# Patient Record
Sex: MALE | Race: WHITE | NOT HISPANIC OR LATINO | Employment: FULL TIME | URBAN - METROPOLITAN AREA
[De-identification: names, ages, dates, MRNs, and addresses within clinical notes are randomized per-mention and may not be internally consistent; named-entity substitution may affect disease eponyms.]

---

## 2017-01-06 ENCOUNTER — PATIENT OUTREACH (OUTPATIENT)
Dept: OTHER | Facility: OTHER | Age: 77
End: 2017-01-06
Payer: MEDICARE

## 2017-01-06 NOTE — PROGRESS NOTES
Last 5 Patient Entered Readings                                                               Current 30 Day Average: 134/72     Recent Readings 12/30/2016 12/29/2016 12/27/2016 12/26/2016 12/25/2016    Systolic BP (mmHg) 139 133 140 138 135    Diastolic BP (mmHg) 72 79 73 72 72    Pulse 60 62 59 58 63        Feeling well. Had a higher reading this morning but feels fine. No HA. BG is also slightly elevated an unsure why. Will continue to monitor. Wife inputs readings weekly.    Follow up with Mr. Reid Herman completed. Patient is maintaining a low sodium diet and continuing his exercise regime. Patient did not have any further questions or concerns. I will follow up in a few weeks to see how he is doing and progressing.

## 2017-01-13 ENCOUNTER — PATIENT OUTREACH (OUTPATIENT)
Dept: OTHER | Facility: OTHER | Age: 77
End: 2017-01-13
Payer: MEDICARE

## 2017-01-13 NOTE — PROGRESS NOTES
Mr. Herman is doing well. He has noticed BP is trending up. Admits to occasional canned foods.     Last 5 Patient Entered Readings                                                               Current 30 Day Average: 134/71     Recent Readings 1/11/2017 1/10/2017 1/9/2017 1/8/2017 1/7/2017    Systolic BP (mmHg) 139 141 115 145 158    Diastolic BP (mmHg) 72 68 61 75 81    Pulse 66 61 59 63 61      BP at goal, trending up  Continue monitoring

## 2017-01-19 ENCOUNTER — TELEPHONE (OUTPATIENT)
Dept: INTERNAL MEDICINE | Facility: CLINIC | Age: 77
End: 2017-01-19

## 2017-01-19 ENCOUNTER — OFFICE VISIT (OUTPATIENT)
Dept: INTERNAL MEDICINE | Facility: CLINIC | Age: 77
End: 2017-01-19
Payer: MEDICARE

## 2017-01-19 VITALS
HEIGHT: 67 IN | OXYGEN SATURATION: 97 % | HEART RATE: 80 BPM | SYSTOLIC BLOOD PRESSURE: 138 MMHG | WEIGHT: 206 LBS | BODY MASS INDEX: 32.33 KG/M2 | TEMPERATURE: 99 F | DIASTOLIC BLOOD PRESSURE: 56 MMHG

## 2017-01-19 DIAGNOSIS — B02.9 HERPES ZOSTER WITHOUT COMPLICATION: Primary | ICD-10-CM

## 2017-01-19 DIAGNOSIS — E10.42 POORLY CONTROLLED TYPE 1 DIABETES MELLITUS WITH PERIPHERAL NEUROPATHY: Chronic | ICD-10-CM

## 2017-01-19 DIAGNOSIS — L30.9 DERMATITIS: ICD-10-CM

## 2017-01-19 DIAGNOSIS — C91.10 CLL (CHRONIC LYMPHOCYTIC LEUKEMIA): ICD-10-CM

## 2017-01-19 DIAGNOSIS — M46.1 SACROILIITIS: ICD-10-CM

## 2017-01-19 DIAGNOSIS — E10.65 POORLY CONTROLLED TYPE 1 DIABETES MELLITUS WITH PERIPHERAL NEUROPATHY: Chronic | ICD-10-CM

## 2017-01-19 DIAGNOSIS — I25.119 CORONARY ARTERY DISEASE INVOLVING NATIVE CORONARY ARTERY OF NATIVE HEART WITH ANGINA PECTORIS: ICD-10-CM

## 2017-01-19 DIAGNOSIS — I70.0 ATHEROSCLEROSIS OF AORTA: ICD-10-CM

## 2017-01-19 DIAGNOSIS — E10.3599 PROLIFERATIVE DIABETIC RETINOPATHY WITHOUT MACULAR EDEMA ASSOCIATED WITH TYPE 1 DIABETES MELLITUS, UNSPECIFIED LATERALITY: ICD-10-CM

## 2017-01-19 DIAGNOSIS — I73.9 PERIPHERAL VASCULAR DISEASE: ICD-10-CM

## 2017-01-19 DIAGNOSIS — D69.2 SENILE PURPURA: ICD-10-CM

## 2017-01-19 PROCEDURE — 99499 UNLISTED E&M SERVICE: CPT | Mod: S$GLB,,, | Performed by: NURSE PRACTITIONER

## 2017-01-19 PROCEDURE — 4010F ACE/ARB THERAPY RXD/TAKEN: CPT | Mod: S$GLB,,, | Performed by: NURSE PRACTITIONER

## 2017-01-19 PROCEDURE — 99213 OFFICE O/P EST LOW 20 MIN: CPT | Mod: S$GLB,,, | Performed by: NURSE PRACTITIONER

## 2017-01-19 PROCEDURE — 1157F ADVNC CARE PLAN IN RCRD: CPT | Mod: S$GLB,,, | Performed by: NURSE PRACTITIONER

## 2017-01-19 PROCEDURE — 3078F DIAST BP <80 MM HG: CPT | Mod: S$GLB,,, | Performed by: NURSE PRACTITIONER

## 2017-01-19 PROCEDURE — 3045F PR MOST RECENT HEMOGLOBIN A1C LEVEL 7.0-9.0%: CPT | Mod: S$GLB,,, | Performed by: NURSE PRACTITIONER

## 2017-01-19 PROCEDURE — 99999 PR PBB SHADOW E&M-EST. PATIENT-LVL IV: CPT | Mod: PBBFAC,,, | Performed by: NURSE PRACTITIONER

## 2017-01-19 PROCEDURE — 1126F AMNT PAIN NOTED NONE PRSNT: CPT | Mod: S$GLB,,, | Performed by: NURSE PRACTITIONER

## 2017-01-19 PROCEDURE — 1159F MED LIST DOCD IN RCRD: CPT | Mod: S$GLB,,, | Performed by: NURSE PRACTITIONER

## 2017-01-19 PROCEDURE — 3075F SYST BP GE 130 - 139MM HG: CPT | Mod: S$GLB,,, | Performed by: NURSE PRACTITIONER

## 2017-01-19 PROCEDURE — 1160F RVW MEDS BY RX/DR IN RCRD: CPT | Mod: S$GLB,,, | Performed by: NURSE PRACTITIONER

## 2017-01-19 RX ORDER — VALACYCLOVIR HYDROCHLORIDE 1 G/1
1000 TABLET, FILM COATED ORAL 3 TIMES DAILY
Qty: 21 TABLET | Refills: 0 | Status: SHIPPED | OUTPATIENT
Start: 2017-01-19 | End: 2017-07-05

## 2017-01-19 RX ORDER — TRIAMCINOLONE ACETONIDE 1 MG/G
OINTMENT TOPICAL 2 TIMES DAILY
Qty: 30 G | Refills: 0 | Status: SHIPPED | OUTPATIENT
Start: 2017-01-19 | End: 2017-07-05

## 2017-01-19 NOTE — MR AVS SNAPSHOT
[facility] Internal Medicine  [address]  [place] [address]  Phone: [phone]  Fax: [phone]                  Reid Herman   2017 5:00 PM   Office Visit    Description:  Male : 1940   Provider:  Elina Martínez NP   Department:  [facility] - Internal Medicine           Reason for Visit     Insect Bite           Diagnoses this Visit        Comments    Herpes zoster without complication    -  Primary     Dermatitis                To Do List           Future Appointments        Provider Department Dept Phone    2017 5:00 PM Elina Martínez NP [facility] Internal Medicine [phone]    2017 8:30 AM LAB, APPOINTMENT [facility] [phone]    2017 8:30 AM MILDRED Fields, FNP [facility] Endocrinology [phone]    2017 8:30 AM Olivia Zavala MD [facility] Internal Medicine [phone]    2017 8:30 AM Marcos Alexander MD [facility] Cardiology [phone]      Goals (5 Years of Data)              Today    Today    17    Blood Pressure <= 140/90   138/56  150/60  139/72    Notes - Note created  2016  3:26 PM by Gilda Turcios, PharmD    It is important to consistently maintain a controlled blood pressure.      Exercise at least 150 minutes per week.           Maintain a low sodium diet           Take at least one BP reading per week at various times of the day           Weight (lb) < 87.5 kg (192 lb 13.6 oz)   93.4 kg (206 lb)        Notes - Note created  2016  3:29 PM by Gilda Turcios, PharmD    Decrease weight by 5% to improve cardiovascular outcomes.         These Medications        Disp Refills Start End    valacyclovir (VALTREX) 1000 MG tablet 21 tablet 0 2017    Take 1 tablet (1,000 mg total) by mouth 3 (three) times daily. - Oral    Pharmacy: [id number] - [place], [place] - [address] AT [facility] [address] Ph #:  [phone]       triamcinolone acetonide 0.1% (KENALOG) 0.1 % ointment 30 g 0 1/19/2017 1/29/2017    Apply topically 2 (two) times daily. - Topical (Top)    Pharmacy: [facility] [address] [place] [address] [address] AT [facility] [address] Ph #: [phone]         [facility] On [facility]     [facility] On Call Nurse Care Line - 24/7 Assistance  Registered nurses in the [facility] On Call Center provide clinical advisement, health education, appointment booking, and other advisory services.  Call for this free service at [phone].             Medications           Message regarding Medications     Verify the changes and/or additions to your medication regime listed below are the same as discussed with your clinician today.  If any of these changes or additions are incorrect, please notify your healthcare provider.        START taking these NEW medications        Refills    valacyclovir (VALTREX) 1000 MG tablet 0    Sig: Take 1 tablet (1,000 mg total) by mouth 3 (three) times daily.    Class: Normal    Route: Oral    triamcinolone acetonide 0.1% (KENALOG) 0.1 % ointment 0    Sig: Apply topically 2 (two) times daily.    Class: Normal    Route: Topical (Top)           Verify that the below list of medications is an accurate representation of the medications you are currently taking.  If none reported, the list may be blank. If incorrect, please contact your healthcare provider. Carry this list with you in case of emergency.           Current Medications     ACCU-CHEK FASTCLIX Misc TEST 6 TIMES DAILY    alpha lipoic acid 600 mg Cap Take 1 capsule by mouth once daily.      aspirin (ECOTRIN) 325 MG EC tablet Take 325 mg by mouth once daily.    atorvastatin (LIPITOR) 80 MG tablet Take 1 tablet (80 mg total) by mouth nightly.    blood sugar diagnostic (ACCU-CHEK SHARON PLUS TEST STRP) Strp Pt test blood sugar 5 times a day.    clotrimazole-betamethasone 1-0.05% (LOTRISONE) cream Apply topically 2  "(two) times daily.    FLUZONE HIGH-DOSE 2016-17, PF, 180 mcg/0.5 mL Syrg     fosinopril-hydrochlorothiazide (MONOPRIL-HCT) 10-12.5 mg per tablet Take 2 tablets by mouth once daily.    L-METHYL-B6-B12 3-35-2 mg Tab TAKE 1 TABLET BY MOUTH EVERY DAY    metformin (GLUCOPHAGE) 500 MG tablet TAKE 1 TABLET TWICE DAILY    metoprolol succinate (TOPROL-XL) 100 MG 24 hr tablet TAKE ONE TABLET BY MOUTH EVERY EVENING    multivitamin capsule Take 1 capsule by mouth once daily.      nitroGLYCERIN (NITROSTAT) 0.4 MG SL tablet Place 1 tablet (0.4 mg total) under the tongue every 5 (five) minutes as needed for Chest pain.    NOVOLOG 100 unit/mL injection VIA INSULIN PUMP INJECT UP TO MAX  UNITS DAILY    triamcinolone acetonide 0.1% (KENALOG) 0.1 % ointment Apply topically 2 (two) times daily.    valacyclovir (VALTREX) 1000 MG tablet Take 1 tablet (1,000 mg total) by mouth 3 (three) times daily.           Clinical Reference Information           Vital Signs - Last Recorded  Most recent update: 1/19/2017 11:45 AM by Elina Martínez NP    BP Pulse Temp Ht Wt SpO2    (!) 138/56 (BP Location: Right arm, Patient Position: Sitting, BP Method: Manual) 80 99.2 °F (37.3 °C) 5' 7" (1.702 m) 93.4 kg (206 lb) 97%    BMI                32.26 kg/m2          Blood Pressure          Most Recent Value    BP  (!)  138/56      Allergies as of 1/19/2017     No Known Allergies      Immunizations Administered on Date of Encounter - 1/19/2017     None      "

## 2017-01-19 NOTE — TELEPHONE ENCOUNTER
Patient walked in today 1/19/17 complaining of possible bug bites on the back of his head and neck. Notified MD and was able to scheduled an with extended hours to see Elina Martínez Np.

## 2017-01-19 NOTE — PROGRESS NOTES
Subjective:       Patient ID: Reid Herman is a 76 y.o. male.    Chief Complaint: Shagufta Mathews is a 76 y.o. male who presents today for a rash on the back of his head that has been present since Sunday.  Early on Sunday he was working in his yard and later in the day he noticed several slightly painful red spot on the back of his head that his wife pointed out.  They tried putting Neosporin on the area with no improvement.  He reports that the pain from the rash has gotten slightly worse since Sunday.  He describes the pain as tender to the touch and says there is a slight tingling feeling.  He has not been able to use his CPAP machine for the last few days because the strap hits at exactly the point of the rash and causes significant discomfort.  Rash   This is a new problem. The current episode started in the past 7 days. The problem has been gradually worsening since onset. The affected locations include the scalp and head. The rash is characterized by redness, swelling and pain. It is unknown if there was an exposure to a precipitant. Pertinent negatives include no diarrhea, fever, shortness of breath or vomiting. Past treatments include antibiotic cream. The treatment provided no relief.     Review of Systems   Constitutional: Negative for fever.   HENT: Negative for facial swelling.    Eyes: Negative for visual disturbance.   Respiratory: Negative for shortness of breath.    Cardiovascular: Negative for chest pain.   Gastrointestinal: Negative for diarrhea, nausea and vomiting.   Genitourinary: Negative for difficulty urinating.   Musculoskeletal: Negative for gait problem.   Skin: Positive for rash.   Neurological: Negative for headaches.   Psychiatric/Behavioral: Negative for confusion.       Objective:      Physical Exam   Constitutional: He is oriented to person, place, and time. He appears well-developed. No distress.   obese   HENT:   Head: Atraumatic.   Eyes: Right eye exhibits no discharge.  Left eye exhibits no discharge.   Neck: Normal range of motion. Neck supple.   Cardiovascular: Normal rate, regular rhythm and normal heart sounds.    Pulmonary/Chest: Effort normal and breath sounds normal. No respiratory distress.   Musculoskeletal: He exhibits edema.   Swelling to LE bilat   Lymphadenopathy:     He has no cervical adenopathy.   Neurological: He is alert and oriented to person, place, and time.   Skin: He is not diaphoretic.        Psychiatric: His behavior is normal.   Nursing note and vitals reviewed.      Assessment:       1. Herpes zoster without complication    2. Dermatitis    3. 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 Peripheral vascular disease    5. Coronary artery disease involving native coronary artery of native heart with angina pectoris    6. Atherosclerosis of aorta    7. CLL (chronic lymphocytic leukemia)    8. Sacroiliitis    9. Senile purpura    10. Proliferative diabetic retinopathy without macular edema associated with type 1 diabetes mellitus, unspecified laterality        Plan:   Reid was seen today for rash.    Diagnoses and all orders for this visit:    Herpes zoster without complication  -     valacyclovir (VALTREX) 1000 MG tablet; Take 1 tablet (1,000 mg total) by mouth 3 (three) times daily.  - Lesion distribution and pain quality consistent with shingles, but no definitive vesicular lesions.  He had shingles vaccines in 2012, so I suspect this is a mild outbreak.   - Call Monday if no improvement or sooner if interval worsening occurs.   Dermatitis  -     triamcinolone acetonide 0.1% (KENALOG) 0.1 % ointment; Apply topically 2 (two) times daily.    Poorly controlled type 1 diabetes mellitus with peripheral neuropathy  Last A1c was 7.5.  Followed by endocrinology and PCP    Peripheral vascular disease  - Stable, followed by cardiology and PCP    Coronary artery disease involving native coronary artery of native heart with angina  pectoris  - Occasional use of Nitro, generally following strenuous activity. Stable, followed by cardiology and PCP    Atherosclerosis of aorta  - Stable, chronic findings.  BP and lipids are well controlled.  Followed by PCP and cardiology    CLL (chronic lymphocytic leukemia)  - Stable, followed by oncology    Sacroiliitis  - Pain has been improved recently. PCP following.     Senile purpura  - Numerous  ecchymotic lesions to forearms bilaterally.  Sees dermatology regularly.     Proliferative diabetic retinopathy without macular edema associated with type 1 diabetes mellitus, unspecified laterality  - Stable, followed by opthalmology.           Pt has been given instructions populated from [employer] database and has verbalized understanding of the after visit summary and information contained wherein.    Follow up with a primary care provider. May go to ER for acute shortness of breath, lightheadedness, fever, or any other emergent complaints or changes in condition.

## 2017-01-23 ENCOUNTER — TELEPHONE (OUTPATIENT)
Dept: OPHTHALMOLOGY | Facility: CLINIC | Age: 77
End: 2017-01-23

## 2017-01-23 ENCOUNTER — LAB VISIT (OUTPATIENT)
Dept: LAB | Facility: HOSPITAL | Age: 77
End: 2017-01-23
Attending: NURSE PRACTITIONER
Payer: MEDICARE

## 2017-01-23 DIAGNOSIS — E10.65 POORLY CONTROLLED TYPE 1 DIABETES MELLITUS WITH PERIPHERAL NEUROPATHY: Chronic | ICD-10-CM

## 2017-01-23 DIAGNOSIS — E10.319: ICD-10-CM

## 2017-01-23 DIAGNOSIS — E10.42 DIABETIC POLYNEUROPATHY ASSOCIATED WITH TYPE 1 DIABETES MELLITUS: ICD-10-CM

## 2017-01-23 DIAGNOSIS — E10.22 TYPE 1 DIABETES MELLITUS WITH STAGE 3 CHRONIC KIDNEY DISEASE: ICD-10-CM

## 2017-01-23 DIAGNOSIS — E10.42 POORLY CONTROLLED TYPE 1 DIABETES MELLITUS WITH PERIPHERAL NEUROPATHY: Chronic | ICD-10-CM

## 2017-01-23 DIAGNOSIS — N18.30 TYPE 1 DIABETES MELLITUS WITH STAGE 3 CHRONIC KIDNEY DISEASE: ICD-10-CM

## 2017-01-23 LAB
ESTIMATED AVG GLUCOSE: 174 MG/DL
HBA1C MFR BLD HPLC: 7.7 %

## 2017-01-23 PROCEDURE — 36415 COLL VENOUS BLD VENIPUNCTURE: CPT

## 2017-01-23 PROCEDURE — 83036 HEMOGLOBIN GLYCOSYLATED A1C: CPT

## 2017-01-23 NOTE — TELEPHONE ENCOUNTER
----- Message from Olya Maya sent at 1/23/2017  1:50 PM CST -----  Contact: Reid Herman   Pt would like to speak with  nurse to scheduled the recall pt can be reached at [phone] please thanks.

## 2017-01-26 ENCOUNTER — TELEPHONE (OUTPATIENT)
Dept: INTERNAL MEDICINE | Facility: CLINIC | Age: 77
End: 2017-01-26

## 2017-01-26 NOTE — TELEPHONE ENCOUNTER
----- Message from Chery Roy sent at 1/26/2017 10:31 AM CST -----  Contact: Self/ [phone]   Pt want to know what should he do next. He is about to take the last pill today. Please call and advise     Thank you

## 2017-01-26 NOTE — TELEPHONE ENCOUNTER
Please call and find out if the problem has resolved. If so, he does not need to do anything.  If it has not, he needs to see dermatology

## 2017-01-26 NOTE — TELEPHONE ENCOUNTER
Called pt, he states problem is still occurring. made dermatology appt. Pt advised to call and see if he can get in for an earlier appt.

## 2017-01-31 ENCOUNTER — OFFICE VISIT (OUTPATIENT)
Dept: ENDOCRINOLOGY | Facility: CLINIC | Age: 77
End: 2017-01-31
Payer: MEDICARE

## 2017-01-31 VITALS
HEIGHT: 67 IN | DIASTOLIC BLOOD PRESSURE: 62 MMHG | BODY MASS INDEX: 32.28 KG/M2 | SYSTOLIC BLOOD PRESSURE: 135 MMHG | HEART RATE: 64 BPM | WEIGHT: 205.69 LBS

## 2017-01-31 DIAGNOSIS — I15.0 RENOVASCULAR HYPERTENSION: ICD-10-CM

## 2017-01-31 DIAGNOSIS — B02.8 HERPES ZOSTER WITH COMPLICATION: ICD-10-CM

## 2017-01-31 DIAGNOSIS — Z96.41 INSULIN PUMP STATUS: ICD-10-CM

## 2017-01-31 DIAGNOSIS — C91.10 CLL (CHRONIC LYMPHOCYTIC LEUKEMIA): ICD-10-CM

## 2017-01-31 DIAGNOSIS — E78.2 MIXED HYPERLIPIDEMIA: ICD-10-CM

## 2017-01-31 DIAGNOSIS — E10.42 POORLY CONTROLLED TYPE 1 DIABETES MELLITUS WITH PERIPHERAL NEUROPATHY: Chronic | ICD-10-CM

## 2017-01-31 DIAGNOSIS — E10.319 DIABETIC RETINOPATHY WITHOUT MACULAR EDEMA ASSOCIATED WITH TYPE 1 DIABETES MELLITUS, UNSPECIFIED LATERALITY, UNSPECIFIED RETINOPATHY SEVERITY: ICD-10-CM

## 2017-01-31 DIAGNOSIS — E10.65 POORLY CONTROLLED TYPE 1 DIABETES MELLITUS WITH PERIPHERAL NEUROPATHY: Chronic | ICD-10-CM

## 2017-01-31 DIAGNOSIS — N18.30 CHRONIC KIDNEY DISEASE, STAGE III (MODERATE): ICD-10-CM

## 2017-01-31 DIAGNOSIS — N18.30 TYPE 1 DIABETES MELLITUS WITH STAGE 3 CHRONIC KIDNEY DISEASE: Primary | ICD-10-CM

## 2017-01-31 DIAGNOSIS — E10.22 TYPE 1 DIABETES MELLITUS WITH STAGE 3 CHRONIC KIDNEY DISEASE: Primary | ICD-10-CM

## 2017-01-31 PROBLEM — B02.9 SHINGLES: Status: ACTIVE | Noted: 2017-01-31

## 2017-01-31 PROCEDURE — 1126F AMNT PAIN NOTED NONE PRSNT: CPT | Mod: S$GLB,,, | Performed by: NURSE PRACTITIONER

## 2017-01-31 PROCEDURE — 99999 PR PBB SHADOW E&M-EST. PATIENT-LVL IV: CPT | Mod: PBBFAC,,, | Performed by: NURSE PRACTITIONER

## 2017-01-31 PROCEDURE — 1157F ADVNC CARE PLAN IN RCRD: CPT | Mod: S$GLB,,, | Performed by: NURSE PRACTITIONER

## 2017-01-31 PROCEDURE — 99215 OFFICE O/P EST HI 40 MIN: CPT | Mod: S$GLB,,, | Performed by: NURSE PRACTITIONER

## 2017-01-31 PROCEDURE — 1159F MED LIST DOCD IN RCRD: CPT | Mod: S$GLB,,, | Performed by: NURSE PRACTITIONER

## 2017-01-31 PROCEDURE — 3075F SYST BP GE 130 - 139MM HG: CPT | Mod: S$GLB,,, | Performed by: NURSE PRACTITIONER

## 2017-01-31 PROCEDURE — 1160F RVW MEDS BY RX/DR IN RCRD: CPT | Mod: S$GLB,,, | Performed by: NURSE PRACTITIONER

## 2017-01-31 PROCEDURE — 3045F PR MOST RECENT HEMOGLOBIN A1C LEVEL 7.0-9.0%: CPT | Mod: S$GLB,,, | Performed by: NURSE PRACTITIONER

## 2017-01-31 PROCEDURE — 4010F ACE/ARB THERAPY RXD/TAKEN: CPT | Mod: S$GLB,,, | Performed by: NURSE PRACTITIONER

## 2017-01-31 PROCEDURE — 99499 UNLISTED E&M SERVICE: CPT | Mod: S$GLB,,, | Performed by: NURSE PRACTITIONER

## 2017-01-31 PROCEDURE — 3078F DIAST BP <80 MM HG: CPT | Mod: S$GLB,,, | Performed by: NURSE PRACTITIONER

## 2017-01-31 NOTE — PATIENT INSTRUCTIONS
Snacks can be an important part of a balanced, healthy meal plan. They allow you to eat more frequently, feeling full and satisfied throughout the day. Also, they allow you to spread carbohydrates evenly, which may stabilize blood sugars.  Plus, snacks are enjoyable!     The amount of carbohydrate needed at snacks varies. Generally, about 15-30 grams of carbohydrate per snack is recommended.  Below you will find some tasty treats.       0-5 gm carb   Crystal Light   Vitamin Water Zero   Herbal tea, unsweetened   2 tsp peanut butter on celery   1./2 cup sugar-free jell-o   1 sugar-free popsicle   ¼ cup blueberries   8oz Blue Angelique unsweetened almond milk   5 baby carrots & celery sticks, cucumbers, bell peppers dipped in ¼ cup salsa, 2Tbsp light ranch dressing or 2Tbsp plain Greek yogurt   10 Goldfish crackers   ½ oz low-fat cheese or string cheese   1 closed handful of nuts, unsalted   1 Tbsp of sunflower seeds, unsalted   1 cup Smart Pop popcorn   1 whole grain brown rice cake        15 gm carb   1 small piece of fruit or ½ banana or 1/2 cup lite canned fruit   3 ellyn cracker squares   3 cups Smart Pop popcorn, top spray butter, Nelson lite salt or cinnamon and Truvia   5 Vanilla Wafers   ½ cup low fat, no added sugar ice cream or frozen yogurt (Blue bell, Blue Bunny, Weight Watchers, Skinny Cow)   ½ turkey, ham, or chicken sandwich   ½ c fruit with ½ c Cottage cheese   4-6 unsalted wheat crackers with 1 oz low fat cheese or 1 tbsp peanut butter    30-45 goldfish crackers (depending on flavor)    7-8 Scientology mini brown rice cakes (caramel, apple cinnamon, chocolate)    12 Scientology mini brown rice cakes (cheddar, bbq, ranch)    1/3 cup hummus dip with raw veg   1/2 whole wheat celina, 1Tbsp hummus   Mini Pizza (1/2 whole wheat English muffin, low-fat  cheese, tomato sauce)   100 calorie snack pack (Oreo, Chips Ahoy, Ritz Mix, Baked Cheetos)   4-6 oz. light or Greek Style yogurt  (Guerita Childs, Michelle, [facility])   ½ cup sugar-free pudding     6 in. wheat tortilla or celina oven toasted chips (topped with spray butter flavoring, cinnamon, Truvia OR spray butter, garlic powder, chili powder)    18 BBQ Popchips (available at Target, Whole Foods, Fresh Market)                   Diabetes Support Group Meetings    Date Topics   February 9 Health Promotion/Nutrition   March 9 Taking Care of Your Kidneys   April 13 Taking Care of Your Feet   May 11 Ease Your Mind with Diabetes   June 8 Hurricane and Emergency Preparedness   July 13 Family & Caregiver Support for Diabetes   *August 17  Taking Care of Your Eyes   September 14 Devices & Technology   October 12 Recipes & Treats   November 9 Getting Pumped Up for Diabetes   December 14 Year-End Close Out            Meetings are held in the [facility] (A) of the [facility] located at [address] [place] [address]. Please call [phone] for additional information.    Free service, offered every 2nd Thursday of every month, except in August! Family members and/or friends are welcome as well!  Support group is for patients with type 1 or type 2 diabetes.    From 3:30p to 4:30p

## 2017-01-31 NOTE — PROGRESS NOTES
CC: This 76 y.o.  male presents for management of Diabetes Mellitus   along with the current chronic medical conditions including:  Patient Active Problem List   Diagnosis    CLL (chronic lymphocytic leukemia)        Diabetic retinopathy associated with type 1 diabetes mellitus    Insulin pump status    Diabetic polyneuropathy associated with type 1 diabetes mellitus    Poorly controlled type 1 diabetes mellitus with peripheral neuropathy    Type 1 diabetes, uncontrolled, with retinopathy    CAD (coronary artery disease)    PDR (proliferative diabetic retinopathy) - Right Eye    NPDR (nonproliferative diabetic retinopathy) - Left Eye    Posterior vitreous detachment - Both Eyes    Hyperlipidemia    HTN (hypertension)            Pseudophakia    BPH with urinary obstruction    Erectile dysfunction    S/P CABG x 2            Insulin pump fitting or adjustment    LBP (low back pain)    Vitreous hemorrhage    Lumbar facet arthropathy    DDD (degenerative disc disease), lumbar    Obstructive sleep apnea    Peripheral vascular disease    Lumbar spondylosis            Spondylolisthesis    S/P lumbar fusion      Status of these conditions is pending review.    HPI: Pt was diagnosed with T1DM in 1972- started on insulin.   Never hospitalized r/t DM recently.   Pt last seen by me in September 2016 and is now being seen by me again today.   Pt currently has accuchek spirit pump/sarai expert.    Pt reports some hypoglycemia in the evening-with possible over correction and a few in the am (50s).  Pt recently was treated for shingles, currently has topical steroid cream and on valtrex.    CURRENT DM MEDS: Metformin 500 mg BID, accuchek spirit pump   See epic download    Pt is monitoring BG at home 5 times a day.   Pt has had hypoglycemia at home- 60s-70s  In am x 2 weeks ago-corrects w/ juice, lowest 44, highest 295  Reid Herman brought glucometer or log to clinic today revealing the  following BG readings:    See download    tdd 48.65  51.2 % bolus, 48.8% basal    DIET/ MEAL PATTERN: Pt eats 3 meals a day    Yogurt, fruit, sugar free cookies     Snack around 7-8p nuts    EXERCISE: no formal exercise, walks occasionally-has cane, PT for back     STANDARDS OF CARE:  Eye exam: 2016, f/u q4-6 mos (floaters/injections/laser therapy), AMD, vit   Podiatry: 2016 f/u q2-3 mos toe nail clipping/ingrown toe nail problem (Dr. Maye Wyatt)                       ROS:   Gen: Appetite good, no weight gain or loss, denies fatigue and + weakness (after sx)-physical therapy.  Skin: Skin is intact and heals well, +shingles (on head, neck), + pruritis, easy bruising, no hair changes, no intolerance to heat/cold.  Eyes: + visual disturbances (floaters)-2015, 2016   Resp: no SOB or DIOR, no cough  Cardiac: No palpitations, chest pain, denies syncope, weakness, no edema or cyanosis.  GI: No nausea or vomiting, diarrhea, +constipation-r/t pain meds, or abdominal pain.  /GYN: No nocturia, no urinary frequency, burning or pain.   PVD: + leg pain with or without exercise, denies cyanosis, pallor, or cold extremities.  MS/Neuro: + numbness/ tingling in BLE; FROM of joints without swelling or pain. Gait mostly steady, has back brace, speech clear, no tremor, coordination problem. + back pain-improved  Psych: Denies drug/ETOH abuse, no hx. of eating disorders or depression.  Other systems: negative.    Lab Results   Component Value Date    HGBA1C 7.7 (H) 01/23/2017     Lab Results   Component Value Date    TSH 0.901 02/24/2016     No results found for: MICROALBUR    Chemistry        Component Value Date/Time     (L) 10/03/2016 0857    K 4.6 10/03/2016 0857    CL 95 10/03/2016 0857    CO2 28 10/03/2016 0857    BUN 17 10/03/2016 0857    CREATININE 1.2 10/03/2016 0857     (H) 10/03/2016 0857        Component Value Date/Time    CALCIUM 9.2 10/03/2016 0857    ALKPHOS 80 10/03/2016 0857    AST 17 10/03/2016  0857    ALT 17 10/03/2016 0857    BILITOT 0.6 10/03/2016 0857          Lab Results   Component Value Date    LDLCALC 65.6 02/24/2016       PE:  GENERAL: Well developed, well nourished.  PSYCH: AAOx3, appropriate mood and affect, pleasant expression, conversant, appears relaxed, well groomed.   EYES: Conjunctiva, corneas clear, EOM intact  NECK: Supple, trachea midline  CHEST: Resp even and unlabored, CTA bilateral.  CARDIAC: s1, s2 normal, no edema noted to BLE   ABDOMEN: soft, non distended   VASCULAR: Same temperature peripherally to centrally, DP pulses +2/4 bilaterally, no edema, brisk capillary refill BUE.  NEURO: Gait mostly steady, has back brace   SKIN: Normal skin turgor. Skin warm and dry. No areas of breakdown, + acanthosis nigracans +noted healing lesions on head/neck, accuchek spirit intact.  FEET: Footware appropriate, wearing diabetic shoes.     ASSESSMENT and PLAN:  Reid was seen today for diabetes mellitus.    Diagnoses and associated orders for this visit:  1. Type 1 diabetes mellitus with stage 3 chronic kidney disease  Hemoglobin A1c next time  a1c goal less than 7.5%  DM uncontrolled,  Reviewed download, and lab work  Change basal mn-0600, 9p-mn, 0.9 u/hr, 0600-9pm 1.15 u/hr  icr 1:9, iob 4 hours, target 110- 120, isf 35  Continue metformin 500 mg bid  Counseling >35 mins  Insulin resistance noted, 0.7-0.8 u/kg      Lipid panel next time     Microalbumin/creatinine urine ratio next time    2. Diabetic retinopathy without macular edema associated with type 1 diabetes mellitus, unspecified laterality, unspecified retinopathy severity  See retinal specialist, see above   3. Poorly controlled type 1 diabetes mellitus with peripheral neuropathy  See above, f/u with podiatry    4. Insulin pump status  See above    5. Mixed hyperlipidemia  Lipid panel next time,   Lab Results   Component Value Date    LDLCALC 65.6 02/24/2016        6. Renovascular hypertension  stable   7. Chronic kidney disease,  stage III (moderate)  Continue to monitor, stable   8. CLL (chronic lymphocytic leukemia)  F/u with hem/onc   9. DDD F/u with specialist   10. Herpes zoster with complication  Continue treatment, f/u with pcp

## 2017-02-02 ENCOUNTER — OFFICE VISIT (OUTPATIENT)
Dept: INTERNAL MEDICINE | Facility: CLINIC | Age: 77
End: 2017-02-02
Payer: MEDICARE

## 2017-02-02 VITALS
DIASTOLIC BLOOD PRESSURE: 70 MMHG | HEART RATE: 76 BPM | BODY MASS INDEX: 32.15 KG/M2 | SYSTOLIC BLOOD PRESSURE: 124 MMHG | HEIGHT: 67 IN | WEIGHT: 204.81 LBS

## 2017-02-02 DIAGNOSIS — N18.30 CHRONIC KIDNEY DISEASE, STAGE III (MODERATE): ICD-10-CM

## 2017-02-02 DIAGNOSIS — E10.65 POORLY CONTROLLED TYPE 1 DIABETES MELLITUS WITH PERIPHERAL NEUROPATHY: Primary | Chronic | ICD-10-CM

## 2017-02-02 DIAGNOSIS — H43.13 VITREOUS HEMORRHAGE, BILATERAL: ICD-10-CM

## 2017-02-02 DIAGNOSIS — G47.33 OBSTRUCTIVE SLEEP APNEA: ICD-10-CM

## 2017-02-02 DIAGNOSIS — E10.42 POORLY CONTROLLED TYPE 1 DIABETES MELLITUS WITH PERIPHERAL NEUROPATHY: Primary | Chronic | ICD-10-CM

## 2017-02-02 DIAGNOSIS — Z12.11 COLON CANCER SCREENING: ICD-10-CM

## 2017-02-02 DIAGNOSIS — E78.00 PURE HYPERCHOLESTEROLEMIA: ICD-10-CM

## 2017-02-02 DIAGNOSIS — I10 ESSENTIAL HYPERTENSION: ICD-10-CM

## 2017-02-02 PROCEDURE — 99499 UNLISTED E&M SERVICE: CPT | Mod: S$GLB,,, | Performed by: INTERNAL MEDICINE

## 2017-02-02 PROCEDURE — 3078F DIAST BP <80 MM HG: CPT | Mod: S$GLB,,, | Performed by: INTERNAL MEDICINE

## 2017-02-02 PROCEDURE — 3074F SYST BP LT 130 MM HG: CPT | Mod: S$GLB,,, | Performed by: INTERNAL MEDICINE

## 2017-02-02 PROCEDURE — 99213 OFFICE O/P EST LOW 20 MIN: CPT | Mod: S$GLB,,, | Performed by: INTERNAL MEDICINE

## 2017-02-02 PROCEDURE — 99999 PR PBB SHADOW E&M-EST. PATIENT-LVL III: CPT | Mod: PBBFAC,,, | Performed by: INTERNAL MEDICINE

## 2017-02-02 RX ORDER — NITROGLYCERIN 0.4 MG/1
0.4 TABLET SUBLINGUAL EVERY 5 MIN PRN
Qty: 50 TABLET | Refills: 12 | Status: SHIPPED | OUTPATIENT
Start: 2017-02-02 | End: 2017-08-16 | Stop reason: SDUPTHER

## 2017-02-02 NOTE — PROGRESS NOTES
"Subjective:       Patient ID: Reid Herman is a 77 y.o. male.    Chief Complaint: Follow-up    HPI   Patient was last seen by me 8/15/2016    Visits since then:  Endocrinology: 1/31/2017: insulin adjusted  Urgent Care: Herpes zoster: left ear, treated with valtrex  Digital hypertension clinic: reviewed and controlled  Podiatrist: follow up every 3 months  Urology: annual for psa  Neurosurgery: Dr. Pinedo: no further follow up  Hematology: Dr. Cabrera: follow up April  Sleep Clinic: Ms Fletcher monitoring CPAP  Opthamology: Dr. Mac    [place] made for Cardiology: Dr. Alexander    Diabetes type 2    Visit Vitals    /70    Pulse 76    Ht 5' 7" (1.702 m)    Wt 92.9 kg (204 lb 12.9 oz)    BMI 32.08 kg/m2   ,   Hemoglobin A1C   Date Value Ref Range Status   01/23/2017 7.7 (H) 4.5 - 6.2 % Final     Comment:     According to ADA guidelines, hemoglobin A1C <7.0% represents  optimal control in non-pregnant diabetic patients.  Different  metrics may apply to specific populations.   Standards of Medical Care in Diabetes - 2016.  For the purpose of screening for the presence of diabetes:  <5.7%     Consistent with the absence of diabetes  5.7-6.4%  Consistent with increasing risk for diabetes   (prediabetes)  >or=6.5%  Consistent with diabetes  Currently no consensus exists for use of hemoglobin A1C  for diagnosis of diabetes for children.     09/22/2016 7.5 (H) 4.5 - 6.2 % Final     Comment:     According to ADA guidelines, hemoglobin A1C <7.0% represents  optimal control in non-pregnant diabetic patients.  Different  metrics may apply to specific populations.   Standards of Medical Care in Diabetes - 2016.  For the purpose of screening for the presence of diabetes:  <5.7%     Consistent with the absence of diabetes  5.7-6.4%  Consistent with increasing risk for diabetes   (prediabetes)  >or=6.5%  Consistent with diabetes  Currently no consensus exists for use of hemoglobin A1C  for diagnosis of diabetes for children.   "   05/31/2016 7.6 (H) 4.5 - 6.2 % Final   ,   Creatinine   Date Value Ref Range Status   10/03/2016 1.2 0.5 - 1.4 mg/dL Final   05/31/2016 1.2 0.5 - 1.4 mg/dL Final   02/24/2016 1.3 0.5 - 1.4 mg/dL Final       Lab Results   Component Value Date    LDLCALC 65.6 02/24/2016    LDLCALC 62.8 (L) 02/18/2015    LDLCALC 61.8 (L) 03/22/2014             No other new symptoms  Review of Systems   Constitutional: Negative for appetite change, chills, fever and unexpected weight change.   Respiratory: Negative for shortness of breath and wheezing.    Cardiovascular: Negative for chest pain, palpitations and leg swelling.   Gastrointestinal: Negative for abdominal pain, blood in stool, diarrhea, nausea and vomiting.       Objective:      Physical Exam   Constitutional: He is oriented to person, place, and time. He appears well-developed and well-nourished. No distress.   Neck: Carotid bruit is not present. No thyromegaly present.   Cardiovascular: Normal rate, regular rhythm and normal heart sounds.  PMI is not displaced.    Pulmonary/Chest: Effort normal and breath sounds normal. No respiratory distress.   Abdominal: Soft. Bowel sounds are normal. He exhibits no distension. There is no tenderness.   Musculoskeletal: He exhibits no edema.   Neurological: He is alert and oriented to person, place, and time.       Assessment:       1. Poorly controlled type 1 diabetes mellitus with peripheral neuropathy    2. Vitreous hemorrhage, bilateral    3. Obstructive sleep apnea    4. Essential hypertension    5. Colon cancer screening    6. Pure hypercholesterolemia    7. Chronic kidney disease, stage III (moderate)        Plan:       Reid was seen today for follow-up.    Diagnoses and all orders for this visit:    Poorly controlled type 1 diabetes mellitus with peripheral neuropathy: endocrine adjusted insulin    Vitreous hemorrhage, bilateral, stable follow up in [place] in March    Obstructive sleep apnea: tries to use equipment  encourage    Essential hypertension: well controlled same med    Colon cancer screening  -     Case request GI: COLONOSCOPY    Pure hypercholesterolemia: same med, stable    Chronic kidney disease, stage III (moderate). Stable, no change in med    Other orders  -     nitroGLYCERIN (NITROSTAT) 0.4 MG SL tablet; Place 1 tablet (0.4 mg total) under the tongue every 5 (five) minutes as needed for Chest pain.      Return in about 6 months (around 8/2/2017) for Annual exam.    New Prescriptions    No medications on file       Modified Medications    Modified Medication Previous Medication    NITROGLYCERIN (NITROSTAT) 0.4 MG SL TABLET nitroGLYCERIN (NITROSTAT) 0.4 MG SL tablet       Place 1 tablet (0.4 mg total) under the tongue every 5 (five) minutes as needed for Chest pain.    Place 1 tablet (0.4 mg total) under the tongue every 5 (five) minutes as needed for Chest pain.       No orders of the defined types were placed in this encounter.      Labs, studies and consults associated with this visit were reviewed

## 2017-02-02 NOTE — MR AVS SNAPSHOT
[facility] - Internal Medicine  [address]  [facility] [address]  Phone: [phone]  Fax: [phone]                  Reid Herman   2017 8:30 AM   Office Visit    Description:  Male : 1940   Provider:  Olivia Zavala MD   Department:  [facility] Internal Medicine           Reason for Visit     Follow-up           Diagnoses this Visit        Comments    Poorly controlled type 1 diabetes mellitus with peripheral neuropathy    -  Primary     Vitreous hemorrhage, bilateral         Obstructive sleep apnea         Essential hypertension         Colon cancer screening         Pure hypercholesterolemia         Chronic kidney disease, stage III (moderate)                To Do List           Future Appointments        Provider Department Dept Phone    2017 8:30 AM [facility] [phone]    3/10/2017 8:30 AM Adrianne [facility] Dermatology [phone]    3/20/2017 9:00 AM Maye Wyatt DPM [facility] Podiatry [phone]    2017 7:30 AM Catrina Mac MD [facility] Ophthalmology [phone]    2017 8:00 AM LAB[facility] [phone]      To Schedule:     Please call the Endoscopy Department at [phone] to schedule your appointment.          Goals (5 Years of Data)              Today    17    Blood Pressure <= 140/90   124/70  135/62  146/84    Notes - Note created  2016  3:26 PM by Gilda Turcios, PharmD    It is important to consistently maintain a controlled blood pressure.      Exercise at least 150 minutes per week.           Maintain a low sodium diet           Take at least one BP reading per week at various times of the day           Weight (lb) < 87.5 kg (192 lb 13.6 oz)   92.9 kg (204 lb 12.9 oz)  93.3 kg (205 lb 11.2 oz)      Notes - Note created  2016  3:29 PM by Gilda Turcios, PharmD    Decrease weight by 5% to  improve cardiovascular outcomes.        Follow-Up and Disposition     Return in about 6 months (around 8/2/2017) for Annual exam.       These Medications        Disp Refills Start End    nitroGLYCERIN (NITROSTAT) 0.4 MG SL tablet 50 tablet 12 2/2/2017 2/2/2018    Place 1 tablet (0.4 mg total) under the tongue every 5 (five) minutes as needed for Chest pain. - Sublingual    Pharmacy: [facility] [address] [place] [address] [address] AT [facility]  #: [phone]       Notes to Pharmacy: Request either a 4 pack or 2 pack of 25 each      [facility]     [facility] Care [facility] - 24/7 Assistance  Registered nurses in the [facility] provide clinical advisement, health education, appointment booking, and other advisory services.  Call for this free service at [phone].             Medications           Message regarding Medications     Verify the changes and/or additions to your medication regime listed below are the same as discussed with your clinician today.  If any of these changes or additions are incorrect, please notify your healthcare provider.             Verify that the below list of medications is an accurate representation of the medications you are currently taking.  If none reported, the list may be blank. If incorrect, please contact your healthcare provider. Carry this list with you in case of emergency.           Current Medications     ACCU-CHEK FASTCLIX Misc TEST 6 TIMES DAILY    alpha lipoic acid 600 mg Cap Take 1 capsule by mouth once daily.      aspirin (ECOTRIN) 325 MG EC tablet Take 325 mg by mouth once daily.    atorvastatin (LIPITOR) 80 MG tablet Take 1 tablet (80 mg total) by mouth nightly.    blood sugar diagnostic (ACCU-CHEK SHARON PLUS TEST STRP) Strp Pt test blood sugar 5 times a day.    clotrimazole-betamethasone 1-0.05% (LOTRISONE) cream Apply topically 2 (two) times daily.    fosinopril-hydrochlorothiazide  "(MONOPRIL-HCT) 10-12.5 mg per tablet Take 2 tablets by mouth once daily.    L-METHYL-B6-B12 3-35-2 mg Tab TAKE 1 TABLET BY MOUTH EVERY DAY    metformin (GLUCOPHAGE) 500 MG tablet TAKE 1 TABLET TWICE DAILY    metoprolol succinate (TOPROL-XL) 100 MG 24 hr tablet TAKE ONE TABLET BY MOUTH EVERY EVENING    multivitamin capsule Take 1 capsule by mouth once daily.      nitroGLYCERIN (NITROSTAT) 0.4 MG SL tablet Place 1 tablet (0.4 mg total) under the tongue every 5 (five) minutes as needed for Chest pain.    NOVOLOG 100 unit/mL injection VIA INSULIN PUMP INJECT UP TO MAX  UNITS DAILY    FLUZONE HIGH-DOSE 2016-17, PF, 180 mcg/0.5 mL Syrg     triamcinolone acetonide 0.1% (KENALOG) 0.1 % ointment Apply topically 2 (two) times daily.    valacyclovir (VALTREX) 1000 MG tablet Take 1 tablet (1,000 mg total) by mouth 3 (three) times daily.           Clinical Reference Information           Vital Signs - Last Recorded  Most recent update: 2/2/2017  8:19 AM by Perla Barrios MA    BP Pulse Ht Wt BMI    124/70 76 5' 7" (1.702 m) 92.9 kg (204 lb 12.9 oz) 32.08 kg/m2      Blood Pressure          Most Recent Value    BP  124/70      Allergies as of 2/2/2017     No Known Allergies      Immunizations Administered on Date of Encounter - 2/2/2017     None      Orders Placed During Today's Visit      Normal Orders This Visit    Case request GI: COLONOSCOPY       "

## 2017-02-03 ENCOUNTER — PATIENT OUTREACH (OUTPATIENT)
Dept: OTHER | Facility: OTHER | Age: 77
End: 2017-02-03
Payer: MEDICARE

## 2017-02-03 NOTE — PROGRESS NOTES
Last 5 Patient Entered Readings                                                               Current 30 Day Average: 140/72     Recent Readings 1/31/2017 1/30/2017 1/29/2017 1/28/2017 1/27/2017    Systolic BP (mmHg) 146 143 140 125 142    Diastolic BP (mmHg) 84 68 72 63 76    Pulse 61 65 64 63 65

## 2017-02-07 RX ORDER — INSULIN ASPART 100 [IU]/ML
INJECTION, SOLUTION INTRAVENOUS; SUBCUTANEOUS
Qty: 30 ML | Refills: 4 | Status: SHIPPED | OUTPATIENT
Start: 2017-02-07 | End: 2018-01-07 | Stop reason: SDUPTHER

## 2017-02-08 DIAGNOSIS — Z12.11 SPECIAL SCREENING FOR MALIGNANT NEOPLASMS, COLON: Primary | ICD-10-CM

## 2017-02-08 RX ORDER — POLYETHYLENE GLYCOL 3350, SODIUM SULFATE ANHYDROUS, SODIUM BICARBONATE, SODIUM CHLORIDE, POTASSIUM CHLORIDE 236; 22.74; 6.74; 5.86; 2.97 G/4L; G/4L; G/4L; G/4L; G/4L
4 POWDER, FOR SOLUTION ORAL ONCE
Qty: 4000 ML | Refills: 0 | Status: SHIPPED | OUTPATIENT
Start: 2017-02-08 | End: 2017-02-08

## 2017-02-10 NOTE — PROGRESS NOTES
Last 5 Patient Entered Readings                                                               Current 30 Day Average: 139/71     Recent Readings 2/6/2017 2/4/2017 2/2/2017 2/1/2017 1/31/2017    Systolic BP (mmHg) 110 141 148 134 146    Diastolic BP (mmHg) 62 67 80 72 84    Pulse 85 63 63 64 61        Going to Cardiologist for a regular check up soon. Feeling well overall. Trying to stay on track with healthy diet and exercise routine.  Currently dealing with Shingles.     Follow up with Mr. Reid Herman completed. Patient is maintaining a low sodium diet and continuing his exercise regime. Patient did not have any further questions or concerns. I will follow up in a few weeks to see how he is doing and progressing.

## 2017-02-14 ENCOUNTER — TELEPHONE (OUTPATIENT)
Dept: DIABETES | Facility: CLINIC | Age: 77
End: 2017-02-14

## 2017-02-20 ENCOUNTER — OFFICE VISIT (OUTPATIENT)
Dept: CARDIOLOGY | Facility: CLINIC | Age: 77
End: 2017-02-20
Payer: MEDICARE

## 2017-02-20 VITALS
HEART RATE: 86 BPM | WEIGHT: 205.94 LBS | SYSTOLIC BLOOD PRESSURE: 140 MMHG | HEIGHT: 67 IN | BODY MASS INDEX: 32.32 KG/M2 | DIASTOLIC BLOOD PRESSURE: 60 MMHG

## 2017-02-20 DIAGNOSIS — E10.42 POORLY CONTROLLED TYPE 1 DIABETES MELLITUS WITH PERIPHERAL NEUROPATHY: Chronic | ICD-10-CM

## 2017-02-20 DIAGNOSIS — I70.0 ATHEROSCLEROSIS OF AORTA: ICD-10-CM

## 2017-02-20 DIAGNOSIS — I73.9 PERIPHERAL VASCULAR DISEASE: ICD-10-CM

## 2017-02-20 DIAGNOSIS — E10.65 POORLY CONTROLLED TYPE 1 DIABETES MELLITUS WITH PERIPHERAL NEUROPATHY: Chronic | ICD-10-CM

## 2017-02-20 DIAGNOSIS — I10 ESSENTIAL HYPERTENSION: ICD-10-CM

## 2017-02-20 DIAGNOSIS — I25.119 CORONARY ARTERY DISEASE INVOLVING NATIVE CORONARY ARTERY OF NATIVE HEART WITH ANGINA PECTORIS: Primary | ICD-10-CM

## 2017-02-20 DIAGNOSIS — C91.10 CLL (CHRONIC LYMPHOCYTIC LEUKEMIA): ICD-10-CM

## 2017-02-20 DIAGNOSIS — E78.00 PURE HYPERCHOLESTEROLEMIA: ICD-10-CM

## 2017-02-20 DIAGNOSIS — I20.89 STABLE ANGINA: ICD-10-CM

## 2017-02-20 PROCEDURE — 99499 UNLISTED E&M SERVICE: CPT | Mod: S$GLB,,, | Performed by: INTERNAL MEDICINE

## 2017-02-20 PROCEDURE — 3078F DIAST BP <80 MM HG: CPT | Mod: S$GLB,,, | Performed by: INTERNAL MEDICINE

## 2017-02-20 PROCEDURE — 1159F MED LIST DOCD IN RCRD: CPT | Mod: S$GLB,,, | Performed by: INTERNAL MEDICINE

## 2017-02-20 PROCEDURE — 99214 OFFICE O/P EST MOD 30 MIN: CPT | Mod: S$GLB,,, | Performed by: INTERNAL MEDICINE

## 2017-02-20 PROCEDURE — 3060F POS MICROALBUMINURIA REV: CPT | Mod: S$GLB,,, | Performed by: INTERNAL MEDICINE

## 2017-02-20 PROCEDURE — 3045F PR MOST RECENT HEMOGLOBIN A1C LEVEL 7.0-9.0%: CPT | Mod: S$GLB,,, | Performed by: INTERNAL MEDICINE

## 2017-02-20 PROCEDURE — 99999 PR PBB SHADOW E&M-EST. PATIENT-LVL III: CPT | Mod: PBBFAC,,, | Performed by: INTERNAL MEDICINE

## 2017-02-20 PROCEDURE — 1126F AMNT PAIN NOTED NONE PRSNT: CPT | Mod: S$GLB,,, | Performed by: INTERNAL MEDICINE

## 2017-02-20 PROCEDURE — 3077F SYST BP >= 140 MM HG: CPT | Mod: S$GLB,,, | Performed by: INTERNAL MEDICINE

## 2017-02-20 PROCEDURE — 1157F ADVNC CARE PLAN IN RCRD: CPT | Mod: S$GLB,,, | Performed by: INTERNAL MEDICINE

## 2017-02-20 NOTE — PROGRESS NOTES
"Subjective:   Patient ID:  Reid Herman is a 77 y.o. male who presents for follow-up of Hypertension      HPI:   Mr. Cabrera is a pleasant man with HTN, HLD, DM1 and prior CAD s/p CABG x with a SVG to LAD and LIMA to D1 in 1994 and subsequent cath in 2006 demonstrated 100% OM2 and 100% PDA who presents today for follow up of CAD. I last saw him 2/2015. He is on CPAP for MINDA. He also has CLL and followed by Onc.   NTG usage stable~3-4 pills/month. He has an insulin pump. He has angina with higher sugar + exertion. This has not changed in character. Enrolled in Dig HTN manage    Vitals:    02/20/17 0831   BP: (!) 140/60   Pulse: 86   Weight: 93.4 kg (205 lb 14.6 oz)   Height: 5' 7" (1.702 m)     Body mass index is 32.25 kg/(m^2).  CrCl cannot be calculated (Patient has no serum creatinine result on file.).    Lab Results   Component Value Date     (L) 10/03/2016    K 4.6 10/03/2016    CL 95 10/03/2016    CO2 28 10/03/2016    BUN 17 10/03/2016    CREATININE 1.2 10/03/2016     (H) 10/03/2016    HGBA1C 7.7 (H) 01/23/2017    AST 17 10/03/2016    ALT 17 10/03/2016    ALBUMIN 3.7 10/03/2016    PROT 6.2 10/03/2016    BILITOT 0.6 10/03/2016    WBC 32.99 (H) 10/03/2016    HGB 12.7 (L) 10/03/2016    HCT 37.2 (L) 10/03/2016    HCT 19 (LL) 05/18/2015    MCV 93 10/03/2016     10/03/2016    INR 0.9 05/11/2015    PSA 1.29 06/21/2013    TSH 0.901 02/24/2016    CHOL 133 02/24/2016    HDL 51 02/24/2016    LDLCALC 65.6 02/24/2016    TRIG 82 02/24/2016       Current Outpatient Prescriptions   Medication Sig    ACCU-CHEK FASTCLIX Misc TEST 6 TIMES DAILY    alpha lipoic acid 600 mg Cap Take 1 capsule by mouth once daily.      aspirin (ECOTRIN) 325 MG EC tablet Take 325 mg by mouth once daily.    atorvastatin (LIPITOR) 80 MG tablet Take 1 tablet (80 mg total) by mouth nightly.    blood sugar diagnostic (ACCU-CHEK SHARON PLUS TEST STRP) Strp Pt test blood sugar 5 times a day.    clotrimazole-betamethasone " 1-0.05% (LOTRISONE) cream Apply topically 2 (two) times daily.    FLUZONE HIGH-DOSE 2016-17, PF, 180 mcg/0.5 mL Syrg     L-METHYL-B6-B12 3-35-2 mg Tab TAKE 1 TABLET BY MOUTH EVERY DAY    metformin (GLUCOPHAGE) 500 MG tablet TAKE 1 TABLET TWICE DAILY    metoprolol succinate (TOPROL-XL) 100 MG 24 hr tablet TAKE ONE TABLET BY MOUTH EVERY EVENING    multivitamin capsule Take 1 capsule by mouth once daily.      nitroGLYCERIN (NITROSTAT) 0.4 MG SL tablet Place 1 tablet (0.4 mg total) under the tongue every 5 (five) minutes as needed for Chest pain.    NOVOLOG 100 unit/mL injection INJECT UP TO MAX  UNITS UNDER THE SKIN VIA INSULIN PUMP DAILY AS DIRECTED    triamcinolone acetonide 0.1% (KENALOG) 0.1 % ointment Apply topically 2 (two) times daily.    valacyclovir (VALTREX) 1000 MG tablet Take 1 tablet (1,000 mg total) by mouth 3 (three) times daily.     No current facility-administered medications for this visit.        Review of Systems   Constitution: Negative for decreased appetite, weakness, malaise/fatigue, weight gain and weight loss.   HENT: Negative for headaches.    Eyes: Negative for visual disturbance.   Cardiovascular: Positive for chest pain. Negative for claudication, dyspnea on exertion, irregular heartbeat, orthopnea, palpitations, paroxysmal nocturnal dyspnea and syncope.   Respiratory: Negative for cough, shortness of breath and snoring.    Skin: Negative for rash.   Musculoskeletal: Negative for arthritis, muscle cramps, muscle weakness and myalgias.   Gastrointestinal: Negative for abdominal pain, anorexia, change in bowel habit and nausea.   Genitourinary: Negative for dysuria and frequency.   Neurological: Negative for excessive daytime sleepiness, dizziness, loss of balance and numbness.   Psychiatric/Behavioral: Negative for depression.       Objective:   Physical Exam   Constitutional: He is oriented to person, place, and time. He appears well-developed and well-nourished.   HENT:    Head: Normocephalic and atraumatic.   Eyes: Pupils are equal, round, and reactive to light.   Neck: Normal range of motion. Neck supple.   Cardiovascular: Normal rate, regular rhythm, normal heart sounds, intact distal pulses and normal pulses.  Exam reveals no gallop.    No murmur heard.  Pulmonary/Chest: Effort normal and breath sounds normal.   Abdominal: Soft. Bowel sounds are normal. There is no hepatosplenomegaly. There is no tenderness.   Musculoskeletal: Normal range of motion.   Neurological: He is alert and oriented to person, place, and time.   Skin: Skin is warm and dry.   Psychiatric: He has a normal mood and affect. His speech is normal and behavior is normal. Judgment and thought content normal.       Assessment:     1. Coronary artery disease involving native coronary artery of native heart with angina pectoris    2. Pure hypercholesterolemia    3. Essential hypertension    4. Stable angina    5. Peripheral vascular disease    6. Atherosclerosis of aorta    7. Poorly controlled type 1 diabetes mellitus with peripheral neuropathy    8. CLL (chronic lymphocytic leukemia)        Plan:   From a cardiac standpoint, pt is doing well and is clinically stable. Pts lipid profile and BP's are at goal. Pt does not require any cardiac testing at this time

## 2017-02-20 NOTE — MR AVS SNAPSHOT
[facility] - Cardiology  [address]  [place] [address]  Phone: [phone]                  Reid Herman   2017 8:30 AM   Office Visit    Description:  Male : 1940   Provider:  Marcos Alexander MD   Department:  [facility] - Cardiology           Reason for Visit     Hypertension           Diagnoses this Visit        Comments    Coronary artery disease involving native coronary artery of native heart with angina pectoris    -  Primary     Pure hypercholesterolemia         Essential hypertension         Stable angina         Peripheral vascular disease         Atherosclerosis of aorta         Poorly controlled type 1 diabetes mellitus with peripheral neuropathy         CLL (chronic lymphocytic leukemia)                To Do List           Future Appointments        Provider Department Dept Phone    3/10/2017 8:30 AM Adrianne Kennedy MD [facility] Dermatology [phone]    3/20/2017 9:00 AM Maye Wyatt DPM [facility] Podiatry [phone]    2017 7:30 AM Catrina Mac MD [facility] Ophthalmology [phone]    2017 8:00 AM LAB, APPOINTMENT [facility] [phone]    2017 8:10 AM LAB, SPECIMEN [facility] [phone]      Your Future Surgeries/Procedures     2017   Surgery with CLARISSA Freeman MD   [facility])    [address] [facility] [address] [phone]              Goals (5 Years of Data)              Today    17    Blood Pressure <= 140/90   140/60  110/62  141/67    Notes - Note created  2016  3:26 PM by Gilda Turcios PharmD    It is important to consistently maintain a controlled blood pressure.      Exercise at least 150 minutes per week.           Maintain a low sodium diet           Take at least one BP reading per week at various times of the day           Weight (lb) < 87.5 kg (192  lb 13.6 oz)   93.4 kg (205 lb 14.6 oz)        Notes - Note created  8/23/2016  3:29 PM by Gilda Turcios, PharmD    Decrease weight by 5% to improve cardiovascular outcomes.        [facility]     [facility] Nurse Care Line - 24/7 Assistance  Registered nurses in the [facility] provide clinical advisement, health education, appointment booking, and other advisory services.  Call for this free service at [phone].             Medications           Message regarding Medications     Verify the changes and/or additions to your medication regime listed below are the same as discussed with your clinician today.  If any of these changes or additions are incorrect, please notify your healthcare provider.             Verify that the below list of medications is an accurate representation of the medications you are currently taking.  If none reported, the list may be blank. If incorrect, please contact your healthcare provider. Carry this list with you in case of emergency.           Current Medications     ACCU-CHEK FASTCLIX Misc TEST 6 TIMES DAILY    alpha lipoic acid 600 mg Cap Take 1 capsule by mouth once daily.      aspirin (ECOTRIN) 325 MG EC tablet Take 325 mg by mouth once daily.    atorvastatin (LIPITOR) 80 MG tablet Take 1 tablet (80 mg total) by mouth nightly.    blood sugar diagnostic (ACCU-CHEK SHARON PLUS TEST STRP) Strp Pt test blood sugar 5 times a day.    clotrimazole-betamethasone 1-0.05% (LOTRISONE) cream Apply topically 2 (two) times daily.    FLUZONE HIGH-DOSE 2016-17, PF, 180 mcg/0.5 mL Syrg     L-METHYL-B6-B12 3-35-2 mg Tab TAKE 1 TABLET BY MOUTH EVERY DAY    metformin (GLUCOPHAGE) 500 MG tablet TAKE 1 TABLET TWICE DAILY    metoprolol succinate (TOPROL-XL) 100 MG 24 hr tablet TAKE ONE TABLET BY MOUTH EVERY EVENING    multivitamin capsule Take 1 capsule by mouth once daily.      nitroGLYCERIN (NITROSTAT) 0.4 MG SL tablet Place 1 tablet (0.4 mg total) under the tongue every  "5 (five) minutes as needed for Chest pain.    NOVOLOG 100 unit/mL injection INJECT UP TO MAX  UNITS UNDER THE SKIN VIA INSULIN PUMP DAILY AS DIRECTED    triamcinolone acetonide 0.1% (KENALOG) 0.1 % ointment Apply topically 2 (two) times daily.    valacyclovir (VALTREX) 1000 MG tablet Take 1 tablet (1,000 mg total) by mouth 3 (three) times daily.           Clinical Reference Information           Your Vitals Were     BP Pulse Height Weight BMI    140/60 86 5' 7" (1.702 m) 93.4 kg (205 lb 14.6 oz) 32.25 kg/m2      Blood Pressure          Most Recent Value    Right Arm BP - Sitting  148/58    Left Arm BP - Sitting  140/60    BP  (!)  140/60      Allergies as of 2/20/2017     No Known Allergies      Immunizations Administered on Date of Encounter - 2/20/2017     None      Language Assistance Services     ATTENTION: Language assistance services are available, free of charge. Please call [phone].      ATENCIÓN: Si habla bianca, tiene a rivero disposición servicios gratuitos de asistencia lingüística. Llame al [phone].     ISAURA Ý: N?u b?n nói Ti?ng Vi?t, có các d?ch v? h? tr? ngôn ng? mi?n phí dành cho b?n. G?i s? [phone].         [facility] complies with applicable Federal civil rights laws and does not discriminate on the basis of race, color, national origin, age, disability, or sex.        "

## 2017-02-23 RX ORDER — FOSINOPRIL SODIUM AND HYDROCHLOROTHIAZIDE 10; 12.5 MG/1; MG/1
1 TABLET ORAL DAILY
Qty: 90 TABLET | Refills: 3 | Status: SHIPPED | OUTPATIENT
Start: 2017-02-23 | End: 2017-02-27 | Stop reason: SDUPTHER

## 2017-02-27 ENCOUNTER — TELEPHONE (OUTPATIENT)
Dept: INTERNAL MEDICINE | Facility: CLINIC | Age: 77
End: 2017-02-27

## 2017-02-27 RX ORDER — FOSINOPRIL SODIUM AND HYDROCHLOROTHIAZIDE 10; 12.5 MG/1; MG/1
2 TABLET ORAL 2 TIMES DAILY
Qty: 180 TABLET | Refills: 3 | Status: SHIPPED | OUTPATIENT
Start: 2017-02-27 | End: 2017-05-26 | Stop reason: DRUGHIGH

## 2017-02-27 NOTE — TELEPHONE ENCOUNTER
----- Message from Dimitry Farmer sent at 2/25/2017 11:16 AM CST -----  Contact: [facility] [phone]  [facility] called and stated that they need correction on the direction for fosinopril-hydrochlorothiazide (MONOPRIL-HCT) 10-12.5 mg per tablet, patient said he takes two tablets per day, advice    Thanks

## 2017-03-03 ENCOUNTER — PATIENT OUTREACH (OUTPATIENT)
Dept: OTHER | Facility: OTHER | Age: 77
End: 2017-03-03
Payer: MEDICARE

## 2017-03-03 NOTE — PROGRESS NOTES
Last 5 Patient Entered Readings                                                               Current 30 Day Average: 139/71     Recent Readings 3/1/2017 2/28/2017 2/27/2017 2/26/2017 2/24/2017    Systolic BP (mmHg) 142 134 138 121 155    Diastolic BP (mmHg) 72 70 74 64 80    Pulse 70 62 59 59 61        Feeling well. No questions or concerns. Continuing with same routine.    Follow up with  Reid HARDEN Virgil completed. Patient is maintaining a low sodium diet and continuing his exercise regime. Patient did not have any further questions or concerns. I will follow up in a few weeks to see how he is doing and progressing.

## 2017-03-10 ENCOUNTER — INITIAL CONSULT (OUTPATIENT)
Dept: DERMATOLOGY | Facility: CLINIC | Age: 77
End: 2017-03-10
Payer: MEDICARE

## 2017-03-10 DIAGNOSIS — D48.5 NEOPLASM OF UNCERTAIN BEHAVIOR OF SKIN: Primary | ICD-10-CM

## 2017-03-10 DIAGNOSIS — D18.00 ANGIOMA: ICD-10-CM

## 2017-03-10 DIAGNOSIS — Z85.828 HISTORY OF NONMELANOMA SKIN CANCER: ICD-10-CM

## 2017-03-10 DIAGNOSIS — Z12.83 SKIN CANCER SCREENING: ICD-10-CM

## 2017-03-10 DIAGNOSIS — L82.1 SK (SEBORRHEIC KERATOSIS): ICD-10-CM

## 2017-03-10 DIAGNOSIS — L57.0 AK (ACTINIC KERATOSIS): ICD-10-CM

## 2017-03-10 PROCEDURE — 1157F ADVNC CARE PLAN IN RCRD: CPT | Mod: S$GLB,,, | Performed by: DERMATOLOGY

## 2017-03-10 PROCEDURE — 99213 OFFICE O/P EST LOW 20 MIN: CPT | Mod: 25,S$GLB,, | Performed by: DERMATOLOGY

## 2017-03-10 PROCEDURE — 88342 IMHCHEM/IMCYTCHM 1ST ANTB: CPT | Mod: 26,,, | Performed by: PATHOLOGY

## 2017-03-10 PROCEDURE — 1160F RVW MEDS BY RX/DR IN RCRD: CPT | Mod: S$GLB,,, | Performed by: DERMATOLOGY

## 2017-03-10 PROCEDURE — 1159F MED LIST DOCD IN RCRD: CPT | Mod: S$GLB,,, | Performed by: DERMATOLOGY

## 2017-03-10 PROCEDURE — 11100 PR BIOPSY OF SKIN LESION: CPT | Mod: 59,S$GLB,, | Performed by: DERMATOLOGY

## 2017-03-10 PROCEDURE — 17000 DESTRUCT PREMALG LESION: CPT | Mod: S$GLB,,, | Performed by: DERMATOLOGY

## 2017-03-10 PROCEDURE — 17003 DESTRUCT PREMALG LES 2-14: CPT | Mod: S$GLB,,, | Performed by: DERMATOLOGY

## 2017-03-10 PROCEDURE — 99999 PR PBB SHADOW E&M-EST. PATIENT-LVL III: CPT | Mod: PBBFAC,,, | Performed by: DERMATOLOGY

## 2017-03-10 PROCEDURE — 88305 TISSUE EXAM BY PATHOLOGIST: CPT | Performed by: PATHOLOGY

## 2017-03-10 NOTE — PROGRESS NOTES
Subjective:       Patient ID:  Reid Herman is a 77 y.o. male who presents for   Chief Complaint   Patient presents with    Follow-up     shingles     HPI  Pt here today for upper body skin check.  Was diagnosed with shingles in Jan and completed a course of valtrex with significant improvement. No residual pain.  Patient has a history of non-melanoma skin cancer and is here today for routine skin check.   Most recently had a BCC excised from his L nasal ala in 6/2015.  When asked about the discoloration above his L eyebrow, he is unsure how long it's been there. Asymptomatic and no prior treatment.      Review of Systems   Constitutional: Negative for fever, chills, weight loss, weight gain, fatigue, night sweats and malaise.   Skin: Negative for itching, rash, dry skin, sun sensitivity, daily sunscreen use, activity-related sunscreen use, recent sunburn and dry lips.   Hematologic/Lymphatic: Negative for adenopathy. Does not bruise/bleed easily.        Objective:    Physical Exam   Constitutional: He appears well-developed and well-nourished. No distress.   Neurological: He is alert and oriented to person, place, and time. He is not disoriented.   Psychiatric: He has a normal mood and affect.   Skin:   Areas Examined (abnormalities noted in diagram):   Scalp / Hair Palpated and Inspected  Head / Face Inspection Performed  Neck Inspection Performed  Chest / Axilla Inspection Performed  Abdomen Inspection Performed  Back Inspection Performed  RUE Inspected  LUE Inspection Performed                           Diagram Legend     Erythematous scaling macule/papule c/w actinic keratosis       Vascular papule c/w angioma      Pigmented verrucoid papule/plaque c/w seborrheic keratosis      Yellow umbilicated papule c/w sebaceous hyperplasia      Irregularly shaped tan macule c/w lentigo     1-2 mm smooth white papules consistent with Milia      Movable subcutaneous cyst with punctum c/w epidermal inclusion cyst       Subcutaneous movable cyst c/w pilar cyst      Firm pink to brown papule c/w dermatofibroma      Pedunculated fleshy papule(s) c/w skin tag(s)      Evenly pigmented macule c/w junctional nevus     Mildly variegated pigmented, slightly irregular-bordered macule c/w mildly atypical nevus      Flesh colored to evenly pigmented papule c/w intradermal nevus       Pink pearly papule/plaque c/w basal cell carcinoma      Erythematous hyperkeratotic cursted plaque c/w SCC      Surgical scar with no sign of skin cancer recurrence      Open and closed comedones      Inflammatory papules and pustules      Verrucoid papule consistent consistent with wart     Erythematous eczematous patches and plaques     Dystrophic onycholytic nail with subungual debris c/w onychomycosis     Umbilicated papule    Erythematous-base heme-crusted tan verrucoid plaque consistent with inflamed seborrheic keratosis     Erythematous Silvery Scaling Plaque c/w Psoriasis     See annotation      Assessment / Plan:       Neoplasm of uncertain behavior of skin  Shave biopsy procedure note:    Shave biopsy performed after verbal consent including risk of infection, scar, recurrence, need for additional treatment of site. Area prepped with alcohol, anesthetized with approximately 1.0cc of 1% lidocaine with epinephrine. Lesional tissue shaved with razor blade. Hemostasis achieved with application of aluminum chloride followed by hyfrecation. No complications. Dressing applied. Wound care explained.    -     Tissue Specimen To Pathology, Dermatology  -     Tissue Specimen To Pathology, Dermatology    Pathology Orders:      Normal Orders This Visit    Tissue Specimen To Pathology, Dermatology     Questions:    Directional Terms:  Other(comment)    Clinical information:  r/o atypical nevus vs. melanoma vs. SK vs. other Comment - shave    Specific Site:  R chest    Tissue Specimen To Pathology, Dermatology     Questions:    Directional Terms:  Other(comment)     Clinical information:  r/o SK vs. lentigo vs. lentigo maligna vs. other Comment - shave    Specific Site:  L eyebrow          AK (actinic keratosis)  Cryosurgery Procedure Note    Verbal consent from the patient is obtained and the patient is aware of the precancerous quality and need for treatment of these lesions. Liquid nitrogen cryosurgery is applied to the 3 actinic keratoses, as detailed in the physical exam, to produce a freeze injury. The patient is aware that blisters may form and is instructed on wound care with gentle cleansing and use of vaseline ointment to keep moist until healed. The patient is supplied a handout on cryosurgery and is instructed to call if lesions do not completely resolve.    SK (seborrheic keratosis)  These are benign inherited growths without a malignant potential. Reassurance given to patient. No treatment is necessary.   Treatment of benign, asymptomatic lesions may be considered cosmetic.  Warned about risk of hypo- or hyperpigmentation with treatment and risk of recurrence.    Angioma  This is a benign vascular lesion. Reassurance given. No treatment required. Treatment of benign, asymptomatic lesions may be considered cosmetic.    Skin cancer screening and History of nonmelanoma skin cancer  Upper body skin examination performed today including at least 6 points as noted in physical examination. Suspicious lesions noted above.  Patient instructed in importance of daily broad spectrum sunscreen use with spf at least 30. Sun avoidance and topical protection/protective clothing discussed.      Return in about 4 months (around 7/10/2017) for skin check or sooner pending biopsy results.

## 2017-03-10 NOTE — PATIENT INSTRUCTIONS
Summer Sun Protection      The [facility] would like to remind you of the importance of sun protection all year round and particularly during the summer when the suns rays are the strongest. It has been proven that both acute and chronic sun exposure damages our cells and leads to skin cancer. Beyond skin cancer, the sun causes 90% of the symptoms of pre-mature skin aging, including wrinkles, lentigines (brown spots), and thin, easily bruised skin. Proper sun protection can help prevent these unwanted conditions.    Many patients report that the dont go in the sun. It has been shown that the average person receives 18 hours of incidental sun exposure per week during activities such as walking through parking lots, driving, or sitting next to windows. This accumulates to several bad sunburns per year!    In choosing sunscreen, you want one that protects against both UVA and UVB rays. It is recommended that you use one of SPF 30 or higher. It is important to apply the sunscreen about 20 minutes prior to sun exposure. Most sunscreens are chemical sunscreens and a reaction must take place in the skin so that they are effective. If they are applied and then you are immediately exposed to the sun or start sweating, this reaction has not had time to take place and you are therefore unprotected. Sunscreen needs to be reapplied every 2 hours if you are participating in water sports or sweating. We recommend Elta MD or Neutrogena Ultra Sheer Dry Touch SPF 55 for daily use; however there are many options and it is most important for you to find one that you will use on a consistent basis.    If you have sensitive skin, you may do best with a sunscreen that contains only physical blockers such as titanium dioxide or zinc oxide. These are typically thicker and harder to apply, however they afford very good protection. Neutrogena Sensitive Skin, Blue Lizard Sensitive Skin (pink top) or Neutrogena Pure  and Free are popular ones.     Aside from sunscreen, clothes with UV protection, wide brimmed hats, and sunglasses are other means of sun protection that we recommend.                        [facility] - DERMATOLOGY  [facility]  [place] [address]  Dept: [phone]  Dept Fax: [phone]                                                                                Shave Biopsy Wound Care    Your doctor has performed a shave biopsy today.  A band aid and vaseline ointment has been placed over the site.  This should remain in place for 24 hours.  It is recommended that you keep the area dry for the first 24 hours.  After 24 hours, you may remove the band aid and wash the area with warm soap and water and apply Vaseline jelly.  Many patients prefer to use Neosporin or Bacitracin ointment.  This is acceptable; however, know that you can develop an allergy to this medication even if you have used it safely for years.  It is important to keep the area moist.  Letting it dry out and get air slows healing time, and will worsen the scar.  Band aid is optional after first 24 hours.      If you notice increasing redness, tenderness, pain, or yellow drainage at the biopsy site, please notify your doctor.  These are signs of an infection.    If your biopsy site is bleeding, apply firm pressure for 15 minutes straight.  Repeat for another 15 minutes, if it is still bleeding.   If the surgical site continues to bleed, then please contact your doctor.      [address] [place] [address]/ [phone] FAX/ [email or web address]      CRYOSURGERY      Your doctor has used a method called cryosurgery to treat your skin condition. Cryosurgery refers to the use of very cold substances to treat a variety of skin conditions such as warts, pre-skin cancers, molluscum contagiosum, sun spots, and several benign growths. The substance we use in cryosurgery is liquid nitrogen  and is so cold (-195 degrees Celsius) that is burns when administered.     Following treatment in the office, the skin may immediately burn and become red. You may find the area around the lesion is affected as well. It is sometimes necessary to treat not only the lesion, but a small area of the surrounding normal skin to achieve a good response.     A blister, and even a blood filled blister, may form after treatment.   This is a normal response. If the blister is painful, it is acceptable to sterilize a needle and with rubbing alcohol and gently pop the blister. It is important that you gently wash the area with soap and warm water as the blister fluid may contain wart virus if a wart was treated. Do no remove the roof of the blister.     The area treated can take anywhere from 1-3 weeks to heal. Healing time depends on the kind skin lesion treated, the location, and how aggressively the lesion was treated. It is recommended that the areas treated are covered with Vaseline or bacitracin ointment and a band-aid. If a band-aid is not practical, just ointment applied several times per day will do. Keeping these areas moist will speed the healing time.    Treatment with liquid nitrogen can leave a scar. In dark skin, it may be a light or dark scar, in light skin it may be a white or pink scar. These will generally fade with time, but may never go away completely.     If you have any concerns after your treatment, please feel free to call the office.       [address] [place] [address]/ [phone] FAX/ [email or web address]

## 2017-03-10 NOTE — MR AVS SNAPSHOT
[facility] Dermatology  [address]  [place] [address]  Phone: [phone]  Fax: [phone]                  Reid Herman   3/10/2017 8:30 AM   Initial consult    Description:  Male : 1940   Provider:  Adrianne Kennedy MD   Department:  [facility] - Dermatology           Reason for Visit     Follow-up           Diagnoses this Visit        Comments    Neoplasm of uncertain behavior of skin    -  Primary     AK (actinic keratosis)         SK (seborrheic keratosis)         Angioma         Skin cancer screening         History of nonmelanoma skin cancer                To Do List           Future Appointments        Provider Department Dept Phone    3/20/2017 9:00 AM Maye Wyatt DPM [facility] Podiatry [phone]    2017 7:30 AM Catrina Mac MD [facility] Ophthalmology [phone]    2017 8:00 AM LAB, APPOINTMENT [facility] [phone]    2017 8:10 AM LAB, SPECIMEN [facility] [phone]    5/3/2017 9:30 AM Nickie Soto APRN, FNP [facility] Endocrinology [phone]      Your Future Surgeries/Procedures     2017   Surgery with CLARISSA Freeman MD   [facility] ([facility])    [address] [facility] [address] [phone]              Goals (5 Years of Data)              3/3/17    3/1/17    2/28/17    Blood Pressure <= 140/90     142/72  142/72    Notes - Note created  2016  3:26 PM by Gilda Turcios, PharmD    It is important to consistently maintain a controlled blood pressure.      Exercise at least 150 minutes per week.   On track        Maintain a low sodium diet   On track        Take at least one BP reading per week at various times of the day   On track        Weight (lb) < 87.5 kg (192 lb 13.6 oz)           Notes - Note created  2016  3:29 PM by Gilda Turcios, PharmD    Decrease weight by 5%  to improve cardiovascular outcomes.        Follow-Up and Disposition     Return in about 4 months (around 7/10/2017) for skin check or sooner pending biopsy results.      [facility]     [facility] On [facility] Nurse Care Line - 24/7 Assistance  Registered nurses in the [facility] Center provide clinical advisement, health education, appointment booking, and other advisory services.  Call for this free service at [phone].             Medications           Message regarding Medications     Verify the changes and/or additions to your medication regime listed below are the same as discussed with your clinician today.  If any of these changes or additions are incorrect, please notify your healthcare provider.             Verify that the below list of medications is an accurate representation of the medications you are currently taking.  If none reported, the list may be blank. If incorrect, please contact your healthcare provider. Carry this list with you in case of emergency.           Current Medications     ACCU-CHEK FASTCLIX Misc TEST 6 TIMES DAILY    alpha lipoic acid 600 mg Cap Take 1 capsule by mouth once daily.      aspirin (ECOTRIN) 325 MG EC tablet Take 325 mg by mouth once daily.    atorvastatin (LIPITOR) 80 MG tablet Take 1 tablet (80 mg total) by mouth nightly.    blood sugar diagnostic (ACCU-CHEK SHARON PLUS TEST STRP) Strp Pt test blood sugar 5 times a day.    clotrimazole-betamethasone 1-0.05% (LOTRISONE) cream Apply topically 2 (two) times daily.    FLUZONE HIGH-DOSE 2016-17, PF, 180 mcg/0.5 mL Syrg     fosinopril-hydrochlorothiazide (MONOPRIL-HCT) 10-12.5 mg per tablet Take 2 tablets by mouth 2 (two) times daily.    L-METHYL-B6-B12 3-35-2 mg Tab TAKE 1 TABLET BY MOUTH EVERY DAY    metformin (GLUCOPHAGE) 500 MG tablet TAKE 1 TABLET TWICE DAILY    metoprolol succinate (TOPROL-XL) 100 MG 24 hr tablet TAKE ONE TABLET BY MOUTH EVERY EVENING    multivitamin capsule Take 1 capsule by mouth once daily.       nitroGLYCERIN (NITROSTAT) 0.4 MG SL tablet Place 1 tablet (0.4 mg total) under the tongue every 5 (five) minutes as needed for Chest pain.    NOVOLOG 100 unit/mL injection INJECT UP TO MAX  UNITS UNDER THE SKIN VIA INSULIN PUMP DAILY AS DIRECTED    triamcinolone acetonide 0.1% (KENALOG) 0.1 % ointment Apply topically 2 (two) times daily.    valacyclovir (VALTREX) 1000 MG tablet Take 1 tablet (1,000 mg total) by mouth 3 (three) times daily.           Clinical Reference Information           Allergies as of 3/10/2017     No Known Allergies      Immunizations Administered on Date of Encounter - 3/10/2017     None      Orders Placed During Today's Visit      Normal Orders This Visit    Tissue Specimen To Pathology, Dermatology     Tissue Specimen To Pathology, Dermatology       Instructions    Summer Sun Protection      The [facility] would like to remind you of the importance of sun protection all year round and particularly during the summer when the suns rays are the strongest. It has been proven that both acute and chronic sun exposure damages our cells and leads to skin cancer. Beyond skin cancer, the sun causes 90% of the symptoms of pre-mature skin aging, including wrinkles, lentigines (brown spots), and thin, easily bruised skin. Proper sun protection can help prevent these unwanted conditions.    Many patients report that the dont go in the sun. It has been shown that the average person receives 18 hours of incidental sun exposure per week during activities such as walking through parking lots, driving, or sitting next to windows. This accumulates to several bad sunburns per year!    In choosing sunscreen, you want one that protects against both UVA and UVB rays. It is recommended that you use one of SPF 30 or higher. It is important to apply the sunscreen about 20 minutes prior to sun exposure. Most sunscreens are chemical sunscreens and a reaction must take place in the  skin so that they are effective. If they are applied and then you are immediately exposed to the sun or start sweating, this reaction has not had time to take place and you are therefore unprotected. Sunscreen needs to be reapplied every 2 hours if you are participating in water sports or sweating. We recommend Elta MD or Neutrogena Ultra Sheer Dry Touch SPF 55 for daily use; however there are many options and it is most important for you to find one that you will use on a consistent basis.    If you have sensitive skin, you may do best with a sunscreen that contains only physical blockers such as titanium dioxide or zinc oxide. These are typically thicker and harder to apply, however they afford very good protection. Neutrogena Sensitive Skin, Blue Lizard Sensitive Skin (pink top) or Neutrogena Pure and Free are popular ones.     Aside from sunscreen, clothes with UV protection, wide brimmed hats, and sunglasses are other means of sun protection that we recommend.                        [facility] DERMATOLOGY  [facility]  [place] [address]  Dept: [phone]  Dept Fax: [phone]                                                                                Shave Biopsy Wound Care    Your doctor has performed a shave biopsy today.  A band aid and vaseline ointment has been placed over the site.  This should remain in place for 24 hours.  It is recommended that you keep the area dry for the first 24 hours.  After 24 hours, you may remove the band aid and wash the area with warm soap and water and apply Vaseline jelly.  Many patients prefer to use Neosporin or Bacitracin ointment.  This is acceptable; however, know that you can develop an allergy to this medication even if you have used it safely for years.  It is important to keep the area moist.  Letting it dry out and get air slows healing time, and will worsen the scar.  Band aid is optional after first 24  hours.      If you notice increasing redness, tenderness, pain, or yellow drainage at the biopsy site, please notify your doctor.  These are signs of an infection.    If your biopsy site is bleeding, apply firm pressure for 15 minutes straight.  Repeat for another 15 minutes, if it is still bleeding.   If the surgical site continues to bleed, then please contact your doctor.      [address] [place] [address]/ [phone] FAX/ [email or web address]      CRYOSURGERY      Your doctor has used a method called cryosurgery to treat your skin condition. Cryosurgery refers to the use of very cold substances to treat a variety of skin conditions such as warts, pre-skin cancers, molluscum contagiosum, sun spots, and several benign growths. The substance we use in cryosurgery is liquid nitrogen and is so cold (-195 degrees Celsius) that is burns when administered.     Following treatment in the office, the skin may immediately burn and become red. You may find the area around the lesion is affected as well. It is sometimes necessary to treat not only the lesion, but a small area of the surrounding normal skin to achieve a good response.     A blister, and even a blood filled blister, may form after treatment.   This is a normal response. If the blister is painful, it is acceptable to sterilize a needle and with rubbing alcohol and gently pop the blister. It is important that you gently wash the area with soap and warm water as the blister fluid may contain wart virus if a wart was treated. Do no remove the roof of the blister.     The area treated can take anywhere from 1-3 weeks to heal. Healing time depends on the kind skin lesion treated, the location, and how aggressively the lesion was treated. It is recommended that the areas treated are covered with Vaseline or bacitracin ointment and a band-aid. If a band-aid is not practical, just ointment applied several times per day will do. Keeping these  areas moist will speed the healing time.    Treatment with liquid nitrogen can leave a scar. In dark skin, it may be a light or dark scar, in light skin it may be a white or pink scar. These will generally fade with time, but may never go away completely.     If you have any concerns after your treatment, please feel free to call the office.       [address] [place] [address]/ [phone] FAX/ [email or web address]           Language Assistance Services     ATTENTION: Language assistance services are available, free of charge. Please call [phone].      ATENCIÓN: Si genevievela bianca, tiene a rivero disposición servicios gratuitos de asistencia lingüística. Llkelly al [phone].     CHÚ Ý: N?u b?n nói Ti?ng Vi?t, có các d?ch v? h? tr? ngôn ng? mi?n phí dành cho b?n. G?i s? [phone].         [facility] complies with applicable Federal civil rights laws and does not discriminate on the basis of race, color, national origin, age, disability, or sex.

## 2017-03-10 NOTE — LETTER
March 11, 2017      Elina Martínez, NP  [address]  [facility] [place] [facility] [address]           [facility] - Dermatology  [address]  [facility] [address]  Phone: [phone]  Fax: [phone]          Patient: Reid Herman   MR Number: [id number]   YOB: 1940   Date of Visit: 3/10/2017       Dear Elina Martínez:    Thank you for referring Reid Herman to me for evaluation. Attached you will find relevant portions of my assessment and plan of care.    If you have questions, please do not hesitate to call me. I look forward to following Reid Herman along with you.    Sincerely,    Adrianne Kennedy MD    Enclosure  CC:  No Recipients    If you would like to receive this communication electronically, please contact [email or web address] or [phone] to request more information on [employer] access.    For providers and/or their staff who would like to refer a patient to [facility], please contact us through our one-stop-shop provider referral line, [facility], at [phone].    If you feel you have received this communication in error or would no longer like to receive these types of communications, please e-mail [email or web address]

## 2017-03-20 ENCOUNTER — OFFICE VISIT (OUTPATIENT)
Dept: PODIATRY | Facility: CLINIC | Age: 77
End: 2017-03-20
Payer: MEDICARE

## 2017-03-20 VITALS
HEART RATE: 66 BPM | BODY MASS INDEX: 32.73 KG/M2 | HEIGHT: 67 IN | DIASTOLIC BLOOD PRESSURE: 75 MMHG | RESPIRATION RATE: 18 BRPM | SYSTOLIC BLOOD PRESSURE: 158 MMHG | WEIGHT: 208.56 LBS

## 2017-03-20 DIAGNOSIS — E10.42 DIABETIC POLYNEUROPATHY ASSOCIATED WITH TYPE 1 DIABETES MELLITUS: Primary | ICD-10-CM

## 2017-03-20 DIAGNOSIS — B35.1 DERMATOPHYTOSIS OF NAIL: ICD-10-CM

## 2017-03-20 DIAGNOSIS — I73.9 PERIPHERAL VASCULAR DISEASE: ICD-10-CM

## 2017-03-20 PROCEDURE — 99499 UNLISTED E&M SERVICE: CPT | Mod: S$GLB,,, | Performed by: PODIATRIST

## 2017-03-20 PROCEDURE — 99999 PR PBB SHADOW E&M-EST. PATIENT-LVL III: CPT | Mod: PBBFAC,,, | Performed by: PODIATRIST

## 2017-03-20 PROCEDURE — 11721 DEBRIDE NAIL 6 OR MORE: CPT | Mod: Q9,S$GLB,, | Performed by: PODIATRIST

## 2017-03-20 NOTE — PROGRESS NOTES
Subjective:      Patient ID: Reid Herman is a 77 y.o. male.    Chief Complaint: PCP (Olivia Zavala MD 2/2/17); Nail Problem; Nail Care; and Peripheral Neuropathy (Pain, numbness, tingling )    Reid is a 77 y.o. male who presents to the clinic for evaluation and treatment of high risk feet. 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 The patient's chief complaint is elongated toenails   This patient has documented high risk feet requiring routine maintenance secondary to diabetes mellitis and those secondary complications of diabetes, as mentioned..    PCP: Olivia Zavala MD    Date Last Seen by PCP:   Chief Complaint   Patient presents with    PCP     Olivia Zavala MD 2/2/17    Nail Problem    Nail Care    Peripheral Neuropathy     Pain, numbness, tingling        Chief Complaint   Patient presents with    PCP     Olivia Zavala MD 2/2/17    Nail Problem    Nail Care    Peripheral Neuropathy     Pain, numbness, tingling         Current shoe gear:  Affected Foot: Rx diabetic extra depth shoes and custom accommodative insoles     Unaffected Foot: Rx diabetic extra depth shoes and custom  accommodative insoles    Hemoglobin A1C   Date Value Ref Range Status   01/23/2017 7.7 (H) 4.5 - 6.2 % Final     Comment:     According to ADA guidelines, hemoglobin A1C <7.0% represents  optimal control in non-pregnant diabetic patients.  Different  metrics may apply to specific populations.   Standards of Medical Care in Diabetes - 2016.  For the purpose of screening for the presence of diabetes:  <5.7%     Consistent with the absence of diabetes  5.7-6.4%  Consistent with increasing risk for diabetes   (prediabetes)  >or=6.5%  Consistent with diabetes  Currently no consensus exists for use of hemoglobin A1C  for diagnosis of diabetes for children.     09/22/2016 7.5 (H) 4.5 - 6.2 % Final     Comment:     According to ADA guidelines, hemoglobin A1C <7.0% represents  optimal control in non-pregnant diabetic patients.  Different  metrics may apply to specific populations.   Standards of Medical Care in Diabetes - 2016.  For the purpose of screening for the presence of diabetes:  <5.7%     Consistent with the absence of diabetes  5.7-6.4%  Consistent with increasing risk for diabetes   (prediabetes)  >or=6.5%  Consistent with diabetes  Currently no consensus exists for use of hemoglobin A1C  for diagnosis of diabetes for children.     05/31/2016 7.6 (H) 4.5 - 6.2 % Final       Review of Systems   Constitution: Negative for chills, fever and night sweats.   Cardiovascular: Negative for chest pain and claudication.   Respiratory: Negative for cough.    Skin: Positive for dry skin and nail changes.   Musculoskeletal: Positive for back pain. Negative for arthritis, falls and gout.           Objective:      Physical Exam   Constitutional: He is oriented to person, place, and time. He appears well-developed and well-nourished. No distress.   Cardiovascular: Normal rate.    Vascular: Dorsalis pedis and posterior tibial pulses are diminished bilaterally. Toes are cool to touch. Feet are warm proximally.There is decreased  digital hair. Skin is atrophic and mildly edematous. R lower leg, ankle, and foot are hyperpigmented.      Musculoskeletal: Normal range of motion.   Reproducible pain along R dorsal medial 1st metatarsal head to palpation.    Neurological: He is alert and oriented to person, place, and time. He exhibits normal muscle tone.   Neurologic: [place]liud 5.07 monofilamant testing absent on toes but otherwise is intact Rolly feet. Sharp/dull sensation absent Bilaterally. Light touch absent Bilaterally.     Skin: Skin is warm, dry and intact. No burn and no rash noted. He is not diaphoretic. No erythema. No pallor.    Nails x 10 are elongated by  2-4mm's, thickened by 1-3 mm's, dystrophic, and are normal in  coloration . Xerosis Bilaterally. No open lesions noted.  Mildly incurvated nail margin w/ No surrounding erythema, edema, malodor, nor drainage noted to lateral b/l hallux        Psychiatric: He has a normal mood and affect. His behavior is normal. Judgment and thought content normal.   Nursing note and vitals reviewed.            Assessment:       Encounter Diagnoses   Name Primary?    Diabetic polyneuropathy associated with type 1 diabetes mellitus Yes    Peripheral vascular disease     Dermatophytosis of nail          Plan:       Reid was seen today for pcp, nail problem, nail care and peripheral neuropathy.    Diagnoses and all orders for this visit:    Diabetic polyneuropathy associated with type 1 diabetes mellitus    Peripheral vascular disease    Dermatophytosis of nail    - Patient was given written and verbal instructions regarding foot condition.  I counseled the patient on his conditions, their implications and medical management.  Shoe inspection. Diabetic Foot Education. Patient reminded of the importance of good nutrition and blood sugar control to help prevent podiatric complications of diabetes. Patient instructed on proper foot hygeine. We discussed wearing proper shoe gear, daily foot  inspections, never walking without protective shoe gear, never putting sharp instruments to feet    - With patient's permission, nails were aggressively reduced and debrided x 10 to their soft tissue attachment mechanically and with electric , removing all offending nail and debris. Patient relates relief following the procedure. She will continue to monitor the areas daily, inspect her feet, wear protective shoe gear when ambulatory, moisturizer to maintain skin integrity and follow in this office in approximately 2-3 months, sooner p.r.n.    - small amt of bleeding to L medial hallux nail margin. This was controlled w/ silver nitrate stick x 2.  Small amount of antibiotic ointment and bandage applied.

## 2017-03-26 RX ORDER — MECOBAL/LEVOMEFOLAT CA/B6 PHOS 2-3-35 MG
TABLET ORAL
Qty: 90 TABLET | Refills: 0 | Status: SHIPPED | OUTPATIENT
Start: 2017-03-26 | End: 2017-07-05 | Stop reason: SDUPTHER

## 2017-04-03 ENCOUNTER — TELEPHONE (OUTPATIENT)
Dept: DIABETES | Facility: CLINIC | Age: 77
End: 2017-04-03

## 2017-04-05 ENCOUNTER — OFFICE VISIT (OUTPATIENT)
Dept: OPHTHALMOLOGY | Facility: CLINIC | Age: 77
End: 2017-04-05
Payer: MEDICARE

## 2017-04-05 DIAGNOSIS — E11.3292 TYPE 2 DIABETES MELLITUS WITH LEFT EYE AFFECTED BY MILD NONPROLIFERATIVE RETINOPATHY WITHOUT MACULAR EDEMA, WITH LONG-TERM CURRENT USE OF INSULIN: ICD-10-CM

## 2017-04-05 DIAGNOSIS — E11.3591 TYPE 2 DIABETES MELLITUS WITH RIGHT EYE AFFECTED BY PROLIFERATIVE RETINOPATHY WITHOUT MACULAR EDEMA, WITH LONG-TERM CURRENT USE OF INSULIN: ICD-10-CM

## 2017-04-05 DIAGNOSIS — Z79.4 TYPE 2 DIABETES MELLITUS WITH RIGHT EYE AFFECTED BY PROLIFERATIVE RETINOPATHY WITHOUT MACULAR EDEMA, WITH LONG-TERM CURRENT USE OF INSULIN: ICD-10-CM

## 2017-04-05 DIAGNOSIS — Z79.4 TYPE 2 DIABETES MELLITUS WITH LEFT EYE AFFECTED BY MILD NONPROLIFERATIVE RETINOPATHY WITHOUT MACULAR EDEMA, WITH LONG-TERM CURRENT USE OF INSULIN: ICD-10-CM

## 2017-04-05 DIAGNOSIS — H35.3132 NONEXUDATIVE AGE-RELATED MACULAR DEGENERATION, BILATERAL, INTERMEDIATE DRY STAGE: Primary | ICD-10-CM

## 2017-04-05 PROCEDURE — 99999 PR PBB SHADOW E&M-EST. PATIENT-LVL II: CPT | Mod: PBBFAC,,, | Performed by: OPHTHALMOLOGY

## 2017-04-05 PROCEDURE — 92226 PR SPECIAL EYE EXAM, SUBSEQUENT: CPT | Mod: 50,S$GLB,, | Performed by: OPHTHALMOLOGY

## 2017-04-05 PROCEDURE — 92134 CPTRZ OPH DX IMG PST SGM RTA: CPT | Mod: S$GLB,,, | Performed by: OPHTHALMOLOGY

## 2017-04-05 PROCEDURE — 92014 COMPRE OPH EXAM EST PT 1/>: CPT | Mod: S$GLB,,, | Performed by: OPHTHALMOLOGY

## 2017-04-05 PROCEDURE — 99499 UNLISTED E&M SERVICE: CPT | Mod: S$GLB,,, | Performed by: OPHTHALMOLOGY

## 2017-04-05 NOTE — MR AVS SNAPSHOT
[facility]  [address]  [place] [address]  Phone: [phone]  Fax: [phone]                  Reid Herman   2017 7:30 AM   Office Visit    Description:  Male : 1940   Provider:  Alex Cotto MD   Department:  [facility]           Reason for Visit     Diabetic Eye Exam                To Do List           Future Appointments        Provider Department Dept Phone    2017 8:15 AM LAB, HEMONC CANCER BLDG [facility] [phone]    2017 9:15 AM Raghavendra Cabrera MD [facility] [phone]    2017 8:00 AM LAB, APPOINTMENT [facility] [phone]    2017 8:10 AM LAB, SPECIMEN [facility] [phone]    5/3/2017 9:30 AM Nickie Soto, APRN, FNP [facility] [phone]      Your Future Surgeries/Procedures     2017   Surgery with CLARISSA Freeman MD   [facility] ([facility] [place] [facility])    [address] [facility] [address] [phone]              Goals (5 Years of Data)              3/26/17    3/25/17    3/24/17    Blood Pressure <= 140/90   141/81  141/81  141/81    Notes - Note created  2016  3:26 PM by Gilda Turcios, PharmD    It is important to consistently maintain a controlled blood pressure.      Exercise at least 150 minutes per week.           Maintain a low sodium diet           Take at least one BP reading per week at various times of the day           Weight (lb) < 87.5 kg (192 lb 13.6 oz)           Notes - Note created  2016  3:29 PM by Gilda Turcios, PharmD    Decrease weight by 5% to improve cardiovascular outcomes.        [facility]     [facility] Care [facility] -  Assistance  Unless otherwise directed by your provider, please contact [facility], our nurse care line that is available for  assistance.      Registered nurses in the [facility] provide: appointment scheduling, clinical advisement, health education, and other advisory services.  Call: [phone] (toll free)               Medications           Message regarding Medications     Verify the changes and/or additions to your medication regime listed below are the same as discussed with your clinician today.  If any of these changes or additions are incorrect, please notify your healthcare provider.             Verify that the below list of medications is an accurate representation of the medications you are currently taking.  If none reported, the list may be blank. If incorrect, please contact your healthcare provider. Carry this list with you in case of emergency.           Current Medications     ACCU-CHEK FASTCLIX Misc TEST 6 TIMES DAILY    alpha lipoic acid 600 mg Cap Take 1 capsule by mouth once daily.      aspirin (ECOTRIN) 325 MG EC tablet Take 325 mg by mouth once daily.    atorvastatin (LIPITOR) 80 MG tablet Take 1 tablet (80 mg total) by mouth nightly.    blood sugar diagnostic (ACCU-CHEK SHARON PLUS TEST STRP) Strp Pt test blood sugar 5 times a day.    clotrimazole-betamethasone 1-0.05% (LOTRISONE) cream Apply topically 2 (two) times daily.    FLUZONE HIGH-DOSE 2016-17, PF, 180 mcg/0.5 mL Syrg     fosinopril-hydrochlorothiazide (MONOPRIL-HCT) 10-12.5 mg per tablet Take 2 tablets by mouth 2 (two) times daily.    L-METHYL-B6-B12 3-35-2 mg Tab TAKE 1 TABLET BY MOUTH EVERY DAY    metformin (GLUCOPHAGE) 500 MG tablet TAKE 1 TABLET TWICE DAILY    metoprolol succinate (TOPROL-XL) 100 MG 24 hr tablet TAKE ONE TABLET BY MOUTH EVERY EVENING    multivitamin capsule Take 1 capsule by mouth once daily.      nitroGLYCERIN (NITROSTAT) 0.4 MG SL tablet Place 1 tablet (0.4 mg total) under the tongue every 5 (five) minutes as needed for Chest pain.    NOVOLOG 100 unit/mL injection INJECT UP TO MAX  UNITS UNDER THE SKIN VIA INSULIN PUMP DAILY  AS DIRECTED    triamcinolone acetonide 0.1% (KENALOG) 0.1 % ointment Apply topically 2 (two) times daily.    valacyclovir (VALTREX) 1000 MG tablet Take 1 tablet (1,000 mg total) by mouth 3 (three) times daily.           Clinical Reference Information           Allergies as of 4/5/2017     No Known Allergies      Immunizations Administered on Date of Encounter - 4/5/2017     None      Language Assistance Services     ATTENTION: Language assistance services are available, free of charge. Please call [phone].      ATENCIÓN: Si genevievela lewisdenise, tiene a rivero disposición servicios gratuitos de asistencia lingüística. Llame al [phone].     ISAURA Ý: N?u b?n nói Ti?ng Vi?t, có các d?ch v? h? tr? ngôn ng? mi?n phí dành cho b?n. G?i s? [phone].         [facility] complies with applicable Federal civil rights laws and does not discriminate on the basis of race, color, national origin, age, disability, or sex.

## 2017-04-05 NOTE — PROGRESS NOTES
HPI     DLS 9/7/16  Pt states vision gets a little out of focus. No flashes some   floaters OD> OS.    No pain/redness.  Taking AREDS 2 vit.    POHX:  NPDR  w hem   S/P Avastin OD X 5 (12/29/14)   S/P PRP OD(01/12/15) (04/13/15)   H/x  vit hem OD 11/06 followed by PRP   S/P phaco IOL OD 4/8/13   S/P phaco IOL OS 4/29/13     Taking  Preservision TWICE DAILY    AIC 7.0               Last edited by Alex Cotto MD on 4/5/2017  8:35 AM.     [place] SDOCT:   OD: good quality, drusen, PED, no IRF/SRF  OS: good quality, drusen, PED, no IRF/SRF    Assessment /Plan     For exam results, see Encounter Report.    Nonexudative age-related macular degeneration, bilateral, intermediate dry stage    Type 2 diabetes mellitus with right eye affected by proliferative retinopathy without macular edema, with long-term current use of insulin    Type 2 diabetes mellitus with left eye affected by mild nonproliferative retinopathy without macular edema, with long-term current use of insulin      Stable OU.  Apparently asymmetric DR  Diabetic Retinopathy discussed in detail, all questions answered  Stressed importance of good BS/BP/Chol Control    Discussed Dry and Wet AMD in detail  Recommend AREDS 2 Vitamins  Home Amsler Grid Testing  RTC 6 months sooner PRN

## 2017-04-07 ENCOUNTER — PATIENT OUTREACH (OUTPATIENT)
Dept: OTHER | Facility: OTHER | Age: 77
End: 2017-04-07
Payer: MEDICARE

## 2017-04-07 NOTE — PROGRESS NOTES
Last 5 Patient Entered Readings                                                               Current 30 Day Average: 143/77     Recent Readings 4/7/2017 4/6/2017 4/3/2017 4/2/2017 4/1/2017    Systolic BP (mmHg) 148 142 152 136 144    Diastolic BP (mmHg) 78 75 83 78 76    Pulse 58 56 61 54 60        Feeling well. No questions or concerns.    Follow up with  Reid HARDEN Virgil completed. Patient is maintaining a low sodium diet and continuing his exercise regime. Patient did not have any further questions or concerns. I will follow up in a few weeks to see how he is doing and progressing.

## 2017-04-12 ENCOUNTER — TELEPHONE (OUTPATIENT)
Dept: CARDIOLOGY | Facility: CLINIC | Age: 77
End: 2017-04-12

## 2017-04-12 ENCOUNTER — PATIENT OUTREACH (OUTPATIENT)
Dept: OTHER | Facility: OTHER | Age: 77
End: 2017-04-12
Payer: MEDICARE

## 2017-04-12 ENCOUNTER — OFFICE VISIT (OUTPATIENT)
Dept: HEMATOLOGY/ONCOLOGY | Facility: CLINIC | Age: 77
End: 2017-04-12
Payer: MEDICARE

## 2017-04-12 ENCOUNTER — PATIENT MESSAGE (OUTPATIENT)
Dept: PODIATRY | Facility: CLINIC | Age: 77
End: 2017-04-12

## 2017-04-12 ENCOUNTER — LAB VISIT (OUTPATIENT)
Dept: LAB | Facility: HOSPITAL | Age: 77
End: 2017-04-12
Attending: INTERNAL MEDICINE
Payer: MEDICARE

## 2017-04-12 VITALS
WEIGHT: 206.38 LBS | DIASTOLIC BLOOD PRESSURE: 70 MMHG | HEIGHT: 67 IN | TEMPERATURE: 98 F | BODY MASS INDEX: 32.39 KG/M2 | SYSTOLIC BLOOD PRESSURE: 151 MMHG | HEART RATE: 58 BPM | RESPIRATION RATE: 13 BRPM

## 2017-04-12 DIAGNOSIS — N13.8 BPH WITH URINARY OBSTRUCTION: ICD-10-CM

## 2017-04-12 DIAGNOSIS — I10 ESSENTIAL HYPERTENSION: Primary | ICD-10-CM

## 2017-04-12 DIAGNOSIS — G47.33 OBSTRUCTIVE SLEEP APNEA: ICD-10-CM

## 2017-04-12 DIAGNOSIS — I15.0 RENOVASCULAR HYPERTENSION: ICD-10-CM

## 2017-04-12 DIAGNOSIS — N18.30 CHRONIC KIDNEY DISEASE, STAGE III (MODERATE): ICD-10-CM

## 2017-04-12 DIAGNOSIS — N40.1 BPH WITH URINARY OBSTRUCTION: ICD-10-CM

## 2017-04-12 DIAGNOSIS — E10.65 POORLY CONTROLLED TYPE 1 DIABETES MELLITUS WITH PERIPHERAL NEUROPATHY: Chronic | ICD-10-CM

## 2017-04-12 DIAGNOSIS — C91.10 CLL (CHRONIC LYMPHOCYTIC LEUKEMIA): Primary | ICD-10-CM

## 2017-04-12 DIAGNOSIS — I73.9 PERIPHERAL VASCULAR DISEASE: ICD-10-CM

## 2017-04-12 DIAGNOSIS — E10.42 POORLY CONTROLLED TYPE 1 DIABETES MELLITUS WITH PERIPHERAL NEUROPATHY: Chronic | ICD-10-CM

## 2017-04-12 DIAGNOSIS — E78.2 MIXED HYPERLIPIDEMIA: ICD-10-CM

## 2017-04-12 DIAGNOSIS — C91.10 CLL (CHRONIC LYMPHOCYTIC LEUKEMIA): ICD-10-CM

## 2017-04-12 DIAGNOSIS — E10.311 DIABETIC RETINOPATHY OF LEFT EYE WITH MACULAR EDEMA ASSOCIATED WITH TYPE 1 DIABETES MELLITUS, UNSPECIFIED RETINOPATHY SEVERITY: ICD-10-CM

## 2017-04-12 DIAGNOSIS — I10 ESSENTIAL HYPERTENSION: ICD-10-CM

## 2017-04-12 DIAGNOSIS — I25.119 CORONARY ARTERY DISEASE INVOLVING NATIVE CORONARY ARTERY OF NATIVE HEART WITH ANGINA PECTORIS: ICD-10-CM

## 2017-04-12 LAB
ALBUMIN SERPL BCP-MCNC: 3.8 G/DL
ALP SERPL-CCNC: 89 U/L
ALT SERPL W/O P-5'-P-CCNC: 17 U/L
ANION GAP SERPL CALC-SCNC: 8 MMOL/L
AST SERPL-CCNC: 19 U/L
BILIRUB SERPL-MCNC: 0.6 MG/DL
BUN SERPL-MCNC: 18 MG/DL
CALCIUM SERPL-MCNC: 9.5 MG/DL
CHLORIDE SERPL-SCNC: 96 MMOL/L
CO2 SERPL-SCNC: 28 MMOL/L
CREAT SERPL-MCNC: 1.2 MG/DL
ERYTHROCYTE [DISTWIDTH] IN BLOOD BY AUTOMATED COUNT: 13.7 %
EST. GFR  (AFRICAN AMERICAN): >60 ML/MIN/1.73 M^2
EST. GFR  (NON AFRICAN AMERICAN): 58.8 ML/MIN/1.73 M^2
GLUCOSE SERPL-MCNC: 220 MG/DL
HCT VFR BLD AUTO: 37.3 %
HGB BLD-MCNC: 12.6 G/DL
MCH RBC QN AUTO: 32.4 PG
MCHC RBC AUTO-ENTMCNC: 33.8 %
MCV RBC AUTO: 96 FL
NEUTROPHILS # BLD AUTO: 3.2 K/UL
PLATELET # BLD AUTO: 154 K/UL
PMV BLD AUTO: 9.8 FL
POTASSIUM SERPL-SCNC: 5.5 MMOL/L
PROT SERPL-MCNC: 6.6 G/DL
RBC # BLD AUTO: 3.89 M/UL
SODIUM SERPL-SCNC: 132 MMOL/L
WBC # BLD AUTO: 37.94 K/UL

## 2017-04-12 PROCEDURE — 3045F PR MOST RECENT HEMOGLOBIN A1C LEVEL 7.0-9.0%: CPT | Mod: S$GLB,,, | Performed by: INTERNAL MEDICINE

## 2017-04-12 PROCEDURE — 1159F MED LIST DOCD IN RCRD: CPT | Mod: S$GLB,,, | Performed by: INTERNAL MEDICINE

## 2017-04-12 PROCEDURE — 99215 OFFICE O/P EST HI 40 MIN: CPT | Mod: S$GLB,,, | Performed by: INTERNAL MEDICINE

## 2017-04-12 PROCEDURE — 4010F ACE/ARB THERAPY RXD/TAKEN: CPT | Mod: S$GLB,,, | Performed by: INTERNAL MEDICINE

## 2017-04-12 PROCEDURE — 99499 UNLISTED E&M SERVICE: CPT | Mod: S$GLB,,, | Performed by: INTERNAL MEDICINE

## 2017-04-12 PROCEDURE — 99999 PR PBB SHADOW E&M-EST. PATIENT-LVL III: CPT | Mod: PBBFAC,,, | Performed by: INTERNAL MEDICINE

## 2017-04-12 PROCEDURE — 3078F DIAST BP <80 MM HG: CPT | Mod: S$GLB,,, | Performed by: INTERNAL MEDICINE

## 2017-04-12 PROCEDURE — 1157F ADVNC CARE PLAN IN RCRD: CPT | Mod: S$GLB,,, | Performed by: INTERNAL MEDICINE

## 2017-04-12 PROCEDURE — 1126F AMNT PAIN NOTED NONE PRSNT: CPT | Mod: S$GLB,,, | Performed by: INTERNAL MEDICINE

## 2017-04-12 PROCEDURE — 3077F SYST BP >= 140 MM HG: CPT | Mod: S$GLB,,, | Performed by: INTERNAL MEDICINE

## 2017-04-12 NOTE — PROGRESS NOTES
Oncology asked for therapy evaluation 2/2 low Na and high K. Reviewed case with Dr. Alexander. Repeat BMP. Assess therapy change.   Patient reports increased broccoli intake over the last few days    BMP  Lab Results   Component Value Date     (L) 04/12/2017    K 5.5 (H) 04/12/2017    CL 96 04/12/2017    CO2 28 04/12/2017    BUN 18 04/12/2017    CREATININE 1.2 04/12/2017    CALCIUM 9.5 04/12/2017    ANIONGAP 8 04/12/2017    ESTGFRAFRICA >60.0 04/12/2017    EGFRNONAA 58.8 (A) 04/12/2017         Last 5 Patient Entered Readings                                                               Current 30 Day Average: 144/77     Recent Readings 4/7/2017 4/6/2017 4/3/2017 4/2/2017 4/1/2017    Systolic BP (mmHg) 148 142 152 136 144    Diastolic BP (mmHg) 78 75 83 78 76    Pulse 58 56 61 54 60

## 2017-04-12 NOTE — TELEPHONE ENCOUNTER
----- Message from Jahaira Burger MA sent at 4/12/2017 12:19 PM CDT -----  Contact: self  Pt.saw -onc  this am 4/12. Pt. is taking Fosinopril-Hctz. The said it affects Potassium and there is too much diuretic in med., to contact cardiology Dr. Please call [phone]  pt.  Last visit 2-20-17.

## 2017-04-12 NOTE — PATIENT INSTRUCTIONS
Call Cardiologist and get off Hydrochlorthiazide part of BP med    Come in 1 week for a CMP    Come in 6 weeks for all labs I ordered today    Come in to see me in 3 mo with CBC/CMP    No other change in meds

## 2017-04-12 NOTE — TELEPHONE ENCOUNTER
Dr. Alexander, The pt says that he saw his oncologist today and he told the pt to contact his cardiologist - says that there is too much diuretic - pt says that his potassium is too high. Potassium today 5.5 and sodium 132. Reviewed cardiac meds with the pt - says that he is taking fosinopril HCT 10-12.5 mg one tablet twice a day, [not two twice a day] and metoprolol 100 mg twice a day. BP at his visit today with ONC was 151/70 HR 58. Please advise. Thanks, Latrice

## 2017-04-12 NOTE — TELEPHONE ENCOUNTER
Spoke with Dr. Alexander requests that I call the [facility] - spoke with Gilda - she will address issue.

## 2017-04-12 NOTE — MR AVS SNAPSHOT
[facility]  [address]  [place] [address]  Phone: [phone]                  Reid Herman   2017 9:15 AM   Office Visit    Description:  Male : 1940   Provider:  Raghavendra Cabrera MD   Department:  [facility]           Diagnoses this Visit        Comments    CLL (chronic lymphocytic leukemia)    -  Primary     Diabetic retinopathy of left eye with macular edema associated with type 1 diabetes mellitus, unspecified retinopathy severity         Mixed hyperlipidemia         BPH with urinary obstruction         Essential hypertension         Obstructive sleep apnea         Chronic kidney disease, stage III (moderate)                To Do List           Future Appointments        Provider Department Dept Phone    2017 8:00 AM LAB, APPOINTMENT [facility] [phone]    2017 8:10 AM LAB, SPECIMEN [facility] [phone]    5/3/2017 9:30 AM Nickie Soto, APRN, FNP [facility] - Endocrinology [phone]    2017 9:30 AM Maye Wyatt, DPM [facility] [phone]      Your Future Surgeries/Procedures     2017   Surgery with CLARISSA Freeman MD   [facility] ([facility])    [address] [facility] [address] [phone]              Goals (5 Years of Data)              Today    17    Blood Pressure <= 140/90   151/70  151/70  151/70    Notes - Note created  2016  3:26 PM by Gilda Turcios, PharmD    It is important to consistently maintain a controlled blood pressure.      Exercise at least 150 minutes per week.           Maintain a low sodium diet           Take at least one BP reading per week at various times of the day     On track      Weight (lb) < 87.5 kg (192 lb 13.6 oz)   93.6 kg (206 lb 5.6 oz)        Notes - Note created  2016  3:29 PM by Gilda KHAN  Slick TurciosD    Decrease weight by 5% to improve cardiovascular outcomes.        [facility]     [facility] Care [facility] - 24/7 Assistance  Unless otherwise directed by your provider, please contact [facility], our nurse care line that is available for 24/7 assistance.     Registered nurses in the [facility] provide: appointment scheduling, clinical advisement, health education, and other advisory services.  Call: [phone] (toll free)               Medications           Message regarding Medications     Verify the changes and/or additions to your medication regime listed below are the same as discussed with your clinician today.  If any of these changes or additions are incorrect, please notify your healthcare provider.             Verify that the below list of medications is an accurate representation of the medications you are currently taking.  If none reported, the list may be blank. If incorrect, please contact your healthcare provider. Carry this list with you in case of emergency.           Current Medications     ACCU-CHEK FASTCLIX Misc TEST 6 TIMES DAILY    alpha lipoic acid 600 mg Cap Take 1 capsule by mouth once daily.      aspirin (ECOTRIN) 325 MG EC tablet Take 325 mg by mouth once daily.    atorvastatin (LIPITOR) 80 MG tablet Take 1 tablet (80 mg total) by mouth nightly.    blood sugar diagnostic (ACCU-CHEK SHARON PLUS TEST STRP) Strp Pt test blood sugar 5 times a day.    clotrimazole-betamethasone 1-0.05% (LOTRISONE) cream Apply topically 2 (two) times daily.    FLUZONE HIGH-DOSE 2016-17, PF, 180 mcg/0.5 mL Syrg     fosinopril-hydrochlorothiazide (MONOPRIL-HCT) 10-12.5 mg per tablet Take 2 tablets by mouth 2 (two) times daily.    L-METHYL-B6-B12 3-35-2 mg Tab TAKE 1 TABLET BY MOUTH EVERY DAY    metformin (GLUCOPHAGE) 500 MG tablet TAKE 1 TABLET TWICE DAILY    metoprolol succinate (TOPROL-XL) 100 MG 24 hr tablet TAKE ONE TABLET BY MOUTH EVERY EVENING     "multivitamin capsule Take 1 capsule by mouth once daily.      nitroGLYCERIN (NITROSTAT) 0.4 MG SL tablet Place 1 tablet (0.4 mg total) under the tongue every 5 (five) minutes as needed for Chest pain.    NOVOLOG 100 unit/mL injection INJECT UP TO MAX  UNITS UNDER THE SKIN VIA INSULIN PUMP DAILY AS DIRECTED    triamcinolone acetonide 0.1% (KENALOG) 0.1 % ointment Apply topically 2 (two) times daily.    valacyclovir (VALTREX) 1000 MG tablet Take 1 tablet (1,000 mg total) by mouth 3 (three) times daily.           Clinical Reference Information           Your Vitals Were     BP Pulse Temp Resp Height Weight    151/70 (BP Location: Left arm, Patient Position: Sitting, BP Method: Automatic) 58 98.3 °F (36.8 °C) 13 5' 7" (1.702 m) 93.6 kg (206 lb 5.6 oz)    BMI                32.32 kg/m2          Blood Pressure          Most Recent Value    BP  (!)  151/70      Allergies as of 4/12/2017     No Known Allergies      Immunizations Administered on Date of Encounter - 4/12/2017     None      Orders Placed During Today's Visit     Future Labs/Procedures Expected by Expires    CBC Oncology  As directed 4/12/2018    Comprehensive metabolic panel  As directed 4/12/2018    Lactate dehydrogenase  As directed 4/12/2018    ZAP-70, Chronic Lymph Leuk Prognosis  As directed 6/11/2018      Instructions    Call Cardiologist and get off Hydrochlorthiazide part of BP med    Come in 1 week for a CMP    Come in 6 weeks for all labs I ordered today    Come in to see me in 3 mo with CBC/CMP    No other change in meds       Language Assistance Services     ATTENTION: Language assistance services are available, free of charge. Please call [phone].      ATENCIÓN: Si habla español, tiene a rivero disposición servicios gratuitos de asistencia lingüística. Llame al [phone].     [facility] Ý: N?u b?n nói Ti?ng Vi?t, có các d?ch v? h? tr? ngôn ng? mi?n phí dành cho b?n. G?i s? [phone].         [facility] complies with " applicable Federal civil rights laws and does not discriminate on the basis of race, color, national origin, age, disability, or sex.

## 2017-04-13 ENCOUNTER — LAB VISIT (OUTPATIENT)
Dept: LAB | Facility: HOSPITAL | Age: 77
End: 2017-04-13
Attending: INTERNAL MEDICINE
Payer: MEDICARE

## 2017-04-13 DIAGNOSIS — I10 ESSENTIAL HYPERTENSION: ICD-10-CM

## 2017-04-13 LAB
ANION GAP SERPL CALC-SCNC: 14 MMOL/L
BUN SERPL-MCNC: 18 MG/DL
CALCIUM SERPL-MCNC: 9.6 MG/DL
CHLORIDE SERPL-SCNC: 94 MMOL/L
CO2 SERPL-SCNC: 24 MMOL/L
CREAT SERPL-MCNC: 1.2 MG/DL
EST. GFR  (AFRICAN AMERICAN): >60 ML/MIN/1.73 M^2
EST. GFR  (NON AFRICAN AMERICAN): 59 ML/MIN/1.73 M^2
GLUCOSE SERPL-MCNC: 342 MG/DL
POTASSIUM SERPL-SCNC: 4.9 MMOL/L
SODIUM SERPL-SCNC: 132 MMOL/L

## 2017-04-13 PROCEDURE — 36415 COLL VENOUS BLD VENIPUNCTURE: CPT

## 2017-04-13 PROCEDURE — 80048 BASIC METABOLIC PNL TOTAL CA: CPT

## 2017-04-13 NOTE — PROGRESS NOTES
Repeat BMP reviewed by myself and Dr. Alexander. K WNL, Na close to baseline.  Counseled patient to limit high K foods- broccoli, potatoes, bananas, etc.  Counseled patient to slight restrict fluids ~ 50-60oz/day. Especially, advised patient to limit caffeineated soda intake (currently drinks 2 18oz bottles per day)  No medication changes at this time. Patient verbalizes understanding.   Continue monitoring    BMP  Lab Results   Component Value Date     (L) 04/13/2017    K 4.9 04/13/2017    CL 94 (L) 04/13/2017    CO2 24 04/13/2017    BUN 18 04/13/2017    CREATININE 1.2 04/13/2017    CALCIUM 9.6 04/13/2017    ANIONGAP 14 04/13/2017    ESTGFRAFRICA >60 04/13/2017    EGFRNONAA 59 (A) 04/13/2017         Last 5 Patient Entered Readings                                                               Current 30 Day Average: 144/77     Recent Readings 4/7/2017 4/6/2017 4/3/2017 4/2/2017 4/1/2017    Systolic BP (mmHg) 148 142 152 136 144    Diastolic BP (mmHg) 78 75 83 78 76    Pulse 58 56 61 54 60

## 2017-04-17 ENCOUNTER — TELEPHONE (OUTPATIENT)
Dept: HEMATOLOGY/ONCOLOGY | Facility: CLINIC | Age: 77
End: 2017-04-17

## 2017-04-17 NOTE — TELEPHONE ENCOUNTER
Returned call and spoke with Mr Herman. Patient scheduled for labs on 4/19/17 and he calling to check if that was the same labs his Cardiologist order and was drawn on 4/13/17. Informed patient it was a different test than the on Dr Cabrera has request. Patient will keep his appointment on 4/19/17.

## 2017-04-17 NOTE — TELEPHONE ENCOUNTER
----- Message from Dagmar Benz sent at 4/17/2017  1:11 PM CDT -----  Contact: self  Patient states need to speak with nurse.  Pt has lab scheduled for Wednesday   Patient  want to know if lab is still needed  Please call pt at [phone]

## 2017-04-18 NOTE — PROGRESS NOTES
PATIENT: Reid Herman  MRN: [id number]  DATE: 4/18/2017    Subjective:   CLL    Oncologic History:  5 year old male with Stage 0 CLL dx'd 6/2010. He remains asymptomatic and does not report any chronic infections. He states that he feels well. No wt loss over the last 6 months. He denies nausea vomiting fever chills night sweats or SOB.  He has not received any therapy for this.  He remains quite stable.  He has no evidence of disease progression.   His ZAP 70 test was negative.     Interval History: Mr. Herman returns for follow up.  He has had no infections.  He has normal palpable lymphadenopathy and he has no B symptoms.    Past Medical History:   Diagnosis Date    Anemia     Back pain     Basal cell carcinoma 6.2015    left nasal ala - excised by SSW    CAD (coronary artery disease) 10/1/2012    Cancer     CLL    Cataract     CLL (chronic lymphocytic leukemia) 8/17/2012    CLL (chronic lymphocytic leukemia)     DDD (degenerative disc disease), lumbar 10/28/2014    Diabetes mellitus     Diabetes mellitus type I     Diabetic retinopathy of both eyes     Heart attack     Hyperlipidemia     Hypertension     Insulin pump in place     Obesity     Poorly controlled type 1 diabetes mellitus with peripheral neuropathy 10/1/2012    Preseptal cellulitis of right eye 8/17/15    S/P CABG x 2 2/14/2014 1994     Skin cancer     Sleep apnea     SQUAMOUS CELL CARCINOMA     right posterior ear, excised via Mph's with Dr.Walia Belkys Gonzalez in sleeping     Type 1 diabetes, uncontrolled, with retinopathy 10/1/2012    Type II or unspecified type diabetes mellitus without mention of complication, not stated as uncontrolled        Past Surgical History:   Procedure Laterality Date    APPENDECTOMY  1953    CATARACT EXTRACTION  4/8/13    right eye (toric)    CATARACT EXTRACTION  4/29/13    left eye (toric)    CORONARY ARTERY BYPASS GRAFT  1994    x 3    EYE SURGERY      cataracts, both eyes     FINGER TENDON REPAIR  2011    left hand    SKIN BIOPSY  2013    multiple    SPINE SURGERY  2015    TLIF    TONSILLECTOMY  1947       Family History   Problem Relation Age of Onset    Breast cancer Mother     Cancer Mother      ? lung cancer    Alzheimer's disease Father     Dementia Father     Stroke Father     Colon cancer Sister     Cancer Sister 60     colon    Diabetes Maternal Grandfather     Diabetes Paternal Aunt     Diabetes Paternal Uncle     Melanoma Neg Hx     Psoriasis Neg Hx     Lupus Neg Hx     Eczema Neg Hx     Acne Neg Hx         Social History:  reports that he has never smoked. He has never used smokeless tobacco. He reports that he drinks about 0.6 oz of alcohol per week  He reports that he does not use illicit drugs.    Allergies:  Review of patient's allergies indicates:  No Known Allergies    Medications:  Current Outpatient Prescriptions   Medication Sig Dispense Refill    ACCU-CHEK FASTCLIX Misc TEST 6 TIMES DAILY 612 each 3    alpha lipoic acid 600 mg Cap Take 1 capsule by mouth once daily.        aspirin (ECOTRIN) 325 MG EC tablet Take 325 mg by mouth once daily.      atorvastatin (LIPITOR) 80 MG tablet Take 1 tablet (80 mg total) by mouth nightly. 90 tablet 3    blood sugar diagnostic (ACCU-CHEK SHARON PLUS TEST STRP) Strp Pt test blood sugar 5 times a day. 650 strip 3    clotrimazole-betamethasone 1-0.05% (LOTRISONE) cream Apply topically 2 (two) times daily. 1 Tube 1    FLUZONE HIGH-DOSE 2016-17, PF, 180 mcg/0.5 mL Syrg       fosinopril-hydrochlorothiazide (MONOPRIL-HCT) 10-12.5 mg per tablet Take 2 tablets by mouth 2 (two) times daily. 180 tablet 3    L-METHYL-B6-B12 3-35-2 mg Tab TAKE 1 TABLET BY MOUTH EVERY DAY 90 tablet 0    metformin (GLUCOPHAGE) 500 MG tablet TAKE 1 TABLET TWICE DAILY 180 tablet 2    metoprolol succinate (TOPROL-XL) 100 MG 24 hr tablet TAKE ONE TABLET BY MOUTH EVERY EVENING 90 tablet 3    multivitamin capsule Take 1 capsule by mouth  "once daily.        nitroGLYCERIN (NITROSTAT) 0.4 MG SL tablet Place 1 tablet (0.4 mg total) under the tongue every 5 (five) minutes as needed for Chest pain. 50 tablet 12    NOVOLOG 100 unit/mL injection INJECT UP TO MAX  UNITS UNDER THE SKIN VIA INSULIN PUMP DAILY AS DIRECTED 30 mL 4    triamcinolone acetonide 0.1% (KENALOG) 0.1 % ointment Apply topically 2 (two) times daily. 30 g 0    valacyclovir (VALTREX) 1000 MG tablet Take 1 tablet (1,000 mg total) by mouth 3 (three) times daily. 21 tablet 0     No current facility-administered medications for this visit.          Review of Systems   HENT: Negative.    Eyes: Negative.    Respiratory: Negative.    Cardiovascular: Negative.    Gastrointestinal: Negative.    Endocrine: Negative.    Genitourinary: Negative.    Musculoskeletal: Negative.    Skin: Negative.    Neurological: Negative.    Hematological: Negative.    Psychiatric/Behavioral: Negative.        ECOG Performance Status: 0  Objective:      Vitals:   Vitals:    04/12/17 0916   BP: (!) 151/70   BP Location: Left arm   Patient Position: Sitting   BP Method: Automatic   Pulse: (!) 58   Resp: 13   Temp: 98.3 °F (36.8 °C)   Weight: 93.6 kg (206 lb 5.6 oz)   Height: 5' 7" (1.702 m)     BMI: Body mass index is 32.32 kg/(m^2).      Physical Exam   Constitutional: he is oriented to person, place, and time. He appears well-developed and well-nourished.   HENT: NC AT   Head: Normocephalic.   Right Ear: External ear normal.   Left Ear: External ear normal.   Nose: Nose normal.   Mouth/Throat: Oropharynx is clear and moist.   Eyes: Conjunctivae and EOM are normal. Pupils are equal, round, and reactive to light.   Neck: Normal range of motion. Neck supple.   Cardiovascular: Normal rate, regular rhythm, normal heart sounds and intact distal pulses.    Pulmonary/Chest: Effort normal and breath sounds normal.   Abdominal: Soft. Bowel sounds are normal.   Musculoskeletal: Normal range of motion.   Neurological: he is " alert and oriented to person, place, and time. He has normal reflexes.   Skin: Skin is warm and dry.   Psychiatric: he has a normal mood and affect. His behavior is normal. Judgment and thought content normal.       Laboratory Data:  Lab Visit on 04/12/2017   Component Date Value Ref Range Status    WBC 04/12/2017 37.94* 3.90 - 12.70 K/uL Final    RBC 04/12/2017 3.89* 4.60 - 6.20 M/uL Final    Hemoglobin 04/12/2017 12.6* 14.0 - 18.0 g/dL Final    Hematocrit 04/12/2017 37.3* 40.0 - 54.0 % Final    MCV 04/12/2017 96  82 - 98 fL Final    MCH 04/12/2017 32.4* 27.0 - 31.0 pg Final    MCHC 04/12/2017 33.8  32.0 - 36.0 % Final    RDW 04/12/2017 13.7  11.5 - 14.5 % Final    Platelets 04/12/2017 154  150 - 350 K/uL Final    MPV 04/12/2017 9.8  9.2 - 12.9 fL Final    Gran # 04/12/2017 3.2  1.8 - 7.7 K/uL Final    Comment: The ANC is based on a white cell differential from an   automated cell counter. It has not been microscopically   reviewed for the presence of abnormal cells. Clinical   correlation is required.      Sodium 04/12/2017 132* 136 - 145 mmol/L Final    Potassium 04/12/2017 5.5* 3.5 - 5.1 mmol/L Final    *No Visible Hemolysis    Chloride 04/12/2017 96  95 - 110 mmol/L Final    CO2 04/12/2017 28  23 - 29 mmol/L Final    Glucose 04/12/2017 220* 70 - 110 mg/dL Final    BUN, Bld 04/12/2017 18  8 - 23 mg/dL Final    Creatinine 04/12/2017 1.2  0.5 - 1.4 mg/dL Final    Calcium 04/12/2017 9.5  8.7 - 10.5 mg/dL Final    Total Protein 04/12/2017 6.6  6.0 - 8.4 g/dL Final    Albumin 04/12/2017 3.8  3.5 - 5.2 g/dL Final    Total Bilirubin 04/12/2017 0.6  0.1 - 1.0 mg/dL Final    Comment: For infants and newborns, interpretation of results should be based  on gestational age, weight and in agreement with clinical  observations.  Premature Infant recommended reference ranges:  Up to 24 hours.............<8.0 mg/dL  Up to 48 hours............<12.0 mg/dL  3-5 days..................<15.0 mg/dL  6-29  days.................<15.0 mg/dL      Alkaline Phosphatase 04/12/2017 89  55 - 135 U/L Final    AST 04/12/2017 19  10 - 40 U/L Final    ALT 04/12/2017 17  10 - 44 U/L Final    Anion Gap 04/12/2017 8  8 - 16 mmol/L Final    eGFR if African American 04/12/2017 >60.0  >60 mL/min/1.73 m^2 Final    eGFR if non African American 04/12/2017 58.8* >60 mL/min/1.73 m^2 Final    Comment: Calculation used to obtain the estimated glomerular filtration  rate (eGFR) is the CKD-EPI equation. Since race is unknown   in our information system, the eGFR values for   -American and Non--American patients are given   for each creatinine result.         Assessment/Plan:     1. CLL (chronic lymphocytic leukemia)    2. Diabetic retinopathy of left eye with macular edema associated with type 1 diabetes mellitus, unspecified retinopathy severity    3. Mixed hyperlipidemia    4. BPH with urinary obstruction    5. Essential hypertension    6. Obstructive sleep apnea    7. Chronic kidney disease, stage III (moderate)      His CLL appears stable.  He has low risk CLL.  He is at 81 and that 19 negative.  Hence his time to treatment will be 10-15 years from now.  At this point I will continue observing him clinically as is no indication for treatment and there is no indication of progression.    His diabetes, hyperlipidemia, BPH, hypertension, chronic renal insufficiency are stable and are being treated by other physicians.    Med and Order  Orders Placed This Encounter    CBC Oncology    Comprehensive metabolic panel    Lactate dehydrogenase    ZAP-70, Chronic Lymph Leuk Prognosis       Follow Up  No Follow-up on file.

## 2017-04-19 ENCOUNTER — LAB VISIT (OUTPATIENT)
Dept: LAB | Facility: HOSPITAL | Age: 77
End: 2017-04-19
Attending: INTERNAL MEDICINE
Payer: MEDICARE

## 2017-04-19 DIAGNOSIS — N13.8 BPH WITH URINARY OBSTRUCTION: ICD-10-CM

## 2017-04-19 DIAGNOSIS — N40.1 BPH WITH URINARY OBSTRUCTION: ICD-10-CM

## 2017-04-19 DIAGNOSIS — E10.311 DIABETIC RETINOPATHY OF LEFT EYE WITH MACULAR EDEMA ASSOCIATED WITH TYPE 1 DIABETES MELLITUS, UNSPECIFIED RETINOPATHY SEVERITY: ICD-10-CM

## 2017-04-19 DIAGNOSIS — G47.33 OBSTRUCTIVE SLEEP APNEA: ICD-10-CM

## 2017-04-19 DIAGNOSIS — E78.2 MIXED HYPERLIPIDEMIA: ICD-10-CM

## 2017-04-19 DIAGNOSIS — I10 ESSENTIAL HYPERTENSION: ICD-10-CM

## 2017-04-19 DIAGNOSIS — N18.30 CHRONIC KIDNEY DISEASE, STAGE III (MODERATE): ICD-10-CM

## 2017-04-19 DIAGNOSIS — C91.10 CLL (CHRONIC LYMPHOCYTIC LEUKEMIA): ICD-10-CM

## 2017-04-19 LAB
ALBUMIN SERPL BCP-MCNC: 4.1 G/DL
ALP SERPL-CCNC: 86 U/L
ALT SERPL W/O P-5'-P-CCNC: 17 U/L
ANION GAP SERPL CALC-SCNC: 7 MMOL/L
AST SERPL-CCNC: 20 U/L
BILIRUB SERPL-MCNC: 0.7 MG/DL
BUN SERPL-MCNC: 19 MG/DL
CALCIUM SERPL-MCNC: 9.8 MG/DL
CHLORIDE SERPL-SCNC: 99 MMOL/L
CO2 SERPL-SCNC: 30 MMOL/L
CREAT SERPL-MCNC: 1.1 MG/DL
EST. GFR  (AFRICAN AMERICAN): >60 ML/MIN/1.73 M^2
EST. GFR  (NON AFRICAN AMERICAN): >60 ML/MIN/1.73 M^2
GLUCOSE SERPL-MCNC: 100 MG/DL
POTASSIUM SERPL-SCNC: 4.8 MMOL/L
PROT SERPL-MCNC: 6.7 G/DL
SODIUM SERPL-SCNC: 136 MMOL/L

## 2017-04-19 PROCEDURE — 36415 COLL VENOUS BLD VENIPUNCTURE: CPT

## 2017-04-19 PROCEDURE — 80053 COMPREHEN METABOLIC PANEL: CPT

## 2017-04-20 ENCOUNTER — PATIENT OUTREACH (OUTPATIENT)
Dept: OTHER | Facility: OTHER | Age: 77
End: 2017-04-20
Payer: MEDICARE

## 2017-04-20 NOTE — PROGRESS NOTES
Patient called back to report he verbalizes understanding. No questions or concerns.   Continue monitoring    Last 5 Patient Entered Readings                                                               Current 30 Day Average: 144/77     Recent Readings 4/7/2017 4/6/2017 4/3/2017 4/2/2017 4/1/2017    Systolic BP (mmHg) 148 142 152 136 144    Diastolic BP (mmHg) 78 75 83 78 76    Pulse 58 56 61 54 60

## 2017-04-20 NOTE — PROGRESS NOTES
Called patient to review repeat labs, WNL.   Continue current treatment    BMP  Lab Results   Component Value Date     04/19/2017    K 4.8 04/19/2017    CL 99 04/19/2017    CO2 30 (H) 04/19/2017    BUN 19 04/19/2017    CREATININE 1.1 04/19/2017    CALCIUM 9.8 04/19/2017    ANIONGAP 7 (L) 04/19/2017    ESTGFRAFRICA >60.0 04/19/2017    EGFRNONAA >60.0 04/19/2017         Last 5 Patient Entered Readings                                                               Current 30 Day Average: 144/77     Recent Readings 4/7/2017 4/6/2017 4/3/2017 4/2/2017 4/1/2017    Systolic BP (mmHg) 148 142 152 136 144    Diastolic BP (mmHg) 78 75 83 78 76    Pulse 58 56 61 54 60

## 2017-04-24 ENCOUNTER — TELEPHONE (OUTPATIENT)
Dept: ENDOCRINOLOGY | Facility: CLINIC | Age: 77
End: 2017-04-24

## 2017-04-24 NOTE — TELEPHONE ENCOUNTER
----- Message from Ainsley Foreman sent at 4/24/2017 10:17 AM CDT -----  Contact: pt   Please call pt   Having colonoscopy tomorrow      WHAT ABOUT HIS INSULIN PUMP??   How does he handle the pump ?  Shut it off?  Ph [phone]     Thanks

## 2017-04-24 NOTE — TELEPHONE ENCOUNTER
Called a pt and left a message to give a call back. Pt have a colonoscopy tomorrow. He need to know that does he nee to continue his pump or he need to shut down. Please advise.

## 2017-04-24 NOTE — TELEPHONE ENCOUNTER
Spoke with the pt and advise him regards his pump and he can set up his basal up to 80% and pt verbally understand and if he have any question will call back tomorrow.

## 2017-04-25 ENCOUNTER — ANESTHESIA (OUTPATIENT)
Dept: ENDOSCOPY | Facility: HOSPITAL | Age: 77
End: 2017-04-25
Payer: MEDICARE

## 2017-04-25 ENCOUNTER — ANESTHESIA EVENT (OUTPATIENT)
Dept: ENDOSCOPY | Facility: HOSPITAL | Age: 77
End: 2017-04-25
Payer: MEDICARE

## 2017-04-25 ENCOUNTER — HOSPITAL ENCOUNTER (OUTPATIENT)
Facility: HOSPITAL | Age: 77
Discharge: HOME | End: 2017-04-25
Attending: COLON & RECTAL SURGERY | Admitting: COLON & RECTAL SURGERY
Payer: MEDICARE

## 2017-04-25 VITALS
RESPIRATION RATE: 22 BRPM | HEART RATE: 81 BPM | WEIGHT: 198 LBS | TEMPERATURE: 98 F | RESPIRATION RATE: 18 BRPM | BODY MASS INDEX: 31.08 KG/M2 | SYSTOLIC BLOOD PRESSURE: 157 MMHG | DIASTOLIC BLOOD PRESSURE: 68 MMHG | HEIGHT: 67 IN | OXYGEN SATURATION: 99 %

## 2017-04-25 DIAGNOSIS — Z12.12 SCREENING FOR COLORECTAL CANCER: ICD-10-CM

## 2017-04-25 DIAGNOSIS — Z12.11 SCREENING FOR COLORECTAL CANCER: ICD-10-CM

## 2017-04-25 LAB — POCT GLUCOSE: 195 MG/DL (ref 70–110)

## 2017-04-25 PROCEDURE — 25000003 PHARM REV CODE 250: Performed by: NURSE ANESTHETIST, CERTIFIED REGISTERED

## 2017-04-25 PROCEDURE — D9220A PRA ANESTHESIA: Mod: PT,CRNA,, | Performed by: NURSE ANESTHETIST, CERTIFIED REGISTERED

## 2017-04-25 PROCEDURE — 88305 TISSUE EXAM BY PATHOLOGIST: CPT | Performed by: PATHOLOGY

## 2017-04-25 PROCEDURE — 45385 COLONOSCOPY W/LESION REMOVAL: CPT | Mod: PT,,, | Performed by: COLON & RECTAL SURGERY

## 2017-04-25 PROCEDURE — 27200997: Performed by: COLON & RECTAL SURGERY

## 2017-04-25 PROCEDURE — 45385 COLONOSCOPY W/LESION REMOVAL: CPT | Performed by: COLON & RECTAL SURGERY

## 2017-04-25 PROCEDURE — D9220A PRA ANESTHESIA: Mod: PT,ANES,, | Performed by: ANESTHESIOLOGY

## 2017-04-25 PROCEDURE — 37000008 HC ANESTHESIA 1ST 15 MINUTES: Performed by: COLON & RECTAL SURGERY

## 2017-04-25 PROCEDURE — 63600175 PHARM REV CODE 636 W HCPCS: Performed by: NURSE ANESTHETIST, CERTIFIED REGISTERED

## 2017-04-25 PROCEDURE — 82962 GLUCOSE BLOOD TEST: CPT | Performed by: COLON & RECTAL SURGERY

## 2017-04-25 PROCEDURE — 27201089 HC SNARE, DISP (ANY): Performed by: COLON & RECTAL SURGERY

## 2017-04-25 PROCEDURE — 37000009 HC ANESTHESIA EA ADD 15 MINS: Performed by: COLON & RECTAL SURGERY

## 2017-04-25 PROCEDURE — 25000003 PHARM REV CODE 250: Performed by: NURSE PRACTITIONER

## 2017-04-25 RX ORDER — PHENYLEPHRINE HYDROCHLORIDE 10 MG/ML
INJECTION INTRAVENOUS
Status: DISCONTINUED | OUTPATIENT
Start: 2017-04-25 | End: 2017-04-25

## 2017-04-25 RX ORDER — METOPROLOL TARTRATE 1 MG/ML
INJECTION, SOLUTION INTRAVENOUS
Status: DISCONTINUED | OUTPATIENT
Start: 2017-04-25 | End: 2017-04-25

## 2017-04-25 RX ORDER — SODIUM CHLORIDE 9 MG/ML
INJECTION, SOLUTION INTRAVENOUS CONTINUOUS
Status: DISCONTINUED | OUTPATIENT
Start: 2017-04-25 | End: 2017-04-25 | Stop reason: HOSPADM

## 2017-04-25 RX ORDER — PROPOFOL 10 MG/ML
VIAL (ML) INTRAVENOUS CONTINUOUS PRN
Status: DISCONTINUED | OUTPATIENT
Start: 2017-04-25 | End: 2017-04-25

## 2017-04-25 RX ORDER — PROPOFOL 10 MG/ML
VIAL (ML) INTRAVENOUS
Status: DISCONTINUED | OUTPATIENT
Start: 2017-04-25 | End: 2017-04-25

## 2017-04-25 RX ORDER — LIDOCAINE HCL/PF 100 MG/5ML
SYRINGE (ML) INTRAVENOUS
Status: DISCONTINUED | OUTPATIENT
Start: 2017-04-25 | End: 2017-04-25

## 2017-04-25 RX ADMIN — PROPOFOL 70 MG: 10 INJECTION, EMULSION INTRAVENOUS at 07:04

## 2017-04-25 RX ADMIN — PHENYLEPHRINE HYDROCHLORIDE 50 MCG: 10 INJECTION INTRAVENOUS at 08:04

## 2017-04-25 RX ADMIN — LIDOCAINE HYDROCHLORIDE 100 MG: 20 INJECTION, SOLUTION INTRAVENOUS at 07:04

## 2017-04-25 RX ADMIN — PHENYLEPHRINE HYDROCHLORIDE 50 MCG: 10 INJECTION INTRAVENOUS at 07:04

## 2017-04-25 RX ADMIN — SODIUM CHLORIDE: 0.9 INJECTION, SOLUTION INTRAVENOUS at 07:04

## 2017-04-25 RX ADMIN — PROPOFOL 10 MG: 10 INJECTION, EMULSION INTRAVENOUS at 07:04

## 2017-04-25 RX ADMIN — METOPROLOL TARTRATE 1 MG: 5 INJECTION, SOLUTION INTRAVENOUS at 07:04

## 2017-04-25 RX ADMIN — PROPOFOL 150 MCG/KG/MIN: 10 INJECTION, EMULSION INTRAVENOUS at 07:04

## 2017-04-25 NOTE — DISCHARGE INSTRUCTIONS
Colonoscopy     A camera attached to a flexible tube with a viewing lens is used to take video pictures.     Colonoscopy is a test to view the inside of your lower digestive tract (colon and rectum). Sometimes it can show the last part of the small intestine (ileum). During the test, small pieces of tissue may be removed for testing. This is called a biopsy. Small growths, such as polyps, may also be removed.   Why is colonoscopy done?  The test is done to help look for colon cancer. And it can help find the source of abdominal pain, bleeding, and changes in bowel habits. It may be needed once a year, depending on factors such as your:  · Age  · Health history  · Family health history  · Symptoms  · Results from any prior colonoscopy  Risks and possible complications  These include:  · Bleeding               · A puncture or tear in the colon   · Risks of anesthesia  · A cancer lesion not being seen  Getting ready   To prepare for the test:  · Talk with your healthcare provider about the risks of the test (see below). Also ask your healthcare provider about alternatives to the test.  · Tell your healthcare provider about any medicines you take. Also tell him or her about any health conditions you may have.  · Make sure your rectum and colon are empty for the test. Follow the diet and bowel prep instructions exactly. If you dont, the test may need to be rescheduled.  · Plan for a friend or family member to drive you home after the test.     Colonoscopy provides an inside view of the entire colon.     You may discuss the results with your doctor right away or at a future visit.  During the test   The test is usually done in the hospital on an outpatient basis. This means you go home the same day. The procedure takes about 30 minutes. During that time:  · You are given relaxing (sedating) medicine through an IV line. You may be drowsy, or fully asleep.  · The healthcare provider will first give you a physical exam to  check for anal and rectal problems.  · Then the anus is lubricated and the scope inserted.  · If you are awake, you may have a feeling similar to needing to have a bowel movement. You may also feel pressure as air is pumped into the colon. Its OK to pass gas during the procedure.  · Biopsy, polyp removal, or other treatments may be done during the test.  After the test   You may have gas right after the test. It can help to try to pass it to help prevent later bloating. Your healthcare provider may discuss the results with you right away. Or you may need to schedule a follow-up visit to talk about the results. After the test, you can go back to your normal eating and other activities. You may be tired from the sedation and need to rest for a few hours.  Date Last Reviewed: 11/1/2016  © 9367-9966 [employer], [employer]. [address] [place] [address], [place] [address]. All rights reserved. This information is not intended as a substitute for professional medical care. Always follow your healthcare professional's instructions.

## 2017-04-25 NOTE — ANESTHESIA RELEASE NOTE
"Anesthesia Release from PACU Note    Patient: eRid Herman    Procedure(s) Performed: Procedure(s) (LRB):  COLONOSCOPY (N/A)    Anesthesia type: general    Post pain: Adequate analgesia    Post assessment: no apparent anesthetic complications, tolerated procedure well and no evidence of recall    Last Vitals:   Visit Vitals    BP (!) 157/68 (BP Location: Left arm, Patient Position: Lying, BP Method: Automatic)    Pulse 81    Temp 36.6 °C (97.8 °F) (Axillary)    Resp 18    Ht 5' 7" (1.702 m)    Wt 89.8 kg (198 lb)    SpO2 99%    BMI 31.01 kg/m2       Post vital signs: stable    Level of consciousness: awake, alert  and oriented    Nausea/Vomiting: no nausea/no vomiting    Complications: none    Airway Patency: patent    Respiratory: unassisted, spontaneous ventilation, room air    Cardiovascular: stable and blood pressure at baseline    Hydration: euvolemic  "

## 2017-04-25 NOTE — H&P
Colonoscopy History and Physical      Procedure : Colonoscopy    Indications:  asymptomatic screening exam and personal history of colon polyps    Family Hx of CRC: Yes, sister    Last Colonoscopy:  2014: 4 polyps removed one of which (in transverse colon) was possibly serrated adenoma     Hx of sedation problems: none  FHX of sedation problems: none    Past Medical History:   Diagnosis Date    Anemia     Back pain     Basal cell carcinoma 6.2015    left nasal ala - excised by SSW    CAD (coronary artery disease) 10/1/2012    Cancer     CLL    Cataract     CLL (chronic lymphocytic leukemia) 8/17/2012    CLL (chronic lymphocytic leukemia)     DDD (degenerative disc disease), lumbar 10/28/2014    Diabetes mellitus     Diabetes mellitus type I     Diabetic retinopathy of both eyes     Heart attack     Hyperlipidemia     Hypertension     Insulin pump in place     Obesity     Poorly controlled type 1 diabetes mellitus with peripheral neuropathy 10/1/2012    Preseptal cellulitis of right eye 8/17/15    S/P CABG x 2 2/14/2014    1994     Skin cancer     Sleep apnea     SQUAMOUS CELL CARCINOMA     right posterior ear, excised via Mph's with Dr.Walia Belkys Gonzalez in sleeping     Type 1 diabetes, uncontrolled, with retinopathy 10/1/2012    Type II or unspecified type diabetes mellitus without mention of complication, not stated as uncontrolled        Family History   Problem Relation Age of Onset    Breast cancer Mother     Cancer Mother      ? lung cancer    Alzheimer's disease Father     Dementia Father     Stroke Father     Colon cancer Sister     Cancer Sister 60     colon    Diabetes Maternal Grandfather     Diabetes Paternal Aunt     Diabetes Paternal Uncle     Melanoma Neg Hx     Psoriasis Neg Hx     Lupus Neg Hx     Eczema Neg Hx     Acne Neg Hx        Social History     Social History    Marital status:      Spouse name: N/A    Number of children: N/A    Years  of education: N/A     Occupational History    [employer]      Social History Main Topics    Smoking status: Never Smoker    Smokeless tobacco: Never Used    Alcohol use 0.6 oz/week     1 Glasses of wine per week      Comment: social once monthly    Drug use: No    Sexual activity: Not Currently     Partners: Female     Other Topics Concern    Not on file     Social History Narrative       Review of patient's allergies indicates:  No Known Allergies    No current facility-administered medications on file prior to encounter.      Current Outpatient Prescriptions on File Prior to Encounter   Medication Sig Dispense Refill    aspirin (ECOTRIN) 325 MG EC tablet Take 325 mg by mouth once daily.      atorvastatin (LIPITOR) 80 MG tablet Take 1 tablet (80 mg total) by mouth nightly. 90 tablet 3    metformin (GLUCOPHAGE) 500 MG tablet TAKE 1 TABLET TWICE DAILY 180 tablet 2    metoprolol succinate (TOPROL-XL) 100 MG 24 hr tablet TAKE ONE TABLET BY MOUTH EVERY EVENING 90 tablet 3    multivitamin capsule Take 1 capsule by mouth once daily.        nitroGLYCERIN (NITROSTAT) 0.4 MG SL tablet Place 1 tablet (0.4 mg total) under the tongue every 5 (five) minutes as needed for Chest pain. 50 tablet 12    ACCU-CHEK FASTCLIX Misc TEST 6 TIMES DAILY 612 each 3    alpha lipoic acid 600 mg Cap Take 1 capsule by mouth once daily.        blood sugar diagnostic (ACCU-CHEK SHARON PLUS TEST STRP) Strp Pt test blood sugar 5 times a day. 650 strip 3    clotrimazole-betamethasone 1-0.05% (LOTRISONE) cream Apply topically 2 (two) times daily. 1 Tube 1    FLUZONE HIGH-DOSE 2016-17, PF, 180 mcg/0.5 mL Syrg       triamcinolone acetonide 0.1% (KENALOG) 0.1 % ointment Apply topically 2 (two) times daily. 30 g 0    valacyclovir (VALTREX) 1000 MG tablet Take 1 tablet (1,000 mg total) by mouth 3 (three) times daily. 21 tablet 0       Review of Systems -   Respiratory ROS: negative  Cardiovascular ROS: negative  Gastrointestinal  ROS: negative  Musculoskeletal ROS: negative  Neurological ROS: negative        Physical Exam:  General: no distress  Head: normocephalic  Lungs:  normal respiratory effort  Heart: regular rate  Abdomen: soft,  Non-tender  Extremities: warm and well perfused       Deep Sedation: Mallampati Score per anesthesia    ASA: II      Patient cleared for Anesthesia:  Conscious Sedation    Anesthesia/Surgery risks, benefits, and alternative options discussed and understood by patient/family.

## 2017-04-25 NOTE — IP AVS SNAPSHOT
[facility]  [address]  [facility] [address]  Phone: [phone]           Patient Discharge Instructions   Our goal is to set you up for success. This packet includes information on your condition, medications, and your home care.  It will help you care for yourself to prevent having to return to the hospital.     Please ask your nurse if you have any questions.      There are many details to remember when preparing to leave the hospital. Here is what you will need to do:    1. Take your medicine. If you are prescribed medications, review your Medication List on the following pages. You may have new medications to  at the pharmacy and others that you'll need to stop taking. Review the instructions for how and when to take your medications. Talk with your doctor or nurses if you are unsure of what to do.     2. Go to your follow-up appointments. Specific follow-up information is listed in the following pages. Your may be contacted by a nurse or clinical provider about future appointments. Be sure we have all of the phone numbers to reach you. Please contact your provider's office if you are unable to make an appointment.     3. Watch for warning signs. Your doctor or nurse will give you detailed warning signs to watch for and when to call for assistance. These instructions may also include educational information about your condition. If you experience any of warning signs to your health, call your doctor.           [facility]  Unless otherwise directed by your provider, please   contact [facility], our nurse care line   that is available for 24/7 assistance.     [phone] (toll-free)     Registered nurses in the [facility]   provide: appointment scheduling, clinical advisement, health education, and other advisory services.                  ** Verify the list of medication(s) below is accurate and up to date. Carry this with you in case of  emergency. If your medications have changed, please notify your healthcare provider.             Medication List      ASK your doctor about these medications        Additional Info                      ACCU-CHEK FASTCLIX Misc   Quantity:  612 each   Refills:  3   Generic drug:  lancets    Instructions:  TEST 6 TIMES DAILY     Begin Date    AM    Noon    PM    Bedtime       alpha lipoic acid 600 mg Cap   Refills:  0   Dose:  1 capsule    Instructions:  Take 1 capsule by mouth once daily.     Begin Date    AM    Noon    PM    Bedtime       aspirin 325 MG EC tablet   Commonly known as:  ECOTRIN   Refills:  0   Dose:  325 mg    Instructions:  Take 325 mg by mouth once daily.     Begin Date    AM    Noon    PM    Bedtime       atorvastatin 80 MG tablet   Commonly known as:  LIPITOR   Quantity:  90 tablet   Refills:  3   Dose:  80 mg    Instructions:  Take 1 tablet (80 mg total) by mouth nightly.     Begin Date    AM    Noon    PM    Bedtime       blood sugar diagnostic Strp   Commonly known as:  ACCU-CHEK SHARON PLUS TEST STRP   Quantity:  650 strip   Refills:  3    Instructions:  Pt test blood sugar 5 times a day.     Begin Date    AM    Noon    PM    Bedtime       clotrimazole-betamethasone 1-0.05% cream   Commonly known as:  LOTRISONE   Quantity:  1 Tube   Refills:  1    Instructions:  Apply topically 2 (two) times daily.     Begin Date    AM    Noon    PM    Bedtime       FLUZONE HIGH-DOSE 2016-17 (PF) 180 mcg/0.5 mL Syrg   Refills:  0   Generic drug:  flu vacc [id number] 65yr up(PF)      Begin Date    AM    Noon    PM    Bedtime       fosinopril-hydrochlorothiazide 10-12.5 mg per tablet   Commonly known as:  MONOPRIL-HCT   Quantity:  180 tablet   Refills:  3   Dose:  2 tablet    Instructions:  Take 2 tablets by mouth 2 (two) times daily.     Begin Date    AM    Noon    PM    Bedtime       L-METHYL-B6-B12 3-35-2 mg Tab   Quantity:  90 tablet   Refills:  0   Generic drug:  mecobal-levomefolat Ca-B6 phos    Instructions:   TAKE 1 TABLET BY MOUTH EVERY DAY     Begin Date    AM    Noon    PM    Bedtime       metformin 500 MG tablet   Commonly known as:  GLUCOPHAGE   Quantity:  180 tablet   Refills:  2    Instructions:  TAKE 1 TABLET TWICE DAILY     Begin Date    AM    Noon    PM    Bedtime       metoprolol succinate 100 MG 24 hr tablet   Commonly known as:  TOPROL-XL   Quantity:  90 tablet   Refills:  3    Instructions:  TAKE ONE TABLET BY MOUTH EVERY EVENING     Begin Date    AM    Noon    PM    Bedtime       multivitamin capsule   Refills:  0   Dose:  1 capsule    Instructions:  Take 1 capsule by mouth once daily.     Begin Date    AM    Noon    PM    Bedtime       nitroGLYCERIN 0.4 MG SL tablet   Commonly known as:  NITROSTAT   Quantity:  50 tablet   Refills:  12   Dose:  0.4 mg   Indications:  Angina   Comments:  Request either a 4 pack or 2 pack of 25 each    Instructions:  Place 1 tablet (0.4 mg total) under the tongue every 5 (five) minutes as needed for Chest pain.     Begin Date    AM    Noon    PM    Bedtime       NOVOLOG 100 unit/mL injection   Quantity:  30 mL   Refills:  4   Generic drug:  insulin aspart    Instructions:  INJECT UP TO MAX  UNITS UNDER THE SKIN VIA INSULIN PUMP DAILY AS DIRECTED     Begin Date    AM    Noon    PM    Bedtime       triamcinolone acetonide 0.1% 0.1 % ointment   Commonly known as:  KENALOG   Quantity:  30 g   Refills:  0    Instructions:  Apply topically 2 (two) times daily.     Begin Date    AM    Noon    PM    Bedtime       valacyclovir 1000 MG tablet   Commonly known as:  VALTREX   Quantity:  21 tablet   Refills:  0   Dose:  1000 mg    Instructions:  Take 1 tablet (1,000 mg total) by mouth 3 (three) times daily.     Begin Date    AM    Noon    PM    Bedtime                  Please bring to all follow up appointments:    1. A copy of your discharge instructions.  2. All medicines you are currently taking in their original bottles.  3. Identification and insurance card.    Please arrive  15 minutes ahead of scheduled appointment time.    Please call 24 hours in advance if you must reschedule your appointment and/or time.        Your Scheduled Appointments     Apr 26, 2017  8:00 AM CDT   Fasting Lab with LAB, APPOINTMENT [facility] ([facility])    [address]  [place] [address]   [phone]            Apr 26, 2017  8:10 AM CDT   Urine with LAB, SPECIMEN [facility] ([facility])    [address]  [place] [address]   [phone]            May 03, 2017  9:30 AM CDT   Established Patient with Nickie Soto, APRN, PARULP   Jay swati - Endocrinology ([facility] )    [address] [facility] [address]   [phone]            May 24, 2017 10:00 AM CDT   Non-Fasting Lab with LAB, HEMONC CANCER BLDG   [facility] ([facility])    [address]  [place] [address]   [phone]            Jun 22, 2017  9:30 AM CDT   Established Patient Visit with DANILO Sánchez swati - Podiatry ([facility] )    [address]  [place] [address]   [phone]                  Discharge Instructions         Colonoscopy     A camera attached to a flexible tube with a viewing lens is used to take video pictures.     Colonoscopy is a test to view the inside of your lower digestive tract (colon and rectum). Sometimes it can show the last part of the small intestine (ileum). During the test, small pieces of tissue may be removed for testing. This is called a biopsy. Small growths, such as polyps, may also be removed.   Why is colonoscopy done?  The test is done to help look for colon cancer. And it can help find the source of abdominal pain, bleeding, and changes in bowel habits. It may be needed once a year, depending on factors such as  your:  · Age  · Health history  · Family health history  · Symptoms  · Results from any prior colonoscopy  Risks and possible complications  These include:  · Bleeding               · A puncture or tear in the colon   · Risks of anesthesia  · A cancer lesion not being seen  Getting ready   To prepare for the test:  · Talk with your healthcare provider about the risks of the test (see below). Also ask your healthcare provider about alternatives to the test.  · Tell your healthcare provider about any medicines you take. Also tell him or her about any health conditions you may have.  · Make sure your rectum and colon are empty for the test. Follow the diet and bowel prep instructions exactly. If you dont, the test may need to be rescheduled.  · Plan for a friend or family member to drive you home after the test.     Colonoscopy provides an inside view of the entire colon.     You may discuss the results with your doctor right away or at a future visit.  During the test   The test is usually done in the hospital on an outpatient basis. This means you go home the same day. The procedure takes about 30 minutes. During that time:  · You are given relaxing (sedating) medicine through an IV line. You may be drowsy, or fully asleep.  · The healthcare provider will first give you a physical exam to check for anal and rectal problems.  · Then the anus is lubricated and the scope inserted.  · If you are awake, you may have a feeling similar to needing to have a bowel movement. You may also feel pressure as air is pumped into the colon. Its OK to pass gas during the procedure.  · Biopsy, polyp removal, or other treatments may be done during the test.  After the test   You may have gas right after the test. It can help to try to pass it to help prevent later bloating. Your healthcare provider may discuss the results with you right away. Or you may need to schedule a follow-up visit to talk about the results. After the test, you  can go back to your normal eating and other activities. You may be tired from the sedation and need to rest for a few hours.  Date Last Reviewed: 11/1/2016 © 2000-2016 [employer]. [address] [place] [address], [place] [address]. All rights reserved. This information is not intended as a substitute for professional medical care. Always follow your healthcare professional's instructions.          Instructions    Discharge Summary/Instructions for after Colonoscopy with   Biopsy/Polypectomy  Patient Name: Reid Herman  Patient MRN: [id number]  Patient YOB: 1940 Tuesday, April 25, 2017    Rajan Freeman MD  RESTRICTIONS ON ACTIVITY:  - Do not drive a car or operate machinery until the day after the procedure.      - The following day: return to full activity including work.  - For  3 days: No heavy lifting, straining or running.  - Diet: Eat and drink normally unless instructed otherwise.  TREATMENT FOR COMMON SIDE EFFECTS:  - Mild abdominal pain and bloating or excessive gas: rest, eat lightly and   use a heating pad.  SYMPTOMS TO WATCH FOR AND REPORT TO YOUR PHYSICIAN:  1. Severe abdominal pain.  2. Fever within 24 hours after a procedure.  3. A large amount of rectal bleeding. (A small amount of blood from the   rectum is not serious, especially if hemorrhoids are present.  4. Because air was put into your colon during the procedure, expelling large   amounts of air from your rectum is normal.  5. You may not have a bowel movement for 1-3 days because of the colonoscopy   prep.  This is normal.  6. Go directly to the emergency room if you notice any of the following:   Chills and/or fever over 101   Persistent vomiting   Severe abdominal pain, other than gas cramps   Severe chest pain   Black, tarry stools   Any bleeding - exceeding one tablespoon  Your doctor recommends these additional instructions:  If any biopsies were performed, my office will call you in 5 to 6 business   days with any  results.  - Discharge patient to home (ambulatory).   - Patient has a contact number available for emergencies.  The signs and   symptoms of potential delayed complications were discussed with the   patient.  Return to normal activities tomorrow.  Written discharge   instructions were provided to the patient.   - High fiber diet for the rest of the patient's life.   - Continue present medications.   - Await pathology results.   - Repeat colonoscopy in 3 years for surveillance based on pathology   results.  You are being discharged to home.   You have a contact number available for emergencies.  The signs and symptoms   of potential delayed complications were discussed with you.  You may return   to normal activities tomorrow.  Written discharge instructions were   provided to you.   Eat a high fiber diet for the rest of your life.   Continue your present medications.   We are waiting for your pathology results.   Your physician has recommended a repeat colonoscopy in three years for   surveillance based on pathology results.  None  If you have any questions or problems, please call your physician - Rajan Freeman MD at Work:  [phone].    LAB RESULTS: [phone]  [facility], EMERGENCY ROOM PHONE NUMBER: [phone]  IF A COMPLICATION OR EMERGENCY SITUATION ARISES AND YOU ARE UNABLE TO REACH   YOUR PHYSICIAN - GO TO THE EMERGENCY ROOM.  Rajan Freeman MD  4/25/2017 8:25:18 AM  This report has been verified and signed electronically.         Admission Information     Date & Time Provider Department CSN    4/25/2017  6:34 AM CLARISSA Freeman MD [facility] [id number]      Care Providers     Provider Role Specialty Primary office phone    CLARISSA Freeman MD Attending Provider Colon and Rectal Surgery [phone]    CLARISSA Freeman MD Surgeon  Colon and Rectal Surgery [phone]      Your Vitals Were     BP Pulse Temp Resp Height Weight    149/67 (BP  "Location: Left arm, Patient Position: Lying, BP Method: Automatic) 86 97.8 °F (36.6 °C) (Axillary) 18 5' 7" (1.702 m) 89.8 kg (198 lb)    SpO2 BMI             95% 31.01 kg/m2         Recent Lab Values        2/11/2015 5/11/2015 9/1/2015 11/24/2015 2/24/2016 5/31/2016 9/22/2016 1/23/2017      8:08 AM  7:54 AM 10:55 AM  8:14 AM  8:00 AM  8:20 AM  8:03 AM  8:18 AM    A1C 7.1 (H) 8.2 (H) 7.4 (H) 7.5 (H) 7.8 (H) 7.6 (H) 7.5 (H) 7.7 (H)    Comment for A1C at  8:03 AM on 9/22/2016:  According to ADA guidelines, hemoglobin A1C <7.0% represents  optimal control in non-pregnant diabetic patients.  Different  metrics may apply to specific populations.   Standards of Medical Care in Diabetes - 2016.  For the purpose of screening for the presence of diabetes:  <5.7%     Consistent with the absence of diabetes  5.7-6.4%  Consistent with increasing risk for diabetes   (prediabetes)  >or=6.5%  Consistent with diabetes  Currently no consensus exists for use of hemoglobin A1C  for diagnosis of diabetes for children.      Comment for A1C at  8:18 AM on 1/23/2017:  According to ADA guidelines, hemoglobin A1C <7.0% represents  optimal control in non-pregnant diabetic patients.  Different  metrics may apply to specific populations.   Standards of Medical Care in Diabetes - 2016.  For the purpose of screening for the presence of diabetes:  <5.7%     Consistent with the absence of diabetes  5.7-6.4%  Consistent with increasing risk for diabetes   (prediabetes)  >or=6.5%  Consistent with diabetes  Currently no consensus exists for use of hemoglobin A1C  for diagnosis of diabetes for children.        Pending Labs     Order Current Status    Specimen to Pathology - Surgery Collected (04/25/17 0822)      Allergies as of 4/25/2017     No Known Allergies      Advance Directives     An advance directive is a document which, in the event you are no longer able to make decisions for yourself, tells your healthcare team what kind of treatment you do " or do not want to receive, or who you would like to make those decisions for you.  If you do not currently have an advance directive, [facility] encourages you to create one.  For more information call:  [phone]), [phone] ([phone]),  or log on to [email or web address].        Language Assistance Services     ATTENTION: Language assistance services are available, free of charge. Please call [phone].      ATENCIÓN: Si habla español, tiene a rivero disposición servicios gratuitos de asistencia lingüística. Llame al [phone].     CHÚ Ý: N?u b?n nói Ti?ng Vi?t, có các d?ch v? h? tr? ngôn ng? mi?n phí dành cho b?n. G?i s? [phone].        Chronic Kindey Disease Education             Diabetes Discharge Instructions                                    [facility] complies with applicable Federal civil rights laws and does not discriminate on the basis of race, color, national origin, age, disability, or sex.

## 2017-04-25 NOTE — ANESTHESIA POSTPROCEDURE EVALUATION
"Anesthesia Post Evaluation    Patient: Reid Herman    Procedure(s) Performed: Procedure(s) (LRB):  COLONOSCOPY (N/A)    Final Anesthesia Type: general  Patient location during evaluation: PACU  Patient participation: Yes- Able to Participate  Level of consciousness: awake and alert, awake and oriented  Post-procedure vital signs: reviewed and stable  Pain management: adequate  Airway patency: patent  PONV status at discharge: No PONV  Anesthetic complications: no      Cardiovascular status: blood pressure returned to baseline, stable and hemodynamically stable  Respiratory status: unassisted, spontaneous ventilation and room air  Hydration status: euvolemic  Follow-up not needed.        Visit Vitals    BP (!) 157/68 (BP Location: Left arm, Patient Position: Lying, BP Method: Automatic)    Pulse 81    Temp 36.6 °C (97.8 °F) (Axillary)    Resp 18    Ht 5' 7" (1.702 m)    Wt 89.8 kg (198 lb)    SpO2 99%    BMI 31.01 kg/m2       Pain/Britton Score: Pain Assessment Performed: Yes (4/25/2017  8:43 AM)  Presence of Pain: denies (4/25/2017  8:43 AM)      "

## 2017-04-25 NOTE — ANESTHESIA PREPROCEDURE EVALUATION
04/25/2017  Reid Herman is a 77 y.o., male.    Anesthesia Evaluation    I have reviewed the Patient Summary Reports.    I have reviewed the Nursing Notes.   I have reviewed the Medications.     Review of Systems  Anesthesia Hx:  History of prior surgery of interest to airway management or planning: heart surgery. Previous anesthesia: MAC, General Cabg x3 1994  with general anesthesia.   colonscopy   here 2014 with MAC.  Procedure performed at an [facility]. Denies Family Hx of Anesthesia complications.   Denies Personal Hx of Anesthesia complications.   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 Denies Tobacco Use. Alcohol Use:  (occ)   Hematology/Oncology:         -- Anemia: Chronic Lymphocytic Leukemia (CLL)   -- Coag Disorders: Denies Bleeding Disorder:  Current/Recent Cancer.   EENT/Dental:   Eyes: (glasses for reading)  Denies ear symptoms  Denies Throat Symptoms  Denies Jaw Problems   Cardiovascular:   Hypertension, well controlled Past MI CAD  CABG/stent  Angina, with exertion  Functional Capacity unable to determine, can walk   limited by disability Coronary Artery Disease:  classic angina pectorus, patient problem list. Denies Hx of Myocardial Infarction (MI). Ischemic Cardiomyopathy  Denies Valvular Heart Disease.  Denies Hx of Heart Murmur.   Denies Deep Venous Thrombosis (DVT)  Hypertension (well controlled with meds pr pt)   Denies Disorder of Cardiac Rhythm    Pulmonary:  Denies Asthma.   Acute Bronchitis:  Obstructive Sleep Apnea (MINDA), CPAP prescribed. Doesn't use it as much Denies Lung/Chest Surgery or Procedure.    Renal/:   Chronic Renal Disease, CRI  Renal Symptoms/Infections/Stones: nocturia.  Denies Kidney Function/Disease    Hepatic/GI:  Hepatic/GI Symptoms: constipation.  Denies Hernia Denies Liver Disease    Musculoskeletal:   Arthritis    Denies Joint Disease   Denies Bone Disorder  Spine Disorders: Spinal Stenosis, Lumbar Spinal Stenosis Lumbar Spine Disorders, Lumbar Disc Disease, Spondylolisthesis, Radiculopathy, Myelopathy   Neurological:   Neuromuscular Disease,  Pain , onset is chronic , location of lower back , quality of stabbing, throbbing, burning , radiating to down leg , severity is a 10 , precipitating factors are sitting down, standing on it , alleviating factors are cook ice, heart sometimes, lt or right side . Denies Seizure Disorder  Denies CVA - Cerebrovasular Accident    Endocrine:   Diabetes, poorly controlled, type 1, using insulin  Diabetes, Type 1 Diabetes for 40yrs years , Complications include Diabetic Retinopathy , controlled by oral hypoglycemics, insulin pump. Typical AM glucose range: 118-130 , most recent HgA1c value was 8.2 on 5/11/15.  Denies Thyroid Disease   Denies Parathyroid Disease.    Psych:   Sleep Disorder and Insomnia. Depression and Major Depression, Treated.  Sleep Disorder and Insomnia.        Physical Exam  General:  Well nourished    Airway/Jaw/Neck:  Airway Findings: Mouth Opening: Normal Tongue: Normal  General Airway Assessment: Adult  Mallampati: I  Improves to I with phonation.  TM Distance: Normal, at least 6 cm  Jaw/Neck Findings:  Neck ROM: Extension Decreased, Mild       Chest/Lungs:  Chest/Lungs Findings: Clear to auscultation     Heart/Vascular:  Heart Findings: Rate: Normal        Mental Status:  Mental Status Findings:  Cooperative, Alert and Oriented         Anesthesia Plan  Type of Anesthesia, risks & benefits discussed:  Anesthesia Type:  general  Patient's Preference:   Intra-op Monitoring Plan: standard ASA monitors  Intra-op Monitoring Plan Comments:   Post Op Pain Control Plan:   Post Op Pain Control Plan Comments:   Induction:   IV  Beta Blocker:  Patient is not currently on a Beta-Blocker (No further documentation required).       Informed Consent: Patient understands risks and  agrees with Anesthesia plan.  Questions answered.   ASA Score: 3     Day of Surgery Review of History & Physical:    H&P update referred to the surgeon.     Anesthesia Plan Notes: Pt with moderate risk for perioperative cardiac events.  Will aim to keep HR <60, pt took betablocker last night (he normally takes in pm)  Pt with insulin dependent DM - pt had turned off insulin pump.          Ready For Surgery From Anesthesia Perspective.

## 2017-04-25 NOTE — PATIENT INSTRUCTIONS
Discharge Summary/Instructions for after Colonoscopy with   Biopsy/Polypectomy  Patient Name: Reid Herman  Patient MRN: [id number]  Patient YOB: 1940 Tuesday, April 25, 2017    Rajan Freeman MD  RESTRICTIONS ON ACTIVITY:  - Do not drive a car or operate machinery until the day after the procedure.      - The following day: return to full activity including work.  - For  3 days: No heavy lifting, straining or running.  - Diet: Eat and drink normally unless instructed otherwise.  TREATMENT FOR COMMON SIDE EFFECTS:  - Mild abdominal pain and bloating or excessive gas: rest, eat lightly and   use a heating pad.  SYMPTOMS TO WATCH FOR AND REPORT TO YOUR PHYSICIAN:  1. Severe abdominal pain.  2. Fever within 24 hours after a procedure.  3. A large amount of rectal bleeding. (A small amount of blood from the   rectum is not serious, especially if hemorrhoids are present.  4. Because air was put into your colon during the procedure, expelling large   amounts of air from your rectum is normal.  5. You may not have a bowel movement for 1-3 days because of the colonoscopy   prep.  This is normal.  6. Go directly to the emergency room if you notice any of the following:   Chills and/or fever over 101   Persistent vomiting   Severe abdominal pain, other than gas cramps   Severe chest pain   Black, tarry stools   Any bleeding - exceeding one tablespoon  Your doctor recommends these additional instructions:  If any biopsies were performed, my office will call you in 5 to 6 business   days with any results.  - Discharge patient to home (ambulatory).   - Patient has a contact number available for emergencies.  The signs and   symptoms of potential delayed complications were discussed with the   patient.  Return to normal activities tomorrow.  Written discharge   instructions were provided to the patient.   - High fiber diet for the rest of the patient's life.   - Continue present medications.   - Await pathology  results.   - Repeat colonoscopy in 3 years for surveillance based on pathology   results.  You are being discharged to home.   You have a contact number available for emergencies.  The signs and symptoms   of potential delayed complications were discussed with you.  You may return   to normal activities tomorrow.  Written discharge instructions were   provided to you.   Eat a high fiber diet for the rest of your life.   Continue your present medications.   We are waiting for your pathology results.   Your physician has recommended a repeat colonoscopy in three years for   surveillance based on pathology results.  None  If you have any questions or problems, please call your physician - Rajan Freeman MD at Work:  [phone].    LAB RESULTS: [phone]  [facility], EMERGENCY ROOM PHONE NUMBER: [phone]  IF A COMPLICATION OR EMERGENCY SITUATION ARISES AND YOU ARE UNABLE TO REACH   YOUR PHYSICIAN - GO TO THE EMERGENCY ROOM.  Rajan Freeman MD  4/25/2017 8:25:18 AM  This report has been verified and signed electronically.

## 2017-04-26 ENCOUNTER — LAB VISIT (OUTPATIENT)
Dept: LAB | Facility: HOSPITAL | Age: 77
End: 2017-04-26
Payer: MEDICARE

## 2017-04-26 DIAGNOSIS — E10.22 TYPE 1 DIABETES MELLITUS WITH STAGE 3 CHRONIC KIDNEY DISEASE: ICD-10-CM

## 2017-04-26 DIAGNOSIS — N18.30 TYPE 1 DIABETES MELLITUS WITH STAGE 3 CHRONIC KIDNEY DISEASE: ICD-10-CM

## 2017-04-26 DIAGNOSIS — E10.319 DIABETIC RETINOPATHY WITHOUT MACULAR EDEMA ASSOCIATED WITH TYPE 1 DIABETES MELLITUS, UNSPECIFIED LATERALITY, UNSPECIFIED RETINOPATHY SEVERITY: ICD-10-CM

## 2017-04-26 DIAGNOSIS — E78.2 MIXED HYPERLIPIDEMIA: ICD-10-CM

## 2017-04-26 LAB
CHOLEST/HDLC SERPL: 2.6 {RATIO}
ESTIMATED AVG GLUCOSE: 169 MG/DL
HBA1C MFR BLD HPLC: 7.5 %
HDL/CHOLESTEROL RATIO: 39.1 %
HDLC SERPL-MCNC: 115 MG/DL
HDLC SERPL-MCNC: 45 MG/DL
LDLC SERPL CALC-MCNC: 56.6 MG/DL
NONHDLC SERPL-MCNC: 70 MG/DL
TRIGL SERPL-MCNC: 67 MG/DL

## 2017-04-26 PROCEDURE — 36415 COLL VENOUS BLD VENIPUNCTURE: CPT

## 2017-04-26 PROCEDURE — 80061 LIPID PANEL: CPT

## 2017-04-26 PROCEDURE — 83036 HEMOGLOBIN GLYCOSYLATED A1C: CPT

## 2017-05-02 ENCOUNTER — TELEPHONE (OUTPATIENT)
Dept: ENDOSCOPY | Facility: HOSPITAL | Age: 77
End: 2017-05-02

## 2017-05-03 ENCOUNTER — OFFICE VISIT (OUTPATIENT)
Dept: ENDOCRINOLOGY | Facility: CLINIC | Age: 77
End: 2017-05-03
Payer: MEDICARE

## 2017-05-03 VITALS
WEIGHT: 204.38 LBS | SYSTOLIC BLOOD PRESSURE: 130 MMHG | BODY MASS INDEX: 32.08 KG/M2 | HEIGHT: 67 IN | DIASTOLIC BLOOD PRESSURE: 61 MMHG | HEART RATE: 64 BPM

## 2017-05-03 DIAGNOSIS — I10 ESSENTIAL HYPERTENSION: ICD-10-CM

## 2017-05-03 DIAGNOSIS — E10.65 POORLY CONTROLLED TYPE 1 DIABETES MELLITUS WITH PERIPHERAL NEUROPATHY: Chronic | ICD-10-CM

## 2017-05-03 DIAGNOSIS — C91.10 CLL (CHRONIC LYMPHOCYTIC LEUKEMIA): ICD-10-CM

## 2017-05-03 DIAGNOSIS — E10.22 TYPE 1 DIABETES MELLITUS WITH STAGE 3 CHRONIC KIDNEY DISEASE: Primary | ICD-10-CM

## 2017-05-03 DIAGNOSIS — N18.30 CHRONIC KIDNEY DISEASE, STAGE III (MODERATE): ICD-10-CM

## 2017-05-03 DIAGNOSIS — E78.2 MIXED HYPERLIPIDEMIA: ICD-10-CM

## 2017-05-03 DIAGNOSIS — E10.42 POORLY CONTROLLED TYPE 1 DIABETES MELLITUS WITH PERIPHERAL NEUROPATHY: Chronic | ICD-10-CM

## 2017-05-03 DIAGNOSIS — E10.311 DIABETIC RETINOPATHY OF LEFT EYE WITH MACULAR EDEMA ASSOCIATED WITH TYPE 1 DIABETES MELLITUS, UNSPECIFIED RETINOPATHY SEVERITY: ICD-10-CM

## 2017-05-03 DIAGNOSIS — E11.3299 NPDR (NONPROLIFERATIVE DIABETIC RETINOPATHY): ICD-10-CM

## 2017-05-03 DIAGNOSIS — N18.30 TYPE 1 DIABETES MELLITUS WITH STAGE 3 CHRONIC KIDNEY DISEASE: Primary | ICD-10-CM

## 2017-05-03 PROCEDURE — 4010F ACE/ARB THERAPY RXD/TAKEN: CPT | Mod: S$GLB,,, | Performed by: NURSE PRACTITIONER

## 2017-05-03 PROCEDURE — 1159F MED LIST DOCD IN RCRD: CPT | Mod: S$GLB,,, | Performed by: NURSE PRACTITIONER

## 2017-05-03 PROCEDURE — 3078F DIAST BP <80 MM HG: CPT | Mod: S$GLB,,, | Performed by: NURSE PRACTITIONER

## 2017-05-03 PROCEDURE — 99499 UNLISTED E&M SERVICE: CPT | Mod: S$GLB,,, | Performed by: NURSE PRACTITIONER

## 2017-05-03 PROCEDURE — 1160F RVW MEDS BY RX/DR IN RCRD: CPT | Mod: S$GLB,,, | Performed by: NURSE PRACTITIONER

## 2017-05-03 PROCEDURE — 3045F PR MOST RECENT HEMOGLOBIN A1C LEVEL 7.0-9.0%: CPT | Mod: S$GLB,,, | Performed by: NURSE PRACTITIONER

## 2017-05-03 PROCEDURE — 1126F AMNT PAIN NOTED NONE PRSNT: CPT | Mod: S$GLB,,, | Performed by: NURSE PRACTITIONER

## 2017-05-03 PROCEDURE — 1157F ADVNC CARE PLAN IN RCRD: CPT | Mod: S$GLB,,, | Performed by: NURSE PRACTITIONER

## 2017-05-03 PROCEDURE — 99215 OFFICE O/P EST HI 40 MIN: CPT | Mod: S$GLB,,, | Performed by: NURSE PRACTITIONER

## 2017-05-03 PROCEDURE — 99999 PR PBB SHADOW E&M-EST. PATIENT-LVL IV: CPT | Mod: PBBFAC,,, | Performed by: NURSE PRACTITIONER

## 2017-05-03 PROCEDURE — 3075F SYST BP GE 130 - 139MM HG: CPT | Mod: S$GLB,,, | Performed by: NURSE PRACTITIONER

## 2017-05-03 RX ORDER — PEN NEEDLE, DIABETIC 29 G X1/2"
NEEDLE, DISPOSABLE MISCELLANEOUS
Qty: 200 EACH | Refills: 11 | Status: SHIPPED | OUTPATIENT
Start: 2017-05-03 | End: 2018-01-01

## 2017-05-03 NOTE — PROGRESS NOTES
CC: This 77 y.o.  male presents for management of Diabetes Mellitus   along with the current chronic medical conditions including:  Patient Active Problem List   Diagnosis    CLL (chronic lymphocytic leukemia)        Diabetic retinopathy associated with type 1 diabetes mellitus    Insulin pump status    Diabetic polyneuropathy associated with type 1 diabetes mellitus    Poorly controlled type 1 diabetes mellitus with peripheral neuropathy    Type 1 diabetes, uncontrolled, with retinopathy    CAD (coronary artery disease)    PDR (proliferative diabetic retinopathy) - Right Eye    NPDR (nonproliferative diabetic retinopathy) - Left Eye    Posterior vitreous detachment - Both Eyes    Hyperlipidemia    HTN (hypertension)            Pseudophakia    BPH with urinary obstruction    Erectile dysfunction    S/P CABG x 2            Insulin pump fitting or adjustment    LBP (low back pain)    Vitreous hemorrhage    Lumbar facet arthropathy    DDD (degenerative disc disease), lumbar    Obstructive sleep apnea    Peripheral vascular disease    Lumbar spondylosis            Spondylolisthesis    S/P lumbar fusion      Status of these conditions is pending review.    HPI: Pt was diagnosed with T1DM in 1972- started on insulin.   Never hospitalized r/t DM recently.   Pt last seen by me in January 2017 and is now being seen by me again today.   Pt currently has accuchek spirit pump/sarai expert.    Pt reports a few hypoglycemic events at night time-with possible over correction and a few in the am (40-60s).    CURRENT DM MEDS: Metformin 500 mg BID, accuchek spirit pump     basal mn-0600, 9p-mn, 0.9 u/hr, 0600-9pm 1.15 u/hr  icr 1:9/1:9/1:8    target 100- 120, isf 35 mn-5pm, 30 5p-mn      Pt is monitoring BG at home 5 times a day.   110s-200s  Dinner/evening upper 200s-300s  Low 48, 71, 61  Reid Herman brought glucometer or log to clinic today revealing the following BG readings:    DIET/  MEAL PATTERN: Pt eats 3 meals a day    Yogurt, fruit, sugar free cookies     Snack around 7-8p nuts    EXERCISE: no formal exercise, walks occasionally-has cane, PT for back     STANDARDS OF CARE:  Eye exam: 2016, f/u q4-6 mos (floaters/injections/laser therapy), AMD, vit   Podiatry: 2016 f/u q2-3 mos toe nail clipping/ingrown toe nail problem (Dr. Maye Wyatt)                       ROS:   Gen: Appetite good, no weight gain or loss, denies fatigue and + weakness (after sx)-physical therapy.  Skin: Skin is intact and heals well, +shingles (on head, neck)-resolved, + pruritis, easy bruising, no hair changes, no intolerance to heat/cold.  Eyes: + visual disturbances (floaters)- 2016   Resp: no SOB or DIOR, no cough  Cardiac: No palpitations, chest pain, denies syncope, weakness, no edema or cyanosis.  GI: No nausea or vomiting, diarrhea, +constipation-r/t pain meds, or abdominal pain.  /GYN: No nocturia, no urinary frequency, burning or pain.   PVD: + leg pain with or without exercise, denies cyanosis, pallor, or cold extremities.  MS/Neuro: + numbness/ tingling in BLE; FROM of joints without swelling or pain. Gait mostly steady, has back brace, speech clear, no tremor, coordination problem. + back pain-improved  Psych: Denies drug/ETOH abuse, no hx. of eating disorders or depression.  Other systems: negative.    Lab Results   Component Value Date    HGBA1C 7.5 (H) 04/26/2017     Lab Results   Component Value Date    TSH 0.901 02/24/2016     No results found for: MICROALBUR    Chemistry        Component Value Date/Time     04/19/2017 1030    K 4.8 04/19/2017 1030    CL 99 04/19/2017 1030    CO2 30 (H) 04/19/2017 1030    BUN 19 04/19/2017 1030    CREATININE 1.1 04/19/2017 1030     04/19/2017 1030        Component Value Date/Time    CALCIUM 9.8 04/19/2017 1030    ALKPHOS 86 04/19/2017 1030    AST 20 04/19/2017 1030    ALT 17 04/19/2017 1030    BILITOT 0.7 04/19/2017 1030          Lab Results    Component Value Date    LDLCALC 56.6 (L) 04/26/2017       PE:  GENERAL: Well developed, well nourished.  PSYCH: AAOx3, appropriate mood and affect, pleasant expression, conversant, appears relaxed, well groomed.   EYES: Conjunctiva, corneas clear, EOM intact  NECK: Supple, trachea midline  CHEST: Resp even and unlabored, CTA bilateral.  CARDIAC: s1, s2 normal, no edema noted to BLE   ABDOMEN: soft, non distended   VASCULAR: Same temperature peripherally to centrally, DP pulses +2/4 bilaterally  NEURO: Gait mostly steady, has back brace   SKIN: Normal skin turgor. Skin warm and dry. No areas of breakdown, + acanthosis nigracans +noted healing lesions on head/neck, accuchek spirit intact.  FEET: Footware appropriate, wearing diabetic shoes.     ASSESSMENT and PLAN:  Reid was seen today for diabetes mellitus.    Diagnoses and associated orders for this visit:  1. Type 1 diabetes mellitus with stage 3 chronic kidney disease  Hemoglobin A1c next time   DM uncontrolled  a1c goal 7-7.5% with less hypoglycemia  Basal 0.9 u/hr mn-0400, 9p-mn, 1.15 u/hr 0400-9p  icr 1:9 mn-noon  1:8 noon-mn  Target 100-120  isf 35 mn-noon, 30 noon-mn  F/u in 3-4 mos  Discussed bg readings throughout day, hypoglycemia, 15 gm/15 min rule-usually uses juice.       2. Poorly controlled type 1 diabetes mellitus with peripheral neuropathy  See above, on supplement (alpha lipoic acid)   3. Diabetic retinopathy of left eye with macular edema associated with type 1 diabetes mellitus, unspecified retinopathy severity  F/u with ophthalmology    4. NPDR (nonproliferative diabetic retinopathy) - Left Eye  See above    5. Essential hypertension  Controlled, continue med(s)   6. Mixed hyperlipidemia  Lab Results   Component Value Date    LDLCALC 56.6 (L) 04/26/2017     Controlled continue lipitor   7. CLL (chronic lymphocytic leukemia)  F/u with hem/onc   8. Chronic kidney disease, stage III (moderate)  Continue to monitor

## 2017-05-03 NOTE — PATIENT INSTRUCTIONS
Snacks can be an important part of a balanced, healthy meal plan. They allow you to eat more frequently, feeling full and satisfied throughout the day. Also, they allow you to spread carbohydrates evenly, which may stabilize blood sugars.  Plus, snacks are enjoyable!     The amount of carbohydrate needed at snacks varies. Generally, about 15-30 grams of carbohydrate per snack is recommended.  Below you will find some tasty treats.       0-5 gm carb   Crystal Light   Vitamin Water Zero   Herbal tea, unsweetened   2 tsp peanut butter on celery   1./2 cup sugar-free jell-o   1 sugar-free popsicle   ¼ cup blueberries   8oz Blue Angelique unsweetened almond milk   5 baby carrots & celery sticks, cucumbers, bell peppers dipped in ¼ cup salsa, 2Tbsp light ranch dressing or 2Tbsp plain Greek yogurt   10 Goldfish crackers   ½ oz low-fat cheese or string cheese   1 closed handful of nuts, unsalted   1 Tbsp of sunflower seeds, unsalted   1 cup Smart Pop popcorn   1 whole grain brown rice cake        15 gm carb   1 small piece of fruit or ½ banana or 1/2 cup lite canned fruit   3 ellyn cracker squares   3 cups Smart Pop popcorn, top spray butter, Nelson lite salt or cinnamon and Truvia   5 Vanilla Wafers   ½ cup low fat, no added sugar ice cream or frozen yogurt (Blue bell, Blue Bunny, Weight Watchers, Skinny Cow)   ½ turkey, ham, or chicken sandwich   ½ c fruit with ½ c Cottage cheese   4-6 unsalted wheat crackers with 1 oz low fat cheese or 1 tbsp peanut butter    30-45 goldfish crackers (depending on flavor)    7-8 Scientologist mini brown rice cakes (caramel, apple cinnamon, chocolate)    12 Scientologist mini brown rice cakes (cheddar, bbq, ranch)    1/3 cup hummus dip with raw veg   1/2 whole wheat celina, 1Tbsp hummus   Mini Pizza (1/2 whole wheat English muffin, low-fat  cheese, tomato sauce)   100 calorie snack pack (Oreo, Chips Ahoy, Ritz Mix, Baked Cheetos)   4-6 oz. light or Greek Style yogurt  (Guerita Childs, Michelle, [facility])   ½ cup sugar-free pudding     6 in. wheat tortilla or celina oven toasted chips (topped with spray butter flavoring, cinnamon, Truvia OR spray butter, garlic powder, chili powder)    18 BBQ Popchips (available at Target, Whole Foods, Fresh Market)           Diabetes Support Group Meetings    Date Topics   February 9 Health Promotion/Nutrition   March 9 Taking Care of Your Kidneys   April 13 Taking Care of Your Feet   May 11 Ease Your Mind with Diabetes   June 8 Hurricane and Emergency Preparedness   July 13 Family & Caregiver Support for Diabetes   *August 17  Taking Care of Your Eyes   September 14 Devices & Technology   October 12 Recipes & Treats   November 9 Getting Pumped Up for Diabetes   December 14 Year-End Close Out            Meetings are held in the [facility] (A) of the [facility] located at [address] [place] [address]. Please call [phone] for additional information.    Free service, offered every 2nd Thursday of every month, except in August! Family members and/or friends are welcome as well!  Support group is for patients with type 1 or type 2 diabetes.    From 3:30p to 4:30p

## 2017-05-03 NOTE — MR AVS SNAPSHOT
[facility] Endocrinology  [address] [facility] [address]  Phone: [phone]                  Reid Herman   5/3/2017 9:30 AM   Office Visit    Description:  Male : 1940   Provider:  MILDRED Fields, KENDRA   Department:  [facility] - Endocrinology           Reason for Visit     Diabetes Mellitus           Diagnoses this Visit        Comments    Type 1 diabetes mellitus with stage 3 chronic kidney disease    -  Primary     Poorly controlled type 1 diabetes mellitus with peripheral neuropathy         Diabetic retinopathy of left eye with macular edema associated with type 1 diabetes mellitus, unspecified retinopathy severity         NPDR (nonproliferative diabetic retinopathy)         Essential hypertension         Mixed hyperlipidemia         CLL (chronic lymphocytic leukemia)         Chronic kidney disease, stage III (moderate)                To Do List           Future Appointments        Provider Department Dept Phone    2017 10:00 AM LAB, HEMONC CANCER BLDG [facility] [phone]    2017 9:30 AM Maye Wyatt DPM [facility] Podiatry [phone]    2017 8:40 AM Kathi Muniz MD [facility] Dermatology [phone]    8/3/2017 8:30 AM LAB, APPOINTMENT NOMC INTMED [facility] [phone]    2017 9:30 AM MILDRED Fields, KENDRA [facility] Endocrinology [phone]      Goals (5 Years of Data)              Today    17    Blood Pressure <= 140/90   130/61  130/61  130/61    Notes - Note created  2016  3:26 PM by Gilda Turcios, PharmD    It is important to consistently maintain a controlled blood pressure.      Exercise at least 150 minutes per week.           Maintain a low sodium diet           Take at least one BP reading per week at various times of the day           Weight (lb) < 87.5 kg (192 lb 13.6 oz)   92.7 kg (204 lb 5.9 oz)        Notes - Note created  2016  3:29  PM by Gilda Turcios, PharmD    Decrease weight by 5% to improve cardiovascular outcomes.        Follow-Up and Disposition     Return in about 3 months (around 8/3/2017).    Follow-up and Disposition History       These Medications        Disp Refills Start End    insulin syringe-needle U-100 (ADVOCATE SYRINGES) 0.3 mL 30 gauge x 5/16 Syrg 200 each 11 5/3/2017     Use as directed    Pharmacy: [facility] - [place] [id number] [facility] Ph #: [phone]         [facility]     [facility] On [facility] Nurse Care Line - 24/7 Assistance  Unless otherwise directed by your provider, please contact [facility], our nurse care line that is available for 24/7 assistance.     Registered nurses in the [facility] On Call Center provide: appointment scheduling, clinical advisement, health education, and other advisory services.  Call: [phone] (toll free)               Medications           Message regarding Medications     Verify the changes and/or additions to your medication regime listed below are the same as discussed with your clinician today.  If any of these changes or additions are incorrect, please notify your healthcare provider.        START taking these NEW medications        Refills    insulin syringe-needle U-100 (ADVOCATE SYRINGES) 0.3 mL 30 gauge x 5/16 Syrg 11    Sig: Use as directed    Class: Normal      STOP taking these medications     FLUZONE HIGH-DOSE 2016-17, PF, 180 mcg/0.5 mL Syrg            Verify that the below list of medications is an accurate representation of the medications you are currently taking.  If none reported, the list may be blank. If incorrect, please contact your healthcare provider. Carry this list with you in case of emergency.           Current Medications     ACCU-CHEK FASTCLIX Misc TEST 6 TIMES DAILY    alpha lipoic acid 600 mg Cap Take 1 capsule by mouth once daily.      aspirin (ECOTRIN) 325 MG EC tablet Take 325 mg by mouth once daily.  "   atorvastatin (LIPITOR) 80 MG tablet Take 1 tablet (80 mg total) by mouth nightly.    blood sugar diagnostic (ACCU-CHEK SHARON PLUS TEST STRP) Strp Pt test blood sugar 5 times a day.    fosinopril-hydrochlorothiazide (MONOPRIL-HCT) 10-12.5 mg per tablet Take 2 tablets by mouth 2 (two) times daily.    L-METHYL-B6-B12 3-35-2 mg Tab TAKE 1 TABLET BY MOUTH EVERY DAY    metformin (GLUCOPHAGE) 500 MG tablet TAKE 1 TABLET TWICE DAILY    metoprolol succinate (TOPROL-XL) 100 MG 24 hr tablet TAKE ONE TABLET BY MOUTH EVERY EVENING    multivitamin capsule Take 1 capsule by mouth once daily.      nitroGLYCERIN (NITROSTAT) 0.4 MG SL tablet Place 1 tablet (0.4 mg total) under the tongue every 5 (five) minutes as needed for Chest pain.    NOVOLOG 100 unit/mL injection INJECT UP TO MAX  UNITS UNDER THE SKIN VIA INSULIN PUMP DAILY AS DIRECTED    clotrimazole-betamethasone 1-0.05% (LOTRISONE) cream Apply topically 2 (two) times daily.    insulin syringe-needle U-100 (ADVOCATE SYRINGES) 0.3 mL 30 gauge x 5/16 Syrg Use as directed    triamcinolone acetonide 0.1% (KENALOG) 0.1 % ointment Apply topically 2 (two) times daily.    valacyclovir (VALTREX) 1000 MG tablet Take 1 tablet (1,000 mg total) by mouth 3 (three) times daily.           Clinical Reference Information           Your Vitals Were     BP Pulse Height Weight BMI    130/61 (BP Location: Left arm, Patient Position: Sitting) 64 5' 7" (1.702 m) 92.7 kg (204 lb 5.9 oz) 32.01 kg/m2      Blood Pressure          Most Recent Value    BP  130/61      Allergies as of 5/3/2017     No Known Allergies      Immunizations Administered on Date of Encounter - 5/3/2017     None      Orders Placed During Today's Visit     Future Labs/Procedures Expected by Expires    Hemoglobin A1c  5/3/2017 7/2/2018      Instructions    Snacks can be an important part of a balanced, healthy meal plan. They allow you to eat more frequently, feeling full and satisfied throughout the day. Also, they allow " you to spread carbohydrates evenly, which may stabilize blood sugars.  Plus, snacks are enjoyable!     The amount of carbohydrate needed at snacks varies. Generally, about 15-30 grams of carbohydrate per snack is recommended.  Below you will find some tasty treats.       0-5 gm carb   Crystal Light   Vitamin Water Zero   Herbal tea, unsweetened   2 tsp peanut butter on celery   1./2 cup sugar-free jell-o   1 sugar-free popsicle   ¼ cup blueberries   8oz Blue Angelique unsweetened almond milk   5 baby carrots & celery sticks, cucumbers, bell peppers dipped in ¼ cup salsa, 2Tbsp light ranch dressing or 2Tbsp plain Greek yogurt   10 Goldfish crackers   ½ oz low-fat cheese or string cheese   1 closed handful of nuts, unsalted   1 Tbsp of sunflower seeds, unsalted   1 cup Smart Pop popcorn   1 whole grain brown rice cake        15 gm carb   1 small piece of fruit or ½ banana or 1/2 cup lite canned fruit   3 ellyn cracker squares   3 cups Smart Pop popcorn, top spray butter, Nelson lite salt or cinnamon and Truvia   5 Vanilla Wafers   ½ cup low fat, no added sugar ice cream or frozen yogurt (Blue bell, Blue Bunny, Weight Watchers, Skinny Cow)   ½ turkey, ham, or chicken sandwich   ½ c fruit with ½ c Cottage cheese   4-6 unsalted wheat crackers with 1 oz low fat cheese or 1 tbsp peanut butter    30-45 goldfish crackers (depending on flavor)    7-8 Anglican mini brown rice cakes (caramel, apple cinnamon, chocolate)    12 Anglican mini brown rice cakes (cheddar, bbq, ranch)    1/3 cup hummus dip with raw veg   1/2 whole wheat celina, 1Tbsp hummus   Mini Pizza (1/2 whole wheat English muffin, low-fat  cheese, tomato sauce)   100 calorie snack pack (Oreo, Chips Ahoy, Ritz Mix, Baked Cheetos)   4-6 oz. light or Greek Style yogurt (Chobani, Yoplait, [place]ios, [place])   ½ cup sugar-free pudding     6 in. wheat tortilla or celina oven toasted chips (topped with spray butter flavoring, cinnamon, Truvia OR  spray butter, garlic powder, chili powder)    18 BBQ Popchips (available at Target, Whole Foods, Fresh Market)           Diabetes Support Group Meetings    Date Topics   February 9 Health Promotion/Nutrition   March 9 Taking Care of Your Kidneys   April 13 Taking Care of Your Feet   May 11 Ease Your Mind with Diabetes   June 8 Hurricane and Emergency Preparedness   July 13 Family & Caregiver Support for Diabetes   *August 17  Taking Care of Your Eyes   September 14 Devices & Technology   October 12 Recipes & Treats   November 9 Getting Pumped Up for Diabetes   December 14 Year-End Close Out            Meetings are held in the [facility] Room (A) of the [facility] located at [address] [place] [address]. Please call [phone] for additional information.    Free service, offered every 2nd Thursday of every month, except in August! Family members and/or friends are welcome as well!  Support group is for patients with type 1 or type 2 diabetes.    From 3:30p to 4:30p             Language Assistance Services     ATTENTION: Language assistance services are available, free of charge. Please call [phone].      ATENCIÓN: Si habla bianca, tiene a rivero disposición servicios gratuitos de asistencia lingüística. Llame al [phone].     ISAURA Ý: N?u b?n nói Ti?ng Vi?t, có các d?ch v? h? tr? ngôn ng? mi?n phí dành cho b?n. G?i s? [phone].         [facility] complies with applicable Federal civil rights laws and does not discriminate on the basis of race, color, national origin, age, disability, or sex.

## 2017-05-05 ENCOUNTER — PATIENT OUTREACH (OUTPATIENT)
Dept: OTHER | Facility: OTHER | Age: 77
End: 2017-05-05
Payer: MEDICARE

## 2017-05-05 NOTE — PROGRESS NOTES
Last 5 Patient Entered Readings                                                               Current 30 Day Average: 149/72     Recent Readings 4/30/2017 4/29/2017 4/28/2017 4/27/2017 4/26/2017    Systolic BP (mmHg) 164 154 128 147 152    Diastolic BP (mmHg) 76 79 71 68 75    Pulse 62 64 67 58 65        Still not getting systolic down. Continuing healthy diet and getting some exercise. Would like to discuss med changes, if needed.  Recently started seeing Dr Alexander for cardio.    Follow up with Mr. Reid Herman completed. Patient is maintaining a low sodium diet and continuing his exercise regime. Patient did not have any further questions or concerns. I will follow up in a few weeks to see how he is doing and progressing.

## 2017-05-08 ENCOUNTER — PATIENT OUTREACH (OUTPATIENT)
Dept: OTHER | Facility: OTHER | Age: 77
End: 2017-05-08
Payer: MEDICARE

## 2017-05-08 NOTE — PROGRESS NOTES
Mr. Herman is doing well. He is concerned that his BP has been trending up. He enters a handful at a time and has not entered any for May yet. He reports he is only taking 1 tablet of fosinopril/HCTZ BID, not 2 tabs BID as prescribed.     Last 5 Patient Entered Readings                                                               Current 30 Day Average: 151/72     Recent Readings 4/30/2017 4/29/2017 4/28/2017 4/27/2017 4/26/2017    Systolic BP (mmHg) 164 154 128 147 152    Diastolic BP (mmHg) 76 79 71 68 75    Pulse 62 64 67 58 65      BP elevated  Increase fosinopril/hctz to 2 tabs BID - 40/50mg per day total  Continue monitoring    Hypertension Medications             fosinopril-hydrochlorothiazide (MONOPRIL-HCT) 10-12.5 mg per tablet Take 2 tablets by mouth 2 (two) times daily.    metoprolol succinate (TOPROL-XL) 100 MG 24 hr tablet TAKE ONE TABLET BY MOUTH EVERY EVENING    nitroGLYCERIN (NITROSTAT) 0.4 MG SL tablet Place 1 tablet (0.4 mg total) under the tongue every 5 (five) minutes as needed for Chest pain.

## 2017-05-23 ENCOUNTER — PATIENT OUTREACH (OUTPATIENT)
Dept: OTHER | Facility: OTHER | Age: 77
End: 2017-05-23
Payer: MEDICARE

## 2017-05-23 NOTE — PROGRESS NOTES
Mr. Herman reports he is taking 3 tablets daily instead of 4 as prescribed. He has noticed some lower readings and doesn't want to get too low. He has not entered these lower readings.     Last 5 Patient Entered Readings                                                               Current 30 Day Average: 140/72     Recent Readings 5/14/2017 5/13/2017 5/12/2017 5/11/2017 5/8/2017    Systolic BP (mmHg) 122 123 114 115 119    Diastolic BP (mmHg) 66 60 55 59 71    Pulse 65 66 57 61 59      BP trending down  Continue monitoring    Hypertension Medications             fosinopril-hydrochlorothiazide (MONOPRIL-HCT) 10-12.5 mg per tablet Take 3 tablets by mouth once daily.    metoprolol succinate (TOPROL-XL) 100 MG 24 hr tablet TAKE ONE TABLET BY MOUTH EVERY EVENING    nitroGLYCERIN (NITROSTAT) 0.4 MG SL tablet Place 1 tablet (0.4 mg total) under the tongue every 5 (five) minutes as needed for Chest pain.

## 2017-05-24 ENCOUNTER — TELEPHONE (OUTPATIENT)
Dept: HEMATOLOGY/ONCOLOGY | Facility: CLINIC | Age: 77
End: 2017-05-24

## 2017-05-24 ENCOUNTER — LAB VISIT (OUTPATIENT)
Dept: LAB | Facility: HOSPITAL | Age: 77
End: 2017-05-24
Attending: INTERNAL MEDICINE
Payer: MEDICARE

## 2017-05-24 DIAGNOSIS — N13.8 BPH WITH URINARY OBSTRUCTION: ICD-10-CM

## 2017-05-24 DIAGNOSIS — E78.2 MIXED HYPERLIPIDEMIA: ICD-10-CM

## 2017-05-24 DIAGNOSIS — N18.30 CHRONIC KIDNEY DISEASE, STAGE III (MODERATE): ICD-10-CM

## 2017-05-24 DIAGNOSIS — N40.1 BPH WITH URINARY OBSTRUCTION: ICD-10-CM

## 2017-05-24 DIAGNOSIS — I10 ESSENTIAL HYPERTENSION: ICD-10-CM

## 2017-05-24 DIAGNOSIS — G47.33 OBSTRUCTIVE SLEEP APNEA: ICD-10-CM

## 2017-05-24 DIAGNOSIS — C91.10 CLL (CHRONIC LYMPHOCYTIC LEUKEMIA): ICD-10-CM

## 2017-05-24 DIAGNOSIS — E10.311 DIABETIC RETINOPATHY OF LEFT EYE WITH MACULAR EDEMA ASSOCIATED WITH TYPE 1 DIABETES MELLITUS, UNSPECIFIED RETINOPATHY SEVERITY: ICD-10-CM

## 2017-05-24 LAB
ERYTHROCYTE [DISTWIDTH] IN BLOOD BY AUTOMATED COUNT: 13.3 %
HCT VFR BLD AUTO: 37.5 %
HGB BLD-MCNC: 12.2 G/DL
LDH SERPL L TO P-CCNC: 160 U/L
MCH RBC QN AUTO: 31.5 PG
MCHC RBC AUTO-ENTMCNC: 32.5 %
MCV RBC AUTO: 97 FL
NEUTROPHILS # BLD AUTO: 2.7 K/UL
PLATELET # BLD AUTO: 154 K/UL
PMV BLD AUTO: 9.8 FL
RBC # BLD AUTO: 3.87 M/UL
WBC # BLD AUTO: 36.5 K/UL

## 2017-05-24 PROCEDURE — 85027 COMPLETE CBC AUTOMATED: CPT

## 2017-05-24 PROCEDURE — 36415 COLL VENOUS BLD VENIPUNCTURE: CPT

## 2017-05-24 PROCEDURE — 83615 LACTATE (LD) (LDH) ENZYME: CPT

## 2017-05-24 NOTE — TELEPHONE ENCOUNTER
----- Message from Dagmar Benz sent at 5/24/2017 11:39 AM CDT -----  Contact: self  Patient need 6 week follow up appointment.    Please call pt at [phone]

## 2017-05-25 DIAGNOSIS — C91.10 CLL (CHRONIC LYMPHOCYTIC LEUKEMIA): Primary | ICD-10-CM

## 2017-05-26 RX ORDER — FOSINOPRIL SODIUM AND HYDROCHLOROTHIAZIDE 10; 12.5 MG/1; MG/1
3 TABLET ORAL DAILY
COMMUNITY
End: 2017-07-12 | Stop reason: SDUPTHER

## 2017-06-02 ENCOUNTER — PATIENT OUTREACH (OUTPATIENT)
Dept: OTHER | Facility: OTHER | Age: 77
End: 2017-06-02
Payer: MEDICARE

## 2017-06-02 NOTE — PROGRESS NOTES
Last 5 Patient Entered Readings                                                               Current 30 Day Average: 128/65     Recent Readings 5/27/2017 5/26/2017 5/24/2017 5/22/2017 5/21/2017    Systolic BP (mmHg) 125 120 132 120 119    Diastolic BP (mmHg) 60 72 71 66 55    Pulse 55 59 62 78 62        lvm

## 2017-06-19 NOTE — PROGRESS NOTES
Last 5 Patient Entered Readings                                                               Current 30 Day Average: 123/61     Recent Readings 6/17/2017 6/16/2017 6/15/2017 6/13/2017 6/12/2017    Systolic BP (mmHg) 126 119 117 112 125    Diastolic BP (mmHg) 61 62 54 59 61    Pulse 63 57 61 54 59        Feeling more fatigued than usual. Not sure if it's Bp/pulse related or leukemia related. Will discuss at next doc appt. Taking all meds/supplements as Rx.   No other questions or concerns.    Follow up with Mr. Reid Herman completed. Patient is maintaining a low sodium diet and continuing his exercise regime. Patient did not have any further questions or concerns. I will follow up in a few weeks to see how he is doing and progressing.

## 2017-06-22 ENCOUNTER — OFFICE VISIT (OUTPATIENT)
Dept: PODIATRY | Facility: CLINIC | Age: 77
End: 2017-06-22
Payer: MEDICARE

## 2017-06-22 VITALS
WEIGHT: 202 LBS | RESPIRATION RATE: 18 BRPM | HEART RATE: 69 BPM | BODY MASS INDEX: 31.71 KG/M2 | SYSTOLIC BLOOD PRESSURE: 132 MMHG | HEIGHT: 67 IN | DIASTOLIC BLOOD PRESSURE: 60 MMHG

## 2017-06-22 DIAGNOSIS — B35.1 DERMATOPHYTOSIS OF NAIL: ICD-10-CM

## 2017-06-22 DIAGNOSIS — S81.801A LEG WOUND, RIGHT, INITIAL ENCOUNTER: ICD-10-CM

## 2017-06-22 DIAGNOSIS — E10.42 DIABETIC POLYNEUROPATHY ASSOCIATED WITH TYPE 1 DIABETES MELLITUS: Primary | ICD-10-CM

## 2017-06-22 DIAGNOSIS — I73.9 PERIPHERAL VASCULAR DISEASE: ICD-10-CM

## 2017-06-22 PROCEDURE — 3045F PR MOST RECENT HEMOGLOBIN A1C LEVEL 7.0-9.0%: CPT | Mod: S$GLB,,, | Performed by: PODIATRIST

## 2017-06-22 PROCEDURE — 1157F ADVNC CARE PLAN IN RCRD: CPT | Mod: S$GLB,,, | Performed by: PODIATRIST

## 2017-06-22 PROCEDURE — 99213 OFFICE O/P EST LOW 20 MIN: CPT | Mod: 25,S$GLB,, | Performed by: PODIATRIST

## 2017-06-22 PROCEDURE — 4010F ACE/ARB THERAPY RXD/TAKEN: CPT | Mod: S$GLB,,, | Performed by: PODIATRIST

## 2017-06-22 PROCEDURE — 99999 PR PBB SHADOW E&M-EST. PATIENT-LVL III: CPT | Mod: PBBFAC,,, | Performed by: PODIATRIST

## 2017-06-22 PROCEDURE — 11721 DEBRIDE NAIL 6 OR MORE: CPT | Mod: Q9,S$GLB,, | Performed by: PODIATRIST

## 2017-06-22 PROCEDURE — 99499 UNLISTED E&M SERVICE: CPT | Mod: S$GLB,,, | Performed by: PODIATRIST

## 2017-06-22 PROCEDURE — 1159F MED LIST DOCD IN RCRD: CPT | Mod: S$GLB,,, | Performed by: PODIATRIST

## 2017-06-22 PROCEDURE — 1126F AMNT PAIN NOTED NONE PRSNT: CPT | Mod: S$GLB,,, | Performed by: PODIATRIST

## 2017-06-22 RX ORDER — MUPIROCIN 20 MG/G
OINTMENT TOPICAL 2 TIMES DAILY
Qty: 30 G | Refills: 1 | Status: SHIPPED | OUTPATIENT
Start: 2017-06-22 | End: 2017-07-02

## 2017-06-22 NOTE — PROGRESS NOTES
Subjective:      Patient ID: Reid Herman is a 77 y.o. male.    Chief Complaint: PCP (Olivia Zavala MD 2/2/16); Diabetic Foot Exam; and Nail Care    Reid is a 77 y.o. male who presents to the clinic for evaluation and treatment of high risk feet. 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 The patient's chief complaint is elongated toenails   This patient has documented high risk feet requiring routine maintenance secondary to diabetes mellitis and those secondary complications of diabetes, as mentioned.. Reports scraping r shin, uses neosporin w/ mild improvement     PCP: Olivia Zavala MD    Date Last Seen by PCP:   Chief Complaint   Patient presents with    PCP     Olivia Zavala MD 2/2/16    Diabetic Foot Exam    Nail Care       Chief Complaint   Patient presents with    PCP     Olivia Zavala MD 2/2/16    Diabetic Foot Exam    Nail Care        Current shoe gear:  Affected Foot: Rx diabetic extra depth shoes and custom accommodative insoles     Unaffected Foot: Rx diabetic extra depth shoes and custom accommodative insoles    Hemoglobin A1C   Date Value Ref Range Status   04/26/2017 7.5 (H) 4.5 - 6.2 % Final     Comment:     According to ADA guidelines, hemoglobin A1C <7.0% represents  optimal control in non-pregnant diabetic patients.  Different  metrics may  apply to specific populations.   Standards of Medical Care in Diabetes - 2016.  For the purpose of screening for the presence of diabetes:  <5.7%     Consistent with the absence of diabetes  5.7-6.4%  Consistent with increasing risk for diabetes   (prediabetes)  >or=6.5%  Consistent with diabetes  Currently no consensus exists for use of hemoglobin A1C  for diagnosis of diabetes for children.     01/23/2017 7.7 (H) 4.5 - 6.2 % Final     Comment:     According to ADA guidelines, hemoglobin A1C <7.0% represents  optimal control in non-pregnant diabetic patients.  Different  metrics may apply to specific populations.   Standards of Medical Care in Diabetes - 2016.  For the purpose of screening for the presence of diabetes:  <5.7%     Consistent with the absence of diabetes  5.7-6.4%  Consistent with increasing risk for diabetes   (prediabetes)  >or=6.5%  Consistent with diabetes  Currently no consensus exists for use of hemoglobin A1C  for diagnosis of diabetes for children.     09/22/2016 7.5 (H) 4.5 - 6.2 % Final     Comment:     According to ADA guidelines, hemoglobin A1C <7.0% represents  optimal control in non-pregnant diabetic patients.  Different  metrics may apply to specific populations.   Standards of Medical Care in Diabetes - 2016.  For the purpose of screening for the presence of diabetes:  <5.7%     Consistent with the absence of diabetes  5.7-6.4%  Consistent with increasing risk for diabetes   (prediabetes)  >or=6.5%  Consistent with diabetes  Currently no consensus exists for use of hemoglobin A1C  for diagnosis of diabetes for children.         Review of Systems   Constitution: Negative for chills, fever and night sweats.   Cardiovascular: Negative for chest pain and claudication.   Respiratory: Negative for cough.    Skin: Positive for dry skin and nail changes.   Musculoskeletal: Positive for back pain. Negative for arthritis, falls and gout.           Objective:      Physical Exam   Constitutional:  He is oriented to person, place, and time. He appears well-developed and well-nourished. No distress.   Cardiovascular: Normal rate.    Vascular: Dorsalis pedis and posterior tibial pulses are diminished bilaterally. Toes are cool to touch. Feet are warm proximally.There is decreased digital hair. Skin is atrophic and mildly edematous. R lower leg, ankle, and foot are hyperpigmented.      Musculoskeletal: Normal range of motion.   Reproducible pain along R dorsal medial 1st metatarsal head to palpation.    Neurological: He is alert and oriented to person, place, and time. He exhibits normal muscle tone.   Neurologic: [place] 5.07 monofilamant testing absent on toes but otherwise is intact Rolly feet. Sharp/dull sensation absent Bilaterally. Light touch absent Bilaterally.     Skin: Skin is warm, dry and intact. No burn and no rash noted. He is not diaphoretic. No erythema. No pallor.    Nails x 10 are elongated by  2-5mm's, thickened by 1-3 mm's, dystrophic, and are normal in  coloration . Xerosis Bilaterally. No open lesions noted.    Anterior distal rle lesion w/ mild surrounding erythema.no increased temp, no pus, no fluctuance   Psychiatric: He has a normal mood and affect. His behavior is normal. Judgment and thought content normal.   Nursing note and vitals reviewed.            Assessment:       Encounter Diagnoses   Name Primary?    Diabetic polyneuropathy associated with type 1 diabetes mellitus Yes    Peripheral vascular disease     Dermatophytosis of nail     Leg wound, right, initial encounter          Plan:       Reid was seen today for pcp, diabetic foot exam and nail care.    Diagnoses and all orders for this visit:    Diabetic polyneuropathy associated with type 1 diabetes mellitus    Peripheral vascular disease    Dermatophytosis of nail    Leg wound, right, initial encounter    Other orders  -     mupirocin (BACTROBAN) 2 % ointment; Apply topically 2 (two) times daily.    - Patient was  given written and verbal instructions regarding foot condition.  I counseled the patient on his conditions, their implications and medical management.  Shoe inspection. Diabetic Foot Education. Patient reminded of the importance of good nutrition and blood sugar control to help prevent podiatric complications of diabetes. Patient instructed on proper foot hygeine. We discussed wearing proper shoe gear, daily foot inspections, never walking without protective shoe gear, never putting sharp instruments to feet    - With patient's permission, nails were aggressively reduced and debrided x 10 to their soft tissue attachment mechanically and with electric , removing all offending nail and debris. Patient relates relief following the procedure. She will continue to monitor the areas daily, inspect her feet, wear protective shoe gear when ambulatory, moisturizer to maintain skin integrity and follow in this office in approximately 2-3 months, sooner p.r.n.    - Cleanse wound with normal saline or wound . Dry well.  -  Small amount of antibiotic ointment and bandage applied. Pt instructed to do this daily. Removing the bandage at night to allow the area to dry

## 2017-06-23 ENCOUNTER — PATIENT OUTREACH (OUTPATIENT)
Dept: OTHER | Facility: OTHER | Age: 77
End: 2017-06-23

## 2017-06-23 NOTE — PROGRESS NOTES
Reid Herman is doing well. No questions or concerns. He is feeling fine with current fosinopril/hctz dose.     Last 5 Patient Entered Readings                                                               Current 30 Day Average: 123/61     Recent Readings 6/17/2017 6/16/2017 6/15/2017 6/13/2017 6/12/2017    Systolic BP (mmHg) 126 119 117 112 125    Diastolic BP (mmHg) 61 62 54 59 61    Pulse 63 57 61 54 59          BP at goal  Continue monitoring    Hypertension Medications             fosinopril-hydrochlorothiazide (MONOPRIL-HCT) 10-12.5 mg per tablet Take 3 tablets by mouth once daily.    metoprolol succinate (TOPROL-XL) 100 MG 24 hr tablet TAKE ONE TABLET BY MOUTH EVERY EVENING    nitroGLYCERIN (NITROSTAT) 0.4 MG SL tablet Place 1 tablet (0.4 mg total) under the tongue every 5 (five) minutes as needed for Chest pain.

## 2017-07-05 ENCOUNTER — OFFICE VISIT (OUTPATIENT)
Dept: HEMATOLOGY/ONCOLOGY | Facility: CLINIC | Age: 77
End: 2017-07-05
Payer: MEDICARE

## 2017-07-05 ENCOUNTER — LAB VISIT (OUTPATIENT)
Dept: LAB | Facility: HOSPITAL | Age: 77
End: 2017-07-05
Attending: INTERNAL MEDICINE
Payer: MEDICARE

## 2017-07-05 VITALS
SYSTOLIC BLOOD PRESSURE: 139 MMHG | BODY MASS INDEX: 31.73 KG/M2 | DIASTOLIC BLOOD PRESSURE: 65 MMHG | HEIGHT: 67 IN | TEMPERATURE: 98 F | WEIGHT: 202.19 LBS | HEART RATE: 61 BPM

## 2017-07-05 DIAGNOSIS — C91.10 CLL (CHRONIC LYMPHOCYTIC LEUKEMIA): Primary | ICD-10-CM

## 2017-07-05 DIAGNOSIS — E10.42 POORLY CONTROLLED TYPE 1 DIABETES MELLITUS WITH PERIPHERAL NEUROPATHY: Chronic | ICD-10-CM

## 2017-07-05 DIAGNOSIS — N13.8 BPH WITH URINARY OBSTRUCTION: ICD-10-CM

## 2017-07-05 DIAGNOSIS — C91.10 CLL (CHRONIC LYMPHOCYTIC LEUKEMIA): ICD-10-CM

## 2017-07-05 DIAGNOSIS — I73.9 PERIPHERAL VASCULAR DISEASE: ICD-10-CM

## 2017-07-05 DIAGNOSIS — E10.65 POORLY CONTROLLED TYPE 1 DIABETES MELLITUS WITH PERIPHERAL NEUROPATHY: Chronic | ICD-10-CM

## 2017-07-05 DIAGNOSIS — N40.1 BPH WITH URINARY OBSTRUCTION: ICD-10-CM

## 2017-07-05 DIAGNOSIS — E78.2 MIXED HYPERLIPIDEMIA: ICD-10-CM

## 2017-07-05 DIAGNOSIS — I10 ESSENTIAL HYPERTENSION: ICD-10-CM

## 2017-07-05 DIAGNOSIS — I25.119 CORONARY ARTERY DISEASE INVOLVING NATIVE CORONARY ARTERY OF NATIVE HEART WITH ANGINA PECTORIS: ICD-10-CM

## 2017-07-05 DIAGNOSIS — G47.33 OBSTRUCTIVE SLEEP APNEA: ICD-10-CM

## 2017-07-05 LAB
ALBUMIN SERPL BCP-MCNC: 3.9 G/DL
ALP SERPL-CCNC: 92 U/L
ALT SERPL W/O P-5'-P-CCNC: 19 U/L
ANION GAP SERPL CALC-SCNC: 8 MMOL/L
ANISOCYTOSIS BLD QL SMEAR: SLIGHT
AST SERPL-CCNC: 21 U/L
BASOPHILS # BLD AUTO: ABNORMAL K/UL
BASOPHILS NFR BLD: 0 %
BILIRUB SERPL-MCNC: 0.7 MG/DL
BUN SERPL-MCNC: 25 MG/DL
CALCIUM SERPL-MCNC: 9.8 MG/DL
CHLORIDE SERPL-SCNC: 99 MMOL/L
CO2 SERPL-SCNC: 29 MMOL/L
CREAT SERPL-MCNC: 1.2 MG/DL
DIFFERENTIAL METHOD: ABNORMAL
EOSINOPHIL # BLD AUTO: ABNORMAL K/UL
EOSINOPHIL NFR BLD: 0 %
ERYTHROCYTE [DISTWIDTH] IN BLOOD BY AUTOMATED COUNT: 13.9 %
EST. GFR  (AFRICAN AMERICAN): >60 ML/MIN/1.73 M^2
EST. GFR  (NON AFRICAN AMERICAN): 58.8 ML/MIN/1.73 M^2
GLUCOSE SERPL-MCNC: 135 MG/DL
HCT VFR BLD AUTO: 37.3 %
HGB BLD-MCNC: 12.2 G/DL
LYMPHOCYTES # BLD AUTO: ABNORMAL K/UL
LYMPHOCYTES NFR BLD: 94 %
MCH RBC QN AUTO: 31.6 PG
MCHC RBC AUTO-ENTMCNC: 32.7 %
MCV RBC AUTO: 97 FL
MONOCYTES # BLD AUTO: ABNORMAL K/UL
MONOCYTES NFR BLD: 1 %
NEUTROPHILS NFR BLD: 5 %
OVALOCYTES BLD QL SMEAR: ABNORMAL
PLATELET # BLD AUTO: 148 K/UL
PMV BLD AUTO: 9.6 FL
POIKILOCYTOSIS BLD QL SMEAR: SLIGHT
POLYCHROMASIA BLD QL SMEAR: ABNORMAL
POTASSIUM SERPL-SCNC: 4.9 MMOL/L
PROT SERPL-MCNC: 6.6 G/DL
RBC # BLD AUTO: 3.86 M/UL
SMUDGE CELLS BLD QL SMEAR: PRESENT
SODIUM SERPL-SCNC: 136 MMOL/L
WBC # BLD AUTO: 39.46 K/UL

## 2017-07-05 PROCEDURE — 85027 COMPLETE CBC AUTOMATED: CPT

## 2017-07-05 PROCEDURE — 85007 BL SMEAR W/DIFF WBC COUNT: CPT

## 2017-07-05 PROCEDURE — 1157F ADVNC CARE PLAN IN RCRD: CPT | Mod: S$GLB,,, | Performed by: INTERNAL MEDICINE

## 2017-07-05 PROCEDURE — 99215 OFFICE O/P EST HI 40 MIN: CPT | Mod: S$GLB,,, | Performed by: INTERNAL MEDICINE

## 2017-07-05 PROCEDURE — 1159F MED LIST DOCD IN RCRD: CPT | Mod: S$GLB,,, | Performed by: INTERNAL MEDICINE

## 2017-07-05 PROCEDURE — 99499 UNLISTED E&M SERVICE: CPT | Mod: S$GLB,,, | Performed by: INTERNAL MEDICINE

## 2017-07-05 PROCEDURE — 80053 COMPREHEN METABOLIC PANEL: CPT

## 2017-07-05 PROCEDURE — 99999 PR PBB SHADOW E&M-EST. PATIENT-LVL III: CPT | Mod: PBBFAC,,, | Performed by: INTERNAL MEDICINE

## 2017-07-05 PROCEDURE — 4010F ACE/ARB THERAPY RXD/TAKEN: CPT | Mod: S$GLB,,, | Performed by: INTERNAL MEDICINE

## 2017-07-05 PROCEDURE — 36415 COLL VENOUS BLD VENIPUNCTURE: CPT

## 2017-07-05 PROCEDURE — 3045F PR MOST RECENT HEMOGLOBIN A1C LEVEL 7.0-9.0%: CPT | Mod: S$GLB,,, | Performed by: INTERNAL MEDICINE

## 2017-07-05 PROCEDURE — 1125F AMNT PAIN NOTED PAIN PRSNT: CPT | Mod: S$GLB,,, | Performed by: INTERNAL MEDICINE

## 2017-07-05 RX ORDER — CLOTRIMAZOLE AND BETAMETHASONE DIPROPIONATE 10; .64 MG/G; MG/G
CREAM TOPICAL
Qty: 15 G | Refills: 0 | Status: SHIPPED | OUTPATIENT
Start: 2017-07-05 | End: 2017-12-08 | Stop reason: SDUPTHER

## 2017-07-06 RX ORDER — MECOBAL/LEVOMEFOLAT CA/B6 PHOS 2-3-35 MG
TABLET ORAL
Qty: 90 TABLET | Refills: 0 | Status: SHIPPED | OUTPATIENT
Start: 2017-07-06 | End: 2017-09-25 | Stop reason: SDUPTHER

## 2017-07-11 ENCOUNTER — PATIENT MESSAGE (OUTPATIENT)
Dept: INTERNAL MEDICINE | Facility: CLINIC | Age: 77
End: 2017-07-11

## 2017-07-11 NOTE — PROGRESS NOTES
PATIENT: Reid Herman  MRN: [id number]  DATE: 4/18/2017    Subjective:   CLL    Oncologic History:  5 year old male with Stage 0 CLL dx'd 6/2010. He remains asymptomatic and does not report any chronic infections. He states that he feels well. No wt loss over the last 6 months. He denies nausea vomiting fever chills night sweats or SOB.  He has not received any therapy for this.  He remains quite stable.  He has no evidence of disease progression.   His ZAP 70 test was negative.     Interval History: Mr. Herman returns for follow up.  He has had no infections.  He has normal palpable lymphadenopathy and he has no B symptoms.    Past Medical History:   Diagnosis Date    Anemia     Back pain     Basal cell carcinoma 6.2015    left nasal ala - excised by SSW    CAD (coronary artery disease) 10/1/2012    Cancer     CLL    Cataract     CLL (chronic lymphocytic leukemia) 8/17/2012    CLL (chronic lymphocytic leukemia)     DDD (degenerative disc disease), lumbar 10/28/2014    Diabetes mellitus     Diabetes mellitus type I     Diabetic retinopathy of both eyes     Heart attack     Hyperlipidemia     Hypertension     Insulin pump in place     Obesity     Poorly controlled type 1 diabetes mellitus with peripheral neuropathy 10/1/2012    Preseptal cellulitis of right eye 8/17/15    S/P CABG x 2 2/14/2014 1994     Skin cancer     Sleep apnea     SQUAMOUS CELL CARCINOMA     right posterior ear, excised via Mph's with Dr.Walia Belkys Gonzalez in sleeping     Type 1 diabetes, uncontrolled, with retinopathy 10/1/2012    Type II or unspecified type diabetes mellitus without mention of complication, not stated as uncontrolled        Past Surgical History:   Procedure Laterality Date    APPENDECTOMY  1953    CATARACT EXTRACTION  4/8/13    right eye (toric)    CATARACT EXTRACTION  4/29/13    left eye (toric)    CORONARY ARTERY BYPASS GRAFT  1994    x 3    EYE SURGERY      cataracts, both eyes     FINGER TENDON REPAIR  2011    left hand    SKIN BIOPSY  2013    multiple    SPINE SURGERY  2015    TLIF    TONSILLECTOMY  1947       Family History   Problem Relation Age of Onset    Breast cancer Mother     Cancer Mother      ? lung cancer    Alzheimer's disease Father     Dementia Father     Stroke Father     Colon cancer Sister     Cancer Sister 60     colon    Diabetes Maternal Grandfather     Diabetes Paternal Aunt     Diabetes Paternal Uncle     Melanoma Neg Hx     Psoriasis Neg Hx     Lupus Neg Hx     Eczema Neg Hx     Acne Neg Hx         Social History:  reports that he has never smoked. He has never used smokeless tobacco. He reports that he drinks about 0.6 oz of alcohol per week  He reports that he does not use illicit drugs.    Allergies:  Review of patient's allergies indicates:  No Known Allergies    Medications:  Current Outpatient Prescriptions   Medication Sig Dispense Refill    ACCU-CHEK FASTCLIX Misc TEST 6 TIMES DAILY 612 each 3    alpha lipoic acid 600 mg Cap Take 1 capsule by mouth once daily.        aspirin (ECOTRIN) 325 MG EC tablet Take 325 mg by mouth once daily.      atorvastatin (LIPITOR) 80 MG tablet Take 1 tablet (80 mg total) by mouth nightly. 90 tablet 3    blood sugar diagnostic (ACCU-CHEK SHARON PLUS TEST STRP) Strp Pt test blood sugar 5 times a day. 650 strip 3    clotrimazole-betamethasone 1-0.05% (LOTRISONE) cream Apply topically 2 (two) times daily. 1 Tube 1    FLUZONE HIGH-DOSE 2016-17, PF, 180 mcg/0.5 mL Syrg       fosinopril-hydrochlorothiazide (MONOPRIL-HCT) 10-12.5 mg per tablet Take 2 tablets by mouth 2 (two) times daily. 180 tablet 3    L-METHYL-B6-B12 3-35-2 mg Tab TAKE 1 TABLET BY MOUTH EVERY DAY 90 tablet 0    metformin (GLUCOPHAGE) 500 MG tablet TAKE 1 TABLET TWICE DAILY 180 tablet 2    metoprolol succinate (TOPROL-XL) 100 MG 24 hr tablet TAKE ONE TABLET BY MOUTH EVERY EVENING 90 tablet 3    multivitamin capsule Take 1 capsule by mouth  "once daily.        nitroGLYCERIN (NITROSTAT) 0.4 MG SL tablet Place 1 tablet (0.4 mg total) under the tongue every 5 (five) minutes as needed for Chest pain. 50 tablet 12    NOVOLOG 100 unit/mL injection INJECT UP TO MAX  UNITS UNDER THE SKIN VIA INSULIN PUMP DAILY AS DIRECTED 30 mL 4    triamcinolone acetonide 0.1% (KENALOG) 0.1 % ointment Apply topically 2 (two) times daily. 30 g 0    valacyclovir (VALTREX) 1000 MG tablet Take 1 tablet (1,000 mg total) by mouth 3 (three) times daily. 21 tablet 0     No current facility-administered medications for this visit.          Review of Systems   HENT: Negative.    Eyes: Negative.    Respiratory: Negative.    Cardiovascular: Negative.    Gastrointestinal: Negative.    Endocrine: Negative.    Genitourinary: Negative.    Musculoskeletal: Negative.    Skin: Negative.    Neurological: Negative.    Hematological: Negative.    Psychiatric/Behavioral: Negative.        ECOG Performance Status: 0  Objective:      Vitals:   Vitals:    04/12/17 0916   BP: (!) 151/70   BP Location: Left arm   Patient Position: Sitting   BP Method: Automatic   Pulse: (!) 58   Resp: 13   Temp: 98.3 °F (36.8 °C)   Weight: 93.6 kg (206 lb 5.6 oz)   Height: 5' 7" (1.702 m)     BMI: Body mass index is 32.32 kg/(m^2).      Physical Exam   Constitutional: he is oriented to person, place, and time. He appears well-developed and well-nourished.   HENT: NC AT   Head: Normocephalic.   Right Ear: External ear normal.   Left Ear: External ear normal.   Nose: Nose normal.   Mouth/Throat: Oropharynx is clear and moist.   Eyes: Conjunctivae and EOM are normal. Pupils are equal, round, and reactive to light.   Neck: Normal range of motion. Neck supple.   Cardiovascular: Normal rate, regular rhythm, normal heart sounds and intact distal pulses.    Pulmonary/Chest: Effort normal and breath sounds normal.   Abdominal: Soft. Bowel sounds are normal.   Musculoskeletal: Normal range of motion.   Neurological: he is " alert and oriented to person, place, and time. He has normal reflexes.   Skin: Skin is warm and dry.   Psychiatric: he has a normal mood and affect. His behavior is normal. Judgment and thought content normal.       Laboratory Data:  Lab Visit on 04/12/2017   Component Date Value Ref Range Status    WBC 04/12/2017 37.94* 3.90 - 12.70 K/uL Final    RBC 04/12/2017 3.89* 4.60 - 6.20 M/uL Final    Hemoglobin 04/12/2017 12.6* 14.0 - 18.0 g/dL Final    Hematocrit 04/12/2017 37.3* 40.0 - 54.0 % Final    MCV 04/12/2017 96  82 - 98 fL Final    MCH 04/12/2017 32.4* 27.0 - 31.0 pg Final    MCHC 04/12/2017 33.8  32.0 - 36.0 % Final    RDW 04/12/2017 13.7  11.5 - 14.5 % Final    Platelets 04/12/2017 154  150 - 350 K/uL Final    MPV 04/12/2017 9.8  9.2 - 12.9 fL Final    Gran # 04/12/2017 3.2  1.8 - 7.7 K/uL Final    Comment: The ANC is based on a white cell differential from an   automated cell counter. It has not been microscopically   reviewed for the presence of abnormal cells. Clinical   correlation is required.      Sodium 04/12/2017 132* 136 - 145 mmol/L Final    Potassium 04/12/2017 5.5* 3.5 - 5.1 mmol/L Final    *No Visible Hemolysis    Chloride 04/12/2017 96  95 - 110 mmol/L Final    CO2 04/12/2017 28  23 - 29 mmol/L Final    Glucose 04/12/2017 220* 70 - 110 mg/dL Final    BUN, Bld 04/12/2017 18  8 - 23 mg/dL Final    Creatinine 04/12/2017 1.2  0.5 - 1.4 mg/dL Final    Calcium 04/12/2017 9.5  8.7 - 10.5 mg/dL Final    Total Protein 04/12/2017 6.6  6.0 - 8.4 g/dL Final    Albumin 04/12/2017 3.8  3.5 - 5.2 g/dL Final    Total Bilirubin 04/12/2017 0.6  0.1 - 1.0 mg/dL Final    Comment: For infants and newborns, interpretation of results should be based  on gestational age, weight and in agreement with clinical  observations.  Premature Infant recommended reference ranges:  Up to 24 hours.............<8.0 mg/dL  Up to 48 hours............<12.0 mg/dL  3-5 days..................<15.0 mg/dL  6-29  days.................<15.0 mg/dL      Alkaline Phosphatase 04/12/2017 89  55 - 135 U/L Final    AST 04/12/2017 19  10 - 40 U/L Final    ALT 04/12/2017 17  10 - 44 U/L Final    Anion Gap 04/12/2017 8  8 - 16 mmol/L Final    eGFR if African American 04/12/2017 >60.0  >60 mL/min/1.73 m^2 Final    eGFR if non African American 04/12/2017 58.8* >60 mL/min/1.73 m^2 Final    Comment: Calculation used to obtain the estimated glomerular filtration  rate (eGFR) is the CKD-EPI equation. Since race is unknown   in our information system, the eGFR values for   -American and Non--American patients are given   for each creatinine result.         Assessment/Plan:     1. CLL (chronic lymphocytic leukemia)    2. Diabetic retinopathy of left eye with macular edema associated with type 1 diabetes mellitus, unspecified retinopathy severity    3. Mixed hyperlipidemia    4. BPH with urinary obstruction    5. Essential hypertension    6. Obstructive sleep apnea    7. Chronic kidney disease, stage III (moderate)      His CLL appears stable.  He has low risk CLL.  He is at 81 and that 19 negative.  Hence his time to treatment will be 10-15 years from now.  At this point I will continue observing him clinically as is no indication for treatment and there is no indication of progression.    His diabetes, hyperlipidemia, BPH, hypertension, chronic renal insufficiency are stable and are being treated by other physicians.    Med and Order  Orders Placed This Encounter    CBC Oncology    Comprehensive metabolic panel    Lactate dehydrogenase    ZAP-70, Chronic Lymph Leuk Prognosis       Follow Up  No Follow-up on file.PATIENT: Reid Herman  MRN: [id number]  DATE: 7/11/2017    Subjective:     Oncologic History:    Interval History: Mr. Herman returns for follow up.    Past Medical History:   Diagnosis Date    Anemia     Back pain     CAD (coronary artery disease) 10/1/2012    Cataract     DDD (degenerative disc  disease), lumbar 10/28/2014    Diabetes mellitus     Diabetes mellitus type I     Diabetic retinopathy of both eyes     Heart attack     Hyperlipidemia     Hypertension     Insulin pump in place     Obesity     Poorly controlled type 1 diabetes mellitus with peripheral neuropathy 10/1/2012    Preseptal cellulitis of right eye 8/17/15    S/P CABG x 2 2/14/2014 1994     Sleep apnea     Trouble in sleeping     Type 1 diabetes, uncontrolled, with retinopathy 10/1/2012    Type II or unspecified type diabetes mellitus without mention of complication, not stated as uncontrolled        Past Surgical History:   Procedure Laterality Date    APPENDECTOMY  1953    CATARACT EXTRACTION  4/8/13    right eye (toric)    CATARACT EXTRACTION  4/29/13    left eye (toric)    COLONOSCOPY N/A 4/25/2017    Procedure: COLONOSCOPY;  Surgeon: CLARISSA Freeman MD;  Location: [facility] ([address] [place] [address]);  Service: Endoscopy;  Laterality: N/A;    CORONARY ARTERY BYPASS GRAFT  1994    x 3    EYE SURGERY      cataracts, both eyes    FINGER TENDON REPAIR  2011    left hand    SKIN BIOPSY  2013    multiple    SPINE SURGERY  2015    TLIF    TONSILLECTOMY  1947       Family History   Problem Relation Age of Onset    Breast cancer Mother     Cancer Mother      ? lung cancer    Alzheimer's disease Father     Dementia Father     Stroke Father     Colon cancer Sister     Cancer Sister 60     colon    Diabetes Maternal Grandfather     Diabetes Paternal Aunt     Diabetes Paternal Uncle     Melanoma Neg Hx     Psoriasis Neg Hx     Lupus Neg Hx     Eczema Neg Hx     Acne Neg Hx         Social History:  reports that he has never smoked. He has never used smokeless tobacco. He reports that he drinks about 0.6 oz of alcohol per week . He reports that he does not use drugs.    Allergies:  Review of patient's allergies indicates:  No Known Allergies    Medications:  Current Outpatient Prescriptions   Medication Sig Dispense  Refill    ACCU-CHEK FASTCLIX Misc TEST 6 TIMES DAILY 612 each 3    alpha lipoic acid 600 mg Cap Take 1 capsule by mouth once daily.        aspirin (ECOTRIN) 325 MG EC tablet Take 325 mg by mouth once daily.      atorvastatin (LIPITOR) 80 MG tablet Take 1 tablet (80 mg total) by mouth nightly. 90 tablet 3    BD INSULIN SYRINGE ULT-FINE II 0.3 mL 31 gauge x 5/16 Syrg       blood sugar diagnostic (ACCU-CHEK SHARON PLUS TEST STRP) Strp Pt test blood sugar 5 times a day. 650 strip 3    fosinopril-hydrochlorothiazide (MONOPRIL-HCT) 10-12.5 mg per tablet Take 3 tablets by mouth once daily.      insulin syringe-needle U-100 (ADVOCATE SYRINGES) 0.3 mL 30 gauge x 5/16 Syrg Use as directed 200 each 11    metformin (GLUCOPHAGE) 500 MG tablet TAKE 1 TABLET TWICE DAILY 180 tablet 2    metoprolol succinate (TOPROL-XL) 100 MG 24 hr tablet TAKE ONE TABLET BY MOUTH EVERY EVENING 90 tablet 3    multivitamin capsule Take 1 capsule by mouth once daily.        nitroGLYCERIN (NITROSTAT) 0.4 MG SL tablet Place 1 tablet (0.4 mg total) under the tongue every 5 (five) minutes as needed for Chest pain. 50 tablet 12    NOVOLOG 100 unit/mL injection INJECT UP TO MAX  UNITS UNDER THE SKIN VIA INSULIN PUMP DAILY AS DIRECTED 30 mL 4    clotrimazole-betamethasone 1-0.05% (LOTRISONE) cream APPLY EXTERNALLY TO THE AFFECTED AREA TWICE DAILY 15 g 0    L-METHYL-B6-B12 3-35-2 mg Tab TAKE 1 TABLET BY MOUTH EVERY DAY 90 tablet 0     No current facility-administered medications for this visit.          Review of Systems   HENT: Negative.    Eyes: Negative.    Respiratory: Negative.    Cardiovascular: Negative.    Gastrointestinal: Negative.    Endocrine: Negative.    Genitourinary: Negative.    Musculoskeletal: Negative.    Skin: Negative.    Neurological: Negative.    Hematological: Negative.    Psychiatric/Behavioral: Negative.        ECOG Performance Status: 1  Objective:      Vitals:   Vitals:    07/05/17 1326   BP: 139/65   Pulse: 61  "  Temp: 98.2 °F (36.8 °C)   Weight: 91.7 kg (202 lb 2.6 oz)   Height: 5' 7" (1.702 m)     BMI: Body mass index is 31.66 kg/m².      Physical Exam   Constitutional: he is oriented to person, place, and time. He appears well-developed and well-nourished.   HENT: NC AT   Head: Normocephalic.   Right Ear: External ear normal.   Left Ear: External ear normal.   Nose: Nose normal.   Mouth/Throat: Oropharynx is clear and moist.   Eyes: Conjunctivae and EOM are normal. Pupils are equal, round, and reactive to light.   Neck: Normal range of motion. Neck supple.   Cardiovascular: Normal rate, regular rhythm, normal heart sounds and intact distal pulses.    Pulmonary/Chest: Effort normal and breath sounds normal.   Abdominal: Soft. Bowel sounds are normal.   Musculoskeletal: Normal range of motion.   Neurological: he is alert and oriented to person, place, and time. He has normal reflexes.   Skin: Skin is warm and dry.   Psychiatric: he has a normal mood and affect. His behavior is normal. Judgment and thought content normal.       Laboratory Data:  Lab Visit on 07/05/2017   Component Date Value Ref Range Status    WBC 07/05/2017 39.46* 3.90 - 12.70 K/uL Final    RBC 07/05/2017 3.86* 4.60 - 6.20 M/uL Final    Hemoglobin 07/05/2017 12.2* 14.0 - 18.0 g/dL Final    Hematocrit 07/05/2017 37.3* 40.0 - 54.0 % Final    MCV 07/05/2017 97  82 - 98 fL Final    MCH 07/05/2017 31.6* 27.0 - 31.0 pg Final    MCHC 07/05/2017 32.7  32.0 - 36.0 % Final    RDW 07/05/2017 13.9  11.5 - 14.5 % Final    Platelets 07/05/2017 148* 150 - 350 K/uL Final    MPV 07/05/2017 9.6  9.2 - 12.9 fL Final    Lymph # 07/05/2017 CANCELED  1.0 - 4.8 K/uL Final    Mono # 07/05/2017 CANCELED  0.3 - 1.0 K/uL Final    Eos # 07/05/2017 CANCELED  0.0 - 0.5 K/uL Final    Baso # 07/05/2017 CANCELED  0.00 - 0.20 K/uL Final    Gran% 07/05/2017 5.0* 38.0 - 73.0 % Final    Lymph% 07/05/2017 94.0* 18.0 - 48.0 % Final    Mono% 07/05/2017 1.0* 4.0 - 15.0 % Final "    Eosinophil% 07/05/2017 0.0  0.0 - 8.0 % Final    Basophil% 07/05/2017 0.0  0.0 - 1.9 % Final    Aniso 07/05/2017 Slight   Final    Poik 07/05/2017 Slight   Final    Poly 07/05/2017 Occasional   Final    Ovalocytes 07/05/2017 Occasional   Final    Smudge Cells 07/05/2017 Present   Final    Comment: Smudge cells present;Substantial numbers may affect the   accuracy of the differential.      Differential Method 07/05/2017 Manual   Final    Sodium 07/05/2017 136  136 - 145 mmol/L Final    Potassium 07/05/2017 4.9  3.5 - 5.1 mmol/L Final    Chloride 07/05/2017 99  95 - 110 mmol/L Final    CO2 07/05/2017 29  23 - 29 mmol/L Final    Glucose 07/05/2017 135* 70 - 110 mg/dL Final    BUN, Bld 07/05/2017 25* 8 - 23 mg/dL Final    Creatinine 07/05/2017 1.2  0.5 - 1.4 mg/dL Final    Calcium 07/05/2017 9.8  8.7 - 10.5 mg/dL Final    Total Protein 07/05/2017 6.6  6.0 - 8.4 g/dL Final    Albumin 07/05/2017 3.9  3.5 - 5.2 g/dL Final    Total Bilirubin 07/05/2017 0.7  0.1 - 1.0 mg/dL Final    Comment: For infants and newborns, interpretation of results should be based  on gestational age, weight and in agreement with clinical  observations.  Premature Infant recommended reference ranges:  Up to 24 hours.............<8.0 mg/dL  Up to 48 hours............<12.0 mg/dL  3-5 days..................<15.0 mg/dL  6-29 days.................<15.0 mg/dL      Alkaline Phosphatase 07/05/2017 92  55 - 135 U/L Final    AST 07/05/2017 21  10 - 40 U/L Final    ALT 07/05/2017 19  10 - 44 U/L Final    Anion Gap 07/05/2017 8  8 - 16 mmol/L Final    eGFR if African American 07/05/2017 >60.0  >60 mL/min/1.73 m^2 Final    eGFR if non African American 07/05/2017 58.8* >60 mL/min/1.73 m^2 Final    Comment: Calculation used to obtain the estimated glomerular filtration  rate (eGFR) is the CKD-EPI equation. Since race is unknown   in our information system, the eGFR values for   -American and Non--American patients are  given   for each creatinine result.         Assessment/Plan:     1. CLL (chronic lymphocytic leukemia)    2. Poorly controlled type 1 diabetes mellitus with peripheral neuropathy    3. Obstructive sleep apnea    4. Coronary artery disease involving native coronary artery of native heart with angina pectoris    5. Essential hypertension    6. Peripheral vascular disease    7. BPH with urinary obstruction    8. Mixed hyperlipidemia        Med and Order  Orders Placed This Encounter    CBC Oncology    Comprehensive metabolic panel    Lactate dehydrogenase       Follow Up  Return in about 6 months (around 1/5/2018).

## 2017-07-12 RX ORDER — FOSINOPRIL SODIUM AND HYDROCHLOROTHIAZIDE 10; 12.5 MG/1; MG/1
3 TABLET ORAL DAILY
Qty: 270 TABLET | Refills: 3 | Status: SHIPPED | OUTPATIENT
Start: 2017-07-12 | End: 2018-03-02 | Stop reason: SDUPTHER

## 2017-07-14 ENCOUNTER — OFFICE VISIT (OUTPATIENT)
Dept: DERMATOLOGY | Facility: CLINIC | Age: 77
End: 2017-07-14
Payer: MEDICARE

## 2017-07-14 DIAGNOSIS — Z85.828 PERSONAL HISTORY OF OTHER MALIGNANT NEOPLASM OF SKIN: ICD-10-CM

## 2017-07-14 DIAGNOSIS — D18.00 ANGIOMA: ICD-10-CM

## 2017-07-14 DIAGNOSIS — D48.5 NEOPLASM OF UNCERTAIN BEHAVIOR OF SKIN: Primary | ICD-10-CM

## 2017-07-14 DIAGNOSIS — L82.1 SK (SEBORRHEIC KERATOSIS): ICD-10-CM

## 2017-07-14 DIAGNOSIS — L57.0 AK (ACTINIC KERATOSIS): ICD-10-CM

## 2017-07-14 PROCEDURE — 11100 PR BIOPSY OF SKIN LESION: CPT | Mod: 59,S$GLB,, | Performed by: DERMATOLOGY

## 2017-07-14 PROCEDURE — 17003 DESTRUCT PREMALG LES 2-14: CPT | Mod: S$GLB,,, | Performed by: DERMATOLOGY

## 2017-07-14 PROCEDURE — 17000 DESTRUCT PREMALG LESION: CPT | Mod: S$GLB,,, | Performed by: DERMATOLOGY

## 2017-07-14 PROCEDURE — 1126F AMNT PAIN NOTED NONE PRSNT: CPT | Mod: S$GLB,,, | Performed by: DERMATOLOGY

## 2017-07-14 PROCEDURE — 99213 OFFICE O/P EST LOW 20 MIN: CPT | Mod: 25,S$GLB,, | Performed by: DERMATOLOGY

## 2017-07-14 PROCEDURE — 99999 PR PBB SHADOW E&M-EST. PATIENT-LVL II: CPT | Mod: PBBFAC,,, | Performed by: DERMATOLOGY

## 2017-07-14 PROCEDURE — 1157F ADVNC CARE PLAN IN RCRD: CPT | Mod: S$GLB,,, | Performed by: DERMATOLOGY

## 2017-07-14 PROCEDURE — 1159F MED LIST DOCD IN RCRD: CPT | Mod: S$GLB,,, | Performed by: DERMATOLOGY

## 2017-07-14 PROCEDURE — 88305 TISSUE EXAM BY PATHOLOGIST: CPT | Performed by: PATHOLOGY

## 2017-07-14 NOTE — PATIENT INSTRUCTIONS
Shave Biopsy Wound Care    Your doctor has performed a shave biopsy today.  A band aid and vaseline ointment has been placed over the site.  This should remain in place for 24 hours.  It is recommended that you keep the area dry for the first 24 hours.  After 24 hours, you may remove the band aid and wash the area with warm soap and water and apply Vaseline jelly.  Many patients prefer to use Neosporin or Bacitracin ointment.  This is acceptable; however, know that you can develop an allergy to this medication even if you have used it safely for years.  It is important to keep the area moist.  Letting it dry out and get air slows healing time, and will worsen the scar.  Band aid is optional after first 24 hours.      If you notice increasing redness, tenderness, pain, or yellow drainage at the biopsy site, please notify your doctor.  These are signs of an infection.    If your biopsy site is bleeding, apply firm pressure for 15 minutes straight.  Repeat for another 15 minutes, if it is still bleeding.   If the surgical site continues to bleed, then please contact your doctor.      [facility] [place] [address]/ [phone] FAX/ [email or web address]

## 2017-07-17 ENCOUNTER — PATIENT OUTREACH (OUTPATIENT)
Dept: OTHER | Facility: OTHER | Age: 77
End: 2017-07-17

## 2017-07-17 NOTE — PROGRESS NOTES
Last 5 Patient Entered Redings Current 30 Day Average: 126/61     Recent Readings 6/17/2017 6/16/2017 6/15/2017 6/13/2017 6/12/2017    Systolic BP (mmHg) 126 119 117 112 125    Diastolic BP (mmHg) 61 62 54 59 61    Pulse 63 57 61 54 59        Feeling well. Wife manually enters readings and must have forgotten to enter them. He will ask her to do so as soon as she's able.  BP this morning was in the usual range.  No questions or concerns.    Follow up with Mr. Reid Herman completed. Patient is maintaining a low sodium diet and continuing his exercise regime. Patient did not have any further questions or concerns. I will follow up in a few weeks to see how he is doing and progressing.

## 2017-07-26 ENCOUNTER — PROCEDURE VISIT (OUTPATIENT)
Dept: DERMATOLOGY | Facility: CLINIC | Age: 77
End: 2017-07-26
Payer: MEDICARE

## 2017-07-26 VITALS
DIASTOLIC BLOOD PRESSURE: 73 MMHG | HEIGHT: 67 IN | BODY MASS INDEX: 31.71 KG/M2 | SYSTOLIC BLOOD PRESSURE: 144 MMHG | WEIGHT: 202 LBS | HEART RATE: 67 BPM

## 2017-07-26 DIAGNOSIS — C44.42 SQUAMOUS CELL CARCINOMA OF SCALP: Primary | ICD-10-CM

## 2017-07-26 PROCEDURE — 13121 CMPLX RPR S/A/L 2.6-7.5 CM: CPT | Mod: 51,S$GLB,, | Performed by: DERMATOLOGY

## 2017-07-26 PROCEDURE — 17311 MOHS 1 STAGE H/N/HF/G: CPT | Mod: S$GLB,,, | Performed by: DERMATOLOGY

## 2017-07-26 PROCEDURE — 99499 UNLISTED E&M SERVICE: CPT | Mod: S$GLB,,, | Performed by: DERMATOLOGY

## 2017-07-26 RX ORDER — OXYCODONE AND ACETAMINOPHEN 5; 325 MG/1; MG/1
1 TABLET ORAL
Qty: 20 TABLET | Refills: 0 | Status: SHIPPED | OUTPATIENT
Start: 2017-07-26 | End: 2017-08-09

## 2017-07-26 RX ORDER — CEPHALEXIN 500 MG/1
500 CAPSULE ORAL 3 TIMES DAILY
Qty: 30 CAPSULE | Refills: 0 | Status: SHIPPED | OUTPATIENT
Start: 2017-07-26 | End: 2017-08-05

## 2017-07-26 NOTE — PROGRESS NOTES
PROCEDURE: Mohs' Micrographic Surgery    INDICATION: Biopsy-proven skin cancer of cosmetically and functionally important areas, including head, neck, genital, hand, foot, or areas known for having difficulty in healing, such as the lower anterior legs. Tumor with ill-defined borders.    REFERRING MD: Kathi Muniz M.D.    CASE NUMBER:     ANESTHETIC: 5 cc 0.5% Lidocaine with Epi 1:200,000 mixed 1:1 with 0.5% Bupivacaine and 3 cc 1.5% Lidocaine with Epinephrine 1:200,000    SURGICAL PREP: Hibiclens    SURGEON: Orion Day MD    ASSISTANTS: Elma Zaman PA-C and Dorita Lester MA    PREOPERATIVE DIAGNOSIS: squamous cell carcinoma    POSTOPERATIVE DIAGNOSIS: squamous cell carcinoma    PATHOLOGIC DIAGNOSIS: squamous cell carcinoma- invasive, superficial    HISTOLOGY OF SPECIMENS IN FIRST STAGE:   Tumor Type: No tumor seen.    STAGES OF MOHS' SURGERY PERFORMED: 1    TUMOR-FREE PLANE ACHIEVED: Yes    HEMOSTASIS: electrocoagulation     SPECIMENS: 2     LOCATION: scalp (R mid scalp vertex). Patient verified location.    INITIAL LESION SIZE: 0.5 x 0.7 cm    FINAL DEFECT SIZE: 1.3 x 1.5 cm    WOUND REPAIR/DISPOSITION: The patient tolerated Mohs' Micrographic Surgery for a squamous cell carcinoma very well. When the tumor was completely removed, a repair of the surgical defect was undertaken.      PROCEDURE: Complex Linear Repair    INDICATION: Status post Mohs' Micrographic Surgery for squamous cell carcinoma.    CASE NUMBER:     SURGEON: Orion Day MD    ASSISTANTS: Elma Zaman PA-C and Jessi Chakraborty, Surg Tech    ANESTHETIC: 5 cc 1.5% Lidocaine with Epinephrine 1:200,000    SURGICAL PREP: Hibiclens    LOCATION: scalp (R mid scalp vertex).    DEFECT SIZE: 1.3 x 1.5 cm    WOUND REPAIR/DISPOSITION:  After the patient's carcinoma had been completely removed with Mohs' Micrographic Surgery, a repair of the surgical defect was undertaken. The patient was returned to the operating suite where the area of  "right mid scalp vertex was prepped, draped, and anesthetized in the usual sterile fashion. The wound was widely undermined in all directions. Then, electrocoagulation was used to obtain meticulous hemostasis. 3-0 Vicryl buried vertical mattress sutures were placed into the subcutaneous and dermal plane to close the wound and jerry the cutaneous wound edge. Bilateral dog ears were identified and were removed by a standard Burow's triangle technique. The cutaneous wound edges were closed using interrupted 3-0 Prolene suture.    The patient tolerated the procedure well.    The area was cleaned and dressed appropriately and the patient was given wound care instructions, as well as appointment for follow-up evaluation. Patient was placed on Percocet 5 prn postop pain and Keflex 500 mg TID x 10 days.    LENGTH OF REPAIR: 3.3 cm    Vitals:    07/26/17 0730 07/26/17 0949   BP: (!) 149/60 (!) 144/73   BP Location: Left arm Right arm   Patient Position: Sitting Sitting   BP Method: Automatic Automatic   Pulse: 68 67   Weight: 91.6 kg (202 lb)    Height: 5' 7" (1.702 m)          "

## 2017-08-03 ENCOUNTER — LAB VISIT (OUTPATIENT)
Dept: LAB | Facility: HOSPITAL | Age: 77
End: 2017-08-03
Attending: NURSE PRACTITIONER
Payer: MEDICARE

## 2017-08-03 DIAGNOSIS — E10.42 POORLY CONTROLLED TYPE 1 DIABETES MELLITUS WITH PERIPHERAL NEUROPATHY: Chronic | ICD-10-CM

## 2017-08-03 DIAGNOSIS — E10.22 TYPE 1 DIABETES MELLITUS WITH STAGE 3 CHRONIC KIDNEY DISEASE: ICD-10-CM

## 2017-08-03 DIAGNOSIS — E10.311 DIABETIC RETINOPATHY OF LEFT EYE WITH MACULAR EDEMA ASSOCIATED WITH TYPE 1 DIABETES MELLITUS, UNSPECIFIED RETINOPATHY SEVERITY: ICD-10-CM

## 2017-08-03 DIAGNOSIS — E10.65 POORLY CONTROLLED TYPE 1 DIABETES MELLITUS WITH PERIPHERAL NEUROPATHY: Chronic | ICD-10-CM

## 2017-08-03 DIAGNOSIS — N18.30 TYPE 1 DIABETES MELLITUS WITH STAGE 3 CHRONIC KIDNEY DISEASE: ICD-10-CM

## 2017-08-03 LAB
ESTIMATED AVG GLUCOSE: 137 MG/DL
HBA1C MFR BLD HPLC: 6.4 %

## 2017-08-03 PROCEDURE — 83036 HEMOGLOBIN GLYCOSYLATED A1C: CPT

## 2017-08-03 PROCEDURE — 36415 COLL VENOUS BLD VENIPUNCTURE: CPT

## 2017-08-04 ENCOUNTER — PATIENT OUTREACH (OUTPATIENT)
Dept: OTHER | Facility: OTHER | Age: 77
End: 2017-08-04

## 2017-08-04 NOTE — PROGRESS NOTES
"Called patient to check in. He is feeling fine. Keeps forgetting to enter readings. "Ihave to get that to you all".    Last 5 Patient Entered Redings Current 30 Day Average:      Recent Readings 6/17/2017 6/16/2017 6/15/2017 6/13/2017 6/12/2017    Systolic BP (mmHg) 126 119 117 112 125    Diastolic BP (mmHg) 61 62 54 59 61    Pulse 63 57 61 54 59          Await new BP readings  Continue monitoring    Hypertension Medications             fosinopril-hydrochlorothiazide (MONOPRIL-HCT) 10-12.5 mg per tablet Take 3 tablets by mouth once daily.    metoprolol succinate (TOPROL-XL) 100 MG 24 hr tablet TAKE ONE TABLET BY MOUTH EVERY EVENING    nitroGLYCERIN (NITROSTAT) 0.4 MG SL tablet Place 1 tablet (0.4 mg total) under the tongue every 5 (five) minutes as needed for Chest pain.          "

## 2017-08-08 ENCOUNTER — OFFICE VISIT (OUTPATIENT)
Dept: ENDOCRINOLOGY | Facility: CLINIC | Age: 77
End: 2017-08-08
Payer: MEDICARE

## 2017-08-08 VITALS
SYSTOLIC BLOOD PRESSURE: 130 MMHG | HEART RATE: 74 BPM | BODY MASS INDEX: 30.62 KG/M2 | DIASTOLIC BLOOD PRESSURE: 60 MMHG | HEIGHT: 68 IN | WEIGHT: 202 LBS

## 2017-08-08 DIAGNOSIS — M51.36 DDD (DEGENERATIVE DISC DISEASE), LUMBAR: ICD-10-CM

## 2017-08-08 DIAGNOSIS — E11.3299 NPDR (NONPROLIFERATIVE DIABETIC RETINOPATHY): ICD-10-CM

## 2017-08-08 DIAGNOSIS — E10.311 DIABETIC RETINOPATHY OF LEFT EYE WITH MACULAR EDEMA ASSOCIATED WITH TYPE 1 DIABETES MELLITUS, UNSPECIFIED RETINOPATHY SEVERITY: ICD-10-CM

## 2017-08-08 DIAGNOSIS — N18.30 CHRONIC KIDNEY DISEASE, STAGE III (MODERATE): ICD-10-CM

## 2017-08-08 DIAGNOSIS — G47.33 OBSTRUCTIVE SLEEP APNEA: ICD-10-CM

## 2017-08-08 DIAGNOSIS — I25.119 CORONARY ARTERY DISEASE INVOLVING NATIVE CORONARY ARTERY OF NATIVE HEART WITH ANGINA PECTORIS: ICD-10-CM

## 2017-08-08 DIAGNOSIS — N18.30 TYPE 1 DIABETES MELLITUS WITH STAGE 3 CHRONIC KIDNEY DISEASE: Primary | ICD-10-CM

## 2017-08-08 DIAGNOSIS — C91.10 CLL (CHRONIC LYMPHOCYTIC LEUKEMIA): ICD-10-CM

## 2017-08-08 DIAGNOSIS — E10.22 TYPE 1 DIABETES MELLITUS WITH STAGE 3 CHRONIC KIDNEY DISEASE: Primary | ICD-10-CM

## 2017-08-08 DIAGNOSIS — E10.65 POORLY CONTROLLED TYPE 1 DIABETES MELLITUS WITH PERIPHERAL NEUROPATHY: Chronic | ICD-10-CM

## 2017-08-08 DIAGNOSIS — E10.42 POORLY CONTROLLED TYPE 1 DIABETES MELLITUS WITH PERIPHERAL NEUROPATHY: Chronic | ICD-10-CM

## 2017-08-08 PROCEDURE — 4010F ACE/ARB THERAPY RXD/TAKEN: CPT | Mod: S$GLB,,, | Performed by: NURSE PRACTITIONER

## 2017-08-08 PROCEDURE — 99999 PR PBB SHADOW E&M-EST. PATIENT-LVL IV: CPT | Mod: PBBFAC,,, | Performed by: NURSE PRACTITIONER

## 2017-08-08 PROCEDURE — 3075F SYST BP GE 130 - 139MM HG: CPT | Mod: S$GLB,,, | Performed by: NURSE PRACTITIONER

## 2017-08-08 PROCEDURE — 1159F MED LIST DOCD IN RCRD: CPT | Mod: S$GLB,,, | Performed by: NURSE PRACTITIONER

## 2017-08-08 PROCEDURE — 3078F DIAST BP <80 MM HG: CPT | Mod: S$GLB,,, | Performed by: NURSE PRACTITIONER

## 2017-08-08 PROCEDURE — 1157F ADVNC CARE PLAN IN RCRD: CPT | Mod: S$GLB,,, | Performed by: NURSE PRACTITIONER

## 2017-08-08 PROCEDURE — 3044F HG A1C LEVEL LT 7.0%: CPT | Mod: S$GLB,,, | Performed by: NURSE PRACTITIONER

## 2017-08-08 PROCEDURE — 1126F AMNT PAIN NOTED NONE PRSNT: CPT | Mod: S$GLB,,, | Performed by: NURSE PRACTITIONER

## 2017-08-08 PROCEDURE — 99215 OFFICE O/P EST HI 40 MIN: CPT | Mod: S$GLB,,, | Performed by: NURSE PRACTITIONER

## 2017-08-08 PROCEDURE — 3008F BODY MASS INDEX DOCD: CPT | Mod: S$GLB,,, | Performed by: NURSE PRACTITIONER

## 2017-08-08 NOTE — PROGRESS NOTES
CC: This 77 y.o.  male presents for management of No chief complaint on file.   along with the current chronic medical conditions including:  Patient Active Problem List   Diagnosis    CLL (chronic lymphocytic leukemia)        Diabetic retinopathy associated with type 1 diabetes mellitus    Insulin pump status    Diabetic polyneuropathy associated with type 1 diabetes mellitus    Poorly controlled type 1 diabetes mellitus with peripheral neuropathy    Type 1 diabetes, uncontrolled, with retinopathy    CAD (coronary artery disease)    PDR (proliferative diabetic retinopathy) - Right Eye    NPDR (nonproliferative diabetic retinopathy) - Left Eye    Posterior vitreous detachment - Both Eyes    Hyperlipidemia    HTN (hypertension)            Pseudophakia    BPH with urinary obstruction    Erectile dysfunction    S/P CABG x 2            Insulin pump fitting or adjustment    LBP (low back pain)    Vitreous hemorrhage    Lumbar facet arthropathy    DDD (degenerative disc disease), lumbar    Obstructive sleep apnea    Peripheral vascular disease    Lumbar spondylosis            Spondylolisthesis    S/P lumbar fusion      Status of these conditions is pending review.    HPI: Pt was diagnosed with T1DM in 1972- started on insulin.   Never hospitalized r/t DM recently.   Pt last seen by me in May 2017 and is now being seen by me again today.   Pt currently has accuchek spirit pump/sarai expert x 2 years ago, on omnipod prior.    a1c has improved w/ recent labs, currently being seen by hem/onc for leukemia.  This may have an affect on a1c value.    CURRENT DM MEDS: Metformin 500 mg BID, accuchek spirit pump     Basal 0.9 u/hr mn-0400, 9p-mn, 1.15 u/hr 0400-9p  icr 1:9 mn-noon  1:8 noon-mn  Target 100-120  isf 35 mn-noon, 30 noon-mn    Pt is monitoring BG at home 5 times a day.   100-200  Dinner/evening upper 200s-300s (skip bolus)  Highest 321  Lowest 44.     Low 50s, 1 reading in the  [id number] late at night, early morning- corrects w/ orange juice     Reid Herman brought glucometer or log to clinic today revealing the following BG readings:    DIET/ MEAL PATTERN: Pt eats 3 meals a day    Yogurt, fruit, sugar free cookies     Snack around 7-8p nuts    EXERCISE: no formal exercise, walks occasionally-has cane, PT for back     STANDARDS OF CARE:  Eye exam: 2017 (may), f/u q4-6 mos (floaters/injections/laser therapy), AMD, vit   Podiatry: 2017 (March) f/u q2-3 mos toe nail clipping/ingrown toe nail problem (Dr. Maye Wyatt)                       ROS:   Gen: Appetite good, no weight gain or loss, denies fatigue and + weakness (after sx)-physical therapy.  Skin: Skin is intact and heals well, +shingles (on head, neck)-resolved, + pruritis, easy bruising, no hair changes, no intolerance to heat/cold.  Eyes: + visual disturbances (floaters)- 2016   Resp: no SOB or DIOR, no cough  Cardiac: No palpitations, chest pain, denies syncope, weakness, no edema or cyanosis.  GI: No nausea or vomiting, diarrhea, +constipation-r/t pain meds, or abdominal pain.  /GYN: No nocturia, no urinary frequency, burning or pain.   PVD: + leg pain with or without exercise, denies cyanosis, pallor, or cold extremities.  MS/Neuro: + numbness/ tingling in BLE; FROM of joints without swelling or pain. Gait mostly steady, has back brace, speech clear, no tremor, coordination problem. + back pain-improved  Psych: Denies drug/ETOH abuse, no hx. of eating disorders or depression.  Other systems: negative.    Lab Results   Component Value Date    HGBA1C 6.4 (H) 08/03/2017     Lab Results   Component Value Date    TSH 0.901 02/24/2016     No results found for: MICROALBUR    Chemistry        Component Value Date/Time     07/05/2017 1154    K 4.9 07/05/2017 1154    CL 99 07/05/2017 1154    CO2 29 07/05/2017 1154    BUN 25 (H) 07/05/2017 1154    CREATININE 1.2 07/05/2017 1154     (H) 07/05/2017  1154        Component Value Date/Time    CALCIUM 9.8 07/05/2017 1154    ALKPHOS 92 07/05/2017 1154    AST 21 07/05/2017 1154    ALT 19 07/05/2017 1154    BILITOT 0.7 07/05/2017 1154          Lab Results   Component Value Date    LDLCALC 56.6 (L) 04/26/2017       PE:  GENERAL: Well developed, well nourished.  PSYCH: AAOx3, appropriate mood and affect, pleasant expression, conversant, appears relaxed, well groomed.   EYES: Conjunctiva, corneas clear, EOM intact  NECK: Supple, trachea midline  CHEST: Resp even and unlabored, CTA bilateral.  CARDIAC: s1, s2 normal, no edema noted to BLE   ABDOMEN: soft, non distended   VASCULAR: Same temperature peripherally to centrally, DP pulses +2/4 bilaterally  NEURO: Gait mostly steady, has back brace   SKIN: Normal skin turgor. Skin warm and dry. No areas of breakdown, + acanthosis nigracans +noted healing lesions on head/neck, accuchek spirit intact.  FEET: Footware appropriate, wearing diabetic shoes.     ASSESSMENT and PLAN:  Reid was seen today for diabetes mellitus.    Diagnoses and associated orders for this visit:  1. Type 1 diabetes mellitus with stage 3 chronic kidney disease  Hemoglobin A1c, fructosamine next time   DM uncontrolled, a1c has improved w/ variables, hypoglycemic events  a1c goal less than 7.5%  Basal change to mn-0400  0.9 u/hr  [id number] 1 u/hr from 1.15 u/hr  0800-9pm- 1.15 u/hr  9pm- mn 0.9 u/hr    Target 110-120   icr 1:8   isf 35  iob 4 hours     F/u in 3-4 mos  Discussed bg readings throughout day, hypoglycemia, 15 gm/15 min rule-usually uses juice.    Counseling >35 mins      2. Poorly controlled type 1 diabetes mellitus with peripheral neuropathy  See above, on supplement (alpha lipoic acid)   3. Diabetic retinopathy of left eye with macular edema associated with type 1 diabetes mellitus, unspecified retinopathy severity  F/u with ophthalmology    4. NPDR (nonproliferative diabetic retinopathy) - Left Eye  See above    5. Essential hypertension   Controlled, continue med(s)   6. Mixed hyperlipidemia  Lab Results   Component Value Date    LDLCALC 56.6 (L) 04/26/2017     Controlled continue lipitor   7. CLL (chronic lymphocytic leukemia)  F/u with hem/onc   8. Chronic kidney disease, stage III (moderate)  Continue to monitor

## 2017-08-08 NOTE — PATIENT INSTRUCTIONS
Snacks can be an important part of a balanced, healthy meal plan. They allow you to eat more frequently, feeling full and satisfied throughout the day. Also, they allow you to spread carbohydrates evenly, which may stabilize blood sugars.  Plus, snacks are enjoyable!     The amount of carbohydrate needed at snacks varies. Generally, about 15-30 grams of carbohydrate per snack is recommended.  Below you will find some tasty treats.       0-5 gm carb   Crystal Light   Vitamin Water Zero   Herbal tea, unsweetened   2 tsp peanut butter on celery   1./2 cup sugar-free jell-o   1 sugar-free popsicle   ¼ cup blueberries   8oz Blue Angelique unsweetened almond milk   5 baby carrots & celery sticks, cucumbers, bell peppers dipped in ¼ cup salsa, 2Tbsp light ranch dressing or 2Tbsp plain Greek yogurt   10 Goldfish crackers   ½ oz low-fat cheese or string cheese   1 closed handful of nuts, unsalted   1 Tbsp of sunflower seeds, unsalted   1 cup Smart Pop popcorn   1 whole grain brown rice cake        15 gm carb   1 small piece of fruit or ½ banana or 1/2 cup lite canned fruit   3 ellyn cracker squares   3 cups Smart Pop popcorn, top spray butter, Nelson lite salt or cinnamon and Truvia   5 Vanilla Wafers   ½ cup low fat, no added sugar ice cream or frozen yogurt (Blue bell, Blue Bunny, Weight Watchers, Skinny Cow)   ½ turkey, ham, or chicken sandwich   ½ c fruit with ½ c Cottage cheese   4-6 unsalted wheat crackers with 1 oz low fat cheese or 1 tbsp peanut butter    30-45 goldfish crackers (depending on flavor)    7-8 Hindu mini brown rice cakes (caramel, apple cinnamon, chocolate)    12 Hindu mini brown rice cakes (cheddar, bbq, ranch)    1/3 cup hummus dip with raw veg   1/2 whole wheat celina, 1Tbsp hummus   Mini Pizza (1/2 whole wheat English muffin, low-fat  cheese, tomato sauce)   100 calorie snack pack (Oreo, Chips Ahoy, Ritz Mix, Baked Cheetos)   4-6 oz. light or Greek Style yogurt  (Guerita Childs, Michelle, [facility][note 3])   ½ cup sugar-free pudding     6 in. wheat tortilla or celina oven toasted chips (topped with spray butter flavoring, cinnamon, Truvia OR spray butter, garlic powder, chili powder)    18 BBQ Popchips (available at Target, Whole Foods, Fresh Market)                   Diabetes Support Group Meetings    Date Topics   February 9 Health Promotion/Nutrition   March 9 Taking Care of Your Kidneys   April 13 Taking Care of Your Feet   May 11 Ease Your Mind with Diabetes   June 8 Hurricane and Emergency Preparedness   July 13 Family & Caregiver Support for Diabetes   *August 17  Taking Care of Your Eyes   September 14 Devices & Technology   October 12 Recipes & Treats   November 9 Getting Pumped Up for Diabetes   December 14 Year-End Close Out            Meetings are held in the [facility] (A) of the [facility] located at [address] [place] [address]. Please call [phone] for additional information.    Free service, offered every 2nd Thursday of every month, except in August! Family members and/or friends are welcome as well!  Support group is for patients with type 1 or type 2 diabetes.    From 3:30p to 4:30p

## 2017-08-09 ENCOUNTER — CLINICAL SUPPORT (OUTPATIENT)
Dept: DERMATOLOGY | Facility: CLINIC | Age: 77
End: 2017-08-09
Payer: MEDICARE

## 2017-08-09 PROCEDURE — 99999 PR PBB SHADOW E&M-EST. PATIENT-LVL III: CPT | Mod: PBBFAC,,,

## 2017-08-09 NOTE — PROGRESS NOTES
77 y.o. male patient is here for suture removal following Mohs' surgery.    Patient reports no problems.    WOUND PE:  The right mid scalp vertex sutures intact. Wound healing well. Good skin edges. No signs or symptoms of infection.    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    PLAN:  Sutures removed today. Steri-strips applied.  Continue wound care.  Keep moist with Aquaphor.    RTC:  In 3-6  months with Kathi Muniz M.D. for skin check or sooner if new concern arises.

## 2017-08-16 NOTE — TELEPHONE ENCOUNTER
----- Message from Patriciamalinda Sutton sent at 8/16/2017  4:06 PM CDT -----  Contact: Pt called  Pt called, requesting a RX refill for nitro. [facility].Ph for pt is [phone]. Pt of Dr. Alexander and LOV 2/20/17. Thank you

## 2017-08-17 RX ORDER — NITROGLYCERIN 0.4 MG/1
0.4 TABLET SUBLINGUAL EVERY 5 MIN PRN
Qty: 50 TABLET | Refills: 3 | Status: SHIPPED | OUTPATIENT
Start: 2017-08-17 | End: 2017-10-16 | Stop reason: SDUPTHER

## 2017-08-18 ENCOUNTER — PATIENT OUTREACH (OUTPATIENT)
Dept: OTHER | Facility: OTHER | Age: 77
End: 2017-08-18

## 2017-08-18 NOTE — PROGRESS NOTES
Last 5 Patient Entered Redings Current 30 Day Average:      Recent Readings 6/17/2017 6/16/2017 6/15/2017 6/13/2017 6/12/2017    Systolic BP (mmHg) 126 119 117 112 125    Diastolic BP (mmHg) 61 62 54 59 61    Pulse 63 57 61 54 59        Wife has been having hand issues and unable to enter readings regularly. Mr Herman has been checking though and they have been running the same as usual. States reading from this morning was a little higher in 140s systolic, sister passed away this morning. Does not want to go on hiatus.     Follow up with Mr. Reid Herman completed. Patient is maintaining a low sodium diet and continuing his exercise regime. Patient did not have any further questions or concerns. I will follow up in a few weeks to see how he is doing and progressing.

## 2017-09-05 RX ORDER — METOPROLOL SUCCINATE 100 MG/1
TABLET, EXTENDED RELEASE ORAL
Qty: 90 TABLET | Refills: 3 | Status: SHIPPED | OUTPATIENT
Start: 2017-09-05 | End: 2018-01-01 | Stop reason: SDUPTHER

## 2017-09-06 RX ORDER — METFORMIN HYDROCHLORIDE 500 MG/1
TABLET ORAL
Qty: 180 TABLET | Refills: 2 | Status: SHIPPED | OUTPATIENT
Start: 2017-09-06 | End: 2018-06-11 | Stop reason: SDUPTHER

## 2017-09-08 ENCOUNTER — OFFICE VISIT (OUTPATIENT)
Dept: INTERNAL MEDICINE | Facility: CLINIC | Age: 77
End: 2017-09-08
Payer: MEDICARE

## 2017-09-08 VITALS
OXYGEN SATURATION: 96 % | SYSTOLIC BLOOD PRESSURE: 120 MMHG | WEIGHT: 201.25 LBS | HEART RATE: 62 BPM | BODY MASS INDEX: 30.5 KG/M2 | DIASTOLIC BLOOD PRESSURE: 58 MMHG | HEIGHT: 68 IN

## 2017-09-08 DIAGNOSIS — I73.9 PERIPHERAL VASCULAR DISEASE: ICD-10-CM

## 2017-09-08 DIAGNOSIS — E78.2 MIXED HYPERLIPIDEMIA: ICD-10-CM

## 2017-09-08 DIAGNOSIS — E10.42 POORLY CONTROLLED TYPE 1 DIABETES MELLITUS WITH PERIPHERAL NEUROPATHY: Chronic | ICD-10-CM

## 2017-09-08 DIAGNOSIS — D69.2 SENILE PURPURA: ICD-10-CM

## 2017-09-08 DIAGNOSIS — H35.3132 NONEXUDATIVE AGE-RELATED MACULAR DEGENERATION, BILATERAL, INTERMEDIATE DRY STAGE: ICD-10-CM

## 2017-09-08 DIAGNOSIS — M47.816 LUMBAR FACET ARTHROPATHY: ICD-10-CM

## 2017-09-08 DIAGNOSIS — M65.342 TRIGGER RING FINGER OF LEFT HAND: ICD-10-CM

## 2017-09-08 DIAGNOSIS — N13.8 BPH WITH URINARY OBSTRUCTION: ICD-10-CM

## 2017-09-08 DIAGNOSIS — D69.6 THROMBOCYTOPENIA, UNSPECIFIED: ICD-10-CM

## 2017-09-08 DIAGNOSIS — N18.30 TYPE 1 DIABETES MELLITUS WITH STAGE 3 CHRONIC KIDNEY DISEASE: ICD-10-CM

## 2017-09-08 DIAGNOSIS — G47.33 OBSTRUCTIVE SLEEP APNEA: ICD-10-CM

## 2017-09-08 DIAGNOSIS — E10.311 DIABETIC RETINOPATHY OF LEFT EYE WITH MACULAR EDEMA ASSOCIATED WITH TYPE 1 DIABETES MELLITUS, UNSPECIFIED RETINOPATHY SEVERITY: ICD-10-CM

## 2017-09-08 DIAGNOSIS — I70.0 ATHEROSCLEROSIS OF AORTA: ICD-10-CM

## 2017-09-08 DIAGNOSIS — M51.36 DDD (DEGENERATIVE DISC DISEASE), LUMBAR: ICD-10-CM

## 2017-09-08 DIAGNOSIS — M46.1 SACROILIITIS: ICD-10-CM

## 2017-09-08 DIAGNOSIS — C91.10 CLL (CHRONIC LYMPHOCYTIC LEUKEMIA): ICD-10-CM

## 2017-09-08 DIAGNOSIS — E10.22 TYPE 1 DIABETES MELLITUS WITH STAGE 3 CHRONIC KIDNEY DISEASE: ICD-10-CM

## 2017-09-08 DIAGNOSIS — N18.30 CHRONIC KIDNEY DISEASE, STAGE III (MODERATE): ICD-10-CM

## 2017-09-08 DIAGNOSIS — E10.42 DIABETIC POLYNEUROPATHY ASSOCIATED WITH TYPE 1 DIABETES MELLITUS: ICD-10-CM

## 2017-09-08 DIAGNOSIS — N40.1 BPH WITH URINARY OBSTRUCTION: ICD-10-CM

## 2017-09-08 DIAGNOSIS — Z00.00 ENCOUNTER FOR PREVENTIVE HEALTH EXAMINATION: Primary | ICD-10-CM

## 2017-09-08 DIAGNOSIS — E10.65 POORLY CONTROLLED TYPE 1 DIABETES MELLITUS WITH PERIPHERAL NEUROPATHY: Chronic | ICD-10-CM

## 2017-09-08 DIAGNOSIS — M47.816 LUMBAR SPONDYLOSIS: ICD-10-CM

## 2017-09-08 DIAGNOSIS — I20.89 STABLE ANGINA: ICD-10-CM

## 2017-09-08 DIAGNOSIS — E11.3299 NPDR (NONPROLIFERATIVE DIABETIC RETINOPATHY): ICD-10-CM

## 2017-09-08 DIAGNOSIS — Z98.1 S/P LUMBAR FUSION: ICD-10-CM

## 2017-09-08 DIAGNOSIS — I25.119 CORONARY ARTERY DISEASE INVOLVING NATIVE CORONARY ARTERY OF NATIVE HEART WITH ANGINA PECTORIS: ICD-10-CM

## 2017-09-08 DIAGNOSIS — I10 ESSENTIAL HYPERTENSION: ICD-10-CM

## 2017-09-08 DIAGNOSIS — E10.3599 PROLIFERATIVE DIABETIC RETINOPATHY WITHOUT MACULAR EDEMA ASSOCIATED WITH TYPE 1 DIABETES MELLITUS, UNSPECIFIED LATERALITY: ICD-10-CM

## 2017-09-08 PROBLEM — Z12.12 SCREENING FOR COLORECTAL CANCER: Status: RESOLVED | Noted: 2017-04-25 | Resolved: 2017-09-08

## 2017-09-08 PROBLEM — B02.9 SHINGLES: Status: RESOLVED | Noted: 2017-01-31 | Resolved: 2017-09-08

## 2017-09-08 PROBLEM — Z12.11 SCREENING FOR COLORECTAL CANCER: Status: RESOLVED | Noted: 2017-04-25 | Resolved: 2017-09-08

## 2017-09-08 PROCEDURE — 99499 UNLISTED E&M SERVICE: CPT | Mod: S$GLB,,, | Performed by: NURSE PRACTITIONER

## 2017-09-08 PROCEDURE — 99999 PR PBB SHADOW E&M-EST. PATIENT-LVL V: CPT | Mod: PBBFAC,,, | Performed by: NURSE PRACTITIONER

## 2017-09-08 PROCEDURE — G0439 PPPS, SUBSEQ VISIT: HCPCS | Mod: S$GLB,,, | Performed by: NURSE PRACTITIONER

## 2017-09-08 NOTE — PATIENT INSTRUCTIONS
Counseling and Referral of Other Preventative  (Italic type indicates deductible and co-insurance are waived)    Patient Name: Reid Herman  Today's Date: 9/8/2017      SERVICE LIMITATIONS RECOMMENDATION    Vaccines    · Pneumococcal (once after 65)    · Influenza (annually)    · Hepatitis B (if medium/high risk)    · Prevnar 13      Hepatitis B medium/high risk factors:       - End-stage renal disease       - Hemophiliacs who received Factor VII or         IX concentrates       - Clients of institutions for the mentally             retarded       - Persons who live in the same house as          a HepB carrier       - Homosexual men       - Illicit injectable drug abusers     Pneumococcal: Done, no repeat necessary     Influenza: Done, repeat in one year     Hepatitis B: N/A     Prevnar 13: Done, no repeat necessary    Prostate cancer screening (annually to age 75)     Prostate specific antigen (PSA) Shared decision making with Provider. Sometimes a co-pay may be required if the patient decides to have this test. The USPSTF no longer recommends prostate cancer screening routinely in medicine: every 1 year    Colorectal cancer screening (to age 75)    · Fecal occult blood test (annual)  · Flexible sigmoidoscopy (5y)  · Screening colonoscopy (10y)  · Barium enema   Last done 4/25/2017, recommend to repeat every 3  years    Diabetes self-management training (no USPSTF recommendations)  Requires referral by treating physician for patient with diabetes or renal disease. 10 hours of initial DSMT sessions of no less than 30 minutes each in a continuous 12-month period. 2 hours of follow-up DSMT in subsequent years.  Recommended to patient, declined    Glaucoma screening (no USPSTF recommendation)  Diabetes mellitus, family history   , age 50 or over    American, age 65 or over  Done this year, repeat every year    Medical nutrition therapy for diabetes or renal disease (no recommended  schedule)  Requires referral by treating physician for patient with diabetes or renal disease or kidney transplant within the past 3 years.  Can be provided in same year as diabetes self-management training (DSMT), and CMS recommends medical nutrition therapy take place after DSMT. Up to 3 hours for initial year and 2 hours in subsequent years.  Recommended to patient, declined    Cardiovascular screening blood tests (every 5 years)  · Fasting lipid panel  Order as a panel if possible  Done this year, repeat every year    Diabetes screening tests (at least every 3 years, Medicare covers annually or at 6-month intervals for prediabetic patients)  · Fasting blood sugar (FBS) or glucose tolerance test (GTT)  Patient must be diagnosed with one of the following:       - Hypertension       - Dyslipidemia       - Obesity (BMI 30kg/m2)       - Previous elevated impaired FBS or GTT       ... or any two of the following:       - Overweight (BMI 25 but <30)       - Family history of diabetes       - Age 65 or older       - History of gestational diabetes or birth of baby weighing more than 9 pounds  Done this year, repeat every year    Abdominal aortic aneurysm screening (once)  · Sonogram   Limited to patients who meet one of the following criteria:       - Men who are 65-75 years old and have smoked more than 100 cigarette in their lifetime       - Anyone with a family history of abdominal aortic aneurysm       - Anyone recommended for screening by the USPSTF  N/A    HIV screening (annually for increased risk patients)  · HIV-1 and HIV-2 by EIA, or AKIN, rapid antibody test or oral mucosa transudate  Patients must be at increased risk for HIV infection per USPSTF guidelines or pregnant. Tests covered annually for patient at increased risk or as requested by the patient. Pregnant patients may receive up to 3 tests during pregnancy.  Risks discussed, screening is not recommended    Smoking cessation counseling (up to 8  sessions per year)  Patients must be asymptomatic of tobacco-related conditions to receive as a preventative service.  Non-smoker    Subsequent annual wellness visit  At least 12 months since last AWV  Return in one year     The following information is provided to all patients.  This information is to help you find resources for any of the problems found today that may be affecting your health:                Living healthy guide: www.[facility].[email or web address].[email or web address]      Understanding Diabetes: www.diabetes.org      Eating healthy: www.cdc.gov/healthyweight      CDC home safety checklist: www.cdc.gov/steadi/patient.html      Agency on Aging: www.goea.[place].[facility]      Alcoholics anonymous (AA): www.aa.org      Physical Activity: www.maria teresa.nih.gov/iw8jjca      Tobacco use: www.quitwithusla.org

## 2017-09-08 NOTE — PROGRESS NOTES
"Reid Herman presented for a  Medicare AWV and comprehensive Health Risk Assessment today. The following components were reviewed and updated:    · Medical history  · Family History  · Social history  · Allergies and Current Medications  · Health Risk Assessment  · Health Maintenance  · Care Team     ** See Completed Assessments for Annual Wellness Visit within the encounter summary.**       The following assessments were completed:  · Living Situation  · CAGE  · Depression Screening  · Timed Get Up and Go  · Whisper Test  · Cognitive Function Screening  · Nutrition Screening  · ADL Screening  · PAQ Screening    Vitals:    09/08/17 0914   BP: (!) 120/58   Pulse: 62   SpO2: 96%   Weight: 91.3 kg (201 lb 4.5 oz)   Height: 5' 8" (1.727 m)     Body mass index is 30.6 kg/m².  Physical Exam   Constitutional: He is oriented to person, place, and time. He appears well-developed and well-nourished.   HENT:   Head: Normocephalic and atraumatic.   Nose: Nose normal.   Eyes: Conjunctivae and EOM are normal.   Neck: Normal range of motion. Neck supple.   Cardiovascular: Normal rate, regular rhythm, normal heart sounds and intact distal pulses.    Pulmonary/Chest: Effort normal and breath sounds normal.   Abdominal: Soft. Bowel sounds are normal.   Musculoskeletal: Normal range of motion.   Neurological: He is alert and oriented to person, place, and time.   Skin: Skin is warm and dry.   Psychiatric: He has a normal mood and affect. His behavior is normal. Judgment and thought content normal.   Nursing note and vitals reviewed.        Diagnoses and health risks identified today and associated recommendations/orders:    1. Encounter for preventive health examination  Assessment performed. Health maintenance updated. Chart review completed.    2. Diabetic polyneuropathy associated with type 1 diabetes mellitus  Last A1C 6.4. Chronic, stable on medication. Followed by Endocrinology.    3. Diabetic retinopathy of left eye with " macular edema associated with type 1 diabetes mellitus, unspecified retinopathy severity  Last A1C 6.4. Chronic, stable on medication. Followed by Endocrinology and Opthalmology.    4. Nonexudative age-related macular degeneration, bilateral, intermediate dry stage  Last A1C 6.4. Chronic, stable on medication. Followed by Endocrinology and Opthalmology.    5. NPDR (nonproliferative diabetic retinopathy) - Left Eye  Last A1C 6.4. Chronic, stable on medication. Followed by Endocrinology and Opthalmology.    6. Proliferative diabetic retinopathy without macular edema associated with type 1 diabetes mellitus, unspecified laterality  Last A1C 6.4. Chronic, stable on medication. Followed by Endocrinology and Opthalmology.    7. Obstructive sleep apnea  Chronic. Stable with CPAP. Followed by Sleep Medicine.    8. Atherosclerosis of aorta  Noted on imaging. Stable with blood pressure control and statin therapy. Followed by Cardiology.    9. Coronary artery disease involving native coronary artery of native heart with angina pectoris  Chronic. Stable on current regimen. Followed by Cardiology.    10. Essential hypertension  Chronic. Stable on current regimen. Followed by PCP.    11. Mixed hyperlipidemia  Chronic. Stable on current regimen. Followed by PCP.    12. Peripheral vascular disease  Chronic. Stable on current regimen. Followed by Cardiology.    13. CLL (chronic lymphocytic leukemia)  Chronic. Followed by Hematology Oncology.    14. Type 1 diabetes mellitus with stage 3 chronic kidney disease  Chronic. Stable on current regimen. Followed by Endocrinology.    15. Poorly controlled type 1 diabetes mellitus with peripheral neuropathy  Chronic. Stable on current regimen. Followed by Endocrinology.    16. Sacroiliitis  Stable. Chronic. Followed by Neurosurgery.    17. Lumbar facet arthropathy  Stable. Chronic. Followed by Neurosurgery.    18. S/P lumbar fusion  Stable. Chronic. Followed by Neurosurgery.    19. Senile  purpura  Chronic. Followed by Hematology Oncology.    20. Thrombocytopenia, unspecified  Chronic. Followed by Hematology Oncology.    21. Chronic kidney disease, stage III (moderate)  Last creatinine 1.2. Followed by PCP.    22. Trigger ring finger of left hand  - Ambulatory Referral to Orthopedics    23. BPH with urinary obstruction  Chronic. Stable. Followed by Urology.    24. Stable angina  Chronic. Stable on current regimen. Followed by Cardiology.    25. DDD (degenerative disc disease), lumbar  Stable. Chronic. Followed by Neurosurgery.    26. Lumbar spondylosis  Stable. Chronic. Followed by Neurosurgery.      Provided Reid with a 5-10 year written screening schedule and personal prevention plan. Recommendations were developed using the USPSTF age appropriate recommendations. Education, counseling, and referrals were provided as needed. After Visit Summary printed and given to patient which includes a list of additional screenings\tests needed.    Return for follow up with Primary Care Provider as instructed, ;sooner if problems, HRA in 1 year.    KENDRA Buckner

## 2017-09-22 ENCOUNTER — PATIENT OUTREACH (OUTPATIENT)
Dept: OTHER | Facility: OTHER | Age: 77
End: 2017-09-22

## 2017-09-22 NOTE — PROGRESS NOTES
Last 5 Patient Entered Redings Current 30 Day Average:      Recent Readings 8/18/2017 8/17/2017 8/16/2017 8/15/2017 8/12/2017    Systolic BP (mmHg) 149 123 115 134 105    Diastolic BP (mmHg) 75 63 56 59 61    Pulse 63 58 65 62 63          Hypertension Digital Medicine (HDMP) Health  Follow Up    LVM to follow up with Mr. Reid Herman.    Encouraged adherence to low sodium diet and physical activity guidelines. Requested readings. Advised patient to call or message with questions or concerns. WCB in 2 weeks.

## 2017-09-25 ENCOUNTER — OFFICE VISIT (OUTPATIENT)
Dept: PODIATRY | Facility: CLINIC | Age: 77
End: 2017-09-25
Payer: MEDICARE

## 2017-09-25 VITALS
HEART RATE: 70 BPM | DIASTOLIC BLOOD PRESSURE: 71 MMHG | BODY MASS INDEX: 30.62 KG/M2 | SYSTOLIC BLOOD PRESSURE: 144 MMHG | RESPIRATION RATE: 18 BRPM | HEIGHT: 68 IN | WEIGHT: 202 LBS

## 2017-09-25 DIAGNOSIS — E10.42 POORLY CONTROLLED TYPE 1 DIABETES MELLITUS WITH PERIPHERAL NEUROPATHY: Primary | Chronic | ICD-10-CM

## 2017-09-25 DIAGNOSIS — B35.1 DERMATOPHYTOSIS OF NAIL: ICD-10-CM

## 2017-09-25 DIAGNOSIS — I73.9 PERIPHERAL VASCULAR DISEASE: ICD-10-CM

## 2017-09-25 DIAGNOSIS — E10.65 POORLY CONTROLLED TYPE 1 DIABETES MELLITUS WITH PERIPHERAL NEUROPATHY: Primary | Chronic | ICD-10-CM

## 2017-09-25 PROCEDURE — 99999 PR PBB SHADOW E&M-EST. PATIENT-LVL III: CPT | Mod: PBBFAC,,, | Performed by: PODIATRIST

## 2017-09-25 PROCEDURE — 11721 DEBRIDE NAIL 6 OR MORE: CPT | Mod: Q9,S$GLB,, | Performed by: PODIATRIST

## 2017-09-25 PROCEDURE — 99499 UNLISTED E&M SERVICE: CPT | Mod: S$GLB,,, | Performed by: PODIATRIST

## 2017-09-25 NOTE — PROGRESS NOTES
Subjective:      Patient ID: Reid Herman is a 77 y.o. male.    Chief Complaint: PCP (KENDRA Garcia 9/8/17); Diabetic Foot Exam (franklin prescription for Dm shoes and refill numbness med ); Nail Care; and Nail Problem    Reid is a 77 y.o. male who presents to the clinic for evaluation and treatment of high risk feet. Reid has a past medical history of Anemia; Back pain; Basal cell carcinoma (06/08/2015); CAD (coronary artery disease) (10/1/2012); Cataract; DDD (degenerative disc disease), lumbar (10/28/2014); Diabetes mellitus; Diabetes mellitus type I; Diabetic retinopathy of both eyes; Heart attack; Hyperlipidemia; Hypertension; Insulin pump in place; Obesity; Poorly controlled type 1 diabetes mellitus with peripheral neuropathy (10/1/2012); Preseptal cellulitis of right eye (8/17/15); S/P CABG x 2 (2/14/2014); Sleep apnea; Squamous cell carcinoma (03/18/2013); Trouble in sleeping; Type 1 diabetes, uncontrolled, with retinopathy (10/1/2012); and Type II or unspecified type diabetes mellitus without mention of complication, not stated as uncontrolled. The patient's chief complaint is elongated toenails   This patient has documented high risk feet requiring routine maintenance secondary to diabetes mellitis and those secondary complications of diabetes, as mentioned..   PCP: Olivia Zavala MD    Date Last Seen by PCP:   Chief Complaint   Patient presents with    PCP     KENDRA Garcia 9/8/17    Diabetic Foot Exam     franklin prescription for Dm shoes and refill numbness med     Nail Care    Nail Problem       Chief Complaint   Patient presents with    PCP     KENDRA Garcia 9/8/17    Diabetic Foot Exam     franklin prescription for Dm shoes and refill numbness med     Nail Care    Nail Problem        Current shoe gear:  Affected Foot: Rx diabetic extra depth shoes and custom accommodative insoles     Unaffected Foot: Rx diabetic extra depth shoes and custom accommodative  christine    Hemoglobin A1C   Date Value Ref Range Status   08/03/2017 6.4 (H) 4.0 - 5.6 % Final     Comment:     According to ADA guidelines, hemoglobin A1c <7.0% represents  optimal control in non-pregnant diabetic patients. Different  metrics may apply to specific patient populations.   Standards of Medical Care in Diabetes-2016.  For the purpose of screening for the presence of diabetes:  <5.7%     Consistent with the absence of diabetes  5.7-6.4%  Consistent with increasing risk for diabetes   (prediabetes)  >or=6.5%  Consistent with diabetes  Currently, no consensus exists for use of hemoglobin A1c  for diagnosis of diabetes for children.  This Hemoglobin A1c assay has significant interference with fetal   hemoglobin   (HbF). The results are invalid for patients with abnormal amounts of   HbF,   including those with known Hereditary Persistence   of Fetal Hemoglobin. Heterozygous hemoglobin variants (HbAS, HbAC,   HbAD, HbAE, HbA2) do not significantly interfere with this assay;   however, presence of multiple variants in a sample may impact the %   interference.     04/26/2017 7.5 (H) 4.5 - 6.2 % Final     Comment:     According to ADA guidelines, hemoglobin A1C <7.0% represents  optimal control in non-pregnant diabetic patients.  Different  metrics may apply to specific populations.   Standards of Medical Care in Diabetes - 2016.  For the purpose of screening for the presence of diabetes:  <5.7%     Consistent with the absence of diabetes  5.7-6.4%  Consistent with increasing risk for diabetes   (prediabetes)  >or=6.5%  Consistent with diabetes  Currently no consensus exists for use of hemoglobin A1C  for diagnosis of diabetes for children.     01/23/2017 7.7 (H) 4.5 - 6.2 % Final     Comment:     According to ADA guidelines, hemoglobin A1C <7.0% represents  optimal control in non-pregnant diabetic patients.  Different  metrics may apply to specific populations.   Standards of Medical Care in Diabetes -  2016.  For the purpose of screening for the presence of diabetes:  <5.7%     Consistent with the absence of diabetes  5.7-6.4%  Consistent with increasing risk for diabetes   (prediabetes)  >or=6.5%  Consistent with diabetes  Currently no consensus exists for use of hemoglobin A1C  for diagnosis of diabetes for children.         Review of Systems   Constitution: Negative for chills, fever and night sweats.   Cardiovascular: Negative for chest pain and claudication.   Respiratory: Negative for cough.    Skin: Positive for dry skin and nail changes.   Musculoskeletal: Negative for arthritis, falls and gout.           Objective:      Physical Exam   Constitutional: He is oriented to person, place, and time. He appears well-developed and well-nourished. No distress.   Cardiovascular: Normal rate.    Vascular: Dorsalis pedis and posterior tibial pulses are diminished bilaterally. Toes are cool to touch. Feet are warm proximally.There is decreased digital hair. Skin is atrophic and mildly edematous. R lower leg, ankle, and foot are hyperpigmented.      Musculoskeletal: Normal range of motion. He exhibits no tenderness.   Adequate joint range of motion without pain, limitation, nor crepitation Bilateral feet and ankle joints. Muscle strength is 5/5 in all groups bilaterally.        Semi  reducible IPJ digital contracture 2-3 b/l      Neurological: He is alert and oriented to person, place, and time. He exhibits normal muscle tone.   Neurologic: Yeaddiss-Eliud 5.07 monofilamant testing absent on toes but otherwise is intact Rolly feet. Sharp/dull sensation absent Bilaterally. Light touch absent Bilaterally.     Skin: Skin is warm, dry and intact. No burn and no rash noted. He is not diaphoretic. No erythema. No pallor.    Nails x 10 are elongated by  1-4mm's, thickened by 1-3 mm's, dystrophic, and are normal in  coloration . Xerosis Bilaterally. No open lesions noted.     Psychiatric: He has a normal mood and affect. His  behavior is normal. Judgment and thought content normal.   Nursing note and vitals reviewed.            Assessment:       Encounter Diagnoses   Name Primary?   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    Diagnoses and all orders for this visit:    Poorly controlled type 1 diabetes mellitus with peripheral neuropathy  -     DIABETIC SHOES FOR HOME USE    Peripheral vascular disease  -     DIABETIC SHOES FOR HOME USE    Dermatophytosis of nail  -     DIABETIC SHOES FOR HOME USE    Other orders  -     mecobal-levomefolat Ca-B6 phos (L-METHYL-B6-B12) 3-35-2 mg Tab; Take 1 tablet by mouth once daily.    - Patient was given written and verbal instructions regarding foot condition.  I counseled the patient on his conditions, their implications and medical management.  Shoe inspection. Diabetic Foot Education. Patient reminded of the importance of good nutrition and blood sugar control to help prevent podiatric complications of diabetes. Patient instructed on proper foot hygeine. We discussed wearing proper shoe gear, daily foot inspections, never walking without protective shoe gear, never putting sharp instruments to feet    - With patient's permission, nails were aggressively reduced and debrided x 10 to their soft tissue attachment mechanically and with electric , removing all offending nail and debris. Patient relates relief following the procedure. She will continue to monitor the areas daily, inspect her feet, wear protective shoe gear when ambulatory, moisturizer to maintain skin integrity and follow in this office in approximately 2-3 months, sooner p.r.n.

## 2017-09-26 ENCOUNTER — TELEPHONE (OUTPATIENT)
Dept: ENDOCRINOLOGY | Facility: CLINIC | Age: 77
End: 2017-09-26

## 2017-09-26 NOTE — TELEPHONE ENCOUNTER
Spoke with the pt and notified that once we did received a Rx for  Shoes  We will sign off by MD and will fax over to dept. Pt verbally understand.

## 2017-09-26 NOTE — TELEPHONE ENCOUNTER
----- Message from Sigrid Call sent at 9/26/2017  1:29 PM CDT -----  Contact: Self   [phone]  Charles   -   Pt  Needs to speak with the nurse in regards to getting diabetic shoes .  Call back number [phone]  Thanks,

## 2017-10-02 DIAGNOSIS — H35.049 DIABETIC RETINAL MICROANEURYSM: Primary | ICD-10-CM

## 2017-10-02 DIAGNOSIS — E11.319 DIABETIC RETINAL MICROANEURYSM: Primary | ICD-10-CM

## 2017-10-04 ENCOUNTER — CLINICAL SUPPORT (OUTPATIENT)
Dept: DIABETES | Facility: CLINIC | Age: 77
End: 2017-10-04
Payer: MEDICARE

## 2017-10-04 ENCOUNTER — OFFICE VISIT (OUTPATIENT)
Dept: OPHTHALMOLOGY | Facility: CLINIC | Age: 77
End: 2017-10-04
Payer: MEDICARE

## 2017-10-04 VITALS — SYSTOLIC BLOOD PRESSURE: 122 MMHG | HEART RATE: 69 BPM | DIASTOLIC BLOOD PRESSURE: 54 MMHG

## 2017-10-04 DIAGNOSIS — E11.319 DIABETIC RETINAL MICROANEURYSM: ICD-10-CM

## 2017-10-04 DIAGNOSIS — Z46.81 INSULIN PUMP FITTING OR ADJUSTMENT: ICD-10-CM

## 2017-10-04 DIAGNOSIS — H35.3132 NONEXUDATIVE AGE-RELATED MACULAR DEGENERATION, BILATERAL, INTERMEDIATE DRY STAGE: ICD-10-CM

## 2017-10-04 DIAGNOSIS — E11.3591 TYPE 2 DIABETES MELLITUS WITH RIGHT EYE AFFECTED BY PROLIFERATIVE RETINOPATHY WITHOUT MACULAR EDEMA, WITH LONG-TERM CURRENT USE OF INSULIN: Primary | ICD-10-CM

## 2017-10-04 DIAGNOSIS — E11.3492 TYPE 2 DIABETES MELLITUS WITH LEFT EYE AFFECTED BY SEVERE NONPROLIFERATIVE RETINOPATHY WITHOUT MACULAR EDEMA, WITH LONG-TERM CURRENT USE OF INSULIN: ICD-10-CM

## 2017-10-04 DIAGNOSIS — H35.049 DIABETIC RETINAL MICROANEURYSM: ICD-10-CM

## 2017-10-04 DIAGNOSIS — Z79.4 TYPE 2 DIABETES MELLITUS WITH RIGHT EYE AFFECTED BY PROLIFERATIVE RETINOPATHY WITHOUT MACULAR EDEMA, WITH LONG-TERM CURRENT USE OF INSULIN: Primary | ICD-10-CM

## 2017-10-04 DIAGNOSIS — Z79.4 TYPE 2 DIABETES MELLITUS WITH LEFT EYE AFFECTED BY SEVERE NONPROLIFERATIVE RETINOPATHY WITHOUT MACULAR EDEMA, WITH LONG-TERM CURRENT USE OF INSULIN: ICD-10-CM

## 2017-10-04 PROCEDURE — 92014 COMPRE OPH EXAM EST PT 1/>: CPT | Mod: S$GLB,,, | Performed by: OPHTHALMOLOGY

## 2017-10-04 PROCEDURE — 92134 CPTRZ OPH DX IMG PST SGM RTA: CPT | Mod: S$GLB,,, | Performed by: OPHTHALMOLOGY

## 2017-10-04 PROCEDURE — 99999 PR PBB SHADOW E&M-EST. PATIENT-LVL III: CPT | Mod: PBBFAC,,, | Performed by: OPHTHALMOLOGY

## 2017-10-04 PROCEDURE — G0108 DIAB MANAGE TRN  PER INDIV: HCPCS | Mod: S$GLB,,, | Performed by: INTERNAL MEDICINE

## 2017-10-04 PROCEDURE — 92226 PR SPECIAL EYE EXAM, SUBSEQUENT: CPT | Mod: 50,S$GLB,, | Performed by: OPHTHALMOLOGY

## 2017-10-04 PROCEDURE — 92235 FLUORESCEIN ANGRPH MLTIFRAME: CPT | Mod: S$GLB,,, | Performed by: OPHTHALMOLOGY

## 2017-10-04 PROCEDURE — 99499 UNLISTED E&M SERVICE: CPT | Mod: S$GLB,,, | Performed by: OPHTHALMOLOGY

## 2017-10-04 NOTE — PROGRESS NOTES
Diabetes Education Visit for Insulin pump infusion set trouble shooting and evaluation for change of insulin pump.     Author: Frida Back RD, CDE  Date: 10/4/2017    Diabetes Education Visit  Diabetes Education Record Assessment/Progress: Initial    Diabetes Type  Diabetes Type : Type I    Diabetes History  Diabetes Diagnosis: >10 years     Pt felt weak during the visit; SMBG with clinic meter BG was 71 gave pt ellyn crax and 4oz juice; pt was here all day in an eye procedure and did not eat.     Current Diabetes Treatment   Current Treatment: Insulin pump Currently on a Accucheck Combo insulin pump; found out that the infusion sets he was using are not being made anymore.      Pt was sent the inset 90 infusion sets and was upset b/c he did not know how to use them...         Barriers to Change  Barriers to Change: None  Learning Challenges : None    Readiness to Learn   Readiness to Learn : Eager         Diabetes Education Assessment/Progress  Medications (states correct name, dose, onset, peak, duration, side effects & timing of meds): Discussion, Individual Session, Written Materials Provided, Competent (verbalizes/demonstrates), Return Demonstration  Today we reviewed how to use the inset infusion set with his current insulin pump.  Discussed how to properly load insulin into the cartridge and attach new infusion set to the insulin cartridge, reviewed using demo how to insert the new infusion set.  Pt did well with 1:1 instruction.      We also discussed other insulin pump systems covered by Medicare; ie.. Medtronic 630 and 670 systems and T-slim with the Dexcom G5 integration.  Discussed how sensor therapy works when used for personal use and also provided Dexcom G5 brochure.  Will contact pump rep. Taking over his current pump company to see what his upgrade options are..       Advised pt to change his infusion set and insulin cartridge every 3 days.     Diabetes Care Plan/Intervention  Education  Plan/Intervention: Insulin Pump Evaluation, Individual Follow-Up DSMT         Education Units of Time   Time Spent: 60 min      Health Maintenance Topics with due status: Not Due       Topic Last Completion Date    TETANUS VACCINE 02/17/2016    Foot Exam 03/20/2017    Colonoscopy 04/25/2017    Lipid Panel 04/26/2017    Eye Exam 07/05/2017    Hemoglobin A1c 08/03/2017     There are no preventive care reminders to display for this patient.

## 2017-10-04 NOTE — PROGRESS NOTES
HPI     DLS 04/05/17  By Dr. TYREL Cotto MD    76 Y/O M here today for his 6 mo Nonexudative ARMD ck. stj     Eye Meds:  Taking Areds Vitamins PO BID                      Systane OU prn    POHx:   NPDR  w hem   S/P Avastin OD X 5 (12/29/14)   S/P PRP OD(01/12/15) (04/13/15)   H/x  vit hem OD 11/06 followed by PRP   S/P phaco IOL OD 4/8/13   S/P phaco IOL OS 4/29/13         Last edited by Teodora Patten MA on 10/4/2017  9:47 AM. (History)          [place] SDOCT:   OD: good quality, drusen, PED, no IRF/SRF, no change from April scan  OS: good quality, drusen, PED, no IRF/SRF, no change from April scan    FA:  OD: staining of PRP scars, ma's, no NV, no ONH fl  OS: brisk, symmetric transit, numerous ma's with peripheral ischemia, no NV, nl ONH fl.  Blockage by large nasal heme (appears larger ma in that area)    Assessment /Plan     For exam results, see Encounter Report.    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  Recommend PRP and pt agrees.    Diabetic Retinopathy discussed in detail, all questions answered  Stressed importance of good BS/BP/Chol Control    Discussed Dry and Wet AMD in detail  Recommend AREDS 2 Vitamins  Home Amsler Grid Testing    RTC 1-2 wks for PRP OS sooner PRN

## 2017-10-05 ENCOUNTER — DOCUMENTATION ONLY (OUTPATIENT)
Dept: HEMATOLOGY/ONCOLOGY | Facility: CLINIC | Age: 77
End: 2017-10-05

## 2017-10-05 ENCOUNTER — LAB VISIT (OUTPATIENT)
Dept: LAB | Facility: HOSPITAL | Age: 77
End: 2017-10-05
Attending: INTERNAL MEDICINE
Payer: MEDICARE

## 2017-10-05 ENCOUNTER — OFFICE VISIT (OUTPATIENT)
Dept: INTERNAL MEDICINE | Facility: CLINIC | Age: 77
End: 2017-10-05
Payer: MEDICARE

## 2017-10-05 VITALS
HEIGHT: 68 IN | BODY MASS INDEX: 30.54 KG/M2 | DIASTOLIC BLOOD PRESSURE: 54 MMHG | SYSTOLIC BLOOD PRESSURE: 120 MMHG | OXYGEN SATURATION: 99 % | HEART RATE: 70 BPM | WEIGHT: 201.5 LBS

## 2017-10-05 DIAGNOSIS — I25.119 CORONARY ARTERY DISEASE INVOLVING NATIVE CORONARY ARTERY OF NATIVE HEART WITH ANGINA PECTORIS: ICD-10-CM

## 2017-10-05 DIAGNOSIS — N18.30 CHRONIC KIDNEY DISEASE, STAGE III (MODERATE): ICD-10-CM

## 2017-10-05 DIAGNOSIS — C91.10 CLL (CHRONIC LYMPHOCYTIC LEUKEMIA): ICD-10-CM

## 2017-10-05 DIAGNOSIS — I10 ESSENTIAL HYPERTENSION: Primary | ICD-10-CM

## 2017-10-05 DIAGNOSIS — G47.33 OBSTRUCTIVE SLEEP APNEA: ICD-10-CM

## 2017-10-05 DIAGNOSIS — I73.9 PERIPHERAL VASCULAR DISEASE: ICD-10-CM

## 2017-10-05 DIAGNOSIS — E78.00 PURE HYPERCHOLESTEROLEMIA: ICD-10-CM

## 2017-10-05 LAB
ALBUMIN SERPL BCP-MCNC: 3.8 G/DL
ALP SERPL-CCNC: 98 U/L
ALT SERPL W/O P-5'-P-CCNC: 18 U/L
ANION GAP SERPL CALC-SCNC: 9 MMOL/L
AST SERPL-CCNC: 22 U/L
BILIRUB SERPL-MCNC: 0.6 MG/DL
BUN SERPL-MCNC: 22 MG/DL
CALCIUM SERPL-MCNC: 9.9 MG/DL
CHLORIDE SERPL-SCNC: 100 MMOL/L
CO2 SERPL-SCNC: 29 MMOL/L
CREAT SERPL-MCNC: 1.1 MG/DL
ERYTHROCYTE [DISTWIDTH] IN BLOOD BY AUTOMATED COUNT: 13.5 %
EST. GFR  (AFRICAN AMERICAN): >60 ML/MIN/1.73 M^2
EST. GFR  (NON AFRICAN AMERICAN): >60 ML/MIN/1.73 M^2
GLUCOSE SERPL-MCNC: 43 MG/DL
HCT VFR BLD AUTO: 39.1 %
HGB BLD-MCNC: 12.7 G/DL
MCH RBC QN AUTO: 32.1 PG
MCHC RBC AUTO-ENTMCNC: 32.5 G/DL
MCV RBC AUTO: 99 FL
NEUTROPHILS # BLD AUTO: 3.1 K/UL
PLATELET # BLD AUTO: 192 K/UL
PMV BLD AUTO: 9.2 FL
POTASSIUM SERPL-SCNC: 4.7 MMOL/L
PROT SERPL-MCNC: 7 G/DL
RBC # BLD AUTO: 3.96 M/UL
SODIUM SERPL-SCNC: 138 MMOL/L
WBC # BLD AUTO: 39.31 K/UL

## 2017-10-05 PROCEDURE — 80053 COMPREHEN METABOLIC PANEL: CPT

## 2017-10-05 PROCEDURE — 99999 PR PBB SHADOW E&M-EST. PATIENT-LVL IV: CPT | Mod: PBBFAC,,, | Performed by: INTERNAL MEDICINE

## 2017-10-05 PROCEDURE — 99214 OFFICE O/P EST MOD 30 MIN: CPT | Mod: S$GLB,,, | Performed by: INTERNAL MEDICINE

## 2017-10-05 PROCEDURE — 85027 COMPLETE CBC AUTOMATED: CPT

## 2017-10-05 PROCEDURE — 99499 UNLISTED E&M SERVICE: CPT | Mod: S$GLB,,, | Performed by: INTERNAL MEDICINE

## 2017-10-05 PROCEDURE — 36415 COLL VENOUS BLD VENIPUNCTURE: CPT

## 2017-10-05 NOTE — PATIENT INSTRUCTIONS
Dr Cotto:call and schedule a laser    Dr. Muniz 1/2018    Dr. Cabrera 1/2018    Dr. Alexander 2/20/2018

## 2017-10-05 NOTE — PROGRESS NOTES
"Subjective:      Patient ID: Reid Herman is a 77 y.o. male.    Chief Complaint: Follow-up (6 month f/u)    HPI:  HPI   Patient is here for a 6 month follow up.    Diabetes type 2  Patient is here for follow up and tells me that he may need another Pump. His last A1C was 6.4 . Patient saw Podiatry 9/25/2017. He has neuropathy and PVD with edema.  He has done well with the orthotics and the diabetic shoes.    Patient also sees Dr. Cotto and is under treatment.    Patient had a squamous cell of the scalp . Dr. Muniz wanted a follow up in 1/2018    He was last seen by Dr. Cabrera in 7/5/2017. Follow up appt 1/2018 and will have lab today which is 3 months    Patient was seen Feb 20 2017 by Cardiology by Dr. Alvarado  BP (!) 120/54 (BP Location: Left arm, Patient Position: Sitting, BP Method: Medium (Automatic))   Pulse 70   Ht 5' 8" (1.727 m)   Wt 91.4 kg (201 lb 8 oz)   SpO2 99%   BMI 30.64 kg/m² ,   Hemoglobin A1C   Date Value Ref Range Status   08/03/2017 6.4 (H) 4.0 - 5.6 % Final     Comment:     According to ADA guidelines, hemoglobin A1c <7.0% represents  optimal control in non-pregnant diabetic patients. Different  metrics may apply to specific patient populations.   Standards of Medical Care in Diabetes-2016.  For the purpose of screening for the presence of diabetes:  <5.7%     Consistent with the absence of diabetes  5.7-6.4%  Consistent with increasing risk for diabetes   (prediabetes)  >or=6.5%  Consistent with diabetes  Currently, no consensus exists for use of hemoglobin A1c  for diagnosis of diabetes for children.  This Hemoglobin A1c assay has significant interference with fetal   hemoglobin   (HbF). The results are invalid for patients with abnormal amounts of   HbF,   including those with known Hereditary Persistence   of Fetal Hemoglobin. Heterozygous hemoglobin variants (HbAS, HbAC,   HbAD, HbAE, HbA2) do not significantly interfere with this assay;   however, presence of multiple " variants in a sample may impact the %   interference.     04/26/2017 7.5 (H) 4.5 - 6.2 % Final     Comment:     According to ADA guidelines, hemoglobin A1C <7.0% represents  optimal control in non-pregnant diabetic patients.  Different  metrics may apply to specific populations.   Standards of Medical Care in Diabetes - 2016.  For the purpose of screening for the presence of diabetes:  <5.7%     Consistent with the absence of diabetes  5.7-6.4%  Consistent with increasing risk for diabetes   (prediabetes)  >or=6.5%  Consistent with diabetes  Currently no consensus exists for use of hemoglobin A1C  for diagnosis of diabetes for children.     01/23/2017 7.7 (H) 4.5 - 6.2 % Final     Comment:     According to ADA guidelines, hemoglobin A1C <7.0% represents  optimal control in non-pregnant diabetic patients.  Different  metrics may apply to specific populations.   Standards of Medical Care in Diabetes - 2016.  For the purpose of screening for the presence of diabetes:  <5.7%     Consistent with the absence of diabetes  5.7-6.4%  Consistent with increasing risk for diabetes   (prediabetes)  >or=6.5%  Consistent with diabetes  Currently no consensus exists for use of hemoglobin A1C  for diagnosis of diabetes for children.     ,   Creatinine   Date Value Ref Range Status   07/05/2017 1.2 0.5 - 1.4 mg/dL Final   04/19/2017 1.1 0.5 - 1.4 mg/dL Final   04/13/2017 1.2 0.5 - 1.4 mg/dL Final       Lab Results   Component Value Date    LDLCALC 56.6 (L) 04/26/2017    LDLCALC 65.6 02/24/2016    LDLCALC 62.8 (L) 02/18/2015         Patient Active Problem List   Diagnosis    CLL (chronic lymphocytic leukemia)    Diabetic retinopathy associated with type 1 diabetes mellitus    Diabetic polyneuropathy associated with type 1 diabetes mellitus    Poorly controlled type 1 diabetes mellitus with peripheral neuropathy    Coronary artery disease involving native coronary artery of native heart with angina pectoris    PDR (proliferative  diabetic retinopathy) - Right Eye    NPDR (nonproliferative diabetic retinopathy) - Left Eye    Posterior vitreous detachment - Both Eyes    Hyperlipidemia    HTN (hypertension)    Dermatophytosis of nail    Pseudophakia    BPH with urinary obstruction    Erectile dysfunction    Corns and callosities    Vitreous hemorrhage    Lumbar facet arthropathy    DDD (degenerative disc disease), lumbar    Obstructive sleep apnea    Peripheral vascular disease    Lumbar spondylosis    Spondylolisthesis    S/P lumbar fusion    Atherosclerosis of aorta    Chronic kidney disease, stage III (moderate)    Sacroiliitis    Type 1 diabetes mellitus with diabetic chronic kidney disease    Stable angina    Nonexudative age-related macular degeneration, bilateral, intermediate dry stage    Senile purpura    Thrombocytopenia, unspecified     Past Medical History:   Diagnosis Date    Anemia     Back pain     Basal cell carcinoma 06/08/2015    left nasal ala    CAD (coronary artery disease) 10/1/2012    Cataract     DDD (degenerative disc disease), lumbar 10/28/2014    Diabetes mellitus     Diabetes mellitus type I     Diabetic retinopathy of both eyes     Heart attack     Hyperlipidemia     Hypertension     Insulin pump in place     Obesity     Poorly controlled type 1 diabetes mellitus with peripheral neuropathy 10/1/2012    Preseptal cellulitis of right eye 8/17/15    S/P CABG x 2 2/14/2014    1994     Sleep apnea     Squamous cell carcinoma 03/18/2013    right posterior ear    Trouble in sleeping     Type 1 diabetes, uncontrolled, with retinopathy 10/1/2012    Type II or unspecified type diabetes mellitus without mention of complication, not stated as uncontrolled      Past Surgical History:   Procedure Laterality Date    APPENDECTOMY  1953    CATARACT EXTRACTION  4/8/13    right eye (toric)    CATARACT EXTRACTION  4/29/13    left eye (toric)    COLONOSCOPY N/A 4/25/2017    Procedure:  "COLONOSCOPY;  Surgeon: CLARISSA Freeman MD;  Location: [facility] ([address] [place] [address]);  Service: Endoscopy;  Laterality: N/A;    CORONARY ARTERY BYPASS GRAFT  1994    x 3    EYE SURGERY      cataracts, both eyes    FINGER TENDON REPAIR  2011    left hand    SKIN BIOPSY  2013    multiple    SPINE SURGERY  2015    TLIF    TONSILLECTOMY  1947     Family History   Problem Relation Age of Onset    Breast cancer Mother     Cancer Mother      ? lung cancer    Alzheimer's disease Father     Dementia Father     Stroke Father     Colon cancer Sister     Cancer Sister 60     colon    Acute myelogenous leukemia Sister     Diabetes Maternal Grandfather     Diabetes Paternal Aunt     Diabetes Paternal Uncle     Melanoma Neg Hx     Psoriasis Neg Hx     Lupus Neg Hx     Eczema Neg Hx     Acne Neg Hx      Review of Systems   Constitutional: Negative for appetite change, chills, fever and unexpected weight change.   Respiratory: Negative for shortness of breath and wheezing.    Cardiovascular: Negative for chest pain, palpitations and leg swelling.   Gastrointestinal: Negative for abdominal pain, blood in stool, diarrhea, nausea and vomiting.     Objective:     Vitals:    10/05/17 0909   BP: (!) 120/54   Pulse: 70   SpO2: 99%   Weight: 91.4 kg (201 lb 8 oz)   Height: 5' 8" (1.727 m)   PainSc: 0-No pain     Body mass index is 30.64 kg/m².  Physical Exam   Constitutional: He is oriented to person, place, and time. He appears well-developed and well-nourished. No distress.   Neck: Carotid bruit is not present. No thyromegaly present.   Cardiovascular: Normal rate, regular rhythm and normal heart sounds.  PMI is not displaced.    Pulmonary/Chest: Effort normal and breath sounds normal. No respiratory distress.   Abdominal: Soft. Bowel sounds are normal. He exhibits no distension. There is no tenderness.   Musculoskeletal: He exhibits no edema.   Neurological: He is alert and oriented to person, place, and time.     Assessment: "     1. Essential hypertension    2. Chronic kidney disease, stage III (moderate)    3. CLL (chronic lymphocytic leukemia)    4. Coronary artery disease involving native coronary artery of native heart with angina pectoris    5. Pure hypercholesterolemia    6. Obstructive sleep apnea    7. Peripheral vascular disease      Plan:   Reid was seen today for follow-up.    Diagnoses and all orders for this visit:    Essential hypertension  The current medical regimen is effective;  continue present plan and medications.      Chronic kidney disease, stage III (moderate): lab today    CLL (chronic lymphocytic leukemia): lab today    Coronary artery disease involving native coronary artery of native heart with angina pectoris:patient to see Dr. Alexander in 2/2018    Pure hypercholesterolemia: The current medical regimen is effective;  continue present plan and medications.      Obstructive sleep apnea: does not use equipment    Peripheral vascular disease: Podiatry completed      Return in about 6 months (around 4/5/2018).     Medication List          Accurate as of 10/5/17 10:13 AM. If you have any questions, ask your nurse or doctor.               CONTINUE taking these medications    ACCU-CHEK FASTCLIX Misc  Generic drug:  lancets  TEST 6 TIMES DAILY     alpha lipoic acid 600 mg Cap     aspirin 325 MG EC tablet  Commonly known as:  ECOTRIN     atorvastatin 80 MG tablet  Commonly known as:  LIPITOR  Take 1 tablet (80 mg total) by mouth nightly.     blood sugar diagnostic Strp  Commonly known as:  ACCU-CHEK SHARON PLUS TEST STRP  Pt test blood sugar 5 times a day.     clotrimazole-betamethasone 1-0.05% cream  Commonly known as:  LOTRISONE  APPLY EXTERNALLY TO THE AFFECTED AREA TWICE DAILY     fosinopril-hydrochlorothiazide 10-12.5 mg per tablet  Commonly known as:  MONOPRIL-HCT  Take 3 tablets by mouth once daily.     * insulin syringe-needle U-100 0.3 mL 30 gauge x 5/16 Syrg  Commonly known as:  ADVOCATE SYRINGES  Use as  directed     * BD INSULIN SYRINGE ULT-FINE II 0.3 mL 31 gauge x 5/16 Syrg  Generic drug:  insulin syringe-needle U-100     mecobal-levomefolat Ca-B6 phos 3-35-2 mg Tab  Commonly known as:  L-METHYL-B6-B12  Take 1 tablet by mouth once daily.     metformin 500 MG tablet  Commonly known as:  GLUCOPHAGE  TAKE 1 TABLET TWICE DAILY     metoprolol succinate 100 MG 24 hr tablet  Commonly known as:  TOPROL-XL  TAKE ONE TABLET BY MOUTH EVERY EVENING     multivitamin capsule     nitroGLYCERIN 0.4 MG SL tablet  Commonly known as:  NITROSTAT  Place 1 tablet (0.4 mg total) under the tongue every 5 (five) minutes as needed for Chest pain.     NOVOLOG 100 unit/mL injection  Generic drug:  insulin aspart  INJECT UP TO MAX  UNITS UNDER THE SKIN VIA INSULIN PUMP DAILY AS DIRECTED        * This list has 2 medication(s) that are the same as other medications prescribed for you. Read the directions carefully, and ask your doctor or other care provider to review them with you.

## 2017-10-06 DIAGNOSIS — E10.65: Primary | ICD-10-CM

## 2017-10-06 DIAGNOSIS — E10.311: Primary | ICD-10-CM

## 2017-10-06 NOTE — PROGRESS NOTES
Last 5 Patient Entered Redings Current 30 Day Average:      Recent Readings 8/18/2017 8/17/2017 8/16/2017 8/15/2017 8/12/2017    Systolic BP (mmHg) 149 123 115 134 105    Diastolic BP (mmHg) 75 63 56 59 61    Pulse 63 58 65 62 63        Hypertension Digital Medicine Program (HDMP): Health  Follow Up    Feeling well.  Went to Dr Zavala for 6 mo check up. BP in office 120/54.  Also saw Oncologist recently for follow up.  Having an eye issue.  Wife manually enters readings. Takes BP daily but hasn't had time to enter them. Stated she will probably have time next week after a bridal shower is over.    Lifestyle Modifications:    1.Low sodium diet: yes - continuing to monitor diet closely    2.Physical activity: yes - staying as active as possible    Follow up with Mr. Reid Herman completed. No further questions or concerns. I will follow up in a few weeks to assess progress.

## 2017-10-09 DIAGNOSIS — E10.42 TYPE 1 DIABETES MELLITUS WITH POLYNEUROPATHY: ICD-10-CM

## 2017-10-10 RX ORDER — BLOOD SUGAR DIAGNOSTIC
STRIP MISCELLANEOUS
Qty: 450 STRIP | Refills: 3 | Status: SHIPPED | OUTPATIENT
Start: 2017-10-10 | End: 2017-11-21 | Stop reason: SDUPTHER

## 2017-10-11 ENCOUNTER — OFFICE VISIT (OUTPATIENT)
Dept: OPHTHALMOLOGY | Facility: CLINIC | Age: 77
End: 2017-10-11
Payer: MEDICARE

## 2017-10-11 VITALS — DIASTOLIC BLOOD PRESSURE: 58 MMHG | HEART RATE: 72 BPM | SYSTOLIC BLOOD PRESSURE: 127 MMHG

## 2017-10-11 DIAGNOSIS — Z79.4 TYPE 2 DIABETES MELLITUS WITH LEFT EYE AFFECTED BY SEVERE NONPROLIFERATIVE RETINOPATHY WITHOUT MACULAR EDEMA, WITH LONG-TERM CURRENT USE OF INSULIN: Primary | ICD-10-CM

## 2017-10-11 DIAGNOSIS — E11.3492 TYPE 2 DIABETES MELLITUS WITH LEFT EYE AFFECTED BY SEVERE NONPROLIFERATIVE RETINOPATHY WITHOUT MACULAR EDEMA, WITH LONG-TERM CURRENT USE OF INSULIN: Primary | ICD-10-CM

## 2017-10-11 PROCEDURE — 99499 UNLISTED E&M SERVICE: CPT | Mod: S$GLB,,, | Performed by: OPHTHALMOLOGY

## 2017-10-11 PROCEDURE — 99999 PR PBB SHADOW E&M-EST. PATIENT-LVL III: CPT | Mod: PBBFAC,,, | Performed by: OPHTHALMOLOGY

## 2017-10-11 PROCEDURE — 67228 TREATMENT X10SV RETINOPATHY: CPT | Mod: LT,S$GLB,, | Performed by: OPHTHALMOLOGY

## 2017-10-11 NOTE — PROGRESS NOTES
HPI     DLS 10/04/17    76 Y/O M here today for PRP laser treatment of the left   eye for the treatment of  PDR . stj     Eye Meds:  Taking Areds Vitamins PO BID                      Systane OU prn     POHx:   NPDR  w hem   S/P Avastin OD X 5 (12/29/14)   S/P PRP OD(01/12/15) (04/13/15)   H/x  vit hem OD 11/06 followed by PRP   S/P phaco IOL OD 4/8/13   S/P phaco IOL OS 4/29/13     Last edited by Teodora Patten MA on 10/11/2017  1:59 PM. (History)            Assessment /Plan     For exam results, see Encounter Report.    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  The patient voiced understanding and wished to proceed with the procedure.  See separate consent form.    Laser Procedure Note  Dx: Severe NPDR OS  Laser: PRP OS  Topical Proparacaine  Argon yellow  Spot: 200  Power:    Dur: 0.070 - 0.130  #:  705   Complications: None  F/U 2 wks for PRP OS, sooner PRN

## 2017-10-12 ENCOUNTER — PATIENT MESSAGE (OUTPATIENT)
Dept: INTERNAL MEDICINE | Facility: CLINIC | Age: 77
End: 2017-10-12

## 2017-10-16 RX ORDER — NITROGLYCERIN 0.4 MG/1
0.4 TABLET SUBLINGUAL EVERY 5 MIN PRN
Qty: 90 TABLET | Refills: 3 | Status: SHIPPED | OUTPATIENT
Start: 2017-10-16 | End: 2017-10-19 | Stop reason: SDUPTHER

## 2017-10-16 NOTE — TELEPHONE ENCOUNTER
Please call [employer] and see what they need per the patient request for his shoes.     Their phone is: [phone].  Thank you.  Manuel

## 2017-10-18 ENCOUNTER — TELEPHONE (OUTPATIENT)
Dept: PODIATRY | Facility: CLINIC | Age: 77
End: 2017-10-18

## 2017-10-18 NOTE — TELEPHONE ENCOUNTER
----- Message from Dot Schneider sent at 10/18/2017  1:48 PM CDT -----  Contact: Pt  Pt called to speak to the nurse regarding his care and to request information from the provider's office which will enable his to purchase ortho shoes. Pt would like a call back today.    Pt can be reached at [phone].    Thanks

## 2017-10-19 RX ORDER — NITROGLYCERIN 0.4 MG/1
0.4 TABLET SUBLINGUAL EVERY 5 MIN PRN
Qty: 50 TABLET | Refills: 6 | Status: SHIPPED | OUTPATIENT
Start: 2017-10-19 | End: 2018-02-19 | Stop reason: SDUPTHER

## 2017-10-25 ENCOUNTER — OFFICE VISIT (OUTPATIENT)
Dept: OPHTHALMOLOGY | Facility: CLINIC | Age: 77
End: 2017-10-25
Payer: MEDICARE

## 2017-10-25 VITALS — SYSTOLIC BLOOD PRESSURE: 113 MMHG | HEART RATE: 77 BPM | DIASTOLIC BLOOD PRESSURE: 49 MMHG

## 2017-10-25 DIAGNOSIS — E11.3492 TYPE 2 DIABETES MELLITUS WITH LEFT EYE AFFECTED BY SEVERE NONPROLIFERATIVE RETINOPATHY WITHOUT MACULAR EDEMA, WITH LONG-TERM CURRENT USE OF INSULIN: Primary | ICD-10-CM

## 2017-10-25 DIAGNOSIS — Z79.4 TYPE 2 DIABETES MELLITUS WITH LEFT EYE AFFECTED BY SEVERE NONPROLIFERATIVE RETINOPATHY WITHOUT MACULAR EDEMA, WITH LONG-TERM CURRENT USE OF INSULIN: Primary | ICD-10-CM

## 2017-10-25 PROCEDURE — 99499 UNLISTED E&M SERVICE: CPT | Mod: S$GLB,,, | Performed by: OPHTHALMOLOGY

## 2017-10-25 PROCEDURE — 67228 TREATMENT X10SV RETINOPATHY: CPT | Mod: LT,S$GLB,, | Performed by: OPHTHALMOLOGY

## 2017-10-25 PROCEDURE — 99999 PR PBB SHADOW E&M-EST. PATIENT-LVL III: CPT | Mod: PBBFAC,,, | Performed by: OPHTHALMOLOGY

## 2017-10-25 NOTE — PROGRESS NOTES
HPI     PRP OS  DLS- 10/11/2017 Dr. Cotto    Pt sts no change since last visit denies pain   (-)Flashes (+)Floaters  (+)Photophobia  (-)Glare    Systane Prn     POHx:   NPDR  w hem   S/P Avastin OD X 5 (12/29/14)   S/P PRP OD(01/12/15) (04/13/15)   H/x  vit hem OD 11/06 followed by PRP   S/P phaco IOL OD 4/8/13   S/P phaco IOL OS 4/29/13      Last edited by Jazmyne Perez on 10/25/2017  1:36 PM. (History)          Assessment /Plan     For exam results, see Encounter Report.    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  The patient voiced understanding and wished to proceed with the procedure.  See separate consent form.    Laser Procedure Note  Dx: Severe NPDR OS  Laser: PRP OS  Topical Proparacaine  Argon yellow  Spot: 200 microns  Power: 100 mW  Dur: 100 ms  #:  1005   Location: 360 peripheral scatter   Complications: None    Diabetic Retinopathy discussed in detail, all questions answered  Stressed importance of good BS/BP/Chol Control  RTC 2-3 months, sooner PRN

## 2017-11-01 ENCOUNTER — LAB VISIT (OUTPATIENT)
Dept: LAB | Facility: HOSPITAL | Age: 77
End: 2017-11-01
Attending: NURSE PRACTITIONER
Payer: MEDICARE

## 2017-11-01 DIAGNOSIS — E10.42 POORLY CONTROLLED TYPE 1 DIABETES MELLITUS WITH PERIPHERAL NEUROPATHY: Chronic | ICD-10-CM

## 2017-11-01 DIAGNOSIS — E11.3299 NPDR (NONPROLIFERATIVE DIABETIC RETINOPATHY): ICD-10-CM

## 2017-11-01 DIAGNOSIS — E10.65 POORLY CONTROLLED TYPE 1 DIABETES MELLITUS WITH PERIPHERAL NEUROPATHY: Chronic | ICD-10-CM

## 2017-11-01 DIAGNOSIS — E10.22 TYPE 1 DIABETES MELLITUS WITH STAGE 3 CHRONIC KIDNEY DISEASE: ICD-10-CM

## 2017-11-01 DIAGNOSIS — N18.30 TYPE 1 DIABETES MELLITUS WITH STAGE 3 CHRONIC KIDNEY DISEASE: ICD-10-CM

## 2017-11-01 DIAGNOSIS — E10.311 DIABETIC RETINOPATHY OF LEFT EYE WITH MACULAR EDEMA ASSOCIATED WITH TYPE 1 DIABETES MELLITUS, UNSPECIFIED RETINOPATHY SEVERITY: ICD-10-CM

## 2017-11-01 LAB
ESTIMATED AVG GLUCOSE: 151 MG/DL
HBA1C MFR BLD HPLC: 6.9 %

## 2017-11-01 PROCEDURE — 36415 COLL VENOUS BLD VENIPUNCTURE: CPT

## 2017-11-01 PROCEDURE — 83036 HEMOGLOBIN GLYCOSYLATED A1C: CPT

## 2017-11-01 PROCEDURE — 82985 ASSAY OF GLYCATED PROTEIN: CPT

## 2017-11-03 LAB — FRUCTOSAMINE SERPL-SCNC: 310 UMOL /L (ref 0–285)

## 2017-11-08 ENCOUNTER — OFFICE VISIT (OUTPATIENT)
Dept: ENDOCRINOLOGY | Facility: CLINIC | Age: 77
End: 2017-11-08
Payer: MEDICARE

## 2017-11-08 VITALS
DIASTOLIC BLOOD PRESSURE: 60 MMHG | HEIGHT: 68 IN | BODY MASS INDEX: 30.51 KG/M2 | HEART RATE: 74 BPM | WEIGHT: 201.31 LBS | SYSTOLIC BLOOD PRESSURE: 130 MMHG

## 2017-11-08 DIAGNOSIS — E78.00 PURE HYPERCHOLESTEROLEMIA: ICD-10-CM

## 2017-11-08 DIAGNOSIS — I25.119 CORONARY ARTERY DISEASE INVOLVING NATIVE CORONARY ARTERY OF NATIVE HEART WITH ANGINA PECTORIS: ICD-10-CM

## 2017-11-08 DIAGNOSIS — M47.816 LUMBAR FACET ARTHROPATHY: ICD-10-CM

## 2017-11-08 DIAGNOSIS — N18.30 CHRONIC KIDNEY DISEASE, STAGE III (MODERATE): ICD-10-CM

## 2017-11-08 DIAGNOSIS — E10.42 POORLY CONTROLLED TYPE 1 DIABETES MELLITUS WITH PERIPHERAL NEUROPATHY: Chronic | ICD-10-CM

## 2017-11-08 DIAGNOSIS — E10.65 POORLY CONTROLLED TYPE 1 DIABETES MELLITUS WITH PERIPHERAL NEUROPATHY: Chronic | ICD-10-CM

## 2017-11-08 DIAGNOSIS — C91.10 CLL (CHRONIC LYMPHOCYTIC LEUKEMIA): ICD-10-CM

## 2017-11-08 DIAGNOSIS — E10.3593 TYPE 1 DIABETES MELLITUS WITH PROLIFERATIVE RETINOPATHY OF BOTH EYES WITHOUT MACULAR EDEMA: Primary | ICD-10-CM

## 2017-11-08 DIAGNOSIS — E10.42 DIABETIC POLYNEUROPATHY ASSOCIATED WITH TYPE 1 DIABETES MELLITUS: ICD-10-CM

## 2017-11-08 DIAGNOSIS — I10 ESSENTIAL HYPERTENSION: ICD-10-CM

## 2017-11-08 DIAGNOSIS — G47.33 OBSTRUCTIVE SLEEP APNEA: ICD-10-CM

## 2017-11-08 PROCEDURE — 99215 OFFICE O/P EST HI 40 MIN: CPT | Mod: S$GLB,,, | Performed by: NURSE PRACTITIONER

## 2017-11-08 PROCEDURE — 99499 UNLISTED E&M SERVICE: CPT | Mod: S$GLB,,, | Performed by: NURSE PRACTITIONER

## 2017-11-08 PROCEDURE — 99999 PR PBB SHADOW E&M-EST. PATIENT-LVL IV: CPT | Mod: PBBFAC,,, | Performed by: NURSE PRACTITIONER

## 2017-11-08 NOTE — PROGRESS NOTES
CC: This 77 y.o.  male presents for management of Diabetes Mellitus   along with the current chronic medical conditions including:  Patient Active Problem List   Diagnosis    CLL (chronic lymphocytic leukemia)        Diabetic retinopathy associated with type 1 diabetes mellitus    Insulin pump status    Diabetic polyneuropathy associated with type 1 diabetes mellitus    Poorly controlled type 1 diabetes mellitus with peripheral neuropathy    Type 1 diabetes, uncontrolled, with retinopathy    CAD (coronary artery disease)    PDR (proliferative diabetic retinopathy) - Right Eye    NPDR (nonproliferative diabetic retinopathy) - Left Eye    Posterior vitreous detachment - Both Eyes    Hyperlipidemia    HTN (hypertension)            Pseudophakia    BPH with urinary obstruction    Erectile dysfunction    S/P CABG x 2            Insulin pump fitting or adjustment    LBP (low back pain)    Vitreous hemorrhage    Lumbar facet arthropathy    DDD (degenerative disc disease), lumbar    Obstructive sleep apnea    Peripheral vascular disease    Lumbar spondylosis            Spondylolisthesis    S/P lumbar fusion      Status of these conditions is pending review.    HPI: Pt was diagnosed with T1DM in 1972- started on insulin.   Never hospitalized r/t DM recently.   Pt last seen by me in August 2017 and is now being seen by me again today.   Pt currently has accuchek spirit pump/sarai expert.    Pt reports that he had a 2-3 day gap of not having pump supplies, but had back up insulin for MDI usage.  Overall, a1c has improved, reports some hypoglycemia.  Some fbg 70s-80s at times.    CURRENT DM MEDS: Metformin 500 mg BID, accuchek spirit pump-see below     Basal setting:   target goal 100-130, isf to 35, icr 1:8,  mn-0400 -0.9 u/hr, 2100-mn 0.9 u/hr, 1508-7060 1.15 u/hr, 5215-8354 1.1 u/hr    Pt is monitoring BG at home 5 times a day.   Pt has had hypoglycemia at home- 40s-60s -corrects w/ juice  (not as often-1-2 a month)  Reid Herman brought glucometer or log to clinic today revealing the following BG readings:    Lowest 43 mg/dl  Highest 465- missed bolus     DIET/ MEAL PATTERN: Pt eats 3 meals a day    Yogurt, fruit, sugar free cookies     Snack around 7-8p nuts    EXERCISE: no formal exercise, walks occasionally-has cane, PT for back     STANDARDS OF CARE:  Eye exam: 2017, f/u q4-6 mos (floaters/injections/laser therapy), AMD, vit   Podiatry: 2017  f/u q2-3 mos toe nail clipping/ingrown toe nail problem  Cardiac Testing: h/o cabg in past 1994, f/u with cardiology, last visit Feb 2015                     ROS:   Gen: Appetite good, no weight gain or loss, denies fatigue and + weakness (after sx)-physical therapy.  Skin: Skin is intact and heals well, no rashes, pruritis, easy bruising, no hair changes, no intolerance to heat/cold.  Eyes: + visual disturbances (floaters)-2015, 2016   Resp: no SOB or DIOR, no cough  Cardiac: No palpitations, chest pain, denies syncope, weakness, no edema or cyanosis.  GI: No nausea or vomiting, diarrhea, +constipation-r/t pain meds, or abdominal pain.  /GYN: No nocturia, no urinary frequency, burning or pain.   PVD: + leg pain with or without exercise, denies cyanosis, pallor, or cold extremities.  MS/Neuro: + numbness/ tingling in BLE; FROM of joints without swelling or pain. Gait mostly steady, has back brace, speech clear, no tremor, coordination problem. + back pain-improved   Psych: Denies drug/ETOH abuse, no hx. of eating disorders or depression.  Other systems: negative.    Lab Results   Component Value Date    HGBA1C 6.9 (H) 11/01/2017     Lab Results   Component Value Date    TSH 0.901 02/24/2016     No results found for: MICROALBUR    Chemistry        Component Value Date/Time     10/05/2017 1040    K 4.7 10/05/2017 1040     10/05/2017 1040    CO2 29 10/05/2017 1040    BUN 22 10/05/2017 1040    CREATININE 1.1 10/05/2017 1040    GLU 43 (LL)  10/05/2017 1040        Component Value Date/Time    CALCIUM 9.9 10/05/2017 1040    ALKPHOS 98 10/05/2017 1040    AST 22 10/05/2017 1040    ALT 18 10/05/2017 1040    BILITOT 0.6 10/05/2017 1040          Lab Results   Component Value Date    LDLCALC 56.6 (L) 04/26/2017       PE:  GENERAL: Well developed, well nourished.  PSYCH: AAOx3, appropriate mood and affect, pleasant expression, conversant, appears relaxed, well groomed.   EYES: Conjunctiva, corneas clear, EOM intact  NECK: Supple, trachea midline  CHEST: Resp even and unlabored, CTA bilateral.  CARDIAC: s1, s2 normal, no edema noted to BLE   ABDOMEN: soft, non distended  VASCULAR: Same temperature peripherally to centrally, DP pulses +2/4 bilaterally, no edema, brisk capillary refill BUE.  NEURO: Gait mostly steady  SKIN: Normal skin turgor. Skin warm and dry. No areas of breakdown, + acanthosis nigracans. accuchek spirit intact.   FEET: Footware appropriate.     ASSESSMENT and PLAN:  Reid was seen today for diabetes mellitus.    Diagnoses and associated orders for this visit:  1. 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  Change all settings in sarai expert and spirit-2018 changing to dexcom, medtronic    ICR 1:9, isf 35, target 110-130,   Basal  mn-0400 0.85 u/hr  3985-5604 1.15 u/hr  8674-8925 1.1 u/hr  2100-mn 0.85 u/hr    a1c goal less than 7% w/ less frequent hypoglycemia  Cut back by 10% icr and some basal settings  F/u in 3 mos   F/u with DE   Counseling >35 mins   2. Poorly controlled type 1 diabetes mellitus with peripheral neuropathy  See above   3. Diabetic polyneuropathy associated with type 1 diabetes mellitus  See above   4. Essential hypertension  Controlled continue med(s)   5. Chronic kidney disease, stage III (moderate)  Controlled, stable   6. Pure hypercholesterolemia  Lab Results   Component Value Date    LDLCALC 56.6 (L) 04/26/2017     At  goal   7. Coronary artery disease involving native coronary artery of native heart with angina pectoris  Avoid hypoglycemia   8. CLL (chronic lymphocytic leukemia)  F/u with hem/onc   9. Obstructive sleep apnea  stable   10. Lumbar facet arthropathy  F/u with specialist, if uncontrolled will increase insulin needs

## 2017-11-08 NOTE — PATIENT INSTRUCTIONS
ICR 1:9, isf 35, target 110-130,   Basal  mn-0400 0.85 u/hr  5173-0735 1.15 u/hr  9810-5970 1.1 u/hr  2100-mn 0.85 u/hr  Snacks can be an important part of a balanced, healthy meal plan. They allow you to eat more frequently, feeling full and satisfied throughout the day. Also, they allow you to spread carbohydrates evenly, which may stabilize blood sugars.  Plus, snacks are enjoyable!     The amount of carbohydrate needed at snacks varies. Generally, about 15-30 grams of carbohydrate per snack is recommended.  Below you will find some tasty treats.       0-5 gm carb   Crystal Light   Vitamin Water Zero   Herbal tea, unsweetened   2 tsp peanut butter on celery   1./2 cup sugar-free jell-o   1 sugar-free popsicle   ¼ cup blueberries   8oz Blue Angelique unsweetened almond milk   5 baby carrots & celery sticks, cucumbers, bell peppers dipped in ¼ cup salsa, 2Tbsp light ranch dressing or 2Tbsp plain Greek yogurt   10 Goldfish crackers   ½ oz low-fat cheese or string cheese   1 closed handful of nuts, unsalted   1 Tbsp of sunflower seeds, unsalted   1 cup Smart Pop popcorn   1 whole grain brown rice cake        15 gm carb   1 small piece of fruit or ½ banana or 1/2 cup lite canned fruit   3 ellyn cracker squares   3 cups Smart Pop popcorn, top spray butter, Nelson lite salt or cinnamon and Truvia   5 Vanilla Wafers   ½ cup low fat, no added sugar ice cream or frozen yogurt (Blue bell, Blue Bunny, Weight Watchers, Skinny Cow)   ½ turkey, ham, or chicken sandwich   ½ c fruit with ½ c Cottage cheese   4-6 unsalted wheat crackers with 1 oz low fat cheese or 1 tbsp peanut butter    30-45 goldfish crackers (depending on flavor)    7-8 Denominational mini brown rice cakes (caramel, apple cinnamon, chocolate)    12 Denominational mini brown rice cakes (cheddar, bbq, ranch)    1/3 cup hummus dip with raw veg   1/2 whole wheat celina, 1Tbsp hummus   Mini Pizza (1/2 whole wheat English muffin, low-fat  cheese, tomato  sauce)   100 calorie snack pack (Oreo, Chips Ahoy, Ritz Mix, Baked Cheetos)   4-6 oz. light or Greek Style yogurt (Chobani, Yoplait, Okios, Stoneyfield)   ½ cup sugar-free pudding     6 in. wheat tortilla or celina oven toasted chips (topped with spray butter flavoring, cinnamon, Truvia OR spray butter, garlic powder, chili powder)    18 BBQ Popchips (available at Target, Whole Foods, Fresh Market)                   Diabetes Support Group Meetings    Date Topics   February 9 Health Promotion/Nutrition   March 9 Taking Care of Your Kidneys   April 13 Taking Care of Your Feet   May 11 Ease Your Mind with Diabetes   June 8 Hurricane and Emergency Preparedness   July 13 Family & Caregiver Support for Diabetes   *August 17  Taking Care of Your Eyes   September 14 Devices & Technology   October 12 Recipes & Treats   November 9 Getting Pumped Up for Diabetes   December 14 Year-End Close Out            Meetings are held in the [facility] (A) of the [facility] located at [address] [place] [address]. Please call [phone] for additional information.    Free service, offered every 2nd Thursday of every month, except in August! Family members and/or friends are welcome as well!  Support group is for patients with type 1 or type 2 diabetes.    From 3:30p to 4:30p

## 2017-11-10 ENCOUNTER — PATIENT OUTREACH (OUTPATIENT)
Dept: OTHER | Facility: OTHER | Age: 77
End: 2017-11-10

## 2017-11-10 NOTE — PROGRESS NOTES
Last 5 Patient Entered Readings Current 30 Day Average:      Recent Readings 8/18/2017 8/17/2017 8/16/2017 8/15/2017 8/12/2017    Systolic BP (mmHg) 149 123 115 134 105    Diastolic BP (mmHg) 75 63 56 59 61    Pulse 63 58 65 62 63        Hypertension Digital Medicine (HDMP) Health  Follow Up    LVM to follow up with Mr. Reid Herman.    Asked patient to return my call and check readings weekly/wife enter at least 1 per week, as required by the program. WCB in 2 weeks.

## 2017-11-14 ENCOUNTER — TELEPHONE (OUTPATIENT)
Dept: HEMATOLOGY/ONCOLOGY | Facility: CLINIC | Age: 77
End: 2017-11-14

## 2017-11-14 NOTE — TELEPHONE ENCOUNTER
----- Message from Kristen Prince sent at 11/14/2017 10:11 AM CST -----  Contact: Pt  Pt needs to schedule a follow up appt. He is a former pt of       Pt call back number [phone]

## 2017-11-15 ENCOUNTER — TELEPHONE (OUTPATIENT)
Dept: HEMATOLOGY/ONCOLOGY | Facility: CLINIC | Age: 77
End: 2017-11-15

## 2017-11-15 NOTE — TELEPHONE ENCOUNTER
Returned call, no answer, left message asking pt to call back in regards to scheduling f/u apt in January

## 2017-11-15 NOTE — TELEPHONE ENCOUNTER
Returned call, spoke with patient and assisted with scheduling an apt in January with Dr. Garcia. Apt made and mailed out

## 2017-11-15 NOTE — TELEPHONE ENCOUNTER
----- Message from Tracee Mas sent at 11/15/2017  7:38 AM CST -----  Patient Calls   Daily Leung RN   You 16 hours ago (3:24 PM)     Tracee,   He has a recall appt with Dr Garcia, previously seen by Dr Cabrera (Routing comment)     Daily Leung RN 16 hours ago (3:24 PM)  ----- Message from Kristen Prince sent at 11/14/2017 10:11 AM CST -----  Contact: Pt  Pt needs to schedule a follow up appt. He is a former pt of         Pt call back number [phone]

## 2017-11-15 NOTE — TELEPHONE ENCOUNTER
----- Message from Kristen Prince sent at 11/15/2017  8:43 AM CST -----  Contact: Pt  Pt returning your call    Pt call back number [phone]

## 2017-11-21 ENCOUNTER — PATIENT OUTREACH (OUTPATIENT)
Dept: OTHER | Facility: OTHER | Age: 77
End: 2017-11-21

## 2017-11-21 ENCOUNTER — TELEPHONE (OUTPATIENT)
Dept: DIABETES | Facility: CLINIC | Age: 77
End: 2017-11-21

## 2017-11-21 DIAGNOSIS — E10.42 TYPE 1 DIABETES MELLITUS WITH POLYNEUROPATHY: ICD-10-CM

## 2017-11-21 NOTE — PROGRESS NOTES
Mr. Herman is doing well. He and wife were taught how to enter BP readings from her cell phone at the [facility]. First reading today was immediately after meds, second reading was about 30 minutes later. No questions or concerns.     Last 5 Patient Entered Readings Current 30 Day Average: 121/69     Recent Readings 11/21/2017 11/21/2017 11/14/2017 8/18/2017 8/17/2017    Systolic BP (mmHg) 122 149 120 149 123    Diastolic BP (mmHg) 57 67 80 75 63    Pulse 62 58 80 63 58        BP at  Goal, <130/80 mmHg  Continue monitoring    Hypertension Medications             fosinopril-hydrochlorothiazide (MONOPRIL-HCT) 10-12.5 mg per tablet Take 3 tablets by mouth once daily.    metoprolol succinate (TOPROL-XL) 100 MG 24 hr tablet TAKE ONE TABLET BY MOUTH EVERY EVENING    nitroGLYCERIN (NITROSTAT) 0.4 MG SL tablet Place 1 tablet (0.4 mg total) under the tongue every 5 (five) minutes as needed for Chest pain. May dispense a two pack of 25 tablets.

## 2017-11-24 RX ORDER — BLOOD SUGAR DIAGNOSTIC
STRIP MISCELLANEOUS
Qty: 450 STRIP | Refills: 3 | Status: SHIPPED | OUTPATIENT
Start: 2017-11-24 | End: 2019-01-01 | Stop reason: SDUPTHER

## 2017-11-27 NOTE — PROGRESS NOTES
Last 5 Patient Entered Readings Current 30 Day Average: 126/63     Recent Readings 11/27/2017 11/26/2017 11/25/2017 11/23/2017 11/22/2017    Systolic BP (mmHg) 130 122 120 125 140    Diastolic BP (mmHg) 64 62 62 56 63    Pulse 62 60 62 70 62        Hypertension Digital Medicine Program (HDMP): Health  Follow Up    Feeling well. Wife now will be entering readings from her phone so we can get them more consistently.     Lifestyle Modifications:    1.Low sodium diet: yes - continuing to monitor sodium intake    2.Physical activity: yes - trying to stay consistent with PA    3.Hypotension/Hypertension symptoms: no   Frequency/Alleviating factors/Precipitating factors, etc.     4.Patient has been compliant with the medication regimen.     Follow up with Mr. Reid Herman completed. No further questions or concerns. I will follow up in a few weeks to assess progress.

## 2017-12-05 ENCOUNTER — PATIENT MESSAGE (OUTPATIENT)
Dept: INTERNAL MEDICINE | Facility: CLINIC | Age: 77
End: 2017-12-05

## 2017-12-07 ENCOUNTER — OFFICE VISIT (OUTPATIENT)
Dept: PODIATRY | Facility: CLINIC | Age: 77
End: 2017-12-07
Payer: MEDICARE

## 2017-12-07 VITALS
BODY MASS INDEX: 31.07 KG/M2 | RESPIRATION RATE: 18 BRPM | DIASTOLIC BLOOD PRESSURE: 63 MMHG | SYSTOLIC BLOOD PRESSURE: 134 MMHG | HEART RATE: 73 BPM | WEIGHT: 205 LBS | HEIGHT: 68 IN

## 2017-12-07 DIAGNOSIS — L60.9 DISEASE OF NAIL: ICD-10-CM

## 2017-12-07 DIAGNOSIS — I73.9 PERIPHERAL VASCULAR DISEASE: Primary | ICD-10-CM

## 2017-12-07 PROCEDURE — 99499 UNLISTED E&M SERVICE: CPT | Mod: S$GLB,,, | Performed by: PODIATRIST

## 2017-12-07 PROCEDURE — 11721 DEBRIDE NAIL 6 OR MORE: CPT | Mod: Q9,S$GLB,, | Performed by: PODIATRIST

## 2017-12-07 PROCEDURE — 99999 PR PBB SHADOW E&M-EST. PATIENT-LVL III: CPT | Mod: PBBFAC,,, | Performed by: PODIATRIST

## 2017-12-07 NOTE — PROGRESS NOTES
Subjective:      Patient ID: Reid Herman is a 77 y.o. male.    Chief Complaint: Diabetic Foot Exam (11/8/17 [facility]) and Nail Care    Reid is a 77 y.o. male who presents to the clinic for evaluation and treatment of high risk feet. Reid has a past medical history of Anemia; Back pain; Basal cell carcinoma (06/08/2015); CAD (coronary artery disease) (10/1/2012); Cataract; DDD (degenerative disc disease), lumbar (10/28/2014); Diabetes mellitus; Diabetes mellitus type I; Diabetic retinopathy of both eyes; Heart attack; Hyperlipidemia; Hypertension; Insulin pump in place; Obesity; Poorly controlled type 1 diabetes mellitus with peripheral neuropathy (10/1/2012); Preseptal cellulitis of right eye (8/17/15); S/P CABG x 2 (2/14/2014); Sleep apnea; Squamous cell carcinoma (03/18/2013); Trouble in sleeping; Type 1 diabetes, uncontrolled, with retinopathy (10/1/2012); and Type II or unspecified type diabetes mellitus without mention of complication, not stated as uncontrolled. The patient's chief complaint is elongated toenails   This patient has documented high risk feet requiring routine maintenance secondary to diabetes mellitis and those secondary complications of diabetes, as mentioned..   PCP: Olivia Zavala MD    Date Last Seen by PCP:   Chief Complaint   Patient presents with    Diabetic Foot Exam     11/8/17 [facility]    Nail Care       Chief Complaint   Patient presents with    Diabetic Foot Exam     11/8/17 [facility]    Nail Care        Current shoe gear:  Affected Foot: Rx diabetic extra depth shoes and custom accommodative insoles     Unaffected Foot: Rx diabetic extra depth shoes and custom accommodative insoles    Hemoglobin A1C   Date Value Ref Range Status   11/01/2017 6.9 (H) 4.0 - 5.6 % Final     Comment:     According to ADA guidelines, hemoglobin A1c <7.0% represents  optimal control in non-pregnant diabetic patients. Different  metrics may apply to specific patient populations.   Standards of  Medical Care in Diabetes-2016.  For the purpose of screening for the presence of diabetes:  <5.7%     Consistent with the absence of diabetes  5.7-6.4%  Consistent with increasing risk for diabetes   (prediabetes)  >or=6.5%  Consistent with diabetes  Currently, no consensus exists for use of hemoglobin A1c  for diagnosis of diabetes for children.  This Hemoglobin A1c assay has significant interference with fetal   hemoglobin   (HbF). The results are invalid for patients with abnormal amounts of   HbF,   including those with known Hereditary Persistence   of Fetal Hemoglobin. Heterozygous hemoglobin variants (HbAS, HbAC,   HbAD, HbAE, HbA2) do not significantly interfere with this assay;   however, presence of multiple variants in a sample may impact the %   interference.     08/03/2017 6.4 (H) 4.0 - 5.6 % Final     Comment:     According to ADA guidelines, hemoglobin A1c <7.0% represents  optimal control in non-pregnant diabetic patients. Different  metrics may apply to specific patient populations.   Standards of Medical Care in Diabetes-2016.  For the purpose of screening for the presence of diabetes:  <5.7%     Consistent with the absence of diabetes  5.7-6.4%  Consistent with increasing risk for diabetes   (prediabetes)  >or=6.5%  Consistent with diabetes  Currently, no consensus exists for use of hemoglobin A1c  for diagnosis of diabetes for children.  This Hemoglobin A1c assay has significant interference with fetal   hemoglobin   (HbF). The results are invalid for patients with abnormal amounts of   HbF,   including those with known Hereditary Persistence   of Fetal Hemoglobin. Heterozygous hemoglobin variants (HbAS, HbAC,   HbAD, HbAE, HbA2) do not significantly interfere with this assay;   however, presence of multiple variants in a sample may impact the %   interference.     04/26/2017 7.5 (H) 4.5 - 6.2 % Final     Comment:     According to ADA guidelines, hemoglobin A1C <7.0% represents  optimal control  in non-pregnant diabetic patients.  Different  metrics may apply to specific populations.   Standards of Medical Care in Diabetes - 2016.  For the purpose of screening for the presence of diabetes:  <5.7%     Consistent with the absence of diabetes  5.7-6.4%  Consistent with increasing risk for diabetes   (prediabetes)  >or=6.5%  Consistent with diabetes  Currently no consensus exists for use of hemoglobin A1C  for diagnosis of diabetes for children.         Review of Systems   Constitution: Negative for chills, fever and night sweats.   Cardiovascular: Negative for chest pain and claudication.   Respiratory: Negative for cough.    Skin: Positive for dry skin and nail changes.   Musculoskeletal: Negative for arthritis, back pain, falls, gout, joint pain and joint swelling.           Objective:      Physical Exam   Constitutional: He is oriented to person, place, and time. He appears well-developed and well-nourished. No distress.   Cardiovascular: Normal rate.    Vascular: Dorsalis pedis and posterior tibial pulses are diminished bilaterally. Toes are cool to touch. Feet are warm proximally.There is decreased digital hair. Skin is atrophic and mildly edematous. R lower leg, ankle, and foot are hyperpigmented.      Musculoskeletal: Normal range of motion. He exhibits no tenderness.   Adequate joint range of motion without pain, limitation, nor crepitation Bilateral feet and ankle joints. Muscle strength is 5/5 in all groups bilaterally.        Semi  reducible IPJ digital contracture 2-3 b/l      Neurological: He is alert and oriented to person, place, and time. He exhibits normal muscle tone.   Neurologic: Millbury-Eliud 5.07 monofilamant testing absent on toes but otherwise is intact Rolly feet. Sharp/dull sensation absent Bilaterally. Light touch absent Bilaterally.     Skin: Skin is warm, dry and intact. No abrasion, no bruising, no burn and no rash noted. He is not diaphoretic. No erythema. No pallor.    Nails x  10 are elongated by  1-3mm's, thickened by 1-3 mm's, dystrophic, and are normal in  coloration . Xerosis Bilaterally. No open lesions noted.     Psychiatric: He has a normal mood and affect. His behavior is normal. Judgment and thought content normal.   Nursing note and vitals reviewed.            Assessment:       Encounter Diagnoses   Name Primary?    Peripheral vascular disease Yes    Disease of nail          Plan:       Reid was seen today for diabetic foot exam and nail care.    Diagnoses and all orders for this visit:    Peripheral vascular disease    Disease of nail    - Patient was given written and verbal instructions regarding foot condition.  I counseled the patient on his conditions, their implications and medical management.  Shoe inspection. Diabetic Foot Education. Patient reminded of the importance of good nutrition and blood sugar control to help prevent podiatric complications of diabetes. Patient instructed on proper foot hygeine. We discussed wearing proper shoe gear, daily foot inspections, never walking without protective shoe gear, never putting sharp instruments to feet    - With patient's permission, nails were aggressively reduced and debrided x 10 to their soft tissue attachment mechanically and with electric , removing all offending nail and debris. Patient relates relief following the procedure. She will continue to monitor the areas daily, inspect her feet, wear protective shoe gear when ambulatory, moisturizer to maintain skin integrity and follow in this office in approximately 2-3 months, sooner p.r.n.

## 2017-12-08 RX ORDER — CLOTRIMAZOLE AND BETAMETHASONE DIPROPIONATE 10; .64 MG/G; MG/G
CREAM TOPICAL
Qty: 15 G | Refills: 0 | Status: SHIPPED | OUTPATIENT
Start: 2017-12-08 | End: 2018-05-14 | Stop reason: SDUPTHER

## 2017-12-10 ENCOUNTER — TELEPHONE (OUTPATIENT)
Dept: INTERNAL MEDICINE | Facility: CLINIC | Age: 77
End: 2017-12-10

## 2017-12-12 ENCOUNTER — PATIENT OUTREACH (OUTPATIENT)
Dept: DIABETES | Facility: CLINIC | Age: 77
End: 2017-12-12

## 2017-12-12 ENCOUNTER — TELEPHONE (OUTPATIENT)
Dept: DERMATOLOGY | Facility: CLINIC | Age: 77
End: 2017-12-12

## 2017-12-12 NOTE — PROGRESS NOTES
Pt called the office regarding the Dexcom G 5 Sensor that he ordered.  Wanted to know what the protocol was for getting the unit started.  Advised Mr. Herman that once his CGMS system ships to let me know and I will work him in the schedule for training.      Spoke to pt also about high BG PP breakfast; eating 1/2 cup blueberries, croissant, sausage, cheese, and eggs every AM.  BG staying in the 200 range before lunch hours after taking insulin with the meal; pt counting as approx 50g which is appropriate.      Advised to consider the fat content of the breakfast; we discussed how fat impacts insulin timing and action and talked about choosing lower fat breakfast and asked pt to also keep track of his BG trends after other breakfast foods and his activity level.  We discussed also the option of doing a dual wave bolus too.     I will follow up with pt re: using the dual wave feature on his pump when he comes in for Dexcom start.

## 2017-12-12 NOTE — Clinical Note
[facility] personal Sensor Dexcom G5 was approved and will be shipped soon.. Will arrange training for patient.

## 2017-12-12 NOTE — TELEPHONE ENCOUNTER
----- Message from Christiano Miller sent at 12/12/2017  1:49 PM CST -----  Contact: PT   Pt requesting to speak to staff in regard to scheduling earlier appt to be seen for skin check, pt says that he has something unusual to be checked out sooner than later. Please call pt at [phone]

## 2017-12-21 ENCOUNTER — TELEPHONE (OUTPATIENT)
Dept: INTERNAL MEDICINE | Facility: CLINIC | Age: 77
End: 2017-12-21

## 2017-12-21 NOTE — TELEPHONE ENCOUNTER
----- Message from Soledad Navarrete sent at 12/20/2017  2:42 PM CST -----  Contact: Nancy Whitley [phone]  Received paperwork but it was incomplete and they are requesting a call back.    Please call and advise.    Thank You

## 2017-12-29 ENCOUNTER — PATIENT OUTREACH (OUTPATIENT)
Dept: OTHER | Facility: OTHER | Age: 77
End: 2017-12-29

## 2017-12-29 NOTE — PROGRESS NOTES
Last 5 Patient Entered Readings Current 30 Day Average: 128/63     Recent Readings 12/28/2017 12/27/2017 12/26/2017 12/26/2017 12/25/2017    SBP (mmHg) 123 152 148 158 137    DBP (mmHg) 53 78 64 146 60    Pulse 68 62 65 66 60        Hypertension Digital Medicine Program (HDMP): Health  Follow Up    Feeling well. Thinks that the readings that were out of his normal were due to user error.    Lifestyle Modifications:    1.Low sodium diet: yes - continuing to monitor sodium and carb intake    2.Physical activity: yes - staying active    3.Hypotension/Hypertension symptoms: no   Frequency/Alleviating factors/Precipitating factors, etc.     4.Patient has been compliant with the medication regimen.     Follow up with Mr. Reid Herman completed. No further questions or concerns. I will follow up in a few weeks to assess progress.

## 2018-01-01 ENCOUNTER — TELEPHONE (OUTPATIENT)
Dept: ENDOCRINOLOGY | Facility: CLINIC | Age: 78
End: 2018-01-01

## 2018-01-01 ENCOUNTER — TELEPHONE (OUTPATIENT)
Dept: DERMATOLOGY | Facility: CLINIC | Age: 78
End: 2018-01-01

## 2018-01-01 ENCOUNTER — PATIENT OUTREACH (OUTPATIENT)
Dept: OTHER | Facility: OTHER | Age: 78
End: 2018-01-01

## 2018-01-01 ENCOUNTER — PATIENT OUTREACH (OUTPATIENT)
Dept: DIABETES | Facility: CLINIC | Age: 78
End: 2018-01-01

## 2018-01-01 ENCOUNTER — HOSPITAL ENCOUNTER (OUTPATIENT)
Dept: RADIOLOGY | Facility: HOSPITAL | Age: 78
Discharge: HOME | End: 2018-12-18
Attending: INTERNAL MEDICINE
Payer: MEDICARE

## 2018-01-01 ENCOUNTER — OFFICE VISIT (OUTPATIENT)
Dept: ENDOCRINOLOGY | Facility: CLINIC | Age: 78
End: 2018-01-01
Payer: MEDICARE

## 2018-01-01 ENCOUNTER — PATIENT MESSAGE (OUTPATIENT)
Dept: DIABETES | Facility: CLINIC | Age: 78
End: 2018-01-01

## 2018-01-01 ENCOUNTER — LAB VISIT (OUTPATIENT)
Dept: LAB | Facility: HOSPITAL | Age: 78
End: 2018-01-01
Payer: MEDICARE

## 2018-01-01 ENCOUNTER — PATIENT MESSAGE (OUTPATIENT)
Dept: ADMINISTRATIVE | Facility: OTHER | Age: 78
End: 2018-01-01

## 2018-01-01 ENCOUNTER — OFFICE VISIT (OUTPATIENT)
Dept: URGENT CARE | Facility: CLINIC | Age: 78
End: 2018-01-01
Payer: MEDICARE

## 2018-01-01 ENCOUNTER — OFFICE VISIT (OUTPATIENT)
Dept: DERMATOLOGY | Facility: CLINIC | Age: 78
End: 2018-01-01
Payer: MEDICARE

## 2018-01-01 ENCOUNTER — OFFICE VISIT (OUTPATIENT)
Dept: HEMATOLOGY/ONCOLOGY | Facility: CLINIC | Age: 78
End: 2018-01-01
Payer: MEDICARE

## 2018-01-01 ENCOUNTER — PATIENT MESSAGE (OUTPATIENT)
Dept: ENDOCRINOLOGY | Facility: CLINIC | Age: 78
End: 2018-01-01

## 2018-01-01 ENCOUNTER — OFFICE VISIT (OUTPATIENT)
Dept: PODIATRY | Facility: CLINIC | Age: 78
End: 2018-01-01
Payer: MEDICARE

## 2018-01-01 ENCOUNTER — OFFICE VISIT (OUTPATIENT)
Dept: INTERNAL MEDICINE | Facility: CLINIC | Age: 78
End: 2018-01-01
Payer: MEDICARE

## 2018-01-01 ENCOUNTER — LAB VISIT (OUTPATIENT)
Dept: LAB | Facility: HOSPITAL | Age: 78
End: 2018-01-01
Attending: NURSE PRACTITIONER
Payer: MEDICARE

## 2018-01-01 ENCOUNTER — TELEPHONE (OUTPATIENT)
Dept: PODIATRY | Facility: CLINIC | Age: 78
End: 2018-01-01

## 2018-01-01 ENCOUNTER — PATIENT MESSAGE (OUTPATIENT)
Dept: CARDIOLOGY | Facility: CLINIC | Age: 78
End: 2018-01-01

## 2018-01-01 ENCOUNTER — PROCEDURE VISIT (OUTPATIENT)
Dept: DERMATOLOGY | Facility: CLINIC | Age: 78
End: 2018-01-01
Payer: MEDICARE

## 2018-01-01 ENCOUNTER — OFFICE VISIT (OUTPATIENT)
Dept: CARDIOLOGY | Facility: CLINIC | Age: 78
End: 2018-01-01
Payer: MEDICARE

## 2018-01-01 ENCOUNTER — TELEPHONE (OUTPATIENT)
Dept: INTERNAL MEDICINE | Facility: CLINIC | Age: 78
End: 2018-01-01

## 2018-01-01 ENCOUNTER — LAB VISIT (OUTPATIENT)
Dept: LAB | Facility: HOSPITAL | Age: 78
End: 2018-01-01
Attending: INTERNAL MEDICINE
Payer: MEDICARE

## 2018-01-01 ENCOUNTER — TELEPHONE (OUTPATIENT)
Dept: ORTHOPEDICS | Facility: CLINIC | Age: 78
End: 2018-01-01

## 2018-01-01 ENCOUNTER — OFFICE VISIT (OUTPATIENT)
Dept: OPHTHALMOLOGY | Facility: CLINIC | Age: 78
End: 2018-01-01
Payer: MEDICARE

## 2018-01-01 VITALS
BODY MASS INDEX: 30.61 KG/M2 | SYSTOLIC BLOOD PRESSURE: 125 MMHG | HEART RATE: 71 BPM | WEIGHT: 195 LBS | OXYGEN SATURATION: 98 % | DIASTOLIC BLOOD PRESSURE: 57 MMHG | TEMPERATURE: 99 F | RESPIRATION RATE: 18 BRPM | HEIGHT: 67 IN

## 2018-01-01 VITALS
WEIGHT: 205 LBS | HEART RATE: 56 BPM | SYSTOLIC BLOOD PRESSURE: 148 MMHG | BODY MASS INDEX: 32.18 KG/M2 | HEIGHT: 67 IN | DIASTOLIC BLOOD PRESSURE: 68 MMHG

## 2018-01-01 VITALS — DIASTOLIC BLOOD PRESSURE: 73 MMHG | SYSTOLIC BLOOD PRESSURE: 143 MMHG | HEART RATE: 77 BPM

## 2018-01-01 VITALS
HEART RATE: 67 BPM | HEIGHT: 67 IN | BODY MASS INDEX: 32.18 KG/M2 | WEIGHT: 205 LBS | RESPIRATION RATE: 18 BRPM | SYSTOLIC BLOOD PRESSURE: 131 MMHG | DIASTOLIC BLOOD PRESSURE: 62 MMHG

## 2018-01-01 VITALS
SYSTOLIC BLOOD PRESSURE: 142 MMHG | HEIGHT: 67 IN | DIASTOLIC BLOOD PRESSURE: 61 MMHG | BODY MASS INDEX: 31.71 KG/M2 | HEART RATE: 79 BPM | WEIGHT: 202 LBS | RESPIRATION RATE: 17 BRPM

## 2018-01-01 VITALS
HEIGHT: 67 IN | DIASTOLIC BLOOD PRESSURE: 68 MMHG | OXYGEN SATURATION: 97 % | HEART RATE: 63 BPM | SYSTOLIC BLOOD PRESSURE: 151 MMHG | WEIGHT: 205 LBS | RESPIRATION RATE: 18 BRPM | BODY MASS INDEX: 32.18 KG/M2 | TEMPERATURE: 98 F

## 2018-01-01 VITALS
WEIGHT: 202.81 LBS | HEART RATE: 74 BPM | DIASTOLIC BLOOD PRESSURE: 68 MMHG | OXYGEN SATURATION: 97 % | BODY MASS INDEX: 31.83 KG/M2 | SYSTOLIC BLOOD PRESSURE: 132 MMHG | HEIGHT: 67 IN

## 2018-01-01 VITALS
WEIGHT: 204.56 LBS | BODY MASS INDEX: 32.11 KG/M2 | HEART RATE: 63 BPM | HEIGHT: 67 IN | SYSTOLIC BLOOD PRESSURE: 126 MMHG | DIASTOLIC BLOOD PRESSURE: 74 MMHG

## 2018-01-01 VITALS
DIASTOLIC BLOOD PRESSURE: 64 MMHG | HEART RATE: 82 BPM | BODY MASS INDEX: 31.63 KG/M2 | WEIGHT: 201.5 LBS | SYSTOLIC BLOOD PRESSURE: 132 MMHG | OXYGEN SATURATION: 98 % | HEIGHT: 67 IN

## 2018-01-01 VITALS
DIASTOLIC BLOOD PRESSURE: 63 MMHG | HEIGHT: 67 IN | WEIGHT: 202.81 LBS | SYSTOLIC BLOOD PRESSURE: 139 MMHG | HEART RATE: 69 BPM | BODY MASS INDEX: 31.83 KG/M2

## 2018-01-01 VITALS
HEIGHT: 67 IN | HEART RATE: 70 BPM | WEIGHT: 201.5 LBS | SYSTOLIC BLOOD PRESSURE: 141 MMHG | BODY MASS INDEX: 31.63 KG/M2 | DIASTOLIC BLOOD PRESSURE: 63 MMHG

## 2018-01-01 VITALS — DIASTOLIC BLOOD PRESSURE: 64 MMHG | SYSTOLIC BLOOD PRESSURE: 142 MMHG | HEART RATE: 70 BPM

## 2018-01-01 DIAGNOSIS — E10.42 DIABETIC POLYNEUROPATHY ASSOCIATED WITH TYPE 1 DIABETES MELLITUS: Primary | ICD-10-CM

## 2018-01-01 DIAGNOSIS — L84 CORNS AND CALLOSITIES: ICD-10-CM

## 2018-01-01 DIAGNOSIS — I25.119 CORONARY ARTERY DISEASE INVOLVING NATIVE CORONARY ARTERY OF NATIVE HEART WITH ANGINA PECTORIS: ICD-10-CM

## 2018-01-01 DIAGNOSIS — E78.00 PURE HYPERCHOLESTEROLEMIA: ICD-10-CM

## 2018-01-01 DIAGNOSIS — L82.1 SK (SEBORRHEIC KERATOSIS): ICD-10-CM

## 2018-01-01 DIAGNOSIS — I73.9 PERIPHERAL VASCULAR DISEASE: ICD-10-CM

## 2018-01-01 DIAGNOSIS — M51.36 DDD (DEGENERATIVE DISC DISEASE), LUMBAR: ICD-10-CM

## 2018-01-01 DIAGNOSIS — C91.10 CLL (CHRONIC LYMPHOCYTIC LEUKEMIA): ICD-10-CM

## 2018-01-01 DIAGNOSIS — E10.3593 TYPE 1 DIABETES MELLITUS WITH PROLIFERATIVE RETINOPATHY OF BOTH EYES WITHOUT MACULAR EDEMA: Primary | ICD-10-CM

## 2018-01-01 DIAGNOSIS — I10 ESSENTIAL HYPERTENSION: ICD-10-CM

## 2018-01-01 DIAGNOSIS — L57.0 AK (ACTINIC KERATOSIS): ICD-10-CM

## 2018-01-01 DIAGNOSIS — Z95.1 S/P CABG (CORONARY ARTERY BYPASS GRAFT): ICD-10-CM

## 2018-01-01 DIAGNOSIS — E10.42 DIABETIC POLYNEUROPATHY ASSOCIATED WITH TYPE 1 DIABETES MELLITUS: ICD-10-CM

## 2018-01-01 DIAGNOSIS — Z79.4 TYPE 2 DIABETES MELLITUS WITH LEFT EYE AFFECTED BY SEVERE NONPROLIFERATIVE RETINOPATHY WITHOUT MACULAR EDEMA, WITH LONG-TERM CURRENT USE OF INSULIN: Primary | ICD-10-CM

## 2018-01-01 DIAGNOSIS — L60.9 DISEASE OF NAIL: ICD-10-CM

## 2018-01-01 DIAGNOSIS — M25.561 RIGHT KNEE PAIN, UNSPECIFIED CHRONICITY: Primary | ICD-10-CM

## 2018-01-01 DIAGNOSIS — R07.9 CHEST PAIN, UNSPECIFIED TYPE: ICD-10-CM

## 2018-01-01 DIAGNOSIS — H11.32 SUBCONJUNCTIVAL HEMORRHAGE OF LEFT EYE: Primary | ICD-10-CM

## 2018-01-01 DIAGNOSIS — G47.33 OBSTRUCTIVE SLEEP APNEA: ICD-10-CM

## 2018-01-01 DIAGNOSIS — C91.10 CLL (CHRONIC LYMPHOCYTIC LEUKEMIA): Primary | ICD-10-CM

## 2018-01-01 DIAGNOSIS — Z85.828 PERSONAL HISTORY OF OTHER MALIGNANT NEOPLASM OF SKIN: ICD-10-CM

## 2018-01-01 DIAGNOSIS — H35.3132 NONEXUDATIVE AGE-RELATED MACULAR DEGENERATION, BILATERAL, INTERMEDIATE DRY STAGE: ICD-10-CM

## 2018-01-01 DIAGNOSIS — D48.5 NEOPLASM OF UNCERTAIN BEHAVIOR OF SKIN: Primary | ICD-10-CM

## 2018-01-01 DIAGNOSIS — R53.83 FATIGUE, UNSPECIFIED TYPE: ICD-10-CM

## 2018-01-01 DIAGNOSIS — I65.23 BILATERAL CAROTID ARTERY STENOSIS: ICD-10-CM

## 2018-01-01 DIAGNOSIS — E10.3593 TYPE 1 DIABETES MELLITUS WITH PROLIFERATIVE RETINOPATHY OF BOTH EYES WITHOUT MACULAR EDEMA: ICD-10-CM

## 2018-01-01 DIAGNOSIS — L57.0 AK (ACTINIC KERATOSIS): Primary | ICD-10-CM

## 2018-01-01 DIAGNOSIS — N18.30 CHRONIC KIDNEY DISEASE, STAGE III (MODERATE): ICD-10-CM

## 2018-01-01 DIAGNOSIS — Z79.4 TYPE 2 DIABETES MELLITUS WITH RIGHT EYE AFFECTED BY PROLIFERATIVE RETINOPATHY WITHOUT MACULAR EDEMA, WITH LONG-TERM CURRENT USE OF INSULIN: ICD-10-CM

## 2018-01-01 DIAGNOSIS — H43.813 PVD (POSTERIOR VITREOUS DETACHMENT), BOTH EYES: ICD-10-CM

## 2018-01-01 DIAGNOSIS — I25.119 CORONARY ARTERY DISEASE INVOLVING NATIVE CORONARY ARTERY OF NATIVE HEART WITH ANGINA PECTORIS: Primary | ICD-10-CM

## 2018-01-01 DIAGNOSIS — M25.561 RIGHT KNEE PAIN, UNSPECIFIED CHRONICITY: ICD-10-CM

## 2018-01-01 DIAGNOSIS — E11.3492 TYPE 2 DIABETES MELLITUS WITH LEFT EYE AFFECTED BY SEVERE NONPROLIFERATIVE RETINOPATHY WITHOUT MACULAR EDEMA, WITH LONG-TERM CURRENT USE OF INSULIN: Primary | ICD-10-CM

## 2018-01-01 DIAGNOSIS — E11.3591 TYPE 2 DIABETES MELLITUS WITH RIGHT EYE AFFECTED BY PROLIFERATIVE RETINOPATHY WITHOUT MACULAR EDEMA, WITH LONG-TERM CURRENT USE OF INSULIN: ICD-10-CM

## 2018-01-01 DIAGNOSIS — C44.42 SQUAMOUS CELL CARCINOMA, SCALP/NECK: Primary | ICD-10-CM

## 2018-01-01 DIAGNOSIS — Z09 POSTOP CHECK: Primary | ICD-10-CM

## 2018-01-01 LAB
ALBUMIN SERPL BCP-MCNC: 4 G/DL
ALBUMIN SERPL BCP-MCNC: 4.2 G/DL
ALP SERPL-CCNC: 103 U/L
ALT SERPL W/O P-5'-P-CCNC: 17 U/L
ANION GAP SERPL CALC-SCNC: 11 MMOL/L
ANION GAP SERPL CALC-SCNC: 9 MMOL/L
ANISOCYTOSIS BLD QL SMEAR: SLIGHT
AST SERPL-CCNC: 19 U/L
BASOPHILS # BLD AUTO: ABNORMAL K/UL
BASOPHILS NFR BLD: 0.5 %
BILIRUB SERPL-MCNC: 0.8 MG/DL
BUN SERPL-MCNC: 22 MG/DL
BUN SERPL-MCNC: 23 MG/DL
CALCIUM SERPL-MCNC: 10.1 MG/DL
CALCIUM SERPL-MCNC: 9.9 MG/DL
CHLORIDE SERPL-SCNC: 96 MMOL/L
CHLORIDE SERPL-SCNC: 96 MMOL/L
CHOLEST SERPL-MCNC: 115 MG/DL
CHOLEST/HDLC SERPL: 2.3 {RATIO}
CO2 SERPL-SCNC: 26 MMOL/L
CO2 SERPL-SCNC: 28 MMOL/L
CREAT SERPL-MCNC: 1.1 MG/DL
CREAT SERPL-MCNC: 1.3 MG/DL
DIFFERENTIAL METHOD: ABNORMAL
EOSINOPHIL # BLD AUTO: ABNORMAL K/UL
EOSINOPHIL NFR BLD: 0 %
ERYTHROCYTE [DISTWIDTH] IN BLOOD BY AUTOMATED COUNT: 14.2 %
EST. GFR  (AFRICAN AMERICAN): >60 ML/MIN/1.73 M^2
EST. GFR  (AFRICAN AMERICAN): >60 ML/MIN/1.73 M^2
EST. GFR  (NON AFRICAN AMERICAN): 53 ML/MIN/1.73 M^2
EST. GFR  (NON AFRICAN AMERICAN): >60 ML/MIN/1.73 M^2
ESTIMATED AVG GLUCOSE: 131 MG/DL
ESTIMATED AVG GLUCOSE: 146 MG/DL
GLUCOSE SERPL-MCNC: 150 MG/DL
GLUCOSE SERPL-MCNC: 157 MG/DL
HBA1C MFR BLD HPLC: 6.2 %
HBA1C MFR BLD HPLC: 6.7 %
HCT VFR BLD AUTO: 36.1 %
HDLC SERPL-MCNC: 49 MG/DL
HDLC SERPL: 42.6 %
HGB BLD-MCNC: 11.7 G/DL
IMM GRANULOCYTES # BLD AUTO: ABNORMAL K/UL
IMM GRANULOCYTES NFR BLD AUTO: ABNORMAL %
LDH SERPL L TO P-CCNC: 151 U/L
LDLC SERPL CALC-MCNC: 58.2 MG/DL
LYMPHOCYTES # BLD AUTO: ABNORMAL K/UL
LYMPHOCYTES NFR BLD: 96.5 %
MCH RBC QN AUTO: 32 PG
MCHC RBC AUTO-ENTMCNC: 32.4 G/DL
MCV RBC AUTO: 99 FL
MONOCYTES # BLD AUTO: ABNORMAL K/UL
MONOCYTES NFR BLD: 0 %
NEUTROPHILS NFR BLD: 3 %
NONHDLC SERPL-MCNC: 66 MG/DL
NRBC BLD-RTO: 0 /100 WBC
PATH REV BLD -IMP: NORMAL
PHOSPHATE SERPL-MCNC: 3.6 MG/DL
PLATELET # BLD AUTO: 161 K/UL
PLATELET BLD QL SMEAR: ABNORMAL
PMV BLD AUTO: 9.5 FL
POTASSIUM SERPL-SCNC: 4.6 MMOL/L
POTASSIUM SERPL-SCNC: 5.5 MMOL/L
PROT SERPL-MCNC: 6.5 G/DL
RBC # BLD AUTO: 3.66 M/UL
SMUDGE CELLS BLD QL SMEAR: PRESENT
SODIUM SERPL-SCNC: 133 MMOL/L
SODIUM SERPL-SCNC: 133 MMOL/L
TRIGL SERPL-MCNC: 39 MG/DL
WBC # BLD AUTO: 41.25 K/UL

## 2018-01-01 PROCEDURE — 85060 BLOOD SMEAR INTERPRETATION: CPT | Mod: ,,, | Performed by: PATHOLOGY

## 2018-01-01 PROCEDURE — 3078F DIAST BP <80 MM HG: CPT | Mod: CPTII,HCNC,S$GLB, | Performed by: INTERNAL MEDICINE

## 2018-01-01 PROCEDURE — 99999 PR PBB SHADOW E&M-EST. PATIENT-LVL V: CPT | Mod: PBBFAC,HCNC,, | Performed by: INTERNAL MEDICINE

## 2018-01-01 PROCEDURE — 83036 HEMOGLOBIN GLYCOSYLATED A1C: CPT

## 2018-01-01 PROCEDURE — 99212 OFFICE O/P EST SF 10 MIN: CPT | Mod: PBBFAC | Performed by: DERMATOLOGY

## 2018-01-01 PROCEDURE — 88305 TISSUE EXAM BY PATHOLOGIST: CPT | Performed by: PATHOLOGY

## 2018-01-01 PROCEDURE — 99214 OFFICE O/P EST MOD 30 MIN: CPT | Mod: S$GLB,,, | Performed by: FAMILY MEDICINE

## 2018-01-01 PROCEDURE — 3078F DIAST BP <80 MM HG: CPT | Mod: CPTII,,, | Performed by: NURSE PRACTITIONER

## 2018-01-01 PROCEDURE — 80069 RENAL FUNCTION PANEL: CPT

## 2018-01-01 PROCEDURE — 85027 COMPLETE CBC AUTOMATED: CPT

## 2018-01-01 PROCEDURE — 11721 DEBRIDE NAIL 6 OR MORE: CPT | Mod: Q9,PBBFAC | Performed by: PODIATRIST

## 2018-01-01 PROCEDURE — 99999 PR PBB SHADOW E&M-EST. PATIENT-LVL II: CPT | Mod: PBBFAC,,, | Performed by: DERMATOLOGY

## 2018-01-01 PROCEDURE — 1101F PT FALLS ASSESS-DOCD LE1/YR: CPT | Mod: CPTII,,, | Performed by: NURSE PRACTITIONER

## 2018-01-01 PROCEDURE — 80061 LIPID PANEL: CPT

## 2018-01-01 PROCEDURE — 99213 OFFICE O/P EST LOW 20 MIN: CPT | Mod: 25,S$PBB,, | Performed by: DERMATOLOGY

## 2018-01-01 PROCEDURE — 3075F SYST BP GE 130 - 139MM HG: CPT | Mod: CPTII,HCNC,S$GLB, | Performed by: INTERNAL MEDICINE

## 2018-01-01 PROCEDURE — 73560 X-RAY EXAM OF KNEE 1 OR 2: CPT | Mod: 26,HCNC,RT, | Performed by: RADIOLOGY

## 2018-01-01 PROCEDURE — 92014 COMPRE OPH EXAM EST PT 1/>: CPT | Mod: HCNC,S$GLB,, | Performed by: OPHTHALMOLOGY

## 2018-01-01 PROCEDURE — 11100 PR BIOPSY OF SKIN LESION: CPT | Mod: 59,S$GLB,, | Performed by: DERMATOLOGY

## 2018-01-01 PROCEDURE — 3078F DIAST BP <80 MM HG: CPT | Mod: CPTII,S$GLB,, | Performed by: INTERNAL MEDICINE

## 2018-01-01 PROCEDURE — 99999 PR PBB SHADOW E&M-EST. PATIENT-LVL III: CPT | Mod: PBBFAC,,, | Performed by: PODIATRIST

## 2018-01-01 PROCEDURE — 17003 DESTRUCT PREMALG LES 2-14: CPT | Mod: PBBFAC | Performed by: DERMATOLOGY

## 2018-01-01 PROCEDURE — 99499 UNLISTED E&M SERVICE: CPT | Mod: HCNC,S$GLB,, | Performed by: NURSE PRACTITIONER

## 2018-01-01 PROCEDURE — 11721 DEBRIDE NAIL 6 OR MORE: CPT | Mod: Q9,HCNC,S$GLB, | Performed by: PODIATRIST

## 2018-01-01 PROCEDURE — 99214 OFFICE O/P EST MOD 30 MIN: CPT | Mod: HCNC,S$GLB,, | Performed by: INTERNAL MEDICINE

## 2018-01-01 PROCEDURE — 36415 COLL VENOUS BLD VENIPUNCTURE: CPT

## 2018-01-01 PROCEDURE — 17000 DESTRUCT PREMALG LESION: CPT | Mod: S$PBB,,, | Performed by: DERMATOLOGY

## 2018-01-01 PROCEDURE — 99499 UNLISTED E&M SERVICE: CPT | Mod: HCNC,S$GLB,, | Performed by: PODIATRIST

## 2018-01-01 PROCEDURE — 3074F SYST BP LT 130 MM HG: CPT | Mod: CPTII,S$GLB,, | Performed by: NURSE PRACTITIONER

## 2018-01-01 PROCEDURE — 3077F SYST BP >= 140 MM HG: CPT | Mod: CPTII,,, | Performed by: NURSE PRACTITIONER

## 2018-01-01 PROCEDURE — 99499 UNLISTED E&M SERVICE: CPT | Mod: HCNC,S$GLB,, | Performed by: OPHTHALMOLOGY

## 2018-01-01 PROCEDURE — 99214 OFFICE O/P EST MOD 30 MIN: CPT | Mod: PBBFAC | Performed by: NURSE PRACTITIONER

## 2018-01-01 PROCEDURE — 95251 CONT GLUC MNTR ANALYSIS I&R: CPT | Mod: ,,, | Performed by: NURSE PRACTITIONER

## 2018-01-01 PROCEDURE — 99999 PR PBB SHADOW E&M-EST. PATIENT-LVL III: CPT | Mod: PBBFAC,HCNC,, | Performed by: OPHTHALMOLOGY

## 2018-01-01 PROCEDURE — 99499 UNLISTED E&M SERVICE: CPT | Mod: S$GLB,,, | Performed by: DERMATOLOGY

## 2018-01-01 PROCEDURE — 17003 DESTRUCT PREMALG LES 2-14: CPT | Mod: S$PBB,,, | Performed by: DERMATOLOGY

## 2018-01-01 PROCEDURE — 80053 COMPREHEN METABOLIC PANEL: CPT

## 2018-01-01 PROCEDURE — 99999 PR PBB SHADOW E&M-EST. PATIENT-LVL III: CPT | Mod: PBBFAC,HCNC,, | Performed by: INTERNAL MEDICINE

## 2018-01-01 PROCEDURE — 3074F SYST BP LT 130 MM HG: CPT | Mod: CPTII,S$GLB,, | Performed by: INTERNAL MEDICINE

## 2018-01-01 PROCEDURE — 99499 UNLISTED E&M SERVICE: CPT | Mod: S$GLB,,, | Performed by: PODIATRIST

## 2018-01-01 PROCEDURE — 92226 PR SPECIAL EYE EXAM, SUBSEQUENT: CPT | Mod: 50,HCNC,S$GLB, | Performed by: OPHTHALMOLOGY

## 2018-01-01 PROCEDURE — 92134 CPTRZ OPH DX IMG PST SGM RTA: CPT | Mod: HCNC,S$GLB,, | Performed by: OPHTHALMOLOGY

## 2018-01-01 PROCEDURE — 3074F SYST BP LT 130 MM HG: CPT | Mod: CPTII,S$GLB,, | Performed by: FAMILY MEDICINE

## 2018-01-01 PROCEDURE — 99215 OFFICE O/P EST HI 40 MIN: CPT | Mod: S$PBB,,, | Performed by: NURSE PRACTITIONER

## 2018-01-01 PROCEDURE — 17000 DESTRUCT PREMALG LESION: CPT | Mod: PBBFAC | Performed by: DERMATOLOGY

## 2018-01-01 PROCEDURE — 99999 PR PBB SHADOW E&M-EST. PATIENT-LVL IV: CPT | Mod: PBBFAC,,, | Performed by: NURSE PRACTITIONER

## 2018-01-01 PROCEDURE — 99213 OFFICE O/P EST LOW 20 MIN: CPT | Mod: S$GLB,,, | Performed by: INTERNAL MEDICINE

## 2018-01-01 PROCEDURE — 17000 DESTRUCT PREMALG LESION: CPT | Mod: S$GLB,,, | Performed by: DERMATOLOGY

## 2018-01-01 PROCEDURE — 3078F DIAST BP <80 MM HG: CPT | Mod: CPTII,S$GLB,, | Performed by: NURSE PRACTITIONER

## 2018-01-01 PROCEDURE — 1101F PT FALLS ASSESS-DOCD LE1/YR: CPT | Mod: CPTII,HCNC,S$GLB, | Performed by: INTERNAL MEDICINE

## 2018-01-01 PROCEDURE — 11721 DEBRIDE NAIL 6 OR MORE: CPT | Mod: Q9,S$GLB,, | Performed by: PODIATRIST

## 2018-01-01 PROCEDURE — 99999 PR PBB SHADOW E&M-EST. PATIENT-LVL I: CPT | Mod: PBBFAC,HCNC,, | Performed by: PODIATRIST

## 2018-01-01 PROCEDURE — 83615 LACTATE (LD) (LDH) ENZYME: CPT

## 2018-01-01 PROCEDURE — 11721 DEBRIDE NAIL 6 OR MORE: CPT | Mod: Q9,S$PBB,, | Performed by: PODIATRIST

## 2018-01-01 PROCEDURE — 3078F DIAST BP <80 MM HG: CPT | Mod: CPTII,S$GLB,, | Performed by: FAMILY MEDICINE

## 2018-01-01 PROCEDURE — 99999 PR PBB SHADOW E&M-EST. PATIENT-LVL III: CPT | Mod: PBBFAC,,, | Performed by: INTERNAL MEDICINE

## 2018-01-01 PROCEDURE — 99214 OFFICE O/P EST MOD 30 MIN: CPT | Mod: S$GLB,,, | Performed by: INTERNAL MEDICINE

## 2018-01-01 PROCEDURE — 85007 BL SMEAR W/DIFF WBC COUNT: CPT

## 2018-01-01 PROCEDURE — 99215 OFFICE O/P EST HI 40 MIN: CPT | Mod: S$GLB,,, | Performed by: NURSE PRACTITIONER

## 2018-01-01 PROCEDURE — 3075F SYST BP GE 130 - 139MM HG: CPT | Mod: CPTII,S$GLB,, | Performed by: INTERNAL MEDICINE

## 2018-01-01 PROCEDURE — 13121 CMPLX RPR S/A/L 2.6-7.5 CM: CPT | Mod: 51,S$GLB,, | Performed by: DERMATOLOGY

## 2018-01-01 PROCEDURE — 99213 OFFICE O/P EST LOW 20 MIN: CPT | Mod: PBBFAC | Performed by: PODIATRIST

## 2018-01-01 PROCEDURE — 73560 X-RAY EXAM OF KNEE 1 OR 2: CPT | Mod: TC,HCNC,RT

## 2018-01-01 PROCEDURE — 99024 POSTOP FOLLOW-UP VISIT: CPT | Mod: S$GLB,,, | Performed by: DERMATOLOGY

## 2018-01-01 PROCEDURE — 99499 UNLISTED E&M SERVICE: CPT | Mod: S$PBB,,, | Performed by: PODIATRIST

## 2018-01-01 PROCEDURE — 17312 MOHS ADDL STAGE: CPT | Mod: S$GLB,,, | Performed by: DERMATOLOGY

## 2018-01-01 PROCEDURE — 17311 MOHS 1 STAGE H/N/HF/G: CPT | Mod: S$GLB,,, | Performed by: DERMATOLOGY

## 2018-01-01 PROCEDURE — 99213 OFFICE O/P EST LOW 20 MIN: CPT | Mod: 25,S$GLB,, | Performed by: DERMATOLOGY

## 2018-01-01 PROCEDURE — 1101F PT FALLS ASSESS-DOCD LE1/YR: CPT | Mod: CPTII,,, | Performed by: DERMATOLOGY

## 2018-01-01 PROCEDURE — 95251 CONT GLUC MNTR ANALYSIS I&R: CPT | Mod: S$GLB,,, | Performed by: NURSE PRACTITIONER

## 2018-01-01 PROCEDURE — 17003 DESTRUCT PREMALG LES 2-14: CPT | Mod: S$GLB,,, | Performed by: DERMATOLOGY

## 2018-01-01 RX ORDER — CLOTRIMAZOLE AND BETAMETHASONE DIPROPIONATE 10; .64 MG/G; MG/G
CREAM TOPICAL
Qty: 15 G | Refills: 0 | Status: SHIPPED | OUTPATIENT
Start: 2018-01-01 | End: 2019-01-01 | Stop reason: SDUPTHER

## 2018-01-01 RX ORDER — ISOSORBIDE MONONITRATE 60 MG/1
60 TABLET, EXTENDED RELEASE ORAL DAILY
Qty: 90 TABLET | Refills: 3 | Status: SHIPPED | OUTPATIENT
Start: 2018-01-01 | End: 2018-01-01

## 2018-01-01 RX ORDER — ISOSORBIDE MONONITRATE 120 MG/1
120 TABLET, EXTENDED RELEASE ORAL DAILY
Qty: 90 TABLET | Refills: 3 | Status: SHIPPED | OUTPATIENT
Start: 2018-01-01 | End: 2018-01-01 | Stop reason: SDUPTHER

## 2018-01-01 RX ORDER — METOPROLOL SUCCINATE 100 MG/1
100 TABLET, EXTENDED RELEASE ORAL NIGHTLY
Qty: 90 TABLET | Refills: 3 | Status: SHIPPED | OUTPATIENT
Start: 2018-01-01

## 2018-01-01 RX ORDER — FLUOROURACIL 50 MG/G
CREAM TOPICAL
Qty: 40 G | Refills: 1 | Status: SHIPPED | OUTPATIENT
Start: 2018-01-01 | End: 2019-01-01 | Stop reason: ALTCHOICE

## 2018-01-01 RX ORDER — NITROGLYCERIN 0.4 MG/1
0.4 TABLET SUBLINGUAL EVERY 5 MIN PRN
Qty: 100 TABLET | Refills: 3 | Status: SHIPPED | OUTPATIENT
Start: 2018-01-01 | End: 2018-01-01 | Stop reason: SDUPTHER

## 2018-01-01 RX ORDER — NITROGLYCERIN 0.4 MG/1
0.4 TABLET SUBLINGUAL EVERY 5 MIN PRN
Qty: 100 TABLET | Refills: 3 | Status: SHIPPED | OUTPATIENT
Start: 2018-01-01 | End: 2019-01-01 | Stop reason: SDUPTHER

## 2018-01-01 RX ORDER — ATORVASTATIN CALCIUM 80 MG/1
TABLET, FILM COATED ORAL
Qty: 45 TABLET | Refills: 3 | Status: SHIPPED | OUTPATIENT
Start: 2018-01-01

## 2018-01-01 RX ORDER — ISOSORBIDE MONONITRATE 120 MG/1
120 TABLET, EXTENDED RELEASE ORAL DAILY
Qty: 90 TABLET | Refills: 3 | Status: ON HOLD | OUTPATIENT
Start: 2018-01-01 | End: 2019-01-01 | Stop reason: HOSPADM

## 2018-01-08 RX ORDER — INSULIN ASPART 100 [IU]/ML
INJECTION, SOLUTION INTRAVENOUS; SUBCUTANEOUS
Qty: 30 ML | Refills: 11 | Status: SHIPPED | OUTPATIENT
Start: 2018-01-08 | End: 2019-01-01 | Stop reason: SDUPTHER

## 2018-01-10 ENCOUNTER — TELEPHONE (OUTPATIENT)
Dept: INTERNAL MEDICINE | Facility: CLINIC | Age: 78
End: 2018-01-10

## 2018-01-10 NOTE — TELEPHONE ENCOUNTER
----- Message from Kimberly Morales sent at 1/9/2018 10:46 AM CST -----  Contact: Audra/Hoa/[phone] reference # [id number]  Audra called in regards needing to verify if patient have diabetics and cardio vascular disease. Please call and advise.       Thank you!!!

## 2018-01-12 ENCOUNTER — OFFICE VISIT (OUTPATIENT)
Dept: HEMATOLOGY/ONCOLOGY | Facility: CLINIC | Age: 78
End: 2018-01-12
Payer: MEDICARE

## 2018-01-12 ENCOUNTER — LAB VISIT (OUTPATIENT)
Dept: LAB | Facility: HOSPITAL | Age: 78
End: 2018-01-12
Payer: MEDICARE

## 2018-01-12 VITALS
SYSTOLIC BLOOD PRESSURE: 144 MMHG | DIASTOLIC BLOOD PRESSURE: 65 MMHG | OXYGEN SATURATION: 97 % | WEIGHT: 202.38 LBS | HEIGHT: 68 IN | TEMPERATURE: 98 F | RESPIRATION RATE: 18 BRPM | HEART RATE: 71 BPM | BODY MASS INDEX: 30.67 KG/M2

## 2018-01-12 DIAGNOSIS — I70.0 ATHEROSCLEROSIS OF AORTA: ICD-10-CM

## 2018-01-12 DIAGNOSIS — E10.3593 TYPE 1 DIABETES MELLITUS WITH PROLIFERATIVE RETINOPATHY OF BOTH EYES WITHOUT MACULAR EDEMA: ICD-10-CM

## 2018-01-12 DIAGNOSIS — I25.119 CORONARY ARTERY DISEASE INVOLVING NATIVE CORONARY ARTERY OF NATIVE HEART WITH ANGINA PECTORIS: ICD-10-CM

## 2018-01-12 DIAGNOSIS — E10.42 DIABETIC POLYNEUROPATHY ASSOCIATED WITH TYPE 1 DIABETES MELLITUS: ICD-10-CM

## 2018-01-12 DIAGNOSIS — I73.9 PERIPHERAL VASCULAR DISEASE: ICD-10-CM

## 2018-01-12 DIAGNOSIS — E10.42 POORLY CONTROLLED TYPE 1 DIABETES MELLITUS WITH PERIPHERAL NEUROPATHY: Chronic | ICD-10-CM

## 2018-01-12 DIAGNOSIS — C91.10 CLL (CHRONIC LYMPHOCYTIC LEUKEMIA): ICD-10-CM

## 2018-01-12 DIAGNOSIS — I20.89 STABLE ANGINA: ICD-10-CM

## 2018-01-12 DIAGNOSIS — E10.65 POORLY CONTROLLED TYPE 1 DIABETES MELLITUS WITH PERIPHERAL NEUROPATHY: Chronic | ICD-10-CM

## 2018-01-12 DIAGNOSIS — C91.10 CLL (CHRONIC LYMPHOCYTIC LEUKEMIA): Primary | ICD-10-CM

## 2018-01-12 PROBLEM — D69.6 THROMBOCYTOPENIA, UNSPECIFIED: Status: RESOLVED | Noted: 2017-09-08 | Resolved: 2018-01-12

## 2018-01-12 LAB
ALBUMIN SERPL BCP-MCNC: 3.9 G/DL
ALP SERPL-CCNC: 93 U/L
ALT SERPL W/O P-5'-P-CCNC: 18 U/L
ANION GAP SERPL CALC-SCNC: 7 MMOL/L
ANISOCYTOSIS BLD QL SMEAR: SLIGHT
AST SERPL-CCNC: 21 U/L
BASOPHILS # BLD AUTO: ABNORMAL K/UL
BASOPHILS NFR BLD: 0 %
BILIRUB SERPL-MCNC: 0.7 MG/DL
BUN SERPL-MCNC: 23 MG/DL
CALCIUM SERPL-MCNC: 10.1 MG/DL
CHLORIDE SERPL-SCNC: 97 MMOL/L
CO2 SERPL-SCNC: 31 MMOL/L
CREAT SERPL-MCNC: 1.3 MG/DL
DIFFERENTIAL METHOD: ABNORMAL
EOSINOPHIL # BLD AUTO: ABNORMAL K/UL
EOSINOPHIL NFR BLD: 0 %
ERYTHROCYTE [DISTWIDTH] IN BLOOD BY AUTOMATED COUNT: 13.6 %
EST. GFR  (AFRICAN AMERICAN): >60 ML/MIN/1.73 M^2
EST. GFR  (NON AFRICAN AMERICAN): 53.4 ML/MIN/1.73 M^2
GLUCOSE SERPL-MCNC: 177 MG/DL
HCT VFR BLD AUTO: 35.6 %
HGB BLD-MCNC: 11.7 G/DL
HYPOCHROMIA BLD QL SMEAR: ABNORMAL
IMM GRANULOCYTES # BLD AUTO: ABNORMAL K/UL
IMM GRANULOCYTES NFR BLD AUTO: ABNORMAL %
LDH SERPL L TO P-CCNC: 158 U/L
LYMPHOCYTES # BLD AUTO: ABNORMAL K/UL
LYMPHOCYTES NFR BLD: 92 %
MCH RBC QN AUTO: 31.8 PG
MCHC RBC AUTO-ENTMCNC: 32.9 G/DL
MCV RBC AUTO: 97 FL
MONOCYTES # BLD AUTO: ABNORMAL K/UL
MONOCYTES NFR BLD: 1 %
NEUTROPHILS NFR BLD: 7 %
NRBC BLD-RTO: 0 /100 WBC
PATH REV BLD -IMP: NORMAL
PLATELET # BLD AUTO: 165 K/UL
PLATELET BLD QL SMEAR: ABNORMAL
PMV BLD AUTO: 9.5 FL
POTASSIUM SERPL-SCNC: 5 MMOL/L
PROT SERPL-MCNC: 6.8 G/DL
RBC # BLD AUTO: 3.68 M/UL
SODIUM SERPL-SCNC: 135 MMOL/L
WBC # BLD AUTO: 46.94 K/UL

## 2018-01-12 PROCEDURE — 85007 BL SMEAR W/DIFF WBC COUNT: CPT

## 2018-01-12 PROCEDURE — 85060 BLOOD SMEAR INTERPRETATION: CPT | Mod: ,,, | Performed by: PATHOLOGY

## 2018-01-12 PROCEDURE — 80053 COMPREHEN METABOLIC PANEL: CPT

## 2018-01-12 PROCEDURE — 99499 UNLISTED E&M SERVICE: CPT | Mod: S$GLB,,, | Performed by: INTERNAL MEDICINE

## 2018-01-12 PROCEDURE — 99214 OFFICE O/P EST MOD 30 MIN: CPT | Mod: S$GLB,,, | Performed by: INTERNAL MEDICINE

## 2018-01-12 PROCEDURE — 83615 LACTATE (LD) (LDH) ENZYME: CPT

## 2018-01-12 PROCEDURE — 99999 PR PBB SHADOW E&M-EST. PATIENT-LVL IV: CPT | Mod: PBBFAC,,, | Performed by: INTERNAL MEDICINE

## 2018-01-12 PROCEDURE — 85027 COMPLETE CBC AUTOMATED: CPT

## 2018-01-12 NOTE — PROGRESS NOTES
HEMATOLOGIC MALIGNANCIES PROGRESS NOTE    IDENTIFYING STATEMENT   Reid Herman (Manuel) is a 77 y.o. male with a  of 1940 from [place] with the diagnosis of CLL.      ONCOLOGY HISTORY:    1. CLL - Amaral Stage 0   A. 2010 - Diagnosed with CLL    2. DM-1, with the following complications   A. Polyneuropathy   B. Retinopathy   C. nephropathy  3. CAD  4. HTN  5. Peripheral vascular disease  6. Atherosclerosis of the aorta  7. CKD-III  8. BPH  9. MINDA    INTERVAL HISTORY:      Mr. Herman returns to clinic for follow-up of his CLL. He has been feeling well since his last visit. He denies any new lymphadenopathy or weight loss. No night sweats. No early satiety or abdominal fullness. He uses an insulin pump for his DM-I. He otherwise has been feeling well and has no complaints today.     Past Medical History, Past Social History and Past Family History have been reviewed and are unchanged except as noted in the interval history.    MEDICATIONS:     Current Outpatient Prescriptions on File Prior to Visit   Medication Sig Dispense Refill    ACCU-CHEK SHARON PLUS TEST STRP Strp TEST BLOOD SUGAR FIVE TIMES DAILY 450 strip 3    ACCU-CHEK FASTCLIX Misc TEST 6 TIMES DAILY 200 each 11    alpha lipoic acid 600 mg Cap Take 1 capsule by mouth once daily.        aspirin (ECOTRIN) 325 MG EC tablet Take 325 mg by mouth once daily.      atorvastatin (LIPITOR) 80 MG tablet Take 1 tablet (80 mg total) by mouth nightly. 90 tablet 3    BD INSULIN SYRINGE ULT-FINE II 0.3 mL 31 gauge x 5/16 Syrg       clotrimazole-betamethasone 1-0.05% (LOTRISONE) cream APPLY EXTERNALLY TO THE AFFECTED AREA TWICE DAILY 15 g 0    fosinopril-hydrochlorothiazide (MONOPRIL-HCT) 10-12.5 mg per tablet Take 3 tablets by mouth once daily. 270 tablet 3    insulin syringe-needle U-100 (ADVOCATE SYRINGES) 0.3 mL 30 gauge x 5/16 Syrg Use as directed 200 each 11    mecobal-levomefolat Ca-B6 phos (L-METHYL-B6-B12) 3-35-2 mg Tab Take 1 tablet by  "mouth once daily. 90 tablet 5    metformin (GLUCOPHAGE) 500 MG tablet TAKE 1 TABLET TWICE DAILY 180 tablet 2    metoprolol succinate (TOPROL-XL) 100 MG 24 hr tablet TAKE ONE TABLET BY MOUTH EVERY EVENING 90 tablet 3    multivitamin capsule Take 1 capsule by mouth once daily.        nitroGLYCERIN (NITROSTAT) 0.4 MG SL tablet Place 1 tablet (0.4 mg total) under the tongue every 5 (five) minutes as needed for Chest pain. May dispense a two pack of 25 tablets. 50 tablet 6    NOVOLOG 100 unit/mL injection INJECT UP TO MAX  UNITS UNDER THE SKIN VIA INSULIN PUMP DAILY AS DIRECTED 30 mL 11     No current facility-administered medications on file prior to visit.        ALLERGIES: Review of patient's allergies indicates:  No Known Allergies     ROS:       Review of Systems   Constitutional: Negative for diaphoresis, fatigue, fever and unexpected weight change.   HENT:   Negative for lump/mass and sore throat.    Eyes: Negative for icterus.   Respiratory: Negative for cough and shortness of breath.    Cardiovascular: Negative for chest pain and palpitations.   Gastrointestinal: Negative for abdominal distention, constipation, diarrhea, nausea and vomiting.   Genitourinary: Negative for dysuria and frequency.    Musculoskeletal: Negative for arthralgias, gait problem and myalgias.   Skin: Negative for rash.   Neurological: Negative for dizziness, gait problem and headaches.   Hematological: Negative for adenopathy. Does not bruise/bleed easily.   Psychiatric/Behavioral: The patient is not nervous/anxious.        PHYSICAL EXAM:  Vitals:    01/12/18 0940   BP: (!) 144/65   Pulse: 71   Resp: 18   Temp: 98.2 °F (36.8 °C)   TempSrc: Oral   SpO2: 97%   Weight: 91.8 kg (202 lb 6.1 oz)   Height: 5' 8" (1.727 m)   PainSc: 0-No pain       Physical Exam   Constitutional: He is oriented to person, place, and time. He appears well-developed and well-nourished. No distress.   HENT:   Head: Normocephalic and atraumatic. "   Mouth/Throat: Mucous membranes are normal. No oral lesions.   Eyes: Conjunctivae are normal.   Neck: No thyromegaly present.   Cardiovascular: Normal rate, regular rhythm and normal heart sounds.    No murmur heard.  Pulmonary/Chest: Breath sounds normal. He has no wheezes. He has no rales.   Abdominal: Soft. He exhibits no distension and no mass. There is no splenomegaly or hepatomegaly. There is no tenderness.   Lymphadenopathy:     He has no cervical adenopathy.        Right cervical: No deep cervical adenopathy present.       Left cervical: No deep cervical adenopathy present.     He has no axillary adenopathy.        Right: No inguinal adenopathy present.        Left: No inguinal adenopathy present.   Neurological: He is alert and oriented to person, place, and time. He has normal strength and normal reflexes. No cranial nerve deficit. Coordination normal.   Skin: No rash noted.       LAB:   Results for orders placed or performed in visit on 01/12/18   CBC auto differential   Result Value Ref Range    WBC 46.94 (H) 3.90 - 12.70 K/uL    RBC 3.68 (L) 4.60 - 6.20 M/uL    Hemoglobin 11.7 (L) 14.0 - 18.0 g/dL    Hematocrit 35.6 (L) 40.0 - 54.0 %    MCV 97 82 - 98 fL    MCH 31.8 (H) 27.0 - 31.0 pg    MCHC 32.9 32.0 - 36.0 g/dL    RDW 13.6 11.5 - 14.5 %    Platelets 165 150 - 350 K/uL    MPV 9.5 9.2 - 12.9 fL    Immature Granulocytes CANCELED 0.0 - 0.5 %    Immature Grans (Abs) CANCELED 0.00 - 0.04 K/uL    Lymph # CANCELED 1.0 - 4.8 K/uL    Mono # CANCELED 0.3 - 1.0 K/uL    Eos # CANCELED 0.0 - 0.5 K/uL    Baso # CANCELED 0.00 - 0.20 K/uL    nRBC 0 0 /100 WBC    Gran% 7.0 (L) 38.0 - 73.0 %    Lymph% 92.0 (H) 18.0 - 48.0 %    Mono% 1.0 (L) 4.0 - 15.0 %    Eosinophil% 0.0 0.0 - 8.0 %    Basophil% 0.0 0.0 - 1.9 %    Platelet Estimate Appears normal     Aniso Slight     Hypo Occasional     Differential Method Manual    Comprehensive metabolic panel   Result Value Ref Range    Sodium 135 (L) 136 - 145 mmol/L    Potassium  5.0 3.5 - 5.1 mmol/L    Chloride 97 95 - 110 mmol/L    CO2 31 (H) 23 - 29 mmol/L    Glucose 177 (H) 70 - 110 mg/dL    BUN, Bld 23 8 - 23 mg/dL    Creatinine 1.3 0.5 - 1.4 mg/dL    Calcium 10.1 8.7 - 10.5 mg/dL    Total Protein 6.8 6.0 - 8.4 g/dL    Albumin 3.9 3.5 - 5.2 g/dL    Total Bilirubin 0.7 0.1 - 1.0 mg/dL    Alkaline Phosphatase 93 55 - 135 U/L    AST 21 10 - 40 U/L    ALT 18 10 - 44 U/L    Anion Gap 7 (L) 8 - 16 mmol/L    eGFR if African American >60.0 >60 mL/min/1.73 m^2    eGFR if non  53.4 (A) >60 mL/min/1.73 m^2   Lactate dehydrogenase   Result Value Ref Range     110 - 260 U/L   Pathologist Review   Result Value Ref Range    Pathologist Review Peripheral Smear REVIEWED        PROBLEMS ASSESSED THIS VISIT:    1. CLL (chronic lymphocytic leukemia)    2. Atherosclerosis of aorta    3. Peripheral vascular disease    4. Stable angina    5. Diabetic polyneuropathy associated with type 1 diabetes mellitus    6. 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 Poorly controlled type 1 diabetes mellitus with peripheral neuropathy    8. Coronary artery disease involving native coronary artery of native heart with angina pectoris        PLAN:       1. CLL - Mr. Herman has mostly stable CBC with absolute lymphocyte count of 43,185 today. He has no symptoms and no indication for treatment. He has had long-term stability. We will continue to monitor in 6 months.    2. Atherosclerosis of the aorta  3. Peripheral vascular disease  4. Angina  5. Coronary artery disease    These are chronic problems and likely pose greater threat to Mr. Herman than his CLL. He is on appropriate statin therapy. He will follow with his PCP (Dr. Zavala)    6. DM-1, complicated by neuropathy and retinopathy    On insulin. Labs today show acceptable glucose control. Continue current therapy and follow-up with other providers for continued management.    Follow-up in  6 months    Shailesh Garcia MD  Hematology and Stem Cell Transplant

## 2018-01-16 PROBLEM — D69.2 SENILE PURPURA: Status: RESOLVED | Noted: 2017-01-19 | Resolved: 2018-01-16

## 2018-01-22 ENCOUNTER — OFFICE VISIT (OUTPATIENT)
Dept: DERMATOLOGY | Facility: CLINIC | Age: 78
End: 2018-01-22
Payer: MEDICARE

## 2018-01-22 DIAGNOSIS — Z85.828 PERSONAL HISTORY OF OTHER MALIGNANT NEOPLASM OF SKIN: ICD-10-CM

## 2018-01-22 DIAGNOSIS — D18.00 ANGIOMA: ICD-10-CM

## 2018-01-22 DIAGNOSIS — L82.1 SK (SEBORRHEIC KERATOSIS): ICD-10-CM

## 2018-01-22 DIAGNOSIS — L57.0 AK (ACTINIC KERATOSIS): Primary | ICD-10-CM

## 2018-01-22 PROCEDURE — 99213 OFFICE O/P EST LOW 20 MIN: CPT | Mod: 25,S$GLB,, | Performed by: DERMATOLOGY

## 2018-01-22 PROCEDURE — 99999 PR PBB SHADOW E&M-EST. PATIENT-LVL II: CPT | Mod: PBBFAC,,, | Performed by: DERMATOLOGY

## 2018-01-22 PROCEDURE — 17000 DESTRUCT PREMALG LESION: CPT | Mod: S$GLB,,, | Performed by: DERMATOLOGY

## 2018-01-22 PROCEDURE — 17003 DESTRUCT PREMALG LES 2-14: CPT | Mod: S$GLB,,, | Performed by: DERMATOLOGY

## 2018-01-22 RX ORDER — ATORVASTATIN CALCIUM 80 MG/1
TABLET, FILM COATED ORAL
Qty: 90 TABLET | Refills: 3 | Status: SHIPPED | OUTPATIENT
Start: 2018-01-22 | End: 2018-01-01 | Stop reason: SDUPTHER

## 2018-01-22 NOTE — PATIENT INSTRUCTIONS

## 2018-01-22 NOTE — PROGRESS NOTES
Subjective:       Patient ID:  Reid Herman is a 77 y.o. male who presents for   Chief Complaint   Patient presents with    Skin Check     UBSE     History of Present Illness: The patient presents for follow up of skin check.    The patient was last seen on: 7/14/17 for shave bx of scc scalp - excised by SSW  This is a high risk patient here to check for the development of new lesions.       Other skin complaints: none          Review of Systems   Skin: Positive for daily sunscreen use, activity-related sunscreen use and wears hat (always). Negative for itching, rash and recent sunburn.   Hematologic/Lymphatic: Bruises/bleeds easily.        Objective:    Physical Exam   Constitutional: He appears well-developed and well-nourished. No distress.   Neurological: He is alert and oriented to person, place, and time. He is not disoriented.   Psychiatric: He has a normal mood and affect.   Skin:   Areas Examined (abnormalities noted in diagram):   Scalp / Hair Palpated and Inspected  Head / Face Inspection Performed  Neck Inspection Performed  Chest / Axilla Inspection Performed  Back Inspection Performed  RUE Inspected  LUE Inspection Performed  Nails and Digits Inspection Performed                   Diagram Legend     Erythematous scaling macule/papule c/w actinic keratosis       Vascular papule c/w angioma      Pigmented verrucoid papule/plaque c/w seborrheic keratosis      Yellow umbilicated papule c/w sebaceous hyperplasia      Irregularly shaped tan macule c/w lentigo     1-2 mm smooth white papules consistent with Milia      Movable subcutaneous cyst with punctum c/w epidermal inclusion cyst      Subcutaneous movable cyst c/w pilar cyst      Firm pink to brown papule c/w dermatofibroma      Pedunculated fleshy papule(s) c/w skin tag(s)      Evenly pigmented macule c/w junctional nevus     Mildly variegated pigmented, slightly irregular-bordered macule c/w mildly atypical nevus      Flesh colored to evenly  pigmented papule c/w intradermal nevus       Pink pearly papule/plaque c/w basal cell carcinoma      Erythematous hyperkeratotic cursted plaque c/w SCC      Surgical scar with no sign of skin cancer recurrence      Open and closed comedones      Inflammatory papules and pustules      Verrucoid papule consistent consistent with wart     Erythematous eczematous patches and plaques     Dystrophic onycholytic nail with subungual debris c/w onychomycosis     Umbilicated papule    Erythematous-base heme-crusted tan verrucoid plaque consistent with inflamed seborrheic keratosis     Erythematous Silvery Scaling Plaque c/w Psoriasis     See annotation      Assessment / Plan:        AK (actinic keratosis) - HAK on scalp  Cryosurgery Procedure Note    Verbal consent from the patient is obtained and the patient is aware of the precancerous quality and need for treatment of these lesions. Liquid nitrogen cryosurgery is applied to the 5 actinic keratoses, as detailed in the physical exam, to produce a freeze injury. The patient is aware that blisters may form and is instructed on wound care with gentle cleansing and use of vaseline ointment to keep moist until healed. The patient is supplied a handout on cryosurgery and is instructed to call if lesions do not completely resolve.    Cont wear hat always    SK (seborrheic keratosis)   - stable and chronic      Angioma   - stable and chronic      Personal history of other malignant neoplasm of skin  Area(s) of previous NMSC evaluated with no signs of recurrence.    Upper body skin examination performed today including at least 6 points as noted in physical examination. No lesions suspicious for malignancy noted.               Follow-up in about 6 months (around 7/22/2018).

## 2018-01-24 ENCOUNTER — TELEPHONE (OUTPATIENT)
Dept: ENDOCRINOLOGY | Facility: CLINIC | Age: 78
End: 2018-01-24

## 2018-01-24 NOTE — TELEPHONE ENCOUNTER
----- Message from Jahaira Belle sent at 1/24/2018  2:03 PM CST -----  Contact: Reid    cell:  [phone]   Pt.likes to go to the [facility] lab , pls put orders in and call him if labs are needed before he sees Ms. Soto on 3/23 at 9am.

## 2018-01-26 ENCOUNTER — CLINICAL SUPPORT (OUTPATIENT)
Dept: DIABETES | Facility: CLINIC | Age: 78
End: 2018-01-26
Payer: MEDICARE

## 2018-01-26 VITALS — BODY MASS INDEX: 31.07 KG/M2 | HEIGHT: 68 IN | WEIGHT: 205 LBS

## 2018-01-26 DIAGNOSIS — E10.3593 TYPE 1 DIABETES MELLITUS WITH PROLIFERATIVE RETINOPATHY OF BOTH EYES WITHOUT MACULAR EDEMA: ICD-10-CM

## 2018-01-26 PROCEDURE — G0108 DIAB MANAGE TRN  PER INDIV: HCPCS | Mod: S$GLB,,, | Performed by: INTERNAL MEDICINE

## 2018-01-26 PROCEDURE — 99999 PR PBB SHADOW E&M-EST. PATIENT-LVL III: CPT | Mod: PBBFAC,,,

## 2018-01-26 NOTE — PROGRESS NOTES
Diabetes Education  Author: Frida Back RD, CDE  Date: 1/26/2018    Diabetes Education Visit  Diabetes Education Record Assessment/Progress: Initial    Diabetes Type  Diabetes Type : Type I    Diabetes History  Diabetes Diagnosis: >10 years    Nutrition  Meal Planning: water, 3 meals per day, artificial sweeteners  What type of sweetener do you use?: Splenda  What type of beverages do you drink?: diet soda/tea, juice, milk, water    Monitoring   Monitoring: Accu-check Marianela Smart View  Self Monitoring : SMBG 6-8 times per day.   Blood Glucose Logs: Yes  Reviewed pump download and noticed still having hypoglycemia at least 2-3 times per week; we discussed how he treats his low BG and advised to make sure he does not over treat them; pt usually uses juice to correct low blood sugar, pt also stated that he is noticing that he has become much more unaware of his low readings.  He is very glad to be starting on the Dexcom G5 today. Pt stated that he sometimes will notice that he gets sweaty if his BG is low but sometimes he said that he gets that way if it high.  Advised pt to always check his BG; we discussed that before he gives insulin he has to always check BG and remember that Novolog has an action time of about 4 hours.  Warned pt about stacking insulin and to avoid doing manual bolus.    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   Smoking Status: Never a Smoker  Alcohol Use: Rarely       DDS-2 Score  ( > 3 = SIGNIFICANT DISTRESS): 1.5        Barriers to Change  Barriers to Change: None  Learning Challenges : None    Readiness to Learn  "  Readiness to Learn : Eager    Cultural Influences  Cultural Influences: No    Diabetes Education Assessment/Progress  · Diabetes Disease Process (diabetes disease process and treatment options): Discussion, Demonstrates Understanding/Competency(verbalizes/demonstrates), Individual Session    · Nutrition (Incorporating nutritional management into one's lifestyle): Discussion, Individual Session, Demonstrates Understanding/Competency (verbalizes/demonstrates), Written Materials Provided    · Physical Activity (incorporating physical activity into one's lifestyle): Discussion, Individual Session, Demonstrates Understanding/Competency (verbalizes/demonstrates)    · Medications (states correct name, dose, onset, peak, duration, side effects & timing of meds): Discussion, Individual Session, Demonstrates Understanding/Competency(verbalizes/demonstrates)    Monitoring (monitoring blood glucose/other parameters & using results): Discussion, Instructed, Individual Session, Demonstrates Understanding/Competency (verbalizes/demonstrates), Return Demonstration, Written Materials Provided; pt started on the Dexcom G5 continuous glucose sensor system training.  Patient arrived with his  fully charged . Education provided using  "Quick Start Guide" per Dexcom protocol.   Overview:  5min glucose reading updates, trending arrows, BG graph screens, battery life indicator, Blue Tooth Symbol.   Menus: trend Graph, start sensor, enter BG, events, Alerts, Settings, Shutdown, Stop Sensor.    Screens and prompts:    * Double blood drop (for start up BG 2 hrs after starting sensor)    * Shaded out blood drop (one more start up BG)    * Single Blood drop (calibration update, due every 12 hrs)     * Low battery Notification     The  transmitter ID programmed in .   Settings:  * Low alert: 75  * High alert: 250  * Rise rate: 3mg/dl/min high repeat set at 1 hour  * Fall Rate: 2mg/dl/min and hypo " repeat set at 15 min     Reviewed sensor site selection. Site selected and prepped using aseptic technique Inserted to right abdomen. Transmitter placed in pod and secured.  Practiced sensor pod/transmitter removal from site, and removal of transmitter from sensor pod.  Patient able to demonstrate without difficulty.  Encouraged to review manual prior to starting another sensor.   Review   problem solving aspects of sensor transmission/ variables that can disrupt RF transmission.  range  20 feet, but the first 3 hrs keep within 3 feet of transmitter.  Advised that acetaminophen use will disrupt normal Dexcom G5 sensor results.  Pt instructed on lag time of interstitial fluid from CBG and was advised to tx hypoglycemia and dose insulin based on SMBG values.  Dexcom technical support contact number given and examples of when to contact them discussed. Pt will download software at home for Dexcom download.     · Acute Complications (preventing, detecting, and treating acute complications): Discussion, Instructed, Demonstrates Understanding/Competency (verbalizes/demonstrates), Individual Session, Written Materials Provided, Needs Review    · Chronic Complications (preventing, detecting, and treating chronic complications): Discussion, Demonstrates Understanding/Competency (verbalizes/demonstrates), Individual Session, Written Materials Provided, Needs Review, Instructed    · Clinical (diabetes and other pertinent medical history): Discussion, Individual Session, Comprehends Key Points, Needs Review    · Cognitive (knowledge of self-management skills, functional health literacy): Discussion, Comprehends Key Points, Written Materials Provided, Individual Session    · Psychosocial (emotional response to diabetes): Discussion, Individual Session, Written Materials Provided, Demonstrates Understanding/Competency (verbalizes/demonstrates)    · Diabetes Distress and Support Systems: Discussion, Written Materials  Provided, Individual Session, Demonstrates Understanding/Competency (verbalizes/demonstrates)    · Behavioral (readiness for change, lifestyle practices, self-care behaviors): Discussion, Individual Session, Demonstrates Understanding/Competency (verbalizes/demonstrates), Written Materials Provided    Goals  Patient has selected/evaluated goals during today's session: Yes, selected  Physical Activity: Set (Patient will increase walking to 4 days a week 30-45 min and will start going with his wife)  Start Date: 01/26/18  Target Date: 03/26/18       Diabetes Care Plan/Intervention  Education Plan/Intervention: Individual Follow-Up DSMT, Insulin Pump Evaluation (Pt will call me as needed; will schedule pt in 6 weeks for visit with me to reveiw other pump options. )    Diabetes Meal Plan  Restrictions: Low Fat, Low Sodium, Restricted Carbohydrate  Calories: 1600  Carbohydrate Per Meal: 30-45g  Carbohydrate Per Snack : 7-15g    Education Units of Time   Time Spent: 120 min    Health Maintenance was reviewed today with patient; discussed the importance of eye screening, foot exams and foot care, routine blood work such as A1c, Microalbumin, and Lipids, along with the importance of routine dental visits and podiatry for foot care needs. Reviewed with pt the importance of getting yearly flu vaccine and Pneumonia vaccine as indicated by his provider. Patient verbalized understanding.     Health Maintenance Topics with due status: Not Due       Topic Last Completion Date    TETANUS VACCINE 02/17/2016    Colonoscopy 04/25/2017    Lipid Panel 04/26/2017    Urine Microalbumin 04/26/2017    Eye Exam 10/25/2017    Hemoglobin A1c 11/01/2017     Health Maintenance Due   Topic Date Due    Foot Exam  03/20/2018

## 2018-01-29 ENCOUNTER — TELEPHONE (OUTPATIENT)
Dept: ENDOCRINOLOGY | Facility: HOSPITAL | Age: 78
End: 2018-01-29

## 2018-01-29 DIAGNOSIS — E10.65 POORLY CONTROLLED TYPE 1 DIABETES MELLITUS WITH PERIPHERAL NEUROPATHY: Chronic | ICD-10-CM

## 2018-01-29 DIAGNOSIS — E10.3593 TYPE 1 DIABETES MELLITUS WITH PROLIFERATIVE RETINOPATHY OF BOTH EYES WITHOUT MACULAR EDEMA: Primary | ICD-10-CM

## 2018-01-29 DIAGNOSIS — E10.42 POORLY CONTROLLED TYPE 1 DIABETES MELLITUS WITH PERIPHERAL NEUROPATHY: Chronic | ICD-10-CM

## 2018-01-31 ENCOUNTER — OFFICE VISIT (OUTPATIENT)
Dept: OPHTHALMOLOGY | Facility: CLINIC | Age: 78
End: 2018-01-31
Payer: MEDICARE

## 2018-01-31 DIAGNOSIS — E11.3591 TYPE 2 DIABETES MELLITUS WITH RIGHT EYE AFFECTED BY PROLIFERATIVE RETINOPATHY WITHOUT MACULAR EDEMA, WITH LONG-TERM CURRENT USE OF INSULIN: ICD-10-CM

## 2018-01-31 DIAGNOSIS — Z79.4 TYPE 2 DIABETES MELLITUS WITH RIGHT EYE AFFECTED BY PROLIFERATIVE RETINOPATHY WITHOUT MACULAR EDEMA, WITH LONG-TERM CURRENT USE OF INSULIN: ICD-10-CM

## 2018-01-31 DIAGNOSIS — H35.3132 NONEXUDATIVE AGE-RELATED MACULAR DEGENERATION, BILATERAL, INTERMEDIATE DRY STAGE: ICD-10-CM

## 2018-01-31 DIAGNOSIS — E11.3492 TYPE 2 DIABETES MELLITUS WITH LEFT EYE AFFECTED BY SEVERE NONPROLIFERATIVE RETINOPATHY WITHOUT MACULAR EDEMA, WITH LONG-TERM CURRENT USE OF INSULIN: Primary | ICD-10-CM

## 2018-01-31 DIAGNOSIS — Z79.4 TYPE 2 DIABETES MELLITUS WITH LEFT EYE AFFECTED BY SEVERE NONPROLIFERATIVE RETINOPATHY WITHOUT MACULAR EDEMA, WITH LONG-TERM CURRENT USE OF INSULIN: Primary | ICD-10-CM

## 2018-01-31 PROCEDURE — 99999 PR PBB SHADOW E&M-EST. PATIENT-LVL II: CPT | Mod: PBBFAC,,, | Performed by: OPHTHALMOLOGY

## 2018-01-31 PROCEDURE — 92226 PR SPECIAL EYE EXAM, SUBSEQUENT: CPT | Mod: 50,S$GLB,, | Performed by: OPHTHALMOLOGY

## 2018-01-31 PROCEDURE — 99499 UNLISTED E&M SERVICE: CPT | Mod: S$GLB,,, | Performed by: OPHTHALMOLOGY

## 2018-01-31 PROCEDURE — 92014 COMPRE OPH EXAM EST PT 1/>: CPT | Mod: S$GLB,,, | Performed by: OPHTHALMOLOGY

## 2018-01-31 PROCEDURE — 92134 CPTRZ OPH DX IMG PST SGM RTA: CPT | Mod: S$GLB,,, | Performed by: OPHTHALMOLOGY

## 2018-01-31 NOTE — PROGRESS NOTES
HPI     3 mo f/u / OCT   DLS- 10/25/2017 Dr. Cotto    Pt sts not much change does notices squinting a lot to see.   Denies pain   (-)Flashes (+)Floaters   (+)Photophobia  (-)Glare    Systane Prn     POHx:   NPDR  w hem   S/P Avastin OD X 5 (12/29/14)   S/P PRP OD(01/12/15) (04/13/15)   S/P PRP OS 10/25/2017  H/x  vit hem OD 11/06 followed by PRP   S/P phaco IOL OD 4/8/13   S/P phaco IOL OS 4/29/13      Last edited by Jazmyne Perez on 1/31/2018  2:29 PM. (History)               [place] SDOCT:   OD: good quality, drusen, PED, no IRF/SRF, no change from 10/4/17 scan  OS: good quality, drusen, PED, no IRF/SRF, no change from 10/4/17 scan    Previous FA:  OD: staining of PRP scars, ma's, no NV, no ONH fl  OS: brisk, symmetric transit, numerous ma's with peripheral ischemia, no NV, nl ONH fl.  Blockage by large nasal heme (appears larger ma in that area)    Assessment /Plan     For exam results, see Encounter Report.    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  Had recent PRP.  Appears stable today.    Diabetic Retinopathy discussed in detail, all questions answered  Stressed importance of good BS/BP/Chol Control    Discussed Dry and Wet AMD in detail  Recommend AREDS 2 Vitamins  Home Amsler Grid Testing    RTC 4 months, sooner PRN

## 2018-02-02 ENCOUNTER — PATIENT OUTREACH (OUTPATIENT)
Dept: OTHER | Facility: OTHER | Age: 78
End: 2018-02-02

## 2018-02-02 NOTE — PROGRESS NOTES
Last 5 Patient Entered Readings                                      Current 30 Day Average: 134/65     Recent Readings 2/1/2018 1/31/2018 1/31/2018 1/30/2018 1/29/2018    SBP (mmHg) 117 130 142 139 116    DBP (mmHg) 49 65 98 94 65    Pulse 60 55 61 65 54        Hypertension Digital Medicine (HDMP) Health  Follow Up    LVM to follow up with Mr. Mathews S Virgil.    Per 30 day average, blood pressure is  134/65 mmHg. Encouraged adherence to low sodium diet and physical activity guidelines. Advised patient to call or message with questions or concerns. WCB in 3 weeks.

## 2018-02-06 ENCOUNTER — OFFICE VISIT (OUTPATIENT)
Dept: INTERNAL MEDICINE | Facility: CLINIC | Age: 78
End: 2018-02-06
Payer: MEDICARE

## 2018-02-06 ENCOUNTER — LAB VISIT (OUTPATIENT)
Dept: LAB | Facility: HOSPITAL | Age: 78
End: 2018-02-06
Attending: INTERNAL MEDICINE
Payer: MEDICARE

## 2018-02-06 VITALS
HEART RATE: 67 BPM | SYSTOLIC BLOOD PRESSURE: 122 MMHG | OXYGEN SATURATION: 97 % | DIASTOLIC BLOOD PRESSURE: 78 MMHG | BODY MASS INDEX: 30.84 KG/M2 | HEIGHT: 68 IN | WEIGHT: 203.5 LBS

## 2018-02-06 DIAGNOSIS — E10.42 DIABETIC POLYNEUROPATHY ASSOCIATED WITH TYPE 1 DIABETES MELLITUS: ICD-10-CM

## 2018-02-06 DIAGNOSIS — I10 ESSENTIAL HYPERTENSION: ICD-10-CM

## 2018-02-06 DIAGNOSIS — N40.0 BENIGN PROSTATIC HYPERPLASIA WITHOUT LOWER URINARY TRACT SYMPTOMS: ICD-10-CM

## 2018-02-06 DIAGNOSIS — Z00.00 PHYSICAL EXAM: Primary | ICD-10-CM

## 2018-02-06 DIAGNOSIS — N13.8 BPH WITH URINARY OBSTRUCTION: ICD-10-CM

## 2018-02-06 DIAGNOSIS — G47.33 OBSTRUCTIVE SLEEP APNEA: ICD-10-CM

## 2018-02-06 DIAGNOSIS — M47.816 LUMBAR FACET ARTHROPATHY: ICD-10-CM

## 2018-02-06 DIAGNOSIS — I25.119 CORONARY ARTERY DISEASE INVOLVING NATIVE CORONARY ARTERY OF NATIVE HEART WITH ANGINA PECTORIS: ICD-10-CM

## 2018-02-06 DIAGNOSIS — E10.42 POORLY CONTROLLED TYPE 1 DIABETES MELLITUS WITH PERIPHERAL NEUROPATHY: Chronic | ICD-10-CM

## 2018-02-06 DIAGNOSIS — E10.65 POORLY CONTROLLED TYPE 1 DIABETES MELLITUS WITH PERIPHERAL NEUROPATHY: Chronic | ICD-10-CM

## 2018-02-06 DIAGNOSIS — N40.1 BPH WITH URINARY OBSTRUCTION: ICD-10-CM

## 2018-02-06 DIAGNOSIS — C91.10 CLL (CHRONIC LYMPHOCYTIC LEUKEMIA): ICD-10-CM

## 2018-02-06 DIAGNOSIS — E78.00 PURE HYPERCHOLESTEROLEMIA: ICD-10-CM

## 2018-02-06 DIAGNOSIS — I70.0 ATHEROSCLEROSIS OF AORTA: ICD-10-CM

## 2018-02-06 DIAGNOSIS — I73.9 PERIPHERAL VASCULAR DISEASE: ICD-10-CM

## 2018-02-06 DIAGNOSIS — N18.30 CHRONIC KIDNEY DISEASE, STAGE III (MODERATE): ICD-10-CM

## 2018-02-06 LAB
CREAT UR-MCNC: 61 MG/DL
MICROALBUMIN UR DL<=1MG/L-MCNC: 7 UG/ML
MICROALBUMIN/CREATININE RATIO: 11.5 UG/MG

## 2018-02-06 PROCEDURE — 99999 PR PBB SHADOW E&M-EST. PATIENT-LVL IV: CPT | Mod: PBBFAC,,, | Performed by: INTERNAL MEDICINE

## 2018-02-06 PROCEDURE — 99397 PER PM REEVAL EST PAT 65+ YR: CPT | Mod: S$GLB,,, | Performed by: INTERNAL MEDICINE

## 2018-02-06 PROCEDURE — 82043 UR ALBUMIN QUANTITATIVE: CPT

## 2018-02-06 PROCEDURE — 99499 UNLISTED E&M SERVICE: CPT | Mod: S$GLB,,, | Performed by: INTERNAL MEDICINE

## 2018-02-06 NOTE — PROGRESS NOTES
Subjective:      Patient ID: Reid Herman is a 78 y.o. male.    Chief Complaint: Annual Exam    HPI:  HPI   Patient is here for annual exam and comprehensive diabetes care. I have reviewed and agree with the podiatry notes documenting need for diabetic shoes with orthotics in the note 9/25/2017 and 12/7/2017. He has been wearing prescribed shoes with Orthotics for a number of years    Diabetes Management Status  Patient is on a continuous continuous glucose monitor , he also has the insulin pump.    Statin: Taking  ACE/ARB: Taking    Screening or Prevention Patient's value Goal Complete/Controlled?   HgA1C Testing and Control   Lab Results   Component Value Date    HGBA1C 6.9 (H) 11/01/2017      Annually/Less than 8% Yes   Lipid profile : 04/26/2017 Annually Yes   LDL control Lab Results   Component Value Date    LDLCALC 56.6 (L) 04/26/2017    Annually/Less than 100 mg/dl  Yes   Nephropathy screening Lab Results   Component Value Date    LABMICR 6.0 04/26/2017     Lab Results   Component Value Date    PROTEINUA NEG 05/07/2004    Annually Yes   Blood pressure BP Readings from Last 1 Encounters:   02/06/18 122/78    Less than 140/90 Yes   Dilated retinal exam : 01/31/2018 Annually Yes   Foot exam   : 03/20/2017 Annually Yes     Endocrinology: appt in 4/2018  Digital hypertension clinic: reviewed and controlled today  Podiatrist: follow up every 3 months  Urology: annual for psa  Neurosurgery: Dr. Pinedo: no further follow up  Hematology: Dr. Shailesh Garcia   Sleep Clinic: Ms Fletcher monitoring CPAP   Opthamology: Dr. Perkins   Cardiology: Dr. Alexander scheduled       Annual exam: 2/6/2018  Colonoscopy: every 5 years ( sister with colon cancer) 4/2009 and patient will call  4/25/2014 follow up in 3-5 years NOW: 4/25/2017 repeat in 3 years  Optho: Dr. Cotto  Cataract Sugery  Flu: 2017  Tetanus: 2016  Shingles: done 3/26/2012, Shingrix discussed  Pneumovax: 12/7/2009 done  Prevnar 13 done  Podiatry: every 3  months  A1C: 6.4  Urine microalbumin/creatinine: due in 4/2018         Patient Active Problem List   Diagnosis    CLL (chronic lymphocytic leukemia)    Diabetic polyneuropathy associated with type 1 diabetes mellitus    Poorly controlled type 1 diabetes mellitus with peripheral neuropathy    Coronary artery disease involving native coronary artery of native heart with angina pectoris    Posterior vitreous detachment - Both Eyes    Hyperlipidemia    HTN (hypertension)    Dermatophytosis of nail    Pseudophakia    BPH with urinary obstruction    Erectile dysfunction    Corns and callosities    Vitreous hemorrhage    Lumbar facet arthropathy    DDD (degenerative disc disease), lumbar    Obstructive sleep apnea    Peripheral vascular disease    Lumbar spondylosis    Spondylolisthesis    S/P lumbar fusion    Atherosclerosis of aorta    Chronic kidney disease, stage III (moderate)    Sacroiliitis    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    Stable angina    Nonexudative age-related macular degeneration, bilateral, intermediate dry stage     Past Medical History:   Diagnosis Date    Anemia     Back pain     Basal cell carcinoma 06/08/2015    left nasal ala    CAD (coronary artery disease) 10/1/2012    Cataract     DDD (degenerative disc disease), lumbar 10/28/2014    Diabetes mellitus     Diabetes mellitus type I     Diabetic retinopathy of both eyes     Heart attack     Hyperlipidemia     Hypertension     Insulin pump in place     Obesity     Poorly controlled type 1 diabetes mellitus with peripheral neuropathy 10/1/2012    Preseptal cellulitis of right eye 8/17/15    S/P CABG x 2 2/14/2014    1994     Sleep apnea     Squamous cell carcinoma 03/18/2013    right posterior ear    Squamous cell carcinoma 07/26/2017    scalp    Trouble in sleeping     Type 1 diabetes, uncontrolled, with retinopathy 10/1/2012    Type II or  unspecified type diabetes mellitus without mention of complication, not stated as uncontrolled      Past Surgical History:   Procedure Laterality Date    APPENDECTOMY  1953    CATARACT EXTRACTION  4/8/13    right eye (toric)    CATARACT EXTRACTION  4/29/13    left eye (toric)    COLONOSCOPY N/A 4/25/2017    Procedure: COLONOSCOPY;  Surgeon: CLARISSA Freeman MD;  Location: [facility] ([address] [place] [address]);  Service: Endoscopy;  Laterality: N/A;    CORONARY ARTERY BYPASS GRAFT  1994    x 3    EYE SURGERY      cataracts, both eyes    FINGER TENDON REPAIR  2011    left hand    SKIN BIOPSY  2013    multiple    SPINE SURGERY  2015    TLIF    TONSILLECTOMY  1947     Family History   Problem Relation Age of Onset    Breast cancer Mother     Cancer Mother      ? lung cancer    Alzheimer's disease Father     Dementia Father     Stroke Father     Colon cancer Sister     Cancer Sister 60     colon    Acute myelogenous leukemia Sister     Diabetes Maternal Grandfather     Diabetes Paternal Aunt     Diabetes Paternal Uncle     Melanoma Neg Hx     Psoriasis Neg Hx     Lupus Neg Hx     Eczema Neg Hx     Acne Neg Hx      Review of Systems   Constitutional: Positive for activity change. Negative for unexpected weight change.   HENT: Positive for hearing loss and rhinorrhea. Negative for trouble swallowing.    Eyes: Negative for discharge and visual disturbance.   Respiratory: Positive for chest tightness. Negative for wheezing.         Seeing Dr. Alexander   Cardiovascular: Positive for chest pain. Negative for palpitations.        Seeing Dr. Alexander   Gastrointestinal: Negative for blood in stool, constipation, diarrhea and vomiting.   Endocrine: Negative for polydipsia and polyuria.   Genitourinary: Negative for difficulty urinating, hematuria and urgency.   Musculoskeletal: Positive for arthralgias. Negative for joint swelling and neck pain.   Neurological: Positive for weakness. Negative for headaches.        Used to enjoy  "physically working   Psychiatric/Behavioral: Positive for confusion and dysphoric mood.        Not really confusion, has to think harder     Objective:     Vitals:    02/06/18 0815   BP: 122/78   Pulse: 67   SpO2: 97%   Weight: 92.3 kg (203 lb 7.8 oz)   Height: 5' 8" (1.727 m)   PainSc: 0-No pain     Body mass index is 30.94 kg/m².  Physical Exam   Constitutional: He is oriented to person, place, and time. He appears well-developed and well-nourished. No distress.   Neck: Carotid bruit is not present. No thyromegaly present.   Cardiovascular: Normal rate, regular rhythm and normal heart sounds.  PMI is not displaced.    Pulmonary/Chest: Effort normal and breath sounds normal. No respiratory distress.   Abdominal: Soft. Bowel sounds are normal. He exhibits no distension. There is no tenderness.   Musculoskeletal: He exhibits no edema.   Neurological: He is alert and oriented to person, place, and time.     Assessment:     1. Physical exam    2. Atherosclerosis of aorta    3. Benign prostatic hyperplasia without lower urinary tract symptoms    4. BPH with urinary obstruction    5. Chronic kidney disease, stage III (moderate)    6. CLL (chronic lymphocytic leukemia)    7. Coronary artery disease involving native coronary artery of native heart with angina pectoris    8. Diabetic polyneuropathy associated with type 1 diabetes mellitus    9. Essential hypertension    10. Pure hypercholesterolemia    11. Lumbar facet arthropathy    12. Obstructive sleep apnea    13. Peripheral vascular disease    14. Poorly controlled type 1 diabetes mellitus with peripheral neuropathy      Plan:   Redi was seen today for annual exam.    Diagnoses and all orders for this visit:    Physical exam: updated and reviewed    Atherosclerosis of aorta: on asa and statin    Benign prostatic hyperplasia without lower urinary tract symptoms  -     Ambulatory consult to Urology    BPH with urinary obstruction: see above    Chronic kidney disease, " stage III (moderate): discussed calling me if questions    CLL (chronic lymphocytic leukemia): Dr. Garcia    Coronary artery disease involving native coronary artery of native heart with angina pectoris Dr. Alexander    Diabetic polyneuropathy associated with type 1 diabetes mellitus  -     Microalbumin/creatinine urine ratio; Future    Essential hypertension: well controlled    Pure hypercholesterolemia on statin and good control    Lumbar facet arthropathy: stable    Obstructive sleep apnea has equipment    Peripheral vascular disease: stable    Poorly controlled type 1 diabetes mellitus with peripheral neuropathy      Follow-up in about 6 months (around 8/6/2018) for Follow up no labs.     Medication List          Accurate as of 2/6/18  9:20 AM. If you have any questions, ask your nurse or doctor.               CONTINUE taking these medications    ACCU-CHEK SHARON PLUS TEST STRP Strp  Generic drug:  blood sugar diagnostic  TEST BLOOD SUGAR FIVE TIMES DAILY     ACCU-CHEK FASTCLIX Misc  Generic drug:  lancets  TEST 6 TIMES DAILY     alpha lipoic acid 600 mg Cap     aspirin 325 MG EC tablet  Commonly known as:  ECOTRIN     atorvastatin 80 MG tablet  Commonly known as:  LIPITOR  TAKE 1 TABLET NIGHTLY.     clotrimazole-betamethasone 1-0.05% cream  Commonly known as:  LOTRISONE  APPLY EXTERNALLY TO THE AFFECTED AREA TWICE DAILY     fosinopril-hydrochlorothiazide 10-12.5 mg per tablet  Commonly known as:  MONOPRIL-HCT  Take 3 tablets by mouth once daily.     * insulin syringe-needle U-100 0.3 mL 30 gauge x 5/16 Syrg  Commonly known as:  ADVOCATE SYRINGES  Use as directed     * BD INSULIN SYRINGE ULT-FINE II 0.3 mL 31 gauge x 5/16 Syrg  Generic drug:  insulin syringe-needle U-100     mecobal-levomefolat Ca-B6 phos 3-35-2 mg Tab  Commonly known as:  L-METHYL-B6-B12  Take 1 tablet by mouth once daily.     metFORMIN 500 MG tablet  Commonly known as:  GLUCOPHAGE  TAKE 1 TABLET TWICE DAILY     metoprolol succinate 100 MG 24 hr  tablet  Commonly known as:  TOPROL-XL  TAKE ONE TABLET BY MOUTH EVERY EVENING     multivitamin capsule     nitroGLYCERIN 0.4 MG SL tablet  Commonly known as:  NITROSTAT  Place 1 tablet (0.4 mg total) under the tongue every 5 (five) minutes as needed for Chest pain. May dispense a two pack of 25 tablets.     NovoLOG 100 unit/mL injection  Generic drug:  insulin aspart  INJECT UP TO MAX  UNITS UNDER THE SKIN VIA INSULIN PUMP DAILY AS DIRECTED        * This list has 2 medication(s) that are the same as other medications prescribed for you. Read the directions carefully, and ask your doctor or other care provider to review them with you.

## 2018-02-19 ENCOUNTER — OFFICE VISIT (OUTPATIENT)
Dept: CARDIOLOGY | Facility: CLINIC | Age: 78
End: 2018-02-19
Payer: MEDICARE

## 2018-02-19 VITALS
HEART RATE: 65 BPM | HEIGHT: 67 IN | BODY MASS INDEX: 31.76 KG/M2 | WEIGHT: 202.38 LBS | DIASTOLIC BLOOD PRESSURE: 66 MMHG | SYSTOLIC BLOOD PRESSURE: 155 MMHG

## 2018-02-19 DIAGNOSIS — E10.3593 TYPE 1 DIABETES MELLITUS WITH PROLIFERATIVE RETINOPATHY OF BOTH EYES WITHOUT MACULAR EDEMA: ICD-10-CM

## 2018-02-19 DIAGNOSIS — R09.89 BRUIT OF RIGHT CAROTID ARTERY: ICD-10-CM

## 2018-02-19 DIAGNOSIS — I25.119 CORONARY ARTERY DISEASE INVOLVING NATIVE CORONARY ARTERY OF NATIVE HEART WITH ANGINA PECTORIS: Primary | ICD-10-CM

## 2018-02-19 DIAGNOSIS — E78.00 PURE HYPERCHOLESTEROLEMIA: ICD-10-CM

## 2018-02-19 DIAGNOSIS — I20.89 STABLE ANGINA: ICD-10-CM

## 2018-02-19 DIAGNOSIS — I10 ESSENTIAL HYPERTENSION: ICD-10-CM

## 2018-02-19 PROCEDURE — 99499 UNLISTED E&M SERVICE: CPT | Mod: S$GLB,,, | Performed by: INTERNAL MEDICINE

## 2018-02-19 PROCEDURE — 99999 PR PBB SHADOW E&M-EST. PATIENT-LVL III: CPT | Mod: PBBFAC,,, | Performed by: INTERNAL MEDICINE

## 2018-02-19 PROCEDURE — 1126F AMNT PAIN NOTED NONE PRSNT: CPT | Mod: S$GLB,,, | Performed by: INTERNAL MEDICINE

## 2018-02-19 PROCEDURE — 99214 OFFICE O/P EST MOD 30 MIN: CPT | Mod: S$GLB,,, | Performed by: INTERNAL MEDICINE

## 2018-02-19 PROCEDURE — 1159F MED LIST DOCD IN RCRD: CPT | Mod: S$GLB,,, | Performed by: INTERNAL MEDICINE

## 2018-02-19 PROCEDURE — 3008F BODY MASS INDEX DOCD: CPT | Mod: S$GLB,,, | Performed by: INTERNAL MEDICINE

## 2018-02-19 RX ORDER — ISOSORBIDE MONONITRATE 60 MG/1
60 TABLET, EXTENDED RELEASE ORAL DAILY
Qty: 90 TABLET | Refills: 3 | Status: SHIPPED | OUTPATIENT
Start: 2018-02-19 | End: 2018-01-01 | Stop reason: SDUPTHER

## 2018-02-19 RX ORDER — NITROGLYCERIN 0.4 MG/1
0.4 TABLET SUBLINGUAL EVERY 5 MIN PRN
Qty: 50 TABLET | Refills: 6 | Status: SHIPPED | OUTPATIENT
Start: 2018-02-19 | End: 2018-01-01 | Stop reason: SDUPTHER

## 2018-02-19 NOTE — PROGRESS NOTES
"Subjective:   Patient ID:  Reid Herman is a 78 y.o. male who presents for follow-up of Coronary Artery Disease      HPI:   Mr. Cabrera is a pleasant man with HTN, HLD, DM1 and prior CAD s/p CABG x with a SVG to LAD and LIMA to D1 in 1994 and subsequent cath in 2006 demonstrated 100% OM2 and 100% PDA who presents today for follow up of CAD. I last saw him 2-2017    He is on CPAP for MINDA. He also has CLL and followed by Onc.     He has an insulin pump. He has angina with higher sugar + exertion. This has not changed in character. However, angina is more frequent. NTG usage is slight more than it was last year.  Now using nitro ~4 days/week.    Enrolled in Dig HTN manage    Vitals:    02/19/18 0907   BP: (!) 155/66   BP Location: Left arm   Patient Position: Sitting   BP Method: X-Large (Automatic)   Pulse: 65   Weight: 91.8 kg (202 lb 6.1 oz)   Height: 5' 7" (1.702 m)     Body mass index is 31.7 kg/m².  CrCl cannot be calculated (Patient's most recent lab result is older than the maximum 7 days allowed.).    Lab Results   Component Value Date     (L) 01/12/2018    K 5.0 01/12/2018    CL 97 01/12/2018    CO2 31 (H) 01/12/2018    BUN 23 01/12/2018    CREATININE 1.3 01/12/2018     (H) 01/12/2018    HGBA1C 6.9 (H) 11/01/2017    AST 21 01/12/2018    ALT 18 01/12/2018    ALBUMIN 3.9 01/12/2018    PROT 6.8 01/12/2018    BILITOT 0.7 01/12/2018    WBC 46.94 (H) 01/12/2018    HGB 11.7 (L) 01/12/2018    HCT 35.6 (L) 01/12/2018    HCT 19 (LL) 05/18/2015    MCV 97 01/12/2018     01/12/2018    INR 0.9 05/11/2015    PSA 1.29 06/21/2013    TSH 0.901 02/24/2016    CHOL 115 (L) 04/26/2017    HDL 45 04/26/2017    LDLCALC 56.6 (L) 04/26/2017    TRIG 67 04/26/2017       Current Outpatient Prescriptions   Medication Sig    ACCU-CHEK SHARON PLUS TEST STRP Strp TEST BLOOD SUGAR FIVE TIMES DAILY    ACCU-CHEK FASTCLIX Misc TEST 6 TIMES DAILY    alpha lipoic acid 600 mg Cap Take 1 capsule by mouth once daily.   "    aspirin (ECOTRIN) 325 MG EC tablet Take 325 mg by mouth once daily.    atorvastatin (LIPITOR) 80 MG tablet TAKE 1 TABLET NIGHTLY. (Patient taking differently: TAKE 1/2 TABLET NIGHTLY.)    BD INSULIN SYRINGE ULT-FINE II 0.3 mL 31 gauge x 5/16 Syrg     clotrimazole-betamethasone 1-0.05% (LOTRISONE) cream APPLY EXTERNALLY TO THE AFFECTED AREA TWICE DAILY    fosinopril-hydrochlorothiazide (MONOPRIL-HCT) 10-12.5 mg per tablet Take 3 tablets by mouth once daily.    insulin syringe-needle U-100 (ADVOCATE SYRINGES) 0.3 mL 30 gauge x 5/16 Syrg Use as directed    mecobal-levomefolat Ca-B6 phos (L-METHYL-B6-B12) 3-35-2 mg Tab Take 1 tablet by mouth once daily.    metformin (GLUCOPHAGE) 500 MG tablet TAKE 1 TABLET TWICE DAILY    metoprolol succinate (TOPROL-XL) 100 MG 24 hr tablet TAKE ONE TABLET BY MOUTH EVERY EVENING    multivitamin capsule Take 1 capsule by mouth once daily.      nitroGLYCERIN (NITROSTAT) 0.4 MG SL tablet Place 1 tablet (0.4 mg total) under the tongue every 5 (five) minutes as needed for Chest pain. May dispense a two pack of 25 tablets.    NOVOLOG 100 unit/mL injection INJECT UP TO MAX  UNITS UNDER THE SKIN VIA INSULIN PUMP DAILY AS DIRECTED     No current facility-administered medications for this visit.        Review of Systems   Constitution: Negative for decreased appetite, weakness, malaise/fatigue, weight gain and weight loss.   Eyes: Negative for visual disturbance.   Cardiovascular: Positive for chest pain. Negative for claudication, dyspnea on exertion, irregular heartbeat, orthopnea, palpitations, paroxysmal nocturnal dyspnea and syncope.   Respiratory: Negative for cough, shortness of breath and snoring.    Skin: Negative for rash.   Musculoskeletal: Negative for arthritis, muscle cramps, muscle weakness and myalgias.   Gastrointestinal: Negative for abdominal pain, anorexia, change in bowel habit and nausea.   Genitourinary: Negative for dysuria and frequency.    Neurological: Negative for excessive daytime sleepiness, dizziness, headaches, loss of balance and numbness.   Psychiatric/Behavioral: Negative for depression.       Objective:   Physical Exam   Constitutional: He is oriented to person, place, and time. He appears well-developed and well-nourished.   HENT:   Head: Normocephalic and atraumatic.   Eyes: Pupils are equal, round, and reactive to light.   Neck: Normal range of motion. Neck supple.   Cardiovascular: Normal rate, regular rhythm, normal heart sounds, intact distal pulses and normal pulses.  Exam reveals no gallop and no friction rub.    No murmur heard.  Pulses:       Carotid pulses are on the right side with bruit.  Pulmonary/Chest: Effort normal and breath sounds normal.   Abdominal: Soft. Bowel sounds are normal. There is no hepatosplenomegaly. There is no tenderness.   Musculoskeletal: Normal range of motion.   Neurological: He is alert and oriented to person, place, and time.   Skin: Skin is warm and dry.   Psychiatric: He has a normal mood and affect. His speech is normal and behavior is normal. Judgment and thought content normal.       Assessment:     1. Coronary artery disease involving native coronary artery of native heart with angina pectoris    2. Essential hypertension    3. Pure hypercholesterolemia    4. Stable angina    5. 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        Plan:   From a cardiac standpoint, pt is doing well.  Add imdur for angina.  Pts lipid profile at goal. In dig HTN.  Pt does not require any cardiac testing at this time  Carotid dopplers given R sided bruit.

## 2018-02-20 ENCOUNTER — CLINICAL SUPPORT (OUTPATIENT)
Dept: CARDIOLOGY | Facility: CLINIC | Age: 78
End: 2018-02-20
Attending: INTERNAL MEDICINE
Payer: MEDICARE

## 2018-02-20 DIAGNOSIS — R09.89 BRUIT OF RIGHT CAROTID ARTERY: ICD-10-CM

## 2018-02-20 LAB — INTERNAL CAROTID STENOSIS: ABNORMAL

## 2018-02-20 PROCEDURE — 93880 EXTRACRANIAL BILAT STUDY: CPT | Mod: S$GLB,,, | Performed by: INTERNAL MEDICINE

## 2018-02-20 NOTE — PROGRESS NOTES
Last 5 Patient Entered Readings                                      Current 30 Day Average: 130/62     Recent Readings 2/19/2018 2/18/2018 2/16/2018 2/15/2018 2/14/2018    SBP (mmHg) 118 127 129 119 124    DBP (mmHg) 56 57 55 55 56    Pulse 59 62 63 59 67        Hypertension Digital Medicine Program (HDMP): Health  Follow Up    Feeling well. Overall pleased with BP readings.  Just started using a CGM to monitor BG but thinks it's not giving accurate readings. Plans to reach out to LINNEAELOISA Galicia.    Lifestyle Modifications:    1.Low sodium diet: yes - continuing to monitor sodium intake, also being mindful of carb intake to help control BG    2.Physical activity: yes - staying active    3.Hypotension/Hypertension symptoms: no   Frequency/Alleviating factors/Precipitating factors, etc.     4.Patient has been compliant with the medication regimen.     Follow up with Mr. Reid Herman completed. No further questions or concerns. I will follow up in a few weeks to assess progress.

## 2018-03-01 ENCOUNTER — TELEPHONE (OUTPATIENT)
Dept: CARDIOLOGY | Facility: CLINIC | Age: 78
End: 2018-03-01

## 2018-03-01 NOTE — TELEPHONE ENCOUNTER
----- Message from Matthew Hatfield sent at 2/27/2018  2:42 PM CST -----  Contact: patient  Please call pt at [phone]. Carotid ultrasound results needed    Thank you

## 2018-03-02 DIAGNOSIS — N40.1 BPH WITH URINARY OBSTRUCTION: Primary | ICD-10-CM

## 2018-03-02 DIAGNOSIS — N13.8 BPH WITH URINARY OBSTRUCTION: Primary | ICD-10-CM

## 2018-03-02 RX ORDER — FOSINOPRIL SODIUM AND HYDROCHLOROTHIAZIDE 10; 12.5 MG/1; MG/1
3 TABLET ORAL DAILY
Qty: 270 TABLET | Refills: 3 | Status: SHIPPED | OUTPATIENT
Start: 2018-03-02 | End: 2019-01-01 | Stop reason: SDUPTHER

## 2018-03-14 ENCOUNTER — LAB VISIT (OUTPATIENT)
Dept: LAB | Facility: HOSPITAL | Age: 78
End: 2018-03-14
Attending: NURSE PRACTITIONER
Payer: MEDICARE

## 2018-03-14 ENCOUNTER — OFFICE VISIT (OUTPATIENT)
Dept: PODIATRY | Facility: CLINIC | Age: 78
End: 2018-03-14
Payer: MEDICARE

## 2018-03-14 ENCOUNTER — PATIENT OUTREACH (OUTPATIENT)
Dept: DIABETES | Facility: CLINIC | Age: 78
End: 2018-03-14

## 2018-03-14 VITALS
WEIGHT: 202 LBS | HEART RATE: 67 BPM | SYSTOLIC BLOOD PRESSURE: 132 MMHG | RESPIRATION RATE: 18 BRPM | DIASTOLIC BLOOD PRESSURE: 67 MMHG | HEIGHT: 67 IN | BODY MASS INDEX: 31.71 KG/M2

## 2018-03-14 DIAGNOSIS — S91.209S NAIL AVULSION OF TOE, SEQUELA: ICD-10-CM

## 2018-03-14 DIAGNOSIS — E10.42 POORLY CONTROLLED TYPE 1 DIABETES MELLITUS WITH PERIPHERAL NEUROPATHY: Chronic | ICD-10-CM

## 2018-03-14 DIAGNOSIS — I73.9 PERIPHERAL VASCULAR DISEASE: Primary | ICD-10-CM

## 2018-03-14 DIAGNOSIS — N13.8 BPH WITH URINARY OBSTRUCTION: ICD-10-CM

## 2018-03-14 DIAGNOSIS — E10.65 POORLY CONTROLLED TYPE 1 DIABETES MELLITUS WITH PERIPHERAL NEUROPATHY: Chronic | ICD-10-CM

## 2018-03-14 DIAGNOSIS — L60.9 DISEASE OF NAIL: ICD-10-CM

## 2018-03-14 DIAGNOSIS — N40.1 BPH WITH URINARY OBSTRUCTION: ICD-10-CM

## 2018-03-14 DIAGNOSIS — E10.3593 TYPE 1 DIABETES MELLITUS WITH PROLIFERATIVE RETINOPATHY OF BOTH EYES WITHOUT MACULAR EDEMA: ICD-10-CM

## 2018-03-14 LAB
COMPLEXED PSA SERPL-MCNC: 1.2 NG/ML
ESTIMATED AVG GLUCOSE: 131 MG/DL
HBA1C MFR BLD HPLC: 6.2 %
TSH SERPL DL<=0.005 MIU/L-ACNC: 1.4 UIU/ML

## 2018-03-14 PROCEDURE — 11721 DEBRIDE NAIL 6 OR MORE: CPT | Mod: Q9,S$GLB,, | Performed by: PODIATRIST

## 2018-03-14 PROCEDURE — 99213 OFFICE O/P EST LOW 20 MIN: CPT | Mod: 25,S$GLB,, | Performed by: PODIATRIST

## 2018-03-14 PROCEDURE — 36415 COLL VENOUS BLD VENIPUNCTURE: CPT

## 2018-03-14 PROCEDURE — 3075F SYST BP GE 130 - 139MM HG: CPT | Mod: CPTII,S$GLB,, | Performed by: PODIATRIST

## 2018-03-14 PROCEDURE — 83036 HEMOGLOBIN GLYCOSYLATED A1C: CPT

## 2018-03-14 PROCEDURE — 99999 PR PBB SHADOW E&M-EST. PATIENT-LVL III: CPT | Mod: PBBFAC,,, | Performed by: PODIATRIST

## 2018-03-14 PROCEDURE — 99499 UNLISTED E&M SERVICE: CPT | Mod: S$GLB,,, | Performed by: PODIATRIST

## 2018-03-14 PROCEDURE — 84443 ASSAY THYROID STIM HORMONE: CPT

## 2018-03-14 PROCEDURE — 3078F DIAST BP <80 MM HG: CPT | Mod: CPTII,S$GLB,, | Performed by: PODIATRIST

## 2018-03-14 PROCEDURE — 84153 ASSAY OF PSA TOTAL: CPT

## 2018-03-14 RX ORDER — CEPHALEXIN 500 MG/1
500 CAPSULE ORAL EVERY 12 HOURS
Qty: 14 CAPSULE | Refills: 0 | Status: SHIPPED | OUTPATIENT
Start: 2018-03-14 | End: 2018-03-21

## 2018-03-15 ENCOUNTER — TELEPHONE (OUTPATIENT)
Dept: UROLOGY | Facility: CLINIC | Age: 78
End: 2018-03-15

## 2018-03-15 NOTE — TELEPHONE ENCOUNTER
Patient notified of psa results.  He will follow up with SHUKRI Farmer NP in 2 weeks as scheduled.

## 2018-03-15 NOTE — PROGRESS NOTES
Subjective:      Patient ID: Reid Herman is a 78 y.o. male.    Chief Complaint: PCP (Olivia Zavala MD 2/06/18); Diabetic Foot Exam; and Nail Care    Reid is a 78 y.o. male who presents to the clinic for evaluation and treatment of high risk feet. 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 The patient's chief complaint is elongated toenails   This patient has documented high risk feet requiring routine maintenance secondary to diabetes mellitis and those secondary complications of diabetes, as mentioned..   PCP: Olivia Zavala MD    Date Last Seen by PCP:   Chief Complaint   Patient presents with    PCP     Olivia Zavala MD 2/06/18    Diabetic Foot Exam    Nail Care       Chief Complaint   Patient presents with    PCP     Olivia Zavala MD 2/06/18    Diabetic Foot Exam    Nail Care        Current shoe gear:  Affected Foot: Rx diabetic extra depth shoes and custom accommodative insoles     Unaffected Foot: Rx diabetic extra depth shoes and custom accommodative insoles    Hemoglobin A1C   Date Value Ref Range Status   03/14/2018 6.2 (H) 4.0 - 5.6 % Final     Comment:     According to ADA guidelines, hemoglobin A1c <7.0% represents  optimal control in  non-pregnant diabetic patients. Different  metrics may apply to specific patient populations.   Standards of Medical Care in Diabetes-2016.  For the purpose of screening for the presence of diabetes:  <5.7%     Consistent with the absence of diabetes  5.7-6.4%  Consistent with increasing risk for diabetes   (prediabetes)  >or=6.5%  Consistent with diabetes  Currently, no consensus exists for use of hemoglobin A1c  for diagnosis of diabetes for children.  This Hemoglobin A1c assay has significant interference with fetal   hemoglobin   (HbF). The results are invalid for patients with abnormal amounts of   HbF,   including those with known Hereditary Persistence   of Fetal Hemoglobin. Heterozygous hemoglobin variants (HbAS, HbAC,   HbAD, HbAE, HbA2) do not significantly interfere with this assay;   however, presence of multiple variants in a sample may impact the %   interference.     11/01/2017 6.9 (H) 4.0 - 5.6 % Final     Comment:     According to ADA guidelines, hemoglobin A1c <7.0% represents  optimal control in non-pregnant diabetic patients. Different  metrics may apply to specific patient populations.   Standards of Medical Care in Diabetes-2016.  For the purpose of screening for the presence of diabetes:  <5.7%     Consistent with the absence of diabetes  5.7-6.4%  Consistent with increasing risk for diabetes   (prediabetes)  >or=6.5%  Consistent with diabetes  Currently, no consensus exists for use of hemoglobin A1c  for diagnosis of diabetes for children.  This Hemoglobin A1c assay has significant interference with fetal   hemoglobin   (HbF). The results are invalid for patients with abnormal amounts of   HbF,   including those with known Hereditary Persistence   of Fetal Hemoglobin. Heterozygous hemoglobin variants (HbAS, HbAC,   HbAD, HbAE, HbA2) do not significantly interfere with this assay;   however, presence of multiple variants in a sample may impact the %   interference.     08/03/2017 6.4 (H) 4.0 -  5.6 % Final     Comment:     According to ADA guidelines, hemoglobin A1c <7.0% represents  optimal control in non-pregnant diabetic patients. Different  metrics may apply to specific patient populations.   Standards of Medical Care in Diabetes-2016.  For the purpose of screening for the presence of diabetes:  <5.7%     Consistent with the absence of diabetes  5.7-6.4%  Consistent with increasing risk for diabetes   (prediabetes)  >or=6.5%  Consistent with diabetes  Currently, no consensus exists for use of hemoglobin A1c  for diagnosis of diabetes for children.  This Hemoglobin A1c assay has significant interference with fetal   hemoglobin   (HbF). The results are invalid for patients with abnormal amounts of   HbF,   including those with known Hereditary Persistence   of Fetal Hemoglobin. Heterozygous hemoglobin variants (HbAS, HbAC,   HbAD, HbAE, HbA2) do not significantly interfere with this assay;   however, presence of multiple variants in a sample may impact the %   interference.         Review of Systems   Constitution: Negative for chills, fever and night sweats.   Cardiovascular: Negative for chest pain and claudication.   Respiratory: Negative for cough.    Skin: Positive for dry skin and nail changes. Negative for color change.   Musculoskeletal: Negative for arthritis, back pain, falls, gout, joint pain and joint swelling.           Objective:      Physical Exam   Constitutional: He is oriented to person, place, and time. He appears well-developed and well-nourished. No distress.   Cardiovascular: Normal rate.    Vascular: Dorsalis pedis and posterior tibial pulses are diminished bilaterally. Toes are cool to touch. Feet are warm proximally.There is decreased digital hair. Skin is atrophic and mildly edematous. R lower leg, ankle, and foot are hyperpigmented.      Musculoskeletal: Normal range of motion. He exhibits no tenderness.   Adequate joint range of motion without pain, limitation, nor crepitation  Bilateral feet and ankle joints. Muscle strength is 5/5 in all groups bilaterally.        Semi  reducible IPJ digital contracture 2-3 b/l      Neurological: He is alert and oriented to person, place, and time. He exhibits normal muscle tone.   Neurologic: [place]-Eliud 5.07 monofilamant testing absent on toes but otherwise is intact Rolly feet. Sharp/dull sensation absent Bilaterally. Light touch absent Bilaterally.     Skin: Skin is warm, dry and intact. No abrasion, no bruising, no burn and no rash noted. He is not diaphoretic. No erythema. No pallor.    Nails x 10 are elongated by  1-3mm's, thickened by 1-3 mm's, dystrophic, and are normal in  coloration . Xerosis Bilaterally. No open lesions noted.      r lateral 2nd toe w/ partially avulsed toenail. Mild surrounding redness. No purulence or increased temp    Psychiatric: He has a normal mood and affect. His behavior is normal. Judgment and thought content normal.   Nursing note and vitals reviewed.            Assessment:       Encounter Diagnoses   Name Primary?    Peripheral vascular disease Yes    Disease of nail     Nail avulsion of toe, sequela - Right Foot          Plan:       Reid was seen today for pcp, diabetic foot exam and nail care.    Diagnoses and all orders for this visit:    Peripheral vascular disease    Disease of nail    Nail avulsion of toe, sequela - Right Foot    Other orders  -     cephALEXin (KEFLEX) 500 MG capsule; Take 1 capsule (500 mg total) by mouth every 12 (twelve) hours.    - Patient was given written and verbal instructions regarding foot condition.  I counseled the patient on his conditions, their implications and medical management.  Shoe inspection. Diabetic Foot Education. Patient reminded of the importance of good nutrition and blood sugar control to help prevent podiatric complications of diabetes. Patient instructed on proper foot hygeine. We discussed wearing proper shoe gear, daily foot inspections, never walking  without protective shoe gear, never putting sharp instruments to feet    - With patient's permission, nails were aggressively reduced and debrided x 10 to their soft tissue attachment mechanically and with electric , removing all offending nail and debris. Patient relates relief following the procedure. She will continue to monitor the areas daily, inspect her feet, wear protective shoe gear when ambulatory, moisturizer to maintain skin integrity and follow in this office in approximately 2-3 months, sooner p.r.n.    - patient presented w/ partially avulsed r 2nd lateral toenail. Mild erythema. rx keflex x 7 days.  Small amount of antibiotic ointment and bandage applied. Pt instructed to do this daily. Removing the bandage at night to allow the area to dry   Recommend f/u in 7/10 days

## 2018-03-16 ENCOUNTER — TELEPHONE (OUTPATIENT)
Dept: ENDOCRINOLOGY | Facility: CLINIC | Age: 78
End: 2018-03-16

## 2018-03-16 NOTE — TELEPHONE ENCOUNTER
----- Message from Jahaiar Belle sent at 3/16/2018 10:26 AM CDT -----  Contact: Jennifer varner/ Zaheer  [phone]  [phone]    Request for chart notes .   Did not receive a response from you.    Fax to:   [phone].

## 2018-03-20 ENCOUNTER — PATIENT OUTREACH (OUTPATIENT)
Dept: OTHER | Facility: OTHER | Age: 78
End: 2018-03-20

## 2018-03-20 NOTE — PROGRESS NOTES
Last 5 Patient Entered Readings                                      Current 30 Day Average: 129/61     Recent Readings 3/4/2018 3/3/2018 3/2/2018 3/1/2018 2/28/2018    SBP (mmHg) 135 143 126 137 141    DBP (mmHg) 66 66 63 56 71    Pulse 60 60 93 69 56        Hypertension Digital Medicine Program (HDMP): Health  Follow Up    Feeling well. Not sure what's caused the last readings to be higher.  Wife plans to go to the [facility] to get cuff fixed, I also gave them Tech Support's contact information and hours, in case that is easier for them.    Lifestyle Modifications:    1.Low sodium diet: yes - being mindful of sodium intake and carbs    2.Physical activity: yes - staying active    3.Hypotension/Hypertension symptoms: no   Frequency/Alleviating factors/Precipitating factors, etc.     4.Patient has been compliant with the medication regimen.     Follow up with Mr. Reid Herman completed. No further questions or concerns. I will follow up in a few weeks to assess progress.

## 2018-03-21 ENCOUNTER — TELEPHONE (OUTPATIENT)
Dept: ENDOCRINOLOGY | Facility: CLINIC | Age: 78
End: 2018-03-21

## 2018-03-21 NOTE — TELEPHONE ENCOUNTER
----- Message from Beverly Nye sent at 3/21/2018  9:18 AM CDT -----  Contact: Self [phone]  PT called for an update about diabetic order for diabetes management. He is low on supplies.

## 2018-03-22 NOTE — PROGRESS NOTES
CC: This 78 y.o.  male presents for management of Diabetes   along with the current chronic medical conditions including:  Patient Active Problem List   Diagnosis    CLL (chronic lymphocytic leukemia)        Diabetic retinopathy associated with type 1 diabetes mellitus    Insulin pump status    Diabetic polyneuropathy associated with type 1 diabetes mellitus    Poorly controlled type 1 diabetes mellitus with peripheral neuropathy    Type 1 diabetes, uncontrolled, with retinopathy    CAD (coronary artery disease)    PDR (proliferative diabetic retinopathy) - Right Eye    NPDR (nonproliferative diabetic retinopathy) - Left Eye    Posterior vitreous detachment - Both Eyes    Hyperlipidemia    HTN (hypertension)            Pseudophakia    BPH with urinary obstruction    Erectile dysfunction    S/P CABG x 2            Insulin pump fitting or adjustment    LBP (low back pain)    Vitreous hemorrhage    Lumbar facet arthropathy    DDD (degenerative disc disease), lumbar    Obstructive sleep apnea    Peripheral vascular disease    Lumbar spondylosis            Spondylolisthesis    S/P lumbar fusion      Status of these conditions is pending review.    HPI: Pt was diagnosed with T1DM in 1972- started on insulin.   Never hospitalized r/t DM recently.   Pt last seen by me in November 2017 and is now being seen by me again today.   Pt currently has accuchek spirit pump/sarai expert.    Has back up of MDI. Pt reports hypoglycemia-nocturnal and during the day at times.  Waking up at 2-3a -waking up to get juice.  Lab Results   Component Value Date    HGBA1C 6.2 (H) 03/14/2018     Pt has dexcom sensor-reports variable readings throughout day. Got dexcom x 2 mos ago.      CURRENT DM MEDS: Metformin 500 mg BID, accuchek spirit pump-see below     Pump settings:  ICR 1:9, isf 35, target 110-130,   Basal  mn-0400 0.85 u/hr  4720-1270 1.15 u/hr  9331-2868 1.1 u/hr  2100-mn 0.85 u/hr    Reviewed download  for 2 weeks per dexcom sensor    The patient has been using a home blood glucose monitor (BGM) and performing frequent (four or more times a day) BGM testing. The patient is insulin-treated with multiple daily injections (MDI) of insulin (3 or more times per day) or a continuous subcutaneous insulin infusion (CSII) pump. The patient's insulin treatment regimen requires frequent adjustments by the patient on the basis of therapeutic CGM testing results.    Pt has had hypoglycemia at home- 40s-60s -corrects w/ juice (not as often-1-2 a month)  Reid Herman brought glucometer or log to clinic today revealing the following BG readings:  See download    DIET/ MEAL PATTERN: Pt eats 3 meals a day    Yogurt, fruit, sugar free cookies     Snack around 7-8p nuts    EXERCISE: no formal exercise, walks occasionally-has cane, PT for back     STANDARDS OF CARE:  Eye exam: 2017, f/u q4-6 mos (floaters/injections/laser therapy), AMD, vit   Podiatry: 2017  f/u q2-3 mos toe nail clipping/ingrown toe nail problem  Cardiac Testing: h/o cabg in past 1994, f/u with cardiology, last visit Feb 2015                     ROS:   Gen: Appetite good, no weight gain or loss, denies fatigue and + weakness (after sx)-physical therapy.  Skin: Skin is intact and heals well, no rashes, pruritis, easy bruising, no hair changes, no intolerance to heat/cold.  Eyes: + visual disturbances (floaters)-2015, 2016   Resp: no SOB or DIOR, no cough  Cardiac: No palpitations, chest pain, denies syncope, weakness, no edema or cyanosis.  GI: No nausea or vomiting, diarrhea, +constipation-r/t pain meds, or abdominal pain.  /GYN: No nocturia, no urinary frequency, burning or pain.   PVD: + leg pain with or without exercise, denies cyanosis, pallor, or cold extremities.  MS/Neuro: + numbness/ tingling in BLE; FROM of joints without swelling or pain. Gait mostly steady, has back brace, speech clear, no tremor, coordination problem. + back pain-improved    Psych: Denies drug/ETOH abuse, no hx. of eating disorders or depression.  Other systems: negative.    Lab Results   Component Value Date    HGBA1C 6.2 (H) 03/14/2018     Lab Results   Component Value Date    TSH 1.402 03/14/2018     No results found for: MICROALBUR    Chemistry        Component Value Date/Time     (L) 01/12/2018 0854    K 5.0 01/12/2018 0854    CL 97 01/12/2018 0854    CO2 31 (H) 01/12/2018 0854    BUN 23 01/12/2018 0854    CREATININE 1.3 01/12/2018 0854     (H) 01/12/2018 0854        Component Value Date/Time    CALCIUM 10.1 01/12/2018 0854    ALKPHOS 93 01/12/2018 0854    AST 21 01/12/2018 0854    ALT 18 01/12/2018 0854    BILITOT 0.7 01/12/2018 0854          Lab Results   Component Value Date    LDLCALC 56.6 (L) 04/26/2017       PE:  GENERAL: Well developed, well nourished.  PSYCH: AAOx3, appropriate mood and affect, pleasant expression, conversant, appears relaxed, well groomed.   EYES: Conjunctiva, corneas clear, EOM intact  NECK: Supple, trachea midline  CHEST: Resp even and unlabored  CARDIAC: s1, s2 normal, no edema noted to BLE   ABDOMEN: soft, non distended  VASCULAR: Same temperature peripherally to centrally, no edema, brisk capillary refill BUE.  NEURO: Gait mostly steady  SKIN: Normal skin turgor. Skin warm and dry. No areas of breakdown, + acanthosis nigracans. accuchek spirit intact.   FEET: Footware appropriate.     ASSESSMENT and PLAN:  Reid was seen today for diabetes mellitus.    Diagnoses and associated orders for this visit:  1. Diabetic polyneuropathy associated with type 1 diabetes mellitus  Hemoglobin A1c, lipid next time   F/u in 3 mos  Target 110-130  mn-0300 0.85 u/hr  4953-1585 1.05 u/hr-changed rate by 10%, shift of time done as well.  4577-9339 1.1 u/hr  2100-mn 0.85 u/hr  icr 1:9, isf 35, target 110-130  Continue metformin  Change dexcom setting 70<>250   Continue use of personal cgm  A1c goal less than 7% with less hypoglycemia     Have download for  accuchek spirit-reviewed w/ M. Nazanin, system lead/director [place]-problems w/ download, able to review dexcom download-avg 157/158    Counseling >35 mins      2. Uncontrolled type 1 diabetes mellitus with diabetic peripheral neuropathy  See above   3. 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  See above, f/u with retinal specialist   4. CLL (chronic lymphocytic leukemia)  F/u with hem/onc   5. Chronic kidney disease, stage III (moderate)  stable     6. Essential hypertension  Controlled, continue med(s)   7. Obstructive sleep apnea  Not really using cpap   8. Pure hypercholesterolemia  Lipid panel-next time  On statin   9. Corns and callosities  F/u with Dr. Maye Cheung today 3p

## 2018-03-23 ENCOUNTER — TELEPHONE (OUTPATIENT)
Dept: ENDOCRINOLOGY | Facility: CLINIC | Age: 78
End: 2018-03-23

## 2018-03-23 ENCOUNTER — OFFICE VISIT (OUTPATIENT)
Dept: ENDOCRINOLOGY | Facility: CLINIC | Age: 78
End: 2018-03-23
Payer: MEDICARE

## 2018-03-23 ENCOUNTER — OFFICE VISIT (OUTPATIENT)
Dept: PODIATRY | Facility: CLINIC | Age: 78
End: 2018-03-23
Payer: MEDICARE

## 2018-03-23 VITALS
WEIGHT: 204 LBS | HEART RATE: 60 BPM | HEIGHT: 68 IN | BODY MASS INDEX: 30.92 KG/M2 | DIASTOLIC BLOOD PRESSURE: 61 MMHG | SYSTOLIC BLOOD PRESSURE: 127 MMHG

## 2018-03-23 VITALS
SYSTOLIC BLOOD PRESSURE: 123 MMHG | HEIGHT: 67 IN | BODY MASS INDEX: 32.08 KG/M2 | DIASTOLIC BLOOD PRESSURE: 77 MMHG | HEART RATE: 69 BPM | WEIGHT: 204.38 LBS

## 2018-03-23 DIAGNOSIS — E10.3593 TYPE 1 DIABETES MELLITUS WITH PROLIFERATIVE RETINOPATHY OF BOTH EYES WITHOUT MACULAR EDEMA: ICD-10-CM

## 2018-03-23 DIAGNOSIS — E10.42 DIABETIC POLYNEUROPATHY ASSOCIATED WITH TYPE 1 DIABETES MELLITUS: Primary | ICD-10-CM

## 2018-03-23 DIAGNOSIS — E78.00 PURE HYPERCHOLESTEROLEMIA: ICD-10-CM

## 2018-03-23 DIAGNOSIS — G47.33 OBSTRUCTIVE SLEEP APNEA: ICD-10-CM

## 2018-03-23 DIAGNOSIS — L84 CORNS AND CALLOSITIES: ICD-10-CM

## 2018-03-23 DIAGNOSIS — C91.10 CLL (CHRONIC LYMPHOCYTIC LEUKEMIA): ICD-10-CM

## 2018-03-23 DIAGNOSIS — I10 ESSENTIAL HYPERTENSION: ICD-10-CM

## 2018-03-23 DIAGNOSIS — M79.674 TOE PAIN, RIGHT: Primary | ICD-10-CM

## 2018-03-23 DIAGNOSIS — N18.30 CHRONIC KIDNEY DISEASE, STAGE III (MODERATE): ICD-10-CM

## 2018-03-23 PROCEDURE — 3078F DIAST BP <80 MM HG: CPT | Mod: CPTII,S$GLB,, | Performed by: NURSE PRACTITIONER

## 2018-03-23 PROCEDURE — 99499 UNLISTED E&M SERVICE: CPT | Mod: S$GLB,,, | Performed by: PODIATRIST

## 2018-03-23 PROCEDURE — 99212 OFFICE O/P EST SF 10 MIN: CPT | Mod: S$GLB,,, | Performed by: PODIATRIST

## 2018-03-23 PROCEDURE — 99499 UNLISTED E&M SERVICE: CPT | Mod: S$GLB,,, | Performed by: NURSE PRACTITIONER

## 2018-03-23 PROCEDURE — 3074F SYST BP LT 130 MM HG: CPT | Mod: CPTII,S$GLB,, | Performed by: PODIATRIST

## 2018-03-23 PROCEDURE — 99999 PR PBB SHADOW E&M-EST. PATIENT-LVL III: CPT | Mod: PBBFAC,,, | Performed by: PODIATRIST

## 2018-03-23 PROCEDURE — 99999 PR PBB SHADOW E&M-EST. PATIENT-LVL IV: CPT | Mod: PBBFAC,,, | Performed by: NURSE PRACTITIONER

## 2018-03-23 PROCEDURE — 3078F DIAST BP <80 MM HG: CPT | Mod: CPTII,S$GLB,, | Performed by: PODIATRIST

## 2018-03-23 PROCEDURE — 3074F SYST BP LT 130 MM HG: CPT | Mod: CPTII,S$GLB,, | Performed by: NURSE PRACTITIONER

## 2018-03-23 PROCEDURE — 99215 OFFICE O/P EST HI 40 MIN: CPT | Mod: S$GLB,,, | Performed by: NURSE PRACTITIONER

## 2018-03-23 NOTE — PROGRESS NOTES
Subjective:      Patient ID: Reid Herman is a 78 y.o. male.    Chief Complaint: Diabetes Mellitus (DR REINOSO 02/06/2018); Diabetic Foot Exam; and Follow-up (RIGHT FOOT/TOE)  Pt is c/o pain in his right 2nd toe. Pt was given antibiotics which he states he finished 2 days ago. Pt states that the redness in the toe has improved and that the pain is very dull.     Review of Systems   Constitution: Negative for chills, fever and malaise/fatigue.   HENT: Negative for hearing loss.    Cardiovascular: Negative for claudication and leg swelling.   Respiratory: Negative for shortness of breath.    Skin: Positive for color change, dry skin, nail changes and unusual hair distribution. Negative for flushing and rash.   Musculoskeletal: Negative for joint pain and myalgias.   Neurological: Negative for loss of balance, numbness, paresthesias and sensory change.   Psychiatric/Behavioral: Negative for altered mental status.           Objective:      Physical Exam   Constitutional: He is oriented to person, place, and time. He appears well-developed and well-nourished.   Cardiovascular:   Pulses:       Dorsalis pedis pulses are 1+ on the right side, and 1+ on the left side.        Posterior tibial pulses are 1+ on the right side, and 1+ on the left side.   No edema noted to b/L LEs   Musculoskeletal:        Right knee: He exhibits no swelling and no ecchymosis.        Left knee: He exhibits no swelling and no ecchymosis.        Right ankle: He exhibits normal range of motion, no swelling, no ecchymosis and normal pulse. No lateral malleolus, no medial malleolus and no head of 5th metatarsal tenderness found. Achilles tendon exhibits no pain, no defect and normal Minaya's test results.        Left ankle: He exhibits normal range of motion, no swelling, no ecchymosis and normal pulse. No lateral malleolus, no medial malleolus and no head of 5th metatarsal tenderness found. Achilles tendon exhibits no pain and normal  Minaya's test results.        Right lower leg: He exhibits no tenderness, no bony tenderness, no swelling, no edema and no deformity.        Left lower leg: He exhibits no tenderness, no swelling and no edema.        Right foot: There is normal range of motion and no deformity.        Left foot: There is normal range of motion and no deformity.   Adequate joint range of motion without pain, limitation, nor crepitation Bilateral feet and ankle joints. Muscle strength is 5/5 in all groups bilaterally.  Hammertoes noted, digits 1-5 b/L     Neurological: He is alert and oriented to person, place, and time. A sensory deficit is present.   [place]-Eliud 5.07 monofilamant testing absent on toes but otherwise is intact Rolly feet. Sharp/dull sensation absent Bilaterally. Light touch absent Bilaterally.   Skin: Skin is warm. Capillary refill takes more than 3 seconds. No abrasion, no bruising, no burn and no ecchymosis noted.   right lateral 2nd toe w/ partially avulsed toenail. Mild surrounding redness. This mild discoloration was compared to 2nd digit on left and appeared normal.  No increase in temp or drainage noted. Some non-viable skin removed from around lateral nail border.   No POP to nail bed or border.    Psychiatric: He has a normal mood and affect. His speech is normal and behavior is normal. He is attentive.             Assessment:       Encounter Diagnosis   Name Primary?    Toe pain, right Yes         Plan:       Reid was seen today for diabetes mellitus, diabetic foot exam and follow-up.    Diagnoses and all orders for this visit:    Toe pain, right      I counseled the patient on his conditions, their implications and medical management.      Non-viable skin debrided from the right 2nd digit.   Pt was advised to soak the right foot in warm water and epsom salt for the next 5 days. Pt was advsied to be very careful with the temperature of the water and to test it with the back of his hand.   Pt  advised to keep his routine appointment that was previously made for diabetic foot exam and to call the office if any new pedal problems should arise or if condition worsens.   .

## 2018-03-23 NOTE — TELEPHONE ENCOUNTER
----- Message from Beverly Nye sent at 3/23/2018 10:31 AM CDT -----  Contact: Diabetes Management [phone]  Diabetes Management is requesting chart notes on Pt.     [phone] (fax)    PLS contact Diabetes Management.

## 2018-03-23 NOTE — PATIENT INSTRUCTIONS
Snacks can be an important part of a balanced, healthy meal plan. They allow you to eat more frequently, feeling full and satisfied throughout the day. Also, they allow you to spread carbohydrates evenly, which may stabilize blood sugars.  Plus, snacks are enjoyable!     The amount of carbohydrate needed at snacks varies. Generally, about 15-30 grams of carbohydrate per snack is recommended.  Below you will find some tasty treats.       0-5 gm carb   Crystal Light   Vitamin Water Zero   Herbal tea, unsweetened   2 tsp peanut butter on celery   1./2 cup sugar-free jell-o   1 sugar-free popsicle   ¼ cup blueberries   8oz Blue Angelique unsweetened almond milk   5 baby carrots & celery sticks, cucumbers, bell peppers dipped in ¼ cup salsa, 2Tbsp light ranch dressing or 2Tbsp plain Greek yogurt   10 Goldfish crackers   ½ oz low-fat cheese or string cheese   1 closed handful of nuts, unsalted   1 Tbsp of sunflower seeds, unsalted   1 cup Smart Pop popcorn   1 whole grain brown rice cake        15 gm carb   1 small piece of fruit or ½ banana or 1/2 cup lite canned fruit   3 ellyn cracker squares   3 cups Smart Pop popcorn, top spray butter, Nelson lite salt or cinnamon and Truvia   5 Vanilla Wafers   ½ cup low fat, no added sugar ice cream or frozen yogurt (Blue bell, Blue Bunny, Weight Watchers, Skinny Cow)   ½ turkey, ham, or chicken sandwich   ½ c fruit with ½ c Cottage cheese   4-6 unsalted wheat crackers with 1 oz low fat cheese or 1 tbsp peanut butter    30-45 goldfish crackers (depending on flavor)    7-8 Anabaptist mini brown rice cakes (caramel, apple cinnamon, chocolate)    12 Anabaptist mini brown rice cakes (cheddar, bbq, ranch)    1/3 cup hummus dip with raw veg   1/2 whole wheat celina, 1Tbsp hummus   Mini Pizza (1/2 whole wheat English muffin, low-fat  cheese, tomato sauce)   100 calorie snack pack (Oreo, Chips Ahoy, Ritz Mix, Baked Cheetos)   4-6 oz. light or Greek Style yogurt  (Guerita Childs, Michelle, [facility])   ½ cup sugar-free pudding     6 in. wheat tortilla or celina oven toasted chips (topped with spray butter flavoring, cinnamon, Truvia OR spray butter, garlic powder, chili powder)    18 BBQ Popchips (available at Target, Whole Foods, Fresh Market)                   Diabetes Support Group Meetings         Date: Topic:   February 8 Health Promotion/Cooking Demo   March 8 Taking Care of Your Kidneys   April 12 Taking Care of Your Feet   May 10 Ease Your Mind with Diabetes   Laura 14 Summer Treats/Cooking Demo   July 12 Super Market Sweep   August 9 Taking Care of Your Eyes   Sept 13 Technology/ADA updates   October 11 Recipes & Treats/Cooking Demo   November 8 Heart Health/Pump it up!   December 13 Year-End Close Out        Meetings are held in the [facility] (A) of the [facility] located at [address] [place] [address]. Please call [phone] for additional information.    Free service, offered every 2nd Thursday of every month! Family members and/or friends are welcome as well!  Support group is for patients with type 1 or type 2 diabetes.    From 3:30p to 4:30p

## 2018-04-03 ENCOUNTER — OFFICE VISIT (OUTPATIENT)
Dept: UROLOGY | Facility: CLINIC | Age: 78
End: 2018-04-03
Payer: MEDICARE

## 2018-04-03 VITALS
HEART RATE: 78 BPM | SYSTOLIC BLOOD PRESSURE: 153 MMHG | WEIGHT: 202 LBS | HEIGHT: 68 IN | TEMPERATURE: 99 F | BODY MASS INDEX: 30.62 KG/M2 | DIASTOLIC BLOOD PRESSURE: 67 MMHG

## 2018-04-03 DIAGNOSIS — N13.8 BPH WITH URINARY OBSTRUCTION: Primary | ICD-10-CM

## 2018-04-03 DIAGNOSIS — R35.0 URINARY FREQUENCY: ICD-10-CM

## 2018-04-03 DIAGNOSIS — N52.01 ERECTILE DYSFUNCTION DUE TO ARTERIAL INSUFFICIENCY: ICD-10-CM

## 2018-04-03 DIAGNOSIS — N40.1 BPH WITH URINARY OBSTRUCTION: Primary | ICD-10-CM

## 2018-04-03 PROCEDURE — 3077F SYST BP >= 140 MM HG: CPT | Mod: CPTII,S$GLB,, | Performed by: NURSE PRACTITIONER

## 2018-04-03 PROCEDURE — 99499 UNLISTED E&M SERVICE: CPT | Mod: S$GLB,,, | Performed by: NURSE PRACTITIONER

## 2018-04-03 PROCEDURE — 3078F DIAST BP <80 MM HG: CPT | Mod: CPTII,S$GLB,, | Performed by: NURSE PRACTITIONER

## 2018-04-03 PROCEDURE — 99214 OFFICE O/P EST MOD 30 MIN: CPT | Mod: S$GLB,,, | Performed by: NURSE PRACTITIONER

## 2018-04-03 PROCEDURE — 99999 PR PBB SHADOW E&M-EST. PATIENT-LVL IV: CPT | Mod: PBBFAC,,, | Performed by: NURSE PRACTITIONER

## 2018-04-03 NOTE — PROGRESS NOTES
Subjective:       Patient ID: Reid Herman is a 78 y.o. male.    Chief Complaint: Other (psa results) and Benign Prostatic Hypertrophy    Reid Herman is a 78 y.o. male here for his annual exam.   His last clinic visit 10/21/2016.   Was seen 07/17/2014 with Dr. Stoll.    He is without any new complaints.   He does have some lower urinary tract symptoms.   He has nocturia 1-3x.   FOS varies with some hesitancy, but overall he is not bothered by the way that he voids.   He not interested in any prostate meds.  He now taking HTN meds with HCTZ TID    He also has some ED and in the past he has undergone ICI by Dr. Salter; not effective.  He was told that he had venous leak and now uses a constriction band when he wishes to have intercourse.  Uses SARA at times and gets good results with the constriction band but not as good as before.                       PSA                  1.2                 03/14/2018       PSA                  1.4                 10/03/2016                 PSA                  2.1                 09/11/2015                 PSA                  1.5                 07/17/2014                 PSA                      1.29                06/21/2013                 PSA                      1.93                04/19/2012                 PSA                      1.41                03/25/2011                 PSA                      2.0                 05/10/2010                 PSA                      1.5                 03/12/2009                 PSA                      0.7                 10/24/2006                 PSA                      0.6                 10/11/2005                 PSA                      0.6                 10/11/2004                      Past Medical History:    Anemia                                                        Hypertension                                                  Cataract                                                      Diabetic  retinopathy of both eyes                             Diabetes mellitus                                             Diabetes mellitus type I                                      Poorly controlled type 1 diabetes mellitus wit* 10/1/2012     Type 1 diabetes, uncontrolled, with retinopathy 10/1/2012     CAD (coronary artery disease)                   10/1/2012     Insulin pump in place                                         Sleep apnea                                                   DDD (degenerative disc disease), lumbar         10/28/2014    Back pain                                                     Type II or unspecified type diabetes mellitus *               Hyperlipidemia                                                Obesity                                                       Trouble in sleeping                                           Heart attack                                                  Cancer                                                          Comment:CLL    CLL (chronic lymphocytic leukemia)              8/17/2012     CLL (chronic lymphocytic leukemia)                            SQUAMOUS CELL CARCINOMA                                  Comment:right posterior ear, excised via Mph's with                    Skin cancer                                                   Preseptal cellulitis of right eye               8/17/15       Past Surgical History:    APPENDECTOMY                                     1953          CORONARY ARTERY BYPASS GRAFT                     1994            Comment:x 3    TONSILLECTOMY                                    1947          FINGER TENDON REPAIR                             2011          SKIN BIOPSY                                      2013          CATARACT EXTRACTION                              4/8/13          Comment:right eye (toric)    CATARACT EXTRACTION                              4/29/13         Comment:left eye (toric)    EYE SURGERY                                                     Review of patient's family history indicates:    Breast cancer                  Mother                    Cancer                         Mother                      Comment: ? lung cancer    Alzheimer's disease            Father                    Dementia                       Father                    Colon cancer                   Sister                    Cancer                         Sister                      Comment: colon    Diabetes                       Paternal Aunt             Diabetes                       Paternal Uncle            Diabetes                       Maternal Grandfather      Melanoma                       Neg Hx                    Psoriasis                      Neg Hx                    Lupus                          Neg Hx                    Eczema                         Neg Hx                    Acne                           Neg Hx                      Social History    Marital Status:              Spouse Name:                       Years of Education:                 Number of children:               Occupational History  Occupation          Employer            Comment               Sales                                       Social History Main Topics    Smoking Status: Never Smoker                      Smokeless Status: Never Used                        Alcohol Use: No                 Comment: social once monthly    Drug Use: No              Sexual Activity: Yes               Partners with: Female    Other Topics            Concern    None on file    Social History Narrative    None on file        Allergies:  Review of patient's allergies indicates no known allergies.    Medications:  Current outpatient prescriptions: ACCU-CHEK SHARON PLUS TEST STRP Strp, , Disp: , Rfl: ;  alpha lipoic acid 600 mg Cap, Take 1 capsule by mouth once daily.  , Disp: , Rfl: ;  aspirin (ECOTRIN) 325 MG EC tablet, Take 325 mg by mouth once daily.,  Disp: , Rfl: ;  atorvastatin (LIPITOR) 80 MG tablet, Take 1 tablet (80 mg total) by mouth nightly. (Patient taking differently: Take 40 mg by mouth nightly. ), Disp: 90 tablet, Rfl: 3  blood sugar diagnostic (ACCU-CHEK SHARON PLUS TEST STRP) Strp, Pt test blood sugar 7 times a day., Disp: 210 strip, Rfl: 6;  diazepam (VALIUM) 5 MG tablet, Take 1 tablet (5 mg total) by mouth every 6 (six) hours as needed (muscle spasms)., Disp: 90 tablet, Rfl: 0;  fosinopril-hydrochlorothiazide (MONOPRIL-HCT) 10-12.5 mg per tablet, TAKE 2 TABLETS BY MOUTH ONCE DAILY, Disp: 180 tablet, Rfl: 0  insulin aspart (NOVOLOG) 100 unit/mL injection, Type 1 dm, pt uses insulin pump, max daily dose 100 units., Disp: 30 mL, Rfl: 6;  levmefolate-B6 phos-methyl-B12 3-35-2 mg Tab, Take 1 tablet by mouth once daily., Disp: 90 each, Rfl: 3;  metformin (GLUCOPHAGE) 500 MG tablet, TAKE 1 TABLET BY MOUTH TWICE DAILY, Disp: 180 tablet, Rfl: 2;  metoprolol succinate (TOPROL-XL) 100 MG 24 hr tablet, TAKE 1 TABLET BY MOUTH EVERY EVENING, Disp: 90 tablet, Rfl: 3  multivitamin capsule, Take 1 capsule by mouth once daily.  , Disp: , Rfl: ;  nitroGLYCERIN (NITROSTAT) 0.4 MG SL tablet, Place 1 tablet (0.4 mg total) under the tongue every 5 (five) minutes as needed for Chest pain., Disp: 50 tablet, Rfl: 12              Review of Systems   Constitutional: Negative for activity change, appetite change, chills and fever.   HENT: Negative for facial swelling and trouble swallowing.    Eyes: Negative for visual disturbance.   Respiratory: Negative for chest tightness and shortness of breath.    Cardiovascular: Negative for chest pain and palpitations.   Gastrointestinal: Negative.  Negative for abdominal pain, constipation, diarrhea, nausea and vomiting.   Genitourinary: Positive for frequency and nocturia. Negative for difficulty urinating, dysuria, flank pain, hematuria, penile pain, penile swelling, scrotal swelling and testicular pain.        FOS is good  Nocturia  2x     Musculoskeletal: Negative for back pain, gait problem, myalgias and neck stiffness.   Skin: Negative for rash.   Neurological: Negative for dizziness and speech difficulty.   Hematological: Does not bruise/bleed easily.   Psychiatric/Behavioral: Negative for behavioral problems.       Objective:      Physical Exam   Nursing note and vitals reviewed.  Constitutional: He is oriented to person, place, and time. Vital signs are normal. He appears well-developed and well-nourished. He is active and cooperative.  Non-toxic appearance. He does not have a sickly appearance.   Urine dipped clear of infection in the office today.     HENT:   Head: Normocephalic and atraumatic.   Right Ear: External ear normal.   Left Ear: External ear normal.   Nose: Nose normal.   Mouth/Throat: Mucous membranes are normal.   Eyes: Conjunctivae and lids are normal. No scleral icterus.   Neck: Trachea normal, normal range of motion and full passive range of motion without pain. Neck supple. No JVD present. No tracheal deviation present.   Cardiovascular: Normal rate, S1 normal and S2 normal.    Pulmonary/Chest: Effort normal. No respiratory distress. He exhibits no tenderness.   Abdominal: Soft. Normal appearance and bowel sounds are normal. There is no hepatosplenomegaly. There is no tenderness. There is no CVA tenderness.   Genitourinary: Rectum normal, testes normal and penis normal. Rectal exam shows no external hemorrhoid, no mass and no tenderness. Prostate is enlarged. No penile tenderness.       Musculoskeletal: Normal range of motion.   Lymphadenopathy: No inguinal adenopathy noted on the right or left side.   Neurological: He is alert and oriented to person, place, and time. He has normal strength.   Skin: Skin is warm, dry and intact.     Psychiatric: He has a normal mood and affect. His behavior is normal. Judgment and thought content normal.       Assessment:       1. BPH with urinary obstruction    2. Urinary frequency     3. Erectile dysfunction due to arterial insufficiency        Plan:         I spent 25 minutes with the patient of which more than half was spent in direct consultation with the patient in regards to our treatment and plan.    Education and recommendations of today's plan of care including home remedies.  We discussed his PSA results.  Discussed AUA guidelines; no need to continue to check PSA's.  Discussed his LUTS and contributory factors. He taking fluid pill TID;   He not interested in prostate meds.  Monitor ED  Overall pleased;  RTC one year

## 2018-04-03 NOTE — PATIENT INSTRUCTIONS
PSA                  1.2                 03/14/2018       PSA                  1.4                 10/03/2016                 PSA                  2.1                 09/11/2015                 PSA                  1.5                 07/17/2014                 PSA                      1.29                06/21/2013                 PSA                      1.93                04/19/2012                 PSA                      1.41                03/25/2011                 PSA                      2.0                 05/10/2010                 PSA                      1.5                 03/12/2009                 PSA                      0.7                 10/24/2006                 PSA                      0.6                 10/11/2005                 PSA                      0.6                 10/11/2004                  BPH (Enlarged Prostate)  The prostate is a gland at the base of the bladder. As some men get older, the prostate may get bigger in size. This problem is called benign prostatic hyperplasia (BPH). BPH puts pressure on the urethra. This is the tube that carries urine from the bladder to the penis. It may interfere with the flow of urine. It may also keep the bladder from emptying fully.    Symptoms of BPH include trouble starting urination and feeling as though the bladder isnt emptying all the way. It also includes a weak urine stream, dribbling and leaking of urine, and frequent and urgent urination (especially at night). BPH can increase the risk of urinary infections. It can also block off urine flow completely. If this occurs, a thin tube (catheter) may be passed into the bladder to help drain urine.  If symptoms are mild, no treatment may be needed right now. If symptoms are more severe, treatment is likely needed. The goal of treatment is to improve urine flow and reduce symptoms. Treatments can include medicine and procedures. Your healthcare provider will discuss  treatment options with you as needed.  Home care  The following guidelines will help you care for yourself at home:  · Urinate as soon as you feel the urge. Don't try to hold your urine.  · Don't limit your fluid intake during the day. Drink 6 to 8 glasses of water or liquids a day. This prevents bacteria from building up in the bladder.  · Avoid drinking fluids after dinner to help reduce urination during the night.  · Avoid medicines that can worsen your symptoms. These include certain cold and allergy medicines and antidepressants. Diuretics used for high blood pressure can also worsen symptoms. Talk to your doctor about the medicines you take. Other choices may work better for you.  Prostate cancer screening  BPH does not increase the risk of prostate cancer. But because prostate cancer is a common cancer in men, screening is sometimes recommended. This may help detect the cancer in its early stages when treatment is most effective. Factors that can increase the risk of prostate cancer include being -American or having a father or brother who had prostate cancer. A high-fat diet may also increase the risk of prostate cancer. Talk to your healthcare provider to see whether you should be screened for prostate cancer.  Follow-up care  Follow up with your healthcare provider, or as advised  To learn more, go to:  · National Kidney & Urologic Diseases Information Clearinghouse  kidney.niddk.nih.gov, 824.105.7050  When to seek medical advice  Call your healthcare provider right away if any of these occur:  · Fever of 100.4°F (38.0°C) or higher, or as advised  · Unable to pass urine for 8 hours  · Increasing pressure or pain in your bladder (lower abdomen)  · Blood in the urine  · Increasing low back pain, not related to injury  · Symptoms of urinary infection (increased urge to urinate, burning when passing urine, foul-smelling urine)  Date Last Reviewed: 7/1/2016  © 0165-8688 [employer] [address]  [place] [address]. All rights reserved. This information is not intended as a substitute for professional medical care. Always follow your healthcare professional's instructions.

## 2018-04-30 ENCOUNTER — PATIENT OUTREACH (OUTPATIENT)
Dept: OTHER | Facility: OTHER | Age: 78
End: 2018-04-30

## 2018-04-30 NOTE — PROGRESS NOTES
Last 5 Patient Entered Readings                                      Current 30 Day Average: 131/60     Recent Readings 4/29/2018 4/28/2018 4/27/2018 4/26/2018 4/25/2018    SBP (mmHg) 126 145 144 115 111    DBP (mmHg) 60 58 65 56 62    Pulse 63 69 60 62 60        Digital Medicine: Health  Follow Up    Feeling well.    Lifestyle Modifications:    1.Dietary Modifications (Sodium intake <2,000mg/day, food labels, dining out): continuing to be mindful of sodium intake    2.Physical Activity: stays active but does not do formal exercise    3.Medication Therapy: Patient has been compliant with the medication regimen.    4.Patient has the following medication side effects/concerns:   (Frequency/Alleviating factors/Precipitating factors, etc.)     Follow up with Mr. Reid Herman completed. No further questions or concerns. Will continue follow up to achieve health goals.

## 2018-05-02 ENCOUNTER — PES CALL (OUTPATIENT)
Dept: ADMINISTRATIVE | Facility: CLINIC | Age: 78
End: 2018-05-02

## 2018-05-10 ENCOUNTER — TELEPHONE (OUTPATIENT)
Dept: ENDOCRINOLOGY | Facility: CLINIC | Age: 78
End: 2018-05-10

## 2018-05-10 NOTE — TELEPHONE ENCOUNTER
----- Message from Jahaira Belle sent at 5/10/2018 10:25 AM CDT -----  Contact: Milind varner/ [employer] te[employer]:   [phone]  Option 2   Faxed you forms to complete for patients' diabetic pump supplies.   Did you receive it?   Pls confirm.

## 2018-05-11 ENCOUNTER — TELEPHONE (OUTPATIENT)
Dept: ENDOCRINOLOGY | Facility: CLINIC | Age: 78
End: 2018-05-11

## 2018-05-14 RX ORDER — CLOTRIMAZOLE AND BETAMETHASONE DIPROPIONATE 10; .64 MG/G; MG/G
CREAM TOPICAL
Qty: 15 G | Refills: 0 | Status: SHIPPED | OUTPATIENT
Start: 2018-05-14 | End: 2018-01-01 | Stop reason: SDUPTHER

## 2018-05-15 ENCOUNTER — OFFICE VISIT (OUTPATIENT)
Dept: INTERNAL MEDICINE | Facility: CLINIC | Age: 78
End: 2018-05-15
Payer: MEDICARE

## 2018-05-15 VITALS
WEIGHT: 204.81 LBS | BODY MASS INDEX: 32.15 KG/M2 | DIASTOLIC BLOOD PRESSURE: 66 MMHG | OXYGEN SATURATION: 97 % | HEIGHT: 67 IN | SYSTOLIC BLOOD PRESSURE: 112 MMHG | TEMPERATURE: 98 F | HEART RATE: 74 BPM

## 2018-05-15 DIAGNOSIS — E10.40 CONTROLLED TYPE 1 DIABETES MELLITUS WITH DIABETIC NEUROPATHY, WITH LONG-TERM CURRENT USE OF INSULIN: ICD-10-CM

## 2018-05-15 DIAGNOSIS — I10 ESSENTIAL HYPERTENSION: ICD-10-CM

## 2018-05-15 DIAGNOSIS — I73.9 PERIPHERAL VASCULAR DISEASE: ICD-10-CM

## 2018-05-15 DIAGNOSIS — I20.89 STABLE ANGINA: ICD-10-CM

## 2018-05-15 DIAGNOSIS — N18.30 CHRONIC KIDNEY DISEASE, STAGE III (MODERATE): ICD-10-CM

## 2018-05-15 DIAGNOSIS — Z00.00 ENCOUNTER FOR PREVENTIVE HEALTH EXAMINATION: Primary | ICD-10-CM

## 2018-05-15 DIAGNOSIS — N13.8 BPH WITH URINARY OBSTRUCTION: ICD-10-CM

## 2018-05-15 DIAGNOSIS — Z98.1 S/P LUMBAR FUSION: ICD-10-CM

## 2018-05-15 DIAGNOSIS — H43.13 VITREOUS HEMORRHAGE OF BOTH EYES: ICD-10-CM

## 2018-05-15 DIAGNOSIS — I70.0 ATHEROSCLEROSIS OF AORTA: ICD-10-CM

## 2018-05-15 DIAGNOSIS — E78.00 PURE HYPERCHOLESTEROLEMIA: ICD-10-CM

## 2018-05-15 DIAGNOSIS — G47.33 OBSTRUCTIVE SLEEP APNEA: ICD-10-CM

## 2018-05-15 DIAGNOSIS — I25.119 CORONARY ARTERY DISEASE INVOLVING NATIVE CORONARY ARTERY OF NATIVE HEART WITH ANGINA PECTORIS: ICD-10-CM

## 2018-05-15 DIAGNOSIS — N40.1 BPH WITH URINARY OBSTRUCTION: ICD-10-CM

## 2018-05-15 DIAGNOSIS — M47.816 LUMBAR FACET ARTHROPATHY: ICD-10-CM

## 2018-05-15 DIAGNOSIS — H35.3132 NONEXUDATIVE AGE-RELATED MACULAR DEGENERATION, BILATERAL, INTERMEDIATE DRY STAGE: ICD-10-CM

## 2018-05-15 DIAGNOSIS — I77.9 CAROTID DISEASE, BILATERAL: ICD-10-CM

## 2018-05-15 DIAGNOSIS — M46.1 SACROILIITIS: ICD-10-CM

## 2018-05-15 DIAGNOSIS — C91.10 CLL (CHRONIC LYMPHOCYTIC LEUKEMIA): ICD-10-CM

## 2018-05-15 DIAGNOSIS — E10.3593 TYPE 1 DIABETES MELLITUS WITH PROLIFERATIVE RETINOPATHY OF BOTH EYES WITHOUT MACULAR EDEMA: ICD-10-CM

## 2018-05-15 PROCEDURE — 99499 UNLISTED E&M SERVICE: CPT | Mod: S$GLB,,, | Performed by: NURSE PRACTITIONER

## 2018-05-15 PROCEDURE — 3074F SYST BP LT 130 MM HG: CPT | Mod: CPTII,S$GLB,, | Performed by: NURSE PRACTITIONER

## 2018-05-15 PROCEDURE — G0439 PPPS, SUBSEQ VISIT: HCPCS | Mod: S$GLB,,, | Performed by: NURSE PRACTITIONER

## 2018-05-15 PROCEDURE — 3078F DIAST BP <80 MM HG: CPT | Mod: CPTII,S$GLB,, | Performed by: NURSE PRACTITIONER

## 2018-05-15 PROCEDURE — 99999 PR PBB SHADOW E&M-EST. PATIENT-LVL V: CPT | Mod: PBBFAC,,, | Performed by: NURSE PRACTITIONER

## 2018-05-15 NOTE — PATIENT INSTRUCTIONS
Counseling and Referral of Other Preventative  (Italic type indicates deductible and co-insurance are waived)    Patient Name: Reid Herman  Today's Date: 5/15/2018    Health Maintenance       Date Due Completion Date    Lipid Panel 04/26/2018 4/26/2017    Influenza Vaccine 08/01/2018 9/25/2017    Hemoglobin A1c 09/14/2018 3/14/2018    Eye Exam 01/31/2019 1/31/2018    Foot Exam 03/14/2019 3/14/2018    Override on 3/20/2017: Done    Override on 5/6/2016: Done (saw podiatry)    Colonoscopy 04/25/2020 4/25/2017    TETANUS VACCINE 02/17/2026 2/17/2016        No orders of the defined types were placed in this encounter.    The following information is provided to all patients.  This information is to help you find resources for any of the problems found today that may be affecting your health:                Living healthy guide: www.[facility].[place].[facility]      Understanding Diabetes: www.diabetes.org      Eating healthy: www.cdc.gov/healthyweight      [facility] home safety checklist: www.cdc.gov/steadi/patient.html      Agency on Aging: www.goea.[place].[facility]      Alcoholics anonymous (AA): www.aa.org      Physical Activity: www.maria teresa.nih.gov/id6ekqm      Tobacco use: www.quitwithusla.org

## 2018-05-15 NOTE — Clinical Note
Patient's last LOENIE testing done in 2016. Results:Report Summary: Impression:  Rt LEONIE (1.81) Segment/Brachial Index is unreliable due to suspected medial calcinosis. PVR waveforms indicate mild infrapopliteal peripheral arterial obstructive disease.  Lt LEONIE (1.81)  Segment/Brachial Index is unreliable due to suspected medial calcinosis. PVR waveforms indicate mild infrapopliteal peripheral arterial obstructive disease.

## 2018-05-16 PROBLEM — I77.9 CAROTID DISEASE, BILATERAL: Status: ACTIVE | Noted: 2018-05-16

## 2018-05-16 NOTE — PROGRESS NOTES
"Reid Herman presented for a  Medicare AWV and comprehensive Health Risk Assessment today. The following components were reviewed and updated:    · Medical history  · Family History  · Social history  · Allergies and Current Medications  · Health Risk Assessment  · Health Maintenance  · Care Team     ** See Completed Assessments for Annual Wellness Visit within the encounter summary.**       The following assessments were completed:  · Living Situation  · CAGE  · Depression Screening  · Timed Get Up and Go  · Whisper Test  · Cognitive Function Screening  ·   ·   · Nutrition Screening  · ADL Screening  · PAQ Screening    Vitals:    05/15/18 1619   BP: 112/66   Pulse: 74   Temp: 98.4 °F (36.9 °C)   SpO2: 97%   Weight: 92.9 kg (204 lb 12.9 oz)   Height: 5' 7" (1.702 m)     Body mass index is 32.08 kg/m².  Physical Exam   Constitutional: He is oriented to person, place, and time. He appears well-developed.   obese   HENT:   Head: Normocephalic and atraumatic.   Nose: Nose normal.   Eyes: Conjunctivae and EOM are normal.   Cardiovascular: Normal rate, regular rhythm, normal heart sounds and intact distal pulses.    Pulmonary/Chest: Effort normal and breath sounds normal.   Abdominal: Soft. Bowel sounds are normal.   Musculoskeletal: Normal range of motion.   Neurological: He is alert and oriented to person, place, and time.   Skin: Skin is warm and dry.   Psychiatric: He has a normal mood and affect. His behavior is normal. Judgment and thought content normal.   Nursing note and vitals reviewed.        Diagnoses and health risks identified today and associated recommendations/orders:    1. Encounter for preventive health examination  Assessment performed. Health maintenance updated. Chart review completed.    2. Atherosclerosis of aorta  Noted on imaging. Chronic. Stable on current regimen. Followed by PCP.    3. Peripheral vascular disease  Chronic. Stable. Followed by Cardiology.  2016:  Report " Summary:  Impression:   Rt LEONIE (1.81) Segment/Brachial Index is unreliable due to suspected medial calcinosis. PVR waveforms indicate mild infrapopliteal peripheral arterial obstructive disease.    Lt LEONIE (1.81)  Segment/Brachial Index is unreliable due to suspected medial calcinosis. PVR waveforms indicate mild infrapopliteal peripheral arterial obstructive disease.    4. 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 Stable. Followed by Cardiology.    5. Coronary artery disease involving native coronary artery of native heart with angina pectoris  Chronic. Stable. Followed by Cardiology.    6. Stable angina  Chronic. Stable. Followed by Cardiology.    7. Chronic kidney disease, stage III (moderate)  Chronic. Stable on current regimen. Followed by PCP.    8. CLL (chronic lymphocytic leukemia)  Chronic. Stable with current regimen. Followed by Hematology Oncology.    9. BPH with urinary obstruction  Chronic. Stable. Followed by Urology.    10. Sacroiliitis  Chronic. Stable on current regimen. Followed by Pain Management and PCP.    11. Lumbar facet arthropathy  Chronic. Stable on current regimen. Followed by Pain Management and PCP.    12. S/P lumbar fusion  Chronic. Stable on current regimen. Followed by Pain Management and PCP.    13. Obstructive sleep apnea  Chronic. Stable. Followed by Sleep Medicine.    14. Controlled type 1 diabetes mellitus with diabetic peripheral neuropathy  Component      Latest Ref Rng & Units 3/14/2018             Hemoglobin A1C      4.0 - 5.6 % 6.2 (H)   Stable and controlled with current regimen. Followed by Endocrinology.    15. Essential hypertension  Chronic. Stable on current regimen. Followed by PCP.    16. Pure hypercholesterolemia  Chronic. Stable on current regimen. Followed by PCP.    17. Carotid disease, bilateral  Chronic. Stable. Followed by Cardiology.    18. Vitreous hemorrhage of both eyes  Chronic. Stable with  current regimen. Followed by Opthalmology.    19. Nonexudative age-related macular degeneration, bilateral, intermediate dry stage  Chronic. Stable with current regimen. Followed by Opthalmology.      Provided Reid with a 5-10 year written screening schedule and personal prevention plan. Recommendations were developed using the USPSTF age appropriate recommendations. Education, counseling, and referrals were provided as needed. After Visit Summary printed and given to patient which includes a list of additional screenings\tests needed.    Follow-up for follow up with Primary Care Provider as instructed, ;sooner if problems, HRA in 1 year.    KENDRA Buckner

## 2018-05-30 ENCOUNTER — OFFICE VISIT (OUTPATIENT)
Dept: OPHTHALMOLOGY | Facility: CLINIC | Age: 78
End: 2018-05-30
Payer: MEDICARE

## 2018-05-30 VITALS — SYSTOLIC BLOOD PRESSURE: 108 MMHG | HEART RATE: 77 BPM | DIASTOLIC BLOOD PRESSURE: 49 MMHG

## 2018-05-30 DIAGNOSIS — Z79.4 TYPE 2 DIABETES MELLITUS WITH LEFT EYE AFFECTED BY SEVERE NONPROLIFERATIVE RETINOPATHY WITHOUT MACULAR EDEMA, WITH LONG-TERM CURRENT USE OF INSULIN: ICD-10-CM

## 2018-05-30 DIAGNOSIS — E11.3492 TYPE 2 DIABETES MELLITUS WITH LEFT EYE AFFECTED BY SEVERE NONPROLIFERATIVE RETINOPATHY WITHOUT MACULAR EDEMA, WITH LONG-TERM CURRENT USE OF INSULIN: ICD-10-CM

## 2018-05-30 DIAGNOSIS — Z79.4 TYPE 2 DIABETES MELLITUS WITH RIGHT EYE AFFECTED BY PROLIFERATIVE RETINOPATHY WITHOUT MACULAR EDEMA, WITH LONG-TERM CURRENT USE OF INSULIN: ICD-10-CM

## 2018-05-30 DIAGNOSIS — H35.3132 NONEXUDATIVE AGE-RELATED MACULAR DEGENERATION, BILATERAL, INTERMEDIATE DRY STAGE: ICD-10-CM

## 2018-05-30 DIAGNOSIS — E11.3591 TYPE 2 DIABETES MELLITUS WITH RIGHT EYE AFFECTED BY PROLIFERATIVE RETINOPATHY WITHOUT MACULAR EDEMA, WITH LONG-TERM CURRENT USE OF INSULIN: ICD-10-CM

## 2018-05-30 PROCEDURE — 92014 COMPRE OPH EXAM EST PT 1/>: CPT | Mod: S$GLB,,, | Performed by: OPHTHALMOLOGY

## 2018-05-30 PROCEDURE — 92226 PR SPECIAL EYE EXAM, SUBSEQUENT: CPT | Mod: 50,S$GLB,, | Performed by: OPHTHALMOLOGY

## 2018-05-30 PROCEDURE — 99999 PR PBB SHADOW E&M-EST. PATIENT-LVL III: CPT | Mod: PBBFAC,,, | Performed by: OPHTHALMOLOGY

## 2018-05-30 PROCEDURE — 92134 CPTRZ OPH DX IMG PST SGM RTA: CPT | Mod: S$GLB,,, | Performed by: OPHTHALMOLOGY

## 2018-05-30 PROCEDURE — 99499 UNLISTED E&M SERVICE: CPT | Mod: S$GLB,,, | Performed by: OPHTHALMOLOGY

## 2018-05-30 NOTE — PROGRESS NOTES
HPI     4 mo f/u / OCT   DLS: 01/31/2018 - Dr. Cotto      Pt states that just seems as if eyes are getting little weaker now when it   comes to vision. No Pain         (-)Flashes (+)Floaters   (+)Photophobia  (-)Glare    EyeMed(s): [facility] Prn    BSL: 165 x a.m. Hour today    Hemoglobin A1C       Date                     Value               Ref Range             Status                03/14/2018               6.2 (H)             4.0 - 5.6 %           Final                    11/01/2017               6.9 (H)             4.0 - 5.6 %           Final                     08/03/2017               6.4 (H)             4.0 - 5.6 %           Final                  POHx:   NPDR  w hem   S/P Avastin OD X 5 (12/29/14)   S/P PRP OD(01/12/15) (04/13/15)   S/P PRP OS 10/25/2017  H/x  vit hem OD 11/06 followed by PRP   S/P phaco IOL OD 4/8/13   S/P phaco IOL OS 4/29/13      Last edited by Latrice White MA on 5/30/2018  1:33 PM. (History)        [place] SDOCT:   OD: good quality, drusen, PED, no IRF/SRF, no change from 1/31/18 scan  OS: good quality, drusen, PED, no IRF/SRF, no change from 1/31/18 scan    Assessment /Plan     For exam results, see Encounter Report.    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, with long-term current use of insulin  -     Posterior Segment OCT Retina-Both eyes    Type 2 diabetes mellitus with right eye affected by proliferative retinopathy without macular edema, with long-term current use of insulin  -     Posterior Segment OCT Retina-Both eyes    Nonexudative age-related macular degeneration, bilateral, intermediate dry stage  -     Posterior Segment OCT Retina-Both eyes    Diabetic Retinopathy discussed in detail, all questions answered  Stressed importance of good BS/BP/Chol Control  Had PRP repeat OS 10/2017      Discussed Dry and Wet AMD in detail  Recommend continue AREDS 2 Vitamins  Home Amsler Grid Testing    RTC 6  months, sooner PRN

## 2018-06-01 ENCOUNTER — PATIENT OUTREACH (OUTPATIENT)
Dept: OTHER | Facility: OTHER | Age: 78
End: 2018-06-01

## 2018-06-01 NOTE — PROGRESS NOTES
Last 5 Patient Entered Readings                                      Current 30 Day Average: 119/59     Recent Readings 6/1/2018 5/31/2018 5/30/2018 5/30/2018 5/29/2018    SBP (mmHg) 123 113 99 112 126    DBP (mmHg) 63 54 49 88 64    Pulse 53 57 64 61 53        Digital Medicine: Health  Follow Up    Feeling well.   Stated he's having an issue with BG and has been trying to reach diabetes ed. I will send a message to Ms. Nazanin on his behalf.    Lifestyle Modifications:    1.Dietary Modifications: continuing to be mindful of sodium intake. Stated he's more concerned with BG than BP at the moment.    2.Physical Activity: stays active with house work, walking    3.Medication Therapy: Patient has been compliant with the medication regimen.    4.Patient has the following medication side effects/concerns:   (Frequency/Alleviating factors/Precipitating factors, etc.)     Follow up with Mr. Reid Herman completed. No further questions or concerns. Will continue follow up to achieve health goals.

## 2018-06-04 NOTE — PROGRESS NOTES
Reviewed BP readings. BP actively managed, goal <130/80 mmHg  Continue monitoring      Last 5 Patient Entered Readings                                      Current 30 Day Average: 120/58     Recent Readings 6/4/2018 6/3/2018 6/1/2018 5/31/2018 5/30/2018    SBP (mmHg) 127 129 123 113 99    DBP (mmHg) 56 50 63 54 49    Pulse 55 62 53 57 64

## 2018-06-05 ENCOUNTER — PATIENT MESSAGE (OUTPATIENT)
Dept: DIABETES | Facility: CLINIC | Age: 78
End: 2018-06-05

## 2018-06-07 ENCOUNTER — PATIENT OUTREACH (OUTPATIENT)
Dept: DIABETES | Facility: CLINIC | Age: 78
End: 2018-06-07

## 2018-06-07 ENCOUNTER — TELEPHONE (OUTPATIENT)
Dept: ENDOCRINOLOGY | Facility: CLINIC | Age: 78
End: 2018-06-07

## 2018-06-07 NOTE — TELEPHONE ENCOUNTER
----- Message from Beverly Nye sent at 6/7/2018 10:22 AM CDT -----  Contact: Self [phone]  [employer] need chart notes for diabetic supplies.

## 2018-06-07 NOTE — PROGRESS NOTES
Spoke with pt today, he is having trouble with his [employer].  Pt is scheduled for Diabetes education tomorrow.  Pt requested to send recent chart notes to [employer].  I have not seen any paperwork from [employer] come through the fax, but I went ahead and faxed chart the notes today.

## 2018-06-08 ENCOUNTER — CLINICAL SUPPORT (OUTPATIENT)
Dept: DIABETES | Facility: CLINIC | Age: 78
End: 2018-06-08
Payer: MEDICARE

## 2018-06-11 RX ORDER — METFORMIN HYDROCHLORIDE 500 MG/1
TABLET ORAL
Qty: 180 TABLET | Refills: 2 | Status: SHIPPED | OUTPATIENT
Start: 2018-06-11 | End: 2019-01-01 | Stop reason: SDUPTHER

## 2018-06-12 NOTE — PROGRESS NOTES
Diabetes Education  Author: Frida Back RD, CDE  Date: 6/12/2018    Diabetes Education Visit  Diabetes Education Record Assessment/Progress: Post Program/Follow-up    Diabetes Type  Diabetes Type : Type I    Diabetes History  Diabetes Diagnosis: >10 years          Diabetes Education Assessment/Progress  · Nutrition (Incorporating nutritional management into one's lifestyle): Instructed, Discussion, Written Materials Provided, Individual Session    · Medications (states correct name, dose, onset, peak, duration, side effects & timing of meds): Discussion, Individual Session, Demonstrates Understanding/Competency(verbalizes/demonstrates)    · Monitoring (monitoring blood glucose/other parameters & using results): Discussion, Individual Session, Instructed, Demonstrates Understanding/Competency (verbalizes/demonstrates)    · Acute Complications (preventing, detecting, and treating acute complications): Discussion, Individual Session, Demonstrates Understanding/Competency (verbalizes/demonstrates):  Pt waking up with BG trending low; sees trend on CGM and treating with too much juice; he is also having problems with his pump... Since he has a dexcom G5 he is not having to SMBG so much and with the Accu chek pump patients are not able to enter in a BG into the pump to use the bolus advisor; they have to check the BG on the meter. Pt was just estimating his bolus amounts not being able to enter in the BG.  Advised that as long as he is on this pump, he need to check BG pre meal and pre insulin dose so that he BG is used in the bolus advisor calculator.  Pt verbalized understanding. Discussed that once he treats a low BG he needs to not sit and watch the Dexcom and continue to drink more juice etc... What is happening is he is over treating his BG with juice and then his BG spikes up which causes him to then stack insulin by not using the bolus advisor feature on his pump; insulin on board is not accounted for in the  bolus calculation.     · Chronic Complications (preventing, detecting, and treating chronic complications): Discussion, Individual Session, Demonstrates Understanding/Competency (verbalizes/demonstrates)    · Clinical (diabetes, other pertinent medical history, and relevant comorbidities reviewed during visit): Discussion, Individual Session, Demonstrates Understanding/Competency (verbalizes/demonstrates)    · Cognitive (knowledge of self-management skills, functional health literacy): Discussion, Individual Session, Demonstrates Understanding/Competency (verbalizes/demonstrates)    · Psychosocial (emotional response to diabetes): Discussion, Individual Session, Demonstrates Understanding/Competency (verbalizes/demonstrates)    · Diabetes Distress and Support Systems: Discussion, Individual Session, Demonstrates Understanding/Competency (verbalizes/demonstrates)    · Behavioral (readiness for change, lifestyle practices, self-care behaviors): Discussion, Individual Session, Demonstrates Understanding/Competency (verbalizes/demonstrates)              Diabetes Care Plan/Intervention  Education Plan/Intervention: Individual Follow-Up DSMT         Education Units of Time   Time Spent: 60 min    Health Maintenance was reviewed today with patient. Discussed with patient importance of routine eye exams, foot exams/foot care, blood work (i.e.: A1c, microalbumin, and lipid), dental visits, yearly flu vaccine, and pneumonia vaccine as indicated by PCP. Patient verbalized understanding.     Health Maintenance Topics with due status: Not Due       Topic Last Completion Date    TETANUS VACCINE 02/17/2016    Colonoscopy 04/25/2017    Influenza Vaccine 09/25/2017    Foot Exam 03/14/2018    Hemoglobin A1c 03/14/2018    Eye Exam 05/30/2018     Health Maintenance Due   Topic Date Due    Lipid Panel  04/26/2018

## 2018-06-13 NOTE — PROGRESS NOTES
Pt called about not receiving his supplies from [facility].  Chart notes were faxed twice.  Called [facility] to inquire.  [employer] needed to wait for the chart notes to upload into their system before shipping him his supplies.  His supplies shipped today.  Left a message to notify the pt.

## 2018-06-14 NOTE — PROGRESS NOTES
Subjective:      Patient ID: Reid Herman is a 78 y.o. male.    Chief Complaint: PCP (KENDRA Garcia 5/15/18); Peripheral Vascular Disease; Nail Problem (great toenails); Nail Care; and Toe Pain    Reid is a 78 y.o. male who presents to the clinic for evaluation and treatment of high risk feet. 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 The patient's chief complaint is elongated toenails   This patient has documented high risk feet requiring routine maintenance secondary to diabetes mellitis and those secondary complications of diabetes, as mentioned..   PCP: Olivia Zavala MD    Date Last Seen by PCP:   Chief Complaint   Patient presents with    PCP     KENDRA Garcia 5/15/18    Peripheral Vascular Disease    Nail Problem     great toenails    Nail Care    Toe Pain       Chief Complaint   Patient presents with    PCP     KENDRA Garcia 5/15/18    Peripheral Vascular Disease    Nail Problem     great toenails    Nail Care    Toe Pain        Current shoe gear:  Affected Foot: Rx diabetic extra depth shoes and custom accommodative insoles     Unaffected Foot: Rx diabetic extra depth shoes and custom accommodative insoles    Hemoglobin A1C    Date Value Ref Range Status   03/14/2018 6.2 (H) 4.0 - 5.6 % Final     Comment:     According to ADA guidelines, hemoglobin A1c <7.0% represents  optimal control in non-pregnant diabetic patients. Different  metrics may apply to specific patient populations.   Standards of Medical Care in Diabetes-2016.  For the purpose of screening for the presence of diabetes:  <5.7%     Consistent with the absence of diabetes  5.7-6.4%  Consistent with increasing risk for diabetes   (prediabetes)  >or=6.5%  Consistent with diabetes  Currently, no consensus exists for use of hemoglobin A1c  for diagnosis of diabetes for children.  This Hemoglobin A1c assay has significant interference with fetal   hemoglobin   (HbF). The results are invalid for patients with abnormal amounts of   HbF,   including those with known Hereditary Persistence   of Fetal Hemoglobin. Heterozygous hemoglobin variants (HbAS, HbAC,   HbAD, HbAE, HbA2) do not significantly interfere with this assay;   however, presence of multiple variants in a sample may impact the %   interference.     11/01/2017 6.9 (H) 4.0 - 5.6 % Final     Comment:     According to ADA guidelines, hemoglobin A1c <7.0% represents  optimal control in non-pregnant diabetic patients. Different  metrics may apply to specific patient populations.   Standards of Medical Care in Diabetes-2016.  For the purpose of screening for the presence of diabetes:  <5.7%     Consistent with the absence of diabetes  5.7-6.4%  Consistent with increasing risk for diabetes   (prediabetes)  >or=6.5%  Consistent with diabetes  Currently, no consensus exists for use of hemoglobin A1c  for diagnosis of diabetes for children.  This Hemoglobin A1c assay has significant interference with fetal   hemoglobin   (HbF). The results are invalid for patients with abnormal amounts of   HbF,   including those with known Hereditary Persistence   of Fetal Hemoglobin. Heterozygous hemoglobin variants (HbAS, HbAC,   HbAD, HbAE,  HbA2) do not significantly interfere with this assay;   however, presence of multiple variants in a sample may impact the %   interference.     08/03/2017 6.4 (H) 4.0 - 5.6 % Final     Comment:     According to ADA guidelines, hemoglobin A1c <7.0% represents  optimal control in non-pregnant diabetic patients. Different  metrics may apply to specific patient populations.   Standards of Medical Care in Diabetes-2016.  For the purpose of screening for the presence of diabetes:  <5.7%     Consistent with the absence of diabetes  5.7-6.4%  Consistent with increasing risk for diabetes   (prediabetes)  >or=6.5%  Consistent with diabetes  Currently, no consensus exists for use of hemoglobin A1c  for diagnosis of diabetes for children.  This Hemoglobin A1c assay has significant interference with fetal   hemoglobin   (HbF). The results are invalid for patients with abnormal amounts of   HbF,   including those with known Hereditary Persistence   of Fetal Hemoglobin. Heterozygous hemoglobin variants (HbAS, HbAC,   HbAD, HbAE, HbA2) do not significantly interfere with this assay;   however, presence of multiple variants in a sample may impact the %   interference.         Review of Systems   Constitution: Negative for chills, fever and night sweats.   Cardiovascular: Negative for chest pain and claudication.   Respiratory: Negative for cough.    Skin: Positive for dry skin and nail changes. Negative for color change, flushing and itching.   Musculoskeletal: Negative for arthritis, back pain, falls, gout, joint pain and joint swelling.           Objective:      Physical Exam   Constitutional: He is oriented to person, place, and time. He appears well-developed and well-nourished. No distress.   Cardiovascular: Normal rate.    Vascular: Dorsalis pedis and posterior tibial pulses are diminished bilaterally. Toes are cool to touch. Feet are warm proximally.There is decreased digital hair. Skin is atrophic and mildly edematous. R  lower leg, ankle, and foot are hyperpigmented.      Musculoskeletal: Normal range of motion. He exhibits no tenderness.   Adequate joint range of motion without pain, limitation, nor crepitation Bilateral feet and ankle joints. Muscle strength is 5/5 in all groups bilaterally.        Semi  reducible IPJ digital contracture 2-3 b/l      Neurological: He is alert and oriented to person, place, and time. He exhibits normal muscle tone.   Neurologic: Novi-Eliud 5.07 monofilamant testing absent on toes but otherwise is intact Rolly feet. Sharp/dull sensation absent Bilaterally. Light touch absent Bilaterally.     Skin: Skin is warm, dry and intact. No abrasion, no bruising, no burn and no rash noted. He is not diaphoretic. No erythema. No pallor.    Nails x 10 are elongated by  1-5mm's, thickened by 1-3 mm's, dystrophic, and are normal in  coloration . Xerosis Bilaterally. No open lesions noted.        Psychiatric: He has a normal mood and affect. His behavior is normal. Judgment and thought content normal.   Nursing note and vitals reviewed.            Assessment:       Encounter Diagnoses   Name Primary?    Diabetic polyneuropathy associated with type 1 diabetes mellitus Yes    Peripheral vascular disease     Disease of nail          Plan:       Reid was seen today for pcp, peripheral vascular disease, nail problem, nail care and toe pain.    Diagnoses and all orders for this visit:    Diabetic polyneuropathy associated with type 1 diabetes mellitus  -     mecobal-levomefolat Ca-B6 phos (L-METHYL-B6-B12) 3-35-2 mg Tab; Take 1 tablet by mouth once daily.    Peripheral vascular disease    Disease of nail    - Patient was given written and verbal instructions regarding foot condition.  I counseled the patient on his conditions, their implications and medical management.  Shoe inspection. Diabetic Foot Education. Patient reminded of the importance of good nutrition and blood sugar control to help prevent  podiatric complications of diabetes. Patient instructed on proper foot hygeine. We discussed wearing proper shoe gear, daily foot inspections, never walking without protective shoe gear, never putting sharp instruments to feet    - With patient's permission, nails were aggressively reduced and debrided x 10 to their soft tissue attachment mechanically and with electric , removing all offending nail and debris. Patient relates relief following the procedure. She will continue to monitor the areas daily, inspect her feet, wear protective shoe gear when ambulatory, moisturizer to maintain skin integrity and follow in this office in approximately 2-3 months, sooner p.r.n.

## 2018-06-14 NOTE — PROGRESS NOTES
Pt called clinic today to see if we received paperwork from [employer].  We have not received anything from them.  Pt states that they need chart notes.  Faxed chart notes to [facility] today without any paperwork from them because the pt needs his supplies.  Faxed it to [phone].

## 2018-06-18 NOTE — TELEPHONE ENCOUNTER
----- Message from Christiano Miller sent at 6/18/2018 12:26 PM CDT -----  Contact: Patient   Patient Requesting Sooner Appointment.     Reason for sooner appt.: scalp lesion   When is the first available appointment? 7/13/2018  Communication Preference:phone# [phone]  Additional Information:

## 2018-06-18 NOTE — TELEPHONE ENCOUNTER
Spoke to pt.pt was concern about a bump he feels and sometimes hits it with his own hat, pt rafy in July, pt will call back if there any early or prior appts before set date.pt will keep area covered and with vaseline jelly.

## 2018-06-19 NOTE — TELEPHONE ENCOUNTER
----- Message from Joselyn Heath sent at 6/19/2018  1:57 PM CDT -----  Contact: [facility] [phone]  Pt called for a new rx's for atorvastatin . An 80 mg is in pt's profile so they need this and metropolol filled. Please clarify whether atorvastatin is 40mg or 80mg

## 2018-06-19 NOTE — TELEPHONE ENCOUNTER
----- Message from Matthew Hatfield sent at 6/19/2018  2:05 PM CDT -----  Contact: [facility]  Please call Elizabeth at [phone]. New Rx request for Nitroglycerin 0.4 mg. Last seen Dr Alexander 02/19/18    Thank you

## 2018-06-26 NOTE — PATIENT INSTRUCTIONS
Subconjunctival Hemorrhage    A subconjunctival hemorrhage is a result of a broken blood vessel in the white portion of the eye. It is usually painless and may be caused by coughing, sneezing, or vomiting. An injury to the eye can cause this. It can also be a sign of hypertension (high blood pressure) or a bleeding disorder.  Although it can look frightening, the presence of the blood is not serious. The blood will be reabsorbed without treatment within 2 to 3 weeks.  Home care  You may continue your usual activities.  Follow-up care  Follow up with your healthcare provider, or as advised.  When to seek medical advice  Contact your healthcare provider right away if any of these occur:  · Pain in the eye  · Change in vision  · The blood does not disappear within three weeks  · Increasing redness or swelling of the eye  · Severe headache or dizziness  · Signs of bruising or bleeding from other parts of your body  Date Last Reviewed: 6/14/2015  © 4910-6447 [employer]. [address] [place] [address], [place] [address]. All rights reserved. This information is not intended as a substitute for professional medical care. Always follow your healthcare professional's instructions.

## 2018-06-26 NOTE — PROGRESS NOTES
"Subjective:       Patient ID: Reid Herman is a 78 y.o. male.    Vitals:  height is 5' 7" (1.702 m) and weight is 88.5 kg (195 lb). His temperature is 98.9 °F (37.2 °C). His blood pressure is 125/57 (abnormal) and his pulse is 71. His respiration is 18 and oxygen saturation is 98%.     Chief Complaint: Eye Problem    This is a 78 y.o. male with who presents today with a chief complaint of eye redness, no pain. Pt was working with ceiling tiles and not sure if anything got into eye. No foreign object sensation, no loss in vision and no pain. No hx of trauma.         Eye Problem    The left eye is affected. This is a new problem. The current episode started in the past 7 days. The problem occurs constantly. The problem has been unchanged. The injury mechanism is unknown. The pain is at a severity of 0/10. The patient is experiencing no pain. There is no known exposure to pink eye. He does not wear contacts. Associated symptoms include eye redness. Pertinent negatives include no blurred vision, fever, nausea, photophobia or vomiting. He has tried nothing for the symptoms. The treatment provided no relief.     Review of Systems   Constitution: Negative for chills and fever.   HENT: Negative for congestion.    Eyes: Positive for redness. Negative for blurred vision, pain and photophobia.   Gastrointestinal: Negative for nausea and vomiting.   Neurological: Negative for headaches.       Objective:      Physical Exam   Constitutional: He appears well-developed and well-nourished.   HENT:   Head: Normocephalic and atraumatic.   Eyes: EOM and lids are normal. Pupils are equal, round, and reactive to light. Lids are everted and swept, no foreign bodies found. No foreign body present in the right eye. No foreign body present in the left eye. Left conjunctiva has a hemorrhage.       Neck: Normal range of motion. Neck supple.   Cardiovascular: Normal rate, regular rhythm and normal heart sounds.    Pulmonary/Chest: " Effort normal and breath sounds normal.   Nursing note and vitals reviewed.      Assessment:       1. Subconjunctival hemorrhage of left eye        Plan:         Subconjunctival hemorrhage of left eye      Patient reassured - follow with his eye MD.

## 2018-07-05 NOTE — TELEPHONE ENCOUNTER
----- Message from Matthew Hatfield sent at 7/5/2018  3:16 PM CDT -----  Contact: patient  Please call pt at [phone]. Patient would like to know if L-Methyl B6-B12 can be substituted because it is now too expensive to purchase    Thank you

## 2018-07-05 NOTE — TELEPHONE ENCOUNTER
Spoke with patient who will consult with his pharmacist concerning possible substitute due to increase in price of medication and will call us if needed for new rx

## 2018-07-06 NOTE — PROGRESS NOTES
Last 5 Patient Entered Readings                                      Current 30 Day Average: 126/60     Recent Readings 7/6/2018 7/5/2018 7/4/2018 7/3/2018 6/28/2018    SBP (mmHg) 119 133 100 136 112    DBP (mmHg) 58 61 47 69 52    Pulse 62 62 55 71 63          Digital Medicine: Health  Follow Up    Feeling well. Overall pleased with readings.  Dexcom issue resolved that he previously mentioned.    Lifestyle Modifications:    1.Dietary Modifications (Sodium intake <2,000mg/day, food labels, dining out): Continuing to be mindful of sodium intake.     2.Physical Activity: Keeping active by walking    3.Medication Therapy: Patient has been compliant with the medication regimen.    4.Patient has the following medication side effects/concerns:   (Frequency/Alleviating factors/Precipitating factors, etc.)     Follow up with Mr. Reid Herman completed. No further questions or concerns. Will continue follow up to achieve health goals.

## 2018-07-13 NOTE — PATIENT INSTRUCTIONS
Shave Biopsy Wound Care    Your doctor has performed a shave biopsy today.  A band aid and vaseline ointment has been placed over the site.  This should remain in place for 24 hours.  It is recommended that you keep the area dry for the first 24 hours.  After 24 hours, you may remove the band aid and wash the area with warm soap and water and apply Vaseline jelly.  Many patients prefer to use Neosporin or Bacitracin ointment.  This is acceptable; however, know that you can develop an allergy to this medication even if you have used it safely for years.  It is important to keep the area moist.  Letting it dry out and get air slows healing time, and will worsen the scar.  Band aid is optional after first 24 hours.      If you notice increasing redness, tenderness, pain, or yellow drainage at the biopsy site, please notify your doctor.  These are signs of an infection.    If your biopsy site is bleeding, apply firm pressure for 15 minutes straight.  Repeat for another 15 minutes, if it is still bleeding.   If the surgical site continues to bleed, then please contact your doctor.      [facility] [place] [address]/ [phone] FAX/ [email or web address]      CRYOSURGERY      Your doctor has used a method called cryosurgery to treat your skin condition. Cryosurgery refers to the use of very cold substances to treat a variety of skin conditions such as warts, pre-skin cancers, molluscum contagiosum, sun spots, and several benign growths. The substance we use in cryosurgery is liquid nitrogen and is so cold (-195 degrees Celsius) that is burns when administered.     Following treatment in the office, the skin may immediately burn and become red. You may find the area around the lesion is affected as well. It is sometimes necessary to treat not only the lesion, but a small area of the surrounding normal skin to achieve a good response.     A blister, and even a blood filled blister, may form  after treatment.   This is a normal response. If the blister is painful, it is acceptable to sterilize a needle and with rubbing alcohol and gently pop the blister. It is important that you gently wash the area with soap and warm water as the blister fluid may contain wart virus if a wart was treated. Do no remove the roof of the blister.     The area treated can take anywhere from 1-3 weeks to heal. Healing time depends on the kind skin lesion treated, the location, and how aggressively the lesion was treated. It is recommended that the areas treated are covered with Vaseline or bacitracin ointment and a band-aid. If a band-aid is not practical, just ointment applied several times per day will do. Keeping these areas moist will speed the healing time.    Treatment with liquid nitrogen can leave a scar. In dark skin, it may be a light or dark scar, in light skin it may be a white or pink scar. These will generally fade with time, but may never go away completely.     If you have any concerns after your treatment, please feel free to call the office.       [facility] [place] [address]/ [phone] FAX/ [email or web address]

## 2018-07-13 NOTE — PROGRESS NOTES
Subjective:       Patient ID:  Reid Herman is a 78 y.o. male who presents for   Chief Complaint   Patient presents with    Skin Check     UBSE    Lesion     scalp     History of Present Illness: The patient presents for follow up of skin check.    The patient was last seen on: 1/22/2018 for cryosurgery to actinic keratoses which have resolved, except one on the scalp which he thinks persisted.   This is a high risk patient here to check for the development of new lesions.    Other skin complaints: Rough lesion on scalp, tender, x 6 months, no treatments tried.   Also reports a brown spot near the left eyebrow, asymptomatic, present x 1 year, no treatments tried.               Lesion         Review of Systems   Constitutional: Negative for fever and chills.   Skin: Positive for wears hat (always). Negative for itching, rash, daily sunscreen use, activity-related sunscreen use and recent sunburn.   Hematologic/Lymphatic: Bruises/bleeds easily.        Objective:    Physical Exam   Constitutional: He appears well-developed and well-nourished. No distress.   Neurological: He is alert and oriented to person, place, and time. He is not disoriented.   Psychiatric: He has a normal mood and affect.   Skin:   Areas Examined (abnormalities noted in diagram):   Scalp / Hair Palpated and Inspected  Head / Face Inspection Performed  Neck Inspection Performed  Chest / Axilla Inspection Performed  Back Inspection Performed  RUE Inspected  LUE Inspection Performed  Nails and Digits Inspection Performed                       Diagram Legend     Erythematous scaling macule/papule c/w actinic keratosis       Vascular papule c/w angioma      Pigmented verrucoid papule/plaque c/w seborrheic keratosis      Yellow umbilicated papule c/w sebaceous hyperplasia      Irregularly shaped tan macule c/w lentigo     1-2 mm smooth white papules consistent with Milia      Movable subcutaneous cyst with punctum c/w epidermal inclusion cyst       Subcutaneous movable cyst c/w pilar cyst      Firm pink to brown papule c/w dermatofibroma      Pedunculated fleshy papule(s) c/w skin tag(s)      Evenly pigmented macule c/w junctional nevus     Mildly variegated pigmented, slightly irregular-bordered macule c/w mildly atypical nevus      Flesh colored to evenly pigmented papule c/w intradermal nevus       Pink pearly papule/plaque c/w basal cell carcinoma      Erythematous hyperkeratotic cursted plaque c/w SCC      Surgical scar with no sign of skin cancer recurrence      Open and closed comedones      Inflammatory papules and pustules      Verrucoid papule consistent consistent with wart     Erythematous eczematous patches and plaques     Dystrophic onycholytic nail with subungual debris c/w onychomycosis     Umbilicated papule    Erythematous-base heme-crusted tan verrucoid plaque consistent with inflamed seborrheic keratosis     Erythematous Silvery Scaling Plaque c/w Psoriasis     See annotation          Assessment / Plan:      Pathology Orders:     Normal Orders This Visit    Tissue Specimen To Pathology, Dermatology     Questions:    Directional Terms:  Other(comment)    Clinical information:  r/o hak vs scc    Specific Site:  right scalp        Neoplasm of uncertain behavior of skin  -     Tissue Specimen To Pathology, Dermatology  Shave biopsy procedure note:    Shave biopsy performed after verbal consent including risk of infection, scar, recurrence, need for additional treatment of site. Area prepped with alcohol, anesthetized with approximately 1.0cc of 1% lidocaine with epinephrine. Lesional tissue shaved with razor blade. Hemostasis achieved with application of aluminum chloride followed by hyfrecation. No complications. Dressing applied. Wound care explained.    If biopsy positive for malignancy, refer to Dr. Day for Mohs surgery consultation.      AK (actinic keratosis)  Cryosurgery Procedure Note    Verbal consent from the patient is  obtained including, but not limited to, risk of hypopigmentation/hyperpigmentation, scar, recurrence of lesion. The patient is aware of the precancerous quality and need for treatment of these lesions. Liquid nitrogen cryosurgery is applied to the 10 actinic keratoses, as detailed in the physical exam, to produce a freeze injury. The patient is aware that blisters may form and is instructed on wound care with gentle cleansing and use of vaseline ointment to keep moist until healed. The patient is supplied a handout on cryosurgery and is instructed to call if lesions do not completely resolve.    Cont wear hat always    SK (seborrheic keratosis)  These are benign inherited growths without a malignant potential. Reassurance given to patient. No treatment is necessary.       Personal history of other malignant neoplasm of skin  Area(s) of previous NMSC evaluated with no signs of recurrence.    Upper body skin examination performed today including at least 6 points as noted in physical examination. Suspicious lesions noted.               Follow-up in about 3 months (around 10/13/2018) for to discuss field treatment - efudex vs PDT for scalp.

## 2018-07-16 NOTE — PATIENT INSTRUCTIONS
Snacks can be an important part of a balanced, healthy meal plan. They allow you to eat more frequently, feeling full and satisfied throughout the day. Also, they allow you to spread carbohydrates evenly, which may stabilize blood sugars.  Plus, snacks are enjoyable!     The amount of carbohydrate needed at snacks varies. Generally, about 15-30 grams of carbohydrate per snack is recommended.  Below you will find some tasty treats.       0-5 gm carb   Crystal Light   Vitamin Water Zero   Herbal tea, unsweetened   2 tsp peanut butter on celery   1./2 cup sugar-free jell-o   1 sugar-free popsicle   ¼ cup blueberries   8oz Blue Angelique unsweetened almond milk   5 baby carrots & celery sticks, cucumbers, bell peppers dipped in ¼ cup salsa, 2Tbsp light ranch dressing or 2Tbsp plain Greek yogurt   10 Goldfish crackers   ½ oz low-fat cheese or string cheese   1 closed handful of nuts, unsalted   1 Tbsp of sunflower seeds, unsalted   1 cup Smart Pop popcorn   1 whole grain brown rice cake        15 gm carb   1 small piece of fruit or ½ banana or 1/2 cup lite canned fruit   3 ellyn cracker squares   3 cups Smart Pop popcorn, top spray butter, Nelson lite salt or cinnamon and Truvia   5 Vanilla Wafers   ½ cup low fat, no added sugar ice cream or frozen yogurt (Blue bell, Blue Bunny, Weight Watchers, Skinny Cow)   ½ turkey, ham, or chicken sandwich   ½ c fruit with ½ c Cottage cheese   4-6 unsalted wheat crackers with 1 oz low fat cheese or 1 tbsp peanut butter    30-45 goldfish crackers (depending on flavor)    7-8 Sikhism mini brown rice cakes (caramel, apple cinnamon, chocolate)    12 Sikhism mini brown rice cakes (cheddar, bbq, ranch)    1/3 cup hummus dip with raw veg   1/2 whole wheat celina, 1Tbsp hummus   Mini Pizza (1/2 whole wheat English muffin, low-fat  cheese, tomato sauce)   100 calorie snack pack (Oreo, Chips Ahoy, Ritz Mix, Baked Cheetos)   4-6 oz. light or Greek Style yogurt  (Guerita Childs, Michelle, [facility])   ½ cup sugar-free pudding     6 in. wheat tortilla or celina oven toasted chips (topped with spray butter flavoring, cinnamon, Truvia OR spray butter, garlic powder, chili powder)    18 BBQ Popchips (available at Target, Whole Foods, Fresh Market)                   Diabetes Support Group Meetings         Date: Topic:   February 8 Health Promotion/Cooking Demo   March 8 Taking Care of Your Kidneys   April 12 Taking Care of Your Feet   May 10 Ease Your Mind with Diabetes   Laura 14 Summer Treats/Cooking Demo   July 12 Super Market Sweep   August 9 Taking Care of Your Eyes   Sept 13 Technology/ADA updates   October 11 Recipes & Treats/Cooking Demo   November 8 Heart Health/Pump it up!   December 13 Year-End Close Out        Meetings are held in the [facility] (A) of the [facility] located at [address] [place] [address]. Please call [phone] for additional information.    Free service, offered every 2nd Thursday of every month! Family members and/or friends are welcome as well!  Support group is for patients with type 1 or type 2 diabetes.    From 3:30p to 4:30p

## 2018-07-16 NOTE — PROGRESS NOTES
CC: This 78 y.o.  male presents for management of Type 1 Diabetes, Uncontrolled, with Retinopathy   along with the current chronic medical conditions including:  Patient Active Problem List   Diagnosis    CLL (chronic lymphocytic leukemia)        Diabetic retinopathy associated with type 1 diabetes mellitus    Insulin pump status    Diabetic polyneuropathy associated with type 1 diabetes mellitus    Poorly controlled type 1 diabetes mellitus with peripheral neuropathy    Type 1 diabetes, uncontrolled, with retinopathy    CAD (coronary artery disease)    PDR (proliferative diabetic retinopathy) - Right Eye    NPDR (nonproliferative diabetic retinopathy) - Left Eye    Posterior vitreous detachment - Both Eyes    Hyperlipidemia    HTN (hypertension)            Pseudophakia    BPH with urinary obstruction    Erectile dysfunction    S/P CABG x 2            Insulin pump fitting or adjustment    LBP (low back pain)    Vitreous hemorrhage    Lumbar facet arthropathy    DDD (degenerative disc disease), lumbar    Obstructive sleep apnea    Peripheral vascular disease    Lumbar spondylosis            Spondylolisthesis    S/P lumbar fusion      Status of these conditions is pending review.    HPI: Pt was diagnosed with T1DM in 1972- started on insulin.   Never hospitalized r/t DM recently.   Pt last seen by me in March 2018 and is now being seen by me again today.   Pt currently has accuchek spirit pump/sarai expert.    Has back up of MDI. Pt reports hypoglycemia-nocturnal and during the day at times w/ yard work.  Waking up at 4a -waking up to get juice.  Lab Results   Component Value Date    HGBA1C 6.2 (H) 07/09/2018     Pt has dexcom sensor ------around jan 2018  BG avg 144 mg/dl SD +/- 40      CURRENT DM MEDS: Metformin 500 mg BID, accuchek spirit pump-see below   novolog    Pump settings:  See download     Reviewed download for 2 weeks per dexcom sensor    The patient has been using a  home blood glucose monitor (BGM) and performing frequent (four or more times a day) BGM testing. The patient is insulin-treated with multiple daily injections (MDI) of insulin (3 or more times per day) or a continuous subcutaneous insulin infusion (CSII) pump. The patient's insulin treatment regimen requires frequent adjustments by the patient on the basis of therapeutic CGM testing results.    Pt has had hypoglycemia at home- 50s-70s, 1 reading in the 40s since last visit -corrects w/ juice (not as often-1-2 a month)  Reid Herman brought glucometer or log to clinic today revealing the following BG readings:  See download    DIET/ MEAL PATTERN: Pt eats 3 meals a day    Yogurt, fruit, sugar free cookies     Snack around 7-8p nuts    EXERCISE: no formal exercise, walks occasionally-has cane, PT for back     STANDARDS OF CARE:  Eye exam: 2018, f/u q4-6 mos (floaters/injections/laser therapy), AMD, vit   Podiatry: 2018  f/u q2-3 mos toe nail clipping/ingrown toe nail problem  Cardiac Testing: h/o cabg in past 1994, f/u with cardiology, last visit Feb 2015                     ROS:   Gen: Appetite good, no weight gain or loss, denies fatigue and + weakness (after sx)-physical therapy.  Skin: Skin is intact and heals well, no rashes, pruritis, easy bruising, no hair changes, no intolerance to heat/cold.  Eyes: + visual disturbances (floaters)-2015, 2016   Resp: no SOB or DIOR, no cough  Cardiac: No palpitations, chest pain, denies syncope, weakness, no edema or cyanosis.  GI: No nausea or vomiting, diarrhea, +constipation-r/t pain meds, or abdominal pain.  /GYN: No nocturia, no urinary frequency, burning or pain.   PVD: + leg pain with or without exercise, denies cyanosis, pallor, or cold extremities.  MS/Neuro: + numbness/ tingling in BLE; FROM of joints without swelling or pain. Gait mostly steady, has back brace, speech clear, no tremor, coordination problem. + back pain-improved   Psych: Denies drug/ETOH  abuse, no hx. of eating disorders or depression.  Other systems: negative.    Lab Results   Component Value Date    HGBA1C 6.2 (H) 07/09/2018     Lab Results   Component Value Date    TSH 1.402 03/14/2018     No results found for: MICROALBUR    Chemistry        Component Value Date/Time     (L) 01/12/2018 0854    K 5.0 01/12/2018 0854    CL 97 01/12/2018 0854    CO2 31 (H) 01/12/2018 0854    BUN 23 01/12/2018 0854    CREATININE 1.3 01/12/2018 0854     (H) 01/12/2018 0854        Component Value Date/Time    CALCIUM 10.1 01/12/2018 0854    ALKPHOS 93 01/12/2018 0854    AST 21 01/12/2018 0854    ALT 18 01/12/2018 0854    BILITOT 0.7 01/12/2018 0854          Lab Results   Component Value Date    LDLCALC 58.2 (L) 07/09/2018       PE:  GENERAL: Well developed, well nourished.  PSYCH: AAOx3, appropriate mood and affect, pleasant expression, conversant, appears relaxed, well groomed.   EYES: Conjunctiva, corneas clear, EOM intact  NECK: Supple, trachea midline  CHEST: Resp even and unlabored  CARDIAC: s1, s2 normal, no edema noted to BLE   ABDOMEN: soft, non distended  VASCULAR: Same temperature peripherally to centrally, no edema, brisk capillary refill BUE.  NEURO: Gait mostly steady  SKIN: Normal skin turgor. Skin warm and dry. No areas of breakdown, mole on scalp (recently seen by derm), + acanthosis nigracans. accuchek spirit intact.   FEET: Footware appropriate.     ASSESSMENT and PLAN:  Reid was seen today for diabetes mellitus.    Diagnoses and associated orders for this visit:  1. Diabetic polyneuropathy associated with type 1 diabetes mellitus  Hemoglobin A1c, renal function panel next time   F/u in 3 mos  a1c below goal    Target 110-130  Continue below:  mn-0300 0.85 u/hr  Change 0300 to 0700---0.9 u/hr  Continue below:  [id number] 1.1 u/hr  2100-mn 0.85 u/hr  icr 1:9, isf 35, target 110-130  Continue metformin  Continue dexcom setting 70<>250   Continue use of personal cgm  A1c goal less than 7%  with less hypoglycemia     Review dexcom download-avg 144 SD +/- 44   Target 70%, 2% hypoglycemia     Discussed fiasp ultra acting-defer to next time    Counseling >35 mins      2. Uncontrolled type 1 diabetes mellitus with diabetic peripheral neuropathy  See above   3. 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  See above, f/u with retinal specialist   4. CLL (chronic lymphocytic leukemia)  F/u with hem/onc, visit today 7/16/18   5. Chronic kidney disease, stage III (moderate)  stable     6. Essential hypertension  Controlled, continue med(s)   7. Obstructive sleep apnea  Not really using cpap   8. Pure hypercholesterolemia  Lipid panel-next time  On statin   9. Corns and callosities  F/u with Dr. Maye Cheung   Next appt 9/11/18

## 2018-07-17 NOTE — ASSESSMENT & PLAN NOTE
Mr. Herman's CLL is clinically stable at this time with no evidence of disease progression. He has no new lymphadenopathy or splenomegaly on exam, and his WBC is actually better compared with his last visit. He has no other indication for treatment of CLL.

## 2018-08-02 NOTE — PROGRESS NOTES
PROCEDURE: Mohs' Micrographic Surgery    INDICATION: Biopsy-proven skin cancer of cosmetically and functionally important areas, including head, neck, genital, hand, foot, or areas known for having difficulty in healing, such as the lower anterior legs. Tumor with ill-defined borders.    REFERRING MD: Kathi Muniz M.D.    CASE NUMBER:     ANESTHETIC: 4.5 cc 0.5% Lidocaine with Epi 1:200,000 mixed 1:1 with 0.5% Bupivacaine    SURGICAL PREP: Hibiclens    SURGEON: Orion Day MD    ASSISTANTS: Elma Zaman PA-C and Dorita Lester MA    PREOPERATIVE DIAGNOSIS: squamous cell carcinoma    POSTOPERATIVE DIAGNOSIS: squamous cell carcinoma    PATHOLOGIC DIAGNOSIS: squamous cell carcinoma- well differentiated, superficial    HISTOLOGY OF SPECIMENS IN FIRST STAGE:   Tumor Type: Tumor seen. Superficial squamous cell carcinoma: Proliferation of squamous cells exhibiting atypia and infiltrating within the superficial papillary dermis.   Depth of Invasion: epidermis and dermis  Perineural Invasion: No    HISTOLOGY OF SPECIMENS IN SUBSEQUENT STAGES:  · Tumor Type: No tumor seen.    STAGES OF MOHS' SURGERY PERFORMED: 2    TUMOR-FREE PLANE ACHIEVED: Yes    HEMOSTASIS: electrocoagulation     SPECIMENS: 3 (2 in stage A and 1 in stage B)    LOCATION: right scalp. Patient verified location by looking at photo taken prior to procedure.     INITIAL LESION SIZE: 0.5 x 0.5 cm    FINAL DEFECT SIZE: 1.1 x 1.3 cm    WOUND REPAIR/DISPOSITION: The patient tolerated Mohs' Micrographic Surgery for a squamous cell carcinoma very well. When the tumor was completely removed, a repair of the surgical defect was undertaken.      PROCEDURE: Complex Linear Repair    INDICATION: Status post Mohs' Micrographic Surgery for squamous cell carcinoma.    CASE NUMBER:     SURGEON: Orion Day MD    ASSISTANTS: Elma Zaman PA-C and Dorita Lester MA    ANESTHETIC: 3 cc 1% Lidocaine with Epinephrine 1:100,000    SURGICAL PREP:  "Hibiclens    LOCATION: right scalp    DEFECT SIZE: 1.1 x 1.3 cm    WOUND REPAIR/DISPOSITION:  After the patient's carcinoma had been completely removed with Mohs' Micrographic Surgery, a repair of the surgical defect was undertaken. The patient was returned to the operating suite where the area of right scalp was prepped, draped, and anesthetized in the usual sterile fashion. The wound was widely undermined in all directions. Then, electrocoagulation was used to obtain meticulous hemostasis. 3-0 Vicryl buried vertical mattress sutures were placed into the subcutaneous and dermal plane to close the wound and jerry the cutaneous wound edge. Bilateral dog ears were identified and were removed by a standard Burow's triangle technique. The cutaneous wound edges were closed using interrupted 3-0 Prolene suture.    The patient tolerated the procedure well.    The area was cleaned and dressed appropriately and the patient was given wound care instructions, as well as appointment for follow-up evaluation. Patient already has pain medication at home.    LENGTH OF REPAIR: 3 cm    Vitals:    08/02/18 0725 08/02/18 1009   BP: (!) 149/69 (!) 148/68   BP Location: Left arm    Patient Position: Sitting    BP Method: Medium (Automatic)    Pulse: 65 (!) 56   Weight: 93 kg (205 lb)    Height: 5' 7" (1.702 m)          "

## 2018-08-06 NOTE — PROGRESS NOTES
Subjective:      Patient ID: Reid Herman is a 78 y.o. male.    Chief Complaint: Follow-up (6 months )    HPI:  HPI     Diabetes Management Status: Type 1 with  CGM    Statin: Taking  ACE/ARB: Taking    Screening or Prevention Patient's value Goal Complete/Controlled?   HgA1C Testing and Control   Lab Results   Component Value Date    HGBA1C 6.2 (H) 07/09/2018      Annually/Less than 8% Yes   Lipid profile : 07/09/2018 Annually Yes   LDL control Lab Results   Component Value Date    LDLCALC 58.2 (L) 07/09/2018    Annually/Less than 100 mg/dl  Yes   Nephropathy screening Lab Results   Component Value Date    LABMICR 7.0 02/06/2018     Lab Results   Component Value Date    PROTEINUA NEG 05/07/2004    Annually Yes   Blood pressure BP Readings from Last 1 Encounters:   08/06/18 132/68    Less than 140/90 Yes   Dilated retinal exam : 05/30/2018 Annually Yes   Foot exam   : 03/14/2018 Annually Yes             Annual exam: 2/6/2018  Colonoscopy: every 5 years ( sister with colon cancer) 4/2009 and patient will call  4/25/2014 follow up in 3-5 years NOW: 4/25/2017 repeat in 3 years  Optho: Dr. Cotto  Cataract Sugery  Flu: 2017  Tetanus: 2016  Shingles: done 3/26/2012, Shingrix discussed  Pneumovax: 12/7/2009 done  Prevnar 13 done  Podiatry: every 3 months  A1C: 6.4  Urine microalbumin/creatinine: due in 4/2018      Endocrinology: Ms. Charles  [facility] hypertension clinic: reviewed and controlled today  Podiatrist: follow up every 3 months Dr. Brian Cheung  Urology: annual for psa  Elizabeth Sean  Neurosurgery: Dr. Pinedo: no further follow up  Hematology: Dr. Shailesh Garcia  Every 6 month  Sleep Clinic: Ms Fletcher monitoring CPAP not using as much maybe that is why  Opthamology: Dr. Perkins every 6 months   Cardiology: Dr. Alexander scheduled          Patient Active Problem List   Diagnosis    CLL (chronic lymphocytic leukemia)    Diabetic polyneuropathy associated with type 1 diabetes mellitus    Controlled type 1  diabetes mellitus with diabetic neuropathy, with long-term current use of insulin    Coronary artery disease involving native coronary artery of native heart with angina pectoris    Posterior vitreous detachment - Both Eyes    Hyperlipidemia    HTN (hypertension)    Dermatophytosis of nail    Pseudophakia    BPH with urinary obstruction    Erectile dysfunction    Corns and callosities    Vitreous hemorrhage    Lumbar facet arthropathy    DDD (degenerative disc disease), lumbar    Obstructive sleep apnea    Peripheral vascular disease    Lumbar spondylosis    Spondylolisthesis    S/P lumbar fusion    Atherosclerosis of aorta    Chronic kidney disease, stage III (moderate)    Sacroiliitis    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    Stable angina    Nonexudative age-related macular degeneration, bilateral, intermediate dry stage    Carotid disease, bilateral     Past Medical History:   Diagnosis Date    Anemia     Back pain     Basal cell carcinoma 06/08/2015    left nasal ala    CAD (coronary artery disease) 10/1/2012    Cataract     DDD (degenerative disc disease), lumbar 10/28/2014    Diabetes mellitus     Diabetes mellitus type I     Diabetic retinopathy of both eyes     Heart attack     Hyperlipidemia     Hypertension     Insulin pump in place     Obesity     Poorly controlled type 1 diabetes mellitus with peripheral neuropathy 10/1/2012    Preseptal cellulitis of right eye 8/17/15    S/P CABG x 2 2/14/2014    1994     Sleep apnea     Squamous cell carcinoma 03/18/2013    right posterior ear    Squamous cell carcinoma 07/26/2017    scalp    Trouble in sleeping     Type 1 diabetes, uncontrolled, with retinopathy 10/1/2012    Type II or unspecified type diabetes mellitus without mention of complication, not stated as uncontrolled      Past Surgical History:   Procedure Laterality Date    APPENDECTOMY  1953     "CATARACT EXTRACTION  4/8/13    right eye (toric)    CATARACT EXTRACTION  4/29/13    left eye (toric)    COLONOSCOPY N/A 4/25/2017    Procedure: COLONOSCOPY;  Surgeon: CLARISSA Freeman MD;  Location: [facility] ([address] [place] [address]);  Service: Endoscopy;  Laterality: N/A;    CORONARY ARTERY BYPASS GRAFT  1994    x 3    EYE SURGERY      cataracts, both eyes    FINGER TENDON REPAIR  2011    left hand    SKIN BIOPSY  2013    multiple    SPINE SURGERY  2015    TLIF    TONSILLECTOMY  1947     Family History   Problem Relation Age of Onset    Breast cancer Mother     Cancer Mother         ? lung cancer    Alzheimer's disease Father     Dementia Father     Stroke Father     Colon cancer Sister     Cancer Sister 60        colon    Acute myelogenous leukemia Sister     Diabetes Maternal Grandfather     Diabetes Paternal Aunt     Diabetes Paternal Uncle     Melanoma Neg Hx     Psoriasis Neg Hx     Lupus Neg Hx     Eczema Neg Hx     Acne Neg Hx      Review of Systems   Constitutional: Positive for activity change. Negative for unexpected weight change.   HENT: Positive for hearing loss. Negative for rhinorrhea and trouble swallowing.    Eyes: Negative for discharge and visual disturbance.   Respiratory: Negative for chest tightness and wheezing.    Cardiovascular: Negative for chest pain and palpitations.   Gastrointestinal: Negative for blood in stool, constipation, diarrhea and vomiting.   Endocrine: Negative for polydipsia and polyuria.   Genitourinary: Negative for difficulty urinating, hematuria and urgency.   Musculoskeletal: Negative for arthralgias, joint swelling and neck pain.   Neurological: Positive for weakness. Negative for headaches.   Psychiatric/Behavioral: Positive for confusion. Negative for dysphoric mood.     Objective:     Vitals:    08/06/18 0846   BP: 132/68   Pulse: 74   SpO2: 97%   Weight: 92 kg (202 lb 13.2 oz)   Height: 5' 7" (1.702 m)   PainSc: 0-No pain     Body mass index is 31.77 " kg/m².  Physical Exam   Constitutional: He is oriented to person, place, and time. He appears well-developed and well-nourished. No distress.   Neck: Carotid bruit is not present. No thyromegaly present.   Cardiovascular: Normal rate, regular rhythm and normal heart sounds.  PMI is not displaced.    Pulmonary/Chest: Effort normal and breath sounds normal. No respiratory distress.   Abdominal: Soft. Bowel sounds are normal. He exhibits no distension. There is no tenderness.   Musculoskeletal: He exhibits no edema.   Neurological: He is alert and oriented to person, place, and time.     Assessment:     1. 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 Pure hypercholesterolemia    3. Essential hypertension    4. Coronary artery disease involving native coronary artery of native heart with angina pectoris      Plan:   Reid was seen today for follow-up.    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     Medication List          Accurate as of 8/6/18  9:41 AM. If you have any questions, ask your nurse or doctor.               CHANGE how you take these medications    atorvastatin 80 MG tablet  Commonly known as:  LIPITOR  TAKE 1/2 TABLET NIGHTLY.  What changed:  · how much to take  · additional instructions        CONTINUE taking these medications    ACCU-CHEK SHARON PLUS TEST STRP Strp  Generic drug:  blood sugar diagnostic  TEST BLOOD SUGAR FIVE TIMES DAILY     ACCU-CHEK FASTCLIX LANCING DEV Misc  Generic drug:  lancets  TEST 6 TIMES DAILY     alpha lipoic acid 600  mg Cap     aspirin 325 MG EC tablet  Commonly known as:  ECOTRIN     clotrimazole-betamethasone 1-0.05% cream  Commonly known as:  LOTRISONE  APPLY EXTERNALLY TO THE AFFECTED AREA TWICE DAILY     fosinopril-hydrochlorothiazide 10-12.5 mg per tablet  Commonly known as:  MONOPRIL-HCT  Take 3 tablets by mouth once daily.     * insulin syringe-needle U-100 0.3 mL 30 gauge x 5/16 Syrg  Commonly known as:  ADVOCATE SYRINGES  Use as directed     * BD INSULIN SYRINGE ULT-FINE II 0.3 mL 31 gauge x 5/16 Syrg  Generic drug:  insulin syringe-needle U-100     isosorbide mononitrate 60 MG 24 hr tablet  Commonly known as:  IMDUR  Take 1 tablet (60 mg total) by mouth once daily.     mecobal-levomefolat Ca-B6 phos 3-35-2 mg Tab  Commonly known as:  L-METHYL-B6-B12  Take 1 tablet by mouth once daily.     metFORMIN 500 MG tablet  Commonly known as:  GLUCOPHAGE  TAKE 1 TABLET TWICE DAILY     metoprolol succinate 100 MG 24 hr tablet  Commonly known as:  TOPROL-XL  Take 1 tablet (100 mg total) by mouth every evening.     multivitamin capsule     nitroGLYCERIN 0.4 MG SL tablet  Commonly known as:  NITROSTAT  Place 1 tablet (0.4 mg total) under the tongue every 5 (five) minutes as needed for Chest pain. May dispense a two pack of 25 tablets.     NovoLOG U-100 Insulin aspart 100 unit/mL injection  Generic drug:  insulin aspart U-100  INJECT UP TO MAX  UNITS UNDER THE SKIN VIA INSULIN PUMP DAILY AS DIRECTED     PRESERVISION AREDS 2 (OMEGA-3) ORAL     PRESERVISION AREDS ORAL        * This list has 2 medication(s) that are the same as other medications prescribed for you. Read the directions carefully, and ask your doctor or other care provider to review them with you.

## 2018-08-16 NOTE — PROGRESS NOTES
78 y.o. male patient is here for suture removal following Mohs' surgery.    Patient reports no problems.    WOUND PE:  The R scalp sutures intact. Wound healing well. Good skin edges. No signs or symptoms of infection.      IMPRESSION:  Healing operative site from Mohs' surgery, SCC R scalp, s/p Mohs' with CLC, postop day # 14.    PLAN:  Sutures removed today. Steri-strips applied.  Continue wound care.  Keep moist with Aquaphor.    RTC:  In 3-6 months with Kathi Muniz M.D. for skin check or sooner if new concern arises.

## 2018-08-30 NOTE — TELEPHONE ENCOUNTER
----- Message from Beverly Nye sent at 8/30/2018 10:47 AM CDT -----  Contact: [employer] [phone]  [employer] is requesting a call to discuss pump supplies for PT.

## 2018-08-31 NOTE — PROGRESS NOTES
Last 5 Patient Entered Readings                                      Current 30 Day Average: 127/54     Recent Readings 8/30/2018 8/29/2018 8/28/2018 8/27/2018 8/26/2018    SBP (mmHg) 120 131 138 139 139    DBP (mmHg) 64 51 51 55 63    Pulse 69 63 68 60 68        Digital Medicine: Health  Follow Up    Feeling well.  Pleased with readings.    Lifestyle Modifications:    1.Dietary Modifications: Continuing to be mindful of sodium and carbs.    2.Physical Activity: Staying active by walking, yard work.    3.Medication Therapy: Patient has been compliant with the medication regimen.    4.Patient has the following medication side effects/concerns:   (Frequency/Alleviating factors/Precipitating factors, etc.)     Follow up with Mr. Reid Herman completed. No further questions or concerns. Will continue follow up to achieve health goals.

## 2018-09-11 NOTE — PROGRESS NOTES
Subjective:      Patient ID: Reid Herman is a 78 y.o. male.    Chief Complaint: PCP (Olivia Zavala MD 8/06/18); Diabetic Foot Exam; Nail Problem; and Nail Care    Reid is a 78 y.o. male who presents to the clinic for evaluation and treatment of high risk feet. Reid has a past medical history of Anemia, Back pain, Basal cell carcinoma (06/08/2015), CAD (coronary artery disease) (10/1/2012), Cataract, DDD (degenerative disc disease), lumbar (10/28/2014), Diabetes mellitus, Diabetes mellitus type I, Diabetic retinopathy of both eyes, Heart attack, Hyperlipidemia, Hypertension, Insulin pump in place, Obesity, Poorly controlled type 1 diabetes mellitus with peripheral neuropathy (10/1/2012), Preseptal cellulitis of right eye (8/17/15), S/P CABG x 2 (2/14/2014), Sleep apnea, Squamous cell carcinoma (03/18/2013), Squamous cell carcinoma (07/26/2017), Trouble in sleeping, Type 1 diabetes, uncontrolled, with retinopathy (10/1/2012), and Type II or unspecified type diabetes mellitus without mention of complication, not stated as uncontrolled. The patient's chief complaint is elongated toenails   This patient has documented high risk feet requiring routine maintenance secondary to diabetes mellitis and those secondary complications of diabetes, as mentioned..   PCP: Olivia Zavala MD    Date Last Seen by PCP:   Chief Complaint   Patient presents with    PCP     Olivia Zavala MD 8/06/18    Diabetic Foot Exam    Nail Problem    Nail Care       Chief Complaint   Patient presents with    PCP     Olivia Zavala MD 8/06/18    Diabetic Foot Exam    Nail Problem    Nail Care        Current shoe gear:  Affected Foot: Rx diabetic extra depth shoes and custom accommodative insoles     Unaffected Foot: Rx diabetic extra depth shoes and custom accommodative insoles    Hemoglobin A1C   Date Value Ref Range Status   07/09/2018 6.2 (H) 4.0 - 5.6 % Final     Comment:     ADA Screening Guidelines:  5.7-6.4%   Consistent with prediabetes  >or=6.5%  Consistent with diabetes  High levels of fetal hemoglobin interfere with the HbA1C  assay. Heterozygous hemoglobin variants (HbS, HgC, etc)do  not significantly interfere with this assay.   However, presence of multiple variants may affect accuracy.     03/14/2018 6.2 (H) 4.0 - 5.6 % Final     Comment:     According to ADA guidelines, hemoglobin A1c <7.0% represents  optimal control in non-pregnant diabetic patients. Different  metrics may apply to specific patient populations.   Standards of Medical Care in Diabetes-2016.  For the purpose of screening for the presence of diabetes:  <5.7%     Consistent with the absence of diabetes  5.7-6.4%  Consistent with increasing risk for diabetes   (prediabetes)  >or=6.5%  Consistent with diabetes  Currently, no consensus exists for use of hemoglobin A1c  for diagnosis of diabetes for children.  This Hemoglobin A1c assay has significant interference with fetal   hemoglobin   (HbF). The results are invalid for patients with abnormal amounts of   HbF,   including those with known Hereditary Persistence   of Fetal Hemoglobin. Heterozygous hemoglobin variants (HbAS, HbAC,   HbAD, HbAE, HbA2) do not significantly interfere with this assay;   however, presence of multiple variants in a sample may impact the %   interference.     11/01/2017 6.9 (H) 4.0 - 5.6 % Final     Comment:     According to ADA guidelines, hemoglobin A1c <7.0% represents  optimal control in non-pregnant diabetic patients. Different  metrics may apply to specific patient populations.   Standards of Medical Care in Diabetes-2016.  For the purpose of screening for the presence of diabetes:  <5.7%     Consistent with the absence of diabetes  5.7-6.4%  Consistent with increasing risk for diabetes   (prediabetes)  >or=6.5%  Consistent with diabetes  Currently, no consensus exists for use of hemoglobin A1c  for diagnosis of diabetes for children.  This Hemoglobin A1c assay has  significant interference with fetal   hemoglobin   (HbF). The results are invalid for patients with abnormal amounts of   HbF,   including those with known Hereditary Persistence   of Fetal Hemoglobin. Heterozygous hemoglobin variants (HbAS, HbAC,   HbAD, HbAE, HbA2) do not significantly interfere with this assay;   however, presence of multiple variants in a sample may impact the %   interference.         Review of Systems   Constitution: Negative for chills, fever and night sweats.   Cardiovascular: Negative for chest pain and claudication.   Respiratory: Negative for cough.    Skin: Positive for dry skin and nail changes. Negative for color change, flushing, itching, poor wound healing and rash.   Musculoskeletal: Negative for arthritis, back pain, falls, gout, joint pain and joint swelling.           Objective:      Physical Exam   Constitutional: He is oriented to person, place, and time. He appears well-developed and well-nourished. No distress.   Cardiovascular: Normal rate.   Vascular: Dorsalis pedis and posterior tibial pulses are diminished bilaterally. Toes are cool to touch. Feet are warm proximally.There is decreased digital hair. Skin is atrophic and mildly edematous. R lower leg, ankle, and foot are hyperpigmented.      Musculoskeletal: Normal range of motion. He exhibits no tenderness.   Adequate joint range of motion without pain, limitation, nor crepitation Bilateral feet and ankle joints. Muscle strength is 5/5 in all groups bilaterally.        Semi  reducible IPJ digital contracture 2-3 b/l      Neurological: He is alert and oriented to person, place, and time. He exhibits normal muscle tone.   Neurologic: Lima-Eliud 5.07 monofilamant testing absent on toes but otherwise is intact Rolly feet. Sharp/dull sensation absent Bilaterally. Light touch absent Bilaterally.     Skin: Skin is warm, dry and intact. No abrasion, no bruising, no burn, no petechiae and no rash noted. He is not diaphoretic.  No erythema. No pallor.    Nails x 10 are elongated by  1-5mm's, thickened by 1-2 mm's, dystrophic, and are normal in  coloration . Xerosis Bilaterally. No open lesions noted.        Psychiatric: He has a normal mood and affect. His behavior is normal. Judgment and thought content normal.   Nursing note and vitals reviewed.            Assessment:       Encounter Diagnoses   Name Primary?    Diabetic polyneuropathy associated with type 1 diabetes mellitus Yes    Peripheral vascular disease     Disease of nail          Plan:       Reid was seen today for pcp, diabetic foot exam, nail problem and nail care.    Diagnoses and all orders for this visit:    Diabetic polyneuropathy associated with type 1 diabetes mellitus    Peripheral vascular disease    Disease of nail    - Patient was given written and verbal instructions regarding foot condition.  I counseled the patient on his conditions, their implications and medical management.  Shoe inspection. Diabetic Foot Education. Patient reminded of the importance of good nutrition and blood sugar control to help prevent podiatric complications of diabetes. Patient instructed on proper foot hygeine. We discussed wearing proper shoe gear, daily foot inspections, never walking without protective shoe gear, never putting sharp instruments to feet    - With patient's permission, nails were aggressively reduced and debrided x 10 to their soft tissue attachment mechanically and with electric , removing all offending nail and debris. Patient relates relief following the procedure. She will continue to monitor the areas daily, inspect her feet, wear protective shoe gear when ambulatory, moisturizer to maintain skin integrity and follow in this office in approximately 2-3 months, sooner p.r.n.

## 2018-10-15 NOTE — PATIENT INSTRUCTIONS

## 2018-10-15 NOTE — PROGRESS NOTES
Subjective:       Patient ID:  Reid Herman is a 78 y.o. male who presents for   Chief Complaint   Patient presents with    Skin Check     UBSE/recheck scalp      History of Present Illness: The patient presents for follow up of skin check.    The patient was last seen on: 7/18 for cryosurgery to actinic keratoses which have resolved and bx of scc right scalp - excised by SSW  This is a high risk patient here to check for the development of new lesions.      Other skin complaints: scaling red lesion on right 3rd finger x 2 months. No prev treatment          Review of Systems   Constitutional: Negative for fever and chills.   Skin: Positive for wears hat (always). Negative for itching, rash, daily sunscreen use, activity-related sunscreen use and recent sunburn.   Hematologic/Lymphatic: Bruises/bleeds easily.        Objective:    Physical Exam   Constitutional: He appears well-developed and well-nourished. No distress.   Neurological: He is alert and oriented to person, place, and time. He is not disoriented.   Psychiatric: He has a normal mood and affect.   Skin:   Areas Examined (abnormalities noted in diagram):   Scalp / Hair Palpated and Inspected  Head / Face Inspection Performed  Neck Inspection Performed  Chest / Axilla Inspection Performed  Back Inspection Performed  RUE Inspected  LUE Inspection Performed  Nails and Digits Inspection Performed                       Diagram Legend     Erythematous scaling macule/papule c/w actinic keratosis       Vascular papule c/w angioma      Pigmented verrucoid papule/plaque c/w seborrheic keratosis      Yellow umbilicated papule c/w sebaceous hyperplasia      Irregularly shaped tan macule c/w lentigo     1-2 mm smooth white papules consistent with Milia      Movable subcutaneous cyst with punctum c/w epidermal inclusion cyst      Subcutaneous movable cyst c/w pilar cyst      Firm pink to brown papule c/w dermatofibroma      Pedunculated fleshy papule(s) c/w  skin tag(s)      Evenly pigmented macule c/w junctional nevus     Mildly variegated pigmented, slightly irregular-bordered macule c/w mildly atypical nevus      Flesh colored to evenly pigmented papule c/w intradermal nevus       Pink pearly papule/plaque c/w basal cell carcinoma      Erythematous hyperkeratotic cursted plaque c/w SCC      Surgical scar with no sign of skin cancer recurrence      Open and closed comedones      Inflammatory papules and pustules      Verrucoid papule consistent consistent with wart     Erythematous eczematous patches and plaques     Dystrophic onycholytic nail with subungual debris c/w onychomycosis     Umbilicated papule    Erythematous-base heme-crusted tan verrucoid plaque consistent with inflamed seborrheic keratosis     Erythematous Silvery Scaling Plaque c/w Psoriasis     See annotation      Assessment / Plan:        AK (actinic keratosis)  -     fluorouracil (EFUDEX) 5 % cream; AAA scalp bid x 2 weeks  Dispense: 40 g; Refill: 1    And     Cryosurgery Procedure Note    Verbal consent from the patient is obtained including, but not limited to, risk of hypopigmentation/hyperpigmentation, scar, recurrence of lesion. The patient is aware of the precancerous quality and need for treatment of these lesions. Liquid nitrogen cryosurgery is applied to the 2 actinic keratoses, as detailed in the physical exam, to produce a freeze injury. The patient is aware that blisters may form and is instructed on wound care with gentle cleansing and use of vaseline ointment to keep moist until healed. The patient is supplied a handout on cryosurgery and is instructed to call if lesions do not completely resolve.    Wear hat always    SK (seborrheic keratosis)   - stable and chronic      Personal history of other malignant neoplasm of skin  Area(s) of previous NMSC evaluated with no signs of recurrence.    Upper body skin examination performed today including at least 6 points as noted in physical  examination. No lesions suspicious for malignancy noted.               Follow-up in about 3 months (around 1/15/2019).

## 2018-10-15 NOTE — PROGRESS NOTES
Last 5 Patient Entered Readings                                      Current 30 Day Average: 128/61     Recent Readings 10/15/2018 10/14/2018 10/13/2018 10/13/2018 10/11/2018    SBP (mmHg) 135 125 120 164 142    DBP (mmHg) 62 51 66 77 57    Pulse 61 58 64 70 64        Digital Medicine: Health  Follow Up    Feeling well.    Reports reading of 164/77 was an error. Waited about 5 mins and rechecked and BP was 120/66.    Lifestyle Modifications:    1.Dietary Modifications: Continuing to monitor sodium and carb intake.    2.Physical Activity: Walks for exercise.    3.Medication Therapy: Patient has been compliant with the medication regimen.    4.Patient has the following medication side effects/concerns:   (Frequency/Alleviating factors/Precipitating factors, etc.)     Follow up with Mr. Reid Herman completed. No further questions or concerns. Will continue to follow up to achieve health goals.

## 2018-10-18 NOTE — PROGRESS NOTES
CC: This 78 y.o.  male presents for management of No chief complaint on file.   along with the current chronic medical conditions including:  Patient Active Problem List   Diagnosis    CLL (chronic lymphocytic leukemia)        Diabetic retinopathy associated with type 1 diabetes mellitus    Insulin pump status    Diabetic polyneuropathy associated with type 1 diabetes mellitus    Poorly controlled type 1 diabetes mellitus with peripheral neuropathy    Type 1 diabetes, uncontrolled, with retinopathy    CAD (coronary artery disease)    PDR (proliferative diabetic retinopathy) - Right Eye    NPDR (nonproliferative diabetic retinopathy) - Left Eye    Posterior vitreous detachment - Both Eyes    Hyperlipidemia    HTN (hypertension)            Pseudophakia    BPH with urinary obstruction    Erectile dysfunction    S/P CABG x 2            Insulin pump fitting or adjustment    LBP (low back pain)    Vitreous hemorrhage    Lumbar facet arthropathy    DDD (degenerative disc disease), lumbar    Obstructive sleep apnea    Peripheral vascular disease    Lumbar spondylosis            Spondylolisthesis    S/P lumbar fusion      Status of these conditions is pending review.    HPI: Pt was diagnosed with T1DM in 1972- started on insulin.   Never hospitalized r/t DM recently.   Pt last seen by me in July 2018 and is now being seen by me again today.   Pt currently has accuchek spirit pump/sarai expert.    Has back up of MDI.   Pt reports hypoglycemia-nocturnal and during the day at times w/ yard work-about the same from last visit.  Hyperglycemia over the past week or so-has concerns w/ bolus features/settings. Interest in omnipod again.   Also has concerns w/ a1c going up from 6.2% to 6.7%.  On antibiotics a few weeks ago for wisdom tooth removal.    Lab Results   Component Value Date    HGBA1C 6.7 (H) 10/15/2018     Pt has dexcom sensor ------around jan 2018  BG avg 148 mg/dl SD +/- 47       CURRENT DM MEDS: Metformin 500 mg BID, accuchek spirit pump-see below   novolog    Pump settings:  See download     Reviewed download for 2 weeks per dexcom sensor    The patient has been using a home blood glucose monitor (BGM) and performing frequent (four or more times a day) BGM testing. The patient is insulin-treated with multiple daily injections (MDI) of insulin (3 or more times per day) or a continuous subcutaneous insulin infusion (CSII) pump. The patient's insulin treatment regimen requires frequent adjustments by the patient on the basis of therapeutic CGM testing results.    Pt has had hypoglycemia at home- 50s-70s, 1-2x 40s in the past 3 mos.   Reid Herman brought glucometer or log to clinic today revealing the following BG readings:  See download    DIET/ MEAL PATTERN: Pt eats 3 meals a day    Yogurt, fruit, sugar free cookies     Snack around 7-8p nuts    EXERCISE: no formal exercise, walks occasionally-has cane, PT for back     STANDARDS OF CARE:  Eye exam: 2018, f/u q4-6 mos (floaters/injections/laser therapy), AMD, vit   Podiatry: 2018  f/u q2-3 mos toe nail clipping/ingrown toe nail problem  Cardiac Testing: h/o cabg in past 1994, f/u with cardiology, last visit Feb 2015                     ROS:   Gen: Appetite good, no weight gain or loss, denies fatigue and + weakness (after sx)-physical therapy.  Skin: Skin is intact and heals well, no rashes, pruritis, easy bruising, no hair changes, no intolerance to heat/cold.  Eyes: + visual disturbances (floaters)-2015, 2016   Resp: no SOB or DIOR, no cough  Cardiac: No palpitations, chest pain, denies syncope, weakness, no edema or cyanosis.  GI: No nausea or vomiting, diarrhea, +constipation-r/t pain meds, or abdominal pain.  /GYN: No nocturia, no urinary frequency, burning or pain.   PVD: + leg pain with or without exercise, denies cyanosis, pallor, or cold extremities.  MS/Neuro: + numbness/ tingling in BLE; FROM of joints without  swelling or pain. Gait mostly steady, has back brace, speech clear, no tremor, coordination problem. + back pain-improved   Psych: Denies drug/ETOH abuse, no hx. of eating disorders or depression.  Other systems: negative.    Lab Results   Component Value Date    HGBA1C 6.7 (H) 10/15/2018     Lab Results   Component Value Date    TSH 1.402 03/14/2018     No results found for: MICROALBUR    Chemistry        Component Value Date/Time     (L) 10/15/2018 1109    K 4.6 10/15/2018 1109    CL 96 10/15/2018 1109    CO2 26 10/15/2018 1109    BUN 22 10/15/2018 1109    CREATININE 1.1 10/15/2018 1109     (H) 10/15/2018 1109        Component Value Date/Time    CALCIUM 10.1 10/15/2018 1109    ALKPHOS 103 07/16/2018 1103    AST 19 07/16/2018 1103    ALT 17 07/16/2018 1103    BILITOT 0.8 07/16/2018 1103          Lab Results   Component Value Date    LDLCALC 58.2 (L) 07/09/2018       PE:  GENERAL: Well developed, well nourished.  PSYCH: AAOx3, appropriate mood and affect, pleasant expression, conversant, appears relaxed, well groomed.   EYES: Conjunctiva, corneas clear, EOM intact  NECK: Supple, trachea midline  CHEST: Resp even and unlabored  CARDIAC: s1, s2 normal, no edema noted to BLE   ABDOMEN: soft, non distended  VASCULAR: Same temperature peripherally to centrally, no edema, brisk capillary refill BUE.  NEURO: Gait mostly steady  SKIN: Normal skin turgor. Skin warm and dry. No areas of breakdown, mole on scalp (recently seen by derm), + acanthosis nigracans. accuchek spirit intact.   FEET: Footware appropriate.     ASSESSMENT and PLAN:  Reid was seen today for diabetes mellitus.    Diagnoses and associated orders for this visit:  1. Uncontrolled type 1 diabetes mellitus with diabetic neuropathy, with long-term current use of insulin  F/u in 3 mos  a1c next time  Reviewed letter for [employer] buying animas  Change accuchek expert 1:9 to 1:8 across the board  Change ISF from 35 to 30  Continue target  110-130    Continue metformin 500 mg bid    mn-0300 0.85 u/hr  0300 to 0700---0.9 u/hr  0863-1135 1.1 u/hr  2100-mn 0.85 u/hr  Continue dexcom setting 70<>250   Continue use of personal cgm  A1c goal less than 7% with less hypoglycemia     Reviewed download-dexcom and accuchek spirit  Discussed case w/ Annabelle Wooten RD, CDE    Will need f/u in jan 2019 for omnipod     76% target, 22.6% above target lately, 2.9% very high  avg 148 mg/dl  sd 47       2. 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  F/u with ophthalmology    Ambulatory Referral to Diabetes Education   3. Diabetic polyneuropathy associated with type 1 diabetes mellitus  See above       4. Chronic kidney disease, stage III (moderate)  Stable    5. Essential hypertension  Controlled, continue med(s)   6. Pure hypercholesterolemia  Lab Results   Component Value Date    LDLCALC 58.2 (L) 07/09/2018     At goal     7. CLL (chronic lymphocytic leukemia)  F/u with hem/onc   8. Coronary artery disease involving native coronary artery of native heart with angina pectoris  Avoid hypoglycemia   9. Corns and callosities  F/u with podiatry

## 2018-10-19 NOTE — PATIENT INSTRUCTIONS
Snacks can be an important part of a balanced, healthy meal plan. They allow you to eat more frequently, feeling full and satisfied throughout the day. Also, they allow you to spread carbohydrates evenly, which may stabilize blood sugars.  Plus, snacks are enjoyable!     The amount of carbohydrate needed at snacks varies. Generally, about 15-30 grams of carbohydrate per snack is recommended.  Below you will find some tasty treats.       0-5 gm carb   Crystal Light   Vitamin Water Zero   Herbal tea, unsweetened   2 tsp peanut butter on celery   1./2 cup sugar-free jell-o   1 sugar-free popsicle   ¼ cup blueberries   8oz Blue Angelique unsweetened almond milk   5 baby carrots & celery sticks, cucumbers, bell peppers dipped in ¼ cup salsa, 2Tbsp light ranch dressing or 2Tbsp plain Greek yogurt   10 Goldfish crackers   ½ oz low-fat cheese or string cheese   1 closed handful of nuts, unsalted   1 Tbsp of sunflower seeds, unsalted   1 cup Smart Pop popcorn   1 whole grain brown rice cake        15 gm carb   1 small piece of fruit or ½ banana or 1/2 cup lite canned fruit   3 ellyn cracker squares   3 cups Smart Pop popcorn, top spray butter, Nelson lite salt or cinnamon and Truvia   5 Vanilla Wafers   ½ cup low fat, no added sugar ice cream or frozen yogurt (Blue bell, Blue Bunny, Weight Watchers, Skinny Cow)   ½ turkey, ham, or chicken sandwich   ½ c fruit with ½ c Cottage cheese   4-6 unsalted wheat crackers with 1 oz low fat cheese or 1 tbsp peanut butter    30-45 goldfish crackers (depending on flavor)    7-8 Sabianist mini brown rice cakes (caramel, apple cinnamon, chocolate)    12 Sabianist mini brown rice cakes (cheddar, bbq, ranch)    1/3 cup hummus dip with raw veg   1/2 whole wheat celina, 1Tbsp hummus   Mini Pizza (1/2 whole wheat English muffin, low-fat  cheese, tomato sauce)   100 calorie snack pack (Oreo, Chips Ahoy, Ritz Mix, Baked Cheetos)   4-6 oz. light or Greek Style yogurt  (Naz, Geurita, [facility], [facility])   ½ cup sugar-free pudding     6 in. wheat tortilla or celina oven toasted chips (topped with spray butter flavoring, cinnamon, Truvia OR spray butter, garlic powder, chili powder)    18 BBQ Popchips (available at [employer], [employer], Fresh Market)

## 2018-11-05 NOTE — PROGRESS NOTES
Received chart note request from [employer].  Faxed/routed the chart notes through efax to [employer] today.

## 2018-11-06 NOTE — TELEPHONE ENCOUNTER
Spoke to pt.pt has used Efudex cream for 2 wks now, advised pt every pt healing process is different in duration and can apply vaseline jelly for some comfort.pt rafy to come back for 3 mths f/u in January.pt verbalized understanding.

## 2018-11-06 NOTE — TELEPHONE ENCOUNTER
----- Message from Vickie Ordaz sent at 11/6/2018  2:08 PM CST -----  Contact: pt at [phone]  Jam pt-Pt is calling in ref to  his finishing his medication and the next step now. Has been using for 2 weeks now.

## 2018-11-07 NOTE — TELEPHONE ENCOUNTER
----- Message from Luis Gonzalez sent at 11/7/2018  9:40 AM CST -----  Contact: pt   Pt would like a call back regarding will discuss when the call is returned   Pt can be reached at  [phone]

## 2018-11-07 NOTE — TELEPHONE ENCOUNTER
Called and spoke with patient about his Dexcom, patient want to do we give out samples here in clinic til his supplies come.

## 2018-11-28 NOTE — PROGRESS NOTES
HPI     DLS 05/30/18   77 Y/O M  Here today for his 6 mo Type II DM OS w/ NPDR   w/o ME and AMD ck and OCT review.     PT reports: Va good OU.  No f/f/pain OU    BS doing better.  Got glucose monitor/device  Pt taking AREDS 2 vits    LBS:   Hemoglobin A1C       Date                     Value               Ref Range             Status                10/15/2018               6.7 (H)             4.0 - 5.6 %           Final                 07/09/2018               6.2 (H)             4.0 - 5.6 %           Final                 03/14/2018               6.2 (H)             4.0 - 5.6 %           Final                POHx:   NPDR  w hem   S/P Avastin OD X 5 (12/29/14)   S/P PRP OD(01/12/15) (04/13/15)   S/P PRP OS 10/25/2017   H/x  vit hem OD 11/06 followed by PRP   S/P phaco IOL OD 4/8/13   S/P phaco IOL OS 4/29/13             Last edited by Alex Cotto MD on 11/28/2018 10:55 AM. (History)        [place] SDOCT:   OD: good quality, drusen, PED, no IRF/SRF, no change from 5/30/18 scan  OS: good quality, drusen, PED, no IRF/SRF, no change from 15/20/18 scan    Assessment /Plan     For exam results, see Encounter Report.    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, with long-term current use of insulin  -     OCT, Retina - OU - Both Eyes  -     Posterior Segment OCT Retina-Both eyes; Future    Type 2 diabetes mellitus with right eye affected by proliferative retinopathy without macular edema, with long-term current use of insulin  -     OCT, Retina - OU - Both Eyes  -     Posterior Segment OCT Retina-Both eyes; Future    Nonexudative age-related macular degeneration, bilateral, intermediate dry stage  -     OCT, Retina - OU - Both Eyes  -     Posterior Segment OCT Retina-Both eyes; Future    PVD (posterior vitreous detachment), both eyes      Doing well OU with DR and AMD.  Stable.    Diabetic Retinopathy discussed in detail, all questions  answered  Stressed importance of good BS/BP/Chol Control  Last PRP OS 10/2017    Discussed Dry and Wet AMD in detail  Recommend AREDS 2 Vitamins  Home [place] Grid Testing  RTC 6 months sooner PRN

## 2018-11-30 NOTE — PROGRESS NOTES
Last 5 Patient Entered Readings                                      Current 30 Day Average: 122/67     Recent Readings 11/29/2018 11/21/2018 11/20/2018 11/19/2018 11/18/2018    SBP (mmHg) 125 110 113 116 110    DBP (mmHg) 64 51 60 53 50    Pulse 70 60 60 62 64        Digital Medicine: Health  Follow Up    Feeling well.    Very pleased with BP readings.    Lifestyle Modifications:    1.Dietary Modifications: continuing to be mindful of sodium intake and monitoring CHO intake for blood sugar control.    2.Physical Activity: yard work, walking    3.Medication Therapy: Patient has been compliant with the medication regimen.    4.Patient has the following medication side effects/concerns:   (Frequency/Alleviating factors/Precipitating factors, etc.)     Follow up with Mr. Reid Herman completed. No further questions or concerns. Will continue to follow up to achieve health goals.

## 2018-12-13 NOTE — PROGRESS NOTES
Subjective:      Patient ID: Reid Herman is a 78 y.o. male.    Chief Complaint: No chief complaint on file.    eRid is a 78 y.o. male who presents to the clinic for evaluation and treatment of high risk feet. 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 The patient's chief complaint is elongated toenails   This patient has documented high risk feet requiring routine maintenance secondary to diabetes mellitis and those secondary complications of diabetes, as mentioned..   PCP: Olivia Zavala MD    Date Last Seen by PCP:   No chief complaint on file.      No chief complaint on file.       Current shoe gear:  Affected Foot: Rx diabetic extra depth shoes and custom accommodative insoles     Unaffected Foot: Rx diabetic extra depth shoes and custom accommodative insoles    Hemoglobin A1C   Date Value Ref Range Status   10/15/2018 6.7 (H) 4.0 - 5.6 % Final     Comment:     ADA Screening Guidelines:  5.7-6.4%  Consistent with prediabetes  >or=6.5%  Consistent with diabetes  High levels of fetal hemoglobin interfere with the HbA1C  assay. Heterozygous hemoglobin variants (HbS, HgC, etc)do  not significantly interfere with this assay.    However, presence of multiple variants may affect accuracy.     07/09/2018 6.2 (H) 4.0 - 5.6 % Final     Comment:     ADA Screening Guidelines:  5.7-6.4%  Consistent with prediabetes  >or=6.5%  Consistent with diabetes  High levels of fetal hemoglobin interfere with the HbA1C  assay. Heterozygous hemoglobin variants (HbS, HgC, etc)do  not significantly interfere with this assay.   However, presence of multiple variants may affect accuracy.     03/14/2018 6.2 (H) 4.0 - 5.6 % Final     Comment:     According to ADA guidelines, hemoglobin A1c <7.0% represents  optimal control in non-pregnant diabetic patients. Different  metrics may apply to specific patient populations.   Standards of Medical Care in Diabetes-2016.  For the purpose of screening for the presence of diabetes:  <5.7%     Consistent with the absence of diabetes  5.7-6.4%  Consistent with increasing risk for diabetes   (prediabetes)  >or=6.5%  Consistent with diabetes  Currently, no consensus exists for use of hemoglobin A1c  for diagnosis of diabetes for children.  This Hemoglobin A1c assay has significant interference with fetal   hemoglobin   (HbF). The results are invalid for patients with abnormal amounts of   HbF,   including those with known Hereditary Persistence   of Fetal Hemoglobin. Heterozygous hemoglobin variants (HbAS, HbAC,   HbAD, HbAE, HbA2) do not significantly interfere with this assay;   however, presence of multiple variants in a sample may impact the %   interference.         Review of Systems   Constitution: Negative for chills, fever and night sweats.   Cardiovascular: Negative for chest pain and claudication.   Respiratory: Negative for cough.    Skin: Positive for dry skin and nail changes. Negative for color change, flushing, itching, poor wound healing and rash.   Musculoskeletal: Negative for arthritis, back pain, falls, gout, joint pain and joint swelling.           Objective:      Physical Exam   Constitutional: He is  oriented to person, place, and time. He appears well-developed and well-nourished. No distress.   Cardiovascular: Normal rate.   Vascular: Dorsalis pedis and posterior tibial pulses are diminished bilaterally. Toes are cool to touch. Feet are warm proximally.There is decreased digital hair. Skin is atrophic and mildly edematous. R lower leg, ankle, and foot are hyperpigmented.      Musculoskeletal: Normal range of motion. He exhibits no tenderness.   Adequate joint range of motion without pain, limitation, nor crepitation Bilateral feet and ankle joints. Muscle strength is 5/5 in all groups bilaterally.        Semi  reducible IPJ digital contracture 2-3 b/l      Neurological: He is alert and oriented to person, place, and time. He exhibits normal muscle tone.   Neurologic: [place]-Eliud 5.07 monofilamant testing absent on toes but otherwise is intact Rolly feet. Sharp/dull sensation absent Bilaterally. Light touch absent Bilaterally.     Skin: Skin is warm, dry and intact. No abrasion, no bruising, no burn, no petechiae and no rash noted. He is not diaphoretic. No erythema. No pallor.    Nails x 10 are elongated by  1-3mm's, thickened by 1-2 mm's, dystrophic, and are normal in  coloration . Xerosis Bilaterally. No open lesions noted.        Psychiatric: He has a normal mood and affect. His behavior is normal. Judgment and thought content normal.   Nursing note and vitals reviewed.            Assessment:       Encounter Diagnoses   Name Primary?    Diabetic polyneuropathy associated with type 1 diabetes mellitus Yes    Peripheral vascular disease     Disease of nail          Plan:       Diagnoses and all orders for this visit:    Diabetic polyneuropathy associated with type 1 diabetes mellitus    Peripheral vascular disease    Disease of nail    - Patient was given written and verbal instructions regarding foot condition.  I counseled the patient on his conditions, their implications and medical management.  Shoe  inspection. Diabetic Foot Education. Patient reminded of the importance of good nutrition and blood sugar control to help prevent podiatric complications of diabetes. Patient instructed on proper foot hygeine. We discussed wearing proper shoe gear, daily foot inspections, never walking without protective shoe gear, never putting sharp instruments to feet    - With patient's permission, nails were aggressively reduced and debrided x 10 to their soft tissue attachment mechanically and with electric , removing all offending nail and debris. Patient relates relief following the procedure. She will continue to monitor the areas daily, inspect her feet, wear protective shoe gear when ambulatory, moisturizer to maintain skin integrity and follow in this office in approximately 2-3 months, sooner p.r.n.

## 2018-12-18 NOTE — PROGRESS NOTES
Subjective:      Patient ID: Reid Herman is a 78 y.o. male.    Chief Complaint: Follow-up    HPI:  HPI   Follow up : we will go through the problems as his history is complex.    Want to know the difference between heartburn and angina    Right knee is bother him: limp for about a week feeling painful, cannot uses NSAIDS    Scalp is healing, Effudex from Dr. Muniz    CKD stage 3  Patient Active Problem List   Diagnosis    CLL (chronic lymphocytic leukemia)    Diabetic polyneuropathy associated with type 1 diabetes mellitus    Controlled type 1 diabetes mellitus with diabetic neuropathy, with long-term current use of insulin    Coronary artery disease involving native coronary artery of native heart with angina pectoris    Posterior vitreous detachment - Both Eyes    Hyperlipidemia    HTN (hypertension)    Dermatophytosis of nail    Pseudophakia    BPH with urinary obstruction    Erectile dysfunction    Corns and callosities    Vitreous hemorrhage    Lumbar facet arthropathy    DDD (degenerative disc disease), lumbar    Obstructive sleep apnea    Peripheral vascular disease    Lumbar spondylosis    Spondylolisthesis    S/P lumbar fusion    Atherosclerosis of aorta    Chronic kidney disease, stage III (moderate)    Sacroiliitis    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    Stable angina    Nonexudative age-related macular degeneration, bilateral, intermediate dry stage    Carotid disease, bilateral     Past Medical History:   Diagnosis Date    Anemia     Back pain     Basal cell carcinoma 06/08/2015    left nasal ala    CAD (coronary artery disease) 10/1/2012    Cataract     DDD (degenerative disc disease), lumbar 10/28/2014    Diabetes mellitus     Diabetes mellitus type I     Diabetic retinopathy of both eyes     Heart attack     Hyperlipidemia     Hypertension     Insulin pump in place     Obesity     Poorly  controlled type 1 diabetes mellitus with peripheral neuropathy 10/1/2012    Preseptal cellulitis of right eye 8/17/15    S/P CABG x 2 2/14/2014    1994     Sleep apnea     Squamous cell carcinoma 03/18/2013    right posterior ear    Squamous cell carcinoma 07/26/2017    scalp    Squamous cell carcinoma 08/02/2018    Right Scalp/MOHS    Trouble in sleeping     Type 1 diabetes, uncontrolled, with retinopathy 10/1/2012    Type II or unspecified type diabetes mellitus without mention of complication, not stated as uncontrolled      Past Surgical History:   Procedure Laterality Date    APPENDECTOMY  1953    BLOCK-NERVE-MEDIAL BRANCH-LUMBAR Bilateral 3/10/2015    [facility]    BLOCK-NERVE-MEDIAL BRANCH-LUMBAR Bilateral 11/25/2014    [facility]    CATARACT EXTRACTION  4/8/13    right eye (toric)    CATARACT EXTRACTION  4/29/13    left eye (toric)    COLONOSCOPY N/A 4/25/2017    Procedure: COLONOSCOPY;  Surgeon: CLARISSA Freeman MD;  Location: [facility] (4TH FLR);  Service: Endoscopy;  Laterality: N/A;    COLONOSCOPY N/A 4/25/2017    Per[facility] by [facility] Freeman [facility] at [facility] (4[facility] F[facility])    COLONOSCOPY N/A 4/25/2014    Per[facility] at [facility] ([facility] F[facility])    CORONARY ARTERY BYPASS GRAFT  1994    x 3    EYE SURGERY      cataracts, both eyes    FINGER TENDON REPAIR  2011    left hand    INJECTION-FACET Bilateral 3/31/2015    [facility]    INJECTION-STEROID-EPIDURAL-LUMBAR N/A 10/28/2014    [facility]    INSERTION, IOL PROSTHESIS Left 4/29/2013    [facility]    INSERTION, IOL PROSTHESIS Right 4/8/2013    [facility]    MINIMALLY INVASIVE FUSION-TRANSLUMINAL LUMBAR INTERBODY (TLIF) Left 5/18/2015    [facility]     "PHACOEMULSIFICATION, CATARACT Left 4/29/2013    [facility]    PHACOEMULSIFICATION, CATARACT Right 4/8/2013    [facility] at [facility]    RADIOFREQUENCY THERMOCOAGULATION (RFTC)-NERVE-MEDIAN BRANCH-LUMBAR Left 1/13/2015    [facility]    RADIOFREQUENCY THERMOCOAGULATION (RFTC)-NERVE-MEDIAN BRANCH-LUMBAR Right 12/30/2014    [facility]    SKIN BIOPSY  2013    multiple    SPINE SURGERY  2015    TLIF    TONSILLECTOMY  1947     Family History   Problem Relation Age of Onset    Breast cancer Mother     Cancer Mother         ? lung cancer    Alzheimer's disease Father     Dementia Father     Stroke Father     Colon cancer Sister     Cancer Sister 60        colon    Acute myelogenous leukemia Sister     Diabetes Maternal Grandfather     Diabetes Paternal Aunt     Diabetes Paternal Uncle     Melanoma Neg Hx     Psoriasis Neg Hx     Lupus Neg Hx     Eczema Neg Hx     Acne Neg Hx      Review of Systems   Constitutional: Negative for appetite change, chills, fever and unexpected weight change.   Respiratory: Negative for shortness of breath and wheezing.    Cardiovascular: Negative for chest pain, palpitations and leg swelling.   Gastrointestinal: Negative for abdominal pain, blood in stool, diarrhea, nausea and vomiting.     Objective:     Vitals:    12/18/18 1410   BP: 132/64   Pulse: 82   SpO2: 98%   Weight: 91.4 kg (201 lb 8 oz)   Height: 5' 7" (1.702 m)   PainSc:   3   PainLoc: Knee     Body mass index is 31.56 kg/m².  Physical Exam   Constitutional: He is oriented to person, place, and time. He appears well-developed and well-nourished. No distress.   Neck: Carotid bruit is not present. No thyromegaly present.   Cardiovascular: Normal rate, regular rhythm and normal heart sounds. PMI is not displaced.   Pulmonary/Chest: Effort normal and breath sounds normal. No respiratory " "distress.   Abdominal: Soft. Bowel sounds are normal. He exhibits no distension. There is no tenderness.   Musculoskeletal: He exhibits no edema.   Neurological: He is alert and oriented to person, place, and time.     Assessment:     1. Right knee pain, unspecified chronicity    2. Coronary artery disease involving native coronary artery of native heart with angina pectoris    3. Fatigue, unspecified type    4. Chest pain, unspecified type    5. Essential hypertension    6. Pure hypercholesterolemia      Plan:   Reid was seen today for follow-up.    Diagnoses and all orders for this visit:    Right knee pain, unspecified chronicity  -     Ambulatory consult to Orthopedics  -     X-Ray Knee 1 or 2 View Right; Future    Coronary artery disease involving native coronary artery of native heart with angina pectoris  -     Ambulatory consult to Cardiology    Fatigue, unspecified type  -     Ambulatory consult to Cardiology    Chest pain, unspecified type  -     Ambulatory consult to Cardiology    Essential hypertension    Pure hypercholesterolemia        Problem List Items Addressed This Visit     Coronary artery disease involving native coronary artery of native heart with angina pectoris    Overview     CABG X2, 1994  SVG-LAD, LIMA-D1  ANGINA= SS/EPIG "WARMING"  RESIDUAL DZ 2006 100% PDA (LT), 100% OM3  INFERIOR ISCHEMIA         Relevant Orders    Ambulatory consult to Cardiology    Hyperlipidemia    HTN (hypertension)      Other Visit Diagnoses     Right knee pain, unspecified chronicity    -  Primary    Relevant Orders    Ambulatory consult to Orthopedics    X-Ray Knee 1 or 2 View Right    Fatigue, unspecified type        Relevant Orders    Ambulatory consult to Cardiology    Chest pain, unspecified type        Relevant Orders    Ambulatory consult to Cardiology        Orders Placed This Encounter   Procedures    X-Ray Knee 1 or 2 View Right     Standing Status:   Future     Standing Expiration Date:   12/18/2019 " "   Ambulatory consult to Orthopedics     Referral Priority:   Routine     Referral Type:   Consultation     Number of Visits Requested:   1    Ambulatory consult to Cardiology     Referral Priority:   Routine     Referral Type:   Consultation     Referral Reason:   Specialty Services Required     Requested Specialty:   Cardiology     Number of Visits Requested:   1     No Follow-up on file.     Medication List           Accurate as of 12/18/18  3:06 PM. If you have any questions, ask your nurse or doctor.               CHANGE how you take these medications    atorvastatin 80 MG tablet  Commonly known as:  LIPITOR  TAKE 1/2 TABLET NIGHTLY.  What changed:    · how much to take  · additional instructions        CONTINUE taking these medications    ACCU-CHEK SHARON PLUS TEST STRP Strp  Generic drug:  blood sugar diagnostic  TEST BLOOD SUGAR FIVE TIMES DAILY     ACCU-CHEK FASTCLIX LANCING DEV Misc  Generic drug:  lancets  TEST 6 TIMES DAILY     alpha lipoic acid 600 mg Cap     aspirin 325 MG EC tablet  Commonly known as:  ECOTRIN     BD INSULIN SYRINGE ULT-FINE II 0.3 mL 31 gauge x 5/16" Syrg  Generic drug:  insulin syringe-needle U-100     clotrimazole-betamethasone 1-0.05% cream  Commonly known as:  LOTRISONE  APPLY EXTERNALLY TO THE AFFECTED AREA TWICE DAILY     fluorouracil 5 % cream  Commonly known as:  EFUDEX  AAA scalp bid x 2 weeks     FLUZONE HIGH-DOSE 2018-19 (PF) 180 mcg/0.5 mL vaccine  Generic drug:  influenza  Inject into the muscle.     fosinopril-hydrochlorothiazide 10-12.5 mg per tablet  Commonly known as:  MONOPRIL-HCT  Take 3 tablets by mouth once daily.     isosorbide mononitrate 60 MG 24 hr tablet  Commonly known as:  IMDUR  Take 1 tablet (60 mg total) by mouth once daily.     mecobal-levomefolat Ca-B6 phos 3-35-2 mg Tab  Commonly known as:  L-METHYL-B6-B12  Take 1 tablet by mouth once daily.     metFORMIN 500 MG tablet  Commonly known as:  GLUCOPHAGE  TAKE 1 TABLET TWICE DAILY     metoprolol " succinate 100 MG 24 hr tablet  Commonly known as:  TOPROL-XL  Take 1 tablet (100 mg total) by mouth every evening.     multivitamin capsule     nitroGLYCERIN 0.4 MG SL tablet  Commonly known as:  NITROSTAT  Place 1 tablet (0.4 mg total) under the tongue every 5 (five) minutes as needed for Chest pain. May dispense a two pack of 25 tablets.     NovoLOG U-100 Insulin aspart 100 unit/mL injection  Generic drug:  insulin aspart U-100  INJECT UP TO MAX  UNITS UNDER THE SKIN VIA INSULIN PUMP DAILY AS DIRECTED     PRESERVISION AREDS 2 (OMEGA-3) ORAL     PRESERVISION AREDS ORAL     SHINGRIX (PF) 50 mcg/0.5 mL injection  Generic drug:  varicella-zoster gE-AS01B (PF)  Inject into the muscle.

## 2018-12-20 NOTE — TELEPHONE ENCOUNTER
----- Message from Elin Oliveira sent at 12/20/2018  2:05 PM CST -----  Contact: Self  .Rx Refill/Request     Is this a Refill or New Rx:  Refill  Rx Name and Strength:  Dexcom - 5 Srinivas  Preferred Pharmacy with phone number: [facility], [phone] Fax: [phone]  Communication Preference:  Additional Information:     .

## 2018-12-21 NOTE — LETTER
December 21, 2018      Olivia Zavala MD  [address]  [facility] [address]           [facility]  [address]  [facility] [address]  Phone: [phone]          Patient: Reid Herman   MR Number: [id number]   YOB: 1940   Date of Visit: 12/21/2018       Dear Dr. Olivia Zavala:    Thank you for referring Reid Herman to me for evaluation. Attached you will find relevant portions of my assessment and plan of care.    If you have questions, please do not hesitate to call me. I look forward to following Reid Herman along with you.    Sincerely,    Marcos Alexander MD    Enclosure  CC:  No Recipients    If you would like to receive this communication electronically, please contact [email or web address] or [phone] to request more information on [facility] access.    For providers and/or their staff who would like to refer a patient to [facility], please contact us through our one-stop-shop provider referral line, [facility], at [phone].    If you feel you have received this communication in error or would no longer like to receive these types of communications, please e-mail [email or web address]

## 2018-12-21 NOTE — PROGRESS NOTES
"Subjective:   Patient ID:  Reid Herman is a 78 y.o. male who presents for follow-up of Coronary artery disease involving native coronary artery of ; Fatigue; and Chest Pain      HPI:   Mr. Cabrera is a pleasant man with HTN, HLD, DM1 and prior CAD s/p CABG x with a SVG to LAD and LIMA to D1 in 1994 and subsequent cath in 2006 demonstrated 100% OM2 and 100% PDA who presents today for follow up of CAD. I last saw him 2-2018 and he is here a few months early b/c increasing CP.  He states that he has been having more CP than usual and has been using significantly more nitro than he has in the past.  He states that he can't distinguish the difference between angina and heartburn however nitro does relieve his sxs.  Sxs are not exertional but often related to elevated BG readings.     He is on CPAP for MINDA. He also has CLL and followed by Onc.     He has an insulin pump. He has angina with higher sugar + exertion. This has not changed in character.     Enrolled in Dig HTN management    Carotids 2-2018  CONCLUSIONS   There is 40 - 49% right Internal Carotid stenosis.  There is 20 - 39% left Internal Carotid stenosis.      Vitals:    12/21/18 0854   BP: 139/63   BP Location: Left arm   Patient Position: Sitting   BP Method: Large (Automatic)   Pulse: 69   Weight: 92 kg (202 lb 13.2 oz)   Height: 5' 7" (1.702 m)     Body mass index is 31.77 kg/m².  CrCl cannot be calculated (Patient's most recent lab result is older than the maximum 7 days allowed.).    Lab Results   Component Value Date     (L) 10/15/2018    K 4.6 10/15/2018    CL 96 10/15/2018    CO2 26 10/15/2018    BUN 22 10/15/2018    CREATININE 1.1 10/15/2018     (H) 10/15/2018    HGBA1C 6.7 (H) 10/15/2018    AST 19 07/16/2018    ALT 17 07/16/2018    ALBUMIN 4.2 10/15/2018    PROT 6.5 07/16/2018    BILITOT 0.8 07/16/2018    WBC 41.25 (H) 07/16/2018    HGB 11.7 (L) 07/16/2018    HCT 36.1 (L) 07/16/2018    HCT 19 (LL) 05/18/2015    MCV 99 (H) " 07/16/2018     07/16/2018    INR 0.9 05/11/2015    PSA 1.29 06/21/2013    TSH 1.402 03/14/2018    CHOL 115 (L) 07/09/2018    HDL 49 07/09/2018    LDLCALC 58.2 (L) 07/09/2018    TRIG 39 07/09/2018       Current Outpatient Medications   Medication Sig    ACCU-CHEK SHARON PLUS TEST STRP Strp TEST BLOOD SUGAR FIVE TIMES DAILY    ACCU-CHEK FASTCLIX Misc TEST 6 TIMES DAILY    alpha lipoic acid 600 mg Cap Take 1 capsule by mouth once daily.      aspirin (ECOTRIN) 325 MG EC tablet Take 325 mg by mouth once daily.    atorvastatin (LIPITOR) 80 MG tablet TAKE 1/2 TABLET NIGHTLY. (Patient taking differently: 40 mg. )    BD INSULIN SYRINGE ULT-FINE II 0.3 mL 31 gauge x 5/16 Syrg     blood-glucose meter,continuous (DEXCOM G5 ) Misc Use as directed.    blood-glucose sensor (DEXCOM G5-G4 SENSOR) Doris Change every 6-7 days.    blood-glucose transmitter (DEXCOM G5 TRANSMITTER) Doris Change every 3 months.    clotrimazole-betamethasone 1-0.05% (LOTRISONE) cream APPLY EXTERNALLY TO THE AFFECTED AREA TWICE DAILY    fluorouracil (EFUDEX) 5 % cream AAA scalp bid x 2 weeks    fosinopril-hydrochlorothiazide (MONOPRIL-HCT) 10-12.5 mg per tablet Take 3 tablets by mouth once daily.    influenza (FLUZONE HIGH-DOSE) 180 mcg/0.5 mL vaccine Inject into the muscle.    isosorbide mononitrate (IMDUR) 120 MG 24 hr tablet Take 1 tablet (120 mg total) by mouth once daily.    mecobal-levomefolat Ca-B6 phos (L-METHYL-B6-B12) 3-35-2 mg Tab Take 1 tablet by mouth once daily.    metFORMIN (GLUCOPHAGE) 500 MG tablet TAKE 1 TABLET TWICE DAILY    metoprolol succinate (TOPROL-XL) 100 MG 24 hr tablet Take 1 tablet (100 mg total) by mouth every evening.    multivitamin capsule Take 1 capsule by mouth once daily.      nitroGLYCERIN (NITROSTAT) 0.4 MG SL tablet Place 1 tablet (0.4 mg total) under the tongue every 5 (five) minutes as needed for Chest pain. May dispense a two pack of 25 tablets.    NOVOLOG 100 unit/mL injection INJECT  UP TO MAX  UNITS UNDER THE SKIN VIA INSULIN PUMP DAILY AS DIRECTED    varicella-zoster gE-AS01B, PF, (SHINGRIX, PF,) 50 mcg/0.5 mL injection Inject into the muscle.    vit A/vit C/vit E/zinc/copper (PRESERVISION AREDS ORAL) Take 1 tablet by mouth 2 (two) times daily.     No current facility-administered medications for this visit.        Review of Systems   Constitution: Negative for decreased appetite, weakness, malaise/fatigue, weight gain and weight loss.   Eyes: Negative for visual disturbance.   Cardiovascular: Positive for chest pain. Negative for claudication, dyspnea on exertion, irregular heartbeat, orthopnea, palpitations, paroxysmal nocturnal dyspnea and syncope.   Respiratory: Negative for cough, shortness of breath and snoring.    Skin: Negative for rash.   Musculoskeletal: Negative for arthritis, muscle cramps, muscle weakness and myalgias.   Gastrointestinal: Negative for abdominal pain, anorexia, change in bowel habit and nausea.   Genitourinary: Negative for dysuria and frequency.   Neurological: Negative for excessive daytime sleepiness, dizziness, headaches, loss of balance and numbness.   Psychiatric/Behavioral: Negative for depression.       Objective:   Physical Exam   Constitutional: He is oriented to person, place, and time. He appears well-developed and well-nourished.   HENT:   Head: Normocephalic and atraumatic.   Cardiovascular: Normal rate, regular rhythm, normal heart sounds and intact distal pulses. Exam reveals no gallop and no friction rub.   No murmur heard.  Pulmonary/Chest: Effort normal and breath sounds normal.   Neurological: He is alert and oriented to person, place, and time.   Psychiatric: He has a normal mood and affect. His behavior is normal. Judgment and thought content normal.       Assessment:     1. Coronary artery disease involving native coronary artery of native heart with angina pectoris    2. Pure hypercholesterolemia    3. Essential hypertension    4.  Bilateral carotid artery stenosis    5. Peripheral vascular disease    6. CLL (chronic lymphocytic leukemia)    7. S/P CABG (coronary artery bypass graft)        Plan:   Pt with increasing angina and complex coronary anatomy and known occlusions.  Last stress was in 2006.  Will increase imdur to 120 daily and can add amlodipine if needed. PET scan to determine location of angina coronary flow capacity.  Consider repeat angio for high risk findings in territories that were revascularized on piror angios (LAD and prox LCX)    Orders Placed This Encounter   Procedures    PET Stress Test (Cupid Only)

## 2018-12-24 NOTE — TELEPHONE ENCOUNTER
----- Message from Beverly Nye sent at 12/24/2018  9:56 AM CST -----  Contact: Self [phone]  PT received a letter from [employer] stating they will no longer cover the replacement blood-glucose sensor (DEXCOM G5-G4 SENSOR) Doris. PT can be reached at [phone].

## 2018-12-26 NOTE — TELEPHONE ENCOUNTER
Spoke with pt to inform him that his appt that he had for 5pm on today 12/26/2018  had to be reschedule till 1/2/19 @ 3pm. Pt verbalized understanding.

## 2019-01-01 ENCOUNTER — OFFICE VISIT (OUTPATIENT)
Dept: OPHTHALMOLOGY | Facility: CLINIC | Age: 79
End: 2019-01-01
Payer: MEDICARE

## 2019-01-01 ENCOUNTER — TELEPHONE (OUTPATIENT)
Dept: WOUND CARE | Facility: CLINIC | Age: 79
End: 2019-01-01

## 2019-01-01 ENCOUNTER — OFFICE VISIT (OUTPATIENT)
Dept: PODIATRY | Facility: CLINIC | Age: 79
End: 2019-01-01
Payer: MEDICARE

## 2019-01-01 ENCOUNTER — TELEPHONE (OUTPATIENT)
Dept: ENDOCRINOLOGY | Facility: HOSPITAL | Age: 79
End: 2019-01-01

## 2019-01-01 ENCOUNTER — ANESTHESIA EVENT (OUTPATIENT)
Dept: INTENSIVE CARE | Facility: HOSPITAL | Age: 79
DRG: 981 | End: 2019-01-01
Payer: MEDICARE

## 2019-01-01 ENCOUNTER — PATIENT OUTREACH (OUTPATIENT)
Dept: DIABETES | Facility: CLINIC | Age: 79
End: 2019-01-01

## 2019-01-01 ENCOUNTER — HOSPITAL ENCOUNTER (OUTPATIENT)
Dept: VASCULAR SURGERY | Facility: CLINIC | Age: 79
Discharge: HOME | End: 2019-04-26
Attending: NURSE PRACTITIONER
Payer: MEDICARE

## 2019-01-01 ENCOUNTER — IMMUNIZATION (OUTPATIENT)
Dept: PHARMACY | Facility: CLINIC | Age: 79
End: 2019-01-01
Payer: MEDICARE

## 2019-01-01 ENCOUNTER — DOCUMENTATION ONLY (OUTPATIENT)
Dept: DIABETES | Facility: CLINIC | Age: 79
End: 2019-01-01

## 2019-01-01 ENCOUNTER — LAB VISIT (OUTPATIENT)
Dept: LAB | Facility: HOSPITAL | Age: 79
End: 2019-01-01
Attending: INTERNAL MEDICINE
Payer: MEDICARE

## 2019-01-01 ENCOUNTER — TELEPHONE (OUTPATIENT)
Dept: ENDOCRINOLOGY | Facility: CLINIC | Age: 79
End: 2019-01-01

## 2019-01-01 ENCOUNTER — PATIENT MESSAGE (OUTPATIENT)
Dept: CARDIAC REHAB | Facility: CLINIC | Age: 79
End: 2019-01-01

## 2019-01-01 ENCOUNTER — CLINICAL SUPPORT (OUTPATIENT)
Dept: DIABETES | Facility: CLINIC | Age: 79
End: 2019-01-01
Payer: MEDICARE

## 2019-01-01 ENCOUNTER — OFFICE VISIT (OUTPATIENT)
Dept: ENDOCRINOLOGY | Facility: CLINIC | Age: 79
End: 2019-01-01
Payer: MEDICARE

## 2019-01-01 ENCOUNTER — PATIENT MESSAGE (OUTPATIENT)
Dept: ENDOCRINOLOGY | Facility: CLINIC | Age: 79
End: 2019-01-01

## 2019-01-01 ENCOUNTER — HOSPITAL ENCOUNTER (OUTPATIENT)
Facility: HOSPITAL | Age: 79
Discharge: HOME | End: 2019-01-29
Attending: INTERNAL MEDICINE | Admitting: INTERNAL MEDICINE
Payer: MEDICARE

## 2019-01-01 ENCOUNTER — HOSPITAL ENCOUNTER (INPATIENT)
Facility: HOSPITAL | Age: 79
LOS: 15 days | DRG: 981 | End: 2019-06-13
Attending: HOSPITALIST | Admitting: HOSPITALIST
Payer: MEDICARE

## 2019-01-01 ENCOUNTER — OFFICE VISIT (OUTPATIENT)
Dept: WOUND CARE | Facility: CLINIC | Age: 79
End: 2019-01-01
Payer: MEDICARE

## 2019-01-01 ENCOUNTER — PATIENT MESSAGE (OUTPATIENT)
Dept: INTERNAL MEDICINE | Facility: CLINIC | Age: 79
End: 2019-01-01

## 2019-01-01 ENCOUNTER — PATIENT MESSAGE (OUTPATIENT)
Dept: PODIATRY | Facility: CLINIC | Age: 79
End: 2019-01-01

## 2019-01-01 ENCOUNTER — PATIENT MESSAGE (OUTPATIENT)
Dept: ADMINISTRATIVE | Facility: OTHER | Age: 79
End: 2019-01-01

## 2019-01-01 ENCOUNTER — HOSPITAL ENCOUNTER (OUTPATIENT)
Dept: RADIOLOGY | Facility: HOSPITAL | Age: 79
Discharge: HOME | End: 2019-01-02
Attending: PHYSICIAN ASSISTANT
Payer: MEDICARE

## 2019-01-01 ENCOUNTER — OFFICE VISIT (OUTPATIENT)
Dept: INTERNAL MEDICINE | Facility: CLINIC | Age: 79
End: 2019-01-01
Payer: MEDICARE

## 2019-01-01 ENCOUNTER — TELEPHONE (OUTPATIENT)
Dept: CARDIOLOGY | Facility: CLINIC | Age: 79
End: 2019-01-01

## 2019-01-01 ENCOUNTER — ANESTHESIA (OUTPATIENT)
Dept: INTENSIVE CARE | Facility: HOSPITAL | Age: 79
DRG: 981 | End: 2019-01-01
Payer: MEDICARE

## 2019-01-01 ENCOUNTER — DOCUMENTATION ONLY (OUTPATIENT)
Dept: CARDIOLOGY | Facility: CLINIC | Age: 79
End: 2019-01-01

## 2019-01-01 ENCOUNTER — PES CALL (OUTPATIENT)
Dept: ADMINISTRATIVE | Facility: CLINIC | Age: 79
End: 2019-01-01

## 2019-01-01 ENCOUNTER — OFFICE VISIT (OUTPATIENT)
Dept: ORTHOPEDICS | Facility: CLINIC | Age: 79
End: 2019-01-01
Payer: MEDICARE

## 2019-01-01 ENCOUNTER — PATIENT MESSAGE (OUTPATIENT)
Dept: WOUND CARE | Facility: CLINIC | Age: 79
End: 2019-01-01

## 2019-01-01 ENCOUNTER — INITIAL CONSULT (OUTPATIENT)
Dept: CARDIOLOGY | Facility: CLINIC | Age: 79
End: 2019-01-01
Payer: MEDICARE

## 2019-01-01 ENCOUNTER — CLINICAL SUPPORT (OUTPATIENT)
Dept: CARDIOLOGY | Facility: CLINIC | Age: 79
End: 2019-01-01
Attending: INTERNAL MEDICINE
Payer: MEDICARE

## 2019-01-01 ENCOUNTER — PATIENT MESSAGE (OUTPATIENT)
Dept: UROLOGY | Facility: CLINIC | Age: 79
End: 2019-01-01

## 2019-01-01 ENCOUNTER — TELEPHONE (OUTPATIENT)
Dept: VASCULAR SURGERY | Facility: CLINIC | Age: 79
End: 2019-01-01

## 2019-01-01 ENCOUNTER — TELEPHONE (OUTPATIENT)
Dept: CARDIAC REHAB | Facility: CLINIC | Age: 79
End: 2019-01-01

## 2019-01-01 ENCOUNTER — OFFICE VISIT (OUTPATIENT)
Dept: CARDIOLOGY | Facility: CLINIC | Age: 79
End: 2019-01-01
Payer: MEDICARE

## 2019-01-01 ENCOUNTER — OFFICE VISIT (OUTPATIENT)
Dept: UROLOGY | Facility: CLINIC | Age: 79
End: 2019-01-01
Payer: MEDICARE

## 2019-01-01 ENCOUNTER — ANESTHESIA (OUTPATIENT)
Dept: SURGERY | Facility: HOSPITAL | Age: 79
DRG: 981 | End: 2019-01-01
Payer: MEDICARE

## 2019-01-01 ENCOUNTER — PATIENT OUTREACH (OUTPATIENT)
Dept: OTHER | Facility: OTHER | Age: 79
End: 2019-01-01

## 2019-01-01 ENCOUNTER — OFFICE VISIT (OUTPATIENT)
Dept: DERMATOLOGY | Facility: CLINIC | Age: 79
End: 2019-01-01
Payer: MEDICARE

## 2019-01-01 ENCOUNTER — HOSPITAL ENCOUNTER (OUTPATIENT)
Dept: RADIOLOGY | Facility: HOSPITAL | Age: 79
Discharge: HOME | End: 2019-05-17
Attending: NURSE PRACTITIONER
Payer: MEDICARE

## 2019-01-01 ENCOUNTER — OFFICE VISIT (OUTPATIENT)
Dept: HEMATOLOGY/ONCOLOGY | Facility: CLINIC | Age: 79
End: 2019-01-01
Payer: MEDICARE

## 2019-01-01 ENCOUNTER — HOSPITAL ENCOUNTER (OUTPATIENT)
Dept: RADIOLOGY | Facility: HOSPITAL | Age: 79
Discharge: HOME | End: 2019-05-20
Attending: INTERNAL MEDICINE
Payer: MEDICARE

## 2019-01-01 ENCOUNTER — NUTRITION (OUTPATIENT)
Dept: DIABETES | Facility: CLINIC | Age: 79
End: 2019-01-01
Payer: MEDICARE

## 2019-01-01 ENCOUNTER — ANESTHESIA EVENT (OUTPATIENT)
Dept: SURGERY | Facility: HOSPITAL | Age: 79
DRG: 981 | End: 2019-01-01
Payer: MEDICARE

## 2019-01-01 ENCOUNTER — LAB VISIT (OUTPATIENT)
Dept: LAB | Facility: HOSPITAL | Age: 79
End: 2019-01-01
Payer: MEDICARE

## 2019-01-01 ENCOUNTER — PATIENT MESSAGE (OUTPATIENT)
Dept: DIABETES | Facility: CLINIC | Age: 79
End: 2019-01-01

## 2019-01-01 VITALS
RESPIRATION RATE: 18 BRPM | HEIGHT: 67 IN | WEIGHT: 208.13 LBS | SYSTOLIC BLOOD PRESSURE: 100 MMHG | TEMPERATURE: 99 F | DIASTOLIC BLOOD PRESSURE: 55 MMHG | BODY MASS INDEX: 32.67 KG/M2 | OXYGEN SATURATION: 84 %

## 2019-01-01 VITALS
BODY MASS INDEX: 31.94 KG/M2 | HEART RATE: 61 BPM | WEIGHT: 203.5 LBS | DIASTOLIC BLOOD PRESSURE: 60 MMHG | HEIGHT: 67 IN | OXYGEN SATURATION: 99 % | SYSTOLIC BLOOD PRESSURE: 138 MMHG

## 2019-01-01 VITALS
SYSTOLIC BLOOD PRESSURE: 149 MMHG | HEIGHT: 67 IN | HEART RATE: 58 BPM | BODY MASS INDEX: 31.45 KG/M2 | WEIGHT: 200.38 LBS | HEART RATE: 75 BPM | SYSTOLIC BLOOD PRESSURE: 142 MMHG | DIASTOLIC BLOOD PRESSURE: 67 MMHG | BODY MASS INDEX: 31.83 KG/M2 | WEIGHT: 202.81 LBS | RESPIRATION RATE: 18 BRPM | HEIGHT: 67 IN | DIASTOLIC BLOOD PRESSURE: 63 MMHG

## 2019-01-01 VITALS
WEIGHT: 203.25 LBS | SYSTOLIC BLOOD PRESSURE: 145 MMHG | BODY MASS INDEX: 31.9 KG/M2 | HEART RATE: 68 BPM | OXYGEN SATURATION: 100 % | TEMPERATURE: 98 F | HEIGHT: 67 IN | DIASTOLIC BLOOD PRESSURE: 63 MMHG

## 2019-01-01 VITALS
SYSTOLIC BLOOD PRESSURE: 142 MMHG | WEIGHT: 198.63 LBS | SYSTOLIC BLOOD PRESSURE: 121 MMHG | HEART RATE: 72 BPM | DIASTOLIC BLOOD PRESSURE: 78 MMHG | WEIGHT: 205.25 LBS | HEART RATE: 72 BPM | BODY MASS INDEX: 32.21 KG/M2 | HEIGHT: 67 IN | BODY MASS INDEX: 31.18 KG/M2 | HEIGHT: 67 IN | OXYGEN SATURATION: 99 % | DIASTOLIC BLOOD PRESSURE: 63 MMHG

## 2019-01-01 VITALS
WEIGHT: 200 LBS | OXYGEN SATURATION: 97 % | HEART RATE: 68 BPM | HEIGHT: 67 IN | TEMPERATURE: 98 F | SYSTOLIC BLOOD PRESSURE: 157 MMHG | BODY MASS INDEX: 31.39 KG/M2 | DIASTOLIC BLOOD PRESSURE: 71 MMHG | RESPIRATION RATE: 18 BRPM

## 2019-01-01 VITALS
WEIGHT: 200.88 LBS | TEMPERATURE: 97 F | WEIGHT: 201.38 LBS | HEIGHT: 67 IN | BODY MASS INDEX: 31.61 KG/M2 | BODY MASS INDEX: 31.53 KG/M2 | HEART RATE: 63 BPM | SYSTOLIC BLOOD PRESSURE: 110 MMHG | TEMPERATURE: 98 F | HEIGHT: 67 IN | HEIGHT: 67 IN | HEART RATE: 76 BPM | RESPIRATION RATE: 18 BRPM | RESPIRATION RATE: 18 BRPM | SYSTOLIC BLOOD PRESSURE: 139 MMHG | SYSTOLIC BLOOD PRESSURE: 133 MMHG | WEIGHT: 202 LBS | DIASTOLIC BLOOD PRESSURE: 72 MMHG | HEART RATE: 64 BPM | DIASTOLIC BLOOD PRESSURE: 62 MMHG | BODY MASS INDEX: 31.71 KG/M2 | DIASTOLIC BLOOD PRESSURE: 59 MMHG

## 2019-01-01 VITALS
DIASTOLIC BLOOD PRESSURE: 63 MMHG | WEIGHT: 199.5 LBS | SYSTOLIC BLOOD PRESSURE: 118 MMHG | BODY MASS INDEX: 31.31 KG/M2 | HEIGHT: 67 IN | TEMPERATURE: 98 F | HEART RATE: 73 BPM

## 2019-01-01 VITALS — BODY MASS INDEX: 31.08 KG/M2 | HEIGHT: 67 IN | WEIGHT: 198 LBS

## 2019-01-01 VITALS
SYSTOLIC BLOOD PRESSURE: 140 MMHG | DIASTOLIC BLOOD PRESSURE: 50 MMHG | OXYGEN SATURATION: 95 % | HEART RATE: 61 BPM | HEIGHT: 67 IN | WEIGHT: 204.38 LBS | BODY MASS INDEX: 32.08 KG/M2

## 2019-01-01 VITALS
WEIGHT: 202.19 LBS | DIASTOLIC BLOOD PRESSURE: 59 MMHG | BODY MASS INDEX: 31.73 KG/M2 | HEART RATE: 73 BPM | HEIGHT: 67 IN | SYSTOLIC BLOOD PRESSURE: 139 MMHG

## 2019-01-01 VITALS
HEART RATE: 83 BPM | RESPIRATION RATE: 18 BRPM | WEIGHT: 202 LBS | HEIGHT: 67 IN | BODY MASS INDEX: 31.71 KG/M2 | TEMPERATURE: 98 F | SYSTOLIC BLOOD PRESSURE: 126 MMHG | DIASTOLIC BLOOD PRESSURE: 74 MMHG

## 2019-01-01 VITALS
HEART RATE: 76 BPM | BODY MASS INDEX: 31.8 KG/M2 | SYSTOLIC BLOOD PRESSURE: 126 MMHG | TEMPERATURE: 97 F | DIASTOLIC BLOOD PRESSURE: 64 MMHG | HEIGHT: 67 IN | WEIGHT: 202.63 LBS

## 2019-01-01 VITALS
TEMPERATURE: 97 F | HEIGHT: 67 IN | SYSTOLIC BLOOD PRESSURE: 122 MMHG | BODY MASS INDEX: 32.08 KG/M2 | WEIGHT: 204.38 LBS | DIASTOLIC BLOOD PRESSURE: 55 MMHG | HEART RATE: 66 BPM

## 2019-01-01 VITALS
HEIGHT: 67 IN | DIASTOLIC BLOOD PRESSURE: 66 MMHG | SYSTOLIC BLOOD PRESSURE: 124 MMHG | WEIGHT: 198.44 LBS | BODY MASS INDEX: 31.15 KG/M2 | HEART RATE: 75 BPM

## 2019-01-01 DIAGNOSIS — I50.9 ACUTE HF (HEART FAILURE): ICD-10-CM

## 2019-01-01 DIAGNOSIS — I73.9 PERIPHERAL VASCULAR DISEASE: ICD-10-CM

## 2019-01-01 DIAGNOSIS — C91.10 CLL (CHRONIC LYMPHOCYTIC LEUKEMIA): Primary | ICD-10-CM

## 2019-01-01 DIAGNOSIS — E10.42 DIABETIC POLYNEUROPATHY ASSOCIATED WITH TYPE 1 DIABETES MELLITUS: ICD-10-CM

## 2019-01-01 DIAGNOSIS — M25.561 RIGHT KNEE PAIN, UNSPECIFIED CHRONICITY: Primary | ICD-10-CM

## 2019-01-01 DIAGNOSIS — G47.33 OBSTRUCTIVE SLEEP APNEA: ICD-10-CM

## 2019-01-01 DIAGNOSIS — Z95.1 S/P CABG (CORONARY ARTERY BYPASS GRAFT): ICD-10-CM

## 2019-01-01 DIAGNOSIS — I25.119 CORONARY ARTERY DISEASE INVOLVING NATIVE CORONARY ARTERY OF NATIVE HEART WITH ANGINA PECTORIS: ICD-10-CM

## 2019-01-01 DIAGNOSIS — L97.521 SKIN ULCER OF TOE OF LEFT FOOT, LIMITED TO BREAKDOWN OF SKIN: ICD-10-CM

## 2019-01-01 DIAGNOSIS — I48.91 A-FIB: ICD-10-CM

## 2019-01-01 DIAGNOSIS — M46.1 SACROILIITIS: ICD-10-CM

## 2019-01-01 DIAGNOSIS — L97.309 DIABETIC ULCER OF ANKLE: Primary | ICD-10-CM

## 2019-01-01 DIAGNOSIS — R35.1 NOCTURIA: ICD-10-CM

## 2019-01-01 DIAGNOSIS — C91.10 CLL (CHRONIC LYMPHOCYTIC LEUKEMIA): ICD-10-CM

## 2019-01-01 DIAGNOSIS — I25.119 CORONARY ARTERY DISEASE INVOLVING NATIVE CORONARY ARTERY OF NATIVE HEART WITH ANGINA PECTORIS: Primary | ICD-10-CM

## 2019-01-01 DIAGNOSIS — L57.0 AK (ACTINIC KERATOSIS): Primary | ICD-10-CM

## 2019-01-01 DIAGNOSIS — E11.3492 TYPE 2 DIABETES MELLITUS WITH LEFT EYE AFFECTED BY SEVERE NONPROLIFERATIVE RETINOPATHY WITHOUT MACULAR EDEMA, WITH LONG-TERM CURRENT USE OF INSULIN: ICD-10-CM

## 2019-01-01 DIAGNOSIS — E11.3591 TYPE 2 DIABETES MELLITUS WITH RIGHT EYE AFFECTED BY PROLIFERATIVE RETINOPATHY WITHOUT MACULAR EDEMA, WITH LONG-TERM CURRENT USE OF INSULIN: ICD-10-CM

## 2019-01-01 DIAGNOSIS — I47.10 SVT (SUPRAVENTRICULAR TACHYCARDIA): ICD-10-CM

## 2019-01-01 DIAGNOSIS — E10.42 DIABETIC POLYNEUROPATHY ASSOCIATED WITH TYPE 1 DIABETES MELLITUS: Primary | ICD-10-CM

## 2019-01-01 DIAGNOSIS — I20.89 STABLE ANGINA: ICD-10-CM

## 2019-01-01 DIAGNOSIS — I46.9 CARDIAC ARREST: ICD-10-CM

## 2019-01-01 DIAGNOSIS — E78.00 PURE HYPERCHOLESTEROLEMIA: ICD-10-CM

## 2019-01-01 DIAGNOSIS — I21.4 NSTEMI (NON-ST ELEVATED MYOCARDIAL INFARCTION): ICD-10-CM

## 2019-01-01 DIAGNOSIS — E10.3593 TYPE 1 DIABETES MELLITUS WITH PROLIFERATIVE RETINOPATHY OF BOTH EYES WITHOUT MACULAR EDEMA: Primary | ICD-10-CM

## 2019-01-01 DIAGNOSIS — I70.0 ATHEROSCLEROSIS OF AORTA: ICD-10-CM

## 2019-01-01 DIAGNOSIS — L82.1 SK (SEBORRHEIC KERATOSIS): ICD-10-CM

## 2019-01-01 DIAGNOSIS — R06.02 SOB (SHORTNESS OF BREATH): ICD-10-CM

## 2019-01-01 DIAGNOSIS — L97.529 DIABETIC ULCER OF LEFT GREAT TOE: ICD-10-CM

## 2019-01-01 DIAGNOSIS — R07.9 CHEST PAIN: ICD-10-CM

## 2019-01-01 DIAGNOSIS — Z96.41 INSULIN PUMP STATUS: ICD-10-CM

## 2019-01-01 DIAGNOSIS — N40.1 BPH WITH URINARY OBSTRUCTION: ICD-10-CM

## 2019-01-01 DIAGNOSIS — E10.3593 TYPE 1 DIABETES MELLITUS WITH PROLIFERATIVE RETINOPATHY OF BOTH EYES WITHOUT MACULAR EDEMA: ICD-10-CM

## 2019-01-01 DIAGNOSIS — E11.621 DIABETIC ULCER OF LEFT GREAT TOE: ICD-10-CM

## 2019-01-01 DIAGNOSIS — D63.0 ANEMIA IN NEOPLASTIC DISEASE: ICD-10-CM

## 2019-01-01 DIAGNOSIS — N18.2 CKD (CHRONIC KIDNEY DISEASE), STAGE II: ICD-10-CM

## 2019-01-01 DIAGNOSIS — E10.40 CONTROLLED TYPE 1 DIABETES MELLITUS WITH DIABETIC NEUROPATHY, WITH LONG-TERM CURRENT USE OF INSULIN: ICD-10-CM

## 2019-01-01 DIAGNOSIS — D18.00 ANGIOMA: ICD-10-CM

## 2019-01-01 DIAGNOSIS — E11.622 DIABETIC ULCER OF ANKLE: Primary | ICD-10-CM

## 2019-01-01 DIAGNOSIS — I48.91 ATRIAL FIBRILLATION: ICD-10-CM

## 2019-01-01 DIAGNOSIS — I10 ESSENTIAL HYPERTENSION: ICD-10-CM

## 2019-01-01 DIAGNOSIS — E10.40 DM NEUROPATHY, TYPE I DIABETES MELLITUS: ICD-10-CM

## 2019-01-01 DIAGNOSIS — L97.521 ULCER OF GREAT TOE, LEFT, LIMITED TO BREAKDOWN OF SKIN: Primary | ICD-10-CM

## 2019-01-01 DIAGNOSIS — H43.813 PVD (POSTERIOR VITREOUS DETACHMENT), BOTH EYES: Primary | ICD-10-CM

## 2019-01-01 DIAGNOSIS — I50.21 ACUTE SYSTOLIC HEART FAILURE: ICD-10-CM

## 2019-01-01 DIAGNOSIS — L03.032 CELLULITIS OF LEFT TOE: Primary | ICD-10-CM

## 2019-01-01 DIAGNOSIS — S90.425S: Primary | ICD-10-CM

## 2019-01-01 DIAGNOSIS — E87.5 HYPERKALEMIA: ICD-10-CM

## 2019-01-01 DIAGNOSIS — N18.30 CKD (CHRONIC KIDNEY DISEASE), STAGE III: ICD-10-CM

## 2019-01-01 DIAGNOSIS — I21.3 STEMI (ST ELEVATION MYOCARDIAL INFARCTION): ICD-10-CM

## 2019-01-01 DIAGNOSIS — N40.0 BENIGN PROSTATIC HYPERPLASIA, UNSPECIFIED WHETHER LOWER URINARY TRACT SYMPTOMS PRESENT: ICD-10-CM

## 2019-01-01 DIAGNOSIS — I73.9 PERIPHERAL VASCULAR DISEASE: Primary | ICD-10-CM

## 2019-01-01 DIAGNOSIS — I50.41 ACUTE COMBINED SYSTOLIC AND DIASTOLIC HEART FAILURE: ICD-10-CM

## 2019-01-01 DIAGNOSIS — L97.521 SKIN ULCER OF TOE OF LEFT FOOT, LIMITED TO BREAKDOWN OF SKIN: Primary | ICD-10-CM

## 2019-01-01 DIAGNOSIS — L97.921 ULCER OF LEFT LOWER EXTREMITY, LIMITED TO BREAKDOWN OF SKIN: Primary | ICD-10-CM

## 2019-01-01 DIAGNOSIS — E10.40 CONTROLLED TYPE 1 DIABETES MELLITUS WITH DIABETIC NEUROPATHY, WITH LONG-TERM CURRENT USE OF INSULIN: Primary | ICD-10-CM

## 2019-01-01 DIAGNOSIS — L03.032 CELLULITIS OF LEFT TOE: ICD-10-CM

## 2019-01-01 DIAGNOSIS — M79.89 LEG SWELLING: ICD-10-CM

## 2019-01-01 DIAGNOSIS — I65.23 BILATERAL CAROTID ARTERY STENOSIS: ICD-10-CM

## 2019-01-01 DIAGNOSIS — M25.561 RIGHT KNEE PAIN, UNSPECIFIED CHRONICITY: ICD-10-CM

## 2019-01-01 DIAGNOSIS — E10.42 TYPE 1 DIABETES MELLITUS WITH POLYNEUROPATHY: ICD-10-CM

## 2019-01-01 DIAGNOSIS — N13.8 BPH WITH URINARY OBSTRUCTION: ICD-10-CM

## 2019-01-01 DIAGNOSIS — I48.92 ATRIAL FLUTTER: ICD-10-CM

## 2019-01-01 DIAGNOSIS — Z79.4 TYPE 2 DIABETES MELLITUS WITH LEFT EYE AFFECTED BY SEVERE NONPROLIFERATIVE RETINOPATHY WITHOUT MACULAR EDEMA, WITH LONG-TERM CURRENT USE OF INSULIN: ICD-10-CM

## 2019-01-01 DIAGNOSIS — S90.425S: ICD-10-CM

## 2019-01-01 DIAGNOSIS — I47.20 VT (VENTRICULAR TACHYCARDIA): ICD-10-CM

## 2019-01-01 DIAGNOSIS — Z79.4 TYPE 2 DIABETES MELLITUS WITH RIGHT EYE AFFECTED BY PROLIFERATIVE RETINOPATHY WITHOUT MACULAR EDEMA, WITH LONG-TERM CURRENT USE OF INSULIN: ICD-10-CM

## 2019-01-01 DIAGNOSIS — N17.9 AKI (ACUTE KIDNEY INJURY): ICD-10-CM

## 2019-01-01 DIAGNOSIS — L97.521 ULCER OF GREAT TOE, LEFT, LIMITED TO BREAKDOWN OF SKIN: ICD-10-CM

## 2019-01-01 DIAGNOSIS — L03.119 CELLULITIS OF FOOT: ICD-10-CM

## 2019-01-01 DIAGNOSIS — E66.09 CLASS 1 OBESITY DUE TO EXCESS CALORIES WITH SERIOUS COMORBIDITY AND BODY MASS INDEX (BMI) OF 30.0 TO 30.9 IN ADULT: ICD-10-CM

## 2019-01-01 DIAGNOSIS — L02.612 FOOT ABSCESS, LEFT: ICD-10-CM

## 2019-01-01 DIAGNOSIS — Z00.00 ENCOUNTER FOR PREVENTIVE HEALTH EXAMINATION: Primary | ICD-10-CM

## 2019-01-01 DIAGNOSIS — Z98.61 POSTSURGICAL PERCUTANEOUS TRANSLUMINAL CORONARY ANGIOPLASTY STATUS: Primary | ICD-10-CM

## 2019-01-01 DIAGNOSIS — I50.9 CHF (CONGESTIVE HEART FAILURE): ICD-10-CM

## 2019-01-01 DIAGNOSIS — L97.521 CHRONIC ULCER OF GREAT TOE OF LEFT FOOT, LIMITED TO BREAKDOWN OF SKIN: ICD-10-CM

## 2019-01-01 DIAGNOSIS — L03.116 CELLULITIS OF LEFT LOWER EXTREMITY: ICD-10-CM

## 2019-01-01 DIAGNOSIS — I49.01 VF (VENTRICULAR FIBRILLATION): ICD-10-CM

## 2019-01-01 DIAGNOSIS — R35.0 URINARY FREQUENCY: Primary | ICD-10-CM

## 2019-01-01 DIAGNOSIS — H35.3132 NONEXUDATIVE AGE-RELATED MACULAR DEGENERATION, BILATERAL, INTERMEDIATE DRY STAGE: ICD-10-CM

## 2019-01-01 DIAGNOSIS — B35.1 DERMATOPHYTOSIS OF NAIL: ICD-10-CM

## 2019-01-01 DIAGNOSIS — Z85.828 PERSONAL HISTORY OF OTHER MALIGNANT NEOPLASM OF SKIN: ICD-10-CM

## 2019-01-01 DIAGNOSIS — G47.33 OSA ON CPAP: ICD-10-CM

## 2019-01-01 LAB
ABO + RH BLD: NORMAL
ABO + RH BLD: NORMAL
ALBUMIN SERPL BCP-MCNC: 2.2 G/DL (ref 3.5–5.2)
ALBUMIN SERPL BCP-MCNC: 2.3 G/DL (ref 3.5–5.2)
ALBUMIN SERPL BCP-MCNC: 2.4 G/DL (ref 3.5–5.2)
ALBUMIN SERPL BCP-MCNC: 2.5 G/DL (ref 3.5–5.2)
ALBUMIN SERPL BCP-MCNC: 2.7 G/DL (ref 3.5–5.2)
ALBUMIN SERPL BCP-MCNC: 2.9 G/DL (ref 3.5–5.2)
ALBUMIN SERPL BCP-MCNC: 3 G/DL (ref 3.5–5.2)
ALBUMIN SERPL BCP-MCNC: 3.2 G/DL (ref 3.5–5.2)
ALBUMIN SERPL BCP-MCNC: 3.2 G/DL (ref 3.5–5.2)
ALBUMIN SERPL BCP-MCNC: 3.3 G/DL (ref 3.5–5.2)
ALBUMIN SERPL BCP-MCNC: 3.3 G/DL (ref 3.5–5.2)
ALBUMIN SERPL BCP-MCNC: 3.4 G/DL (ref 3.5–5.2)
ALBUMIN SERPL BCP-MCNC: 3.5 G/DL (ref 3.5–5.2)
ALBUMIN SERPL BCP-MCNC: 3.5 G/DL (ref 3.5–5.2)
ALBUMIN SERPL BCP-MCNC: 3.6 G/DL (ref 3.5–5.2)
ALBUMIN SERPL BCP-MCNC: 3.7 G/DL (ref 3.5–5.2)
ALBUMIN SERPL BCP-MCNC: 3.8 G/DL (ref 3.5–5.2)
ALBUMIN SERPL BCP-MCNC: 3.9 G/DL (ref 3.5–5.2)
ALBUMIN SERPL BCP-MCNC: 3.9 G/DL (ref 3.5–5.2)
ALBUMIN SERPL BCP-MCNC: 4 G/DL (ref 3.5–5.2)
ALBUMIN SERPL BCP-MCNC: 4.2 G/DL
ALLENS TEST: ABNORMAL
ALP SERPL-CCNC: 103 U/L (ref 55–135)
ALP SERPL-CCNC: 103 U/L (ref 55–135)
ALP SERPL-CCNC: 105 U/L (ref 55–135)
ALP SERPL-CCNC: 107 U/L (ref 55–135)
ALP SERPL-CCNC: 108 U/L (ref 55–135)
ALP SERPL-CCNC: 108 U/L (ref 55–135)
ALP SERPL-CCNC: 113 U/L (ref 55–135)
ALP SERPL-CCNC: 113 U/L (ref 55–135)
ALP SERPL-CCNC: 75 U/L (ref 55–135)
ALP SERPL-CCNC: 80 U/L (ref 55–135)
ALP SERPL-CCNC: 82 U/L (ref 55–135)
ALP SERPL-CCNC: 83 U/L (ref 55–135)
ALP SERPL-CCNC: 85 U/L (ref 55–135)
ALP SERPL-CCNC: 86 U/L (ref 55–135)
ALP SERPL-CCNC: 88 U/L (ref 55–135)
ALP SERPL-CCNC: 88 U/L (ref 55–135)
ALP SERPL-CCNC: 89 U/L (ref 55–135)
ALP SERPL-CCNC: 90 U/L (ref 55–135)
ALP SERPL-CCNC: 90 U/L (ref 55–135)
ALP SERPL-CCNC: 92 U/L (ref 55–135)
ALP SERPL-CCNC: 92 U/L (ref 55–135)
ALP SERPL-CCNC: 95 U/L
ALP SERPL-CCNC: 96 U/L (ref 55–135)
ALP SERPL-CCNC: 97 U/L (ref 55–135)
ALT SERPL W/O P-5'-P-CCNC: 14 U/L
ALT SERPL W/O P-5'-P-CCNC: 15 U/L (ref 10–44)
ALT SERPL W/O P-5'-P-CCNC: 18 U/L (ref 10–44)
ALT SERPL W/O P-5'-P-CCNC: 18 U/L (ref 10–44)
ALT SERPL W/O P-5'-P-CCNC: 19 U/L (ref 10–44)
ALT SERPL W/O P-5'-P-CCNC: 20 U/L (ref 10–44)
ALT SERPL W/O P-5'-P-CCNC: 21 U/L (ref 10–44)
ALT SERPL W/O P-5'-P-CCNC: 21 U/L (ref 10–44)
ALT SERPL W/O P-5'-P-CCNC: 24 U/L (ref 10–44)
ALT SERPL W/O P-5'-P-CCNC: 24 U/L (ref 10–44)
ALT SERPL W/O P-5'-P-CCNC: 25 U/L (ref 10–44)
ALT SERPL W/O P-5'-P-CCNC: 25 U/L (ref 10–44)
ALT SERPL W/O P-5'-P-CCNC: 26 U/L (ref 10–44)
ALT SERPL W/O P-5'-P-CCNC: 26 U/L (ref 10–44)
ALT SERPL W/O P-5'-P-CCNC: 27 U/L (ref 10–44)
ALT SERPL W/O P-5'-P-CCNC: 27 U/L (ref 10–44)
ALT SERPL W/O P-5'-P-CCNC: 28 U/L (ref 10–44)
ALT SERPL W/O P-5'-P-CCNC: 29 U/L (ref 10–44)
ALT SERPL W/O P-5'-P-CCNC: 30 U/L (ref 10–44)
ALT SERPL W/O P-5'-P-CCNC: 31 U/L (ref 10–44)
ALT SERPL W/O P-5'-P-CCNC: 32 U/L (ref 10–44)
ALT SERPL W/O P-5'-P-CCNC: 35 U/L (ref 10–44)
ALT SERPL W/O P-5'-P-CCNC: 35 U/L (ref 10–44)
ALT SERPL W/O P-5'-P-CCNC: 38 U/L (ref 10–44)
ALT SERPL W/O P-5'-P-CCNC: 39 U/L (ref 10–44)
ANION GAP SERPL CALC-SCNC: 10 MMOL/L
ANION GAP SERPL CALC-SCNC: 10 MMOL/L (ref 8–16)
ANION GAP SERPL CALC-SCNC: 11 MMOL/L (ref 8–16)
ANION GAP SERPL CALC-SCNC: 12 MMOL/L (ref 8–16)
ANION GAP SERPL CALC-SCNC: 13 MMOL/L (ref 8–16)
ANION GAP SERPL CALC-SCNC: 14 MMOL/L (ref 8–16)
ANION GAP SERPL CALC-SCNC: 14 MMOL/L (ref 8–16)
ANION GAP SERPL CALC-SCNC: 17 MMOL/L (ref 8–16)
ANION GAP SERPL CALC-SCNC: 21 MMOL/L (ref 8–16)
ANION GAP SERPL CALC-SCNC: 21 MMOL/L (ref 8–16)
ANION GAP SERPL CALC-SCNC: 7 MMOL/L
ANION GAP SERPL CALC-SCNC: 7 MMOL/L (ref 8–16)
ANION GAP SERPL CALC-SCNC: 7 MMOL/L (ref 8–16)
ANION GAP SERPL CALC-SCNC: 8 MMOL/L
ANION GAP SERPL CALC-SCNC: 8 MMOL/L (ref 8–16)
ANION GAP SERPL CALC-SCNC: 9 MMOL/L (ref 8–16)
ANISOCYTOSIS BLD QL SMEAR: ABNORMAL
ANISOCYTOSIS BLD QL SMEAR: ABNORMAL
ANISOCYTOSIS BLD QL SMEAR: SLIGHT
APTT BLDCRRT: 23.1 SEC (ref 21–32)
APTT BLDCRRT: 26.8 SEC (ref 21–32)
APTT BLDCRRT: 26.8 SEC (ref 21–32)
APTT BLDCRRT: 27.4 SEC (ref 21–32)
APTT BLDCRRT: 27.9 SEC (ref 21–32)
APTT BLDCRRT: 28.2 SEC (ref 21–32)
APTT BLDCRRT: 40.3 SEC (ref 21–32)
APTT BLDCRRT: 40.4 SEC (ref 21–32)
APTT BLDCRRT: 40.9 SEC (ref 21–32)
APTT BLDCRRT: 41.5 SEC (ref 21–32)
APTT BLDCRRT: 41.9 SEC (ref 21–32)
APTT BLDCRRT: 48.8 SEC (ref 21–32)
APTT BLDCRRT: 49.5 SEC (ref 21–32)
APTT BLDCRRT: 49.5 SEC (ref 21–32)
APTT BLDCRRT: 49.8 SEC (ref 21–32)
APTT BLDCRRT: 50.7 SEC (ref 21–32)
APTT BLDCRRT: 50.7 SEC (ref 21–32)
APTT BLDCRRT: 51.1 SEC (ref 21–32)
APTT BLDCRRT: 54.4 SEC (ref 21–32)
APTT BLDCRRT: 55.2 SEC (ref 21–32)
APTT BLDCRRT: 58.3 SEC (ref 21–32)
APTT BLDCRRT: 58.5 SEC (ref 21–32)
APTT BLDCRRT: 60.6 SEC (ref 21–32)
APTT BLDCRRT: 62 SEC (ref 21–32)
APTT BLDCRRT: 62.9 SEC (ref 21–32)
APTT BLDCRRT: 62.9 SEC (ref 21–32)
APTT BLDCRRT: 63.7 SEC (ref 21–32)
APTT BLDCRRT: 70.1 SEC (ref 21–32)
APTT BLDCRRT: 70.5 SEC (ref 21–32)
APTT BLDCRRT: 83 SEC (ref 21–32)
ASCENDING AORTA: 3 CM
ASCENDING AORTA: 3.31 CM
AST SERPL-CCNC: 18 U/L (ref 10–40)
AST SERPL-CCNC: 19 U/L (ref 10–40)
AST SERPL-CCNC: 19 U/L (ref 10–40)
AST SERPL-CCNC: 20 U/L
AST SERPL-CCNC: 20 U/L (ref 10–40)
AST SERPL-CCNC: 23 U/L (ref 10–40)
AST SERPL-CCNC: 25 U/L (ref 10–40)
AST SERPL-CCNC: 26 U/L (ref 10–40)
AST SERPL-CCNC: 26 U/L (ref 10–40)
AST SERPL-CCNC: 27 U/L (ref 10–40)
AST SERPL-CCNC: 27 U/L (ref 10–40)
AST SERPL-CCNC: 29 U/L (ref 10–40)
AST SERPL-CCNC: 39 U/L (ref 10–40)
AST SERPL-CCNC: 49 U/L (ref 10–40)
AST SERPL-CCNC: 52 U/L (ref 10–40)
AST SERPL-CCNC: 55 U/L (ref 10–40)
AST SERPL-CCNC: 60 U/L (ref 10–40)
AST SERPL-CCNC: 61 U/L (ref 10–40)
AST SERPL-CCNC: 69 U/L (ref 10–40)
AST SERPL-CCNC: 71 U/L (ref 10–40)
AST SERPL-CCNC: 74 U/L (ref 10–40)
AST SERPL-CCNC: 74 U/L (ref 10–40)
AST SERPL-CCNC: 77 U/L (ref 10–40)
AV INDEX (PROSTH): 0.48
AV INDEX (PROSTH): 0.58
AV MEAN GRADIENT: 3.46 MMHG
AV MEAN GRADIENT: 3.88 MMHG
AV PEAK GRADIENT: 6.05 MMHG
AV PEAK GRADIENT: 7.84 MMHG
AV VALVE AREA: 1.51 CM2
AV VALVE AREA: 1.8 CM2
AV VELOCITY RATIO: 0.5
AV VELOCITY RATIO: 0.61
BACTERIA #/AREA URNS AUTO: ABNORMAL /HPF
BACTERIA #/AREA URNS AUTO: ABNORMAL /HPF
BACTERIA BLD CULT: NORMAL
BACTERIA SPEC AEROBE CULT: NORMAL
BACTERIA SPEC ANAEROBE CULT: NORMAL
BACTERIA SPEC ANAEROBE CULT: NORMAL
BACTERIA UR CULT: NO GROWTH
BASOPHILS # BLD AUTO: 0.02 K/UL (ref 0–0.2)
BASOPHILS # BLD AUTO: 0.02 K/UL (ref 0–0.2)
BASOPHILS # BLD AUTO: 0.03 K/UL (ref 0–0.2)
BASOPHILS # BLD AUTO: 0.03 K/UL (ref 0–0.2)
BASOPHILS # BLD AUTO: 0.07 K/UL (ref 0–0.2)
BASOPHILS # BLD AUTO: 0.08 K/UL (ref 0–0.2)
BASOPHILS # BLD AUTO: 0.08 K/UL (ref 0–0.2)
BASOPHILS # BLD AUTO: 0.09 K/UL (ref 0–0.2)
BASOPHILS # BLD AUTO: 0.11 K/UL (ref 0–0.2)
BASOPHILS # BLD AUTO: ABNORMAL K/UL
BASOPHILS # BLD AUTO: ABNORMAL K/UL
BASOPHILS # BLD AUTO: ABNORMAL K/UL (ref 0–0.2)
BASOPHILS NFR BLD: 0 %
BASOPHILS NFR BLD: 0 % (ref 0–1.9)
BASOPHILS NFR BLD: 0.1 % (ref 0–1.9)
BASOPHILS NFR BLD: 0.1 % (ref 0–1.9)
BASOPHILS NFR BLD: 0.2 % (ref 0–1.9)
BASOPHILS NFR BLD: 0.3 % (ref 0–1.9)
BASOPHILS NFR BLD: 0.3 % (ref 0–1.9)
BILIRUB SERPL-MCNC: 0.4 MG/DL (ref 0.1–1)
BILIRUB SERPL-MCNC: 0.5 MG/DL (ref 0.1–1)
BILIRUB SERPL-MCNC: 0.6 MG/DL (ref 0.1–1)
BILIRUB SERPL-MCNC: 0.7 MG/DL (ref 0.1–1)
BILIRUB SERPL-MCNC: 0.8 MG/DL
BILIRUB SERPL-MCNC: 0.8 MG/DL (ref 0.1–1)
BILIRUB SERPL-MCNC: 0.8 MG/DL (ref 0.1–1)
BILIRUB SERPL-MCNC: 0.9 MG/DL (ref 0.1–1)
BILIRUB SERPL-MCNC: 0.9 MG/DL (ref 0.1–1)
BILIRUB SERPL-MCNC: NORMAL MG/DL
BILIRUB UR QL STRIP: NEGATIVE
BLD GP AB SCN CELLS X3 SERPL QL: NORMAL
BLD GP AB SCN CELLS X3 SERPL QL: NORMAL
BLOOD URINE, POC: NORMAL
BNP SERPL-MCNC: 2608 PG/ML (ref 0–99)
BNP SERPL-MCNC: 2690 PG/ML (ref 0–99)
BSA FOR ECHO PROCEDURE: 2.09 M2
BSA FOR ECHO PROCEDURE: 2.15 M2
BUN SERPL-MCNC: 18 MG/DL
BUN SERPL-MCNC: 18 MG/DL (ref 8–23)
BUN SERPL-MCNC: 19 MG/DL (ref 8–23)
BUN SERPL-MCNC: 20 MG/DL
BUN SERPL-MCNC: 20 MG/DL (ref 8–23)
BUN SERPL-MCNC: 21 MG/DL (ref 8–23)
BUN SERPL-MCNC: 21 MG/DL (ref 8–23)
BUN SERPL-MCNC: 22 MG/DL (ref 8–23)
BUN SERPL-MCNC: 23 MG/DL (ref 8–23)
BUN SERPL-MCNC: 24 MG/DL (ref 8–23)
BUN SERPL-MCNC: 24 MG/DL (ref 8–23)
BUN SERPL-MCNC: 25 MG/DL (ref 8–23)
BUN SERPL-MCNC: 25 MG/DL (ref 8–23)
BUN SERPL-MCNC: 26 MG/DL (ref 8–23)
BUN SERPL-MCNC: 27 MG/DL
BUN SERPL-MCNC: 27 MG/DL (ref 8–23)
BUN SERPL-MCNC: 28 MG/DL (ref 8–23)
BUN SERPL-MCNC: 29 MG/DL (ref 8–23)
BUN SERPL-MCNC: 30 MG/DL (ref 8–23)
BUN SERPL-MCNC: 30 MG/DL (ref 8–23)
BUN SERPL-MCNC: 31 MG/DL (ref 8–23)
BUN SERPL-MCNC: 32 MG/DL (ref 8–23)
BUN SERPL-MCNC: 34 MG/DL (ref 8–23)
BUN SERPL-MCNC: 34 MG/DL (ref 8–23)
BUN SERPL-MCNC: 36 MG/DL (ref 8–23)
BUN SERPL-MCNC: 36 MG/DL (ref 8–23)
BUN SERPL-MCNC: 37 MG/DL (ref 8–23)
BUN SERPL-MCNC: 39 MG/DL (ref 8–23)
BUN SERPL-MCNC: 39 MG/DL (ref 8–23)
BURR CELLS BLD QL SMEAR: ABNORMAL
CALCIUM SERPL-MCNC: 10 MG/DL (ref 8.7–10.5)
CALCIUM SERPL-MCNC: 10.2 MG/DL
CALCIUM SERPL-MCNC: 10.5 MG/DL
CALCIUM SERPL-MCNC: 7.3 MG/DL (ref 8.7–10.5)
CALCIUM SERPL-MCNC: 7.6 MG/DL (ref 8.7–10.5)
CALCIUM SERPL-MCNC: 7.7 MG/DL (ref 8.7–10.5)
CALCIUM SERPL-MCNC: 7.8 MG/DL (ref 8.7–10.5)
CALCIUM SERPL-MCNC: 7.9 MG/DL (ref 8.7–10.5)
CALCIUM SERPL-MCNC: 8 MG/DL (ref 8.7–10.5)
CALCIUM SERPL-MCNC: 8.1 MG/DL (ref 8.7–10.5)
CALCIUM SERPL-MCNC: 8.2 MG/DL (ref 8.7–10.5)
CALCIUM SERPL-MCNC: 8.3 MG/DL (ref 8.7–10.5)
CALCIUM SERPL-MCNC: 8.4 MG/DL (ref 8.7–10.5)
CALCIUM SERPL-MCNC: 8.4 MG/DL (ref 8.7–10.5)
CALCIUM SERPL-MCNC: 8.5 MG/DL (ref 8.7–10.5)
CALCIUM SERPL-MCNC: 8.6 MG/DL (ref 8.7–10.5)
CALCIUM SERPL-MCNC: 9 MG/DL (ref 8.7–10.5)
CALCIUM SERPL-MCNC: 9.1 MG/DL (ref 8.7–10.5)
CALCIUM SERPL-MCNC: 9.2 MG/DL (ref 8.7–10.5)
CALCIUM SERPL-MCNC: 9.3 MG/DL (ref 8.7–10.5)
CALCIUM SERPL-MCNC: 9.3 MG/DL (ref 8.7–10.5)
CALCIUM SERPL-MCNC: 9.4 MG/DL (ref 8.7–10.5)
CALCIUM SERPL-MCNC: 9.5 MG/DL (ref 8.7–10.5)
CALCIUM SERPL-MCNC: 9.6 MG/DL (ref 8.7–10.5)
CALCIUM SERPL-MCNC: 9.7 MG/DL
CALCIUM SERPL-MCNC: 9.8 MG/DL (ref 8.7–10.5)
CALCIUM SERPL-MCNC: 9.8 MG/DL (ref 8.7–10.5)
CALCIUM SERPL-MCNC: 9.9 MG/DL (ref 8.7–10.5)
CHLORIDE SERPL-SCNC: 100 MMOL/L (ref 95–110)
CHLORIDE SERPL-SCNC: 100 MMOL/L (ref 95–110)
CHLORIDE SERPL-SCNC: 101 MMOL/L (ref 95–110)
CHLORIDE SERPL-SCNC: 102 MMOL/L (ref 95–110)
CHLORIDE SERPL-SCNC: 103 MMOL/L (ref 95–110)
CHLORIDE SERPL-SCNC: 104 MMOL/L (ref 95–110)
CHLORIDE SERPL-SCNC: 105 MMOL/L (ref 95–110)
CHLORIDE SERPL-SCNC: 106 MMOL/L (ref 95–110)
CHLORIDE SERPL-SCNC: 106 MMOL/L (ref 95–110)
CHLORIDE SERPL-SCNC: 90 MMOL/L (ref 95–110)
CHLORIDE SERPL-SCNC: 91 MMOL/L (ref 95–110)
CHLORIDE SERPL-SCNC: 91 MMOL/L (ref 95–110)
CHLORIDE SERPL-SCNC: 92 MMOL/L (ref 95–110)
CHLORIDE SERPL-SCNC: 93 MMOL/L (ref 95–110)
CHLORIDE SERPL-SCNC: 94 MMOL/L
CHLORIDE SERPL-SCNC: 94 MMOL/L (ref 95–110)
CHLORIDE SERPL-SCNC: 95 MMOL/L
CHLORIDE SERPL-SCNC: 95 MMOL/L
CHLORIDE SERPL-SCNC: 97 MMOL/L (ref 95–110)
CHLORIDE SERPL-SCNC: 98 MMOL/L (ref 95–110)
CHLORIDE SERPL-SCNC: 98 MMOL/L (ref 95–110)
CHLORIDE SERPL-SCNC: 99 MMOL/L (ref 95–110)
CK SERPL-CCNC: 667 U/L (ref 20–200)
CLARITY UR REFRACT.AUTO: ABNORMAL
CLARITY UR REFRACT.AUTO: CLEAR
CO2 SERPL-SCNC: 17 MMOL/L (ref 23–29)
CO2 SERPL-SCNC: 18 MMOL/L (ref 23–29)
CO2 SERPL-SCNC: 18 MMOL/L (ref 23–29)
CO2 SERPL-SCNC: 20 MMOL/L (ref 23–29)
CO2 SERPL-SCNC: 20 MMOL/L (ref 23–29)
CO2 SERPL-SCNC: 21 MMOL/L (ref 23–29)
CO2 SERPL-SCNC: 22 MMOL/L (ref 23–29)
CO2 SERPL-SCNC: 23 MMOL/L (ref 23–29)
CO2 SERPL-SCNC: 24 MMOL/L (ref 23–29)
CO2 SERPL-SCNC: 25 MMOL/L (ref 23–29)
CO2 SERPL-SCNC: 26 MMOL/L (ref 23–29)
CO2 SERPL-SCNC: 27 MMOL/L
CO2 SERPL-SCNC: 27 MMOL/L (ref 23–29)
CO2 SERPL-SCNC: 28 MMOL/L
CO2 SERPL-SCNC: 28 MMOL/L (ref 23–29)
CO2 SERPL-SCNC: 29 MMOL/L (ref 23–29)
CO2 SERPL-SCNC: 29 MMOL/L (ref 23–29)
CO2 SERPL-SCNC: 30 MMOL/L (ref 23–29)
CO2 SERPL-SCNC: 30 MMOL/L (ref 23–29)
CO2 SERPL-SCNC: 31 MMOL/L
CO2 SERPL-SCNC: 31 MMOL/L (ref 23–29)
CO2 SERPL-SCNC: 32 MMOL/L (ref 23–29)
CO2 SERPL-SCNC: 33 MMOL/L (ref 23–29)
COLOR UR AUTO: ABNORMAL
COLOR UR AUTO: YELLOW
COLOR, POC UA: NORMAL
CREAT SERPL-MCNC: 1 MG/DL
CREAT SERPL-MCNC: 1.1 MG/DL (ref 0.5–1.4)
CREAT SERPL-MCNC: 1.2 MG/DL
CREAT SERPL-MCNC: 1.2 MG/DL (ref 0.5–1.4)
CREAT SERPL-MCNC: 1.3 MG/DL
CREAT SERPL-MCNC: 1.3 MG/DL (ref 0.5–1.4)
CREAT SERPL-MCNC: 1.4 MG/DL (ref 0.5–1.4)
CREAT SERPL-MCNC: 1.5 MG/DL (ref 0.5–1.4)
CREAT SERPL-MCNC: 1.6 MG/DL (ref 0.5–1.4)
CREAT SERPL-MCNC: 1.7 MG/DL (ref 0.5–1.4)
CRP SERPL-MCNC: 21.4 MG/L (ref 0–8.2)
CRP SERPL-MCNC: 8.7 MG/L (ref 0–8.2)
CV ECHO LV RWT: 0.27 CM
CV ECHO LV RWT: 0.37 CM
CV STRESS BASE HR: 83
DACRYOCYTES BLD QL SMEAR: ABNORMAL
DELSYS: ABNORMAL
DIASTOLIC BLOOD PRESSURE: 61
DIFFERENTIAL METHOD: ABNORMAL
DOP CALC AO PEAK VEL: 1.23 M/S
DOP CALC AO PEAK VEL: 1.4 M/S
DOP CALC AO VTI: 15.79 CM
DOP CALC AO VTI: 26.89 CM
DOP CALC LVOT AREA: 3.11 CM2
DOP CALC LVOT AREA: 3.14 CM2
DOP CALC LVOT DIAMETER: 1.99 CM
DOP CALC LVOT DIAMETER: 2 CM
DOP CALC LVOT PEAK VEL: 0.61 M/S
DOP CALC LVOT PEAK VEL: 0.86 M/S
DOP CALC LVOT STROKE VOLUME: 28.48 CM3
DOP CALC LVOT STROKE VOLUME: 40.73 CM3
DOP CALCLVOT PEAK VEL VTI: 12.97 CM
DOP CALCLVOT PEAK VEL VTI: 9.16 CM
E WAVE DECELERATION TIME: 156.41 MSEC
E WAVE DECELERATION TIME: 229.4 MSEC
E/A RATIO: 1.67
E/A RATIO: 4.09
E/E' RATIO: 31.33
E/E' RATIO: 33
ECHO LV POSTERIOR WALL: 0.7 CM (ref 0.6–1.1)
ECHO LV POSTERIOR WALL: 1.04 CM (ref 0.6–1.1)
EOSINOPHIL # BLD AUTO: 0 K/UL (ref 0–0.5)
EOSINOPHIL # BLD AUTO: 0 K/UL (ref 0–0.5)
EOSINOPHIL # BLD AUTO: 0.1 K/UL (ref 0–0.5)
EOSINOPHIL # BLD AUTO: ABNORMAL K/UL
EOSINOPHIL # BLD AUTO: ABNORMAL K/UL
EOSINOPHIL # BLD AUTO: ABNORMAL K/UL (ref 0–0.5)
EOSINOPHIL NFR BLD: 0 %
EOSINOPHIL NFR BLD: 0 % (ref 0–8)
EOSINOPHIL NFR BLD: 0.1 % (ref 0–8)
EOSINOPHIL NFR BLD: 0.2 % (ref 0–8)
EOSINOPHIL NFR BLD: 0.3 % (ref 0–8)
EOSINOPHIL NFR BLD: 0.3 % (ref 0–8)
EOSINOPHIL NFR BLD: 0.5 % (ref 0–8)
EOSINOPHIL NFR BLD: 1 % (ref 0–8)
ERYTHROCYTE [DISTWIDTH] IN BLOOD BY AUTOMATED COUNT: 13.9 %
ERYTHROCYTE [DISTWIDTH] IN BLOOD BY AUTOMATED COUNT: 13.9 %
ERYTHROCYTE [DISTWIDTH] IN BLOOD BY AUTOMATED COUNT: 14.1 %
ERYTHROCYTE [DISTWIDTH] IN BLOOD BY AUTOMATED COUNT: 14.4 % (ref 11.5–14.5)
ERYTHROCYTE [DISTWIDTH] IN BLOOD BY AUTOMATED COUNT: 14.4 % (ref 11.5–14.5)
ERYTHROCYTE [DISTWIDTH] IN BLOOD BY AUTOMATED COUNT: 14.5 % (ref 11.5–14.5)
ERYTHROCYTE [DISTWIDTH] IN BLOOD BY AUTOMATED COUNT: 14.5 % (ref 11.5–14.5)
ERYTHROCYTE [DISTWIDTH] IN BLOOD BY AUTOMATED COUNT: 14.6 % (ref 11.5–14.5)
ERYTHROCYTE [DISTWIDTH] IN BLOOD BY AUTOMATED COUNT: 14.7 % (ref 11.5–14.5)
ERYTHROCYTE [DISTWIDTH] IN BLOOD BY AUTOMATED COUNT: 14.8 % (ref 11.5–14.5)
ERYTHROCYTE [DISTWIDTH] IN BLOOD BY AUTOMATED COUNT: 14.8 % (ref 11.5–14.5)
ERYTHROCYTE [DISTWIDTH] IN BLOOD BY AUTOMATED COUNT: 14.9 % (ref 11.5–14.5)
ERYTHROCYTE [DISTWIDTH] IN BLOOD BY AUTOMATED COUNT: 14.9 % (ref 11.5–14.5)
ERYTHROCYTE [DISTWIDTH] IN BLOOD BY AUTOMATED COUNT: 15 % (ref 11.5–14.5)
ERYTHROCYTE [DISTWIDTH] IN BLOOD BY AUTOMATED COUNT: 15.1 % (ref 11.5–14.5)
ERYTHROCYTE [DISTWIDTH] IN BLOOD BY AUTOMATED COUNT: 15.2 % (ref 11.5–14.5)
ERYTHROCYTE [DISTWIDTH] IN BLOOD BY AUTOMATED COUNT: 15.3 % (ref 11.5–14.5)
ERYTHROCYTE [DISTWIDTH] IN BLOOD BY AUTOMATED COUNT: 15.4 % (ref 11.5–14.5)
ERYTHROCYTE [DISTWIDTH] IN BLOOD BY AUTOMATED COUNT: 15.5 % (ref 11.5–14.5)
ERYTHROCYTE [DISTWIDTH] IN BLOOD BY AUTOMATED COUNT: 15.6 % (ref 11.5–14.5)
ERYTHROCYTE [SEDIMENTATION RATE] IN BLOOD BY WESTERGREN METHOD: 11 MM/HR (ref 0–23)
ERYTHROCYTE [SEDIMENTATION RATE] IN BLOOD BY WESTERGREN METHOD: 18 MM/H
ERYTHROCYTE [SEDIMENTATION RATE] IN BLOOD BY WESTERGREN METHOD: 18 MM/H
ERYTHROCYTE [SEDIMENTATION RATE] IN BLOOD BY WESTERGREN METHOD: 26 MM/H
ERYTHROCYTE [SEDIMENTATION RATE] IN BLOOD BY WESTERGREN METHOD: 31 MM/HR (ref 0–23)
EST. GFR  (AFRICAN AMERICAN): 44 ML/MIN/1.73 M^2
EST. GFR  (AFRICAN AMERICAN): 47.3 ML/MIN/1.73 M^2
EST. GFR  (AFRICAN AMERICAN): 51.2 ML/MIN/1.73 M^2
EST. GFR  (AFRICAN AMERICAN): 55.6 ML/MIN/1.73 M^2
EST. GFR  (AFRICAN AMERICAN): >60 ML/MIN/1.73 M^2
EST. GFR  (NON AFRICAN AMERICAN): 38.1 ML/MIN/1.73 M^2
EST. GFR  (NON AFRICAN AMERICAN): 40.9 ML/MIN/1.73 M^2
EST. GFR  (NON AFRICAN AMERICAN): 44.3 ML/MIN/1.73 M^2
EST. GFR  (NON AFRICAN AMERICAN): 48.1 ML/MIN/1.73 M^2
EST. GFR  (NON AFRICAN AMERICAN): 52.6 ML/MIN/1.73 M^2
EST. GFR  (NON AFRICAN AMERICAN): 53 ML/MIN/1.73 M^2
EST. GFR  (NON AFRICAN AMERICAN): 58 ML/MIN/1.73 M^2
EST. GFR  (NON AFRICAN AMERICAN): 58.4 ML/MIN/1.73 M^2
EST. GFR  (NON AFRICAN AMERICAN): >60 ML/MIN/1.73 M^2
ESTIMATED AVG GLUCOSE: 120 MG/DL
ESTIMATED AVG GLUCOSE: 120 MG/DL (ref 68–131)
ESTIMATED AVG GLUCOSE: 140 MG/DL (ref 68–131)
FIO2: 100
FIO2: 100
FIO2: 40
FIO2: 40
FIO2: 50
FLOW: 0.5
FLOW: 1
FRACTIONAL SHORTENING: 14 % (ref 28–44)
FRACTIONAL SHORTENING: 19 % (ref 28–44)
GIANT PLATELETS BLD QL SMEAR: PRESENT
GLUCOSE SERPL-MCNC: 120 MG/DL (ref 70–110)
GLUCOSE SERPL-MCNC: 123 MG/DL (ref 70–110)
GLUCOSE SERPL-MCNC: 127 MG/DL (ref 70–110)
GLUCOSE SERPL-MCNC: 128 MG/DL (ref 70–110)
GLUCOSE SERPL-MCNC: 133 MG/DL (ref 70–110)
GLUCOSE SERPL-MCNC: 135 MG/DL (ref 70–110)
GLUCOSE SERPL-MCNC: 135 MG/DL (ref 70–110)
GLUCOSE SERPL-MCNC: 140 MG/DL
GLUCOSE SERPL-MCNC: 141 MG/DL (ref 70–110)
GLUCOSE SERPL-MCNC: 148 MG/DL (ref 70–110)
GLUCOSE SERPL-MCNC: 149 MG/DL (ref 70–110)
GLUCOSE SERPL-MCNC: 149 MG/DL (ref 70–110)
GLUCOSE SERPL-MCNC: 151 MG/DL (ref 70–110)
GLUCOSE SERPL-MCNC: 152 MG/DL (ref 70–110)
GLUCOSE SERPL-MCNC: 155 MG/DL (ref 70–110)
GLUCOSE SERPL-MCNC: 156 MG/DL (ref 70–110)
GLUCOSE SERPL-MCNC: 157 MG/DL (ref 70–110)
GLUCOSE SERPL-MCNC: 162 MG/DL (ref 70–110)
GLUCOSE SERPL-MCNC: 165 MG/DL (ref 70–110)
GLUCOSE SERPL-MCNC: 168 MG/DL (ref 70–110)
GLUCOSE SERPL-MCNC: 169 MG/DL (ref 70–110)
GLUCOSE SERPL-MCNC: 169 MG/DL (ref 70–110)
GLUCOSE SERPL-MCNC: 172 MG/DL (ref 70–110)
GLUCOSE SERPL-MCNC: 172 MG/DL (ref 70–110)
GLUCOSE SERPL-MCNC: 173 MG/DL (ref 70–110)
GLUCOSE SERPL-MCNC: 175 MG/DL (ref 70–110)
GLUCOSE SERPL-MCNC: 176 MG/DL (ref 70–110)
GLUCOSE SERPL-MCNC: 178 MG/DL (ref 70–110)
GLUCOSE SERPL-MCNC: 180 MG/DL (ref 70–110)
GLUCOSE SERPL-MCNC: 181 MG/DL (ref 70–110)
GLUCOSE SERPL-MCNC: 181 MG/DL (ref 70–110)
GLUCOSE SERPL-MCNC: 182 MG/DL (ref 70–110)
GLUCOSE SERPL-MCNC: 186 MG/DL (ref 70–110)
GLUCOSE SERPL-MCNC: 187 MG/DL (ref 70–110)
GLUCOSE SERPL-MCNC: 187 MG/DL (ref 70–110)
GLUCOSE SERPL-MCNC: 191 MG/DL (ref 70–110)
GLUCOSE SERPL-MCNC: 199 MG/DL (ref 70–110)
GLUCOSE SERPL-MCNC: 203 MG/DL (ref 70–110)
GLUCOSE SERPL-MCNC: 214 MG/DL (ref 70–110)
GLUCOSE SERPL-MCNC: 225 MG/DL (ref 70–110)
GLUCOSE SERPL-MCNC: 226 MG/DL
GLUCOSE SERPL-MCNC: 230 MG/DL
GLUCOSE SERPL-MCNC: 247 MG/DL (ref 70–110)
GLUCOSE SERPL-MCNC: 247 MG/DL (ref 70–110)
GLUCOSE SERPL-MCNC: 270 MG/DL (ref 70–110)
GLUCOSE SERPL-MCNC: 273 MG/DL (ref 70–110)
GLUCOSE SERPL-MCNC: 278 MG/DL (ref 70–110)
GLUCOSE SERPL-MCNC: 286 MG/DL (ref 70–110)
GLUCOSE SERPL-MCNC: 287 MG/DL (ref 70–110)
GLUCOSE SERPL-MCNC: 294 MG/DL (ref 70–110)
GLUCOSE SERPL-MCNC: 320 MG/DL (ref 70–110)
GLUCOSE SERPL-MCNC: 320 MG/DL (ref 70–110)
GLUCOSE SERPL-MCNC: 322 MG/DL (ref 70–110)
GLUCOSE SERPL-MCNC: 417 MG/DL (ref 70–110)
GLUCOSE SERPL-MCNC: 468 MG/DL (ref 70–110)
GLUCOSE SERPL-MCNC: 473 MG/DL (ref 70–110)
GLUCOSE UR QL STRIP: ABNORMAL
GLUCOSE UR QL STRIP: NEGATIVE
GLUCOSE UR QL STRIP: NORMAL
GRAM STN SPEC: NORMAL
HBA1C MFR BLD HPLC: 5.8 %
HBA1C MFR BLD HPLC: 5.8 % (ref 4–5.6)
HBA1C MFR BLD HPLC: 6.5 % (ref 4–5.6)
HCO3 UR-SCNC: 16.3 MMOL/L (ref 24–28)
HCO3 UR-SCNC: 19.1 MMOL/L (ref 24–28)
HCO3 UR-SCNC: 19.5 MMOL/L (ref 24–28)
HCO3 UR-SCNC: 21.5 MMOL/L (ref 24–28)
HCO3 UR-SCNC: 22.3 MMOL/L (ref 24–28)
HCO3 UR-SCNC: 22.4 MMOL/L (ref 24–28)
HCO3 UR-SCNC: 22.5 MMOL/L (ref 24–28)
HCO3 UR-SCNC: 23.4 MMOL/L (ref 24–28)
HCO3 UR-SCNC: 25.1 MMOL/L (ref 24–28)
HCO3 UR-SCNC: 25.2 MMOL/L (ref 24–28)
HCO3 UR-SCNC: 26 MMOL/L (ref 24–28)
HCO3 UR-SCNC: 28.4 MMOL/L (ref 24–28)
HCO3 UR-SCNC: 28.6 MMOL/L (ref 24–28)
HCO3 UR-SCNC: 29.8 MMOL/L (ref 24–28)
HCT VFR BLD AUTO: 24.6 % (ref 40–54)
HCT VFR BLD AUTO: 25.8 % (ref 40–54)
HCT VFR BLD AUTO: 25.8 % (ref 40–54)
HCT VFR BLD AUTO: 27.2 % (ref 40–54)
HCT VFR BLD AUTO: 29.2 % (ref 40–54)
HCT VFR BLD AUTO: 29.2 % (ref 40–54)
HCT VFR BLD AUTO: 29.8 % (ref 40–54)
HCT VFR BLD AUTO: 29.9 % (ref 40–54)
HCT VFR BLD AUTO: 29.9 % (ref 40–54)
HCT VFR BLD AUTO: 31.3 % (ref 40–54)
HCT VFR BLD AUTO: 31.6 % (ref 40–54)
HCT VFR BLD AUTO: 31.6 % (ref 40–54)
HCT VFR BLD AUTO: 31.7 % (ref 40–54)
HCT VFR BLD AUTO: 31.9 % (ref 40–54)
HCT VFR BLD AUTO: 32 % (ref 40–54)
HCT VFR BLD AUTO: 32.2 % (ref 40–54)
HCT VFR BLD AUTO: 32.5 % (ref 40–54)
HCT VFR BLD AUTO: 32.5 % (ref 40–54)
HCT VFR BLD AUTO: 32.8 % (ref 40–54)
HCT VFR BLD AUTO: 33.1 % (ref 40–54)
HCT VFR BLD AUTO: 33.3 % (ref 40–54)
HCT VFR BLD AUTO: 33.7 % (ref 40–54)
HCT VFR BLD AUTO: 33.7 % (ref 40–54)
HCT VFR BLD AUTO: 34.3 % (ref 40–54)
HCT VFR BLD AUTO: 35.3 % (ref 40–54)
HCT VFR BLD AUTO: 35.4 %
HCT VFR BLD AUTO: 35.5 %
HCT VFR BLD AUTO: 35.8 % (ref 40–54)
HCT VFR BLD AUTO: 36.1 % (ref 40–54)
HCT VFR BLD AUTO: 37.7 %
HCT VFR BLD AUTO: 43.6 % (ref 40–54)
HCT VFR BLD CALC: 35 %PCV (ref 36–54)
HGB BLD-MCNC: 10 G/DL (ref 14–18)
HGB BLD-MCNC: 10.1 G/DL (ref 14–18)
HGB BLD-MCNC: 10.3 G/DL (ref 14–18)
HGB BLD-MCNC: 10.3 G/DL (ref 14–18)
HGB BLD-MCNC: 10.4 G/DL (ref 14–18)
HGB BLD-MCNC: 10.5 G/DL (ref 14–18)
HGB BLD-MCNC: 10.5 G/DL (ref 14–18)
HGB BLD-MCNC: 10.6 G/DL (ref 14–18)
HGB BLD-MCNC: 10.7 G/DL (ref 14–18)
HGB BLD-MCNC: 11 G/DL (ref 14–18)
HGB BLD-MCNC: 11 G/DL (ref 14–18)
HGB BLD-MCNC: 11.2 G/DL
HGB BLD-MCNC: 11.5 G/DL (ref 14–18)
HGB BLD-MCNC: 11.6 G/DL (ref 14–18)
HGB BLD-MCNC: 11.7 G/DL
HGB BLD-MCNC: 12.1 G/DL
HGB BLD-MCNC: 12.1 G/DL (ref 14–18)
HGB BLD-MCNC: 13.8 G/DL (ref 14–18)
HGB BLD-MCNC: 7.6 G/DL (ref 14–18)
HGB BLD-MCNC: 8 G/DL (ref 14–18)
HGB BLD-MCNC: 8 G/DL (ref 14–18)
HGB BLD-MCNC: 8.5 G/DL (ref 14–18)
HGB BLD-MCNC: 9.4 G/DL (ref 14–18)
HGB BLD-MCNC: 9.4 G/DL (ref 14–18)
HGB BLD-MCNC: 9.6 G/DL (ref 14–18)
HGB BLD-MCNC: 9.6 G/DL (ref 14–18)
HGB BLD-MCNC: 9.9 G/DL (ref 14–18)
HGB UR QL STRIP: ABNORMAL
HGB UR QL STRIP: ABNORMAL
HGB UR QL STRIP: NEGATIVE
HGB UR QL STRIP: NEGATIVE
HYALINE CASTS UR QL AUTO: 8 /LPF
HYPOCHROMIA BLD QL SMEAR: ABNORMAL
IMM GRANULOCYTES # BLD AUTO: 0.02 K/UL (ref 0–0.04)
IMM GRANULOCYTES # BLD AUTO: 0.02 K/UL (ref 0–0.04)
IMM GRANULOCYTES # BLD AUTO: 0.03 K/UL (ref 0–0.04)
IMM GRANULOCYTES # BLD AUTO: 0.05 K/UL (ref 0–0.04)
IMM GRANULOCYTES # BLD AUTO: 0.07 K/UL (ref 0–0.04)
IMM GRANULOCYTES # BLD AUTO: 0.12 K/UL (ref 0–0.04)
IMM GRANULOCYTES # BLD AUTO: 0.17 K/UL (ref 0–0.04)
IMM GRANULOCYTES # BLD AUTO: ABNORMAL K/UL
IMM GRANULOCYTES # BLD AUTO: ABNORMAL K/UL (ref 0–0.04)
IMM GRANULOCYTES NFR BLD AUTO: 0.1 % (ref 0–0.5)
IMM GRANULOCYTES NFR BLD AUTO: 0.2 % (ref 0–0.5)
IMM GRANULOCYTES NFR BLD AUTO: 0.4 % (ref 0–0.5)
IMM GRANULOCYTES NFR BLD AUTO: ABNORMAL %
IMM GRANULOCYTES NFR BLD AUTO: ABNORMAL % (ref 0–0.5)
INR PPP: 1
INR PPP: 1 (ref 0.8–1.2)
INR PPP: 1.1 (ref 0.8–1.2)
INTERVENTRICULAR SEPTUM: 0.8 CM (ref 0.6–1.1)
INTERVENTRICULAR SEPTUM: 0.85 CM (ref 0.6–1.1)
IVRT: 0.05 MSEC
IVRT: 0.09 MSEC
KETONES UR QL STRIP: ABNORMAL
KETONES UR QL STRIP: NEGATIVE
KETONES UR QL STRIP: NORMAL
LA MAJOR: 5.5 CM
LA MAJOR: 5.77 CM
LA MINOR: 5.5 CM
LA MINOR: 5.68 CM
LA WIDTH: 4.3 CM
LA WIDTH: 4.47 CM
LACTATE SERPL-SCNC: 1.6 MMOL/L (ref 0.5–2.2)
LACTATE SERPL-SCNC: 1.8 MMOL/L (ref 0.5–2.2)
LACTATE SERPL-SCNC: 1.9 MMOL/L (ref 0.5–2.2)
LACTATE SERPL-SCNC: 2.6 MMOL/L (ref 0.5–2.2)
LDH SERPL L TO P-CCNC: 153 U/L
LDH SERPL L TO P-CCNC: 9.62 MMOL/L (ref 0.36–1.25)
LEFT ATRIUM SIZE: 4.3 CM
LEFT ATRIUM SIZE: 4.42 CM
LEFT ATRIUM VOLUME INDEX: 42.5 ML/M2
LEFT ATRIUM VOLUME INDEX: 46.1 ML/M2
LEFT ATRIUM VOLUME: 86.44 CM3
LEFT ATRIUM VOLUME: 96.14 CM3
LEFT INTERNAL DIMENSION IN SYSTOLE: 4.4 CM (ref 2.1–4)
LEFT INTERNAL DIMENSION IN SYSTOLE: 4.56 CM (ref 2.1–4)
LEFT VENTRICLE DIASTOLIC VOLUME INDEX: 53.05 ML/M2
LEFT VENTRICLE DIASTOLIC VOLUME INDEX: 61.29 ML/M2
LEFT VENTRICLE DIASTOLIC VOLUME: 107.96 ML
LEFT VENTRICLE DIASTOLIC VOLUME: 127.8 ML
LEFT VENTRICLE MASS INDEX: 63.6 G/M2
LEFT VENTRICLE MASS INDEX: 97.9 G/M2
LEFT VENTRICLE SYSTOLIC VOLUME INDEX: 36.1 ML/M2
LEFT VENTRICLE SYSTOLIC VOLUME INDEX: 45.8 ML/M2
LEFT VENTRICLE SYSTOLIC VOLUME: 73.56 ML
LEFT VENTRICLE SYSTOLIC VOLUME: 95.41 ML
LEFT VENTRICULAR INTERNAL DIMENSION IN DIASTOLE: 5.1 CM (ref 3.5–6)
LEFT VENTRICULAR INTERNAL DIMENSION IN DIASTOLE: 5.6 CM (ref 3.5–6)
LEFT VENTRICULAR MASS: 129.43 G
LEFT VENTRICULAR MASS: 204.09 G
LEUKOCYTE ESTERASE UR QL STRIP: NEGATIVE
LEUKOCYTE ESTERASE URINE, POC: NORMAL
LV LATERAL E/E' RATIO: 31.33
LV LATERAL E/E' RATIO: 33
LV SEPTAL E/E' RATIO: 31.33
LV SEPTAL E/E' RATIO: 33
LYMPHOCYTES # BLD AUTO: 22.1 K/UL (ref 1–4.8)
LYMPHOCYTES # BLD AUTO: 22.1 K/UL (ref 1–4.8)
LYMPHOCYTES # BLD AUTO: 28.5 K/UL (ref 1–4.8)
LYMPHOCYTES # BLD AUTO: 28.9 K/UL (ref 1–4.8)
LYMPHOCYTES # BLD AUTO: 32 K/UL (ref 1–4.8)
LYMPHOCYTES # BLD AUTO: 37.5 K/UL (ref 1–4.8)
LYMPHOCYTES # BLD AUTO: 42.8 K/UL (ref 1–4.8)
LYMPHOCYTES # BLD AUTO: 48.1 K/UL (ref 1–4.8)
LYMPHOCYTES # BLD AUTO: 48.1 K/UL (ref 1–4.8)
LYMPHOCYTES # BLD AUTO: ABNORMAL K/UL
LYMPHOCYTES # BLD AUTO: ABNORMAL K/UL
LYMPHOCYTES # BLD AUTO: ABNORMAL K/UL (ref 1–4.8)
LYMPHOCYTES NFR BLD: 81 % (ref 18–48)
LYMPHOCYTES NFR BLD: 86.5 % (ref 18–48)
LYMPHOCYTES NFR BLD: 88 % (ref 18–48)
LYMPHOCYTES NFR BLD: 88.5 % (ref 18–48)
LYMPHOCYTES NFR BLD: 89 % (ref 18–48)
LYMPHOCYTES NFR BLD: 90.5 % (ref 18–48)
LYMPHOCYTES NFR BLD: 90.7 % (ref 18–48)
LYMPHOCYTES NFR BLD: 91 % (ref 18–48)
LYMPHOCYTES NFR BLD: 92 %
LYMPHOCYTES NFR BLD: 92 % (ref 18–48)
LYMPHOCYTES NFR BLD: 93 % (ref 18–48)
LYMPHOCYTES NFR BLD: 93.3 % (ref 18–48)
LYMPHOCYTES NFR BLD: 93.3 % (ref 18–48)
LYMPHOCYTES NFR BLD: 94 % (ref 18–48)
LYMPHOCYTES NFR BLD: 94.2 % (ref 18–48)
LYMPHOCYTES NFR BLD: 94.2 % (ref 18–48)
LYMPHOCYTES NFR BLD: 95 % (ref 18–48)
LYMPHOCYTES NFR BLD: 96 %
LYMPHOCYTES NFR BLD: 96 % (ref 18–48)
LYMPHOCYTES NFR BLD: 96 % (ref 18–48)
LYMPHOCYTES NFR BLD: 97 % (ref 18–48)
LYMPHOCYTES NFR BLD: 98 %
LYMPHOCYTES NFR BLD: 98 % (ref 18–48)
LYMPHOCYTES NFR BLD: 98 % (ref 18–48)
MAGNESIUM SERPL-MCNC: 1.7 MG/DL (ref 1.6–2.6)
MAGNESIUM SERPL-MCNC: 1.7 MG/DL (ref 1.6–2.6)
MAGNESIUM SERPL-MCNC: 1.8 MG/DL (ref 1.6–2.6)
MAGNESIUM SERPL-MCNC: 1.9 MG/DL (ref 1.6–2.6)
MAGNESIUM SERPL-MCNC: 2 MG/DL (ref 1.6–2.6)
MAGNESIUM SERPL-MCNC: 2.1 MG/DL (ref 1.6–2.6)
MAGNESIUM SERPL-MCNC: 2.1 MG/DL (ref 1.6–2.6)
MAGNESIUM SERPL-MCNC: 2.2 MG/DL (ref 1.6–2.6)
MAGNESIUM SERPL-MCNC: 2.3 MG/DL (ref 1.6–2.6)
MAGNESIUM SERPL-MCNC: 2.4 MG/DL (ref 1.6–2.6)
MAGNESIUM SERPL-MCNC: 2.5 MG/DL (ref 1.6–2.6)
MAGNESIUM SERPL-MCNC: 2.6 MG/DL (ref 1.6–2.6)
MAGNESIUM SERPL-MCNC: 2.7 MG/DL (ref 1.6–2.6)
MAGNESIUM SERPL-MCNC: 2.9 MG/DL (ref 1.6–2.6)
MAGNESIUM SERPL-MCNC: 3 MG/DL (ref 1.6–2.6)
MAGNESIUM SERPL-MCNC: 3.6 MG/DL (ref 1.6–2.6)
MAGNESIUM SERPL-MCNC: 7.7 MG/DL (ref 1.6–2.6)
MCH RBC QN AUTO: 31.3 PG
MCH RBC QN AUTO: 31.5 PG (ref 27–31)
MCH RBC QN AUTO: 31.5 PG (ref 27–31)
MCH RBC QN AUTO: 31.6 PG (ref 27–31)
MCH RBC QN AUTO: 31.7 PG (ref 27–31)
MCH RBC QN AUTO: 31.9 PG (ref 27–31)
MCH RBC QN AUTO: 32 PG (ref 27–31)
MCH RBC QN AUTO: 32 PG (ref 27–31)
MCH RBC QN AUTO: 32.1 PG (ref 27–31)
MCH RBC QN AUTO: 32.2 PG
MCH RBC QN AUTO: 32.3 PG
MCH RBC QN AUTO: 32.3 PG (ref 27–31)
MCH RBC QN AUTO: 32.4 PG (ref 27–31)
MCH RBC QN AUTO: 32.5 PG (ref 27–31)
MCH RBC QN AUTO: 32.7 PG (ref 27–31)
MCH RBC QN AUTO: 33.7 PG (ref 27–31)
MCHC RBC AUTO-ENTMCNC: 30.9 G/DL (ref 32–36)
MCHC RBC AUTO-ENTMCNC: 31 G/DL (ref 32–36)
MCHC RBC AUTO-ENTMCNC: 31 G/DL (ref 32–36)
MCHC RBC AUTO-ENTMCNC: 31.2 G/DL (ref 32–36)
MCHC RBC AUTO-ENTMCNC: 31.3 G/DL (ref 32–36)
MCHC RBC AUTO-ENTMCNC: 31.5 G/DL (ref 32–36)
MCHC RBC AUTO-ENTMCNC: 31.5 G/DL (ref 32–36)
MCHC RBC AUTO-ENTMCNC: 31.6 G/DL
MCHC RBC AUTO-ENTMCNC: 31.6 G/DL (ref 32–36)
MCHC RBC AUTO-ENTMCNC: 31.7 G/DL (ref 32–36)
MCHC RBC AUTO-ENTMCNC: 31.7 G/DL (ref 32–36)
MCHC RBC AUTO-ENTMCNC: 31.8 G/DL (ref 32–36)
MCHC RBC AUTO-ENTMCNC: 31.9 G/DL (ref 32–36)
MCHC RBC AUTO-ENTMCNC: 32 G/DL (ref 32–36)
MCHC RBC AUTO-ENTMCNC: 32.1 G/DL
MCHC RBC AUTO-ENTMCNC: 32.1 G/DL (ref 32–36)
MCHC RBC AUTO-ENTMCNC: 32.2 G/DL (ref 32–36)
MCHC RBC AUTO-ENTMCNC: 32.3 G/DL (ref 32–36)
MCHC RBC AUTO-ENTMCNC: 32.6 G/DL (ref 32–36)
MCHC RBC AUTO-ENTMCNC: 32.6 G/DL (ref 32–36)
MCHC RBC AUTO-ENTMCNC: 33 G/DL
MCHC RBC AUTO-ENTMCNC: 33.2 G/DL (ref 32–36)
MCHC RBC AUTO-ENTMCNC: 33.5 G/DL (ref 32–36)
MCHC RBC AUTO-ENTMCNC: 33.6 G/DL (ref 32–36)
MCHC RBC AUTO-ENTMCNC: 33.8 G/DL (ref 32–36)
MCV RBC AUTO: 100 FL
MCV RBC AUTO: 100 FL (ref 82–98)
MCV RBC AUTO: 101 FL (ref 82–98)
MCV RBC AUTO: 101 FL (ref 82–98)
MCV RBC AUTO: 102 FL (ref 82–98)
MCV RBC AUTO: 103 FL (ref 82–98)
MCV RBC AUTO: 95 FL (ref 82–98)
MCV RBC AUTO: 96 FL (ref 82–98)
MCV RBC AUTO: 97 FL (ref 82–98)
MCV RBC AUTO: 98 FL
MCV RBC AUTO: 98 FL (ref 82–98)
MCV RBC AUTO: 99 FL
MCV RBC AUTO: 99 FL (ref 82–98)
MCV RBC AUTO: 99 FL (ref 82–98)
MICROSCOPIC COMMENT: ABNORMAL
MICROSCOPIC COMMENT: ABNORMAL
MICROSCOPIC COMMENT: NORMAL
MIN VOL: 13.3
MIN VOL: 13.4
MODE: ABNORMAL
MONOCYTES # BLD AUTO: 0.2 K/UL (ref 0.3–1)
MONOCYTES # BLD AUTO: 0.6 K/UL (ref 0.3–1)
MONOCYTES # BLD AUTO: 0.8 K/UL (ref 0.3–1)
MONOCYTES # BLD AUTO: 0.8 K/UL (ref 0.3–1)
MONOCYTES # BLD AUTO: 2.6 K/UL (ref 0.3–1)
MONOCYTES # BLD AUTO: ABNORMAL K/UL
MONOCYTES # BLD AUTO: ABNORMAL K/UL
MONOCYTES # BLD AUTO: ABNORMAL K/UL (ref 0.3–1)
MONOCYTES NFR BLD: 0 %
MONOCYTES NFR BLD: 0 % (ref 4–15)
MONOCYTES NFR BLD: 0.4 % (ref 4–15)
MONOCYTES NFR BLD: 0.4 % (ref 4–15)
MONOCYTES NFR BLD: 0.5 % (ref 4–15)
MONOCYTES NFR BLD: 0.7 % (ref 4–15)
MONOCYTES NFR BLD: 0.7 % (ref 4–15)
MONOCYTES NFR BLD: 1 %
MONOCYTES NFR BLD: 1 % (ref 4–15)
MONOCYTES NFR BLD: 1.5 %
MONOCYTES NFR BLD: 1.5 % (ref 4–15)
MONOCYTES NFR BLD: 1.6 % (ref 4–15)
MONOCYTES NFR BLD: 1.7 % (ref 4–15)
MONOCYTES NFR BLD: 2 % (ref 4–15)
MONOCYTES NFR BLD: 2.6 % (ref 4–15)
MONOCYTES NFR BLD: 3 % (ref 4–15)
MONOCYTES NFR BLD: 5.6 % (ref 4–15)
MV PEAK A VEL: 0.23 M/S
MV PEAK A VEL: 0.79 M/S
MV PEAK E VEL: 0.94 M/S
MV PEAK E VEL: 1.32 M/S
NEUTROPHILS # BLD AUTO: 1.4 K/UL (ref 1.8–7.7)
NEUTROPHILS # BLD AUTO: 1.4 K/UL (ref 1.8–7.7)
NEUTROPHILS # BLD AUTO: 2 K/UL (ref 1.8–7.7)
NEUTROPHILS # BLD AUTO: 2.5 K/UL (ref 1.8–7.7)
NEUTROPHILS # BLD AUTO: 2.6 K/UL (ref 1.8–7.7)
NEUTROPHILS # BLD AUTO: 2.6 K/UL (ref 1.8–7.7)
NEUTROPHILS # BLD AUTO: 4.2 K/UL (ref 1.8–7.7)
NEUTROPHILS # BLD AUTO: 4.4 K/UL (ref 1.8–7.7)
NEUTROPHILS # BLD AUTO: 5.9 K/UL (ref 1.8–7.7)
NEUTROPHILS # BLD AUTO: ABNORMAL K/UL
NEUTROPHILS NFR BLD: 10 % (ref 38–73)
NEUTROPHILS NFR BLD: 11 % (ref 38–73)
NEUTROPHILS NFR BLD: 11.3 % (ref 38–73)
NEUTROPHILS NFR BLD: 12 % (ref 38–73)
NEUTROPHILS NFR BLD: 12.7 % (ref 38–73)
NEUTROPHILS NFR BLD: 2 %
NEUTROPHILS NFR BLD: 2 % (ref 38–73)
NEUTROPHILS NFR BLD: 2 % (ref 38–73)
NEUTROPHILS NFR BLD: 3 %
NEUTROPHILS NFR BLD: 3 % (ref 38–73)
NEUTROPHILS NFR BLD: 4 % (ref 38–73)
NEUTROPHILS NFR BLD: 4 % (ref 38–73)
NEUTROPHILS NFR BLD: 4.5 % (ref 38–73)
NEUTROPHILS NFR BLD: 5 % (ref 38–73)
NEUTROPHILS NFR BLD: 5 % (ref 38–73)
NEUTROPHILS NFR BLD: 5.1 % (ref 38–73)
NEUTROPHILS NFR BLD: 5.1 % (ref 38–73)
NEUTROPHILS NFR BLD: 5.5 % (ref 38–73)
NEUTROPHILS NFR BLD: 5.7 % (ref 38–73)
NEUTROPHILS NFR BLD: 5.7 % (ref 38–73)
NEUTROPHILS NFR BLD: 6 % (ref 38–73)
NEUTROPHILS NFR BLD: 6.2 % (ref 38–73)
NEUTROPHILS NFR BLD: 6.5 %
NEUTROPHILS NFR BLD: 6.5 % (ref 38–73)
NEUTROPHILS NFR BLD: 7 % (ref 38–73)
NEUTROPHILS NFR BLD: 7 % (ref 38–73)
NEUTROPHILS NFR BLD: 8 % (ref 38–73)
NEUTROPHILS NFR BLD: 8 % (ref 38–73)
NEUTROPHILS NFR BLD: 9.2 % (ref 38–73)
NEUTROPHILS NFR BLD: 9.5 % (ref 38–73)
NEUTROPHILS NFR BLD: 9.5 % (ref 38–73)
NEUTS BAND NFR BLD MANUAL: 0.5 %
NEUTS BAND NFR BLD MANUAL: 0.5 %
NEUTS BAND NFR BLD MANUAL: 1 %
NEUTS BAND NFR BLD MANUAL: 1 %
NITRITE UR QL STRIP: NEGATIVE
NITRITE, POC UA: NORMAL
NRBC BLD-RTO: 0 /100 WBC
NSE SERPL-MCNC: 37 NG/ML
NSE SERPL-MCNC: 44 NG/ML
NSE SERPL-MCNC: 47 NG/ML
NUC REST DIASTOLIC VOLUME INDEX: 108
NUC REST EJECTION FRACTION: 59
NUC REST SYSTOLIC VOLUME INDEX: 45
NUC STRESS DIASTOLIC VOLUME INDEX: 139
NUC STRESS EJECTION FRACTION: 40 %
NUC STRESS SYSTOLIC VOLUME INDEX: 83
OHS CV CPX 85 PERCENT MAX PREDICTED HEART RATE MALE: 122
OHS CV CPX MAX PREDICTED HEART RATE: 144
OHS CV CPX PATIENT IS FEMALE: 0
OHS CV CPX PATIENT IS MALE: 1
OHS CV CPX PEAK DIASTOLIC BLOOD PRESSURE: 48 MMHG
OHS CV CPX PEAK HEAR RATE: 86
OHS CV CPX PEAK RATE PRESSURE PRODUCT: 9288
OHS CV CPX PEAK SYSTOLIC BLOOD PRESSURE: 108
OHS CV CPX PERCENT MAX PREDICTED HEART RATE ACHIEVED: 60
OHS CV CPX RATE PRESSURE PRODUCT PRESENTING: NORMAL
OSMOLALITY SERPL: 279 MOSM/KG (ref 280–300)
OSMOLALITY UR: 323 MOSM/KG (ref 50–1200)
OVALOCYTES BLD QL SMEAR: ABNORMAL
PATH REV BLD -IMP: NORMAL
PATH REV BLD -IMP: NORMAL
PCO2 BLDA: 24.7 MMHG (ref 35–45)
PCO2 BLDA: 28.6 MMHG (ref 35–45)
PCO2 BLDA: 28.6 MMHG (ref 35–45)
PCO2 BLDA: 29.5 MMHG (ref 35–45)
PCO2 BLDA: 30.4 MMHG (ref 35–45)
PCO2 BLDA: 32.9 MMHG (ref 35–45)
PCO2 BLDA: 36.2 MMHG (ref 35–45)
PCO2 BLDA: 38.4 MMHG (ref 35–45)
PCO2 BLDA: 38.6 MMHG (ref 35–45)
PCO2 BLDA: 38.8 MMHG (ref 35–45)
PCO2 BLDA: 38.9 MMHG (ref 35–45)
PCO2 BLDA: 39.7 MMHG (ref 35–45)
PCO2 BLDA: 42.9 MMHG (ref 35–45)
PCO2 BLDA: 43.8 MMHG (ref 35–45)
PEEP: 5
PH SMN: 7.23 [PH] (ref 7.35–7.45)
PH SMN: 7.26 [PH] (ref 7.35–7.45)
PH SMN: 7.38 [PH] (ref 7.35–7.45)
PH SMN: 7.38 [PH] (ref 7.35–7.45)
PH SMN: 7.41 [PH] (ref 7.35–7.45)
PH SMN: 7.43 [PH] (ref 7.35–7.45)
PH SMN: 7.43 [PH] (ref 7.35–7.45)
PH SMN: 7.45 [PH] (ref 7.35–7.45)
PH SMN: 7.47 [PH] (ref 7.35–7.45)
PH SMN: 7.49 [PH] (ref 7.35–7.45)
PH SMN: 7.5 [PH] (ref 7.35–7.45)
PH SMN: 7.54 [PH] (ref 7.35–7.45)
PH SMN: 7.55 [PH] (ref 7.35–7.45)
PH SMN: 7.56 [PH] (ref 7.35–7.45)
PH UR STRIP: 6 [PH] (ref 5–8)
PH UR STRIP: 7 [PH] (ref 5–8)
PH, POC UA: 6
PHOSPHATE SERPL-MCNC: 1.6 MG/DL (ref 2.7–4.5)
PHOSPHATE SERPL-MCNC: 1.8 MG/DL (ref 2.7–4.5)
PHOSPHATE SERPL-MCNC: 1.8 MG/DL (ref 2.7–4.5)
PHOSPHATE SERPL-MCNC: 2.9 MG/DL (ref 2.7–4.5)
PHOSPHATE SERPL-MCNC: 2.9 MG/DL (ref 2.7–4.5)
PHOSPHATE SERPL-MCNC: 3 MG/DL (ref 2.7–4.5)
PHOSPHATE SERPL-MCNC: 3.1 MG/DL (ref 2.7–4.5)
PHOSPHATE SERPL-MCNC: 3.2 MG/DL (ref 2.7–4.5)
PHOSPHATE SERPL-MCNC: 3.2 MG/DL (ref 2.7–4.5)
PHOSPHATE SERPL-MCNC: 3.3 MG/DL (ref 2.7–4.5)
PHOSPHATE SERPL-MCNC: 3.4 MG/DL (ref 2.7–4.5)
PHOSPHATE SERPL-MCNC: 3.5 MG/DL (ref 2.7–4.5)
PHOSPHATE SERPL-MCNC: 3.6 MG/DL (ref 2.7–4.5)
PHOSPHATE SERPL-MCNC: 3.7 MG/DL (ref 2.7–4.5)
PHOSPHATE SERPL-MCNC: 3.8 MG/DL (ref 2.7–4.5)
PHOSPHATE SERPL-MCNC: 3.8 MG/DL (ref 2.7–4.5)
PHOSPHATE SERPL-MCNC: 3.9 MG/DL (ref 2.7–4.5)
PHOSPHATE SERPL-MCNC: 4.1 MG/DL (ref 2.7–4.5)
PHOSPHATE SERPL-MCNC: 4.3 MG/DL (ref 2.7–4.5)
PHOSPHATE SERPL-MCNC: 4.4 MG/DL (ref 2.7–4.5)
PHOSPHATE SERPL-MCNC: 4.6 MG/DL (ref 2.7–4.5)
PHOSPHATE SERPL-MCNC: 5.1 MG/DL (ref 2.7–4.5)
PIP: 24
PIP: 24
PIP: 26
PIP: 27
PISA TR MAX VEL: 1.75 M/S
PISA TR MAX VEL: 3.13 M/S
PLATELET # BLD AUTO: 101 K/UL (ref 150–350)
PLATELET # BLD AUTO: 101 K/UL (ref 150–350)
PLATELET # BLD AUTO: 104 K/UL (ref 150–350)
PLATELET # BLD AUTO: 106 K/UL (ref 150–350)
PLATELET # BLD AUTO: 106 K/UL (ref 150–350)
PLATELET # BLD AUTO: 107 K/UL (ref 150–350)
PLATELET # BLD AUTO: 112 K/UL (ref 150–350)
PLATELET # BLD AUTO: 114 K/UL (ref 150–350)
PLATELET # BLD AUTO: 125 K/UL (ref 150–350)
PLATELET # BLD AUTO: 138 K/UL (ref 150–350)
PLATELET # BLD AUTO: 144 K/UL (ref 150–350)
PLATELET # BLD AUTO: 146 K/UL (ref 150–350)
PLATELET # BLD AUTO: 148 K/UL (ref 150–350)
PLATELET # BLD AUTO: 148 K/UL (ref 150–350)
PLATELET # BLD AUTO: 154 K/UL (ref 150–350)
PLATELET # BLD AUTO: 155 K/UL
PLATELET # BLD AUTO: 158 K/UL (ref 150–350)
PLATELET # BLD AUTO: 158 K/UL (ref 150–350)
PLATELET # BLD AUTO: 161 K/UL
PLATELET # BLD AUTO: 162 K/UL (ref 150–350)
PLATELET # BLD AUTO: 166 K/UL
PLATELET # BLD AUTO: 172 K/UL (ref 150–350)
PLATELET # BLD AUTO: 176 K/UL (ref 150–350)
PLATELET # BLD AUTO: 193 K/UL (ref 150–350)
PLATELET # BLD AUTO: 64 K/UL (ref 150–350)
PLATELET # BLD AUTO: 64 K/UL (ref 150–350)
PLATELET # BLD AUTO: 67 K/UL (ref 150–350)
PLATELET # BLD AUTO: 78 K/UL (ref 150–350)
PLATELET # BLD AUTO: 83 K/UL (ref 150–350)
PLATELET # BLD AUTO: 83 K/UL (ref 150–350)
PLATELET # BLD AUTO: 89 K/UL (ref 150–350)
PLATELET BLD QL SMEAR: ABNORMAL
PLATELET RESPONSE PLAVIX: 103 PRU (ref 194–418)
PMV BLD AUTO: 10.1 FL (ref 9.2–12.9)
PMV BLD AUTO: 10.2 FL (ref 9.2–12.9)
PMV BLD AUTO: 10.3 FL (ref 9.2–12.9)
PMV BLD AUTO: 9.2 FL (ref 9.2–12.9)
PMV BLD AUTO: 9.2 FL (ref 9.2–12.9)
PMV BLD AUTO: 9.3 FL
PMV BLD AUTO: 9.4 FL (ref 9.2–12.9)
PMV BLD AUTO: 9.4 FL (ref 9.2–12.9)
PMV BLD AUTO: 9.5 FL
PMV BLD AUTO: 9.5 FL (ref 9.2–12.9)
PMV BLD AUTO: 9.6 FL
PMV BLD AUTO: 9.7 FL (ref 9.2–12.9)
PMV BLD AUTO: 9.9 FL (ref 9.2–12.9)
PO2 BLDA: 178 MMHG (ref 80–100)
PO2 BLDA: 230 MMHG (ref 80–100)
PO2 BLDA: 253 MMHG (ref 80–100)
PO2 BLDA: 267 MMHG (ref 80–100)
PO2 BLDA: 30 MMHG (ref 40–60)
PO2 BLDA: 30 MMHG (ref 80–100)
PO2 BLDA: 31 MMHG (ref 40–60)
PO2 BLDA: 34 MMHG (ref 40–60)
PO2 BLDA: 35 MMHG (ref 40–60)
PO2 BLDA: 36 MMHG (ref 40–60)
PO2 BLDA: 36 MMHG (ref 40–60)
PO2 BLDA: 39 MMHG (ref 40–60)
PO2 BLDA: 42 MMHG (ref 40–60)
PO2 BLDA: 83 MMHG (ref 80–100)
POC ACTIVATED CLOTTING TIME K: 246 SEC (ref 74–137)
POC ACTIVATED CLOTTING TIME K: 252 SEC (ref 74–137)
POC ACTIVATED CLOTTING TIME K: 324 SEC (ref 74–137)
POC BE: -1 MMOL/L
POC BE: -11 MMOL/L
POC BE: -3 MMOL/L
POC BE: -4 MMOL/L
POC BE: -5 MMOL/L
POC BE: -8 MMOL/L
POC BE: 0 MMOL/L
POC BE: 0 MMOL/L
POC BE: 1 MMOL/L
POC BE: 2 MMOL/L
POC BE: 3 MMOL/L
POC BE: 5 MMOL/L
POC BE: 6 MMOL/L
POC BE: 6 MMOL/L
POC IONIZED CALCIUM: 1.05 MMOL/L (ref 1.06–1.42)
POC SATURATED O2: 100 % (ref 95–100)
POC SATURATED O2: 57 % (ref 95–100)
POC SATURATED O2: 65 % (ref 95–100)
POC SATURATED O2: 66 % (ref 95–100)
POC SATURATED O2: 67 % (ref 95–100)
POC SATURATED O2: 68 % (ref 95–100)
POC SATURATED O2: 70 % (ref 95–100)
POC SATURATED O2: 71 % (ref 95–100)
POC SATURATED O2: 73 % (ref 95–100)
POC SATURATED O2: 75 % (ref 95–100)
POC SATURATED O2: 94 % (ref 95–100)
POC TCO2: 17 MMOL/L (ref 23–27)
POC TCO2: 20 MMOL/L (ref 23–27)
POC TCO2: 21 MMOL/L (ref 24–29)
POC TCO2: 23 MMOL/L (ref 23–27)
POC TCO2: 23 MMOL/L (ref 24–29)
POC TCO2: 23 MMOL/L (ref 24–29)
POC TCO2: 24 MMOL/L (ref 23–27)
POC TCO2: 24 MMOL/L (ref 24–29)
POC TCO2: 26 MMOL/L (ref 23–27)
POC TCO2: 26 MMOL/L (ref 24–29)
POC TCO2: 27 MMOL/L (ref 24–29)
POC TCO2: 30 MMOL/L (ref 23–27)
POC TCO2: 30 MMOL/L (ref 24–29)
POC TCO2: 31 MMOL/L (ref 24–29)
POCT GLUCOSE: 113 MG/DL (ref 70–110)
POCT GLUCOSE: 115 MG/DL (ref 70–110)
POCT GLUCOSE: 116 MG/DL (ref 70–110)
POCT GLUCOSE: 117 MG/DL (ref 70–110)
POCT GLUCOSE: 123 MG/DL (ref 70–110)
POCT GLUCOSE: 124 MG/DL (ref 70–110)
POCT GLUCOSE: 129 MG/DL (ref 70–110)
POCT GLUCOSE: 136 MG/DL (ref 70–110)
POCT GLUCOSE: 138 MG/DL (ref 70–110)
POCT GLUCOSE: 139 MG/DL (ref 70–110)
POCT GLUCOSE: 141 MG/DL (ref 70–110)
POCT GLUCOSE: 141 MG/DL (ref 70–110)
POCT GLUCOSE: 143 MG/DL (ref 70–110)
POCT GLUCOSE: 144 MG/DL (ref 70–110)
POCT GLUCOSE: 144 MG/DL (ref 70–110)
POCT GLUCOSE: 145 MG/DL (ref 70–110)
POCT GLUCOSE: 146 MG/DL (ref 70–110)
POCT GLUCOSE: 148 MG/DL (ref 70–110)
POCT GLUCOSE: 148 MG/DL (ref 70–110)
POCT GLUCOSE: 150 MG/DL (ref 70–110)
POCT GLUCOSE: 154 MG/DL (ref 70–110)
POCT GLUCOSE: 155 MG/DL (ref 70–110)
POCT GLUCOSE: 155 MG/DL (ref 70–110)
POCT GLUCOSE: 156 MG/DL (ref 70–110)
POCT GLUCOSE: 157 MG/DL (ref 70–110)
POCT GLUCOSE: 157 MG/DL (ref 70–110)
POCT GLUCOSE: 158 MG/DL (ref 70–110)
POCT GLUCOSE: 159 MG/DL (ref 70–110)
POCT GLUCOSE: 160 MG/DL (ref 70–110)
POCT GLUCOSE: 161 MG/DL (ref 70–110)
POCT GLUCOSE: 162 MG/DL (ref 70–110)
POCT GLUCOSE: 165 MG/DL (ref 70–110)
POCT GLUCOSE: 166 MG/DL (ref 70–110)
POCT GLUCOSE: 167 MG/DL (ref 70–110)
POCT GLUCOSE: 171 MG/DL (ref 70–110)
POCT GLUCOSE: 173 MG/DL (ref 70–110)
POCT GLUCOSE: 173 MG/DL (ref 70–110)
POCT GLUCOSE: 174 MG/DL (ref 70–110)
POCT GLUCOSE: 174 MG/DL (ref 70–110)
POCT GLUCOSE: 175 MG/DL (ref 70–110)
POCT GLUCOSE: 176 MG/DL (ref 70–110)
POCT GLUCOSE: 176 MG/DL (ref 70–110)
POCT GLUCOSE: 177 MG/DL (ref 70–110)
POCT GLUCOSE: 179 MG/DL (ref 70–110)
POCT GLUCOSE: 179 MG/DL (ref 70–110)
POCT GLUCOSE: 180 MG/DL (ref 70–110)
POCT GLUCOSE: 181 MG/DL (ref 70–110)
POCT GLUCOSE: 182 MG/DL (ref 70–110)
POCT GLUCOSE: 183 MG/DL (ref 70–110)
POCT GLUCOSE: 184 MG/DL (ref 70–110)
POCT GLUCOSE: 184 MG/DL (ref 70–110)
POCT GLUCOSE: 185 MG/DL (ref 70–110)
POCT GLUCOSE: 186 MG/DL (ref 70–110)
POCT GLUCOSE: 187 MG/DL (ref 70–110)
POCT GLUCOSE: 188 MG/DL (ref 70–110)
POCT GLUCOSE: 190 MG/DL (ref 70–110)
POCT GLUCOSE: 191 MG/DL (ref 70–110)
POCT GLUCOSE: 192 MG/DL (ref 70–110)
POCT GLUCOSE: 192 MG/DL (ref 70–110)
POCT GLUCOSE: 193 MG/DL (ref 70–110)
POCT GLUCOSE: 194 MG/DL (ref 70–110)
POCT GLUCOSE: 195 MG/DL (ref 70–110)
POCT GLUCOSE: 196 MG/DL (ref 70–110)
POCT GLUCOSE: 196 MG/DL (ref 70–110)
POCT GLUCOSE: 197 MG/DL (ref 70–110)
POCT GLUCOSE: 198 MG/DL (ref 70–110)
POCT GLUCOSE: 201 MG/DL (ref 70–110)
POCT GLUCOSE: 202 MG/DL (ref 70–110)
POCT GLUCOSE: 203 MG/DL (ref 70–110)
POCT GLUCOSE: 203 MG/DL (ref 70–110)
POCT GLUCOSE: 206 MG/DL (ref 70–110)
POCT GLUCOSE: 207 MG/DL (ref 70–110)
POCT GLUCOSE: 208 MG/DL (ref 70–110)
POCT GLUCOSE: 208 MG/DL (ref 70–110)
POCT GLUCOSE: 209 MG/DL (ref 70–110)
POCT GLUCOSE: 209 MG/DL (ref 70–110)
POCT GLUCOSE: 210 MG/DL (ref 70–110)
POCT GLUCOSE: 212 MG/DL (ref 70–110)
POCT GLUCOSE: 213 MG/DL (ref 70–110)
POCT GLUCOSE: 215 MG/DL (ref 70–110)
POCT GLUCOSE: 219 MG/DL (ref 70–110)
POCT GLUCOSE: 222 MG/DL (ref 70–110)
POCT GLUCOSE: 226 MG/DL (ref 70–110)
POCT GLUCOSE: 227 MG/DL (ref 70–110)
POCT GLUCOSE: 228 MG/DL (ref 70–110)
POCT GLUCOSE: 230 MG/DL (ref 70–110)
POCT GLUCOSE: 239 MG/DL (ref 70–110)
POCT GLUCOSE: 240 MG/DL (ref 70–110)
POCT GLUCOSE: 241 MG/DL (ref 70–110)
POCT GLUCOSE: 242 MG/DL (ref 70–110)
POCT GLUCOSE: 246 MG/DL (ref 70–110)
POCT GLUCOSE: 250 MG/DL (ref 70–110)
POCT GLUCOSE: 251 MG/DL (ref 70–110)
POCT GLUCOSE: 251 MG/DL (ref 70–110)
POCT GLUCOSE: 252 MG/DL (ref 70–110)
POCT GLUCOSE: 255 MG/DL (ref 70–110)
POCT GLUCOSE: 256 MG/DL (ref 70–110)
POCT GLUCOSE: 256 MG/DL (ref 70–110)
POCT GLUCOSE: 258 MG/DL (ref 70–110)
POCT GLUCOSE: 259 MG/DL (ref 70–110)
POCT GLUCOSE: 264 MG/DL (ref 70–110)
POCT GLUCOSE: 278 MG/DL (ref 70–110)
POCT GLUCOSE: 286 MG/DL (ref 70–110)
POCT GLUCOSE: 291 MG/DL (ref 70–110)
POCT GLUCOSE: 293 MG/DL (ref 70–110)
POCT GLUCOSE: 303 MG/DL (ref 70–110)
POCT GLUCOSE: 303 MG/DL (ref 70–110)
POCT GLUCOSE: 305 MG/DL (ref 70–110)
POCT GLUCOSE: 308 MG/DL (ref 70–110)
POCT GLUCOSE: 314 MG/DL (ref 70–110)
POCT GLUCOSE: 331 MG/DL (ref 70–110)
POCT GLUCOSE: 336 MG/DL (ref 70–110)
POCT GLUCOSE: 338 MG/DL (ref 70–110)
POCT GLUCOSE: 365 MG/DL (ref 70–110)
POCT GLUCOSE: 367 MG/DL (ref 70–110)
POCT GLUCOSE: 368 MG/DL (ref 70–110)
POCT GLUCOSE: 394 MG/DL (ref 70–110)
POCT GLUCOSE: 394 MG/DL (ref 70–110)
POCT GLUCOSE: 397 MG/DL (ref 70–110)
POCT GLUCOSE: 402 MG/DL (ref 70–110)
POCT GLUCOSE: 421 MG/DL (ref 70–110)
POCT GLUCOSE: 425 MG/DL (ref 70–110)
POCT GLUCOSE: 430 MG/DL (ref 70–110)
POCT GLUCOSE: 436 MG/DL (ref 70–110)
POCT GLUCOSE: 437 MG/DL (ref 70–110)
POCT GLUCOSE: 441 MG/DL (ref 70–110)
POCT GLUCOSE: 50 MG/DL (ref 70–110)
POCT GLUCOSE: 73 MG/DL (ref 70–110)
POCT GLUCOSE: 83 MG/DL (ref 70–110)
POCT GLUCOSE: <20 MG/DL (ref 70–110)
POIKILOCYTOSIS BLD QL SMEAR: SLIGHT
POLYCHROMASIA BLD QL SMEAR: ABNORMAL
POTASSIUM BLD-SCNC: 4.1 MMOL/L (ref 3.5–5.1)
POTASSIUM SERPL-SCNC: 3.1 MMOL/L (ref 3.5–5.1)
POTASSIUM SERPL-SCNC: 3.4 MMOL/L (ref 3.5–5.1)
POTASSIUM SERPL-SCNC: 3.5 MMOL/L (ref 3.5–5.1)
POTASSIUM SERPL-SCNC: 3.6 MMOL/L (ref 3.5–5.1)
POTASSIUM SERPL-SCNC: 3.7 MMOL/L (ref 3.5–5.1)
POTASSIUM SERPL-SCNC: 3.7 MMOL/L (ref 3.5–5.1)
POTASSIUM SERPL-SCNC: 3.8 MMOL/L (ref 3.5–5.1)
POTASSIUM SERPL-SCNC: 3.8 MMOL/L (ref 3.5–5.1)
POTASSIUM SERPL-SCNC: 3.9 MMOL/L (ref 3.5–5.1)
POTASSIUM SERPL-SCNC: 4 MMOL/L (ref 3.5–5.1)
POTASSIUM SERPL-SCNC: 4.1 MMOL/L (ref 3.5–5.1)
POTASSIUM SERPL-SCNC: 4.2 MMOL/L (ref 3.5–5.1)
POTASSIUM SERPL-SCNC: 4.3 MMOL/L (ref 3.5–5.1)
POTASSIUM SERPL-SCNC: 4.4 MMOL/L
POTASSIUM SERPL-SCNC: 4.4 MMOL/L (ref 3.5–5.1)
POTASSIUM SERPL-SCNC: 4.5 MMOL/L (ref 3.5–5.1)
POTASSIUM SERPL-SCNC: 4.6 MMOL/L (ref 3.5–5.1)
POTASSIUM SERPL-SCNC: 4.6 MMOL/L (ref 3.5–5.1)
POTASSIUM SERPL-SCNC: 4.7 MMOL/L (ref 3.5–5.1)
POTASSIUM SERPL-SCNC: 4.8 MMOL/L (ref 3.5–5.1)
POTASSIUM SERPL-SCNC: 4.9 MMOL/L (ref 3.5–5.1)
POTASSIUM SERPL-SCNC: 4.9 MMOL/L (ref 3.5–5.1)
POTASSIUM SERPL-SCNC: 5.3 MMOL/L
POTASSIUM SERPL-SCNC: 5.4 MMOL/L (ref 3.5–5.1)
POTASSIUM SERPL-SCNC: 5.5 MMOL/L (ref 3.5–5.1)
POTASSIUM SERPL-SCNC: 5.6 MMOL/L
PROCALCITONIN SERPL IA-MCNC: 1.21 NG/ML
PROT SERPL-MCNC: 4.8 G/DL (ref 6–8.4)
PROT SERPL-MCNC: 4.9 G/DL (ref 6–8.4)
PROT SERPL-MCNC: 5.2 G/DL (ref 6–8.4)
PROT SERPL-MCNC: 5.3 G/DL (ref 6–8.4)
PROT SERPL-MCNC: 5.5 G/DL (ref 6–8.4)
PROT SERPL-MCNC: 5.6 G/DL (ref 6–8.4)
PROT SERPL-MCNC: 5.7 G/DL (ref 6–8.4)
PROT SERPL-MCNC: 5.7 G/DL (ref 6–8.4)
PROT SERPL-MCNC: 5.8 G/DL (ref 6–8.4)
PROT SERPL-MCNC: 5.9 G/DL (ref 6–8.4)
PROT SERPL-MCNC: 5.9 G/DL (ref 6–8.4)
PROT SERPL-MCNC: 6 G/DL (ref 6–8.4)
PROT SERPL-MCNC: 6.1 G/DL (ref 6–8.4)
PROT SERPL-MCNC: 6.1 G/DL (ref 6–8.4)
PROT SERPL-MCNC: 6.2 G/DL (ref 6–8.4)
PROT SERPL-MCNC: 6.2 G/DL (ref 6–8.4)
PROT SERPL-MCNC: 6.3 G/DL (ref 6–8.4)
PROT SERPL-MCNC: 6.4 G/DL (ref 6–8.4)
PROT SERPL-MCNC: 6.6 G/DL (ref 6–8.4)
PROT SERPL-MCNC: 7.1 G/DL
PROT UR QL STRIP: ABNORMAL
PROT UR QL STRIP: NEGATIVE
PROTEIN, POC: NORMAL
PROTHROMBIN TIME: 10.1 SEC (ref 9–12.5)
PROTHROMBIN TIME: 10.2 SEC
PROTHROMBIN TIME: 10.2 SEC (ref 9–12.5)
PROTHROMBIN TIME: 10.5 SEC (ref 9–12.5)
PROTHROMBIN TIME: 11 SEC (ref 9–12.5)
PROTHROMBIN TIME: 11 SEC (ref 9–12.5)
PROTHROMBIN TIME: 11.2 SEC (ref 9–12.5)
PROTHROMBIN TIME: 11.3 SEC (ref 9–12.5)
PROTHROMBIN TIME: 11.5 SEC (ref 9–12.5)
PULM VEIN S/D RATIO: 0.64
PV PEAK D VEL: 0.58 M/S
PV PEAK S VEL: 0.37 M/S
RA MAJOR: 4.74 CM
RA MAJOR: 5.23 CM
RA PRESSURE: 8 MMHG
RA WIDTH: 3.29 CM
RA WIDTH: 3.94 CM
RBC # BLD AUTO: 2.38 M/UL (ref 4.6–6.2)
RBC # BLD AUTO: 2.51 M/UL (ref 4.6–6.2)
RBC # BLD AUTO: 2.51 M/UL (ref 4.6–6.2)
RBC # BLD AUTO: 2.66 M/UL (ref 4.6–6.2)
RBC # BLD AUTO: 2.97 M/UL (ref 4.6–6.2)
RBC # BLD AUTO: 2.97 M/UL (ref 4.6–6.2)
RBC # BLD AUTO: 3.05 M/UL (ref 4.6–6.2)
RBC # BLD AUTO: 3.05 M/UL (ref 4.6–6.2)
RBC # BLD AUTO: 3.09 M/UL (ref 4.6–6.2)
RBC # BLD AUTO: 3.09 M/UL (ref 4.6–6.2)
RBC # BLD AUTO: 3.1 M/UL (ref 4.6–6.2)
RBC # BLD AUTO: 3.16 M/UL (ref 4.6–6.2)
RBC # BLD AUTO: 3.19 M/UL (ref 4.6–6.2)
RBC # BLD AUTO: 3.2 M/UL (ref 4.6–6.2)
RBC # BLD AUTO: 3.21 M/UL (ref 4.6–6.2)
RBC # BLD AUTO: 3.25 M/UL (ref 4.6–6.2)
RBC # BLD AUTO: 3.26 M/UL (ref 4.6–6.2)
RBC # BLD AUTO: 3.27 M/UL (ref 4.6–6.2)
RBC # BLD AUTO: 3.27 M/UL (ref 4.6–6.2)
RBC # BLD AUTO: 3.28 M/UL (ref 4.6–6.2)
RBC # BLD AUTO: 3.35 M/UL (ref 4.6–6.2)
RBC # BLD AUTO: 3.39 M/UL (ref 4.6–6.2)
RBC # BLD AUTO: 3.58 M/UL
RBC # BLD AUTO: 3.61 M/UL (ref 4.6–6.2)
RBC # BLD AUTO: 3.61 M/UL (ref 4.6–6.2)
RBC # BLD AUTO: 3.62 M/UL
RBC # BLD AUTO: 3.76 M/UL
RBC # BLD AUTO: 3.77 M/UL (ref 4.6–6.2)
RBC # BLD AUTO: 4.24 M/UL (ref 4.6–6.2)
RBC #/AREA URNS AUTO: 0 /HPF (ref 0–4)
RBC #/AREA URNS AUTO: 35 /HPF (ref 0–4)
RBC #/AREA URNS AUTO: 8 /HPF (ref 0–4)
RIGHT VENTRICULAR END-DIASTOLIC DIMENSION: 3.51 CM
RIGHT VENTRICULAR END-DIASTOLIC DIMENSION: 4.28 CM
RV TISSUE DOPPLER FREE WALL SYSTOLIC VELOCITY 1 (APICAL 4 CHAMBER VIEW): 4.1 M/S
RV TISSUE DOPPLER FREE WALL SYSTOLIC VELOCITY 1 (APICAL 4 CHAMBER VIEW): 7.65 M/S
SAMPLE: ABNORMAL
SINUS: 3.16 CM
SINUS: 3.59 CM
SITE: ABNORMAL
SMUDGE CELLS BLD QL SMEAR: PRESENT
SODIUM BLD-SCNC: 135 MMOL/L (ref 136–145)
SODIUM SERPL-SCNC: 126 MMOL/L (ref 136–145)
SODIUM SERPL-SCNC: 126 MMOL/L (ref 136–145)
SODIUM SERPL-SCNC: 127 MMOL/L (ref 136–145)
SODIUM SERPL-SCNC: 127 MMOL/L (ref 136–145)
SODIUM SERPL-SCNC: 130 MMOL/L (ref 136–145)
SODIUM SERPL-SCNC: 131 MMOL/L
SODIUM SERPL-SCNC: 131 MMOL/L (ref 136–145)
SODIUM SERPL-SCNC: 132 MMOL/L
SODIUM SERPL-SCNC: 132 MMOL/L
SODIUM SERPL-SCNC: 132 MMOL/L (ref 136–145)
SODIUM SERPL-SCNC: 133 MMOL/L (ref 136–145)
SODIUM SERPL-SCNC: 134 MMOL/L (ref 136–145)
SODIUM SERPL-SCNC: 135 MMOL/L (ref 136–145)
SODIUM SERPL-SCNC: 136 MMOL/L (ref 136–145)
SODIUM SERPL-SCNC: 137 MMOL/L (ref 136–145)
SODIUM SERPL-SCNC: 137 MMOL/L (ref 136–145)
SODIUM SERPL-SCNC: 138 MMOL/L (ref 136–145)
SODIUM SERPL-SCNC: 138 MMOL/L (ref 136–145)
SODIUM SERPL-SCNC: 139 MMOL/L (ref 136–145)
SODIUM SERPL-SCNC: 139 MMOL/L (ref 136–145)
SODIUM UR-SCNC: 75 MMOL/L (ref 20–250)
SP GR UR STRIP: 1.01 (ref 1–1.03)
SP GR UR STRIP: 1.02 (ref 1–1.03)
SP02: 100
SPECIFIC GRAVITY, POC UA: 10.1
SQUAMOUS #/AREA URNS AUTO: 0 /HPF
STJ: 2.68 CM
STJ: 3.12 CM
STRESS ST DEPRESSION: 1.5 MM
SYSTOLIC BLOOD PRESSURE: 139
TDI LATERAL: 0.03
TDI LATERAL: 0.04
TDI SEPTAL: 0.03
TDI SEPTAL: 0.04
TDI: 0.03
TDI: 0.04
TOXIC GRANULES BLD QL SMEAR: PRESENT
TR MAX PG: 12.25 MMHG
TR MAX PG: 39.19 MMHG
TRICUSPID ANNULAR PLANE SYSTOLIC EXCURSION: 1.23 CM
TRICUSPID ANNULAR PLANE SYSTOLIC EXCURSION: 1.54 CM
TROPONIN I SERPL DL<=0.01 NG/ML-MCNC: 0.65 NG/ML (ref 0–0.03)
TROPONIN I SERPL DL<=0.01 NG/ML-MCNC: 0.85 NG/ML (ref 0–0.03)
TROPONIN I SERPL DL<=0.01 NG/ML-MCNC: 0.9 NG/ML (ref 0–0.03)
TROPONIN I SERPL DL<=0.01 NG/ML-MCNC: 1.17 NG/ML (ref 0–0.03)
TROPONIN I SERPL DL<=0.01 NG/ML-MCNC: 13.87 NG/ML (ref 0–0.03)
TROPONIN I SERPL DL<=0.01 NG/ML-MCNC: 14.42 NG/ML (ref 0–0.03)
TROPONIN I SERPL DL<=0.01 NG/ML-MCNC: 3.52 NG/ML (ref 0–0.03)
TROPONIN I SERPL DL<=0.01 NG/ML-MCNC: 3.98 NG/ML (ref 0–0.03)
TROPONIN I SERPL DL<=0.01 NG/ML-MCNC: 4.14 NG/ML (ref 0–0.03)
TROPONIN I SERPL DL<=0.01 NG/ML-MCNC: 4.31 NG/ML (ref 0–0.03)
TROPONIN I SERPL DL<=0.01 NG/ML-MCNC: 5.06 NG/ML (ref 0–0.03)
TROPONIN I SERPL DL<=0.01 NG/ML-MCNC: 5.31 NG/ML (ref 0–0.03)
TROPONIN I SERPL DL<=0.01 NG/ML-MCNC: 6.1 NG/ML (ref 0–0.03)
TROPONIN I SERPL DL<=0.01 NG/ML-MCNC: 7.18 NG/ML (ref 0–0.03)
TV REST PULMONARY ARTERY PRESSURE: 47 MMHG
URN SPEC COLLECT METH UR: ABNORMAL
URN SPEC COLLECT METH UR: NORMAL
UROBILINOGEN, POC UA: NORMAL
VANCOMYCIN TROUGH SERPL-MCNC: 11.4 UG/ML (ref 10–22)
VANCOMYCIN TROUGH SERPL-MCNC: 13.9 UG/ML (ref 10–22)
VANCOMYCIN TROUGH SERPL-MCNC: 17.5 UG/ML (ref 10–22)
VT: 450
VT: 500
WBC # BLD AUTO: 23.65 K/UL (ref 3.9–12.7)
WBC # BLD AUTO: 23.65 K/UL (ref 3.9–12.7)
WBC # BLD AUTO: 25.12 K/UL (ref 3.9–12.7)
WBC # BLD AUTO: 29.09 K/UL (ref 3.9–12.7)
WBC # BLD AUTO: 29.29 K/UL (ref 3.9–12.7)
WBC # BLD AUTO: 30.2 K/UL (ref 3.9–12.7)
WBC # BLD AUTO: 30.99 K/UL (ref 3.9–12.7)
WBC # BLD AUTO: 31.46 K/UL (ref 3.9–12.7)
WBC # BLD AUTO: 31.91 K/UL (ref 3.9–12.7)
WBC # BLD AUTO: 37.06 K/UL (ref 3.9–12.7)
WBC # BLD AUTO: 37.63 K/UL (ref 3.9–12.7)
WBC # BLD AUTO: 40.5 K/UL (ref 3.9–12.7)
WBC # BLD AUTO: 40.93 K/UL (ref 3.9–12.7)
WBC # BLD AUTO: 40.93 K/UL (ref 3.9–12.7)
WBC # BLD AUTO: 45.35 K/UL
WBC # BLD AUTO: 46.28 K/UL (ref 3.9–12.7)
WBC # BLD AUTO: 46.82 K/UL (ref 3.9–12.7)
WBC # BLD AUTO: 48.3 K/UL (ref 3.9–12.7)
WBC # BLD AUTO: 48.61 K/UL (ref 3.9–12.7)
WBC # BLD AUTO: 48.61 K/UL (ref 3.9–12.7)
WBC # BLD AUTO: 50.43 K/UL (ref 3.9–12.7)
WBC # BLD AUTO: 50.54 K/UL (ref 3.9–12.7)
WBC # BLD AUTO: 51.07 K/UL (ref 3.9–12.7)
WBC # BLD AUTO: 51.07 K/UL (ref 3.9–12.7)
WBC # BLD AUTO: 51.62 K/UL (ref 3.9–12.7)
WBC # BLD AUTO: 51.72 K/UL (ref 3.9–12.7)
WBC # BLD AUTO: 53.46 K/UL
WBC # BLD AUTO: 53.96 K/UL (ref 3.9–12.7)
WBC # BLD AUTO: 53.96 K/UL (ref 3.9–12.7)
WBC # BLD AUTO: 54.66 K/UL
WBC # BLD AUTO: 56.5 K/UL (ref 3.9–12.7)
WBC #/AREA URNS AUTO: 0 /HPF (ref 0–5)
WBC #/AREA URNS AUTO: 3 /HPF (ref 0–5)
WBC #/AREA URNS AUTO: 3 /HPF (ref 0–5)
YEAST UR QL AUTO: ABNORMAL

## 2019-01-01 PROCEDURE — 27200966 HC CLOSED SUCTION SYSTEM: Mod: HCNC

## 2019-01-01 PROCEDURE — G0439 PPPS, SUBSEQ VISIT: HCPCS | Mod: HCNC,S$GLB,, | Performed by: NURSE PRACTITIONER

## 2019-01-01 PROCEDURE — 99233 SBSQ HOSP IP/OBS HIGH 50: CPT | Mod: HCNC,GC,, | Performed by: INTERNAL MEDICINE

## 2019-01-01 PROCEDURE — 84100 ASSAY OF PHOSPHORUS: CPT | Mod: HCNC

## 2019-01-01 PROCEDURE — G0108 PR DIAB MANAGE TRN  PER INDIV: ICD-10-PCS | Mod: HCNC,S$GLB,, | Performed by: DIETITIAN, REGISTERED

## 2019-01-01 PROCEDURE — 25000003 PHARM REV CODE 250: Mod: HCNC | Performed by: STUDENT IN AN ORGANIZED HEALTH CARE EDUCATION/TRAINING PROGRAM

## 2019-01-01 PROCEDURE — 63600175 PHARM REV CODE 636 W HCPCS: Mod: HCNC | Performed by: INTERNAL MEDICINE

## 2019-01-01 PROCEDURE — 97116 GAIT TRAINING THERAPY: CPT | Mod: HCNC

## 2019-01-01 PROCEDURE — 99232 SBSQ HOSP IP/OBS MODERATE 35: CPT | Mod: HCNC,,, | Performed by: NURSE PRACTITIONER

## 2019-01-01 PROCEDURE — 73565 X-RAY EXAM OF KNEES: CPT | Mod: 26,HCNC,, | Performed by: RADIOLOGY

## 2019-01-01 PROCEDURE — 94761 N-INVAS EAR/PLS OXIMETRY MLT: CPT | Mod: HCNC

## 2019-01-01 PROCEDURE — 93923 UPR/LXTR ART STDY 3+ LVLS: CPT | Mod: HCNC,S$GLB,, | Performed by: SURGERY

## 2019-01-01 PROCEDURE — 1101F PR PT FALLS ASSESS DOC 0-1 FALLS W/OUT INJ PAST YR: ICD-10-PCS | Mod: HCNC,CPTII,S$GLB, | Performed by: PODIATRIST

## 2019-01-01 PROCEDURE — 37228 PR TIB/PER REVASC W/TLA: ICD-10-PCS | Mod: HCNC,LT,, | Performed by: SURGERY

## 2019-01-01 PROCEDURE — 33967 INSERT I-AORT PERCUT DEVICE: CPT | Mod: HCNC | Performed by: INTERNAL MEDICINE

## 2019-01-01 PROCEDURE — 63600175 PHARM REV CODE 636 W HCPCS: Mod: HCNC | Performed by: NURSE PRACTITIONER

## 2019-01-01 PROCEDURE — 93005 ELECTROCARDIOGRAM TRACING: CPT | Mod: HCNC

## 2019-01-01 PROCEDURE — 1101F PR PT FALLS ASSESS DOC 0-1 FALLS W/OUT INJ PAST YR: ICD-10-PCS | Mod: HCNC,CPTII,S$GLB, | Performed by: NURSE PRACTITIONER

## 2019-01-01 PROCEDURE — D9220A PRA ANESTHESIA: Mod: HCNC,ANES,, | Performed by: ANESTHESIOLOGY

## 2019-01-01 PROCEDURE — 36600 WITHDRAWAL OF ARTERIAL BLOOD: CPT | Mod: HCNC

## 2019-01-01 PROCEDURE — 99900035 HC TECH TIME PER 15 MIN (STAT): Mod: HCNC

## 2019-01-01 PROCEDURE — 99499 UNLISTED E&M SERVICE: CPT | Mod: HCNC,S$GLB,, | Performed by: NURSE PRACTITIONER

## 2019-01-01 PROCEDURE — 95951 HC EEG MONITORING/VIDEO RECORD: CPT | Mod: HCNC

## 2019-01-01 PROCEDURE — 3075F SYST BP GE 130 - 139MM HG: CPT | Mod: HCNC,CPTII,S$GLB, | Performed by: NURSE PRACTITIONER

## 2019-01-01 PROCEDURE — 97530 THERAPEUTIC ACTIVITIES: CPT | Mod: HCNC

## 2019-01-01 PROCEDURE — 3074F SYST BP LT 130 MM HG: CPT | Mod: HCNC,CPTII,S$GLB, | Performed by: NURSE PRACTITIONER

## 2019-01-01 PROCEDURE — S0028 INJECTION, FAMOTIDINE, 20 MG: HCPCS | Mod: HCNC | Performed by: STUDENT IN AN ORGANIZED HEALTH CARE EDUCATION/TRAINING PROGRAM

## 2019-01-01 PROCEDURE — 1101F PT FALLS ASSESS-DOCD LE1/YR: CPT | Mod: CPTII,HCNC,S$GLB, | Performed by: PHYSICIAN ASSISTANT

## 2019-01-01 PROCEDURE — 82800 BLOOD PH: CPT | Mod: HCNC

## 2019-01-01 PROCEDURE — 99999 PR PBB SHADOW E&M-EST. PATIENT-LVL V: CPT | Mod: PBBFAC,HCNC,,

## 2019-01-01 PROCEDURE — G0108 DIAB MANAGE TRN  PER INDIV: HCPCS | Mod: HCNC,S$GLB,, | Performed by: DIETITIAN, REGISTERED

## 2019-01-01 PROCEDURE — 94002 VENT MGMT INPAT INIT DAY: CPT | Mod: HCNC

## 2019-01-01 PROCEDURE — 25500020 PHARM REV CODE 255: Mod: HCNC | Performed by: INTERNAL MEDICINE

## 2019-01-01 PROCEDURE — 27200234 HC IABP BALLOON: Mod: HCNC

## 2019-01-01 PROCEDURE — 36415 COLL VENOUS BLD VENIPUNCTURE: CPT | Mod: HCNC

## 2019-01-01 PROCEDURE — 99999 PR PBB SHADOW E&M-EST. PATIENT-LVL V: CPT | Mod: PBBFAC,HCNC,, | Performed by: INTERNAL MEDICINE

## 2019-01-01 PROCEDURE — 99223 PR INITIAL HOSPITAL CARE,LEVL III: ICD-10-PCS | Mod: HCNC,AI,GC, | Performed by: INTERNAL MEDICINE

## 2019-01-01 PROCEDURE — C1887 CATHETER, GUIDING: HCPCS | Mod: HCNC | Performed by: INTERNAL MEDICINE

## 2019-01-01 PROCEDURE — 11000001 HC ACUTE MED/SURG PRIVATE ROOM: Mod: HCNC

## 2019-01-01 PROCEDURE — 85610 PROTHROMBIN TIME: CPT | Mod: HCNC

## 2019-01-01 PROCEDURE — 27000221 HC OXYGEN, UP TO 24 HOURS: Mod: HCNC

## 2019-01-01 PROCEDURE — C1874 STENT, COATED/COV W/DEL SYS: HCPCS | Mod: HCNC | Performed by: INTERNAL MEDICINE

## 2019-01-01 PROCEDURE — 63600175 PHARM REV CODE 636 W HCPCS: Mod: HCNC | Performed by: STUDENT IN AN ORGANIZED HEALTH CARE EDUCATION/TRAINING PROGRAM

## 2019-01-01 PROCEDURE — 93455 CORONARY ART/GRFT ANGIO S&I: CPT | Mod: 59,HCNC | Performed by: INTERNAL MEDICINE

## 2019-01-01 PROCEDURE — 85007 BL SMEAR W/DIFF WBC COUNT: CPT | Mod: HCNC

## 2019-01-01 PROCEDURE — 99999 PR PBB SHADOW E&M-EST. PATIENT-LVL V: CPT | Mod: PBBFAC,HCNC,, | Performed by: NURSE PRACTITIONER

## 2019-01-01 PROCEDURE — 85027 COMPLETE CBC AUTOMATED: CPT | Mod: HCNC

## 2019-01-01 PROCEDURE — C1887 CATHETER, GUIDING: HCPCS | Mod: HCNC | Performed by: SURGERY

## 2019-01-01 PROCEDURE — 99213 OFFICE O/P EST LOW 20 MIN: CPT | Mod: HCNC,GC,S$GLB, | Performed by: INTERNAL MEDICINE

## 2019-01-01 PROCEDURE — 99999 PR PBB SHADOW E&M-EST. PATIENT-LVL V: CPT | Mod: PBBFAC,HCNC,GC, | Performed by: INTERNAL MEDICINE

## 2019-01-01 PROCEDURE — 99231 PR SUBSEQUENT HOSPITAL CARE,LEVL I: ICD-10-PCS | Mod: HCNC,GC,, | Performed by: INTERNAL MEDICINE

## 2019-01-01 PROCEDURE — 94003 VENT MGMT INPAT SUBQ DAY: CPT | Mod: HCNC

## 2019-01-01 PROCEDURE — 99214 PR OFFICE/OUTPT VISIT, EST, LEVL IV, 30-39 MIN: ICD-10-PCS | Mod: HCNC,S$GLB,, | Performed by: PODIATRIST

## 2019-01-01 PROCEDURE — 99999 PR PBB SHADOW E&M-EST. PATIENT-LVL V: ICD-10-PCS | Mod: PBBFAC,HCNC,, | Performed by: NURSE PRACTITIONER

## 2019-01-01 PROCEDURE — 3074F PR MOST RECENT SYSTOLIC BLOOD PRESSURE < 130 MM HG: ICD-10-PCS | Mod: HCNC,CPTII,S$GLB, | Performed by: NURSE PRACTITIONER

## 2019-01-01 PROCEDURE — C1751 CATH, INF, PER/CENT/MIDLINE: HCPCS | Mod: HCNC

## 2019-01-01 PROCEDURE — 85730 THROMBOPLASTIN TIME PARTIAL: CPT | Mod: HCNC

## 2019-01-01 PROCEDURE — 99499 RISK ADDL DX/OHS AUDIT: ICD-10-PCS | Mod: HCNC,S$GLB,, | Performed by: NURSE PRACTITIONER

## 2019-01-01 PROCEDURE — 3078F DIAST BP <80 MM HG: CPT | Mod: HCNC,CPTII,S$GLB, | Performed by: NURSE PRACTITIONER

## 2019-01-01 PROCEDURE — 99232 PR SUBSEQUENT HOSPITAL CARE,LEVL II: ICD-10-PCS | Mod: HCNC,GC,, | Performed by: INTERNAL MEDICINE

## 2019-01-01 PROCEDURE — 93010 ELECTROCARDIOGRAM REPORT: CPT | Mod: HCNC,76,, | Performed by: INTERNAL MEDICINE

## 2019-01-01 PROCEDURE — 1101F PT FALLS ASSESS-DOCD LE1/YR: CPT | Mod: HCNC,CPTII,S$GLB, | Performed by: NURSE PRACTITIONER

## 2019-01-01 PROCEDURE — 80202 ASSAY OF VANCOMYCIN: CPT | Mod: HCNC

## 2019-01-01 PROCEDURE — 84100 ASSAY OF PHOSPHORUS: CPT | Mod: 91,HCNC

## 2019-01-01 PROCEDURE — 93010 ELECTROCARDIOGRAM REPORT: CPT | Mod: HCNC,,, | Performed by: INTERNAL MEDICINE

## 2019-01-01 PROCEDURE — 83735 ASSAY OF MAGNESIUM: CPT | Mod: HCNC

## 2019-01-01 PROCEDURE — 3078F PR MOST RECENT DIASTOLIC BLOOD PRESSURE < 80 MM HG: ICD-10-PCS | Mod: HCNC,CPTII,S$GLB, | Performed by: PODIATRIST

## 2019-01-01 PROCEDURE — 3078F PR MOST RECENT DIASTOLIC BLOOD PRESSURE < 80 MM HG: ICD-10-PCS | Mod: HCNC,CPTII,S$GLB, | Performed by: NURSE PRACTITIONER

## 2019-01-01 PROCEDURE — 99233 PR SUBSEQUENT HOSPITAL CARE,LEVL III: ICD-10-PCS | Mod: HCNC,,, | Performed by: INTERNAL MEDICINE

## 2019-01-01 PROCEDURE — 87070 CULTURE OTHR SPECIMN AEROBIC: CPT | Mod: HCNC

## 2019-01-01 PROCEDURE — 25000003 PHARM REV CODE 250: Mod: HCNC | Performed by: NURSE ANESTHETIST, CERTIFIED REGISTERED

## 2019-01-01 PROCEDURE — C1760 CLOSURE DEV, VASC: HCPCS | Mod: HCNC | Performed by: SURGERY

## 2019-01-01 PROCEDURE — 71000016 HC POSTOP RECOV ADDL HR: Mod: HCNC | Performed by: SURGERY

## 2019-01-01 PROCEDURE — 99152 MOD SED SAME PHYS/QHP 5/>YRS: CPT | Mod: HCNC,,, | Performed by: INTERNAL MEDICINE

## 2019-01-01 PROCEDURE — 84145 PROCALCITONIN (PCT): CPT | Mod: HCNC

## 2019-01-01 PROCEDURE — 99999 PR PBB SHADOW E&M-EST. PATIENT-LVL III: ICD-10-PCS | Mod: PBBFAC,HCNC,, | Performed by: PODIATRIST

## 2019-01-01 PROCEDURE — S5571 INSULIN DISPOS PEN 3 ML: HCPCS | Mod: HCNC | Performed by: NURSE PRACTITIONER

## 2019-01-01 PROCEDURE — 99232 SBSQ HOSP IP/OBS MODERATE 35: CPT | Mod: HCNC,,, | Performed by: SURGERY

## 2019-01-01 PROCEDURE — 99233 PR SUBSEQUENT HOSPITAL CARE,LEVL III: ICD-10-PCS | Mod: HCNC,GC,, | Performed by: INTERNAL MEDICINE

## 2019-01-01 PROCEDURE — 99900026 HC AIRWAY MAINTENANCE (STAT): Mod: HCNC

## 2019-01-01 PROCEDURE — 83520 IMMUNOASSAY QUANT NOS NONAB: CPT | Mod: HCNC

## 2019-01-01 PROCEDURE — 83605 ASSAY OF LACTIC ACID: CPT | Mod: HCNC

## 2019-01-01 PROCEDURE — 1101F PT FALLS ASSESS-DOCD LE1/YR: CPT | Mod: HCNC,CPTII,GC,S$GLB | Performed by: INTERNAL MEDICINE

## 2019-01-01 PROCEDURE — 3075F SYST BP GE 130 - 139MM HG: CPT | Mod: HCNC,CPTII,S$GLB, | Performed by: PODIATRIST

## 2019-01-01 PROCEDURE — 99222 1ST HOSP IP/OBS MODERATE 55: CPT | Mod: HCNC,25,, | Performed by: INTERNAL MEDICINE

## 2019-01-01 PROCEDURE — 94150 VITAL CAPACITY TEST: CPT | Mod: HCNC

## 2019-01-01 PROCEDURE — 71000015 HC POSTOP RECOV 1ST HR: Mod: HCNC | Performed by: SURGERY

## 2019-01-01 PROCEDURE — 99232 SBSQ HOSP IP/OBS MODERATE 35: CPT | Mod: HCNC,GC,, | Performed by: INTERNAL MEDICINE

## 2019-01-01 PROCEDURE — 99499 UNLISTED E&M SERVICE: CPT | Mod: HCNC,S$GLB,, | Performed by: INTERNAL MEDICINE

## 2019-01-01 PROCEDURE — 3078F PR MOST RECENT DIASTOLIC BLOOD PRESSURE < 80 MM HG: ICD-10-PCS | Mod: HCNC,CPTII,GC,S$GLB | Performed by: INTERNAL MEDICINE

## 2019-01-01 PROCEDURE — 84484 ASSAY OF TROPONIN QUANT: CPT | Mod: HCNC

## 2019-01-01 PROCEDURE — 87205 SMEAR GRAM STAIN: CPT | Mod: HCNC

## 2019-01-01 PROCEDURE — 73660 X-RAY EXAM OF TOE(S): CPT | Mod: TC,HCNC,LT

## 2019-01-01 PROCEDURE — 3078F DIAST BP <80 MM HG: CPT | Mod: CPTII,HCNC,S$GLB, | Performed by: INTERNAL MEDICINE

## 2019-01-01 PROCEDURE — 37000008 HC ANESTHESIA 1ST 15 MINUTES: Mod: HCNC | Performed by: SURGERY

## 2019-01-01 PROCEDURE — 33967 INSERT I-AORT PERCUT DEVICE: CPT | Mod: HCNC

## 2019-01-01 PROCEDURE — 81002 POCT URINE DIPSTICK WITHOUT MICROSCOPE: ICD-10-PCS | Mod: HCNC,S$GLB,, | Performed by: NURSE PRACTITIONER

## 2019-01-01 PROCEDURE — 80048 BASIC METABOLIC PNL TOTAL CA: CPT | Mod: 91,HCNC

## 2019-01-01 PROCEDURE — 99233 PR SUBSEQUENT HOSPITAL CARE,LEVL III: ICD-10-PCS | Mod: HCNC,,, | Performed by: NURSE PRACTITIONER

## 2019-01-01 PROCEDURE — 25000003 PHARM REV CODE 250: Mod: HCNC | Performed by: INTERNAL MEDICINE

## 2019-01-01 PROCEDURE — 27200188 HC TRANSDUCER, ART ADULT/PEDS: Mod: HCNC

## 2019-01-01 PROCEDURE — 80048 BASIC METABOLIC PNL TOTAL CA: CPT | Mod: HCNC

## 2019-01-01 PROCEDURE — 3075F PR MOST RECENT SYSTOLIC BLOOD PRESS GE 130-139MM HG: ICD-10-PCS | Mod: HCNC,CPTII,S$GLB, | Performed by: NURSE PRACTITIONER

## 2019-01-01 PROCEDURE — 99999 PR PBB SHADOW E&M-EST. PATIENT-LVL II: CPT | Mod: PBBFAC,HCNC,, | Performed by: OPHTHALMOLOGY

## 2019-01-01 PROCEDURE — 20000000 HC ICU ROOM: Mod: HCNC

## 2019-01-01 PROCEDURE — 99232 PR SUBSEQUENT HOSPITAL CARE,LEVL II: ICD-10-PCS | Mod: HCNC,,, | Performed by: NURSE PRACTITIONER

## 2019-01-01 PROCEDURE — 95951 PR EEG MONITORING/VIDEORECORD: ICD-10-PCS | Mod: 26,HCNC,, | Performed by: PSYCHIATRY & NEUROLOGY

## 2019-01-01 PROCEDURE — 93455 PR CATH PLACE/CORONARY ANGIO, IMG SUPER/INTERP, BYPASS ANGIO: ICD-10-PCS | Mod: 26,59,HCNC, | Performed by: INTERNAL MEDICINE

## 2019-01-01 PROCEDURE — 3078F PR MOST RECENT DIASTOLIC BLOOD PRESSURE < 80 MM HG: ICD-10-PCS | Mod: HCNC,CPTII,S$GLB, | Performed by: INTERNAL MEDICINE

## 2019-01-01 PROCEDURE — 84132 ASSAY OF SERUM POTASSIUM: CPT | Mod: HCNC

## 2019-01-01 PROCEDURE — C1769 GUIDE WIRE: HCPCS | Mod: HCNC | Performed by: INTERNAL MEDICINE

## 2019-01-01 PROCEDURE — A9555 RB82 RUBIDIUM: HCPCS | Mod: HCNC,S$GLB,, | Performed by: INTERNAL MEDICINE

## 2019-01-01 PROCEDURE — 82803 BLOOD GASES ANY COMBINATION: CPT | Mod: HCNC

## 2019-01-01 PROCEDURE — 27201423 OPTIME MED/SURG SUP & DEVICES STERILE SUPPLY: Mod: HCNC | Performed by: INTERNAL MEDICINE

## 2019-01-01 PROCEDURE — 99212 OFFICE O/P EST SF 10 MIN: CPT | Mod: HCNC,S$GLB,, | Performed by: NURSE PRACTITIONER

## 2019-01-01 PROCEDURE — 99205 OFFICE O/P NEW HI 60 MIN: CPT | Mod: HCNC,S$GLB,, | Performed by: INTERNAL MEDICINE

## 2019-01-01 PROCEDURE — 27000202 HC IABP, ADD'L DAY: Mod: HCNC

## 2019-01-01 PROCEDURE — 20220 BONE BIOPSY TROCAR/NDL SUPFC: CPT | Mod: HCNC,,, | Performed by: PODIATRIST

## 2019-01-01 PROCEDURE — 3077F SYST BP >= 140 MM HG: CPT | Mod: HCNC,CPTII,S$GLB, | Performed by: INTERNAL MEDICINE

## 2019-01-01 PROCEDURE — 99203 PR OFFICE/OUTPT VISIT, NEW, LEVL III, 30-44 MIN: ICD-10-PCS | Mod: HCNC,S$GLB,, | Performed by: NURSE PRACTITIONER

## 2019-01-01 PROCEDURE — S5571 INSULIN DISPOS PEN 3 ML: HCPCS | Mod: HCNC | Performed by: STUDENT IN AN ORGANIZED HEALTH CARE EDUCATION/TRAINING PROGRAM

## 2019-01-01 PROCEDURE — D9220A PRA ANESTHESIA: ICD-10-PCS | Mod: HCNC,ANES,, | Performed by: ANESTHESIOLOGY

## 2019-01-01 PROCEDURE — 99999 PR PBB SHADOW E&M-EST. PATIENT-LVL V: ICD-10-PCS | Mod: PBBFAC,HCNC,GC, | Performed by: INTERNAL MEDICINE

## 2019-01-01 PROCEDURE — 85060 BLOOD SMEAR INTERPRETATION: CPT | Mod: HCNC,,, | Performed by: PATHOLOGY

## 2019-01-01 PROCEDURE — 99999 PR PBB SHADOW E&M-EST. PATIENT-LVL IV: ICD-10-PCS | Mod: PBBFAC,HCNC,, | Performed by: PODIATRIST

## 2019-01-01 PROCEDURE — 80053 COMPREHEN METABOLIC PANEL: CPT | Mod: 91,HCNC

## 2019-01-01 PROCEDURE — 3078F DIAST BP <80 MM HG: CPT | Mod: HCNC,CPTII,S$GLB, | Performed by: INTERNAL MEDICINE

## 2019-01-01 PROCEDURE — 84484 ASSAY OF TROPONIN QUANT: CPT | Mod: 91,HCNC

## 2019-01-01 PROCEDURE — 99213 PR OFFICE/OUTPT VISIT, EST, LEVL III, 20-29 MIN: ICD-10-PCS | Mod: HCNC,S$GLB,, | Performed by: DERMATOLOGY

## 2019-01-01 PROCEDURE — 81003 URINALYSIS AUTO W/O SCOPE: CPT | Mod: HCNC

## 2019-01-01 PROCEDURE — 99499 UNLISTED E&M SERVICE: CPT | Mod: HCNC,S$GLB,, | Performed by: OPHTHALMOLOGY

## 2019-01-01 PROCEDURE — G0439 PR MEDICARE ANNUAL WELLNESS SUBSEQUENT VISIT: ICD-10-PCS | Mod: HCNC,S$GLB,, | Performed by: NURSE PRACTITIONER

## 2019-01-01 PROCEDURE — 92937 PRQ TRLUML REVSC CAB GRF 1: CPT | Mod: LD,HCNC,, | Performed by: INTERNAL MEDICINE

## 2019-01-01 PROCEDURE — 87186 SC STD MICRODIL/AGAR DIL: CPT | Mod: HCNC

## 2019-01-01 PROCEDURE — 25000003 PHARM REV CODE 250: Mod: HCNC | Performed by: PHYSICIAN ASSISTANT

## 2019-01-01 PROCEDURE — 63600175 PHARM REV CODE 636 W HCPCS: Mod: HCNC | Performed by: SURGERY

## 2019-01-01 PROCEDURE — 1101F PT FALLS ASSESS-DOCD LE1/YR: CPT | Mod: HCNC,CPTII,S$GLB, | Performed by: PODIATRIST

## 2019-01-01 PROCEDURE — 63600175 PHARM REV CODE 636 W HCPCS: Mod: HCNC | Performed by: PHYSICIAN ASSISTANT

## 2019-01-01 PROCEDURE — 78492 PET STRESS (CUPID ONLY): ICD-10-PCS | Mod: HCNC,S$GLB,, | Performed by: INTERNAL MEDICINE

## 2019-01-01 PROCEDURE — 87075 CULTR BACTERIA EXCEPT BLOOD: CPT | Mod: HCNC

## 2019-01-01 PROCEDURE — 3077F SYST BP >= 140 MM HG: CPT | Mod: CPTII,HCNC,S$GLB, | Performed by: INTERNAL MEDICINE

## 2019-01-01 PROCEDURE — 99152 MOD SED SAME PHYS/QHP 5/>YRS: CPT | Mod: HCNC | Performed by: INTERNAL MEDICINE

## 2019-01-01 PROCEDURE — 95251 CONT GLUC MNTR ANALYSIS I&R: CPT | Mod: HCNC,S$GLB,, | Performed by: NURSE PRACTITIONER

## 2019-01-01 PROCEDURE — 85060 PATHOLOGIST REVIEW: ICD-10-PCS | Mod: HCNC,,, | Performed by: PATHOLOGY

## 2019-01-01 PROCEDURE — 71000044 HC DOSC ROUTINE RECOVERY FIRST HOUR: Mod: HCNC | Performed by: SURGERY

## 2019-01-01 PROCEDURE — 99999 PR PBB SHADOW E&M-EST. PATIENT-LVL III: CPT | Mod: PBBFAC,HCNC,, | Performed by: INTERNAL MEDICINE

## 2019-01-01 PROCEDURE — 99215 PR OFFICE/OUTPT VISIT, EST, LEVL V, 40-54 MIN: ICD-10-PCS | Mod: HCNC,S$GLB,, | Performed by: INTERNAL MEDICINE

## 2019-01-01 PROCEDURE — 99214 PR OFFICE/OUTPT VISIT, EST, LEVL IV, 30-39 MIN: ICD-10-PCS | Mod: 25,HCNC,S$GLB, | Performed by: NURSE PRACTITIONER

## 2019-01-01 PROCEDURE — 85025 COMPLETE CBC W/AUTO DIFF WBC: CPT | Mod: HCNC

## 2019-01-01 PROCEDURE — 3077F SYST BP >= 140 MM HG: CPT | Mod: HCNC,CPTII,S$GLB, | Performed by: PODIATRIST

## 2019-01-01 PROCEDURE — 99999 PR PBB SHADOW E&M-EST. PATIENT-LVL III: ICD-10-PCS | Mod: PBBFAC,HCNC,, | Performed by: INTERNAL MEDICINE

## 2019-01-01 PROCEDURE — 95251 PR GLUCOSE MONITOR, 72 HOUR, PHYS INTERP: ICD-10-PCS | Mod: HCNC,S$GLB,, | Performed by: NURSE PRACTITIONER

## 2019-01-01 PROCEDURE — 99223 PR INITIAL HOSPITAL CARE,LEVL III: ICD-10-PCS | Mod: HCNC,,, | Performed by: PHYSICIAN ASSISTANT

## 2019-01-01 PROCEDURE — 80053 COMPREHEN METABOLIC PANEL: CPT | Mod: HCNC

## 2019-01-01 PROCEDURE — 99231 PR SUBSEQUENT HOSPITAL CARE,LEVL I: ICD-10-PCS | Mod: HCNC,,, | Performed by: NURSE PRACTITIONER

## 2019-01-01 PROCEDURE — 83735 ASSAY OF MAGNESIUM: CPT | Mod: 91,HCNC

## 2019-01-01 PROCEDURE — C1894 INTRO/SHEATH, NON-LASER: HCPCS | Mod: HCNC | Performed by: SURGERY

## 2019-01-01 PROCEDURE — 99232 PR SUBSEQUENT HOSPITAL CARE,LEVL II: ICD-10-PCS | Mod: HCNC,,, | Performed by: SURGERY

## 2019-01-01 PROCEDURE — 27000190 HC CPAP FULL FACE MASK W/VALVE: Mod: HCNC

## 2019-01-01 PROCEDURE — 1101F PR PT FALLS ASSESS DOC 0-1 FALLS W/OUT INJ PAST YR: ICD-10-PCS | Mod: HCNC,CPTII,GC,S$GLB | Performed by: INTERNAL MEDICINE

## 2019-01-01 PROCEDURE — C1725 CATH, TRANSLUMIN NON-LASER: HCPCS | Mod: HCNC | Performed by: INTERNAL MEDICINE

## 2019-01-01 PROCEDURE — 99999 PR PBB SHADOW E&M-EST. PATIENT-LVL IV: CPT | Mod: PBBFAC,HCNC,, | Performed by: PODIATRIST

## 2019-01-01 PROCEDURE — 99214 OFFICE O/P EST MOD 30 MIN: CPT | Mod: HCNC,S$GLB,, | Performed by: PODIATRIST

## 2019-01-01 PROCEDURE — 99999 PR PBB SHADOW E&M-EST. PATIENT-LVL V: ICD-10-PCS | Mod: PBBFAC,HCNC,, | Performed by: PHYSICIAN ASSISTANT

## 2019-01-01 PROCEDURE — 11721 DEBRIDE NAIL 6 OR MORE: CPT | Mod: Q9,HCNC,S$GLB, | Performed by: PODIATRIST

## 2019-01-01 PROCEDURE — 27201640 HC PAD, ARTICGEL: Mod: HCNC

## 2019-01-01 PROCEDURE — 93010 EKG 12-LEAD: ICD-10-PCS | Mod: HCNC,,, | Performed by: INTERNAL MEDICINE

## 2019-01-01 PROCEDURE — 99999 PR PBB SHADOW E&M-EST. PATIENT-LVL V: CPT | Mod: PBBFAC,HCNC,, | Performed by: PHYSICIAN ASSISTANT

## 2019-01-01 PROCEDURE — 99223 1ST HOSP IP/OBS HIGH 75: CPT | Mod: HCNC,AI,GC, | Performed by: INTERNAL MEDICINE

## 2019-01-01 PROCEDURE — 87176 TISSUE HOMOGENIZATION CULTR: CPT | Mod: HCNC

## 2019-01-01 PROCEDURE — 99223 PR INITIAL HOSPITAL CARE,LEVL III: ICD-10-PCS | Mod: HCNC,,, | Performed by: SURGERY

## 2019-01-01 PROCEDURE — 73565 XR KNEE AP STANDING BILATERAL: ICD-10-PCS | Mod: 26,HCNC,, | Performed by: RADIOLOGY

## 2019-01-01 PROCEDURE — 75710 ARTERY X-RAYS ARM/LEG: CPT | Mod: 26,59,HCNC, | Performed by: SURGERY

## 2019-01-01 PROCEDURE — 3077F PR MOST RECENT SYSTOLIC BLOOD PRESSURE >= 140 MM HG: ICD-10-PCS | Mod: HCNC,CPTII,S$GLB, | Performed by: PODIATRIST

## 2019-01-01 PROCEDURE — 99222 PR INITIAL HOSPITAL CARE,LEVL II: ICD-10-PCS | Mod: HCNC,,, | Performed by: NURSE PRACTITIONER

## 2019-01-01 PROCEDURE — 3077F PR MOST RECENT SYSTOLIC BLOOD PRESSURE >= 140 MM HG: ICD-10-PCS | Mod: CPTII,HCNC,S$GLB, | Performed by: INTERNAL MEDICINE

## 2019-01-01 PROCEDURE — 92226 PR SPECIAL EYE EXAM, SUBSEQUENT: ICD-10-PCS | Mod: 50,HCNC,S$GLB, | Performed by: OPHTHALMOLOGY

## 2019-01-01 PROCEDURE — 82550 ASSAY OF CK (CPK): CPT | Mod: HCNC

## 2019-01-01 PROCEDURE — 99231 SBSQ HOSP IP/OBS SF/LOW 25: CPT | Mod: HCNC,GC,, | Performed by: INTERNAL MEDICINE

## 2019-01-01 PROCEDURE — 99222 1ST HOSP IP/OBS MODERATE 55: CPT | Mod: HCNC,,, | Performed by: NURSE PRACTITIONER

## 2019-01-01 PROCEDURE — 99233 SBSQ HOSP IP/OBS HIGH 50: CPT | Mod: HCNC,,, | Performed by: INTERNAL MEDICINE

## 2019-01-01 PROCEDURE — 99499 RISK ADDL DX/OHS AUDIT: ICD-10-PCS | Mod: HCNC,S$GLB,, | Performed by: INTERNAL MEDICINE

## 2019-01-01 PROCEDURE — 92937 PR BYPASS (IN OR THROUGH): ICD-10-PCS | Mod: LD,HCNC,, | Performed by: INTERNAL MEDICINE

## 2019-01-01 PROCEDURE — 99152 PR MOD CONSCIOUS SEDATION, SAME PHYS, 5+ YRS, FIRST 15 MIN: ICD-10-PCS | Mod: HCNC,,, | Performed by: INTERNAL MEDICINE

## 2019-01-01 PROCEDURE — 3075F PR MOST RECENT SYSTOLIC BLOOD PRESS GE 130-139MM HG: ICD-10-PCS | Mod: HCNC,CPTII,S$GLB, | Performed by: PODIATRIST

## 2019-01-01 PROCEDURE — 99233 SBSQ HOSP IP/OBS HIGH 50: CPT | Mod: HCNC,,, | Performed by: NURSE PRACTITIONER

## 2019-01-01 PROCEDURE — 75710 PR  ANGIO EXTREMITY UNILAT: ICD-10-PCS | Mod: 26,59,HCNC, | Performed by: SURGERY

## 2019-01-01 PROCEDURE — 3074F SYST BP LT 130 MM HG: CPT | Mod: HCNC,CPTII,S$GLB, | Performed by: INTERNAL MEDICINE

## 2019-01-01 PROCEDURE — 1101F PR PT FALLS ASSESS DOC 0-1 FALLS W/OUT INJ PAST YR: ICD-10-PCS | Mod: CPTII,HCNC,S$GLB, | Performed by: DERMATOLOGY

## 2019-01-01 PROCEDURE — 63600175 PHARM REV CODE 636 W HCPCS: Mod: HCNC

## 2019-01-01 PROCEDURE — 97165 OT EVAL LOW COMPLEX 30 MIN: CPT | Mod: HCNC

## 2019-01-01 PROCEDURE — 36000707: Mod: HCNC | Performed by: SURGERY

## 2019-01-01 PROCEDURE — 33967 PR IABP INSERTION: ICD-10-PCS | Mod: HCNC,78,, | Performed by: INTERNAL MEDICINE

## 2019-01-01 PROCEDURE — 73660 XR TOE 2 OR MORE VIEWS LEFT: ICD-10-PCS | Mod: 26,HCNC,LT, | Performed by: RADIOLOGY

## 2019-01-01 PROCEDURE — 87077 CULTURE AEROBIC IDENTIFY: CPT | Mod: HCNC

## 2019-01-01 PROCEDURE — 1101F PT FALLS ASSESS-DOCD LE1/YR: CPT | Mod: CPTII,HCNC,S$GLB, | Performed by: DERMATOLOGY

## 2019-01-01 PROCEDURE — 3077F PR MOST RECENT SYSTOLIC BLOOD PRESSURE >= 140 MM HG: ICD-10-PCS | Mod: HCNC,CPTII,S$GLB, | Performed by: INTERNAL MEDICINE

## 2019-01-01 PROCEDURE — 3078F DIAST BP <80 MM HG: CPT | Mod: HCNC,CPTII,S$GLB, | Performed by: PODIATRIST

## 2019-01-01 PROCEDURE — 85730 THROMBOPLASTIN TIME PARTIAL: CPT | Mod: 91,HCNC

## 2019-01-01 PROCEDURE — 84300 ASSAY OF URINE SODIUM: CPT | Mod: HCNC

## 2019-01-01 PROCEDURE — 99999 PR PBB SHADOW E&M-EST. PATIENT-LVL V: ICD-10-PCS | Mod: PBBFAC,HCNC,,

## 2019-01-01 PROCEDURE — 82962 GLUCOSE BLOOD TEST: CPT | Mod: HCNC | Performed by: SURGERY

## 2019-01-01 PROCEDURE — C1769 GUIDE WIRE: HCPCS | Mod: HCNC | Performed by: SURGERY

## 2019-01-01 PROCEDURE — 99499 RISK ADDL DX/OHS AUDIT: ICD-10-PCS | Mod: HCNC,S$GLB,, | Performed by: OPHTHALMOLOGY

## 2019-01-01 PROCEDURE — 31500 INTUBATION: ICD-10-PCS | Mod: HCNC,,, | Performed by: ANESTHESIOLOGY

## 2019-01-01 PROCEDURE — 99999 PR PBB SHADOW E&M-EST. PATIENT-LVL III: ICD-10-PCS | Mod: PBBFAC,HCNC,, | Performed by: NURSE PRACTITIONER

## 2019-01-01 PROCEDURE — 99900017 HC EXTUBATION W/PARAMETERS (STAT): Mod: HCNC

## 2019-01-01 PROCEDURE — 88311 DECALCIFY TISSUE: CPT | Mod: 26,HCNC,, | Performed by: PATHOLOGY

## 2019-01-01 PROCEDURE — 99214 PR OFFICE/OUTPT VISIT, EST, LEVL IV, 30-39 MIN: ICD-10-PCS | Mod: HCNC,S$GLB,, | Performed by: NURSE PRACTITIONER

## 2019-01-01 PROCEDURE — 11721 PR DEBRIDEMENT OF NAILS, 6 OR MORE: ICD-10-PCS | Mod: Q9,HCNC,S$GLB, | Performed by: PODIATRIST

## 2019-01-01 PROCEDURE — 99153 MOD SED SAME PHYS/QHP EA: CPT | Mod: HCNC | Performed by: INTERNAL MEDICINE

## 2019-01-01 PROCEDURE — 1101F PR PT FALLS ASSESS DOC 0-1 FALLS W/OUT INJ PAST YR: ICD-10-PCS | Mod: CPTII,HCNC,S$GLB, | Performed by: PHYSICIAN ASSISTANT

## 2019-01-01 PROCEDURE — 83605 ASSAY OF LACTIC ACID: CPT | Mod: 91,HCNC

## 2019-01-01 PROCEDURE — 99999 PR PBB SHADOW E&M-EST. PATIENT-LVL I: ICD-10-PCS | Mod: PBBFAC,HCNC,, | Performed by: DIETITIAN, REGISTERED

## 2019-01-01 PROCEDURE — 87102 FUNGUS ISOLATION CULTURE: CPT | Mod: HCNC

## 2019-01-01 PROCEDURE — 31500 INSERT EMERGENCY AIRWAY: CPT | Mod: HCNC

## 2019-01-01 PROCEDURE — 83880 ASSAY OF NATRIURETIC PEPTIDE: CPT | Mod: HCNC

## 2019-01-01 PROCEDURE — 3074F SYST BP LT 130 MM HG: CPT | Mod: CPTII,HCNC,S$GLB, | Performed by: PHYSICIAN ASSISTANT

## 2019-01-01 PROCEDURE — 73565 X-RAY EXAM OF KNEES: CPT | Mod: TC,HCNC

## 2019-01-01 PROCEDURE — 93010 EKG 12-LEAD: ICD-10-PCS | Mod: HCNC,76,, | Performed by: INTERNAL MEDICINE

## 2019-01-01 PROCEDURE — 99999 PR PBB SHADOW E&M-EST. PATIENT-LVL III: CPT | Mod: PBBFAC,HCNC,, | Performed by: PODIATRIST

## 2019-01-01 PROCEDURE — 99205 PR OFFICE/OUTPT VISIT, NEW, LEVL V, 60-74 MIN: ICD-10-PCS | Mod: HCNC,S$GLB,, | Performed by: INTERNAL MEDICINE

## 2019-01-01 PROCEDURE — 99233 PR SUBSEQUENT HOSPITAL CARE,LEVL III: ICD-10-PCS | Mod: 25,HCNC,, | Performed by: PODIATRIST

## 2019-01-01 PROCEDURE — 1101F PT FALLS ASSESS-DOCD LE1/YR: CPT | Mod: HCNC,CPTII,S$GLB, | Performed by: INTERNAL MEDICINE

## 2019-01-01 PROCEDURE — 93455 CORONARY ART/GRFT ANGIO S&I: CPT | Mod: 26,59,HCNC, | Performed by: INTERNAL MEDICINE

## 2019-01-01 PROCEDURE — 83036 HEMOGLOBIN GLYCOSYLATED A1C: CPT | Mod: HCNC

## 2019-01-01 PROCEDURE — 85027 COMPLETE CBC AUTOMATED: CPT | Mod: 91,HCNC

## 2019-01-01 PROCEDURE — 99233 PR SUBSEQUENT HOSPITAL CARE,LEVL III: ICD-10-PCS | Mod: HCNC,,, | Performed by: PODIATRIST

## 2019-01-01 PROCEDURE — 43752 NASAL/OROGASTRIC W/TUBE PLMT: CPT | Mod: HCNC

## 2019-01-01 PROCEDURE — 36556 INSERT NON-TUNNEL CV CATH: CPT | Mod: HCNC

## 2019-01-01 PROCEDURE — 1101F PT FALLS ASSESS-DOCD LE1/YR: CPT | Mod: CPTII,HCNC,S$GLB, | Performed by: INTERNAL MEDICINE

## 2019-01-01 PROCEDURE — C1894 INTRO/SHEATH, NON-LASER: HCPCS | Mod: HCNC | Performed by: INTERNAL MEDICINE

## 2019-01-01 PROCEDURE — 99212 PR OFFICE/OUTPT VISIT, EST, LEVL II, 10-19 MIN: ICD-10-PCS | Mod: HCNC,S$GLB,, | Performed by: NURSE PRACTITIONER

## 2019-01-01 PROCEDURE — 85025 COMPLETE CBC W/AUTO DIFF WBC: CPT | Mod: 91,HCNC

## 2019-01-01 PROCEDURE — 99214 PR OFFICE/OUTPT VISIT, EST, LEVL IV, 30-39 MIN: ICD-10-PCS | Mod: 25,HCNC,S$GLB, | Performed by: PODIATRIST

## 2019-01-01 PROCEDURE — 88311 TISSUE SPECIMEN TO PATHOLOGY - SURGERY: ICD-10-PCS | Mod: 26,HCNC,, | Performed by: PATHOLOGY

## 2019-01-01 PROCEDURE — 73660 X-RAY EXAM OF TOE(S): CPT | Mod: 26,HCNC,LT, | Performed by: RADIOLOGY

## 2019-01-01 PROCEDURE — 88307 TISSUE EXAM BY PATHOLOGIST: CPT | Mod: HCNC | Performed by: PATHOLOGY

## 2019-01-01 PROCEDURE — 99214 PR OFFICE/OUTPT VISIT, EST, LEVL IV, 30-39 MIN: ICD-10-PCS | Mod: HCNC,S$GLB,, | Performed by: INTERNAL MEDICINE

## 2019-01-01 PROCEDURE — 51702 INSERT TEMP BLADDER CATH: CPT | Mod: HCNC

## 2019-01-01 PROCEDURE — 82947 ASSAY GLUCOSE BLOOD QUANT: CPT | Mod: HCNC

## 2019-01-01 PROCEDURE — C1760 CLOSURE DEV, VASC: HCPCS | Mod: HCNC | Performed by: INTERNAL MEDICINE

## 2019-01-01 PROCEDURE — 99999 PR PBB SHADOW E&M-EST. PATIENT-LVL II: CPT | Mod: PBBFAC,HCNC,, | Performed by: DERMATOLOGY

## 2019-01-01 PROCEDURE — 99213 OFFICE O/P EST LOW 20 MIN: CPT | Mod: HCNC,S$GLB,, | Performed by: DERMATOLOGY

## 2019-01-01 PROCEDURE — 94010 BREATHING CAPACITY TEST: CPT | Mod: HCNC

## 2019-01-01 PROCEDURE — 92928 PRQ TCAT PLMT NTRAC ST 1 LES: CPT | Mod: LC,HCNC,, | Performed by: INTERNAL MEDICINE

## 2019-01-01 PROCEDURE — 83935 ASSAY OF URINE OSMOLALITY: CPT | Mod: HCNC

## 2019-01-01 PROCEDURE — 86140 C-REACTIVE PROTEIN: CPT | Mod: HCNC

## 2019-01-01 PROCEDURE — C1884 EMBOLIZATION PROTECT SYST: HCPCS | Mod: HCNC | Performed by: INTERNAL MEDICINE

## 2019-01-01 PROCEDURE — 3074F PR MOST RECENT SYSTOLIC BLOOD PRESSURE < 130 MM HG: ICD-10-PCS | Mod: HCNC,CPTII,GC,S$GLB | Performed by: INTERNAL MEDICINE

## 2019-01-01 PROCEDURE — 3074F PR MOST RECENT SYSTOLIC BLOOD PRESSURE < 130 MM HG: ICD-10-PCS | Mod: HCNC,CPTII,S$GLB, | Performed by: PODIATRIST

## 2019-01-01 PROCEDURE — 78492 MYOCRD IMG PET MLT RST&STRS: CPT | Mod: HCNC,S$GLB,, | Performed by: INTERNAL MEDICINE

## 2019-01-01 PROCEDURE — 99214 OFFICE O/P EST MOD 30 MIN: CPT | Mod: HCNC,S$GLB,, | Performed by: NURSE PRACTITIONER

## 2019-01-01 PROCEDURE — 99291 CRITICAL CARE FIRST HOUR: CPT | Mod: HCNC,,, | Performed by: INTERNAL MEDICINE

## 2019-01-01 PROCEDURE — 25500020 PHARM REV CODE 255: Mod: HCNC | Performed by: SURGERY

## 2019-01-01 PROCEDURE — 99291 CRITICAL CARE FIRST HOUR: CPT | Mod: HCNC,GC,, | Performed by: HOSPITALIST

## 2019-01-01 PROCEDURE — 3074F PR MOST RECENT SYSTOLIC BLOOD PRESSURE < 130 MM HG: ICD-10-PCS | Mod: HCNC,CPTII,S$GLB, | Performed by: INTERNAL MEDICINE

## 2019-01-01 PROCEDURE — 1101F PR PT FALLS ASSESS DOC 0-1 FALLS W/OUT INJ PAST YR: ICD-10-PCS | Mod: HCNC,CPTII,S$GLB, | Performed by: INTERNAL MEDICINE

## 2019-01-01 PROCEDURE — 99222 PR INITIAL HOSPITAL CARE,LEVL II: ICD-10-PCS | Mod: HCNC,25,, | Performed by: INTERNAL MEDICINE

## 2019-01-01 PROCEDURE — 94660 CPAP INITIATION&MGMT: CPT | Mod: HCNC

## 2019-01-01 PROCEDURE — 87040 BLOOD CULTURE FOR BACTERIA: CPT | Mod: HCNC

## 2019-01-01 PROCEDURE — 3078F PR MOST RECENT DIASTOLIC BLOOD PRESSURE < 80 MM HG: ICD-10-PCS | Mod: CPTII,HCNC,S$GLB, | Performed by: PHYSICIAN ASSISTANT

## 2019-01-01 PROCEDURE — C9604 PERC D-E COR REVASC T CABG S: HCPCS | Mod: LD,HCNC | Performed by: INTERNAL MEDICINE

## 2019-01-01 PROCEDURE — 99223 PR INITIAL HOSPITAL CARE,LEVL III: ICD-10-PCS | Mod: HCNC,,, | Performed by: NURSE PRACTITIONER

## 2019-01-01 PROCEDURE — 99214 OFFICE O/P EST MOD 30 MIN: CPT | Mod: HCNC,S$GLB,, | Performed by: INTERNAL MEDICINE

## 2019-01-01 PROCEDURE — 86901 BLOOD TYPING SEROLOGIC RH(D): CPT | Mod: HCNC

## 2019-01-01 PROCEDURE — 25000003 PHARM REV CODE 250: Mod: HCNC | Performed by: SURGERY

## 2019-01-01 PROCEDURE — 63600175 PHARM REV CODE 636 W HCPCS: Mod: HCNC | Performed by: NURSE ANESTHETIST, CERTIFIED REGISTERED

## 2019-01-01 PROCEDURE — 99291 PR CRITICAL CARE, E/M 30-74 MINUTES: ICD-10-PCS | Mod: HCNC,GC,, | Performed by: HOSPITALIST

## 2019-01-01 PROCEDURE — 81002 URINALYSIS NONAUTO W/O SCOPE: CPT | Mod: HCNC,S$GLB,, | Performed by: NURSE PRACTITIONER

## 2019-01-01 PROCEDURE — 99203 OFFICE O/P NEW LOW 30 MIN: CPT | Mod: HCNC,S$GLB,, | Performed by: PHYSICIAN ASSISTANT

## 2019-01-01 PROCEDURE — 92134 CPTRZ OPH DX IMG PST SGM RTA: CPT | Mod: HCNC,S$GLB,, | Performed by: OPHTHALMOLOGY

## 2019-01-01 PROCEDURE — A9585 GADOBUTROL INJECTION: HCPCS | Mod: HCNC | Performed by: INTERNAL MEDICINE

## 2019-01-01 PROCEDURE — 99999 PR PBB SHADOW E&M-EST. PATIENT-LVL II: CPT | Mod: PBBFAC,HCNC,,

## 2019-01-01 PROCEDURE — 3074F SYST BP LT 130 MM HG: CPT | Mod: HCNC,CPTII,GC,S$GLB | Performed by: INTERNAL MEDICINE

## 2019-01-01 PROCEDURE — 99499 RISK ADDL DX/OHS AUDIT: ICD-10-PCS | Mod: HCNC,S$GLB,, | Performed by: PODIATRIST

## 2019-01-01 PROCEDURE — 92226 PR SPECIAL EYE EXAM, SUBSEQUENT: CPT | Mod: 50,HCNC,S$GLB, | Performed by: OPHTHALMOLOGY

## 2019-01-01 PROCEDURE — 99999 PR PBB SHADOW E&M-EST. PATIENT-LVL II: ICD-10-PCS | Mod: PBBFAC,HCNC,, | Performed by: OPHTHALMOLOGY

## 2019-01-01 PROCEDURE — 27201423 OPTIME MED/SURG SUP & DEVICES STERILE SUPPLY: Mod: HCNC | Performed by: SURGERY

## 2019-01-01 PROCEDURE — 99499 UNLISTED E&M SERVICE: CPT | Mod: HCNC,S$GLB,, | Performed by: PODIATRIST

## 2019-01-01 PROCEDURE — 99233 SBSQ HOSP IP/OBS HIGH 50: CPT | Mod: HCNC,,, | Performed by: PODIATRIST

## 2019-01-01 PROCEDURE — 99213 PR OFFICE/OUTPT VISIT, EST, LEVL III, 20-29 MIN: ICD-10-PCS | Mod: HCNC,GC,S$GLB, | Performed by: INTERNAL MEDICINE

## 2019-01-01 PROCEDURE — 85007 BL SMEAR W/DIFF WBC COUNT: CPT | Mod: 91,HCNC

## 2019-01-01 PROCEDURE — A9555 PR RB82 RUBIDIUM: ICD-10-PCS | Mod: HCNC,S$GLB,, | Performed by: INTERNAL MEDICINE

## 2019-01-01 PROCEDURE — 99999 PR PBB SHADOW E&M-EST. PATIENT-LVL II: ICD-10-PCS | Mod: PBBFAC,HCNC,,

## 2019-01-01 PROCEDURE — 99499 RISK ADDL DX/OHS AUDIT: ICD-10-PCS | Mod: HCNC,S$GLB,, | Performed by: PHYSICIAN ASSISTANT

## 2019-01-01 PROCEDURE — 99233 SBSQ HOSP IP/OBS HIGH 50: CPT | Mod: HCNC,GC,, | Performed by: HOSPITALIST

## 2019-01-01 PROCEDURE — 85652 RBC SED RATE AUTOMATED: CPT | Mod: HCNC

## 2019-01-01 PROCEDURE — 36000706: Mod: HCNC | Performed by: SURGERY

## 2019-01-01 PROCEDURE — 93923 PR NON-INVASIVE PHYSIOLOGIC STUDY EXTREMITY 3 LEVELS: ICD-10-PCS | Mod: HCNC,S$GLB,, | Performed by: SURGERY

## 2019-01-01 PROCEDURE — 3078F PR MOST RECENT DIASTOLIC BLOOD PRESSURE < 80 MM HG: ICD-10-PCS | Mod: CPTII,HCNC,S$GLB, | Performed by: INTERNAL MEDICINE

## 2019-01-01 PROCEDURE — 99223 1ST HOSP IP/OBS HIGH 75: CPT | Mod: HCNC,,, | Performed by: PHYSICIAN ASSISTANT

## 2019-01-01 PROCEDURE — 37000009 HC ANESTHESIA EA ADD 15 MINS: Mod: HCNC | Performed by: SURGERY

## 2019-01-01 PROCEDURE — 99215 OFFICE O/P EST HI 40 MIN: CPT | Mod: HCNC,S$GLB,, | Performed by: INTERNAL MEDICINE

## 2019-01-01 PROCEDURE — 88307 TISSUE SPECIMEN TO PATHOLOGY - SURGERY: ICD-10-PCS | Mod: 26,HCNC,, | Performed by: PATHOLOGY

## 2019-01-01 PROCEDURE — 99999 PR PBB SHADOW E&M-EST. PATIENT-LVL V: ICD-10-PCS | Mod: PBBFAC,HCNC,, | Performed by: INTERNAL MEDICINE

## 2019-01-01 PROCEDURE — 92134 POSTERIOR SEGMENT OCT RETINA (OCULAR COHERENCE TOMOGRAPHY)-BOTH EYES: ICD-10-PCS | Mod: HCNC,S$GLB,, | Performed by: OPHTHALMOLOGY

## 2019-01-01 PROCEDURE — 95951 PR EEG MONITORING/VIDEORECORD: CPT | Mod: 26,HCNC,, | Performed by: PSYCHIATRY & NEUROLOGY

## 2019-01-01 PROCEDURE — 99291 PR CRITICAL CARE, E/M 30-74 MINUTES: ICD-10-PCS | Mod: HCNC,,, | Performed by: INTERNAL MEDICINE

## 2019-01-01 PROCEDURE — 99233 SBSQ HOSP IP/OBS HIGH 50: CPT | Mod: HCNC,,, | Performed by: PHYSICIAN ASSISTANT

## 2019-01-01 PROCEDURE — 99233 PR SUBSEQUENT HOSPITAL CARE,LEVL III: ICD-10-PCS | Mod: HCNC,,, | Performed by: PHYSICIAN ASSISTANT

## 2019-01-01 PROCEDURE — C1894 INTRO/SHEATH, NON-LASER: HCPCS | Mod: HCNC

## 2019-01-01 PROCEDURE — 99499 UNLISTED E&M SERVICE: CPT | Mod: HCNC,S$GLB,, | Performed by: PHYSICIAN ASSISTANT

## 2019-01-01 PROCEDURE — 37228 PR TIB/PER REVASC W/TLA: CPT | Mod: HCNC,LT,, | Performed by: SURGERY

## 2019-01-01 PROCEDURE — 93005 ELECTROCARDIOGRAM TRACING: CPT | Mod: HCNC,59

## 2019-01-01 PROCEDURE — 83930 ASSAY OF BLOOD OSMOLALITY: CPT | Mod: HCNC

## 2019-01-01 PROCEDURE — 92014 COMPRE OPH EXAM EST PT 1/>: CPT | Mod: HCNC,S$GLB,, | Performed by: OPHTHALMOLOGY

## 2019-01-01 PROCEDURE — 99999 PR PBB SHADOW E&M-EST. PATIENT-LVL I: CPT | Mod: PBBFAC,HCNC,, | Performed by: DIETITIAN, REGISTERED

## 2019-01-01 PROCEDURE — 33967 INSERT I-AORT PERCUT DEVICE: CPT | Mod: HCNC,78,, | Performed by: INTERNAL MEDICINE

## 2019-01-01 PROCEDURE — 81001 URINALYSIS AUTO W/SCOPE: CPT | Mod: HCNC

## 2019-01-01 PROCEDURE — C1725 CATH, TRANSLUMIN NON-LASER: HCPCS | Mod: HCNC | Performed by: SURGERY

## 2019-01-01 PROCEDURE — 3074F PR MOST RECENT SYSTOLIC BLOOD PRESSURE < 130 MM HG: ICD-10-PCS | Mod: CPTII,HCNC,S$GLB, | Performed by: PHYSICIAN ASSISTANT

## 2019-01-01 PROCEDURE — 25000003 PHARM REV CODE 250: Mod: HCNC

## 2019-01-01 PROCEDURE — 99231 SBSQ HOSP IP/OBS SF/LOW 25: CPT | Mod: HCNC,,, | Performed by: NURSE PRACTITIONER

## 2019-01-01 PROCEDURE — 92014 PR EYE EXAM, EST PATIENT,COMPREHESV: ICD-10-PCS | Mod: HCNC,S$GLB,, | Performed by: OPHTHALMOLOGY

## 2019-01-01 PROCEDURE — 1101F PR PT FALLS ASSESS DOC 0-1 FALLS W/OUT INJ PAST YR: ICD-10-PCS | Mod: CPTII,HCNC,S$GLB, | Performed by: INTERNAL MEDICINE

## 2019-01-01 PROCEDURE — 87086 URINE CULTURE/COLONY COUNT: CPT | Mod: HCNC

## 2019-01-01 PROCEDURE — 86850 RBC ANTIBODY SCREEN: CPT | Mod: HCNC

## 2019-01-01 PROCEDURE — 85576 BLOOD PLATELET AGGREGATION: CPT | Mod: HCNC

## 2019-01-01 PROCEDURE — 27100094 HC IABP, SET-UP: Mod: HCNC

## 2019-01-01 PROCEDURE — 3078F DIAST BP <80 MM HG: CPT | Mod: HCNC,CPTII,GC,S$GLB | Performed by: INTERNAL MEDICINE

## 2019-01-01 PROCEDURE — 99999 PR PBB SHADOW E&M-EST. PATIENT-LVL III: CPT | Mod: PBBFAC,HCNC,, | Performed by: NURSE PRACTITIONER

## 2019-01-01 PROCEDURE — 99214 OFFICE O/P EST MOD 30 MIN: CPT | Mod: 25,HCNC,S$GLB, | Performed by: PODIATRIST

## 2019-01-01 PROCEDURE — 99223 1ST HOSP IP/OBS HIGH 75: CPT | Mod: HCNC,,, | Performed by: NURSE PRACTITIONER

## 2019-01-01 PROCEDURE — 82962 GLUCOSE BLOOD TEST: CPT | Mod: HCNC

## 2019-01-01 PROCEDURE — 83615 LACTATE (LD) (LDH) ENZYME: CPT | Mod: HCNC

## 2019-01-01 PROCEDURE — 27202092 HC NEEDLE, EZ-IO: Mod: HCNC

## 2019-01-01 PROCEDURE — C9600 PERC DRUG-EL COR STENT SING: HCPCS | Mod: LC,HCNC | Performed by: INTERNAL MEDICINE

## 2019-01-01 PROCEDURE — 3074F SYST BP LT 130 MM HG: CPT | Mod: HCNC,CPTII,S$GLB, | Performed by: PODIATRIST

## 2019-01-01 PROCEDURE — 99214 OFFICE O/P EST MOD 30 MIN: CPT | Mod: 25,HCNC,S$GLB, | Performed by: NURSE PRACTITIONER

## 2019-01-01 PROCEDURE — 20600001 HC STEP DOWN PRIVATE ROOM: Mod: HCNC

## 2019-01-01 PROCEDURE — 31500 INSERT EMERGENCY AIRWAY: CPT | Mod: HCNC,,, | Performed by: ANESTHESIOLOGY

## 2019-01-01 PROCEDURE — 99999 PR PBB SHADOW E&M-EST. PATIENT-LVL II: ICD-10-PCS | Mod: PBBFAC,HCNC,, | Performed by: DERMATOLOGY

## 2019-01-01 PROCEDURE — 99233 SBSQ HOSP IP/OBS HIGH 50: CPT | Mod: 25,HCNC,, | Performed by: PODIATRIST

## 2019-01-01 PROCEDURE — 99203 OFFICE O/P NEW LOW 30 MIN: CPT | Mod: HCNC,S$GLB,, | Performed by: NURSE PRACTITIONER

## 2019-01-01 PROCEDURE — 99203 PR OFFICE/OUTPT VISIT, NEW, LEVL III, 30-44 MIN: ICD-10-PCS | Mod: HCNC,S$GLB,, | Performed by: PHYSICIAN ASSISTANT

## 2019-01-01 PROCEDURE — 99233 PR SUBSEQUENT HOSPITAL CARE,LEVL III: ICD-10-PCS | Mod: HCNC,GC,, | Performed by: HOSPITALIST

## 2019-01-01 PROCEDURE — 99223 1ST HOSP IP/OBS HIGH 75: CPT | Mod: HCNC,,, | Performed by: SURGERY

## 2019-01-01 PROCEDURE — 3078F DIAST BP <80 MM HG: CPT | Mod: CPTII,HCNC,S$GLB, | Performed by: PHYSICIAN ASSISTANT

## 2019-01-01 PROCEDURE — 92928 PR STENT: ICD-10-PCS | Mod: LC,HCNC,, | Performed by: INTERNAL MEDICINE

## 2019-01-01 PROCEDURE — 20220 PR BONE BIOPSY,TROCAR/NEEDLE SUPERF: ICD-10-PCS | Mod: HCNC,,, | Performed by: PODIATRIST

## 2019-01-01 PROCEDURE — 97161 PT EVAL LOW COMPLEX 20 MIN: CPT | Mod: HCNC

## 2019-01-01 DEVICE — STENT RONYX20015UX RESOLUTE ONYX 2.00X15
Type: IMPLANTABLE DEVICE | Site: HEART | Status: FUNCTIONAL
Brand: RESOLUTE ONYX™

## 2019-01-01 DEVICE — STENT RSINT25030UX MICROTRAC 2.50X30RX
Type: IMPLANTABLE DEVICE | Site: HEART | Status: FUNCTIONAL
Brand: RESOLUTE INTEGRITY™

## 2019-01-01 DEVICE — STENT RONYX40015UX RESOLUTE ONYX 4.00X15
Type: IMPLANTABLE DEVICE | Site: HEART | Status: FUNCTIONAL
Brand: RESOLUTE ONYX™

## 2019-01-01 RX ORDER — ALCOHOL 2.38 KG/3.79L
1 GEL TOPICAL DAILY
Qty: 90 CAPSULE | Refills: 5 | Status: SHIPPED | OUTPATIENT
Start: 2019-01-01

## 2019-01-01 RX ORDER — POTASSIUM CHLORIDE 14.9 MG/ML
60 INJECTION INTRAVENOUS
Status: DISCONTINUED | OUTPATIENT
Start: 2019-01-01 | End: 2019-01-01 | Stop reason: HOSPADM

## 2019-01-01 RX ORDER — INSULIN ASPART 100 [IU]/ML
0-5 INJECTION, SOLUTION INTRAVENOUS; SUBCUTANEOUS
Status: DISCONTINUED | OUTPATIENT
Start: 2019-01-01 | End: 2019-01-01

## 2019-01-01 RX ORDER — METOPROLOL SUCCINATE 50 MG/1
50 TABLET, EXTENDED RELEASE ORAL DAILY
Qty: 30 TABLET | Refills: 11 | Status: SHIPPED | OUTPATIENT
Start: 2019-01-01 | End: 2019-01-01 | Stop reason: SDUPTHER

## 2019-01-01 RX ORDER — METOPROLOL TARTRATE 25 MG/1
25 TABLET, FILM COATED ORAL ONCE
Status: DISCONTINUED | OUTPATIENT
Start: 2019-01-01 | End: 2019-01-01

## 2019-01-01 RX ORDER — DIPYRIDAMOLE 5 MG/ML
50.4 INJECTION INTRAVENOUS
Status: COMPLETED | OUTPATIENT
Start: 2019-01-01 | End: 2019-01-01

## 2019-01-01 RX ORDER — IBUPROFEN 200 MG
16 TABLET ORAL
Status: DISCONTINUED | OUTPATIENT
Start: 2019-01-01 | End: 2019-01-01

## 2019-01-01 RX ORDER — LIDOCAINE HCL/PF 100 MG/5ML
SYRINGE (ML) INTRAVENOUS
Status: DISCONTINUED | OUTPATIENT
Start: 2019-01-01 | End: 2019-01-01

## 2019-01-01 RX ORDER — ROCURONIUM BROMIDE 10 MG/ML
INJECTION, SOLUTION INTRAVENOUS
Status: COMPLETED
Start: 2019-01-01 | End: 2019-01-01

## 2019-01-01 RX ORDER — FENTANYL CITRATE 50 UG/ML
INJECTION, SOLUTION INTRAMUSCULAR; INTRAVENOUS
Status: DISCONTINUED | OUTPATIENT
Start: 2019-01-01 | End: 2019-01-01 | Stop reason: HOSPADM

## 2019-01-01 RX ORDER — DIGOXIN 0.25 MG/ML
500 INJECTION INTRAMUSCULAR; INTRAVENOUS
Status: DISCONTINUED | OUTPATIENT
Start: 2019-01-01 | End: 2019-01-01

## 2019-01-01 RX ORDER — CLOPIDOGREL BISULFATE 75 MG/1
75 TABLET ORAL DAILY
Qty: 90 TABLET | Refills: 4 | Status: SHIPPED | OUTPATIENT
Start: 2019-01-01 | End: 2019-01-01 | Stop reason: SDUPTHER

## 2019-01-01 RX ORDER — METOPROLOL TARTRATE 25 MG/1
12.5 TABLET ORAL 2 TIMES DAILY
Status: DISCONTINUED | OUTPATIENT
Start: 2019-01-01 | End: 2019-01-01

## 2019-01-01 RX ORDER — ROCURONIUM BROMIDE 10 MG/ML
INJECTION, SOLUTION INTRAVENOUS
Status: DISPENSED
Start: 2019-01-01 | End: 2019-01-01

## 2019-01-01 RX ORDER — FUROSEMIDE 10 MG/ML
80 INJECTION INTRAMUSCULAR; INTRAVENOUS 2 TIMES DAILY
Status: DISCONTINUED | OUTPATIENT
Start: 2019-01-01 | End: 2019-01-01 | Stop reason: HOSPADM

## 2019-01-01 RX ORDER — METOPROLOL SUCCINATE 50 MG/1
50 TABLET, EXTENDED RELEASE ORAL DAILY
Qty: 30 TABLET | Refills: 11 | Status: SHIPPED | OUTPATIENT
Start: 2019-01-01 | End: 2020-05-16

## 2019-01-01 RX ORDER — LIDOCAINE HYDROCHLORIDE ANHYDROUS AND DEXTROSE MONOHYDRATE .8; 5 G/100ML; G/100ML
2 INJECTION, SOLUTION INTRAVENOUS CONTINUOUS
Status: DISCONTINUED | OUTPATIENT
Start: 2019-01-01 | End: 2019-01-01

## 2019-01-01 RX ORDER — MIDAZOLAM HYDROCHLORIDE 1 MG/ML
INJECTION INTRAMUSCULAR; INTRAVENOUS
Status: COMPLETED
Start: 2019-01-01 | End: 2019-01-01

## 2019-01-01 RX ORDER — METFORMIN HYDROCHLORIDE 500 MG/1
TABLET ORAL
Qty: 180 TABLET | Refills: 2 | Status: SHIPPED | OUTPATIENT
Start: 2019-01-01

## 2019-01-01 RX ORDER — HEPARIN SODIUM,PORCINE/D5W 25000/250
12 INTRAVENOUS SOLUTION INTRAVENOUS CONTINUOUS
Status: DISCONTINUED | OUTPATIENT
Start: 2019-01-01 | End: 2019-01-01

## 2019-01-01 RX ORDER — FUROSEMIDE 10 MG/ML
80 INJECTION INTRAMUSCULAR; INTRAVENOUS 2 TIMES DAILY
Status: DISCONTINUED | OUTPATIENT
Start: 2019-01-01 | End: 2019-01-01

## 2019-01-01 RX ORDER — DIPHENHYDRAMINE HCL 25 MG
50 CAPSULE ORAL ONCE
Status: CANCELLED | OUTPATIENT
Start: 2019-01-01 | End: 2019-01-01

## 2019-01-01 RX ORDER — VANCOMYCIN HCL IN 5 % DEXTROSE 1.5G/250ML
1500 PLASTIC BAG, INJECTION (ML) INTRAVENOUS
Status: DISCONTINUED | OUTPATIENT
Start: 2019-01-01 | End: 2019-01-01

## 2019-01-01 RX ORDER — DOXYCYCLINE 100 MG/1
100 CAPSULE ORAL 2 TIMES DAILY
Qty: 28 CAPSULE | Refills: 0 | Status: SHIPPED | OUTPATIENT
Start: 2019-01-01 | End: 2019-01-01

## 2019-01-01 RX ORDER — INSULIN ASPART 100 [IU]/ML
1-15 INJECTION, SOLUTION INTRAVENOUS; SUBCUTANEOUS
Status: DISCONTINUED | OUTPATIENT
Start: 2019-01-01 | End: 2019-01-01

## 2019-01-01 RX ORDER — MIDAZOLAM HYDROCHLORIDE 1 MG/ML
INJECTION, SOLUTION INTRAMUSCULAR; INTRAVENOUS
Status: DISCONTINUED | OUTPATIENT
Start: 2019-01-01 | End: 2019-01-01 | Stop reason: HOSPADM

## 2019-01-01 RX ORDER — MIDAZOLAM HYDROCHLORIDE 1 MG/ML
INJECTION, SOLUTION INTRAMUSCULAR; INTRAVENOUS
Status: DISCONTINUED | OUTPATIENT
Start: 2019-01-01 | End: 2019-01-01

## 2019-01-01 RX ORDER — INSULIN ASPART 100 [IU]/ML
1-10 INJECTION, SOLUTION INTRAVENOUS; SUBCUTANEOUS EVERY 6 HOURS PRN
Status: DISCONTINUED | OUTPATIENT
Start: 2019-01-01 | End: 2019-01-01

## 2019-01-01 RX ORDER — CLOPIDOGREL BISULFATE 75 MG/1
75 TABLET ORAL DAILY
Status: DISCONTINUED | OUTPATIENT
Start: 2019-01-01 | End: 2019-01-01

## 2019-01-01 RX ORDER — HEPARIN SODIUM 1000 [USP'U]/ML
INJECTION, SOLUTION INTRAVENOUS; SUBCUTANEOUS
Status: DISCONTINUED | OUTPATIENT
Start: 2019-01-01 | End: 2019-01-01 | Stop reason: HOSPADM

## 2019-01-01 RX ORDER — ATORVASTATIN CALCIUM 20 MG/1
40 TABLET, FILM COATED ORAL NIGHTLY
Status: DISCONTINUED | OUTPATIENT
Start: 2019-01-01 | End: 2019-01-01 | Stop reason: HOSPADM

## 2019-01-01 RX ORDER — GADOBUTROL 604.72 MG/ML
10 INJECTION INTRAVENOUS
Status: COMPLETED | OUTPATIENT
Start: 2019-01-01 | End: 2019-01-01

## 2019-01-01 RX ORDER — VANCOMYCIN 2 GRAM/500 ML IN 0.9 % SODIUM CHLORIDE INTRAVENOUS
20 ONCE
Status: COMPLETED | OUTPATIENT
Start: 2019-01-01 | End: 2019-01-01

## 2019-01-01 RX ORDER — SODIUM CHLORIDE 0.9 % (FLUSH) 0.9 %
3 SYRINGE (ML) INJECTION
Status: DISCONTINUED | OUTPATIENT
Start: 2019-01-01 | End: 2019-01-01 | Stop reason: HOSPADM

## 2019-01-01 RX ORDER — POTASSIUM CHLORIDE 20 MEQ/1
40 TABLET, EXTENDED RELEASE ORAL ONCE
Status: COMPLETED | OUTPATIENT
Start: 2019-01-01 | End: 2019-01-01

## 2019-01-01 RX ORDER — LISINOPRIL AND HYDROCHLOROTHIAZIDE 12.5; 2 MG/1; MG/1
1 TABLET ORAL 2 TIMES DAILY
Qty: 180 TABLET | Refills: 3 | Status: SHIPPED | OUTPATIENT
Start: 2019-01-01 | End: 2020-01-25

## 2019-01-01 RX ORDER — GLUCAGON 1 MG
1 KIT INJECTION
Status: DISCONTINUED | OUTPATIENT
Start: 2019-01-01 | End: 2019-01-01

## 2019-01-01 RX ORDER — CEFAZOLIN SODIUM 1 G/3ML
INJECTION, POWDER, FOR SOLUTION INTRAMUSCULAR; INTRAVENOUS
Status: DISCONTINUED | OUTPATIENT
Start: 2019-01-01 | End: 2019-01-01 | Stop reason: HOSPADM

## 2019-01-01 RX ORDER — CLOPIDOGREL BISULFATE 75 MG/1
TABLET ORAL
Qty: 90 TABLET | Refills: 4 | Status: SHIPPED | OUTPATIENT
Start: 2019-01-01

## 2019-01-01 RX ORDER — METOPROLOL TARTRATE 1 MG/ML
5 INJECTION, SOLUTION INTRAVENOUS ONCE
Status: DISCONTINUED | OUTPATIENT
Start: 2019-01-01 | End: 2019-01-01

## 2019-01-01 RX ORDER — NITROGLYCERIN 0.4 MG/1
0.4 TABLET SUBLINGUAL EVERY 5 MIN PRN
Status: DISCONTINUED | OUTPATIENT
Start: 2019-01-01 | End: 2019-01-01 | Stop reason: HOSPADM

## 2019-01-01 RX ORDER — LISINOPRIL 10 MG/1
10 TABLET ORAL DAILY
Status: DISCONTINUED | OUTPATIENT
Start: 2019-01-01 | End: 2019-01-01

## 2019-01-01 RX ORDER — DOCUSATE SODIUM 100 MG/1
100 CAPSULE, LIQUID FILLED ORAL EVERY OTHER DAY
COMMUNITY

## 2019-01-01 RX ORDER — NAPROXEN SODIUM 220 MG/1
81 TABLET, FILM COATED ORAL DAILY
Status: DISCONTINUED | OUTPATIENT
Start: 2019-01-01 | End: 2019-01-01 | Stop reason: ALTCHOICE

## 2019-01-01 RX ORDER — FUROSEMIDE 10 MG/ML
40 INJECTION INTRAMUSCULAR; INTRAVENOUS 2 TIMES DAILY
Status: DISCONTINUED | OUTPATIENT
Start: 2019-01-01 | End: 2019-01-01

## 2019-01-01 RX ORDER — CLOPIDOGREL BISULFATE 75 MG/1
75 TABLET ORAL DAILY
Qty: 30 TABLET | Refills: 11 | Status: SHIPPED | OUTPATIENT
Start: 2019-01-01 | End: 2019-01-01 | Stop reason: SDUPTHER

## 2019-01-01 RX ORDER — FUROSEMIDE 10 MG/ML
40 INJECTION INTRAMUSCULAR; INTRAVENOUS ONCE
Status: COMPLETED | OUTPATIENT
Start: 2019-01-01 | End: 2019-01-01

## 2019-01-01 RX ORDER — FUROSEMIDE 10 MG/ML
80 INJECTION INTRAMUSCULAR; INTRAVENOUS 3 TIMES DAILY
Status: DISCONTINUED | OUTPATIENT
Start: 2019-01-01 | End: 2019-01-01

## 2019-01-01 RX ORDER — VERAPAMIL HYDROCHLORIDE 2.5 MG/ML
INJECTION, SOLUTION INTRAVENOUS
Status: DISCONTINUED | OUTPATIENT
Start: 2019-01-01 | End: 2019-01-01 | Stop reason: HOSPADM

## 2019-01-01 RX ORDER — MAGNESIUM SULFATE HEPTAHYDRATE 40 MG/ML
2 INJECTION, SOLUTION INTRAVENOUS ONCE
Status: COMPLETED | OUTPATIENT
Start: 2019-01-01 | End: 2019-01-01

## 2019-01-01 RX ORDER — SODIUM CHLORIDE 9 MG/ML
INJECTION, SOLUTION INTRAVENOUS CONTINUOUS
Status: ACTIVE | OUTPATIENT
Start: 2019-01-01 | End: 2019-01-01

## 2019-01-01 RX ORDER — HYDROCHLOROTHIAZIDE 12.5 MG/1
12.5 TABLET ORAL DAILY
Status: DISCONTINUED | OUTPATIENT
Start: 2019-01-01 | End: 2019-01-01

## 2019-01-01 RX ORDER — INSULIN ASPART 100 [IU]/ML
1-10 INJECTION, SOLUTION INTRAVENOUS; SUBCUTANEOUS
Status: DISCONTINUED | OUTPATIENT
Start: 2019-01-01 | End: 2019-01-01

## 2019-01-01 RX ORDER — METFORMIN HYDROCHLORIDE 500 MG/1
500 TABLET ORAL 2 TIMES DAILY
Qty: 180 TABLET | Refills: 2 | Status: ON HOLD | OUTPATIENT
Start: 2019-01-01 | End: 2019-01-01 | Stop reason: SDUPTHER

## 2019-01-01 RX ORDER — EPHEDRINE SULFATE 50 MG/ML
INJECTION, SOLUTION INTRAVENOUS
Status: DISCONTINUED | OUTPATIENT
Start: 2019-01-01 | End: 2019-01-01

## 2019-01-01 RX ORDER — PROPOFOL 10 MG/ML
INJECTION, EMULSION INTRAVENOUS
Status: DISPENSED
Start: 2019-01-01 | End: 2019-01-01

## 2019-01-01 RX ORDER — AMINOPHYLLINE 25 MG/ML
75 INJECTION, SOLUTION INTRAVENOUS
Status: COMPLETED | OUTPATIENT
Start: 2019-01-01 | End: 2019-01-01

## 2019-01-01 RX ORDER — ASPIRIN 81 MG/1
81 TABLET ORAL DAILY
Status: DISCONTINUED | OUTPATIENT
Start: 2019-01-01 | End: 2019-01-01

## 2019-01-01 RX ORDER — ASPIRIN 325 MG
325 TABLET ORAL ONCE
Status: COMPLETED | OUTPATIENT
Start: 2019-01-01 | End: 2019-01-01

## 2019-01-01 RX ORDER — LIDOCAINE HYDROCHLORIDE ANHYDROUS AND DEXTROSE MONOHYDRATE .8; 5 G/100ML; G/100ML
1 INJECTION, SOLUTION INTRAVENOUS CONTINUOUS
Status: DISCONTINUED | OUTPATIENT
Start: 2019-01-01 | End: 2019-01-01

## 2019-01-01 RX ORDER — METOPROLOL TARTRATE 1 MG/ML
5 INJECTION, SOLUTION INTRAVENOUS ONCE
Status: COMPLETED | OUTPATIENT
Start: 2019-01-01 | End: 2019-01-01

## 2019-01-01 RX ORDER — ASPIRIN 325 MG
325 TABLET ORAL DAILY
Status: DISCONTINUED | OUTPATIENT
Start: 2019-01-01 | End: 2019-01-01

## 2019-01-01 RX ORDER — ROCURONIUM BROMIDE 10 MG/ML
INJECTION, SOLUTION INTRAVENOUS
Status: DISCONTINUED
Start: 2019-01-01 | End: 2019-01-01 | Stop reason: WASHOUT

## 2019-01-01 RX ORDER — PROPOFOL 10 MG/ML
5 INJECTION, EMULSION INTRAVENOUS CONTINUOUS
Status: DISCONTINUED | OUTPATIENT
Start: 2019-01-01 | End: 2019-01-01

## 2019-01-01 RX ORDER — ACETAMINOPHEN 650 MG/20.3ML
650 LIQUID ORAL EVERY 6 HOURS
Status: DISCONTINUED | OUTPATIENT
Start: 2019-01-01 | End: 2019-01-01

## 2019-01-01 RX ORDER — GLUCAGON 1 MG
1 KIT INJECTION
Status: DISCONTINUED | OUTPATIENT
Start: 2019-01-01 | End: 2019-01-01 | Stop reason: HOSPADM

## 2019-01-01 RX ORDER — SODIUM CHLORIDE 9 MG/ML
3 INJECTION, SOLUTION INTRAVENOUS CONTINUOUS
Status: CANCELLED | OUTPATIENT
Start: 2019-01-01 | End: 2019-01-01

## 2019-01-01 RX ORDER — FENTANYL CITRATE 50 UG/ML
INJECTION, SOLUTION INTRAMUSCULAR; INTRAVENOUS
Status: DISCONTINUED | OUTPATIENT
Start: 2019-01-01 | End: 2019-01-01

## 2019-01-01 RX ORDER — NOREPINEPHRINE BITARTRATE/D5W 4MG/250ML
0.02 PLASTIC BAG, INJECTION (ML) INTRAVENOUS CONTINUOUS
Status: DISCONTINUED | OUTPATIENT
Start: 2019-01-01 | End: 2019-01-01

## 2019-01-01 RX ORDER — NOREPINEPHRINE BITARTRATE/D5W 4MG/250ML
PLASTIC BAG, INJECTION (ML) INTRAVENOUS
Status: COMPLETED
Start: 2019-01-01 | End: 2019-01-01

## 2019-01-01 RX ORDER — DIGOXIN 0.25 MG/ML
500 INJECTION INTRAMUSCULAR; INTRAVENOUS ONCE
Status: DISCONTINUED | OUTPATIENT
Start: 2019-01-01 | End: 2019-01-01

## 2019-01-01 RX ORDER — SODIUM CHLORIDE 9 MG/ML
INJECTION, SOLUTION INTRAVENOUS ONCE
Status: CANCELLED | OUTPATIENT
Start: 2019-01-01 | End: 2019-01-01

## 2019-01-01 RX ORDER — IBUPROFEN 200 MG
24 TABLET ORAL
Status: DISCONTINUED | OUTPATIENT
Start: 2019-01-01 | End: 2019-01-01

## 2019-01-01 RX ORDER — PHENYLEPHRINE HCL IN 0.9% NACL 1 MG/10 ML
SYRINGE (ML) INTRAVENOUS
Status: DISCONTINUED
Start: 2019-01-01 | End: 2019-01-01 | Stop reason: WASHOUT

## 2019-01-01 RX ORDER — NITROGLYCERIN 400 UG/1
1 SPRAY ORAL EVERY 5 MIN PRN
Status: SHIPPED | OUTPATIENT
Start: 2019-01-01

## 2019-01-01 RX ORDER — FENTANYL CITRATE 50 UG/ML
25 INJECTION, SOLUTION INTRAMUSCULAR; INTRAVENOUS EVERY 5 MIN PRN
Status: DISCONTINUED | OUTPATIENT
Start: 2019-01-01 | End: 2019-01-01 | Stop reason: HOSPADM

## 2019-01-01 RX ORDER — INSULIN ASPART 100 [IU]/ML
0-5 INJECTION, SOLUTION INTRAVENOUS; SUBCUTANEOUS EVERY 6 HOURS PRN
Status: DISCONTINUED | OUTPATIENT
Start: 2019-01-01 | End: 2019-01-01

## 2019-01-01 RX ORDER — SODIUM CHLORIDE 9 MG/ML
3 INJECTION, SOLUTION INTRAVENOUS CONTINUOUS
Status: ACTIVE | OUTPATIENT
Start: 2019-01-01 | End: 2019-01-01

## 2019-01-01 RX ORDER — ATORVASTATIN CALCIUM 20 MG/1
40 TABLET, FILM COATED ORAL NIGHTLY
Status: DISCONTINUED | OUTPATIENT
Start: 2019-01-01 | End: 2019-01-01

## 2019-01-01 RX ORDER — ETOMIDATE 2 MG/ML
INJECTION INTRAVENOUS
Status: DISCONTINUED | OUTPATIENT
Start: 2019-01-01 | End: 2019-01-01

## 2019-01-01 RX ORDER — HEPARIN SODIUM 200 [USP'U]/100ML
INJECTION, SOLUTION INTRAVENOUS
Status: DISCONTINUED | OUTPATIENT
Start: 2019-01-01 | End: 2019-01-01 | Stop reason: HOSPADM

## 2019-01-01 RX ORDER — METFORMIN HYDROCHLORIDE 500 MG/1
500 TABLET ORAL 2 TIMES DAILY
COMMUNITY
Start: 2019-01-01 | End: 2019-01-01 | Stop reason: SDUPTHER

## 2019-01-01 RX ORDER — LIDOCAINE HYDROCHLORIDE 10 MG/ML
INJECTION, SOLUTION EPIDURAL; INFILTRATION; INTRACAUDAL; PERINEURAL
Status: DISCONTINUED | OUTPATIENT
Start: 2019-01-01 | End: 2019-01-01 | Stop reason: HOSPADM

## 2019-01-01 RX ORDER — VANCOMYCIN 2 GRAM/500 ML IN 0.9 % SODIUM CHLORIDE INTRAVENOUS
20 ONCE
Status: DISCONTINUED | OUTPATIENT
Start: 2019-01-01 | End: 2019-01-01

## 2019-01-01 RX ORDER — EPINEPHRINE 0.1 MG/ML
INJECTION INTRAVENOUS CODE/TRAUMA/SEDATION MEDICATION
Status: COMPLETED | OUTPATIENT
Start: 2019-01-01 | End: 2019-01-01

## 2019-01-01 RX ORDER — PHENYLEPHRINE HYDROCHLORIDE 10 MG/ML
INJECTION INTRAVENOUS
Status: DISCONTINUED | OUTPATIENT
Start: 2019-01-01 | End: 2019-01-01 | Stop reason: HOSPADM

## 2019-01-01 RX ORDER — IBUPROFEN 200 MG
24 TABLET ORAL
Status: DISCONTINUED | OUTPATIENT
Start: 2019-01-01 | End: 2019-01-01 | Stop reason: SDUPTHER

## 2019-01-01 RX ORDER — NITROGLYCERIN 5 MG/ML
INJECTION, SOLUTION INTRAVENOUS
Status: DISCONTINUED | OUTPATIENT
Start: 2019-01-01 | End: 2019-01-01 | Stop reason: HOSPADM

## 2019-01-01 RX ORDER — NITROGLYCERIN 0.4 MG/1
0.4 TABLET SUBLINGUAL EVERY 5 MIN PRN
Qty: 100 TABLET | Refills: 3 | Status: SHIPPED | OUTPATIENT
Start: 2019-01-01

## 2019-01-01 RX ORDER — PROTAMINE SULFATE 10 MG/ML
INJECTION, SOLUTION INTRAVENOUS
Status: DISCONTINUED | OUTPATIENT
Start: 2019-01-01 | End: 2019-01-01

## 2019-01-01 RX ORDER — FENTANYL CITRATE-0.9 % NACL/PF 10 MCG/ML
PLASTIC BAG, INJECTION (ML) INTRAVENOUS CONTINUOUS
Status: DISCONTINUED | OUTPATIENT
Start: 2019-01-01 | End: 2019-01-01 | Stop reason: HOSPADM

## 2019-01-01 RX ORDER — SODIUM NITROPRUSSIDE 25 MG/ML
INJECTION INTRAVENOUS
Status: DISCONTINUED | OUTPATIENT
Start: 2019-01-01 | End: 2019-01-01 | Stop reason: HOSPADM

## 2019-01-01 RX ORDER — FUROSEMIDE 10 MG/ML
80 INJECTION INTRAMUSCULAR; INTRAVENOUS ONCE
Status: COMPLETED | OUTPATIENT
Start: 2019-01-01 | End: 2019-01-01

## 2019-01-01 RX ORDER — MORPHINE SULFATE 2 MG/ML
1 INJECTION, SOLUTION INTRAMUSCULAR; INTRAVENOUS EVERY 6 HOURS PRN
Status: DISCONTINUED | OUTPATIENT
Start: 2019-01-01 | End: 2019-01-01

## 2019-01-01 RX ORDER — MAGNESIUM SULFATE HEPTAHYDRATE 40 MG/ML
2 INJECTION, SOLUTION INTRAVENOUS
Status: COMPLETED | OUTPATIENT
Start: 2019-01-01 | End: 2019-01-01

## 2019-01-01 RX ORDER — AMPICILLIN 500 MG/1
500 CAPSULE ORAL 4 TIMES DAILY
Qty: 40 CAPSULE | Refills: 0 | Status: SHIPPED | OUTPATIENT
Start: 2019-01-01 | End: 2019-01-01

## 2019-01-01 RX ORDER — INSULIN ASPART 100 [IU]/ML
0-5 INJECTION, SOLUTION INTRAVENOUS; SUBCUTANEOUS EVERY 6 HOURS PRN
Status: DISCONTINUED | OUTPATIENT
Start: 2019-01-01 | End: 2019-01-01 | Stop reason: HOSPADM

## 2019-01-01 RX ORDER — METOPROLOL TARTRATE 1 MG/ML
INJECTION, SOLUTION INTRAVENOUS
Status: DISCONTINUED
Start: 2019-01-01 | End: 2019-01-01 | Stop reason: WASHOUT

## 2019-01-01 RX ORDER — INSULIN ASPART 100 [IU]/ML
1-15 INJECTION, SOLUTION INTRAVENOUS; SUBCUTANEOUS EVERY 6 HOURS PRN
Status: DISCONTINUED | OUTPATIENT
Start: 2019-01-01 | End: 2019-01-01

## 2019-01-01 RX ORDER — LIDOCAINE HYDROCHLORIDE 20 MG/ML
INJECTION, SOLUTION EPIDURAL; INFILTRATION; INTRACAUDAL; PERINEURAL
Status: DISCONTINUED | OUTPATIENT
Start: 2019-01-01 | End: 2019-01-01 | Stop reason: HOSPADM

## 2019-01-01 RX ORDER — METOPROLOL TARTRATE 25 MG/1
25 TABLET, FILM COATED ORAL ONCE
Status: COMPLETED | OUTPATIENT
Start: 2019-01-01 | End: 2019-01-01

## 2019-01-01 RX ORDER — POTASSIUM CHLORIDE 29.8 MG/ML
80 INJECTION INTRAVENOUS
Status: DISCONTINUED | OUTPATIENT
Start: 2019-01-01 | End: 2019-01-01 | Stop reason: HOSPADM

## 2019-01-01 RX ORDER — FLUTICASONE PROPIONATE 50 MCG
1 SPRAY, SUSPENSION (ML) NASAL
COMMUNITY

## 2019-01-01 RX ORDER — GLUCAGON 1 MG
1 KIT INJECTION
Status: DISCONTINUED | OUTPATIENT
Start: 2019-01-01 | End: 2019-01-01 | Stop reason: SDUPTHER

## 2019-01-01 RX ORDER — CLOTRIMAZOLE AND BETAMETHASONE DIPROPIONATE 10; .64 MG/G; MG/G
CREAM TOPICAL
Qty: 15 G | Refills: 0 | Status: SHIPPED | OUTPATIENT
Start: 2019-01-01

## 2019-01-01 RX ORDER — SODIUM CHLORIDE 0.9 % (FLUSH) 0.9 %
5 SYRINGE (ML) INJECTION
Status: DISCONTINUED | OUTPATIENT
Start: 2019-01-01 | End: 2019-01-01 | Stop reason: SDUPTHER

## 2019-01-01 RX ORDER — POLYETHYLENE GLYCOL 3350 17 G/17G
17 POWDER, FOR SOLUTION ORAL 2 TIMES DAILY
Status: DISCONTINUED | OUTPATIENT
Start: 2019-01-01 | End: 2019-01-01 | Stop reason: HOSPADM

## 2019-01-01 RX ORDER — CLOPIDOGREL BISULFATE 75 MG/1
75 TABLET ORAL DAILY
Status: DISCONTINUED | OUTPATIENT
Start: 2019-01-01 | End: 2019-01-01 | Stop reason: HOSPADM

## 2019-01-01 RX ORDER — LIDOCAINE HYDROCHLORIDE ANHYDROUS AND DEXTROSE MONOHYDRATE .8; 5 G/100ML; G/100ML
0.5 INJECTION, SOLUTION INTRAVENOUS CONTINUOUS
Status: DISPENSED | OUTPATIENT
Start: 2019-01-01 | End: 2019-01-01

## 2019-01-01 RX ORDER — DIPHENHYDRAMINE HCL 50 MG
50 CAPSULE ORAL ONCE
Status: COMPLETED | OUTPATIENT
Start: 2019-01-01 | End: 2019-01-01

## 2019-01-01 RX ORDER — POTASSIUM CHLORIDE 29.8 MG/ML
40 INJECTION INTRAVENOUS
Status: DISCONTINUED | OUTPATIENT
Start: 2019-01-01 | End: 2019-01-01 | Stop reason: HOSPADM

## 2019-01-01 RX ORDER — CEFAZOLIN SODIUM 1 G/3ML
2 INJECTION, POWDER, FOR SOLUTION INTRAMUSCULAR; INTRAVENOUS
Status: DISCONTINUED | OUTPATIENT
Start: 2019-01-01 | End: 2019-01-01 | Stop reason: HOSPADM

## 2019-01-01 RX ORDER — MINERAL OIL
180 ENEMA (ML) RECTAL DAILY PRN
COMMUNITY

## 2019-01-01 RX ORDER — CEFTRIAXONE 1 G/1
1 INJECTION, POWDER, FOR SOLUTION INTRAMUSCULAR; INTRAVENOUS
Status: DISCONTINUED | OUTPATIENT
Start: 2019-01-01 | End: 2019-01-01

## 2019-01-01 RX ORDER — LEVALBUTEROL 1.25 MG/.5ML
1.25 SOLUTION, CONCENTRATE RESPIRATORY (INHALATION) EVERY 6 HOURS PRN
Status: DISCONTINUED | OUTPATIENT
Start: 2019-01-01 | End: 2019-01-01

## 2019-01-01 RX ORDER — FAMOTIDINE 10 MG/ML
20 INJECTION INTRAVENOUS 2 TIMES DAILY
Status: DISCONTINUED | OUTPATIENT
Start: 2019-01-01 | End: 2019-01-01 | Stop reason: HOSPADM

## 2019-01-01 RX ORDER — PROPOFOL 10 MG/ML
VIAL (ML) INTRAVENOUS CONTINUOUS PRN
Status: DISCONTINUED | OUTPATIENT
Start: 2019-01-01 | End: 2019-01-01

## 2019-01-01 RX ORDER — LIDOCAINE HYDROCHLORIDE ANHYDROUS AND DEXTROSE MONOHYDRATE .8; 5 G/100ML; G/100ML
0.5 INJECTION, SOLUTION INTRAVENOUS CONTINUOUS
Status: DISCONTINUED | OUTPATIENT
Start: 2019-01-01 | End: 2019-01-01

## 2019-01-01 RX ORDER — CHLORHEXIDINE GLUCONATE ORAL RINSE 1.2 MG/ML
15 SOLUTION DENTAL 2 TIMES DAILY
Status: DISCONTINUED | OUTPATIENT
Start: 2019-01-01 | End: 2019-01-01 | Stop reason: ALTCHOICE

## 2019-01-01 RX ORDER — FENTANYL CITRATE 50 UG/ML
INJECTION, SOLUTION INTRAMUSCULAR; INTRAVENOUS
Status: DISPENSED
Start: 2019-01-01 | End: 2019-01-01

## 2019-01-01 RX ORDER — LIDOCAINE HYDROCHLORIDE ANHYDROUS AND DEXTROSE MONOHYDRATE .8; 5 G/100ML; G/100ML
1 INJECTION, SOLUTION INTRAVENOUS CONTINUOUS
Status: DISCONTINUED | OUTPATIENT
Start: 2019-01-01 | End: 2019-01-01 | Stop reason: HOSPADM

## 2019-01-01 RX ORDER — CLOPIDOGREL BISULFATE 75 MG/1
TABLET ORAL
Qty: 30 TABLET | Refills: 11 | Status: ON HOLD | OUTPATIENT
Start: 2019-01-01 | End: 2019-01-01 | Stop reason: SDUPTHER

## 2019-01-01 RX ORDER — METOPROLOL SUCCINATE 25 MG/1
50 TABLET, EXTENDED RELEASE ORAL NIGHTLY
Status: DISCONTINUED | OUTPATIENT
Start: 2019-01-01 | End: 2019-01-01

## 2019-01-01 RX ORDER — LIDOCAINE HCL/PF 100 MG/5ML
SYRINGE (ML) INTRAVENOUS CODE/TRAUMA/SEDATION MEDICATION
Status: COMPLETED | OUTPATIENT
Start: 2019-01-01 | End: 2019-01-01

## 2019-01-01 RX ORDER — NITROGLYCERIN 0.4 MG/1
TABLET SUBLINGUAL
Status: DISCONTINUED | OUTPATIENT
Start: 2019-01-01 | End: 2019-01-01 | Stop reason: HOSPADM

## 2019-01-01 RX ORDER — BUSPIRONE HYDROCHLORIDE 10 MG/1
30 TABLET ORAL ONCE
Status: DISCONTINUED | OUTPATIENT
Start: 2019-01-01 | End: 2019-01-01

## 2019-01-01 RX ORDER — INSULIN ASPART 100 [IU]/ML
INJECTION, SOLUTION INTRAVENOUS; SUBCUTANEOUS
Qty: 30 ML | Refills: 11 | Status: SHIPPED | OUTPATIENT
Start: 2019-01-01

## 2019-01-01 RX ORDER — FOSINOPRIL SODIUM AND HYDROCHLOROTHIAZIDE 10; 12.5 MG/1; MG/1
3 TABLET ORAL DAILY
Qty: 270 TABLET | Refills: 3 | Status: SHIPPED | OUTPATIENT
Start: 2019-01-01 | End: 2019-01-01

## 2019-01-01 RX ORDER — HEPARIN SODIUM,PORCINE/D5W 25000/250
12 INTRAVENOUS SOLUTION INTRAVENOUS CONTINUOUS
Status: DISCONTINUED | OUTPATIENT
Start: 2019-01-01 | End: 2019-01-01 | Stop reason: HOSPADM

## 2019-01-01 RX ORDER — SODIUM CHLORIDE 0.9 % (FLUSH) 0.9 %
10 SYRINGE (ML) INJECTION
Status: DISCONTINUED | OUTPATIENT
Start: 2019-01-01 | End: 2019-01-01

## 2019-01-01 RX ORDER — INDOMETHACIN 25 MG/1
5 CAPSULE ORAL ONCE
Status: COMPLETED | OUTPATIENT
Start: 2019-01-01 | End: 2019-01-01

## 2019-01-01 RX ORDER — ACETAMINOPHEN 325 MG/1
650 TABLET ORAL EVERY 4 HOURS PRN
Status: DISCONTINUED | OUTPATIENT
Start: 2019-01-01 | End: 2019-01-01 | Stop reason: HOSPADM

## 2019-01-01 RX ORDER — AMIODARONE HYDROCHLORIDE 150 MG/3ML
INJECTION, SOLUTION INTRAVENOUS CODE/TRAUMA/SEDATION MEDICATION
Status: COMPLETED | OUTPATIENT
Start: 2019-01-01 | End: 2019-01-01

## 2019-01-01 RX ORDER — POTASSIUM CHLORIDE 20 MEQ/1
40 TABLET, EXTENDED RELEASE ORAL 2 TIMES DAILY
Status: ACTIVE | OUTPATIENT
Start: 2019-01-01 | End: 2019-01-01

## 2019-01-01 RX ORDER — SODIUM CHLORIDE 9 MG/ML
INJECTION, SOLUTION INTRAVENOUS CONTINUOUS PRN
Status: DISCONTINUED | OUTPATIENT
Start: 2019-01-01 | End: 2019-01-01

## 2019-01-01 RX ORDER — SODIUM CHLORIDE 0.9 % (FLUSH) 0.9 %
10 SYRINGE (ML) INJECTION
Status: DISCONTINUED | OUTPATIENT
Start: 2019-01-01 | End: 2019-01-01 | Stop reason: HOSPADM

## 2019-01-01 RX ORDER — PROPOFOL 10 MG/ML
5 INJECTION, EMULSION INTRAVENOUS CONTINUOUS
Status: DISCONTINUED | OUTPATIENT
Start: 2019-01-01 | End: 2019-01-01 | Stop reason: ALTCHOICE

## 2019-01-01 RX ORDER — DOXYCYCLINE 100 MG/1
100 CAPSULE ORAL 2 TIMES DAILY
Qty: 20 CAPSULE | Refills: 0 | Status: SHIPPED | OUTPATIENT
Start: 2019-01-01 | End: 2019-01-01

## 2019-01-01 RX ORDER — CEPHALEXIN 250 MG/1
500 CAPSULE ORAL 4 TIMES DAILY
Qty: 56 CAPSULE | Refills: 0 | Status: SHIPPED | OUTPATIENT
Start: 2019-01-01 | End: 2019-01-01

## 2019-01-01 RX ORDER — DIPHENHYDRAMINE HCL 25 MG
50 CAPSULE ORAL ONCE
Status: COMPLETED | OUTPATIENT
Start: 2019-01-01 | End: 2019-01-01

## 2019-01-01 RX ORDER — ISOSORBIDE MONONITRATE 30 MG/1
30 TABLET, EXTENDED RELEASE ORAL DAILY
Status: DISCONTINUED | OUTPATIENT
Start: 2019-01-01 | End: 2019-01-01

## 2019-01-01 RX ORDER — HEPARIN SODIUM,PORCINE/D5W 25000/250
12 INTRAVENOUS SOLUTION INTRAVENOUS CONTINUOUS
Status: DISPENSED | OUTPATIENT
Start: 2019-01-01 | End: 2019-01-01

## 2019-01-01 RX ORDER — ASPIRIN 81 MG/1
81 TABLET ORAL DAILY
Refills: 0 | COMMUNITY
Start: 2019-01-01 | End: 2020-01-25

## 2019-01-01 RX ORDER — CARBOXYMETHYLCELLULOSE SODIUM 10 MG/ML
1 GEL OPHTHALMIC EVERY 12 HOURS
Status: DISCONTINUED | OUTPATIENT
Start: 2019-01-01 | End: 2019-01-01 | Stop reason: HOSPADM

## 2019-01-01 RX ORDER — LISINOPRIL 20 MG/1
20 TABLET ORAL DAILY
Status: DISCONTINUED | OUTPATIENT
Start: 2019-01-01 | End: 2019-01-01

## 2019-01-01 RX ORDER — ALCOHOL 2.38 KG/3.79L
1 GEL TOPICAL DAILY
Qty: 90 CAPSULE | Refills: 5 | Status: SHIPPED | OUTPATIENT
Start: 2019-01-01 | End: 2019-01-01 | Stop reason: SDUPTHER

## 2019-01-01 RX ORDER — METOPROLOL SUCCINATE 100 MG/1
200 TABLET, EXTENDED RELEASE ORAL NIGHTLY
Status: DISCONTINUED | OUTPATIENT
Start: 2019-01-01 | End: 2019-01-01

## 2019-01-01 RX ORDER — CHLORHEXIDINE GLUCONATE ORAL RINSE 1.2 MG/ML
15 SOLUTION DENTAL 2 TIMES DAILY
Status: DISCONTINUED | OUTPATIENT
Start: 2019-01-01 | End: 2019-01-01 | Stop reason: HOSPADM

## 2019-01-01 RX ORDER — PROPOFOL 10 MG/ML
INJECTION, EMULSION INTRAVENOUS
Status: COMPLETED
Start: 2019-01-01 | End: 2019-01-01

## 2019-01-01 RX ORDER — ETOMIDATE 2 MG/ML
INJECTION INTRAVENOUS
Status: DISPENSED
Start: 2019-01-01 | End: 2019-01-01

## 2019-01-01 RX ORDER — LANCETS
EACH MISCELLANEOUS
Qty: 550 EACH | Refills: 3 | Status: SHIPPED | OUTPATIENT
Start: 2019-01-01

## 2019-01-01 RX ORDER — HEPARIN SODIUM 1000 [USP'U]/ML
INJECTION, SOLUTION INTRAVENOUS; SUBCUTANEOUS
Status: DISCONTINUED | OUTPATIENT
Start: 2019-01-01 | End: 2019-01-01

## 2019-01-01 RX ORDER — IBUPROFEN 200 MG
16 TABLET ORAL
Status: DISCONTINUED | OUTPATIENT
Start: 2019-01-01 | End: 2019-01-01 | Stop reason: SDUPTHER

## 2019-01-01 RX ORDER — SUCCINYLCHOLINE CHLORIDE 20 MG/ML
INJECTION INTRAMUSCULAR; INTRAVENOUS
Status: COMPLETED
Start: 2019-01-01 | End: 2019-01-01

## 2019-01-01 RX ORDER — IODIXANOL 320 MG/ML
INJECTION, SOLUTION INTRAVASCULAR
Status: DISCONTINUED | OUTPATIENT
Start: 2019-01-01 | End: 2019-01-01 | Stop reason: HOSPADM

## 2019-01-01 RX ORDER — NAPROXEN SODIUM 220 MG/1
81 TABLET, FILM COATED ORAL DAILY
Status: DISCONTINUED | OUTPATIENT
Start: 2019-01-01 | End: 2019-01-01 | Stop reason: HOSPADM

## 2019-01-01 RX ADMIN — VANCOMYCIN HYDROCHLORIDE 1750 MG: 1 INJECTION, POWDER, LYOPHILIZED, FOR SOLUTION INTRAVENOUS at 08:06

## 2019-01-01 RX ADMIN — DIPHENHYDRAMINE HYDROCHLORIDE 50 MG: 50 CAPSULE ORAL at 10:06

## 2019-01-01 RX ADMIN — NOREPINEPHRINE BITARTRATE 0.06 MCG/KG/MIN: 1 INJECTION, SOLUTION, CONCENTRATE INTRAVENOUS at 01:06

## 2019-01-01 RX ADMIN — ACETAMINOPHEN 650 MG: 650 SOLUTION ORAL at 11:06

## 2019-01-01 RX ADMIN — ETOMIDATE 2 MG: 2 INJECTION, SOLUTION INTRAVENOUS at 09:06

## 2019-01-01 RX ADMIN — INSULIN ASPART 2 UNITS: 100 INJECTION, SOLUTION INTRAVENOUS; SUBCUTANEOUS at 07:06

## 2019-01-01 RX ADMIN — NITROGLYCERIN 1 SPRAY: 400 SPRAY ORAL at 01:01

## 2019-01-01 RX ADMIN — ASPIRIN 81 MG CHEWABLE TABLET 81 MG: 81 TABLET CHEWABLE at 09:06

## 2019-01-01 RX ADMIN — VANCOMYCIN HYDROCHLORIDE 1750 MG: 1 INJECTION, POWDER, LYOPHILIZED, FOR SOLUTION INTRAVENOUS at 07:06

## 2019-01-01 RX ADMIN — FUROSEMIDE 40 MG: 10 INJECTION, SOLUTION INTRAMUSCULAR; INTRAVENOUS at 11:05

## 2019-01-01 RX ADMIN — SODIUM CHLORIDE 2.2 UNITS/HR: 9 INJECTION, SOLUTION INTRAVENOUS at 05:06

## 2019-01-01 RX ADMIN — METOPROLOL SUCCINATE 150 MG: 100 TABLET, EXTENDED RELEASE ORAL at 08:05

## 2019-01-01 RX ADMIN — COLLAGENASE SANTYL: 250 OINTMENT TOPICAL at 12:05

## 2019-01-01 RX ADMIN — LIDOCAINE HYDROCHLORIDE 100 MG: 20 INJECTION, SOLUTION INTRAVENOUS at 12:06

## 2019-01-01 RX ADMIN — AMIODARONE HYDROCHLORIDE 150 MG: 1.5 INJECTION, SOLUTION INTRAVENOUS at 02:06

## 2019-01-01 RX ADMIN — Medication 275 MCG/HR: at 09:06

## 2019-01-01 RX ADMIN — EPINEPHRINE 1 MG: 0.1 INJECTION, SOLUTION ENDOTRACHEAL; INTRACARDIAC; INTRAVENOUS at 12:06

## 2019-01-01 RX ADMIN — POLYETHYLENE GLYCOL 3350 17 G: 17 POWDER, FOR SOLUTION ORAL at 08:06

## 2019-01-01 RX ADMIN — ASPIRIN 81 MG: 81 TABLET, COATED ORAL at 09:05

## 2019-01-01 RX ADMIN — CLOPIDOGREL 75 MG: 75 TABLET, FILM COATED ORAL at 08:06

## 2019-01-01 RX ADMIN — CHLORHEXIDINE GLUCONATE 0.12% ORAL RINSE 15 ML: 1.2 LIQUID ORAL at 08:06

## 2019-01-01 RX ADMIN — FAMOTIDINE 20 MG: 10 INJECTION, SOLUTION INTRAVENOUS at 09:06

## 2019-01-01 RX ADMIN — Medication 0.22 MCG/KG/MIN: at 04:06

## 2019-01-01 RX ADMIN — SODIUM CHLORIDE 3.2 UNITS/HR: 9 INJECTION, SOLUTION INTRAVENOUS at 03:06

## 2019-01-01 RX ADMIN — Medication 125 MCG/HR: at 05:06

## 2019-01-01 RX ADMIN — INSULIN ASPART 4 UNITS: 100 INJECTION, SOLUTION INTRAVENOUS; SUBCUTANEOUS at 05:06

## 2019-01-01 RX ADMIN — CARBOXYMETHYLCELLULOSE SODIUM 1 DROP: 10 GEL OPHTHALMIC at 09:06

## 2019-01-01 RX ADMIN — POLYETHYLENE GLYCOL 3350 17 G: 17 POWDER, FOR SOLUTION ORAL at 09:06

## 2019-01-01 RX ADMIN — COLLAGENASE SANTYL: 250 OINTMENT TOPICAL at 08:06

## 2019-01-01 RX ADMIN — NITROGLYCERIN 0.4 MG: 0.4 TABLET SUBLINGUAL at 08:06

## 2019-01-01 RX ADMIN — PROPOFOL 15 MCG/KG/MIN: 10 INJECTION, EMULSION INTRAVENOUS at 10:06

## 2019-01-01 RX ADMIN — SODIUM CHLORIDE 2.7 UNITS/HR: 9 INJECTION, SOLUTION INTRAVENOUS at 02:06

## 2019-01-01 RX ADMIN — INSULIN DETEMIR 11 UNITS: 100 INJECTION, SOLUTION SUBCUTANEOUS at 08:06

## 2019-01-01 RX ADMIN — LISINOPRIL 10 MG: 10 TABLET ORAL at 08:06

## 2019-01-01 RX ADMIN — POTASSIUM CHLORIDE 40 MEQ: 1500 TABLET, EXTENDED RELEASE ORAL at 05:06

## 2019-01-01 RX ADMIN — LIDOCAINE HYDROCHLORIDE 2 MG/MIN: 8 INJECTION, SOLUTION INTRAVENOUS at 05:06

## 2019-01-01 RX ADMIN — LIDOCAINE HYDROCHLORIDE 2 MG/MIN: 8 INJECTION, SOLUTION INTRAVENOUS at 12:06

## 2019-01-01 RX ADMIN — Medication 0.14 MCG/KG/MIN: at 10:06

## 2019-01-01 RX ADMIN — PROPOFOL 40 MCG/KG/MIN: 10 INJECTION, EMULSION INTRAVENOUS at 06:06

## 2019-01-01 RX ADMIN — INSULIN ASPART 3 UNITS: 100 INJECTION, SOLUTION INTRAVENOUS; SUBCUTANEOUS at 08:06

## 2019-01-01 RX ADMIN — FUROSEMIDE 80 MG: 10 INJECTION, SOLUTION INTRAMUSCULAR; INTRAVENOUS at 08:06

## 2019-01-01 RX ADMIN — FAMOTIDINE 20 MG: 10 INJECTION, SOLUTION INTRAVENOUS at 08:06

## 2019-01-01 RX ADMIN — EPINEPHRINE 1 MG: 0.1 INJECTION, SOLUTION ENDOTRACHEAL; INTRACARDIAC; INTRAVENOUS at 01:06

## 2019-01-01 RX ADMIN — VANCOMYCIN HYDROCHLORIDE 1750 MG: 1 INJECTION, POWDER, LYOPHILIZED, FOR SOLUTION INTRAVENOUS at 12:06

## 2019-01-01 RX ADMIN — CARBOXYMETHYLCELLULOSE SODIUM 1 DROP: 10 GEL OPHTHALMIC at 08:06

## 2019-01-01 RX ADMIN — NOREPINEPHRINE BITARTRATE 0.1 MCG/KG/MIN: 1 INJECTION, SOLUTION, CONCENTRATE INTRAVENOUS at 11:06

## 2019-01-01 RX ADMIN — HEPARIN SODIUM 9 UNITS/KG/HR: 10000 INJECTION, SOLUTION INTRAVENOUS at 10:06

## 2019-01-01 RX ADMIN — AMIODARONE HYDROCHLORIDE 1 MG/MIN: 1.8 INJECTION, SOLUTION INTRAVENOUS at 03:06

## 2019-01-01 RX ADMIN — INSULIN ASPART 4 UNITS: 100 INJECTION, SOLUTION INTRAVENOUS; SUBCUTANEOUS at 08:06

## 2019-01-01 RX ADMIN — CEFTRIAXONE 2 G: 2 INJECTION, SOLUTION INTRAVENOUS at 05:06

## 2019-01-01 RX ADMIN — COLLAGENASE SANTYL: 250 OINTMENT TOPICAL at 09:06

## 2019-01-01 RX ADMIN — CLOPIDOGREL 75 MG: 75 TABLET, FILM COATED ORAL at 08:05

## 2019-01-01 RX ADMIN — INSULIN ASPART 2 UNITS: 100 INJECTION, SOLUTION INTRAVENOUS; SUBCUTANEOUS at 11:06

## 2019-01-01 RX ADMIN — NOREPINEPHRINE BITARTRATE 0.22 MCG/KG/MIN: 1 INJECTION, SOLUTION, CONCENTRATE INTRAVENOUS at 04:06

## 2019-01-01 RX ADMIN — FUROSEMIDE 80 MG: 10 INJECTION, SOLUTION INTRAMUSCULAR; INTRAVENOUS at 05:06

## 2019-01-01 RX ADMIN — FUROSEMIDE 80 MG: 10 INJECTION, SOLUTION INTRAMUSCULAR; INTRAVENOUS at 09:06

## 2019-01-01 RX ADMIN — INSULIN ASPART 2 UNITS: 100 INJECTION, SOLUTION INTRAVENOUS; SUBCUTANEOUS at 05:06

## 2019-01-01 RX ADMIN — INSULIN ASPART 1 UNITS: 100 INJECTION, SOLUTION INTRAVENOUS; SUBCUTANEOUS at 08:06

## 2019-01-01 RX ADMIN — SODIUM CHLORIDE: 0.9 INJECTION, SOLUTION INTRAVENOUS at 01:06

## 2019-01-01 RX ADMIN — INSULIN ASPART 3 UNITS: 100 INJECTION, SOLUTION INTRAVENOUS; SUBCUTANEOUS at 05:05

## 2019-01-01 RX ADMIN — PROPOFOL 35 MCG/KG/MIN: 10 INJECTION, EMULSION INTRAVENOUS at 04:06

## 2019-01-01 RX ADMIN — INSULIN ASPART 3 UNITS: 100 INJECTION, SOLUTION INTRAVENOUS; SUBCUTANEOUS at 12:05

## 2019-01-01 RX ADMIN — CHLORHEXIDINE GLUCONATE 0.12% ORAL RINSE 15 ML: 1.2 LIQUID ORAL at 09:06

## 2019-01-01 RX ADMIN — Medication 0.18 MCG/KG/MIN: at 08:06

## 2019-01-01 RX ADMIN — INSULIN ASPART 5 UNITS: 100 INJECTION, SOLUTION INTRAVENOUS; SUBCUTANEOUS at 12:06

## 2019-01-01 RX ADMIN — CLOPIDOGREL 75 MG: 75 TABLET, FILM COATED ORAL at 09:06

## 2019-01-01 RX ADMIN — PROPOFOL 50 MCG/KG/MIN: 10 INJECTION, EMULSION INTRAVENOUS at 09:06

## 2019-01-01 RX ADMIN — INSULIN ASPART 7 UNITS: 100 INJECTION, SOLUTION INTRAVENOUS; SUBCUTANEOUS at 11:06

## 2019-01-01 RX ADMIN — CEFTRIAXONE SODIUM 2 G: 2 INJECTION, POWDER, FOR SOLUTION INTRAMUSCULAR; INTRAVENOUS at 06:06

## 2019-01-01 RX ADMIN — INSULIN ASPART 6 UNITS: 100 INJECTION, SOLUTION INTRAVENOUS; SUBCUTANEOUS at 04:06

## 2019-01-01 RX ADMIN — ASPIRIN 81 MG: 81 TABLET, COATED ORAL at 08:06

## 2019-01-01 RX ADMIN — CEFTRIAXONE SODIUM 1 G: 1 INJECTION, POWDER, FOR SOLUTION INTRAMUSCULAR; INTRAVENOUS at 06:06

## 2019-01-01 RX ADMIN — INSULIN ASPART 3 UNITS: 100 INJECTION, SOLUTION INTRAVENOUS; SUBCUTANEOUS at 07:06

## 2019-01-01 RX ADMIN — HEPARIN SODIUM 7 UNITS/KG/HR: 10000 INJECTION, SOLUTION INTRAVENOUS at 06:06

## 2019-01-01 RX ADMIN — MORPHINE SULFATE 1 MG: 2 INJECTION, SOLUTION INTRAMUSCULAR; INTRAVENOUS at 12:06

## 2019-01-01 RX ADMIN — HEPARIN SODIUM AND DEXTROSE 10 UNITS/KG/HR: 10000; 5 INJECTION INTRAVENOUS at 01:05

## 2019-01-01 RX ADMIN — CLOPIDOGREL 75 MG: 75 TABLET, FILM COATED ORAL at 11:06

## 2019-01-01 RX ADMIN — POTASSIUM CHLORIDE 60 MEQ: 200 INJECTION, SOLUTION INTRAVENOUS at 12:06

## 2019-01-01 RX ADMIN — METOPROLOL SUCCINATE 150 MG: 100 TABLET, EXTENDED RELEASE ORAL at 09:06

## 2019-01-01 RX ADMIN — ATORVASTATIN CALCIUM 40 MG: 20 TABLET, FILM COATED ORAL at 09:06

## 2019-01-01 RX ADMIN — PROPOFOL 25 MCG/KG/MIN: 10 INJECTION, EMULSION INTRAVENOUS at 08:06

## 2019-01-01 RX ADMIN — CEFTRIAXONE 2 G: 2 INJECTION, SOLUTION INTRAVENOUS at 04:06

## 2019-01-01 RX ADMIN — CEFAZOLIN 2 G: 330 INJECTION, POWDER, FOR SOLUTION INTRAMUSCULAR; INTRAVENOUS at 08:06

## 2019-01-01 RX ADMIN — METOPROLOL TARTRATE 5 MG: 1 INJECTION, SOLUTION INTRAVENOUS at 10:06

## 2019-01-01 RX ADMIN — Medication 190 MCG/HR: at 09:06

## 2019-01-01 RX ADMIN — ACETAMINOPHEN 650 MG: 650 SOLUTION ORAL at 05:06

## 2019-01-01 RX ADMIN — FUROSEMIDE 80 MG: 10 INJECTION, SOLUTION INTRAMUSCULAR; INTRAVENOUS at 08:05

## 2019-01-01 RX ADMIN — LISINOPRIL 10 MG: 10 TABLET ORAL at 06:06

## 2019-01-01 RX ADMIN — HEPARIN SODIUM AND DEXTROSE 10 UNITS/KG/HR: 10000; 5 INJECTION INTRAVENOUS at 09:06

## 2019-01-01 RX ADMIN — INSULIN ASPART 3 UNITS: 100 INJECTION, SOLUTION INTRAVENOUS; SUBCUTANEOUS at 11:06

## 2019-01-01 RX ADMIN — ASPIRIN 81 MG: 81 TABLET, COATED ORAL at 08:05

## 2019-01-01 RX ADMIN — FUROSEMIDE 80 MG: 10 INJECTION, SOLUTION INTRAMUSCULAR; INTRAVENOUS at 03:06

## 2019-01-01 RX ADMIN — INSULIN DETEMIR 18 UNITS: 100 INJECTION, SOLUTION SUBCUTANEOUS at 08:05

## 2019-01-01 RX ADMIN — FUROSEMIDE 80 MG: 10 INJECTION, SOLUTION INTRAMUSCULAR; INTRAVENOUS at 02:06

## 2019-01-01 RX ADMIN — HEPARIN SODIUM 12 UNITS/KG/HR: 10000 INJECTION, SOLUTION INTRAVENOUS at 08:06

## 2019-01-01 RX ADMIN — ATORVASTATIN CALCIUM 40 MG: 20 TABLET, FILM COATED ORAL at 08:06

## 2019-01-01 RX ADMIN — CHLORHEXIDINE GLUCONATE 0.12% ORAL RINSE 15 ML: 1.2 LIQUID ORAL at 12:06

## 2019-01-01 RX ADMIN — MIDAZOLAM HYDROCHLORIDE 2 MG: 1 INJECTION, SOLUTION INTRAMUSCULAR; INTRAVENOUS at 01:06

## 2019-01-01 RX ADMIN — PROPOFOL 15 MCG/KG/MIN: 10 INJECTION, EMULSION INTRAVENOUS at 07:06

## 2019-01-01 RX ADMIN — COLLAGENASE SANTYL: 250 OINTMENT TOPICAL at 01:06

## 2019-01-01 RX ADMIN — AMINOPHYLLINE 75 MG: 25 INJECTION, SOLUTION INTRAVENOUS at 01:01

## 2019-01-01 RX ADMIN — PROPOFOL 40 MCG/KG/MIN: 10 INJECTION, EMULSION INTRAVENOUS at 11:06

## 2019-01-01 RX ADMIN — SODIUM BICARBONATE 5 MEQ: 84 INJECTION, SOLUTION INTRAVENOUS at 01:06

## 2019-01-01 RX ADMIN — ROCURONIUM BROMIDE 100 MG: 10 INJECTION INTRAVENOUS at 01:06

## 2019-01-01 RX ADMIN — FUROSEMIDE 40 MG: 10 INJECTION, SOLUTION INTRAMUSCULAR; INTRAVENOUS at 02:06

## 2019-01-01 RX ADMIN — INSULIN ASPART 6 UNITS: 100 INJECTION, SOLUTION INTRAVENOUS; SUBCUTANEOUS at 06:06

## 2019-01-01 RX ADMIN — CARBOXYMETHYLCELLULOSE SODIUM 1 DROP: 10 GEL OPHTHALMIC at 12:06

## 2019-01-01 RX ADMIN — PROPOFOL 25 MCG/KG/MIN: 10 INJECTION, EMULSION INTRAVENOUS at 04:06

## 2019-01-01 RX ADMIN — FUROSEMIDE 80 MG: 10 INJECTION, SOLUTION INTRAMUSCULAR; INTRAVENOUS at 03:05

## 2019-01-01 RX ADMIN — FUROSEMIDE 80 MG: 10 INJECTION, SOLUTION INTRAMUSCULAR; INTRAVENOUS at 06:06

## 2019-01-01 RX ADMIN — PROPOFOL 15 MCG/KG/MIN: 10 INJECTION, EMULSION INTRAVENOUS at 02:06

## 2019-01-01 RX ADMIN — SODIUM CHLORIDE 250 ML: 0.9 INJECTION, SOLUTION INTRAVENOUS at 02:06

## 2019-01-01 RX ADMIN — CARBOXYMETHYLCELLULOSE SODIUM 1 DROP: 10 GEL OPHTHALMIC at 11:06

## 2019-01-01 RX ADMIN — SODIUM CHLORIDE 2.7 UNITS/HR: 9 INJECTION, SOLUTION INTRAVENOUS at 01:06

## 2019-01-01 RX ADMIN — INSULIN ASPART 8 UNITS: 100 INJECTION, SOLUTION INTRAVENOUS; SUBCUTANEOUS at 08:06

## 2019-01-01 RX ADMIN — DIPYRIDAMOLE 50.4 MG: 5 INJECTION INTRAVENOUS at 01:01

## 2019-01-01 RX ADMIN — ASPIRIN 81 MG: 81 TABLET, COATED ORAL at 11:06

## 2019-01-01 RX ADMIN — INSULIN ASPART 4 UNITS: 100 INJECTION, SOLUTION INTRAVENOUS; SUBCUTANEOUS at 04:05

## 2019-01-01 RX ADMIN — HEPARIN SODIUM 12 UNITS/KG/HR: 10000 INJECTION, SOLUTION INTRAVENOUS at 01:06

## 2019-01-01 RX ADMIN — INSULIN ASPART 5 UNITS: 100 INJECTION, SOLUTION INTRAVENOUS; SUBCUTANEOUS at 11:06

## 2019-01-01 RX ADMIN — Medication 25 MCG/HR: at 02:06

## 2019-01-01 RX ADMIN — MAGNESIUM SULFATE IN WATER 2 G: 40 INJECTION, SOLUTION INTRAVENOUS at 06:06

## 2019-01-01 RX ADMIN — METOPROLOL TARTRATE 25 MG: 25 TABLET ORAL at 02:06

## 2019-01-01 RX ADMIN — DIPHENHYDRAMINE HYDROCHLORIDE 50 MG: 25 CAPSULE ORAL at 06:01

## 2019-01-01 RX ADMIN — SODIUM CHLORIDE: 0.9 INJECTION, SOLUTION INTRAVENOUS at 08:06

## 2019-01-01 RX ADMIN — INSULIN ASPART 7 UNITS: 100 INJECTION, SOLUTION INTRAVENOUS; SUBCUTANEOUS at 12:06

## 2019-01-01 RX ADMIN — METOPROLOL SUCCINATE 150 MG: 100 TABLET, EXTENDED RELEASE ORAL at 11:05

## 2019-01-01 RX ADMIN — LIDOCAINE HYDROCHLORIDE 75 MG: 20 INJECTION, SOLUTION INTRAVENOUS at 12:06

## 2019-01-01 RX ADMIN — HEPARIN SODIUM 12 UNITS/KG/HR: 10000 INJECTION, SOLUTION INTRAVENOUS at 10:06

## 2019-01-01 RX ADMIN — ASPIRIN 81 MG: 81 TABLET, COATED ORAL at 09:06

## 2019-01-01 RX ADMIN — INSULIN ASPART 8 UNITS: 100 INJECTION, SOLUTION INTRAVENOUS; SUBCUTANEOUS at 12:06

## 2019-01-01 RX ADMIN — INSULIN ASPART 4 UNITS: 100 INJECTION, SOLUTION INTRAVENOUS; SUBCUTANEOUS at 08:05

## 2019-01-01 RX ADMIN — LISINOPRIL 10 MG: 10 TABLET ORAL at 09:06

## 2019-01-01 RX ADMIN — CLOPIDOGREL 75 MG: 75 TABLET, FILM COATED ORAL at 09:05

## 2019-01-01 RX ADMIN — ATORVASTATIN CALCIUM 40 MG: 20 TABLET, FILM COATED ORAL at 08:05

## 2019-01-01 RX ADMIN — INSULIN ASPART 4 UNITS: 100 INJECTION, SOLUTION INTRAVENOUS; SUBCUTANEOUS at 06:06

## 2019-01-01 RX ADMIN — FUROSEMIDE 80 MG: 10 INJECTION, SOLUTION INTRAMUSCULAR; INTRAVENOUS at 10:05

## 2019-01-01 RX ADMIN — SODIUM CHLORIDE 21 UNITS/HR: 9 INJECTION, SOLUTION INTRAVENOUS at 12:06

## 2019-01-01 RX ADMIN — VANCOMYCIN HYDROCHLORIDE 1750 MG: 1 INJECTION, POWDER, LYOPHILIZED, FOR SOLUTION INTRAVENOUS at 09:06

## 2019-01-01 RX ADMIN — METOPROLOL SUCCINATE 50 MG: 25 TABLET, EXTENDED RELEASE ORAL at 09:06

## 2019-01-01 RX ADMIN — PROPOFOL 25 MCG/KG/MIN: 10 INJECTION, EMULSION INTRAVENOUS at 03:06

## 2019-01-01 RX ADMIN — ASPIRIN 81 MG CHEWABLE TABLET 81 MG: 81 TABLET CHEWABLE at 08:06

## 2019-01-01 RX ADMIN — CEFTRIAXONE SODIUM 2 G: 2 INJECTION, POWDER, FOR SOLUTION INTRAMUSCULAR; INTRAVENOUS at 05:06

## 2019-01-01 RX ADMIN — PROPOFOL 15 MCG/KG/MIN: 10 INJECTION, EMULSION INTRAVENOUS at 05:06

## 2019-01-01 RX ADMIN — NOREPINEPHRINE BITARTRATE 0.16 MCG/KG/MIN: 1 INJECTION, SOLUTION, CONCENTRATE INTRAVENOUS at 04:06

## 2019-01-01 RX ADMIN — INSULIN DETEMIR 15 UNITS: 100 INJECTION, SOLUTION SUBCUTANEOUS at 11:05

## 2019-01-01 RX ADMIN — INSULIN ASPART 2 UNITS: 100 INJECTION, SOLUTION INTRAVENOUS; SUBCUTANEOUS at 01:06

## 2019-01-01 RX ADMIN — INSULIN ASPART 2 UNITS: 100 INJECTION, SOLUTION INTRAVENOUS; SUBCUTANEOUS at 12:06

## 2019-01-01 RX ADMIN — INSULIN ASPART 4 UNITS: 100 INJECTION, SOLUTION INTRAVENOUS; SUBCUTANEOUS at 11:06

## 2019-01-01 RX ADMIN — Medication 0.16 MCG/KG/MIN: at 03:06

## 2019-01-01 RX ADMIN — ACETAMINOPHEN 650 MG: 650 SOLUTION ORAL at 12:06

## 2019-01-01 RX ADMIN — INSULIN ASPART 5 UNITS: 100 INJECTION, SOLUTION INTRAVENOUS; SUBCUTANEOUS at 12:05

## 2019-01-01 RX ADMIN — ISOSORBIDE MONONITRATE 30 MG: 30 TABLET, EXTENDED RELEASE ORAL at 08:06

## 2019-01-01 RX ADMIN — Medication 0.1 MCG/KG/MIN: at 11:06

## 2019-01-01 RX ADMIN — FUROSEMIDE 80 MG: 10 INJECTION, SOLUTION INTRAMUSCULAR; INTRAVENOUS at 11:06

## 2019-01-01 RX ADMIN — HEPARIN SODIUM AND DEXTROSE 12 UNITS/KG/HR: 10000; 5 INJECTION INTRAVENOUS at 03:05

## 2019-01-01 RX ADMIN — POTASSIUM CHLORIDE 40 MEQ: 400 INJECTION, SOLUTION INTRAVENOUS at 11:06

## 2019-01-01 RX ADMIN — SODIUM CHLORIDE 4 UNITS/HR: 9 INJECTION, SOLUTION INTRAVENOUS at 05:06

## 2019-01-01 RX ADMIN — Medication 0.16 MCG/KG/MIN: at 06:06

## 2019-01-01 RX ADMIN — INSULIN ASPART 7 UNITS: 100 INJECTION, SOLUTION INTRAVENOUS; SUBCUTANEOUS at 07:06

## 2019-01-01 RX ADMIN — VANCOMYCIN HYDROCHLORIDE 2250 MG: 1.5 INJECTION, POWDER, LYOPHILIZED, FOR SOLUTION INTRAVENOUS at 07:06

## 2019-01-01 RX ADMIN — SODIUM CHLORIDE 3 ML/KG/HR: 0.9 INJECTION, SOLUTION INTRAVENOUS at 06:01

## 2019-01-01 RX ADMIN — PROTAMINE SULFATE 10 MG: 10 INJECTION, SOLUTION INTRAVENOUS at 10:06

## 2019-01-01 RX ADMIN — LIDOCAINE HYDROCHLORIDE 1 MG/MIN: 8 INJECTION, SOLUTION INTRAVENOUS at 05:06

## 2019-01-01 RX ADMIN — INSULIN ASPART 6 UNITS: 100 INJECTION, SOLUTION INTRAVENOUS; SUBCUTANEOUS at 05:06

## 2019-01-01 RX ADMIN — INSULIN DETEMIR 12 UNITS: 100 INJECTION, SOLUTION SUBCUTANEOUS at 09:06

## 2019-01-01 RX ADMIN — Medication 0.18 MCG/KG/MIN: at 01:06

## 2019-01-01 RX ADMIN — INSULIN ASPART 3 UNITS: 100 INJECTION, SOLUTION INTRAVENOUS; SUBCUTANEOUS at 04:06

## 2019-01-01 RX ADMIN — INSULIN ASPART 7 UNITS: 100 INJECTION, SOLUTION INTRAVENOUS; SUBCUTANEOUS at 08:06

## 2019-01-01 RX ADMIN — MAGNESIUM SULFATE IN WATER 2 G: 40 INJECTION, SOLUTION INTRAVENOUS at 03:06

## 2019-01-01 RX ADMIN — HEPARIN SODIUM 6000 UNITS: 1000 INJECTION, SOLUTION INTRAVENOUS; SUBCUTANEOUS at 09:06

## 2019-01-01 RX ADMIN — INSULIN ASPART 1 UNITS: 100 INJECTION, SOLUTION INTRAVENOUS; SUBCUTANEOUS at 08:05

## 2019-01-01 RX ADMIN — INSULIN DETEMIR 12 UNITS: 100 INJECTION, SOLUTION SUBCUTANEOUS at 08:06

## 2019-01-01 RX ADMIN — AMIODARONE HYDROCHLORIDE 150 MG: 50 INJECTION, SOLUTION INTRAVENOUS at 12:06

## 2019-01-01 RX ADMIN — Medication 2000 MG: at 11:05

## 2019-01-01 RX ADMIN — HUMAN ALBUMIN MICROSPHERES AND PERFLUTREN 0.66 MG: 10; .22 INJECTION, SOLUTION INTRAVENOUS at 02:05

## 2019-01-01 RX ADMIN — HEPARIN SODIUM AND DEXTROSE 10 UNITS/KG/HR: 10000; 5 INJECTION INTRAVENOUS at 12:05

## 2019-01-01 RX ADMIN — LIDOCAINE HYDROCHLORIDE 2 MG/MIN: 8 INJECTION, SOLUTION INTRAVENOUS at 07:06

## 2019-01-01 RX ADMIN — HEPARIN SODIUM 7 UNITS/KG/HR: 10000 INJECTION, SOLUTION INTRAVENOUS at 02:06

## 2019-01-01 RX ADMIN — HEPARIN SODIUM 1000 UNITS: 1000 INJECTION, SOLUTION INTRAVENOUS; SUBCUTANEOUS at 09:06

## 2019-01-01 RX ADMIN — SUCCINYLCHOLINE CHLORIDE 100 MG: 20 INJECTION, SOLUTION INTRAMUSCULAR; INTRAVENOUS; PARENTERAL at 03:06

## 2019-01-01 RX ADMIN — Medication 0.08 MCG/KG/MIN: at 03:06

## 2019-01-01 RX ADMIN — LIDOCAINE HYDROCHLORIDE 40 MG: 20 INJECTION, SOLUTION INTRAVENOUS at 09:06

## 2019-01-01 RX ADMIN — MAGNESIUM SULFATE HEPTAHYDRATE 3 G: 500 INJECTION, SOLUTION INTRAMUSCULAR; INTRAVENOUS at 05:06

## 2019-01-01 RX ADMIN — ATORVASTATIN CALCIUM 40 MG: 20 TABLET, FILM COATED ORAL at 11:05

## 2019-01-01 RX ADMIN — SODIUM PHOSPHATE, MONOBASIC, MONOHYDRATE 20.01 MMOL: 276; 142 INJECTION, SOLUTION INTRAVENOUS at 06:06

## 2019-01-01 RX ADMIN — GADOBUTROL 10 ML: 604.72 INJECTION INTRAVENOUS at 11:05

## 2019-01-01 RX ADMIN — LIDOCAINE HYDROCHLORIDE 2 MG/MIN: 8 INJECTION, SOLUTION INTRAVENOUS at 01:06

## 2019-01-01 RX ADMIN — Medication 0.04 MCG/KG/MIN: at 05:06

## 2019-01-01 RX ADMIN — INSULIN DETEMIR 15 UNITS: 100 INJECTION, SOLUTION SUBCUTANEOUS at 08:06

## 2019-01-01 RX ADMIN — COLLAGENASE SANTYL: 250 OINTMENT TOPICAL at 09:05

## 2019-01-01 RX ADMIN — ETOMIDATE 2 MG: 2 INJECTION, SOLUTION INTRAVENOUS at 10:06

## 2019-01-01 RX ADMIN — LIDOCAINE HYDROCHLORIDE 2 MG/MIN: 8 INJECTION, SOLUTION INTRAVENOUS at 08:06

## 2019-01-01 RX ADMIN — POTASSIUM CHLORIDE 40 MEQ: 400 INJECTION, SOLUTION INTRAVENOUS at 02:06

## 2019-01-01 RX ADMIN — INSULIN ASPART 1 UNITS: 100 INJECTION, SOLUTION INTRAVENOUS; SUBCUTANEOUS at 02:06

## 2019-01-01 RX ADMIN — ISOSORBIDE MONONITRATE 30 MG: 30 TABLET, EXTENDED RELEASE ORAL at 09:06

## 2019-01-01 RX ADMIN — INSULIN ASPART 3 UNITS: 100 INJECTION, SOLUTION INTRAVENOUS; SUBCUTANEOUS at 07:01

## 2019-01-01 RX ADMIN — LIDOCAINE HYDROCHLORIDE 2 MG/MIN: 8 INJECTION, SOLUTION INTRAVENOUS at 04:06

## 2019-01-01 RX ADMIN — Medication 100 MCG/HR: at 08:06

## 2019-01-01 RX ADMIN — COLLAGENASE SANTYL: 250 OINTMENT TOPICAL at 05:06

## 2019-01-01 RX ADMIN — PROTAMINE SULFATE 5 MG: 10 INJECTION, SOLUTION INTRAVENOUS at 10:06

## 2019-01-01 RX ADMIN — Medication 100 MCG: at 06:06

## 2019-01-01 RX ADMIN — INSULIN ASPART 4 UNITS: 100 INJECTION, SOLUTION INTRAVENOUS; SUBCUTANEOUS at 05:05

## 2019-01-01 RX ADMIN — INSULIN ASPART 3 UNITS: 100 INJECTION, SOLUTION INTRAVENOUS; SUBCUTANEOUS at 06:06

## 2019-01-01 RX ADMIN — ASPIRIN 325 MG ORAL TABLET 325 MG: 325 PILL ORAL at 02:05

## 2019-01-01 RX ADMIN — INSULIN DETEMIR 15 UNITS: 100 INJECTION, SOLUTION SUBCUTANEOUS at 09:06

## 2019-01-01 RX ADMIN — POLYETHYLENE GLYCOL 3350 17 G: 17 POWDER, FOR SOLUTION ORAL at 11:06

## 2019-01-01 RX ADMIN — FENTANYL CITRATE 25 MCG: 50 INJECTION, SOLUTION INTRAMUSCULAR; INTRAVENOUS at 08:06

## 2019-01-01 RX ADMIN — INSULIN ASPART 8 UNITS: 100 INJECTION, SOLUTION INTRAVENOUS; SUBCUTANEOUS at 12:05

## 2019-01-01 RX ADMIN — METOPROLOL SUCCINATE 50 MG: 25 TABLET, EXTENDED RELEASE ORAL at 12:06

## 2019-01-01 RX ADMIN — LISINOPRIL 10 MG: 10 TABLET ORAL at 09:05

## 2019-01-01 RX ADMIN — ISOSORBIDE MONONITRATE 30 MG: 30 TABLET, EXTENDED RELEASE ORAL at 02:06

## 2019-01-01 RX ADMIN — LIDOCAINE HYDROCHLORIDE 2 MG/MIN: 8 INJECTION, SOLUTION INTRAVENOUS at 02:06

## 2019-01-01 RX ADMIN — MIDAZOLAM HYDROCHLORIDE 2 MG: 1 INJECTION, SOLUTION INTRAMUSCULAR; INTRAVENOUS at 08:06

## 2019-01-01 RX ADMIN — SODIUM CHLORIDE 3.1 UNITS/HR: 9 INJECTION, SOLUTION INTRAVENOUS at 10:06

## 2019-01-01 RX ADMIN — Medication 115 MCG/HR: at 01:06

## 2019-01-01 RX ADMIN — POTASSIUM CHLORIDE 40 MEQ: 400 INJECTION, SOLUTION INTRAVENOUS at 06:06

## 2019-01-01 RX ADMIN — HEPARIN SODIUM 7 UNITS/KG/HR: 10000 INJECTION, SOLUTION INTRAVENOUS at 01:06

## 2019-01-01 RX ADMIN — EPHEDRINE SULFATE 5 MG: 50 INJECTION, SOLUTION INTRAMUSCULAR; INTRAVENOUS; SUBCUTANEOUS at 09:06

## 2019-01-01 RX ADMIN — INSULIN DETEMIR 14 UNITS: 100 INJECTION, SOLUTION SUBCUTANEOUS at 09:06

## 2019-01-01 RX ADMIN — INSULIN ASPART 2 UNITS: 100 INJECTION, SOLUTION INTRAVENOUS; SUBCUTANEOUS at 09:06

## 2019-01-02 NOTE — LETTER
January 2, 2019      Olivia Zavala MD  [address]  [facility] [address]           [facility]  [address], [address]  [facility] [address]  Phone: [phone]          Patient: Reid Herman   MR Number: [id number]   YOB: 1940   Date of Visit: 1/2/2019       Dear Dr. Olivia Zavala:    Thank you for referring Reid Herman to me for evaluation. Attached you will find relevant portions of my assessment and plan of care.    If you have questions, please do not hesitate to call me. I look forward to following Reid Herman along with you.    Sincerely,    Trae Elliott PA-C    Enclosure  CC:  No Recipients    If you would like to receive this communication electronically, please contact [email or web address] or [phone] to request more information on [employer] Link access.    For providers and/or their staff who would like to refer a patient to [facility], please contact us through our one-stop-shop provider referral line, [facility], at [phone].    If you feel you have received this communication in error or would no longer like to receive these types of communications, please e-mail [email or web address]

## 2019-01-02 NOTE — PROGRESS NOTES
SUBJECTIVE:     Chief Complaint & History of Present Illness:  Reid Herman is a New patient 78 y.o. male who is seen here today with a complaint of  right knee pain .  Patient is here today for evaluation treatment of pain soreness in the right knee over the past 10 days to 2 weeks.  Does not remember a specific trauma or injury but did feel bit of a misstep he had some mild swelling and tenderness in the anterior and medial aspects of the knee but since he scheduled the appointment symptoms have returned to baseline at the time of the visit he has little to no pain in or about the knee and has returned full range of motion  On a scale of 1-10, with 10 being worst pain imaginable, he rates this pain as 0 on good days and 1 on bad days.  he describes the pain as sore.    Review of patient's allergies indicates:  No Known Allergies      Current Outpatient Medications   Medication Sig Dispense Refill    ACCU-CHEK SHARON PLUS TEST STRP Strp TEST BLOOD SUGAR FIVE TIMES DAILY 450 strip 3    ACCU-CHEK FASTCLIX Misc TEST 6 TIMES DAILY 200 each 11    aspirin (ECOTRIN) 325 MG EC tablet Take 325 mg by mouth once daily.      atorvastatin (LIPITOR) 80 MG tablet TAKE 1/2 TABLET NIGHTLY. (Patient taking differently: 40 mg. ) 45 tablet 3    BD INSULIN SYRINGE ULT-FINE II 0.3 mL 31 gauge x 5/16 Syrg       blood-glucose meter,continuous (DEXCOM G5 ) Misc Use as directed. 1 each 0    blood-glucose sensor (DEXCOM G5-G4 SENSOR) Doris Change every 6-7 days. 15 Device 3    blood-glucose transmitter (DEXCOM G5 TRANSMITTER) Doris Change every 3 months. 1 Device 3    clotrimazole-betamethasone 1-0.05% (LOTRISONE) cream APPLY EXTERNALLY TO THE AFFECTED AREA TWICE DAILY 15 g 0    isosorbide mononitrate (IMDUR) 120 MG 24 hr tablet Take 1 tablet (120 mg total) by mouth once daily. 90 tablet 3    mecobal-levomefolat Ca-B6 phos (L-METHYL-B6-B12) 3-35-2 mg Tab Take 1 tablet by mouth once daily. 90 tablet 5    metFORMIN  (GLUCOPHAGE) 500 MG tablet TAKE 1 TABLET TWICE DAILY 180 tablet 2    metoprolol succinate (TOPROL-XL) 100 MG 24 hr tablet Take 1 tablet (100 mg total) by mouth every evening. 90 tablet 3    multivitamin capsule Take 1 capsule by mouth once daily.        nitroGLYCERIN (NITROSTAT) 0.4 MG SL tablet Place 1 tablet (0.4 mg total) under the tongue every 5 (five) minutes as needed for Chest pain. May dispense a two pack of 25 tablets. 100 tablet 3    NOVOLOG 100 unit/mL injection INJECT UP TO MAX  UNITS UNDER THE SKIN VIA INSULIN PUMP DAILY AS DIRECTED 30 mL 11    varicella-zoster gE-AS01B, PF, (SHINGRIX, PF,) 50 mcg/0.5 mL injection Inject into the muscle. 0.5 mL 1    vit A/vit C/vit E/zinc/copper (PRESERVISION AREDS ORAL) Take 1 tablet by mouth 2 (two) times daily.      alpha lipoic acid 600 mg Cap Take 1 capsule by mouth once daily.        fluorouracil (EFUDEX) 5 % cream AAA scalp bid x 2 weeks 40 g 1    fosinopril-hydrochlorothiazide (MONOPRIL-HCT) 10-12.5 mg per tablet Take 3 tablets by mouth once daily. 270 tablet 3    influenza (FLUZONE HIGH-DOSE) 180 mcg/0.5 mL vaccine Inject into the muscle. 0.5 mL 1     No current facility-administered medications for this visit.        Past Medical History:   Diagnosis Date    Anemia     Back pain     Basal cell carcinoma 06/08/2015    left nasal ala    CAD (coronary artery disease) 10/1/2012    Cataract     DDD (degenerative disc disease), lumbar 10/28/2014    Diabetes mellitus     Diabetes mellitus type I     Diabetic retinopathy of both eyes     Heart attack     Hyperlipidemia     Hypertension     Insulin pump in place     Obesity     Poorly controlled type 1 diabetes mellitus with peripheral neuropathy 10/1/2012    Preseptal cellulitis of right eye 8/17/15    S/P CABG x 2 2/14/2014    1994     Sleep apnea     Squamous cell carcinoma 03/18/2013    right posterior ear    Squamous cell carcinoma 07/26/2017    scalp    Squamous cell carcinoma  08/02/2018    Right Scalp/MOHS    Trouble in sleeping     Type 1 diabetes, uncontrolled, with retinopathy 10/1/2012    Type II or unspecified type diabetes mellitus without mention of complication, not stated as uncontrolled        Past Surgical History:   Procedure Laterality Date    APPENDECTOMY  1953    BLOCK-NERVE-MEDIAL BRANCH-LUMBAR Bilateral 3/10/2015    Performed by [facility] at [facility]    BLOCK-NERVE-MEDIAL BRANCH-LUMBAR Bilateral 11/25/2014    [facility]    CATARACT EXTRACTION  4/8/13    right eye (toric)    CATARACT EXTRACTION  4/29/13    left eye (toric)    COLONOSCOPY N/A 4/25/2017    [facility] (4TH FLR)    COLONOSCOPY N/A 4/25/2014    Per[facility] at [facility] ENDO (4TH FLR)    CORONARY ARTERY BYPASS GRAFT  1994    x 3    EYE SURGERY      cataracts, both eyes    FINGER TENDON REPAIR  2011    left hand    INJECTION-FACET Bilateral 3/31/2015    [facility]    INJECTION-STEROID-EPIDURAL-LUMBAR N/A 10/28/2014    [facility]formed by [facility]    INSERTION, IOL PROSTHESIS Left 4/29/2013    [facility] OR    INSERTION, IOL PROSTHESIS Right 4/8/2013    [facility] at [facility]    MINIMALLY INVASIVE FUSION-TRANSLUMINAL LUMBAR INTERBODY (TLIF) Left 5/18/2015    Performed [facility] Tee [facility]inedo [facility] at [facility] OR 2ND FLR    PHACOEMULSIFICATION, CATARACT Left 4/29/2013    [facility] at [facility] OR    PHACOEMULSIFICATION, CATARACT Right 4/8/2013    [facility] at [facility] OR    RADIOFREQUENCY THERMOCOAGULATION (RFTC)-NERVE-MEDIAN BRANCH-LUMBAR Left 1/13/2015    [facility]    RADIOFREQUENCY THERMOCOAGULATION (RFTC)-NERVE-MEDIAN BRANCH-LUMBAR Right 12/30/2014    [facility]    SKIN  "BIOPSY  2013    multiple    SPINE SURGERY  2015    TLIF    TONSILLECTOMY  1947       Vital Signs (Most Recent)  Vitals:    01/02/19 1513   BP: 124/66   Pulse: 75           Review of Systems:  ROS:  Constitutional: no fever or chills  Eyes: no visual changes, Diabetic retinopathy, macular degeneration  ENT: no nasal congestion or sore throat  Respiratory: no cough or shortness of breath  Cardiovascular: no chest pain or palpitations, Positive CAD, CABG x2, peripheral vascular disease, stable angina  Gastrointestinal: no nausea or vomiting, tolerating diet  Genitourinary: no hematuria or dysuria, Positive CKD stage 3  Integument/Breast: no rash or pruritis  Hematologic/Lymphatic: no easy bruising or lymphadenopathy, Positive chronic anemia  Musculoskeletal: no arthralgias or myalgias  Neurological: no seizures or tremors, Positive degenerative disc disease, diabetic polyneuropathy  Behavioral/Psych: no auditory or visual hallucinations  Endocrine: no heat or cold intolerance, Positive diabetes type 1                OBJECTIVE:     PHYSICAL EXAM:  Height: 5' 7" (170.2 cm) Weight: 90 kg (198 lb 6.6 oz), General Appearance: Well nourished, well developed, in no acute distress.  Neurological: Mood & affect are normal.  right  Knee Exam:  Knee Range of Motion:0-120 degrees flexion   Effusion:none  Condition of skin:intact  Location of tenderness:Medial joint line   Strength:5 of 5  Stability:  Lachman: stable, LCL: stable, MCL: stable, PCL: stable and posteromedial (dial): stable  Varus /Valgus stress:  normal  Candice:   negative/negative    left  Knee Exam:  Knee Range of Motion:0-120 degrees flexion   Effusion:none  Condition of skin:intact  Location of tenderness:None   Strength:5 of 5  Stability:  stable to testing  Varus /Valgus stress:  normal  Candice:   negative/negative      Hip Examination:  normal    RADIOGRAPHS:  X-rays taken today films reviewed by me demonstrate well-preserved joint spaces throughout both " knees with very minor medial joint space narrowing on the left and no osteophytic spurring sclerotic changes fracture dislocation or other bony abnormalities    ASSESSMENT/PLAN:     Plan: We discussed with the patient at length all the different treatment options available for  the knee including anti-inflammatories, acetaminophen, rest, ice, knee strengthening exercise, occasional cortisone injections for temporary relief, Viscosupplimentation injections, arthroscopic debridement osteotomy, and finally knee arthroplasty.   Patient will continue course of watchful waiting he is going to adjust his exercise routine to a decrease impact exercises follow-up p.r.n.

## 2019-01-09 NOTE — TELEPHONE ENCOUNTER
----- Message from Aracelis Silva sent at 1/9/2019  4:04 PM CST -----  Contact: pt  Pt is scheduled for a PET STRESS on Friday and is confused about what meds to stop or continue.  He can be reached at [phone].  He is a pt of .    Thank you

## 2019-01-11 NOTE — TELEPHONE ENCOUNTER
----- Message from Marcos Alexander MD sent at 1/11/2019  4:20 PM CST -----  As we discussed.  Angiogram please

## 2019-01-14 PROBLEM — L57.0 AK (ACTINIC KERATOSIS): Status: ACTIVE | Noted: 2019-01-01

## 2019-01-14 NOTE — PROGRESS NOTES
Subjective:       Patient ID:  Reid Herman is a 78 y.o. male who presents for   Chief Complaint   Patient presents with    Lesion     follow up Efudex     This is a high risk patient here to check for the development of new lesions.        Actinic Keratosis  - Follow-up  Symptom course: improving  Currently using: 10/18 efudex bid x 2 weeks scalp.  Affected locations: scalp  Signs / symptoms: asymptomatic        Review of Systems   Constitutional: Negative for fever and chills.   Skin: Positive for wears hat (always). Negative for itching, rash, daily sunscreen use, activity-related sunscreen use and recent sunburn.   Hematologic/Lymphatic: Bruises/bleeds easily.        Objective:    Physical Exam   Constitutional: He appears well-developed and well-nourished. No distress.   Neurological: He is alert and oriented to person, place, and time. He is not disoriented.   Psychiatric: He has a normal mood and affect.   Skin:   Areas Examined (abnormalities noted in diagram):   Scalp / Hair Palpated and Inspected  Head / Face Inspection Performed  Neck Inspection Performed  Chest / Axilla Inspection Performed  Back Inspection Performed  RUE Inspected  LUE Inspection Performed  Nails and Digits Inspection Performed                   Diagram Legend     Erythematous scaling macule/papule c/w actinic keratosis       Vascular papule c/w angioma      Pigmented verrucoid papule/plaque c/w seborrheic keratosis      Yellow umbilicated papule c/w sebaceous hyperplasia      Irregularly shaped tan macule c/w lentigo     1-2 mm smooth white papules consistent with Milia      Movable subcutaneous cyst with punctum c/w epidermal inclusion cyst      Subcutaneous movable cyst c/w pilar cyst      Firm pink to brown papule c/w dermatofibroma      Pedunculated fleshy papule(s) c/w skin tag(s)      Evenly pigmented macule c/w junctional nevus     Mildly variegated pigmented, slightly irregular-bordered macule c/w mildly atypical  nevus      Flesh colored to evenly pigmented papule c/w intradermal nevus       Pink pearly papule/plaque c/w basal cell carcinoma      Erythematous hyperkeratotic cursted plaque c/w SCC      Surgical scar with no sign of skin cancer recurrence      Open and closed comedones      Inflammatory papules and pustules      Verrucoid papule consistent consistent with wart     Erythematous eczematous patches and plaques     Dystrophic onycholytic nail with subungual debris c/w onychomycosis     Umbilicated papule    Erythematous-base heme-crusted tan verrucoid plaque consistent with inflamed seborrheic keratosis     Erythematous Silvery Scaling Plaque c/w Psoriasis     See annotation      Assessment / Plan:        AK (actinic keratosis)    SK (seborrheic keratosis)  These are benign inherited growths without a malignant potential. Reassurance given to patient. No treatment is necessary.       Angiomas   - stable and chronic    Personal history of other malignant neoplasm of skin  Area(s) of previous NMSC evaluated with no signs of recurrence.    Upper body skin examination performed today including at least 6 points as noted in physical examination. No lesions suspicious for malignancy noted.        AK (actinic keratosis)  Today's Plan:      S/p efudex bid x 2 weeks scalp - great response    Cont wear hat always      Follow-up in about 1 year (around 1/14/2020).

## 2019-01-18 NOTE — PROGRESS NOTES
Last 5 Patient Entered Readings                                      Current 30 Day Average: 125/56     Recent Readings 1/18/2019 1/16/2019 1/14/2019 1/12/2019 1/11/2019    SBP (mmHg) 131 137 124 104 118    DBP (mmHg) 57 61 51 55 48    Pulse 64 67 66 62 75          Digital Medicine: Health  Follow Up    Left voicemail to follow up with Mr. Reid Herman.  Current BP average 125/56 mmHg is at goal.

## 2019-01-21 NOTE — Clinical Note
Please schedule follow-up lab on Friday for BMP in main lab on 2nd floor (he has a cardiology consult that day). Please schedule after cardiology appt, in case they want to order labs as well.Follow-up with MD/NP in 6 months with CBC, CMP, LDH

## 2019-01-22 PROBLEM — E87.5 HYPERKALEMIA: Status: ACTIVE | Noted: 2019-01-01

## 2019-01-22 PROBLEM — D63.0 ANEMIA IN NEOPLASTIC DISEASE: Status: ACTIVE | Noted: 2019-01-01

## 2019-01-22 NOTE — PROGRESS NOTES
HEMATOLOGIC MALIGNANCIES PROGRESS NOTE    IDENTIFYING STATEMENT   Reid Herman (Manuel) is a 78 y.o. male with a  of 1940 from [place] with the diagnosis of CLL.      ONCOLOGY HISTORY:    1. CLL - [place] Stage 0   A. 2010 - Diagnosed with CLL    2. DM-1, with the following complications   A. Polyneuropathy   B. Retinopathy   C. nephropathy  3. CAD  4. HTN  5. Peripheral vascular disease  6. Atherosclerosis of the aorta  7. CKD-III  8. BPH  9. MINDA    INTERVAL HISTORY:      Mr. Herman returns to clinic for follow-up of his CLL. He has been feeling well since his last visit. He denies any new lymphadenopathy or weight loss. No night sweats. No early satiety or abdominal fullness. He is thankful for his health and to be alive.     He uses an insulin pump for his DM-I. He also now has an implanted glucose monitor. He otherwise has been feeling well and has no complaints today.     Past Medical History, Past Social History and Past Family History have been reviewed and are unchanged except as noted in the interval history.    MEDICATIONS:     Current Outpatient Medications on File Prior to Visit   Medication Sig Dispense Refill    ACCU-CHEK SHARON PLUS TEST STRP Strp TEST BLOOD SUGAR FIVE TIMES DAILY 450 strip 3    ACCU-CHEK FASTCLIX Misc TEST 6 TIMES DAILY 200 each 11    alpha lipoic acid 600 mg Cap Take 1 capsule by mouth once daily.        aspirin (ECOTRIN) 325 MG EC tablet Take 325 mg by mouth once daily.      atorvastatin (LIPITOR) 80 MG tablet TAKE 1/2 TABLET NIGHTLY. (Patient taking differently: 40 mg. ) 45 tablet 3    BD INSULIN SYRINGE ULT-FINE II 0.3 mL 31 gauge x 5/16 Syrg       blood-glucose meter,continuous (DEXCOM G5 ) Misc Use as directed. 1 each 0    blood-glucose sensor (DEXCOM G5-G4 SENSOR) Doris Change every 6-7 days. 15 Device 3    blood-glucose transmitter (DEXCOM G5 TRANSMITTER) Doris Change every 3 months. 1 Device 3    clotrimazole-betamethasone 1-0.05%  (LOTRISONE) cream APPLY EXTERNALLY TO THE AFFECTED AREA TWICE DAILY 15 g 0    fluorouracil (EFUDEX) 5 % cream AAA scalp bid x 2 weeks 40 g 1    fosinopril-hydrochlorothiazide (MONOPRIL-HCT) 10-12.5 mg per tablet Take 3 tablets by mouth once daily. 270 tablet 3    influenza (FLUZONE HIGH-DOSE) 180 mcg/0.5 mL vaccine Inject into the muscle. 0.5 mL 1    isosorbide mononitrate (IMDUR) 120 MG 24 hr tablet Take 1 tablet (120 mg total) by mouth once daily. 90 tablet 3    mecobal-levomefolat Ca-B6 phos (L-METHYL-B6-B12) 3-35-2 mg Tab Take 1 tablet by mouth once daily. 90 tablet 5    metFORMIN (GLUCOPHAGE) 500 MG tablet Take 1 tablet (500 mg total) by mouth 2 (two) times daily. 180 tablet 2    metoprolol succinate (TOPROL-XL) 100 MG 24 hr tablet Take 1 tablet (100 mg total) by mouth every evening. 90 tablet 3    multivitamin capsule Take 1 capsule by mouth once daily.        nitroGLYCERIN (NITROSTAT) 0.4 MG SL tablet Place 1 tablet (0.4 mg total) under the tongue every 5 (five) minutes as needed for Chest pain. May dispense a two pack of 25 tablets. 100 tablet 3    NOVOLOG 100 unit/mL injection INJECT UP TO MAX  UNITS UNDER THE SKIN VIA INSULIN PUMP DAILY AS DIRECTED 30 mL 11    varicella-zoster gE-AS01B, PF, (SHINGRIX, PF,) 50 mcg/0.5 mL injection Inject into the muscle. 0.5 mL 1    vit A/vit C/vit E/zinc/copper (PRESERVISION AREDS ORAL) Take 1 tablet by mouth 2 (two) times daily.       Current Facility-Administered Medications on File Prior to Visit   Medication Dose Route Frequency Provider Last Rate Last Dose    nitroGLYCERIN 0.4 MG/DOSE TL SPRY 400 mcg/spray spray 1 spray  1 spray Sublingual Q5 Min PRN Marcos Alexander MD   1 spray at 01/11/19 7137       ALLERGIES: Review of patient's allergies indicates:  No Known Allergies     ROS:       Review of Systems   Constitutional: Negative for diaphoresis, fatigue, fever and unexpected weight change.   HENT:   Negative for lump/mass and sore throat.   "  Eyes: Negative for icterus.   Respiratory: Negative for cough and shortness of breath.    Cardiovascular: Negative for chest pain and palpitations.   Gastrointestinal: Negative for abdominal distention, constipation, diarrhea, nausea and vomiting.   Genitourinary: Negative for dysuria and frequency.    Musculoskeletal: Negative for arthralgias, gait problem and myalgias.   Skin: Negative for rash.   Neurological: Negative for dizziness, gait problem and headaches.   Hematological: Negative for adenopathy. Does not bruise/bleed easily.   Psychiatric/Behavioral: The patient is not nervous/anxious.        PHYSICAL EXAM:  Vitals:    01/21/19 0948   BP: (!) 145/63   Pulse: 68   Temp: 97.7 °F (36.5 °C)   SpO2: 100%   Weight: 92.2 kg (203 lb 4.2 oz)   Height: 5' 7" (1.702 m)   PainSc: 0-No pain       Physical Exam   Constitutional: He is oriented to person, place, and time. He appears well-developed and well-nourished. No distress.   HENT:   Head: Normocephalic and atraumatic.   Mouth/Throat: Mucous membranes are normal. No oral lesions.   Eyes: Conjunctivae are normal.   Neck: No thyromegaly present.   Cardiovascular: Normal rate, regular rhythm and normal heart sounds.   No murmur heard.  Pulmonary/Chest: Breath sounds normal. He has no wheezes. He has no rales.   Abdominal: Soft. He exhibits no distension and no mass. There is no splenomegaly or hepatomegaly. There is no tenderness.   Lymphadenopathy:     He has no cervical adenopathy.        Right cervical: No deep cervical adenopathy present.       Left cervical: No deep cervical adenopathy present.     He has no axillary adenopathy.        Right: No inguinal adenopathy present.        Left: No inguinal adenopathy present.   Neurological: He is alert and oriented to person, place, and time. He has normal strength and normal reflexes. No cranial nerve deficit. Coordination normal.   Skin: No rash noted.       LAB:   Results for orders placed or performed in visit on " 01/21/19   Rapid BMT CBC with Diff   Result Value Ref Range    WBC 54.66 (HH) 3.90 - 12.70 K/uL    RBC 3.76 (L) 4.60 - 6.20 M/uL    Hemoglobin 12.1 (L) 14.0 - 18.0 g/dL    Hematocrit 37.7 (L) 40.0 - 54.0 %     (H) 82 - 98 fL    MCH 32.2 (H) 27.0 - 31.0 pg    MCHC 32.1 32.0 - 36.0 g/dL    RDW 14.1 11.5 - 14.5 %    Platelets 166 150 - 350 K/uL    MPV 9.3 9.2 - 12.9 fL    Immature Granulocytes Test Not Performed 0.0 - 0.5 %    Gran # (ANC) Test Not Performed 1.8 - 7.7 K/uL    Immature Grans (Abs) Test Not Performed 0.00 - 0.04 K/uL    Lymph # Test Not Performed 1.0 - 4.8 K/uL    Mono # Test Not Performed 0.3 - 1.0 K/uL    Eos # Test Not Performed 0.0 - 0.5 K/uL    Baso # Test Not Performed 0.00 - 0.20 K/uL    nRBC 0 0 /100 WBC    Gran% 6.5 (L) 38.0 - 73.0 %    Lymph% 92.0 (H) 18.0 - 48.0 %    Mono% 1.5 (L) 4.0 - 15.0 %    Eosinophil% 0.0 0.0 - 8.0 %    Basophil% 0.0 0.0 - 1.9 %    Platelet Estimate Appears normal     Aniso Slight     Poik Slight     Poly Occasional     Ovalocytes Occasional     Tear Drop Cells Occasional     Differential Method Manual    Comprehensive metabolic panel   Result Value Ref Range    Sodium 131 (L) 136 - 145 mmol/L    Potassium 5.6 (H) 3.5 - 5.1 mmol/L    Chloride 95 95 - 110 mmol/L    CO2 28 23 - 29 mmol/L    Glucose 226 (H) 70 - 110 mg/dL    BUN, Bld 20 8 - 23 mg/dL    Creatinine 1.3 0.5 - 1.4 mg/dL    Calcium 10.2 8.7 - 10.5 mg/dL    Total Protein 7.1 6.0 - 8.4 g/dL    Albumin 4.2 3.5 - 5.2 g/dL    Total Bilirubin 0.8 0.1 - 1.0 mg/dL    Alkaline Phosphatase 95 55 - 135 U/L    AST 20 10 - 40 U/L    ALT 14 10 - 44 U/L    Anion Gap 8 8 - 16 mmol/L    eGFR if African American >60.0 >60 mL/min/1.73 m^2    eGFR if non  53.0 (A) >60 mL/min/1.73 m^2   Lactate dehydrogenase   Result Value Ref Range     110 - 260 U/L   Hemoglobin A1c   Result Value Ref Range    Hemoglobin A1C 5.8 (H) 4.0 - 5.6 %    Estimated Avg Glucose 120 68 - 131 mg/dL     *Note: Due to a large  number of results and/or encounters for the requested time period, some results have not been displayed. A complete set of results can be found in Results Review.       PROBLEMS ASSESSED THIS VISIT:    1. CLL (chronic lymphocytic leukemia)    2. Anemia in neoplastic disease    3. Hyperkalemia    4. Atherosclerosis of aorta    5. Diabetic polyneuropathy associated with type 1 diabetes mellitus    6. 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 Herman is clinically well without any evidence of symptomatic disease. His WBC has increased; however, the doubling time of his ALC appears to be approximately 2 years at this point in time. Therefore, there are no indications for therapy at this time.     We will continue with monitoring of his CBC and for any symptom development.     Anemia in neoplastic disease  Likely related to CLL. Mild. Therapy is not typically indicated until hemoglobin < 10 g/dl.     Hyperkalemia  Likely a falsely elevated value due to leukemic burden of CLL. We will recheck. If this remains persistently elevated, then we will obtain ECG.     Atherosclerosis of aorta  Identified on plain film imaging on 7/21/2014 of the lumbar spine. This is calcific atherosclerosis. He is on atorvastatin and should continue. He should also maintain optimal control of his diabetes.     Diabetic polyneuropathy associated with type 1 diabetes mellitus  He is using an insulin pump to optimize glucose control. Continue.     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 Continue management by insulin pump and follow-up with PCP.     Follow-up in 6 months    Shailesh Garcia MD  Hematology and Stem Cell Transplant

## 2019-01-22 NOTE — ASSESSMENT & PLAN NOTE
Mr. Herman is clinically well without any evidence of symptomatic disease. His WBC has increased; however, the doubling time of his ALC appears to be approximately 2 years at this point in time. Therefore, there are no indications for therapy at this time.     We will continue with monitoring of his CBC and for any symptom development.

## 2019-01-22 NOTE — ASSESSMENT & PLAN NOTE
Likely a falsely elevated value due to leukemic burden of CLL. We will recheck. If this remains persistently elevated, then we will obtain ECG.

## 2019-01-22 NOTE — ASSESSMENT & PLAN NOTE
Identified on plain film imaging on 7/21/2014 of the lumbar spine. This is calcific atherosclerosis. He is on atorvastatin and should continue. He should also maintain optimal control of his diabetes.

## 2019-01-25 NOTE — PROGRESS NOTES
Interventional Cardiology Clinic Note  Reason for Visit: Chest pain  Referring Physician:Dr Marcos Alexander  Primary care Physician:Olivia Zavala    Patient ID: 78 year old male referred for accelerating angina and positive Cardiac PET stress.    Assessment:     1. Coronary artery disease involving native coronary artery of native heart with angina pectoris    2. Controlled type 1 diabetes mellitus with diabetic neuropathy, with long-term current use of insulin    3. Pure hypercholesterolemia    4. Essential hypertension    5. Peripheral vascular disease    6. CKD (chronic kidney disease), stage II    7. Atherosclerosis of aorta    8. Stable angina        Plan:   Coronary artery disease involving native coronary artery of native heart with angina pectoris    -Accelerating angina in past 6 months with positive cardiac PET  -maximize medical therapy-Increase Toprol XL to 150 mg daily  -Change fosinopril-HCTZ to lisinopril-HCTZ 20-12.5 mg po BID  -Coronary angiogram to delineate anatomy  -Left distal radial access(snuffbox).  -Plavix load 300 mg today followed by 75 mg daily  -change aspirin to 81 mg daily  -Pre-op fluids-3cc/kg/hr starting 1 hr before  -benadryl prior to procedure  -The risks, benefits and alternatives of the procedure were explained to the patient.   -The risks of coronary angiography include but are not limited to: bleeding, infection, heart rhythm abnormalities, allergic reactions, kidney injury and potential need for dialysis, stroke and death.   -The risks of moderate sedation include hypotension, respiratory depression, arrhythmias, bronchospasm, and death.   - Informed consent was obtained and the  patient is agreeable to proceed with the procedure.    Controlled type 1 diabetes mellitus with diabetic neuropathy, with long-term current use of insulin  -Currently on insulin pump and metformin  -Hold metformin prior to procedure    Pure hypercholesterolemia  -Continue Lipitor 40 mg daily  -Last  Lipid profile(7/2018) T.Chol 115, TG 39, HDL 49, LDL 58.2    Essential hypertension  -BP not at goal  -Change fosinopril-HCTZ(10-12.5 3 tabs daily) to Lisinopril-HCTZ 20-12.5 mg twice  A day    Peripheral vascular disease  -Denies any claudication symptoms, no h/o TIA or CVA  -Continue aspirin, statin    CKD (chronic kidney disease), stage II  -Creatinine baseline at 1.3       NATHANIEL BRATON  CARDIOLOGY FELLOW, PGY 5  [phone]    Discussed with Dr. Ny.     I have personally taken the history and examined his patient and agree with the resident's note as stated above.  I have explained the risks, benefits and alternatives of the cath and possible PCI in detail.  The patient and his wife voice understanding and all questions have been answered. He was offered the option of staging the procedure or proceeding ad hoc and he prefers to get everything done in one episode.  The patient agrees to proceed as planned.    HPI:   Mr. Reid Herman presents with worsening angina for last 6 months. Patient reports that he has been using nitroglycerin almost every day for last 6 months. Previously he will use NTG when he was planning to walk long distances at most once a week. Now he can get angina walking very short distances( < 1 block). Angina reported as his typical heart pain- left sided chest discomfort, no radiation, no SOB and resolves with 1 Nitroglycerin only. He reports compliance with his medications. He denies any palpitations, dizziness, syncope, claudication, bleeding, skin breakdown or ulceration.    His Past medical history includes HTN, Hyperlipidemia, DM-1(insulin pump), CAD s/p CABG x 2 (SVG->LAD, LIMA-> D1 1994)-coronary angiogram 2016 showed 100 % OM-2 & 100 % PDA occlusion and  LAD, patent SVG and LIMA grafts, Left to right collaterals for a non dominant RCA with diffuse disease, MINDA compliant with CPAP, CLL not currently on any therapy, PAD.      Cardiac PET(1/11/2019)-  1.Moderate to severe  intensity mid inferoseptal and septal resting perfusion abnormality involving 12% of the LV myocardium. After pharmacologic stress this defect is more severe and larger involving 30 % of the LV myocardium indicative of ischemia with underlying scar.    2. Small to moderate sized, moderate intensity basal to mid lateral stress induced perfusion abnormality involving 10% of the LV myocardium    US carotid (2/20/2018)    There is 40 - 49% right Internal Carotid stenosis.  There is 20 - 39% left Internal Carotid stenosis.    LEONIE (2/29/2016 )Right 1.81 Left 1.81-suspected medial calcinosis    ROS:    Review of Systems   Constitution: Negative for decreased appetite and fever.   HENT: Negative for hoarse voice and nosebleeds.    Eyes: Negative for blurred vision and photophobia.   Cardiovascular: Positive for chest pain. Negative for dyspnea on exertion, irregular heartbeat, orthopnea and palpitations.   Respiratory: Negative for cough and shortness of breath.    Hematologic/Lymphatic: Negative for bleeding problem. Does not bruise/bleed easily.   Skin: Negative for itching and rash.   Musculoskeletal: Negative for joint swelling and neck pain.   Gastrointestinal: Negative for abdominal pain, hematemesis, hematochezia, nausea and vomiting.   Genitourinary: Negative for hematuria.   Neurological: Negative for light-headedness and seizures.   Psychiatric/Behavioral: Negative for altered mental status. The patient is not nervous/anxious.      PMH:     Past Medical History:   Diagnosis Date    Anemia     Back pain     Basal cell carcinoma 06/08/2015    left nasal ala    CAD (coronary artery disease) 10/1/2012    Cataract     DDD (degenerative disc disease), lumbar 10/28/2014    Diabetes mellitus     Diabetes mellitus type I     Diabetic retinopathy of both eyes     Heart attack     Hyperlipidemia     Hypertension     Insulin pump in place     Obesity     Poorly controlled type 1 diabetes mellitus with  peripheral neuropathy 10/1/2012    Preseptal cellulitis of right eye 8/17/15    S/P CABG x 2 2/14/2014    1994     Sleep apnea     Squamous cell carcinoma 03/18/2013    right posterior ear    Squamous cell carcinoma 07/26/2017    scalp    Squamous cell carcinoma 08/02/2018    Right Scalp/MOHS    Trouble in sleeping     Type 1 diabetes, uncontrolled, with retinopathy 10/1/2012    Type II or unspecified type diabetes mellitus without mention of complication, not stated as uncontrolled     Vitreous hemorrhage 8/17/2014     Past Surgical History:   Procedure Laterality Date    APPENDECTOMY  1953    BLOCK-NERVE-MEDIAL BRANCH-LUMBAR Bilateral 3/10/2015    [facility]    BLOCK-NERVE-MEDIAL BRANCH-LUMBAR Bilateral 11/25/2014    [facility]    CATARACT EXTRACTION  4/8/13    right eye (toric)    CATARACT EXTRACTION  4/29/13    left eye (toric)    COLONOSCOPY N/A 4/25/2017    [facility] CL[facility]SA Freeman [facility]    COLONOSCOPY N/A 4/25/2014    [facility] ([facility]    CORONARY ARTERY BYPASS GRAFT  1994    x 3    EYE SURGERY      cataracts, both eyes    FINGER TENDON REPAIR  2011    left hand    INJECTION-FACET Bilateral 3/31/2015    [facility]    INJECTION-STEROID-EPIDURAL-LUMBAR N/A 10/28/2014    [facility] M[facility]    INSERTION, IOL PROSTHESIS Left 4/29/2013    [facility]    INSERTION, IOL PROSTHESIS Right 4/8/2013    [facility]    MINIMALLY INVASIVE FUSION-TRANSLUMINAL LUMBAR INTERBODY (TLIF) Left 5/18/2015    Performed [facility] Tee [facility]inedo [facility] at [facility] OR 2ND FLR    PHACOEMULSIFICATION, CATARACT Left 4/29/2013    [facility] at [facility]    PHACOEMULSIFICATION, CATARACT Right 4/8/2013    [facility]    RADIOFREQUENCY THERMOCOAGULATION (RFTC)-NERVE-MEDIAN BRANCH-LUMBAR Left 1/13/2015    [facility]    RADIOFREQUENCY THERMOCOAGULATION (RFTC)-NERVE-MEDIAN BRANCH-LUMBAR Right 12/30/2014    [facility]    SKIN BIOPSY  2013    multiple    SPINE SURGERY  2015    TLIF    TONSILLECTOMY  1947     Allergies:   Review of patient's allergies indicates:  No Known Allergies  Medications:     Current Outpatient Medications on File Prior to Visit   Medication Sig Dispense Refill    ACCU-CHEK SHARON PLUS TEST STRP Strp TEST BLOOD SUGAR FIVE TIMES DAILY 450 strip 3    ACCU-CHEK FASTCLIX Misc TEST 6 TIMES DAILY 200 each 11    alpha lipoic acid 600 mg Cap Take 1 capsule by mouth once daily.        aspirin (ECOTRIN) 325 MG EC tablet Take 325 mg by mouth once daily.      atorvastatin (LIPITOR) 80 MG tablet TAKE 1/2 TABLET NIGHTLY. (Patient taking differently: 40 mg. ) 45 tablet 3    BD INSULIN SYRINGE ULT-FINE II 0.3 mL 31 gauge x 5/16 Syrg       blood-glucose meter,continuous (DEXCOM G5 ) Misc Use as directed. 1 each 0    blood-glucose sensor (DEXCOM G5-G4 SENSOR) Doris Change every 6-7 days. 15 Device 3    blood-glucose transmitter (DEXCOM G5 TRANSMITTER) Doris Change every 3 months. 1 Device 3    clotrimazole-betamethasone 1-0.05% (LOTRISONE) cream APPLY EXTERNALLY TO THE AFFECTED AREA TWICE DAILY 15 g 0    fluorouracil (EFUDEX) 5 % cream AAA scalp bid x 2 weeks 40 g 1    fosinopril-hydrochlorothiazide (MONOPRIL-HCT) 10-12.5 mg per tablet Take 3 tablets by mouth once daily. 270 tablet 3    influenza (FLUZONE HIGH-DOSE) 180 mcg/0.5 mL vaccine Inject into the muscle. 0.5 mL 1    isosorbide mononitrate (IMDUR) 120 MG 24 hr tablet Take 1 tablet (120 mg total) by mouth once daily. 90 tablet 3    mecobal-levomefolat Ca-B6 phos (L-METHYL-B6-B12) 3-35-2 mg Tab Take 1 tablet by mouth once daily. 90 tablet 5    metFORMIN  (GLUCOPHAGE) 500 MG tablet Take 1 tablet (500 mg total) by mouth 2 (two) times daily. 180 tablet 2    metoprolol succinate (TOPROL-XL) 100 MG 24 hr tablet Take 1 tablet (100 mg total) by mouth every evening. 90 tablet 3    multivitamin capsule Take 1 capsule by mouth once daily.        nitroGLYCERIN (NITROSTAT) 0.4 MG SL tablet Place 1 tablet (0.4 mg total) under the tongue every 5 (five) minutes as needed for Chest pain. May dispense a two pack of 25 tablets. 100 tablet 3    NOVOLOG 100 unit/mL injection INJECT UP TO MAX  UNITS UNDER THE SKIN VIA INSULIN PUMP DAILY AS DIRECTED 30 mL 11    varicella-zoster gE-AS01B, PF, (SHINGRIX, PF,) 50 mcg/0.5 mL injection Inject into the muscle. 0.5 mL 1    vit A/vit C/vit E/zinc/copper (PRESERVISION AREDS ORAL) Take 1 tablet by mouth 2 (two) times daily.       Current Facility-Administered Medications on File Prior to Visit   Medication Dose Route Frequency Provider Last Rate Last Dose    nitroGLYCERIN 0.4 MG/DOSE TL SPRY 400 mcg/spray spray 1 spray  1 spray Sublingual Q5 Min PRN Marcos Alexander MD   1 spray at 01/11/19 1335     Social History:     Social History     Tobacco Use    Smoking status: Never Smoker    Smokeless tobacco: Never Used   Substance Use Topics    Alcohol use: Yes     Alcohol/week: 0.6 oz     Types: 1 Glasses of wine per week     Comment: social once monthly     Family History:     Family History   Problem Relation Age of Onset    Breast cancer Mother     Cancer Mother         ? lung cancer    Alzheimer's disease Father     Dementia Father     Stroke Father     Colon cancer Sister     Cancer Sister 60        colon    Acute myelogenous leukemia Sister     Diabetes Maternal Grandfather     Diabetes Paternal Aunt     Diabetes Paternal Uncle     Melanoma Neg Hx     Psoriasis Neg Hx     Lupus Neg Hx     Eczema Neg Hx     Acne Neg Hx      Physical Exam:   BP (!) 142/63 (BP Location: Right arm, Patient Position: Sitting, BP Method:  "Large (Automatic))   Pulse 72   Ht 5' 7" (1.702 m)   Wt 93.1 kg (205 lb 4 oz)   SpO2 99%   BMI 32.15 kg/m²      Physical Exam  General: alert, awake and oriented x 3  Eyes:PERRL.   Neck:no JVD   Lungs:  clear to auscultation bilaterally   Cardiovascular: Heart: regular rate and rhythm, S1, S2 normal, no murmur, click, rub or gallop.   Chest Wall: no tenderness.   Extremities: no cyanosis + trace edema.   Abdomen/Rectal: Abdomen: soft, non-tender non-distented; bowel sounds normal  Neurologic: Normal strength and tone. No focal numbness or weakness     LEFT RIGHT   RADIAL 2+  Distal radial 2 + 2+     ULNAR     BRACHIAL     FEMORAL 2+ 2+   DP 1+ 1+   TP 1+ 1+   DEREK'S TEST Normal Normal   BARBEAU TEST         Labs:     Lab Results   Component Value Date     (L) 01/21/2019    K 5.6 (H) 01/21/2019    CL 95 01/21/2019    CO2 28 01/21/2019    BUN 20 01/21/2019    CREATININE 1.3 01/21/2019    ANIONGAP 8 01/21/2019     Lab Results   Component Value Date    HGBA1C 5.8 (H) 01/21/2019     No results found for: BNP, BNPTRIAGEBLO Lab Results   Component Value Date    WBC 54.66 (HH) 01/21/2019    HGB 12.1 (L) 01/21/2019    HCT 37.7 (L) 01/21/2019    HCT 19 (LL) 05/18/2015     01/21/2019    GRAN Test Not Performed 01/21/2019    GRAN 6.5 (L) 01/21/2019     Lab Results   Component Value Date    CHOL 115 (L) 07/09/2018    HDL 49 07/09/2018    LDLCALC 58.2 (L) 07/09/2018    TRIG 39 07/09/2018          Imaging:     Nuc Stress EF   Date Value Ref Range Status   01/11/2019 40 % Final     Nuc Rest EF   Date Value Ref Range Status   01/11/2019 59  Final       "

## 2019-01-25 NOTE — PROGRESS NOTES
OUTPATIENT CATHETERIZATION INSTRUCTIONS    You have been scheduled for a procedure in the catheterization lab on____________________________________ ,  Please report to the Cardiology Waiting Area on the Third floor of the hospital and check in at _____________.   You will then be taken to the SSCU (Short Stay Cardiac Unit) and prepared for your procedure. Please be aware that this is not the time of your procedure but the time you are to arrive. The procedures are scheduled on an hourly basis; however, emergency cases take precedence over all other cases.       You may not have anything to eat or drink after midnight the night before your test. You may take your regular morning medications with water. If there are any medications that you should not take you will be instructed to hold them that morning. If you are diabetic and on Metformin (Glucophage) do not take it the day before, the day of, and for 2 days after your procedure.      The procedure will take 1-2 hours to perform. After the procedure, you will return to SSCU on the third floor of the hospital. You will need to lie still (or keep your arm still) for the next 4 to 6 hours to minimize bleeding from the puncture site. Your family may remain in the room with you during this time.       You may be able to be discharged home that same afternoon if there is someone to drive you home and there were no complications. If you have one of the balloon, stent, or device procedures you may spend the night in the hospital. Your doctor will determine, based on your progress, the date and time of your discharge. The results of your procedure will be discussed with you before you are discharged. Any further testing or procedures will be scheduled for you either before you leave or you will be called with these appointments.       If you should have any questions, concerns, or need to change the date of your procedure, please call  KHURRAM Ferrell @ [phone]    Special  Instructions:  Hold Metformin the day before, day of and 2 days after procedure.  Unplug pump when you get up the morning of procedure  Take 4 Plavix pills the day before your procedure and 1 pill the morning of your procedure before coming to the hospital.  Drink plenty of water the day before and after your procedure.       THE ABOVE INSTRUCTIONS WERE GIVEN TO THE PATIENT VERBALLY AND THEY VERBALIZED UNDERSTANDING.  THEY DO NOT REQUIRE ANY SPECIAL NEEDS AND DO NOT HAVE ANY LEARNING BARRIERS.          Directions for Reporting to Cardiology Waiting Area in the Hospital  If you park in the Parking Garage:  Take elevators to the1st floor of the parking garage.  Continue past the gift shop, coffee shop, and piano.  Take a right and go to the gold elevators. (Elevator B)  Take the elevator to the 3rd floor.  Follow the arrow on the sign on the wall that says Cath Lab Registration/EP Lab Registration.  Follow the long hallway all the way around until you come to a big open area.  This is the registration area.  Check in at Reception Desk.    OR    If family is dropping you off:  Have them drop you off at the front of the Hospital under the green overhang.  Enter through the doors and take a right.  Take the E elevators to the 3rd floor Cardiology Waiting Area.  Check in at the Reception Desk in the waiting room.

## 2019-01-25 NOTE — H&P (VIEW-ONLY)
Interventional Cardiology Clinic Note  Reason for Visit: Chest pain  Referring Physician:Dr Marcos Alexander  Primary care Physician:Olivia Zavala    Patient ID: 78 year old male referred for accelerating angina and positive Cardiac PET stress.    Assessment:     1. Coronary artery disease involving native coronary artery of native heart with angina pectoris    2. Controlled type 1 diabetes mellitus with diabetic neuropathy, with long-term current use of insulin    3. Pure hypercholesterolemia    4. Essential hypertension    5. Peripheral vascular disease    6. CKD (chronic kidney disease), stage II    7. Atherosclerosis of aorta    8. Stable angina        Plan:   Coronary artery disease involving native coronary artery of native heart with angina pectoris    -Accelerating angina in past 6 months with positive cardiac PET  -maximize medical therapy-Increase Toprol XL to 150 mg daily  -Change fosinopril-HCTZ to lisinopril-HCTZ 20-12.5 mg po BID  -Coronary angiogram to delineate anatomy  -Left distal radial access(snuffbox).  -Plavix load 300 mg today followed by 75 mg daily  -change aspirin to 81 mg daily  -Pre-op fluids-3cc/kg/hr starting 1 hr before  -benadryl prior to procedure  -The risks, benefits and alternatives of the procedure were explained to the patient.   -The risks of coronary angiography include but are not limited to: bleeding, infection, heart rhythm abnormalities, allergic reactions, kidney injury and potential need for dialysis, stroke and death.   -The risks of moderate sedation include hypotension, respiratory depression, arrhythmias, bronchospasm, and death.   - Informed consent was obtained and the  patient is agreeable to proceed with the procedure.    Controlled type 1 diabetes mellitus with diabetic neuropathy, with long-term current use of insulin  -Currently on insulin pump and metformin  -Hold metformin prior to procedure    Pure hypercholesterolemia  -Continue Lipitor 40 mg daily  -Last  Lipid profile(7/2018) T.Chol 115, TG 39, HDL 49, LDL 58.2    Essential hypertension  -BP not at goal  -Change fosinopril-HCTZ(10-12.5 3 tabs daily) to Lisinopril-HCTZ 20-12.5 mg twice  A day    Peripheral vascular disease  -Denies any claudication symptoms, no h/o TIA or CVA  -Continue aspirin, statin    CKD (chronic kidney disease), stage II  -Creatinine baseline at 1.3       NATHANIEL BARTON  CARDIOLOGY FELLOW, PGY 5  [phone]    Discussed with Dr. Ny.     I have personally taken the history and examined his patient and agree with the resident's note as stated above.  I have explained the risks, benefits and alternatives of the cath and possible PCI in detail.  The patient and his wife voice understanding and all questions have been answered. He was offered the option of staging the procedure or proceeding ad hoc and he prefers to get everything done in one episode.  The patient agrees to proceed as planned.    HPI:   Mr. Reid Herman presents with worsening angina for last 6 months. Patient reports that he has been using nitroglycerin almost every day for last 6 months. Previously he will use NTG when he was planning to walk long distances at most once a week. Now he can get angina walking very short distances( < 1 block). Angina reported as his typical heart pain- left sided chest discomfort, no radiation, no SOB and resolves with 1 Nitroglycerin only. He reports compliance with his medications. He denies any palpitations, dizziness, syncope, claudication, bleeding, skin breakdown or ulceration.    His Past medical history includes HTN, Hyperlipidemia, DM-1(insulin pump), CAD s/p CABG x 2 (SVG->LAD, LIMA-> D1 1994)-coronary angiogram 2016 showed 100 % OM-2 & 100 % PDA occlusion and  LAD, patent SVG and LIMA grafts, Left to right collaterals for a non dominant RCA with diffuse disease, MINDA compliant with CPAP, CLL not currently on any therapy, PAD.      Cardiac PET(1/11/2019)-  1.Moderate to severe  intensity mid inferoseptal and septal resting perfusion abnormality involving 12% of the LV myocardium. After pharmacologic stress this defect is more severe and larger involving 30 % of the LV myocardium indicative of ischemia with underlying scar.    2. Small to moderate sized, moderate intensity basal to mid lateral stress induced perfusion abnormality involving 10% of the LV myocardium    US carotid (2/20/2018)    There is 40 - 49% right Internal Carotid stenosis.  There is 20 - 39% left Internal Carotid stenosis.    LEONIE (2/29/2016 )Right 1.81 Left 1.81-suspected medial calcinosis    ROS:    Review of Systems   Constitution: Negative for decreased appetite and fever.   HENT: Negative for hoarse voice and nosebleeds.    Eyes: Negative for blurred vision and photophobia.   Cardiovascular: Positive for chest pain. Negative for dyspnea on exertion, irregular heartbeat, orthopnea and palpitations.   Respiratory: Negative for cough and shortness of breath.    Hematologic/Lymphatic: Negative for bleeding problem. Does not bruise/bleed easily.   Skin: Negative for itching and rash.   Musculoskeletal: Negative for joint swelling and neck pain.   Gastrointestinal: Negative for abdominal pain, hematemesis, hematochezia, nausea and vomiting.   Genitourinary: Negative for hematuria.   Neurological: Negative for light-headedness and seizures.   Psychiatric/Behavioral: Negative for altered mental status. The patient is not nervous/anxious.      PMH:     Past Medical History:   Diagnosis Date    Anemia     Back pain     Basal cell carcinoma 06/08/2015    left nasal ala    CAD (coronary artery disease) 10/1/2012    Cataract     DDD (degenerative disc disease), lumbar 10/28/2014    Diabetes mellitus     Diabetes mellitus type I     Diabetic retinopathy of both eyes     Heart attack     Hyperlipidemia     Hypertension     Insulin pump in place     Obesity     Poorly controlled type 1 diabetes mellitus with  peripheral neuropathy 10/1/2012    Preseptal cellulitis of right eye 8/17/15    S/P CABG x 2 2/14/2014    1994     Sleep apnea     Squamous cell carcinoma 03/18/2013    right posterior ear    Squamous cell carcinoma 07/26/2017    scalp    Squamous cell carcinoma 08/02/2018    Right Scalp/MOHS    Trouble in sleeping     Type 1 diabetes, uncontrolled, with retinopathy 10/1/2012    Type II or unspecified type diabetes mellitus without mention of complication, not stated as uncontrolled     Vitreous hemorrhage 8/17/2014     Past Surgical History:   Procedure Laterality Date    APPENDECTOMY  1953    BLOCK-NERVE-MEDIAL BRANCH-LUMBAR Bilateral 3/10/2015    [facility]    BLOCK-NERVE-MEDIAL BRANCH-LUMBAR Bilateral 11/25/2014    [facility]    CATARACT EXTRACTION  4/8/13    right eye (toric)    CATARACT EXTRACTION  4/29/13    left eye (toric)    COLONOSCOPY N/A 4/25/2017    Performed by CL[facility]SA Freeman [facility] at [facility] ENDO (4TH FLR)    COLONOSCOPY N/A 4/25/2014    Performed by [facility] at [facility] (4TH FLR)    CORONARY ARTERY BYPASS GRAFT  1994    x 3    EYE SURGERY      cataracts, both eyes    FINGER TENDON REPAIR  2011    left hand    INJECTION-FACET Bilateral 3/31/2015    [facility]    INJECTION-STEROID-EPIDURAL-LUMBAR N/A 10/28/2014    [facility] MGT    INSERTION, IOL PROSTHESIS Left 4/29/2013    [facility] at [facility] OR    INSERTION, IOL PROSTHESIS Right 4/8/2013    [facility] by [facility] at [facility] OR    MINIMALLY INVASIVE FUSION-TRANSLUMINAL LUMBAR INTERBODY (TLIF) Left 5/18/2015    Performed by Tee Pinedo [facility] at [facility] OR 2ND FLR    PHACOEMULSIFICATION, CATARACT Left 4/29/2013    [facility]formed by [facility] at [facility] OR    PHACOEMULSIFICATION, CATARACT Right 4/8/2013    [facility]formed by [facility] at  [facility] OR    RADIOFREQUENCY THERMOCOAGULATION (RFTC)-NERVE-MEDIAN BRANCH-LUMBAR Left 1/13/2015    [facility]    RADIOFREQUENCY THERMOCOAGULATION (RFTC)-NERVE-MEDIAN BRANCH-LUMBAR Right 12/30/2014    [facility]    SKIN BIOPSY  2013    multiple    SPINE SURGERY  2015    TLIF    TONSILLECTOMY  1947     Allergies:   Review of patient's allergies indicates:  No Known Allergies  Medications:     Current Outpatient Medications on File Prior to Visit   Medication Sig Dispense Refill    ACCU-CHEK SHARON PLUS TEST STRP Strp TEST BLOOD SUGAR FIVE TIMES DAILY 450 strip 3    ACCU-CHEK FASTCLIX Misc TEST 6 TIMES DAILY 200 each 11    alpha lipoic acid 600 mg Cap Take 1 capsule by mouth once daily.        aspirin (ECOTRIN) 325 MG EC tablet Take 325 mg by mouth once daily.      atorvastatin (LIPITOR) 80 MG tablet TAKE 1/2 TABLET NIGHTLY. (Patient taking differently: 40 mg. ) 45 tablet 3    BD INSULIN SYRINGE ULT-FINE II 0.3 mL 31 gauge x 5/16 Syrg       blood-glucose meter,continuous (DEXCOM G5 ) Misc Use as directed. 1 each 0    blood-glucose sensor (DEXCOM G5-G4 SENSOR) Doris Change every 6-7 days. 15 Device 3    blood-glucose transmitter (DEXCOM G5 TRANSMITTER) Doris Change every 3 months. 1 Device 3    clotrimazole-betamethasone 1-0.05% (LOTRISONE) cream APPLY EXTERNALLY TO THE AFFECTED AREA TWICE DAILY 15 g 0    fluorouracil (EFUDEX) 5 % cream AAA scalp bid x 2 weeks 40 g 1    fosinopril-hydrochlorothiazide (MONOPRIL-HCT) 10-12.5 mg per tablet Take 3 tablets by mouth once daily. 270 tablet 3    influenza (FLUZONE HIGH-DOSE) 180 mcg/0.5 mL vaccine Inject into the muscle. 0.5 mL 1    isosorbide mononitrate (IMDUR) 120 MG 24 hr tablet Take 1 tablet (120 mg total) by mouth once daily. 90 tablet 3    mecobal-levomefolat Ca-B6 phos (L-METHYL-B6-B12) 3-35-2 mg Tab Take 1 tablet by mouth once daily. 90 tablet 5    metFORMIN  (GLUCOPHAGE) 500 MG tablet Take 1 tablet (500 mg total) by mouth 2 (two) times daily. 180 tablet 2    metoprolol succinate (TOPROL-XL) 100 MG 24 hr tablet Take 1 tablet (100 mg total) by mouth every evening. 90 tablet 3    multivitamin capsule Take 1 capsule by mouth once daily.        nitroGLYCERIN (NITROSTAT) 0.4 MG SL tablet Place 1 tablet (0.4 mg total) under the tongue every 5 (five) minutes as needed for Chest pain. May dispense a two pack of 25 tablets. 100 tablet 3    NOVOLOG 100 unit/mL injection INJECT UP TO MAX  UNITS UNDER THE SKIN VIA INSULIN PUMP DAILY AS DIRECTED 30 mL 11    varicella-zoster gE-AS01B, PF, (SHINGRIX, PF,) 50 mcg/0.5 mL injection Inject into the muscle. 0.5 mL 1    vit A/vit C/vit E/zinc/copper (PRESERVISION AREDS ORAL) Take 1 tablet by mouth 2 (two) times daily.       Current Facility-Administered Medications on File Prior to Visit   Medication Dose Route Frequency Provider Last Rate Last Dose    nitroGLYCERIN 0.4 MG/DOSE TL SPRY 400 mcg/spray spray 1 spray  1 spray Sublingual Q5 Min PRN Marcos Alexander MD   1 spray at 01/11/19 1335     Social History:     Social History     Tobacco Use    Smoking status: Never Smoker    Smokeless tobacco: Never Used   Substance Use Topics    Alcohol use: Yes     Alcohol/week: 0.6 oz     Types: 1 Glasses of wine per week     Comment: social once monthly     Family History:     Family History   Problem Relation Age of Onset    Breast cancer Mother     Cancer Mother         ? lung cancer    Alzheimer's disease Father     Dementia Father     Stroke Father     Colon cancer Sister     Cancer Sister 60        colon    Acute myelogenous leukemia Sister     Diabetes Maternal Grandfather     Diabetes Paternal Aunt     Diabetes Paternal Uncle     Melanoma Neg Hx     Psoriasis Neg Hx     Lupus Neg Hx     Eczema Neg Hx     Acne Neg Hx      Physical Exam:   BP (!) 142/63 (BP Location: Right arm, Patient Position: Sitting, BP Method:  "Large (Automatic))   Pulse 72   Ht 5' 7" (1.702 m)   Wt 93.1 kg (205 lb 4 oz)   SpO2 99%   BMI 32.15 kg/m²      Physical Exam  General: alert, awake and oriented x 3  Eyes:PERRL.   Neck:no JVD   Lungs:  clear to auscultation bilaterally   Cardiovascular: Heart: regular rate and rhythm, S1, S2 normal, no murmur, click, rub or gallop.   Chest Wall: no tenderness.   Extremities: no cyanosis + trace edema.   Abdomen/Rectal: Abdomen: soft, non-tender non-distented; bowel sounds normal  Neurologic: Normal strength and tone. No focal numbness or weakness     LEFT RIGHT   RADIAL 2+  Distal radial 2 + 2+     ULNAR     BRACHIAL     FEMORAL 2+ 2+   DP 1+ 1+   TP 1+ 1+   DEREK'S TEST Normal Normal   BARBEAU TEST         Labs:     Lab Results   Component Value Date     (L) 01/21/2019    K 5.6 (H) 01/21/2019    CL 95 01/21/2019    CO2 28 01/21/2019    BUN 20 01/21/2019    CREATININE 1.3 01/21/2019    ANIONGAP 8 01/21/2019     Lab Results   Component Value Date    HGBA1C 5.8 (H) 01/21/2019     No results found for: BNP, BNPTRIAGEBLO Lab Results   Component Value Date    WBC 54.66 (HH) 01/21/2019    HGB 12.1 (L) 01/21/2019    HCT 37.7 (L) 01/21/2019    HCT 19 (LL) 05/18/2015     01/21/2019    GRAN Test Not Performed 01/21/2019    GRAN 6.5 (L) 01/21/2019     Lab Results   Component Value Date    CHOL 115 (L) 07/09/2018    HDL 49 07/09/2018    LDLCALC 58.2 (L) 07/09/2018    TRIG 39 07/09/2018          Imaging:     Nuc Stress EF   Date Value Ref Range Status   01/11/2019 40 % Final     Nuc Rest EF   Date Value Ref Range Status   01/11/2019 59  Final       "

## 2019-01-25 NOTE — LETTER
January 25, 2019      Marcos Alexander MD  [address]  [place] [address]           [facility]  [address]  [facility] [address]  Phone: [phone]          Patient: Reid Herman   MR Number: [id number]   YOB: 1940   Date of Visit: 1/25/2019       Dear Dr. Marcos Alexander:    Thank you for referring Reid Herman to me for evaluation. Attached you will find relevant portions of my assessment and plan of care.    If you have questions, please do not hesitate to call me. I look forward to following Reid Herman along with you.    Sincerely,    Magalie Whaley RN    Enclosure  CC:  No Recipients    If you would like to receive this communication electronically, please contact [email or web address] or [phone] to request more information on [employer] access.    For providers and/or their staff who would like to refer a patient to [facility], please contact us through our one-stop-shop provider referral line, [facility], at [phone].    If you feel you have received this communication in error or would no longer like to receive these types of communications, please e-mail [email or web address]

## 2019-01-29 NOTE — OP NOTE
"    Post Cath Note  Referring Physician: Saturnino Ny MD  Procedure: ANGIOGRAM, CORONARY ARTERY (N/A), INSERTION, STENT, CORONARY ARTERY (N/A)       Access: Left radial      See full report for further details    Intervention:   DUC 2 X 15 Resolute Nunda dLCx, DUC 2.5 x 30 mm mLCx  Closure device: Radial band    Post Cath Exam:   BP (!) 164/79 (BP Location: Right arm, Patient Position: Lying)   Pulse 74   Temp 97.7 °F (36.5 °C) (Oral)   Resp 18   Ht 5' 7" (1.702 m)   Wt 90.7 kg (200 lb)   SpO2 97%   BMI 31.32 kg/m²   No unusual pain, hematoma, thrill or bruit at vascular access site.  Distal pulse present without signs of ischemia.    Recommendations:   - Routine post-cath care  - IVF at 3cc/kg/hr for 4 hrs  - Continue DAPT, Statin, Cardiac rehab referral, Continue medical management, Risk factor reduction, Follow-up with outpatient cardiologist      NATHANIEL BARTON  PGY 5  "

## 2019-01-29 NOTE — Clinical Note
Prepped: groin and left radial. Prepped with: ChloraPrep. The site was clipped. The patient was draped.

## 2019-01-29 NOTE — Clinical Note
Catheter is repositioned to the SVG. SVG TO D1The vessel(s) were injected and visualized in multiple views.

## 2019-01-29 NOTE — INTERVAL H&P NOTE
"The patient has been examined and the H&P has been reviewed:    I concur with the findings and no changes have occurred since H&P was written.    Anesthesia/Surgery risks, benefits and alternative options discussed and understood by patient/family.          Active Hospital Problems    Diagnosis  POA    Coronary artery disease involving native coronary artery of native heart with angina pectoris [I25.119]  Yes     CABG X2, 1994  SVG-LAD, LIMA-D1  ANGINA= SS/EPIG "WARMING"  RESIDUAL DZ 2006 100% PDA (LT), 100% OM3  INFERIOR ISCHEMIA        Resolved Hospital Problems   No resolved problems to display.     "

## 2019-01-29 NOTE — Clinical Note
130 ml injected throughout the case. 70 mL total wasted during the case. 200 mL total used in the case.

## 2019-01-29 NOTE — PLAN OF CARE
Problem: Adult Inpatient Plan of Care  Goal: Plan of Care Review  Outcome: Ongoing (interventions implemented as appropriate)  Pt transferred from cath lab via stretcher.  Vss.  Post op orders and assessment initiated.  Pt aao, tolerating po.  Family called to bs.  Pt in no acute distress and verbalizes no complaints. Call bell within reach.  Will continue to monitor.

## 2019-01-29 NOTE — HPI
Mr. Reid Herman presents for coronary angiogram with complaints of worsening angina for last 6 months and positive stress test.     His Past medical history includes HTN, Hyperlipidemia, DM-1(insulin pump), CAD s/p CABG x 2 (SVG->LAD, LIMA-> D1 1994)-coronary angiogram 2016 showed 100 % OM-2 & 100 % PDA occlusion and  LAD, patent SVG and LIMA grafts, Left to right collaterals for a non dominant RCA with diffuse disease, MINDA compliant with CPAP, CLL not currently on any therapy, PAD.        Cardiac PET(1/11/2019)-  1.Moderate to severe intensity mid inferoseptal and septal resting perfusion abnormality involving 12% of the LV myocardium. After pharmacologic stress this defect is more severe and larger involving 30 % of the LV myocardium indicative of ischemia with underlying scar.     2. Small to moderate sized, moderate intensity basal to mid lateral stress induced perfusion abnormality involving 10% of the LV myocardium

## 2019-01-29 NOTE — HOSPITAL COURSE
Patient underwent coronary angiogram via Left Distal radial approach and tolerated the procedure well. His coronary angiogram showed mid left circumflex 80 % lesion, distal left circumflex 99 %, prox SVG-LAD 60 %, oLAD 100 % ,  % , LIMA-D1 patent with 50 % lesion. He underwent DUC to mLCx and DUC to dLCx. He was monitored post procedure and had no complications. He will be discharged home in a stable condition. He is to continue aspirin and plavix for 1 year, continue statin,BB(toprol  mg) discontinue Imdur.Follow up with Dr Alexander in 4 weeks. Also he is enrolled in digital HTN program.

## 2019-01-29 NOTE — NURSING
Patient was seen by Dr Chin and Dr yN.  Discharge instructions and medlist given.  Patient and spouse verbalized understanding.  Denies need for escort off unit.  Off unit via wheelchair with spouse.

## 2019-01-29 NOTE — DISCHARGE SUMMARY
[facility]  Interventional Cardiology  Discharge Summary      Patient Name: Reid Herman  MRN: [id number]  Admission Date: 1/29/2019  Hospital Length of Stay: 0 days  Discharge Date and Time:  01/29/2019 2:34 PM  Attending Physician: Saturnino Ny MD  Discharging Provider: Norman Chin MD  Primary Care Physician: Olivia Zavala MD    HPI:  Mr. Reid Herman presents  for coronary angiogram with complaints of worsening angina for last 6 months and positive stress test.     His Past medical history includes HTN, Hyperlipidemia, DM-1(insulin pump), CAD s/p CABG x 2 (SVG->LAD, LIMA-> D1 1994)-coronary angiogram 2016 showed 100 % OM-2 & 100 % PDA occlusion and  LAD, patent SVG and LIMA grafts, Left to right collaterals for a non dominant RCA with diffuse disease, MINDA compliant with CPAP, CLL not currently on any therapy, PAD.        Cardiac PET(1/11/2019)-  1.Moderate to severe intensity mid inferoseptal and septal resting perfusion abnormality involving 12% of the LV myocardium. After pharmacologic stress this defect is more severe and larger involving 30 % of the LV myocardium indicative of ischemia with underlying scar.     2. Small to moderate sized, moderate intensity basal to mid lateral stress induced perfusion abnormality involving 10% of the LV myocardium        Procedure(s) (LRB):  ANGIOGRAM, CORONARY ARTERY (N/A)  INSERTION, STENT, CORONARY ARTERY (N/A)     Indwelling Lines/Drains at time of discharge:  Lines/Drains/Airways          None          Hospital Course:  Patient underwent coronary angiogram via Left Distal radial approach and tolerated the procedure well. His coronary angiogram showed mid left circumflex 80 % lesion, distal left circumflex 99 %, prox SVG-LAD 60 %, oLAD 100 % ,  % , LIMA-D1 patent with 50 % lesion. He underwent DUC to mLCx and DUC to dLCx. He was monitored post procedure and had no complications. He will be discharged home in a  stable condition. He is to continue aspirin and plavix for 1 year, continue statin,BB(toprol  mg) discontinue Imdur.Follow up with Dr Alexander in 4 weeks. Also he is enrolled in digital HTN program.              Significant Diagnostic Studies: Labs:   BMP:   Recent Labs   Lab 01/29/19  0545   *   *   K 4.4   CL 95   CO2 27   BUN 27*   CREATININE 1.2   CALCIUM 9.7    and CBC   Recent Labs   Lab 01/29/19  0545   WBC 53.46*   HGB 11.7*   HCT 35.5*          Pending Diagnostic Studies:     None        No new Assessment & Plan notes have been filed under this hospital service since the last note was generated.  Service: Interventional Cardiology      Discharged Condition: good    Follow Up:  Follow-up Information     Marcos Alexander MD In 4 weeks.    Specialty:  Cardiology                 Patient Instructions:      Diet Cardiac     Notify your health care provider if you experience any of the following:  temperature >100.4     Notify your health care provider if you experience any of the following:  persistent nausea and vomiting or diarrhea     Notify your health care provider if you experience any of the following:  severe uncontrolled pain     Notify your health care provider if you experience any of the following:  redness, tenderness, or signs of infection (pain, swelling, redness, odor or green/yellow discharge around incision site)     Notify your health care provider if you experience any of the following:  difficulty breathing or increased cough     Notify your health care provider if you experience any of the following:  severe persistent headache     Notify your health care provider if you experience any of the following:  worsening rash     Notify your health care provider if you experience any of the following:  persistent dizziness, light-headedness, or visual disturbances     Notify your health care provider if you experience any of the following:  increased confusion or weakness  "    Activity as tolerated     Medications:  Reconciled Home Medications:      Medication List      START taking these medications    clopidogrel 75 mg tablet  Commonly known as:  PLAVIX  Take 1 tablet (75 mg total) by mouth once daily. Take 4 tablets x 1 followed by one tablet daily        CHANGE how you take these medications    atorvastatin 80 MG tablet  Commonly known as:  LIPITOR  TAKE 1/2 TABLET NIGHTLY.  What changed:    · how much to take  · additional instructions     metFORMIN 500 MG tablet  Commonly known as:  GLUCOPHAGE  Take 1 tablet (500 mg total) by mouth 2 (two) times daily.  Start taking on:  2/1/2019  What changed:  These instructions start on 2/1/2019. If you are unsure what to do until then, ask your doctor or other care provider.        CONTINUE taking these medications    ACCU-CHEK SHARON PLUS TEST STRP Strp  Generic drug:  blood sugar diagnostic  TEST BLOOD SUGAR FIVE TIMES DAILY     ACCU-CHEK FASTCLIX LANCING DEV Misc  Generic drug:  lancets  TEST 6 TIMES DAILY     alpha lipoic acid 600 mg Cap  Take 1 capsule by mouth once daily.     aspirin 81 MG EC tablet  Commonly known as:  ECOTRIN  Take 1 tablet (81 mg total) by mouth once daily.     BD INSULIN SYRINGE ULT-FINE II 0.3 mL 31 gauge x 5/16" Syrg  Generic drug:  insulin syringe-needle U-100     blood-glucose meter,continuous Misc  Commonly known as:  DEXCOM G5   Use as directed.     blood-glucose sensor Doris  Commonly known as:  DEXCOM G5-G4 SENSOR  Change every 6-7 days.     blood-glucose transmitter Doris  Commonly known as:  DEXCOM G5 TRANSMITTER  Change every 3 months.     clotrimazole-betamethasone 1-0.05% cream  Commonly known as:  LOTRISONE  APPLY EXTERNALLY TO THE AFFECTED AREA TWICE DAILY     fluorouracil 5 % cream  Commonly known as:  EFUDEX  AAA scalp bid x 2 weeks     FLUZONE HIGH-DOSE 2018-19 (PF) 180 mcg/0.5 mL vaccine  Generic drug:  influenza  Inject into the muscle.     lisinopril-hydrochlorothiazide 20-12.5 mg per " tablet  Commonly known as:  PRINZIDE,ZESTORETIC  Take 1 tablet by mouth 2 (two) times daily.     mecobal-levomefolat Ca-B6 phos 3-35-2 mg Tab  Commonly known as:  L-METHYL-B6-B12  Take 1 tablet by mouth once daily.     * metoprolol succinate 100 MG 24 hr tablet  Commonly known as:  TOPROL-XL  Take 1 tablet (100 mg total) by mouth every evening.     * metoprolol succinate 50 MG 24 hr tablet  Commonly known as:  TOPROL-XL  Take 1 tablet (50 mg total) by mouth once daily.     multivitamin capsule  Take 1 capsule by mouth once daily.     nitroGLYCERIN 0.4 MG SL tablet  Commonly known as:  NITROSTAT  Place 1 tablet (0.4 mg total) under the tongue every 5 (five) minutes as needed for Chest pain. May dispense a two pack of 25 tablets.     NovoLOG U-100 Insulin aspart 100 unit/mL injection  Generic drug:  insulin aspart U-100  INJECT UP TO MAX  UNITS UNDER THE SKIN VIA INSULIN PUMP DAILY AS DIRECTED     PRESERVISION AREDS ORAL  Take 1 tablet by mouth 2 (two) times daily.     SHINGRIX (PF) 50 mcg/0.5 mL injection  Generic drug:  varicella-zoster gE-AS01B (PF)  Inject into the muscle.         * This list has 2 medication(s) that are the same as other medications prescribed for you. Read the directions carefully, and ask your doctor or other care provider to review them with you.            STOP taking these medications    isosorbide mononitrate 120 MG 24 hr tablet  Commonly known as:  IMDUR            Time spent on the discharge of patient: 35 minutes    Norman Chin MD  Interventional Cardiology  [facility]

## 2019-01-30 NOTE — PROGRESS NOTES
CC: This 78 y.o.  male presents for management of No chief complaint on file.   along with the current chronic medical conditions including:  Patient Active Problem List   Diagnosis    CLL (chronic lymphocytic leukemia)        Diabetic retinopathy associated with type 1 diabetes mellitus    Insulin pump status    Diabetic polyneuropathy associated with type 1 diabetes mellitus    Poorly controlled type 1 diabetes mellitus with peripheral neuropathy    Type 1 diabetes, uncontrolled, with retinopathy    CAD (coronary artery disease)    PDR (proliferative diabetic retinopathy) - Right Eye    NPDR (nonproliferative diabetic retinopathy) - Left Eye    Posterior vitreous detachment - Both Eyes    Hyperlipidemia    HTN (hypertension)            Pseudophakia    BPH with urinary obstruction    Erectile dysfunction    S/P CABG x 2            Insulin pump fitting or adjustment    LBP (low back pain)    Vitreous hemorrhage    Lumbar facet arthropathy    DDD (degenerative disc disease), lumbar    Obstructive sleep apnea    Peripheral vascular disease    Lumbar spondylosis            Spondylolisthesis    S/P lumbar fusion      Status of these conditions is pending review.    HPI: Pt was diagnosed with T1DM in 1972- started on insulin.   Never hospitalized r/t DM recently.   Pt was last seen by me in October 2018 and is now being seen by me again today.  Pt will like to change his accuchek spirit pump/sarai expert.  On 1/29/19, pt had angiogram/stent replacement/changes, same day discharge.   mg/dl during visit, still has dexcom g5, via [employer] for supplies   Has back up of MDI.     Pt reports hypoglycemia-nocturnal and during the day at times w/ yard work-about the same from last visit.    Lab Results   Component Value Date    HGBA1C 5.8 (H) 01/21/2019     Pt has dexcom sensor ------around jan 2018  BG avg 150, SD 44                          CURRENT DM MEDS: Metformin 500 mg BID-off currently  r/t procedure/contrast, resumes on Saturday 2/2/19, accuchek spirit pump-see below   novolog    Pump settings:  No download, having issues w/ downloading during visit    Reviewed download for 2 weeks per dexcom sensor, see above    The patient has been using a home blood glucose monitor (BGM) and performing frequent (four or more times a day) BGM testing. The patient is insulin-treated with multiple daily injections (MDI) of insulin (3 or more times per day) or a continuous subcutaneous insulin infusion (CSII) pump. The patient's insulin treatment regimen requires frequent adjustments by the patient on the basis of therapeutic CGM testing results.    Pt has had hypoglycemia at home-not often   Reid Herman brought glucometer or log to clinic today revealing the following BG readings:  See download    DIET/ MEAL PATTERN: Pt eats 3 meals a day    Yogurt, fruit, sugar free cookies     Snack around 8p nuts/fruit, noted excursion overnight     EXERCISE: no formal exercise, walks occasionally-has cane, PT for back     STANDARDS OF CARE:  Eye exam: 2018, f/u q4-6 mos (floaters/injections/laser therapy), AMD  Podiatry: 2018  f/u q2-3 mos toe nail clipping/ingrown toe nail problem  Cardiac Testing: h/o cabg in past 1994, f/u with cardiology, procedure/sx 1/29/19                     ROS:   Gen: Appetite good, + weight loss #4 lbs, denies fatigue  Skin: Skin is intact and heals well, no rashes, pruritis, easy bruising, no hair changes, no intolerance to heat/cold.  Eyes: + visual disturbances (floaters)-2015, 2016   Resp: no SOB or DIOR, no cough  Cardiac: No palpitations, chest pain, denies syncope, weakness, no edema or cyanosis.  GI: No nausea or vomiting, diarrhea, +constipation-r/t pain meds, or abdominal pain.  /GYN: No nocturia, no urinary frequency, burning or pain.   PVD: no leg pain, denies cyanosis, pallor, or cold extremities.  MS/Neuro: + numbness/ tingling in BLE; FROM of joints without swelling or pain.  Gait mostly steady, has back brace, speech clear, no tremor, coordination problem.  Psych: Denies drug/ETOH abuse, no hx. of eating disorders or depression.  Other systems: negative.    Lab Results   Component Value Date    HGBA1C 5.8 (H) 01/21/2019     Lab Results   Component Value Date    TSH 1.402 03/14/2018     No results found for: MICROALBUR    Chemistry        Component Value Date/Time     (L) 01/29/2019 0545    K 4.4 01/29/2019 0545    CL 95 01/29/2019 0545    CO2 27 01/29/2019 0545    BUN 27 (H) 01/29/2019 0545    CREATININE 1.2 01/29/2019 0545     (H) 01/29/2019 0545        Component Value Date/Time    CALCIUM 9.7 01/29/2019 0545    ALKPHOS 95 01/21/2019 0831    AST 20 01/21/2019 0831    ALT 14 01/21/2019 0831    BILITOT 0.8 01/21/2019 0831          Lab Results   Component Value Date    LDLCALC 58.2 (L) 07/09/2018       PE:  GENERAL: Well developed, well nourished.  PSYCH: AAOx3, appropriate mood and affect, pleasant expression, conversant, appears relaxed, well groomed.   EYES: Conjunctiva, corneas clear, EOM intact  NECK: Supple, trachea midline  CHEST: Resp even and unlabored  CARDIAC: s1, s2 normal, no edema noted to BLE   ABDOMEN: soft, non distended  VASCULAR: Same temperature peripherally to centrally, no edema, brisk capillary refill BUE.  NEURO: Gait mostly steady  SKIN: Normal skin turgor. Skin warm and dry. No areas of breakdown, SCC on scalp (recently seen by derm-1/2019)-improved, + acanthosis nigracans. accuchek spirit intact.   FEET: Footware appropriate.     ASSESSMENT and PLAN:  Reid was seen today for diabetes mellitus.    Diagnoses and associated orders for this visit:    1. 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 CLL (chronic lymphocytic leukemia)  F/u with hem/onc   3. CKD (chronic kidney disease), stage II  Continue to monitor, stable   4. Diabetic polyneuropathy associated with type 1 diabetes mellitus  stabilized   5. Pure hypercholesterolemia  Lab Results   Component Value Date    CHOL 115 (L) 07/09/2018    CHOL 115 (L) 04/26/2017    CHOL 133 02/24/2016     Lab Results   Component Value Date    HDL 49 07/09/2018    HDL 45 04/26/2017    HDL 51 02/24/2016     Lab Results   Component Value Date    LDLCALC 58.2 (L) 07/09/2018    LDLCALC 56.6 (L) 04/26/2017    LDLCALC 65.6 02/24/2016     Lab Results   Component Value Date    TRIG 39 07/09/2018    TRIG 67 04/26/2017    TRIG 82 02/24/2016     Lab Results   Component Value Date    CHOLHDL 42.6 07/09/2018    CHOLHDL 39.1 04/26/2017    CHOLHDL 38.3 02/24/2016        6. Obstructive sleep apnea  May increase insulin resistance if uncontrolled    7. CAD F/u with cardiology  Metoprolol increase to 150 mg/daily  On plavix 75 mg daily  On acei/hctz 20/12.5 mg bid  Recent procedure 1/29/19

## 2019-01-31 NOTE — PATIENT INSTRUCTIONS
Snacks can be an important part of a balanced, healthy meal plan. They allow you to eat more frequently, feeling full and satisfied throughout the day. Also, they allow you to spread carbohydrates evenly, which may stabilize blood sugars.  Plus, snacks are enjoyable!     The amount of carbohydrate needed at snacks varies. Generally, about 15-30 grams of carbohydrate per snack is recommended.  Below you will find some tasty treats.       0-5 gm carb   Crystal Light   Vitamin Water Zero   Herbal tea, unsweetened   2 tsp peanut butter on celery   1./2 cup sugar-free jell-o   1 sugar-free popsicle   ¼ cup blueberries   8oz Blue Angelique unsweetened almond milk   5 baby carrots & celery sticks, cucumbers, bell peppers dipped in ¼ cup salsa, 2Tbsp light ranch dressing or 2Tbsp plain Greek yogurt   10 Goldfish crackers   ½ oz low-fat cheese or string cheese   1 closed handful of nuts, unsalted   1 Tbsp of sunflower seeds, unsalted   1 cup Smart Pop popcorn   1 whole grain brown rice cake        15 gm carb   1 small piece of fruit or ½ banana or 1/2 cup lite canned fruit   3 ellyn cracker squares   3 cups Smart Pop popcorn, top spray butter, Nelson lite salt or cinnamon and Truvia   5 Vanilla Wafers   ½ cup low fat, no added sugar ice cream or frozen yogurt (Blue bell, Blue Bunny, Weight Watchers, Skinny Cow)   ½ turkey, ham, or chicken sandwich   ½ c fruit with ½ c Cottage cheese   4-6 unsalted wheat crackers with 1 oz low fat cheese or 1 tbsp peanut butter    30-45 goldfish crackers (depending on flavor)    7-8 Sabianism mini brown rice cakes (caramel, apple cinnamon, chocolate)    12 Sabianism mini brown rice cakes (cheddar, bbq, ranch)    1/3 cup hummus dip with raw veg   1/2 whole wheat celina, 1Tbsp hummus   Mini Pizza (1/2 whole wheat English muffin, low-fat  cheese, tomato sauce)   100 calorie snack pack (Oreo, Chips Ahoy, Ritz Mix, Baked Cheetos)   4-6 oz. light or Greek Style yogurt  (Naz, Guerita, Michelle, [facility])   ½ cup sugar-free pudding     6 in. wheat tortilla or celina oven toasted chips (topped with spray butter flavoring, cinnamon, Truvia OR spray butter, garlic powder, chili powder)    18 BBQ Popchips (available at Target, Whole Foods, Fresh Market)                 Diabetes Support Group Meetings         Date: Topic:   February 14 Eat Fit LESTER/Health Promotion   March 14 Taking Care of Your Smile   April 11 Spring into Healthy Eating/Cooking Demo   May 9 Ease Your Mind with Diabetes   Laura 13 Summer Treats/Cooking Demo   July 11 Eat Fit LESTER/Super Market Sweep   August 8 Taking Care of Your Eyes and Feet   Sept 12 Technology/ADA updates   October 10 Recipes & Treats/Cooking Demo   November 14 Heart Health/Pump it up!   December 12 Year-End Close Out        Meetings are held in the [facility] (A) of the [facility] located at [address] [place] [address]. Please call [phone] for additional information.    Free service, offered every 2nd Thursday of every month! Family members and/or friends are welcome as well!  Support group is for patients with type 1 or type 2 diabetes.    From 3:30p to 4:30p

## 2019-02-01 NOTE — PROGRESS NOTES
Last 5 Patient Entered Readings                                      Current 30 Day Average: 124/62     Recent Readings 2/1/2019 1/31/2019 1/30/2019 1/28/2019 1/27/2019    SBP (mmHg) 113 114 121 134 120    DBP (mmHg) 48 87 61 62 78    Pulse 55 63 59 58 60        Digital Medicine: Health  Follow Up    Feeling well.    Couldn't speak at length because he was at an appt for diabetes ed. Encouraged him to reach out with any questions/concerns.    Lifestyle Modifications:    1.Dietary Modifications: deferred    2.Physical Activity: deferred    3.Medication Therapy: Patient has been compliant with the medication regimen.    4.Patient has the following medication side effects/concerns:   (Frequency/Alleviating factors/Precipitating factors, etc.)     Follow up with Mr. Reid Herman completed. No further questions or concerns. Will continue to follow up to achieve health goals.

## 2019-02-04 NOTE — PROGRESS NOTES
Filled out Omnipod CMN.  Faxed signed CMN, filled out and signed Medicare exception and chart notes to Shade with [employer].

## 2019-02-19 NOTE — PROGRESS NOTES
Patient is interested upgrading insulin pump and continuous glucose monitor. Current pump is out of warranty - Accu-check spirit combo (Novolog)  Date of evaluation: 2/1/2019     Insulin Pump Evaluation Check List:      Introduction to Insulin Pump Therapy:  · Reviewed basics of insulin pump therapy   · Reviewed common terms such as: ISF/Correction Factor, CHO ratio, Goal BG, Bolus, Basal Rate, Active Insulin Time, and Insulin on Board.     · Reviewed importance of appropriately calculating prandial insulin dose/Bolus using advanced carbohydrate counting (Insulin to Carbohydrate Ratio) and Correction Dose (Sensitivity/Correction Factor).   · Discussed basic problem-solving skills for responding to hypo and/or hyperglycemia with regard to insulin pump therapy.   · Reviewed patient responsibilities and expectations for insulin pump approval by provider and insurance company.  · Encouraged patient to check with their insurance provider regarding information such as possible costs associated with initial and monthly pump costs.      Carbohydrate Counting Skills Evaluation:   · Is patient actively and accurately counting carbohydrates at each meal? Yes   ? Reviewed basic Carbohydrate Counting principles--Food groups, label reading, use of dry measuring cups for accurate portion sizes.   ? Provided online and maricarmen-based resources to help with counting carbohydrates.      · Does patient have a current CHO Ratio and ISF determined by Endocrine Provider?  Yes  ? Patient verbalized understanding of ICR and ISF calculations with current pump settings.     Insulin Pump Options:   · Reviewed major insulin pump types:   ? Medtronic: 630G, 670G  ? Tandem: T-slim X 2   ? Insulet:  Omni Pod   · Discussed unique features of each pump.   · Patient was able to view and practice actual device use--using demo pumps and supplies.   · Interested in switching back to Omnipod (has worn Omnipod in the past)      Time spent with pt: 1 hour

## 2019-02-21 NOTE — TELEPHONE ENCOUNTER
----- Message from Nora Narvaez sent at 2/21/2019  9:53 AM CST -----  Contact: Patient  The pt needs a few pills (14) of his Plavix sent to [facility]. He needs them now because he has 1 pill left. He is waiting on his script from [facility]. Thanks, Nora   12/21/2018 Dr. Alexander

## 2019-02-26 NOTE — PROGRESS NOTES
Spoke with pt - reports Omnipod a little more expensive than he thought at pharmacy. Wants to check if cost different through DME.   eFaxed Face sheet to Shade Jimenez at [facility].

## 2019-02-28 NOTE — TELEPHONE ENCOUNTER
----- Message from Jahaira Belle sent at 2/28/2019  2:02 PM CST -----  Contact: Reid       tel: [phone]  Rx Refill/Request     Is this a Refill or New Rx:    Refill        Rx Name and Strength:     Accu-check  Testing strips/testing 3 times a day     Preferred Pharmacy with phone number:  [facility] Mail order:  [phone]   Communication Preference: phone   Additional Information:   Pls call.

## 2019-03-01 NOTE — PROGRESS NOTES
"Subjective:   Patient ID:  Reid Herman is a 79 y.o. male who presents for follow-up of Coronary artery disease involving native coronary artery of  (Hospital f/u 1/29/19 )      HPI:   Mr. Cabrera is here s/p intervention of his LCX for an abnormal PET and increasing angina.  Mr. Cabrera is a pleasant man with HTN, HLD, DM1 and prior CAD s/p CABG x with a SVG to LAD and LIMA to D1 in 1994.    He had been having more CP than usual and PET showed large regions of ischemia in the LCX and inferoseptal walls. Since his PCI, he states his angina has markedly improved.  His SLNTG use is back to baseline since his PCI (3-5/month)  [facility] 1-29-19  · Successful PCI with DUC to distal and mid L. dominant LCX.  · Mid Cx lesion , 80% stenosed reduced to 0%..  · A STENT RESOLUTE INTGRTY [id number] stent was successfully placed at 12 YEISON for 5 sec.  · Dist Cx lesion , 99% stenosed reduced to 0%..  · A STENT RESOLUTE RAUL 2X15 DUC stent was successfully placed at 12 YEISON for 5 sec.  · Origin to Prox LAD Vein Graft lesion , 60% stenosed.  · Ost LAD to Prox LAD lesion , 100% stenosed (chronic).  · 1st Diag lesion , 50% stenosed.  · Ost RCA to Prox RCA lesion , 100% stenosed.  · Estimated blood loss: <50 mL  · Three vessel coronary artery disease.      He is on CPAP for MINDA. He also has CLL and followed by Onc.     He has an insulin pump.     Enrolled in Dig HTN management    Carotids 2-2018  CONCLUSIONS   There is 40 - 49% right Internal Carotid stenosis.  There is 20 - 39% left Internal Carotid stenosis.      Vitals:    03/01/19 1108   BP: (!) 142/63   BP Location: Left arm   Patient Position: Sitting   BP Method: Large (Automatic)   Pulse: (!) 58   Weight: 90.9 kg (200 lb 6.4 oz)   Height: 5' 7" (1.702 m)     Body mass index is 31.39 kg/m².  CrCl cannot be calculated (Patient's most recent lab result is older than the maximum 7 days allowed.).    Lab Results   Component Value Date     (L) 01/29/2019    K 4.4 01/29/2019 "    CL 95 01/29/2019    CO2 27 01/29/2019    BUN 27 (H) 01/29/2019    CREATININE 1.2 01/29/2019     (H) 01/29/2019    HGBA1C 5.8 (H) 01/21/2019    AST 20 01/21/2019    ALT 14 01/21/2019    ALBUMIN 4.2 01/21/2019    PROT 7.1 01/21/2019    BILITOT 0.8 01/21/2019    WBC 53.46 (HH) 01/29/2019    HGB 11.7 (L) 01/29/2019    HCT 35.5 (L) 01/29/2019    HCT 19 (LL) 05/18/2015    MCV 98 01/29/2019     01/29/2019    INR 1.0 01/29/2019    PSA 1.29 06/21/2013    TSH 1.402 03/14/2018    CHOL 115 (L) 07/09/2018    HDL 49 07/09/2018    LDLCALC 58.2 (L) 07/09/2018    TRIG 39 07/09/2018       Current Outpatient Medications   Medication Sig    ACCU-CHEK FASTCLIX Misc TEST 6 TIMES DAILY    alpha lipoic acid 600 mg Cap Take 1 capsule by mouth once daily.      aspirin (ECOTRIN) 81 MG EC tablet Take 1 tablet (81 mg total) by mouth once daily.    atorvastatin (LIPITOR) 80 MG tablet TAKE 1/2 TABLET NIGHTLY. (Patient taking differently: 40 mg. )    BD INSULIN SYRINGE ULT-FINE II 0.3 mL 31 gauge x 5/16 Syrg     blood sugar diagnostic (ACCU-CHEK SHARON PLUS TEST STRP) Strp Check sugar a 5x a day.    blood-glucose meter,continuous (DEXCOM G6 ) Misc Use as directed, change every 3 years    blood-glucose sensor (DEXCOM G6 SENSOR) Doris Change every 10 days.    blood-glucose transmitter (DEXCOM G6 TRANSMITTER) Doris Change every 3 months.    clopidogrel (PLAVIX) 75 mg tablet Take 4 tablets x 1 followed by one tablet daily    clotrimazole-betamethasone 1-0.05% (LOTRISONE) cream APPLY EXTERNALLY TO THE AFFECTED AREA TWICE DAILY    docusate sodium (COLACE) 100 MG capsule Take 100 mg by mouth 2 (two) times daily.    fluorouracil (EFUDEX) 5 % cream AAA scalp bid x 2 weeks    fluticasone (FLONASE) 50 mcg/actuation nasal spray 1 spray by Each Nare route as needed for Rhinitis.    influenza (FLUZONE HIGH-DOSE) 180 mcg/0.5 mL vaccine Inject into the muscle.    insulin aspart U-100 (NOVOLOG U-100 INSULIN ASPART) 100 unit/mL  injection Uses insulin pump, max daily 100 units.    insulin pump cartridge (OMNIPOD DASH INSULIN POD) Crtg Change every 3 days.    insulin pump controller (OMNIPOD DASH PDM KIT) Misc Use as directed    lisinopril-hydrochlorothiazide (PRINZIDE,ZESTORETIC) 20-12.5 mg per tablet Take 1 tablet by mouth 2 (two) times daily.    mecobal-levomefolat Ca-B6 phos (L-METHYL-B6-B12) 3-35-2 mg Tab Take 1 tablet by mouth once daily.    metFORMIN (GLUCOPHAGE) 500 MG tablet Take 1 tablet (500 mg total) by mouth 2 (two) times daily.    metoprolol succinate (TOPROL-XL) 100 MG 24 hr tablet Take 1 tablet (100 mg total) by mouth every evening.    metoprolol succinate (TOPROL-XL) 50 MG 24 hr tablet Take 1 tablet (50 mg total) by mouth once daily.    multivitamin capsule Take 1 capsule by mouth once daily.      nitroGLYCERIN (NITROSTAT) 0.4 MG SL tablet Place 1 tablet (0.4 mg total) under the tongue every 5 (five) minutes as needed for Chest pain. May dispense a two pack of 25 tablets.    varicella-zoster gE-AS01B, PF, (SHINGRIX, PF,) 50 mcg/0.5 mL injection Inject into the muscle.    varicella-zoster gE-AS01B, PF, (SHINGRIX, PF,) 50 mcg/0.5 mL injection Inject into the muscle.    vit A/vit C/vit E/zinc/copper (PRESERVISION AREDS ORAL) Take 1 tablet by mouth 2 (two) times daily.     Current Facility-Administered Medications   Medication    nitroGLYCERIN 0.4 MG/DOSE TL SPRY 400 mcg/spray spray 1 spray       Review of Systems   Constitution: Negative for decreased appetite, weakness, malaise/fatigue, weight gain and weight loss.   Eyes: Negative for visual disturbance.   Cardiovascular: Negative for chest pain, claudication, dyspnea on exertion, irregular heartbeat, orthopnea, palpitations, paroxysmal nocturnal dyspnea and syncope.   Respiratory: Negative for cough, shortness of breath and snoring.    Skin: Negative for rash.   Musculoskeletal: Negative for arthritis, muscle cramps, muscle weakness and myalgias.    Gastrointestinal: Negative for abdominal pain, anorexia, change in bowel habit and nausea.   Genitourinary: Negative for dysuria and frequency.   Neurological: Negative for excessive daytime sleepiness, dizziness, headaches, loss of balance and numbness.   Psychiatric/Behavioral: Negative for depression.       Objective:   Physical Exam   Constitutional: He is oriented to person, place, and time. He appears well-developed and well-nourished.   HENT:   Head: Normocephalic and atraumatic.   Cardiovascular: Normal rate, regular rhythm, normal heart sounds and intact distal pulses. Exam reveals no gallop and no friction rub.   No murmur heard.  Pulmonary/Chest: Effort normal and breath sounds normal.   Neurological: He is alert and oriented to person, place, and time.   Psychiatric: He has a normal mood and affect. His behavior is normal. Judgment and thought content normal.       Assessment:     1. Coronary artery disease involving native coronary artery of native heart with angina pectoris    2. Essential hypertension    3. Pure hypercholesterolemia    4. S/P CABG (coronary artery bypass graft)    5. Stable angina    6. Peripheral vascular disease    7. Bilateral carotid artery stenosis    8. Atherosclerosis of aorta        Plan:   Pt's angina has improved since PCI.  Has used SLNTG a few times since but he has a large ischemic burden in the inferoseptum that cant be revascularized.  From a cardiac standpoint, pt is doing well and is clinically stable. Pts lipid profile and BP's are at goal. Pt does not require any cardiac testing at this time.  Ok to pursue cardiac rehab.      No orders of the defined types were placed in this encounter.

## 2019-03-11 NOTE — PROGRESS NOTES
Subjective:      Patient ID: Reid Herman is a 79 y.o. male.    Chief Complaint: PCP (Olivia Zavala MD 12/18/18); Diabetic Foot Exam; Foot Problem (numbness, tingling, pain  ); and Nail Care    Reid is a 79 y.o. male who presents to the clinic for evaluation and treatment of high risk feet. eRid has a past medical history of Anemia, Back pain, Basal cell carcinoma (06/08/2015), CAD (coronary artery disease) (10/1/2012), Cataract, DDD (degenerative disc disease), lumbar (10/28/2014), Diabetes mellitus, Diabetes mellitus type I, Diabetic retinopathy of both eyes, Hyperlipidemia, Hypertension, Insulin pump in place, Obesity, Poorly controlled type 1 diabetes mellitus with peripheral neuropathy (10/1/2012), Preseptal cellulitis of right eye (8/17/15), S/P CABG x 2 (2/14/2014), Sleep apnea, Squamous cell carcinoma (03/18/2013), Squamous cell carcinoma (07/26/2017), Squamous cell carcinoma (08/02/2018), Trouble in sleeping, Type 1 diabetes, uncontrolled, with retinopathy (10/1/2012), Type II or unspecified type diabetes mellitus without mention of complication, not stated as uncontrolled, and Vitreous hemorrhage (8/17/2014). The patient's chief complaint is elongated toenails   This patient has documented high risk feet requiring routine maintenance secondary to diabetes mellitis and those secondary complications of diabetes, as mentioned..   PCP: Olivia Zavala MD    Date Last Seen by PCP:   Chief Complaint   Patient presents with    PCP     Olivia Zavala MD 12/18/18    Diabetic Foot Exam    Foot Problem     numbness, tingling, pain      Nail Care       Chief Complaint   Patient presents with    PCP     Olivia Zavala MD 12/18/18    Diabetic Foot Exam    Foot Problem     numbness, tingling, pain      Nail Care        Current shoe gear:  Affected Foot: Rx diabetic extra depth shoes and custom accommodative insoles     Unaffected Foot: Rx diabetic extra depth shoes and custom accommodative  christine    Hemoglobin A1C   Date Value Ref Range Status   01/21/2019 5.8 (H) 4.0 - 5.6 % Final     Comment:     ADA Screening Guidelines:  5.7-6.4%  Consistent with prediabetes  >or=6.5%  Consistent with diabetes  High levels of fetal hemoglobin interfere with the HbA1C  assay. Heterozygous hemoglobin variants (HbS, HgC, etc)do  not significantly interfere with this assay.   However, presence of multiple variants may affect accuracy.     10/15/2018 6.7 (H) 4.0 - 5.6 % Final     Comment:     ADA Screening Guidelines:  5.7-6.4%  Consistent with prediabetes  >or=6.5%  Consistent with diabetes  High levels of fetal hemoglobin interfere with the HbA1C  assay. Heterozygous hemoglobin variants (HbS, HgC, etc)do  not significantly interfere with this assay.   However, presence of multiple variants may affect accuracy.     07/09/2018 6.2 (H) 4.0 - 5.6 % Final     Comment:     ADA Screening Guidelines:  5.7-6.4%  Consistent with prediabetes  >or=6.5%  Consistent with diabetes  High levels of fetal hemoglobin interfere with the HbA1C  assay. Heterozygous hemoglobin variants (HbS, HgC, etc)do  not significantly interfere with this assay.   However, presence of multiple variants may affect accuracy.             Patient Active Problem List   Diagnosis    CLL (chronic lymphocytic leukemia)    Diabetic polyneuropathy associated with type 1 diabetes mellitus    Controlled type 1 diabetes mellitus with diabetic neuropathy, with long-term current use of insulin    Coronary artery disease involving native coronary artery of native heart with angina pectoris    Posterior vitreous detachment - Both Eyes    Hyperlipidemia    HTN (hypertension)    Dermatophytosis of nail    Pseudophakia    BPH with urinary obstruction    Erectile dysfunction    S/P CABG (coronary artery bypass graft)    Corns and callosities    Lumbar facet arthropathy    DDD (degenerative disc disease), lumbar    Obstructive sleep apnea    Peripheral  vascular disease    Lumbar spondylosis    Spondylolisthesis    S/P lumbar fusion    Atherosclerosis of aorta    CKD (chronic kidney disease), stage II    Sacroiliitis    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    Stable angina    Nonexudative age-related macular degeneration, bilateral, intermediate dry stage    Carotid disease, bilateral    AK (actinic keratosis)    Anemia in neoplastic disease    Hyperkalemia       Current Outpatient Medications on File Prior to Visit   Medication Sig Dispense Refill    ACCU-CHEK FASTCLIX Misc TEST 6 TIMES DAILY 200 each 11    alpha lipoic acid 600 mg Cap Take 1 capsule by mouth once daily.        aspirin (ECOTRIN) 81 MG EC tablet Take 1 tablet (81 mg total) by mouth once daily.  0    atorvastatin (LIPITOR) 80 MG tablet TAKE 1/2 TABLET NIGHTLY. (Patient taking differently: 40 mg. ) 45 tablet 3    BD INSULIN SYRINGE ULT-FINE II 0.3 mL 31 gauge x 5/16 Syrg       blood sugar diagnostic (ACCU-CHEK SHARON PLUS TEST STRP) Strp Check sugar a 5x a day. 450 strip 3    blood-glucose meter,continuous (DEXCOM G6 ) Misc Use as directed, change every 3 years 1 each 0    blood-glucose sensor (DEXCOM G6 SENSOR) Doris Change every 10 days. 3 Device 6    blood-glucose transmitter (DEXCOM G6 TRANSMITTER) Doris Change every 3 months. 1 Device 1    clopidogrel (PLAVIX) 75 mg tablet Take 4 tablets x 1 followed by one tablet daily 90 tablet 4    clotrimazole-betamethasone 1-0.05% (LOTRISONE) cream APPLY EXTERNALLY TO THE AFFECTED AREA TWICE DAILY 15 g 0    docusate sodium (COLACE) 100 MG capsule Take 100 mg by mouth 2 (two) times daily.      fluorouracil (EFUDEX) 5 % cream AAA scalp bid x 2 weeks 40 g 1    fluticasone (FLONASE) 50 mcg/actuation nasal spray 1 spray by Each Nare route as needed for Rhinitis.      influenza (FLUZONE HIGH-DOSE) 180 mcg/0.5 mL vaccine Inject into the muscle. 0.5 mL 1    insulin aspart U-100  (NOVOLOG U-100 INSULIN ASPART) 100 unit/mL injection Uses insulin pump, max daily 100 units. 30 mL 11    insulin pump cartridge (OMNIPOD DASH INSULIN POD) Crtg Change every 3 days. 10 each 11    insulin pump controller (OMNIPOD DASH PDM KIT) Misc Use as directed 1 each 0    lisinopril-hydrochlorothiazide (PRINZIDE,ZESTORETIC) 20-12.5 mg per tablet Take 1 tablet by mouth 2 (two) times daily. 180 tablet 3    mecobal-levomefolat Ca-B6 phos (L-METHYL-B6-B12) 3-35-2 mg Tab Take 1 tablet by mouth once daily. 90 tablet 5    metFORMIN (GLUCOPHAGE) 500 MG tablet Take 1 tablet (500 mg total) by mouth 2 (two) times daily.      metoprolol succinate (TOPROL-XL) 100 MG 24 hr tablet Take 1 tablet (100 mg total) by mouth every evening. 90 tablet 3    metoprolol succinate (TOPROL-XL) 50 MG 24 hr tablet Take 1 tablet (50 mg total) by mouth once daily. 30 tablet 11    multivitamin capsule Take 1 capsule by mouth once daily.        nitroGLYCERIN (NITROSTAT) 0.4 MG SL tablet Place 1 tablet (0.4 mg total) under the tongue every 5 (five) minutes as needed for Chest pain. May dispense a two pack of 25 tablets. 100 tablet 3    varicella-zoster gE-AS01B, PF, (SHINGRIX, PF,) 50 mcg/0.5 mL injection Inject into the muscle. 0.5 mL 1    varicella-zoster gE-AS01B, PF, (SHINGRIX, PF,) 50 mcg/0.5 mL injection Inject into the muscle. 0.5 mL 0    vit A/vit C/vit E/zinc/copper (PRESERVISION AREDS ORAL) Take 1 tablet by mouth 2 (two) times daily.       Current Facility-Administered Medications on File Prior to Visit   Medication Dose Route Frequency Provider Last Rate Last Dose    nitroGLYCERIN 0.4 MG/DOSE TL SPRY 400 mcg/spray spray 1 spray  1 spray Sublingual Q5 Min PRN Marcos Alexander MD   1 spray at 01/11/19 1335       Review of patient's allergies indicates:  No Known Allergies    Past Surgical History:   Procedure Laterality Date    ANGIOGRAM, CORONARY ARTERY N/A 1/29/2019    [facility]     APPENDECTOMY  1953    BLOCK-NERVE-MEDIAL BRANCH-LUMBAR Bilateral 3/10/2015    [facility] at [facility]    BLOCK-NERVE-MEDIAL BRANCH-LUMBAR Bilateral 11/25/2014    Performed by [facility]les [facility] at [facility]    CATARACT EXTRACTION  4/8/13    right eye (toric)    CATARACT EXTRACTION  4/29/13    left eye (toric)    COLONOSCOPY N/A 4/25/2017    Performed by CLARISSA Freeman [facility] at [facility] (4TH F[facility])    COLONOSCOPY N/A 4/25/2014    Performed by CL[facility]ISSA Freeman [facility] at [facility] (4TH FLR)    CORONARY ARTERY BYPASS GRAFT  1994    x 3    EYE SURGERY      cataracts, both eyes    FINGER TENDON REPAIR  2011    left hand    INJECTION-FACET Bilateral 3/31/2015    [facility] at [facility]    INJECTION-STEROID-EPIDURAL-LUMBAR N/A 10/28/2014    [facility] at [facility]    INSERTION, IOL PROSTHESIS Left 4/29/2013    Performed by Soco Sandoval [facility] at [facility]    INSERTION, IOL PROSTHESIS Right 4/8/2013    Performed by Soco [facility]oval [facility] at [facility]    INSERTION, STENT, CORONARY ARTERY N/A 1/29/2019    Performed by Saturnino Ny MD at [facility] CATH [facility]    MINIMALLY INVASIVE FUSION-TRANSLUMINAL LUMBAR INTERBODY (TLIF) Left 5/18/2015    Performed by Tee Pinedo MD at [facility] 2ND FLR    PHACOEMULSIFICATION, CATARACT Left 4/29/2013    Performed by Soco [facility]oval [facility] at [facility]    PHACOEMULSIFICATION, CATARACT Right 4/8/2013    Performed by Soco [facility]oval [facility] at [facility]    RADIOFREQUENCY THERMOCOAGULATION (RFTC)-NERVE-MEDIAN BRANCH-LUMBAR Left 1/13/2015    [facility] at [facility]    RADIOFREQUENCY THERMOCOAGULATION (RFTC)-NERVE-MEDIAN BRANCH-LUMBAR Right 12/30/2014    [facility] at [facility]    SKIN BIOPSY  2013    multiple    SPINE SURGERY  2015    TLIF    TONSILLECTOMY  1947       Family History   Problem Relation Age of Onset    Breast cancer Mother     Cancer  "Mother         ? lung cancer    Alzheimer's disease Father     Dementia Father     Stroke Father     Colon cancer Sister     Cancer Sister 60        colon    Acute myelogenous leukemia Sister     Diabetes Maternal Grandfather     Diabetes Paternal Aunt     Diabetes Paternal Uncle     Melanoma Neg Hx     Psoriasis Neg Hx     Lupus Neg Hx     Eczema Neg Hx     Acne Neg Hx        Social History     Socioeconomic History    Marital status:      Spouse name: Not on file    Number of children: Not on file    Years of education: Not on file    Highest education level: Not on file   Social Needs    Financial resource strain: Not on file    Food insecurity - worry: Not on file    Food insecurity - inability: Not on file    Transportation needs - medical: Not on file    Transportation needs - non-medical: Not on file   Occupational History    Occupation: Sales     Employer: [employer]    Tobacco Use    Smoking status: Never Smoker    Smokeless tobacco: Never Used   Substance and Sexual Activity    Alcohol use: Yes     Alcohol/week: 0.6 oz     Types: 1 Glasses of wine per week     Comment: social once monthly    Drug use: No    Sexual activity: Not Currently     Partners: Female   Other Topics Concern    Not on file   Social History Narrative    Not on file           Review of Systems   Constitution: Negative for chills, fever and night sweats.   Cardiovascular: Negative for chest pain and claudication.   Respiratory: Negative for cough.    Skin: Positive for dry skin and nail changes. Negative for color change, flushing, itching, poor wound healing and rash.   Musculoskeletal: Negative for arthritis, back pain, falls, gout, joint pain and joint swelling.   Neurological: Positive for numbness and paresthesias.           Objective:       Vitals:    03/11/19 1103   BP: (!) 149/67   Pulse: 75   Resp: 18   Weight: 92 kg (202 lb 13.2 oz)   Height: 5' 7" (1.702 m)   PainSc:   3   PainLoc: " Foot        Physical Exam   Constitutional: He is oriented to person, place, and time. He appears well-developed and well-nourished. No distress.   Cardiovascular: Normal rate.   Vascular: Dorsalis pedis and posterior tibial pulses are diminished bilaterally. Toes are cool to touch. Feet are warm proximally.There is decreased digital hair. Skin is atrophic and mildly edematous. R lower leg, ankle, and foot are hyperpigmented.      Musculoskeletal: Normal range of motion. He exhibits no tenderness.   Adequate joint range of motion without pain, limitation, nor crepitation Bilateral feet and ankle joints. Muscle strength is 5/5 in all groups bilaterally.        Semi  reducible IPJ digital contracture 2-3 b/l      Neurological: He is alert and oriented to person, place, and time. He exhibits normal muscle tone.   Neurologic: [place]-Eliud 5.07 monofilamant testing absent on toes but otherwise is intact Rolly feet. Sharp/dull sensation absent Bilaterally. Light touch absent Bilaterally.     Skin: Skin is warm, dry and intact. No abrasion, no bruising, no burn, no lesion, no petechiae and no rash noted. He is not diaphoretic. No erythema. No pallor.    Nails x 10 are elongated by 2-5 mm's, thickened by 1-2 mm's, dystrophic, and are normal in  coloration . Xerosis Bilaterally. No open lesions noted.        Psychiatric: He has a normal mood and affect. His behavior is normal. Judgment and thought content normal.   Nursing note and vitals reviewed.            Assessment:       Encounter Diagnoses   Name Primary?    Diabetic polyneuropathy associated with type 1 diabetes mellitus Yes    Peripheral vascular disease     Dermatophytosis of nail          Plan:       Reid was seen today for pcp, diabetic foot exam, foot problem and nail care.    Diagnoses and all orders for this visit:    Diabetic polyneuropathy associated with type 1 diabetes mellitus    Peripheral vascular disease    Dermatophytosis of nail    Other  orders  -     [id number]-algal oil (METANX/FOLTANX RF) 3 mg-35 mg-2 mg -90.314 mg Cap; Take 1 tablet by mouth once daily.    - Patient was given written and verbal instructions regarding foot condition.  I counseled the patient on his conditions, their implications and medical management.  Shoe inspection. Diabetic Foot Education. Patient reminded of the importance of good nutrition and blood sugar control to help prevent podiatric complications of diabetes. Patient instructed on proper foot hygeine. We discussed wearing proper shoe gear, daily foot inspections, never walking without protective shoe gear, never putting sharp instruments to feet    - With patient's permission, nails were aggressively reduced and debrided x 10 to their soft tissue attachment mechanically and with electric , removing all offending nail and debris. Patient relates relief following the procedure. She will continue to monitor the areas daily, inspect her feet, wear protective shoe gear when ambulatory, moisturizer to maintain skin integrity and follow in this office in approximately 2-3 months, sooner p.r.n.    - Prescription written for Metanx to take once daily for a month.  If patient tolerates it well we may increase to twice daily.  Discussed over-the-counter topicals including Capsaicin and Shadi-Neely    - Discussed the  importance of maintaining  low blood sugar levels, and the direct affect elevated blood sugars have on progression of neuropathic symptoms

## 2019-03-14 NOTE — PROGRESS NOTES
AOB and face sheet efaxed to [employer] at [phone]. Pt is interested in cost/coverage of TSlim insulin pump.

## 2019-03-15 NOTE — PROGRESS NOTES
Diabetes Education  Author: Elina Fong RD  Date: 3/15/2019    Diabetes Care Management Summary  Diabetes Education Record Progress: Comprehensive (pump upgrade eval)    Current Diabetes Risk Level: Low     Diabetes Type : Type I  Diabetes Diagnosis: >10 years (Dx 1972)      Current Treatment: Insulin pump, Insulin, Oral Medication  Novolog via Accucheck/Spirit combo;   metformin 500 mg BID      Monitoring   Self Monitoring : (CGM)  Do you use a personal continuous glucose monitor?: Yes  What kind of glucose monitor do you use?: Dexcom G5 - transmitter expires in May      Barriers to Change: None  Learning Challenges : None           Diabetes Education Assessment/Progress  Patient is interested in upgrading his insulin pump and continuous glucose monitor. Current pump out of warranty 3/31/2019.   · Covered basic details of pump therapy with patient.   · Reviewed major insulin pump vendors:   ? Medtronic: 630G, 670G  ? Tandem: T-slim X 2   ? Insulet:  Omni Pod - last quoted price of Omnipod too expensive per pt  · Discussed unique features of each pump.   · Patient was able to view and practice actual device use--using demo pumps and supplies.   · Reviewed pumps with CGM capability: Medronic 670G with Guardian sensor and TSlim:X2 with Basal IQ/Dexcom G6 integration.  · Reviewed terms ISF, CHO ratio, goal BG, bolus, basal, active insulin and insulin on board.   · Explained each pump allows for different max volumes of insulin in reservoir chamber.  · Pt verbalized clearer understanding of pump and CGM therapy.   · Reviewed patient responsibilities and expectations for insulin pump approval by provider and insurance company  · Encouraged patient to check with their insurance provider regarding information such as possible costs associated with initial and monthly pump costs.      · Is patient actively and accurately counting carbohydrates at each meal? Yes      · Does patient have a current CHO Ratio and ISF  determined by Endocrine Provider?  Yes - to transfer from old pump           Diabetes Care Plan/Intervention  Education Plan/Intervention: Individual Follow-Up DSMT      Diabetes Meal Plan  Restrictions: Low Fat, Low Sodium, Restricted Carbohydrate  Calories: 1600  Carbohydrate Per Meal: 30-45g  Carbohydrate Per Snack : 7-15g      Today's Self-Management Care Plan was developed with the patient's input and is based on barriers identified during today's assessment.    The long and short-term goals in the care plan were written with the patient/caregiver's input. The patient has agreed to work toward these goals to improve his overall diabetes control.      The patient received a copy of today's self-management plan and verbalized understanding of the care plan, goals, and all of today's instructions.      The patient was encouraged to communicate with his physician and care team regarding his condition(s) and treatment.  I provided the patient with my contact information today and encouraged him to contact me via phone or patient portal as needed.         Education Units of Time   Time Spent: 60 min

## 2019-03-22 NOTE — PROGRESS NOTES
Last 5 Patient Entered Readings                                      Current 30 Day Average: 117/54     Recent Readings 3/15/2019 3/13/2019 3/12/2019 3/8/2019 3/1/2019    SBP (mmHg) 119 122 106 124 115    DBP (mmHg) 55 61 58 58 46    Pulse 55 58 56 64 57        Digital Medicine: Health  Follow Up    Feeling well.    Hoping to get a new pump and CGM over the next few months.    Lifestyle Modifications:    1.Dietary Modifications: monitoring sodium intake and carbs.    2.Physical Activity: staying active around the house, yard work, walking    3.Medication Therapy: Patient has been compliant with the medication regimen.    4.Patient has the following medication side effects/concerns:   (Frequency/Alleviating factors/Precipitating factors, etc.)     Follow up with Mr. Reid Herman completed. No further questions or concerns. Will continue to follow up to achieve health goals.

## 2019-03-27 NOTE — TELEPHONE ENCOUNTER
----- Message from Charlotte Brown sent at 3/27/2019  3:34 PM CDT -----  Please contact patient for an appointment, he has a diabetic ulcer on his left ankle and would need to see you prior to the first available I had which is 4/17.  Please contact the patient with instructions or an earlier appointment if available.  [phone]

## 2019-03-27 NOTE — PROGRESS NOTES
Subjective:       Patient ID: Reid Herman is a 79 y.o. male.    Chief Complaint: Sore (left ankle)    Mr. Herman is a 79 y.o. M pt with PMHx significant for T1DM, CAD, CLL, HTN and HLD who presents with a skin ulcer on his left leg since 1 week ago. Reports the lesion has been getting worse since Sunday. Unsure how he got the lesion; thinks he might have scratched his skin when pulling on his socks. Denies purulence or pain at the site (no sensation). Has been covering it with gauze and applying topical triple antibiotic. Denies fevers, chills, nausea vomiting, diarrhea or claudication. Notes chronic bilat chronic lower ext edema.     Review of Systems   Constitutional: Negative for chills, fatigue and fever.   HENT: Negative for congestion and trouble swallowing.    Eyes: Negative for visual disturbance.   Respiratory: Negative for cough and shortness of breath.    Cardiovascular: Positive for leg swelling. Negative for chest pain and palpitations.   Gastrointestinal: Negative for diarrhea and vomiting.   Genitourinary: Negative for dysuria.   Musculoskeletal: Negative for arthralgias and gait problem.   Skin: Positive for wound.   Neurological: Negative for dizziness and light-headedness.   Psychiatric/Behavioral: Negative for agitation and confusion.       Objective:      Physical Exam   Constitutional: He is oriented to person, place, and time. He appears well-developed and well-nourished.   obese   HENT:   Head: Normocephalic and atraumatic.   Eyes: Pupils are equal, round, and reactive to light. EOM are normal.   Neck: Normal range of motion. Neck supple.   Cardiovascular: Normal rate, regular rhythm and normal heart sounds.   Pulmonary/Chest: Effort normal and breath sounds normal. No respiratory distress.   Abdominal: Soft. Bowel sounds are normal. He exhibits no distension.   Musculoskeletal: Normal range of motion. He exhibits edema (bilat lower ext edema). He exhibits no tenderness.    Neurological: He is alert and oriented to person, place, and time. No cranial nerve deficit.   Skin: Skin is warm.   Skin ulceration at medial aspect of distal left leg, irregularly shaped, measuring approx 1cm x 1cm with approximately 1 inch of erythematous skin surrounding lesion. No purulence or fluctuance present.   Psychiatric: He has a normal mood and affect. His behavior is normal.       Assessment:       1. Diabetic ulcer of ankle        Plan:       Diabetic ulcer of ankle  --pt immunocompromised with hx of CLL (untreated) and T1DM  --will treat with cephalexin x 1 wk  -     Ambulatory Referral to Wound Clinic  -     cephALEXin (KEFLEX) 250 MG capsule; Take 2 capsules (500 mg total) by mouth 4 (four) times daily. for 7 days  Dispense: 56 capsule; Refill: 0    Case discussed with Dr. Ferraro.  Teodora Jacobs PGY-3  Internal Medicine

## 2019-03-27 NOTE — PROGRESS NOTES
Mr. Herman is a 79 y.o. M pt with PMHx significant for T1DM, CAD, CLL, HTN and HLD who presents with a skin ulcer on his left leg since 1 week ago. Reports the lesion has been getting worse since Sunday. Unsure how he got the lesion; thinks he might have scratched his skin when pulling on his socks. Denies purulence or pain at the site (no sensation). Has been covering it with gauze and applying topical triple antibiotic. Denies fevers, chills, nausea vomiting, diarrhea or claudication. Notes chronic bilat chronic lower ext edema.

## 2019-03-27 NOTE — PROGRESS NOTES
"Reid Herman presented for a  Medicare AWV and comprehensive Health Risk Assessment today. The following components were reviewed and updated:    · Medical history  · Family History  · Social history  · Allergies and Current Medications  · Health Risk Assessment  · Health Maintenance  · Care Team     ** See Completed Assessments for Annual Wellness Visit within the encounter summary.**       The following assessments were completed:  · Living Situation  · CAGE  · Depression Screening  · Timed Get Up and Go  · Whisper Test  · Cognitive Function Screening  ·   ·   · Nutrition Screening  · ADL Screening  · PAQ Screening    Vitals:    03/27/19 0937   BP: 138/60   Pulse: 61   SpO2: 99%   Weight: 92.3 kg (203 lb 7.8 oz)   Height: 5' 7" (1.702 m)     Body mass index is 31.87 kg/m².  Physical Exam   Constitutional: He is oriented to person, place, and time. He appears well-developed.   obese   HENT:   Head: Normocephalic and atraumatic.   Nose: Nose normal.   Eyes: Conjunctivae and EOM are normal.   Cardiovascular: Normal rate, regular rhythm, normal heart sounds and intact distal pulses.   Pulmonary/Chest: Effort normal and breath sounds normal.   Musculoskeletal: Normal range of motion.   Neurological: He is alert and oriented to person, place, and time.   Skin: Skin is warm and dry.   Psychiatric: He has a normal mood and affect. His behavior is normal. Judgment and thought content normal.   Nursing note and vitals reviewed.        Diagnoses and health risks identified today and associated recommendations/orders:    1. Encounter for preventive health examination  Assessment performed. Health maintenance updated. Chart review completed.    2. Diabetic polyneuropathy associated with type 1 diabetes mellitus  Chronic. Continue current regimen. Followed by Endocrinology.     3. 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 Continue current regimen. Followed by " Endocrinology and Opthalmology.    4. DM neuropathy, type I diabetes mellitus  Chronic. Continue current regimen. Followed by Endocrinology.     5. CLL (chronic lymphocytic leukemia)  Chronic. Continue current regimen. Followed by hematology Oncology.    6. Coronary artery disease involving native coronary artery of native heart with angina pectoris  Chronic. Continue current regimen as instructed. Followed by Cardiology.    7. Bilateral carotid artery stenosis  Chronic. Continue current regimen as instructed. Followed by Cardiology.    8. Atherosclerosis of aorta  Noted on imaging. Continue current regimen. Followed by PCP.    9. CKD (chronic kidney disease), stage III  Chronic. Continue current regimen. Followed by PCP.  Creatinine increased.    10. Sacroiliitis  Chronic. Continue current regimen. Followed by Spine Services and PCP.    11. Peripheral vascular disease  Chronic. Continue current regimen as instructed. Followed by Cardiology.    12. Essential hypertension  Chronic. Stable. Followed by Digital Medicine.    13. Pure hypercholesterolemia  Chronic. Stable. Followed by PCP.    14. BPH with urinary obstruction  Chronic. Stable. Followed by Urology.  No urinary complaints today other than frequency.    15. Obstructive sleep apnea  Chronic. Stable. Followed by Sleep Medicine.      Provided Reid with a 5-10 year written screening schedule and personal prevention plan. Recommendations were developed using the USPSTF age appropriate recommendations. Education, counseling, and referrals were provided as needed. After Visit Summary printed and given to patient which includes a list of additional screenings\tests needed.    Follow up for Annual Wellness Visit in 1 year, follow up with PCP as instructed, ;sooner if problems.    KENDRA Buckner

## 2019-03-27 NOTE — PATIENT INSTRUCTIONS
Counseling and Referral of Other Preventative  (Italic type indicates deductible and co-insurance are waived)    Patient Name: Reid Herman  Today's Date: 3/27/2019    Health Maintenance       Date Due Completion Date    Lipid Panel 07/09/2019 7/9/2018    Hemoglobin A1c 07/21/2019 1/21/2019    Eye Exam 11/28/2019 11/28/2018    Foot Exam 03/11/2020 3/11/2019 (Done)    Override on 3/11/2019: Done    Override on 3/20/2017: Done    Override on 5/6/2016: Done (saw podiatry)    Colonoscopy 04/25/2020 4/25/2017    TETANUS VACCINE 02/17/2026 2/17/2016        No orders of the defined types were placed in this encounter.    The following information is provided to all patients.  This information is to help you find resources for any of the problems found today that may be affecting your health:                Living healthy guide: www.[facility].[place].[facility]      Understanding Diabetes: www.diabetes.org      Eating healthy: www.cdc.gov/healthyweight      CDC home safety checklist: www.cdc.gov/steadi/patient.html      Agency on Aging: www.goea.[place].[facility]      Alcoholics anonymous (AA): www.aa.org      Physical Activity: www.maria teresa.nih.gov/pf5fznb      Tobacco use: www.quitwithusla.org

## 2019-03-29 PROBLEM — L97.921 ULCER OF LEFT LOWER EXTREMITY, LIMITED TO BREAKDOWN OF SKIN: Status: ACTIVE | Noted: 2019-01-01

## 2019-03-29 NOTE — PATIENT INSTRUCTIONS
You may shower using a mild soap such as Dove.  Irrigate the wound with lukewarm water for 5 minutes and dry thoroughly.  Apply medihoney gel to the wound, cover with cotton gauze and secure with your sock.  Change dressing daily.  Report any signs of infection.    You may purchase your supplies from [facility] located at [address] [place] [address].  The telephone number is [phone].

## 2019-03-29 NOTE — LETTER
March 29, 2019      Teodora Jacobs MD  [address]  [facility] [address]           [facility]  [address]  [facility] [address]  Phone: [phone]          Patient: Reid Herman   MR Number: [id number]   YOB: 1940   Date of Visit: 3/29/2019       Dear Dr. Teodora Jacobs:    Thank you for referring Reid Herman to me for evaluation. Attached you will find relevant portions of my assessment and plan of care.    If you have questions, please do not hesitate to call me. I look forward to following Reid Herman along with you.    Sincerely,    Latrice Dacosta NP    Enclosure  CC:  No Recipients    If you would like to receive this communication electronically, please contact [email or web address] or [phone] to request more information on [facility] Link access.    For providers and/or their staff who would like to refer a patient to [facility], please contact us through our one-stop-shop provider referral line, [facility], at [phone].    If you feel you have received this communication in error or would no longer like to receive these types of communications, please e-mail [email or web address]

## 2019-03-29 NOTE — PROGRESS NOTES
Subjective:       Patient ID: Reid Herman is a 79 y.o. male.    Chief Complaint: Wound Check    HPI   This is a 79 year old male referred by Dr. Gamboa for evaluation and management of an ulcer to the left medial leg. Unsure how he got the lesion; thinks he might have scratched his skin when pulling on his socks.  His PMHx significant for T1DM, CAD, CLL, HTN and HLD . He reports he noticed the wound last week but it has been getting progressively worse.  He was seen by [facility] on 3/27/19 and given keflex and was referred to wound care. He is afebrile. He denies increased redness, swelling or purulent drainage.    Review of Systems   Constitutional: Negative for chills, diaphoresis and fever.   HENT: Positive for postnasal drip and rhinorrhea. Negative for hearing loss, sinus pressure, sneezing, sore throat, tinnitus and trouble swallowing.    Eyes: Negative for visual disturbance.   Respiratory: Positive for apnea (on CPAP). Negative for cough, shortness of breath and wheezing.    Cardiovascular: Positive for chest pain. Negative for palpitations and leg swelling.   Gastrointestinal: Negative for constipation, diarrhea, nausea and vomiting.   Genitourinary: Positive for frequency. Negative for difficulty urinating, dysuria and hematuria.   Musculoskeletal: Positive for arthralgias and back pain. Negative for joint swelling.   Skin: Positive for wound.   Neurological: Negative for dizziness, weakness, light-headedness and headaches.   Hematological: Bruises/bleeds easily.   Psychiatric/Behavioral: Positive for decreased concentration. Negative for confusion, dysphoric mood and sleep disturbance. The patient is nervous/anxious.        Objective:      Physical Exam   Constitutional: He is oriented to person, place, and time. He appears well-developed and well-nourished. No distress.   HENT:   Head: Normocephalic and atraumatic.   Mouth/Throat: Oropharynx is clear and moist.   Eyes: Pupils are equal, round,  and reactive to light. Conjunctivae and EOM are normal. Right eye exhibits no discharge. Left eye exhibits no discharge. No scleral icterus.   Neck: Normal range of motion. Neck supple. No JVD present. No tracheal deviation present. No thyromegaly present.   Cardiovascular: Normal rate, regular rhythm and normal heart sounds. Exam reveals no gallop and no friction rub.   No murmur heard.  Pulmonary/Chest: Effort normal and breath sounds normal. No respiratory distress. He has no wheezes. He has no rales.   Abdominal: Soft. Bowel sounds are normal. He exhibits no distension. There is no tenderness.   Musculoskeletal: Normal range of motion. He exhibits no edema or tenderness.        Legs:  Neurological: He is alert and oriented to person, place, and time.   Skin: Skin is warm and dry. No rash noted. He is not diaphoretic. No erythema.   Psychiatric: He has a normal mood and affect. His behavior is normal. Judgment and thought content normal.   Nursing note and vitals reviewed.      A vascular lab study on 2/29/16 revealed:  Rt LEONIE (1.81) Segment/Brachial Index is unreliable due to suspected medial calcinosis. PVR waveforms indicate mild infrapopliteal peripheral arterial obstructive disease.    Lt LEONIE (1.81)  Segment/Brachial Index is unreliable due to suspected medial calcinosis. PVR waveforms indicate mild infrapopliteal peripheral arterial obstructive disease.    Assessment:       1. Ulcer of left lower extremity, limited to breakdown of skin    2. Peripheral vascular disease               Plan:            Apply medihoney gel daily to left leg ulcer, cover with cotton gauze and secure with sock.  LEONIE/PVRs.  Return to clinic in one month.

## 2019-04-03 NOTE — PATIENT INSTRUCTIONS
BPH (Enlarged Prostate)  The prostate is a gland at the base of the bladder. As some men get older, the prostate may get bigger in size. This problem is called benign prostatic hyperplasia (BPH). BPH puts pressure on the urethra. This is the tube that carries urine from the bladder to the penis. It may interfere with the flow of urine. It may also keep the bladder from emptying fully.    Symptoms of BPH include trouble starting urination and feeling as though the bladder isnt emptying all the way. It also includes a weak urine stream, dribbling and leaking of urine, and frequent and urgent urination (especially at night). BPH can increase the risk of urinary infections. It can also block off urine flow completely. If this occurs, a thin tube (catheter) may be passed into the bladder to help drain urine.  If symptoms are mild, no treatment may be needed right now. If symptoms are more severe, treatment is likely needed. The goal of treatment is to improve urine flow and reduce symptoms. Treatments can include medicine and procedures. Your healthcare provider will discuss treatment options with you as needed.  Home care  The following guidelines will help you care for yourself at home:  · Urinate as soon as you feel the urge. Don't try to hold your urine.  · Don't limit your fluid intake during the day. Drink 6 to 8 glasses of water or liquids a day. This prevents bacteria from building up in the bladder.  · Avoid drinking fluids after dinner to help reduce urination during the night.  · Avoid medicines that can worsen your symptoms. These include certain cold and allergy medicines and antidepressants. Diuretics used for high blood pressure can also worsen symptoms. Talk to your doctor about the medicines you take. Other choices may work better for you.  Prostate cancer screening  BPH does not increase the risk of prostate cancer. But because prostate cancer is a common cancer in men, screening is sometimes  recommended. This may help detect the cancer in its early stages when treatment is most effective. Factors that can increase the risk of prostate cancer include being -American or having a father or brother who had prostate cancer. A high-fat diet may also increase the risk of prostate cancer. Talk to your healthcare provider to see whether you should be screened for prostate cancer.  Follow-up care  Follow up with your healthcare provider, or as advised  To learn more, go to:  · National Kidney & Urologic Diseases Information Clearinghouse  kidney.niddk.nih.gov, 369.447.5822  When to seek medical advice  Call your healthcare provider right away if any of these occur:  · Fever of 100.4°F (38.0°C) or higher, or as advised  · Unable to pass urine for 8 hours  · Increasing pressure or pain in your bladder (lower abdomen)  · Blood in the urine  · Increasing low back pain, not related to injury  · Symptoms of urinary infection (increased urge to urinate, burning when passing urine, foul-smelling urine)  Date Last Reviewed: 7/1/2016 © 2000-2017 [employer]. [address] [place] [address], [place] [address]. All rights reserved. This information is not intended as a substitute for professional medical care. Always follow your healthcare professional's instructions.

## 2019-04-03 NOTE — PROGRESS NOTES
"Subjective:       Patient ID: Reid Herman is a 79 y.o. male.    Chief Complaint: BPH.    HPI   Reid Herman is a 79 y.o. male new patient to me (records of past medical, family and social history personally reviewed by me), w/ h/o CKD, DM (insulin pump), MINDA, HTN, HLD, BPH w/ LUTS. Last seen in clinic 4/3/18 w/ NADINE Farmer.    Today pt returns to clinic for BPH f/u. He reports urinary frequency (q1-2hrs). Reports consuming three 6oz coffees, one cup of tea/d, and 16oz bottle diet cola/d.  Nocturia twice/night. Denies urgency. Denies incontinence. Reports adequate FOS. Denies intermittency, straining to urinate. He feels empty post voids. Reports sleep apnea, but denies adherence to CPAP, "I don't like it." Currently taking keflex 500mg po QID for L ankle infection. Denies GH, f/c, n/v, abd/flank/pelvic pain.    Review of Systems   Constitutional: Negative for chills and fever.   Eyes: Negative for visual disturbance.   Respiratory: Negative for shortness of breath.    Cardiovascular: Negative for chest pain and palpitations.   Gastrointestinal: Negative for abdominal pain, constipation, nausea and vomiting.   Endocrine: Negative for polyuria.   Genitourinary: Negative for difficulty urinating, dysuria, frequency, hematuria and urgency.   Musculoskeletal: Negative for back pain.   Skin: Negative for rash.   Neurological: Negative for dizziness and headaches.   Psychiatric/Behavioral: Negative for behavioral problems.         Objective:       UA dip in clinic today w/ trace blood.    Cr 1.2 (1/29/19).    Lab Results   Component Value Date    PSA 1.29 06/21/2013    PSA 1.93 04/19/2012    PSA 1.41 03/25/2011    PSA 2.0 05/10/2010    PSA 1.5 03/12/2009    PSA 0.7 10/24/2006    PSA 0.6 10/11/2005    PSA 0.6 10/11/2004    PSADIAG 1.2 03/14/2018    PSADIAG 1.4 10/03/2016    PSADIAG 2.1 09/11/2015    PSADIAG 1.5 07/17/2014     Physical Exam   Vitals reviewed.  Constitutional: He is oriented to person, place, " and time. He appears well-developed and well-nourished. No distress.   HENT:   Head: Normocephalic.   Eyes: Conjunctivae are normal. No scleral icterus.   Neck: Normal range of motion.   Pulmonary/Chest: Effort normal. No respiratory distress.   Abdominal: Soft. He exhibits no distension. There is no tenderness. There is no rebound. Hernia confirmed negative in the right inguinal area and confirmed negative in the left inguinal area.   Genitourinary: Testes normal and penis normal. Rectal exam shows no external hemorrhoid. Prostate is enlarged. Prostate is not tender. Right testis shows no mass, no swelling and no tenderness. Left testis shows no mass, no swelling and no tenderness. No discharge found.   Genitourinary Comments: The prostate is enlarged, smooth, symmetrical, without nodule or induration. The seminal vesicles were normal and nonpalpable. The prostate was not tender or boggy. Rectal tone was normal no rectal mass was palpable.     Musculoskeletal: He exhibits no edema.   Lymphadenopathy: No inguinal adenopathy noted on the right or left side.   Neurological: He is alert and oriented to person, place, and time.   Skin: Skin is warm.     Psychiatric: He has a normal mood and affect. His behavior is normal.         Assessment:       1. Benign prostatic hyperplasia, unspecified whether lower urinary tract symptoms present        Plan:       I spent 30 minutes with the patient of which more than half was spent in direct consultation with the patient in regards to our treatment and plan.    Discussed and recommend MINDA, and association w/ nocturia, recommend adhering to CPAP, and limiting/avoiding fluids, especially caffeine 2-4 hrs prior to bedtime to reduce episodes of nocturia.    Discussed and reviewed BPH. Reviewed lower urinary anatomy. Discussed prostate normally enlarges to some degree in all men with advancing age, but not all men require treatment. Men with BPH may or may not have symptoms. Urinary  symptoms include irritative (i.e., frequency, urgency) and/or obstructive (i.e., hesitancy, slow stream) symptoms. Treatment for BPH include medications (alpha blockers and/or alpha reductase inhibitors) or surgical intervention (transurethral resection of the prostate/laser ablation/plasma vaporization/transurethral incision of the prostate). Pt is interested in modifying his caffeine intake for frequency before advancing to medical therapy.    Will send urine micro and culture.    Education sheets provided.    Patient verbalized and expressed understanding, and agree w/ plan.

## 2019-04-26 PROBLEM — L97.521 ULCER OF GREAT TOE, LEFT, LIMITED TO BREAKDOWN OF SKIN: Status: ACTIVE | Noted: 2019-01-01

## 2019-04-26 PROBLEM — L97.921 ULCER OF LEFT LOWER EXTREMITY, LIMITED TO BREAKDOWN OF SKIN: Status: RESOLVED | Noted: 2019-01-01 | Resolved: 2019-01-01

## 2019-04-26 PROBLEM — L03.032 CELLULITIS OF LEFT TOE: Status: ACTIVE | Noted: 2019-01-01

## 2019-04-26 NOTE — PATIENT INSTRUCTIONS
insdMEDICATION: DOXYCYCLINE  You have been prescribed an antibiotic drug known as Doxycycline (brand name: Vibramycin). It is a form of tetracycline antibiotic used to treat infections.  DIRECTIONS FOR USE:  Doxycycline may be taken with milk or food to prevent upset stomach. Do not take within 1 hour of bedtime. Take the medicine at regular intervals. If the label says every 12 hours, this means twice a day. Doses dont have to be exactly 12 hours apart, but should be spread out evenly twice a day. Take all of the medicine until it is gone, even if you are feeling better. This will ensure the infection is fully treated.  WHAT TO WATCH OUR FOR:  POSSIBLE SIDE EFFECTS: Nausea, vomiting, heartburn, upper abdominal pain, diarrhea (Contact your doctor if any of these symptoms persist or become severe). White spots in the mouth (thrush), vaginal itching or discharge (Contact your doctor).  ALLERGIC REACTION: Rash, itching, swelling, trouble breathing or swallowing (Contact your doctor or return to this facility promptly).  MEDICAL CONDITIONS: Before starting this medicine, be sure your doctor knows if you have any of the following conditions:  · If you are in the last half of pregnancy or are breastfeeding  · Less than 9 years of age  · Reaction to tetracycline-type drugs in the past  · Kidney or liver disease  DRUG INTERACTION: Before starting this medicine, be sure your doctor knows if you are taking any of the following:  · Penicillin-type antibiotic, phenobarbital, birth control pill  · Coumadin (warfarin), Tegretol (carbamazepine)  WARNING:  · Sensitivity to sunlight may develop. Therefore, you should avoid the sun or use sunscreen for the next several weeks.  · Avoid alcohol when taking this medicine.  · This medicine may become harmful when past the expiration date. Throw away any leftover pills.  · Do not take the following medicines 3 hours before or 3 hours after a dose of doxycycline:   · Antacids, laxatives    · Pepto-Bismol, calcium, iron supplements   [NOTE: This information topic may not include all directions, precautions, medical conditions, drug/food interactions and warnings for this drug. Check with your doctor, nurse, or pharmacist for any questions that you may have.]  © 6647-3225 [employer], [address] [place] [address]. All rights reserved. This information is not intended as a substitute for professional medical care. Always follow your healthcare professional's instructions.        You may shower using a mild soap such as Dove.  Irrigate the wound with lukewarm water for 5 minutes and dry thoroughly.  Apply medihoney gel to the wound, cover with cotton gauze and secure with paper tape or coban.  Change dressing daily.  Report any signs of infection.    Take antibiotics as ordered.

## 2019-04-26 NOTE — PROGRESS NOTES
Subjective:       Patient ID: Reid Herman is a 79 y.o. male.    Chief Complaint: Wound Check    HPI     This patient is seen today for reevaluation of an ulcer to the left medial leg. Unsure how he got the lesion; thinks he might have scratched his skin when pulling on his socks.  His PMHx significant for T1DM, CAD, CLL, HTN and HLD . He has been using medihoney gel daily on the wound and it is now healed.  However, he developed a blister to the left medial toe and has a new wound.  He has been using both medihoney gel and neosporin on this wound but it is not healing.  He is afebrile. He denies increased redness, swelling or purulent drainage. He does not complain of pain. His medical history is complicated by type I diabetes, PVD, and diabetic polyneuropathy.    Review of Systems   Constitutional: Negative for chills, diaphoresis and fever.   HENT: Positive for postnasal drip and rhinorrhea. Negative for hearing loss, sinus pressure, sneezing, sore throat, tinnitus and trouble swallowing.    Eyes: Negative for visual disturbance.   Respiratory: Positive for apnea (on CPAP). Negative for cough, shortness of breath and wheezing.    Cardiovascular: Positive for chest pain. Negative for palpitations and leg swelling.   Gastrointestinal: Negative for constipation, diarrhea, nausea and vomiting.   Genitourinary: Positive for frequency. Negative for difficulty urinating, dysuria and hematuria.   Musculoskeletal: Positive for arthralgias and back pain. Negative for joint swelling.   Skin: Positive for wound.   Neurological: Negative for dizziness, weakness, light-headedness and headaches.   Hematological: Bruises/bleeds easily.   Psychiatric/Behavioral: Positive for decreased concentration. Negative for confusion, dysphoric mood and sleep disturbance. The patient is nervous/anxious.        Objective:      Physical Exam   Constitutional: He is oriented to person, place, and time. He appears well-developed and  well-nourished. No distress.   HENT:   Head: Normocephalic and atraumatic.   Musculoskeletal: Normal range of motion. He exhibits no edema or tenderness.        Legs:       Feet:    Neurological: He is alert and oriented to person, place, and time.   Skin: Skin is warm and dry. No rash noted. He is not diaphoretic. No erythema.   Psychiatric: He has a normal mood and affect. His behavior is normal. Judgment and thought content normal.   Nursing note and vitals reviewed.      A vascular lab study performed today revealed:  The presence of incompressible arteries renders segmental pressures unreliable. PVR waveforms suggest minimal to mild distal PAOD.  Assessment:       1. Cellulitis of left toe    2. Ulcer of great toe, left, limited to breakdown of skin    3. Peripheral vascular disease    4. Diabetic polyneuropathy associated with type 1 diabetes mellitus               Plan:            Apply medihoney gel daily to left leg ulcer, cover with cotton gauze and secure with sock.  Return to clinic in one month.    Left medial leg    Left great toe

## 2019-05-03 NOTE — PATIENT INSTRUCTIONS
You may shower using a mild soap such as Dove.  Irrigate the wound with lukewarm water for 5 minutes and dry thoroughly.  Apply medihoney gel to the wound, cover with cotton gauze and secure with paper tape. Change dressing daily.  Report any signs of infection.

## 2019-05-03 NOTE — PROGRESS NOTES
Subjective:       Patient ID: Reid Herman is a 79 y.o. male.    Chief Complaint: Wound Check    HPI     This patient is seen today for reevaluation of an ulcer left medial great toe.  This occurred when he pulled dry skin off the toe leaving a wound.  He developed cellulitis and was placed on doxycycline a week ago.  The cellulitis is improved.  His PMHx significant for T1DM, CAD, CLL, HTN and HLD . He has been using medihoney gel daily on the wound.  He is afebrile. He denies increased redness, swelling or purulent drainage. His pain level is 1/10.     Review of Systems   Constitutional: Negative for chills, diaphoresis and fever.   HENT: Positive for postnasal drip and rhinorrhea. Negative for hearing loss, sinus pressure, sneezing, sore throat, tinnitus and trouble swallowing.    Eyes: Negative for visual disturbance.   Respiratory: Positive for apnea (on CPAP). Negative for cough, shortness of breath and wheezing.    Cardiovascular: Positive for chest pain. Negative for palpitations and leg swelling.   Gastrointestinal: Negative for constipation, diarrhea, nausea and vomiting.   Genitourinary: Positive for frequency. Negative for difficulty urinating, dysuria and hematuria.   Musculoskeletal: Positive for arthralgias and back pain. Negative for joint swelling.   Skin: Positive for wound.   Neurological: Negative for dizziness, weakness, light-headedness and headaches.   Hematological: Bruises/bleeds easily.   Psychiatric/Behavioral: Positive for decreased concentration. Negative for confusion, dysphoric mood and sleep disturbance. The patient is nervous/anxious.        Objective:      Physical Exam   Constitutional: He is oriented to person, place, and time. He appears well-developed and well-nourished. No distress.   HENT:   Head: Normocephalic and atraumatic.   Musculoskeletal: Normal range of motion. He exhibits no edema or tenderness.        Feet:    Neurological: He is alert and oriented to person,  place, and time.   Skin: Skin is warm and dry. No rash noted. He is not diaphoretic. No erythema.   Psychiatric: He has a normal mood and affect. His behavior is normal. Judgment and thought content normal.   Nursing note and vitals reviewed.      A vascular lab study performed today revealed:  The presence of incompressible arteries renders segmental pressures unreliable. PVR waveforms suggest minimal to mild distal PAOD.  Assessment:       1. Ulcer of great toe, left, limited to breakdown of skin    2. Cellulitis of left toe    3. Peripheral vascular disease    4. Diabetic polyneuropathy associated with type 1 diabetes mellitus               Plan:            Complete doxycycline as ordered.  Apply medihoney gel daily to left leg ulcer, cover with cotton gauze and secure with sock.  Return to clinic in one month.    Left great toe

## 2019-05-08 PROBLEM — Z96.41 INSULIN PUMP STATUS: Status: ACTIVE | Noted: 2019-01-01

## 2019-05-08 NOTE — PROGRESS NOTES
CHIEF COMPLAINT: Type 1 Diabetes     HPI: Mr. Reid Herman is a 79 y.o. male who was diagnosed with type 1 DM in 1970.     Last seen by ERIN Rivers NP 01/2019.  Seen in conjunction w/ Dr. ISSI James.   On 1/29/19, pt had angiogram/stent replacement/changes, same day discharge.      Current diabetes regimen:  Metformin 500mg BID    Pump: accuchek spirit pump/sarai     Pump Settings  Basal Rate  0000 - 0300 0.85 u/hr  0300 - 0700 0.9 u/hr  0700 - 2100  1.1 u/hr  2100 - 0000  0.85 u/hr       Carb Ratio  12A: 1:8    ISF  1:30    Target: 110-130  IAT: 3    TDD 50.73 units  Basal 45.7%; Bolus 54.3%      Hemoglobin A1C   Date Value Ref Range Status   05/02/2019 6.5 (H) 4.0 - 5.6 % Final     Comment:     ADA Screening Guidelines:  5.7-6.4%  Consistent with prediabetes  >or=6.5%  Consistent with diabetes  High levels of fetal hemoglobin interfere with the HbA1C  assay. Heterozygous hemoglobin variants (HbS, HgC, etc)do  not significantly interfere with this assay.   However, presence of multiple variants may affect accuracy.     01/21/2019 5.8 (H) 4.0 - 5.6 % Final     Comment:     ADA Screening Guidelines:  5.7-6.4%  Consistent with prediabetes  >or=6.5%  Consistent with diabetes  High levels of fetal hemoglobin interfere with the HbA1C  assay. Heterozygous hemoglobin variants (HbS, HgC, etc)do  not significantly interfere with this assay.   However, presence of multiple variants may affect accuracy.     10/15/2018 6.7 (H) 4.0 - 5.6 % Final     Comment:     ADA Screening Guidelines:  5.7-6.4%  Consistent with prediabetes  >or=6.5%  Consistent with diabetes  High levels of fetal hemoglobin interfere with the HbA1C  assay. Heterozygous hemoglobin variants (HbS, HgC, etc)do  not significantly interfere with this assay.   However, presence of multiple variants may affect accuracy.          Glucose Monitoring:  Meter/CGM:  Dexcom G5 (started around 1/2018)  Sensor and pump report downloaded and reviewed, see report in media  tab    Pt is monitoring blood glucose readings 4 times a day.  Needs >100 strips per month related to fluctuations with blood glucose reading, A1c trends, and activity level.    Hypoglycemic Episodes:  Yes, reports 39, 50-60s here and there    DKA: none within the past recent months    Screening / DM Complications:  Diabetes Management Status    Statin: Taking  ACE/ARB: Taking    Screening or Prevention Patient's value Goal Complete/Controlled?   HgA1C Testing and Control   Lab Results   Component Value Date    HGBA1C 6.5 (H) 05/02/2019      Annually/Less than 8% Yes   Lipid profile : 07/09/2018 Annually Yes   LDL control Lab Results   Component Value Date    LDLCALC 58.2 (L) 07/09/2018    Annually/Less than 100 mg/dl  Yes   Nephropathy screening Lab Results   Component Value Date    LABMICR 7.0 02/06/2018     Lab Results   Component Value Date    PROTEINUA Negative 04/03/2019    Annually Yes   Blood pressure BP Readings from Last 1 Encounters:   05/08/19 110/62    Less than 140/90 Yes   Dilated retinal exam : 11/28/2018 Annually Yes   Foot exam   : 03/11/2019 Annually Yes       Last Podiatry Exam: 2018  Neuropathy: yes  Last eye exam:  2018, with next appointment 5/2019 +PDR  CVD/MI: yes, s/p CABG    Lab Results   Component Value Date    TSH 1.402 03/14/2018     No results found for: TTGIGA    Diet/Exercise:  Pt eats 3 meals a day  Yogurt, fruit, sugar free cookies   Snack around 8p nuts/fruit, noted excursion overnight   no formal exercise, walks occasionally-has cane, PT for back - active at work      REVIEW OF SYSTEMS  General: no weakness, fatigue, or weight changes.   Eyes: no double or blurred vision, eye pain, or redness  Cardiovascular: no chest pain, palpitations, edema, or murmurs.   Respiratory: no cough or dyspnea.   GI: no heartburn, nausea, or changes in bowel patterns; good appetite.   Skin: no rashes, dryness, itching, or reactions at insulin injection sites. easy bruising, + acanthosis nigracans.  "accuchek spirit intact.   FEET: Footware appropriate.   Neuro: no numbness, tingling, tremors, or vertigo. + numbness/ tingling in BLE  Endocrine: no polyuria, polydipsia, polyphagia, heat or cold intolerance.     Vital Signs  /62   Pulse 76   Resp 18   Ht 5' 7" (1.702 m)   Wt 91.3 kg (201 lb 6.2 oz)   BMI 31.54 kg/m²     PHYSICAL EXAMINATION  Constitutional: Appears fairly well, no distress  Neck: Supple, trachea midline.   Respiratory: CTA without wheezes, even and unlabored.  Cardiovascular: RRR; no carotid bruits or murmurs; (+) DP pulses; no edema.   Lymph: no lymphadenopathy palpated  Skin: warm and dry; no injection site reactions, no acanthosis nigracans observed.  Neuro:patient alert and cooperative, normal affect; slower gait.    Diabetes Foot Exam:   Protective Sensation (w/ 10 gram monofilament):  Right: Decreased  Left: Absent    Visual Inspection:  Ulceration -  Left great toe dressed, no drainage, some dried purulent material.    Pedal Pulses:   Right: Diminshed  Left: Absent    Posterior tibialis:   Right:Diminshed  Left: Diminshed      Lab Results   Component Value Date    HGBA1C 6.5 (H) 05/02/2019    HGBA1C 5.8 (H) 01/21/2019    LABMICR 7.0 02/06/2018    MICALBCREAT 11.5 02/06/2018    CHOL 115 (L) 07/09/2018    CHOL 115 (L) 04/26/2017    TRIG 39 07/09/2018    TRIG 67 04/26/2017    HDL 49 07/09/2018    HDL 45 04/26/2017    LDLCALC 58.2 (L) 07/09/2018    LDLCALC 56.6 (L) 04/26/2017         Chemistry        Component Value Date/Time     (L) 01/29/2019 0545    K 4.4 01/29/2019 0545    CL 95 01/29/2019 0545    CO2 27 01/29/2019 0545    BUN 27 (H) 01/29/2019 0545    CREATININE 1.2 01/29/2019 0545     (H) 01/29/2019 0545        Component Value Date/Time    CALCIUM 9.7 01/29/2019 0545    ALKPHOS 95 01/21/2019 0831    AST 20 01/21/2019 0831    ALT 14 01/21/2019 0831    BILITOT 0.8 01/21/2019 0831    ESTGFRAFRICA >60.0 01/29/2019 0545    EGFRNONAA 58.4 (A) 01/29/2019 0545    "         Assessment/Plan  1. Controlled type 1 diabetes mellitus with diabetic neuropathy, with long-term current use of insulin    2. Insulin pump status    3. 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 Diabetic polyneuropathy associated with type 1 diabetes mellitus    5. CLL (chronic lymphocytic leukemia)    6. Anemia in neoplastic disease    7. CKD (chronic kidney disease), stage III    8. Coronary artery disease involving native coronary artery of native heart with angina pectoris    9. Peripheral vascular disease    10. Chronic ulcer of great toe of left foot, limited to breakdown of skin      Glucagon kit: no - ordered       1-4, 10.  DM on pump with PDR, NPDR, neuropathy  T1DM on insulin pump.  Currently working to acquire a [employer] pump (ordered).  Patient also with L great toe ulcer, on abx.  Completes therapy on Friday.    No change to regimen at this time.   Will await until patient completes treatment to re-eval BG levels and make adjustments at that time.  To bring in dexcom 5/29 for just a download.     Recommend bolusing for nighttime snack as this could contribute to nighttime hyperglycemia.  Patient verbalized understanding.     For any technical insulin pump issues, please contact the insulin pump company; the toll free number is printed on the label on the back of the insulin pump.      5.  CLL  Chronic, follows heme/onc q3mo  Optimize BG    6. Anemia  Can falsely lower a1c.      7.  CKD III  Caution with insulin stacking    8 - 9. CAD and PVD  Optimize BG and avoid hypoglycemia.         FOLLOW UP  Follow up in about 3 months (around 8/8/2019).   a1c in 3 months    Agree with assessment/plan per Dr. ISIS James

## 2019-05-10 NOTE — PROGRESS NOTES
Pt came in to hand updated letters from [employer] and [employer] about pump coverage. Reviewed features of tandem pump per his request.     Time spent with patient: 15 minutes  No charge for today's visit.

## 2019-05-10 NOTE — TELEPHONE ENCOUNTER
----- Message from Jahaira Belle sent at 5/10/2019 10:08 AM CDT -----  Contact: Maria A varner/Hoa  [phone] / fax no:  [phone]    Needs Advice    Reason for call:     Ref. [email or web address].[email or web address]. Case no.: [id number], Lacking the support to approve for medical necessity. Medical Director is offering an opportunity to set up an optional  Peer to peer conversation before the denial .  you can call anytime before 5/13 , 12noon. And it will be set up.   Needs Providers direct info.  And Med. Director will call you.         Communication Preference:  Phone     Additional Information:  pls call.

## 2019-05-13 NOTE — TELEPHONE ENCOUNTER
Contacted patient in response to message. Notified patient that wound care clinic is closed on Mondays and informed patient of Carmina Dacosta's latest recommendations. Pt verbalized understanding, verified FU appt with Carmina. Message forwarded to WINSOME Gonsalves.----- Message from Toño Hardy sent at 5/13/2019  1:08 PM CDT -----  Reason for call:Pt states he's finished hie antibiotics and would like to know what he need to do now, pt states his wound isn't healed and he's still in pain.        Communication Preference:[phone]    Additional Information:

## 2019-05-14 NOTE — TELEPHONE ENCOUNTER
----- Message from Sage Carpio sent at 5/10/2019  1:25 PM CDT -----  Contact: pt  Needs Advice    Reason for call: pt states he needs to speak with Carmina in regards to if he needs to continue taking his medication         Communication Preference: [phone]    Additional Information:

## 2019-05-14 NOTE — TELEPHONE ENCOUNTER
----- Message from Homa Newell RN sent at 5/13/2019  3:18 PM CDT -----  Brina,    I called him back and notified him of Carmina's last recs and confirmed his FU. If you have any additional advice could you give him a call?    Homa  ----- Message -----  From: Toño Antony  Sent: 5/13/2019   1:08 PM  To: Praneeth Pollard Staff    Reason for call:Pt states he's finished hie antibiotics and would like to know what he need to do now, pt states his wound isn't healed and he's still in pain.        Communication Preference:[phone]    Additional Information:

## 2019-05-14 NOTE — TELEPHONE ENCOUNTER
----- Message from Nasrin Pierre sent at 5/14/2019 11:51 AM CDT -----  Contact: Shan patel [phone] ext [phone]  .Needs Advice    Reason for call:        Communication Preference:phone     Additional Information:  Annabelle states they patient Need chart notes from  [phone] please fax to number below  [phone] attention Nurse

## 2019-05-17 PROBLEM — L03.032 CELLULITIS OF LEFT TOE: Status: RESOLVED | Noted: 2019-01-01 | Resolved: 2019-01-01

## 2019-05-17 NOTE — PROGRESS NOTES
Subjective:       Patient ID: Reid Herman is a 79 y.o. male.    Chief Complaint: Wound Check    HPI     This patient is seen today for reevaluation of an ulcer left medial great toe.  This occurred when he pulled dry skin off the toe leaving a wound.  He developed cellulitis and was placed on doxycycline a week ago.  The cellulitis is resolved.  His PMHx significant for T1DM, CAD, CLL, HTN and HLD . He has been using medihoney gel daily on the wound.  He is wearing closed toe shoes and he walks a good deal during the day.  The wound is rubbing against the side of the shoe and is larger in size. He is afebrile. He denies increased redness, swelling or purulent drainage. His pain level is 2/10.     Review of Systems   Constitutional: Negative for chills, diaphoresis and fever.   HENT: Positive for postnasal drip and rhinorrhea. Negative for hearing loss, sinus pressure, sneezing, sore throat, tinnitus and trouble swallowing.    Eyes: Negative for visual disturbance.   Respiratory: Positive for apnea (on CPAP). Negative for cough, shortness of breath and wheezing.    Cardiovascular: Positive for chest pain. Negative for palpitations and leg swelling.   Gastrointestinal: Negative for constipation, diarrhea, nausea and vomiting.   Genitourinary: Positive for frequency. Negative for difficulty urinating, dysuria and hematuria.   Musculoskeletal: Positive for arthralgias and back pain. Negative for joint swelling.   Skin: Positive for wound.   Neurological: Negative for dizziness, weakness, light-headedness and headaches.   Hematological: Bruises/bleeds easily.   Psychiatric/Behavioral: Positive for decreased concentration. Negative for confusion, dysphoric mood and sleep disturbance. The patient is nervous/anxious.        Objective:      Physical Exam   Constitutional: He is oriented to person, place, and time. He appears well-developed and well-nourished. No distress.   HENT:   Head: Normocephalic and  atraumatic.   Musculoskeletal: Normal range of motion. He exhibits no edema or tenderness.        Feet:    Neurological: He is alert and oriented to person, place, and time.   Skin: Skin is warm and dry. No rash noted. He is not diaphoretic. No erythema.   Psychiatric: He has a normal mood and affect. His behavior is normal. Judgment and thought content normal.   Nursing note and vitals reviewed.        Assessment:       1. Ulcer of great toe, left, limited to breakdown of skin    2. Peripheral vascular disease    3. Diabetic polyneuropathy associated with type 1 diabetes mellitus               Plan:            Apply medihoney gel daily to left leg ulcer, cover with cotton gauze and secure with sock, paper tape, or coban.  Return to clinic in one month.    Left great toe

## 2019-05-17 NOTE — PATIENT INSTRUCTIONS
You may shower using a mild soap such as Dove.  Irrigate the wound with lukewarm water for 5 minutes and dry thoroughly.  Apply medihoney gel to the wound, cover with cotton gauze and secure with paper tape or coban.  Change dressing daily.  Report any signs of infection.    Purchase a pair of shoes such as sandals that do not cause pressure on the toe.  Keep toe covered except when cleaning and dressing the wound.

## 2019-05-22 NOTE — PROGRESS NOTES
Subjective:      Patient ID: Reid Herman is a 79 y.o. male.    Chief Complaint: Diabetes Mellitus; Foot Ulcer (left foot great toe ); Foot Problem (swlling, warm to touch and redness ); and Foot Pain    Reid is a 79 y.o. male who presents to the clinic for evaluation and treatment of high risk feet. Reid has a past medical history of Anemia, Back pain, Basal cell carcinoma (06/08/2015), CAD (coronary artery disease) (10/1/2012), Cataract, DDD (degenerative disc disease), lumbar (10/28/2014), Diabetes mellitus, Diabetes mellitus type I, Diabetic retinopathy of both eyes, Hyperlipidemia, Hypertension, Insulin pump in place, Obesity, Poorly controlled type 1 diabetes mellitus with peripheral neuropathy (10/1/2012), Preseptal cellulitis of right eye (8/17/15), S/P CABG x 2 (2/14/2014), Sleep apnea, Squamous cell carcinoma (03/18/2013), Squamous cell carcinoma (07/26/2017), Squamous cell carcinoma (08/02/2018), Trouble in sleeping, Type 1 diabetes, uncontrolled, with retinopathy (10/1/2012), Type II or unspecified type diabetes mellitus without mention of complication, not stated as uncontrolled, and Vitreous hemorrhage (8/17/2014). The patient's chief complaint is diabetic foot ulcer, R big toe. He has had X-rays, mylene, and been evaluated by Carmina Dacosta NP. He applies medi honey q day w/ bandage. Reports the pain has recently increased and the toe has become red. He manages pain w/ PO Tylenol . This patient has documented high risk feet requiring routine maintenance secondary to diabetes mellitis and those secondary complications of diabetes, as mentioned..    PCP: Olivia Zavala MD    Date Last Seen by PCP:   Chief Complaint   Patient presents with    Diabetes Mellitus    Foot Ulcer     left foot great toe     Foot Problem     swlling, warm to touch and redness     Foot Pain         Hemoglobin A1C   Date Value Ref Range Status   05/02/2019 6.5 (H) 4.0 - 5.6 % Final     Comment:     ADA Screening  Guidelines:  5.7-6.4%  Consistent with prediabetes  >or=6.5%  Consistent with diabetes  High levels of fetal hemoglobin interfere with the HbA1C  assay. Heterozygous hemoglobin variants (HbS, HgC, etc)do  not significantly interfere with this assay.   However, presence of multiple variants may affect accuracy.     01/21/2019 5.8 (H) 4.0 - 5.6 % Final     Comment:     ADA Screening Guidelines:  5.7-6.4%  Consistent with prediabetes  >or=6.5%  Consistent with diabetes  High levels of fetal hemoglobin interfere with the HbA1C  assay. Heterozygous hemoglobin variants (HbS, HgC, etc)do  not significantly interfere with this assay.   However, presence of multiple variants may affect accuracy.     10/15/2018 6.7 (H) 4.0 - 5.6 % Final     Comment:     ADA Screening Guidelines:  5.7-6.4%  Consistent with prediabetes  >or=6.5%  Consistent with diabetes  High levels of fetal hemoglobin interfere with the HbA1C  assay. Heterozygous hemoglobin variants (HbS, HgC, etc)do  not significantly interfere with this assay.   However, presence of multiple variants may affect accuracy.         Review of Systems   Constitution: Negative for chills, decreased appetite and fever.   Cardiovascular: Negative for leg swelling.   Skin: Positive for poor wound healing.   Musculoskeletal: Negative for arthritis, joint pain, joint swelling and myalgias.   Gastrointestinal: Negative for nausea and vomiting.   Neurological: Positive for numbness. Negative for loss of balance and paresthesias.         Patient Active Problem List   Diagnosis    CLL (chronic lymphocytic leukemia)    Diabetic polyneuropathy associated with type 1 diabetes mellitus    Controlled type 1 diabetes mellitus with diabetic neuropathy, with long-term current use of insulin    Coronary artery disease involving native coronary artery of native heart with angina pectoris    Posterior vitreous detachment - Both Eyes    Hyperlipidemia    HTN (hypertension)    Dermatophytosis  of nail    Pseudophakia    BPH with urinary obstruction    Erectile dysfunction    S/P CABG (coronary artery bypass graft)    Corns and callosities    Lumbar facet arthropathy    DDD (degenerative disc disease), lumbar    Obstructive sleep apnea    Peripheral vascular disease    Lumbar spondylosis    Spondylolisthesis    S/P lumbar fusion    Atherosclerosis of aorta    CKD (chronic kidney disease), stage III    Sacroiliitis    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    Stable angina    Nonexudative age-related macular degeneration, bilateral, intermediate dry stage    Carotid disease, bilateral    AK (actinic keratosis)    Anemia in neoplastic disease    Hyperkalemia    Ulcer of great toe, left, limited to breakdown of skin    Insulin pump status       Current Outpatient Medications on File Prior to Visit   Medication Sig Dispense Refill    ACCU-CHEK FASTCLIX LANCET DRUM Misc TEST 6 TIMES DAILY 550 each 3    alpha lipoic acid 600 mg Cap Take 1 capsule by mouth once daily.        aspirin (ECOTRIN) 81 MG EC tablet Take 1 tablet (81 mg total) by mouth once daily.  0    atorvastatin (LIPITOR) 80 MG tablet TAKE 1/2 TABLET NIGHTLY. (Patient taking differently: 40 mg. ) 45 tablet 3    BD INSULIN SYRINGE ULT-FINE II 0.3 mL 31 gauge x 5/16 Syrg       blood sugar diagnostic (ACCU-CHEK SHARON PLUS TEST STRP) Strp Check sugar a 5x a day. 450 strip 3    blood-glucose meter,continuous (DEXCOM G6 ) Misc Use as directed, change every 3 years 1 each 0    blood-glucose sensor (DEXCOM G6 SENSOR) Doris Change every 10 days. 3 Device 6    blood-glucose transmitter (DEXCOM G6 TRANSMITTER) Doris Change every 3 months. 1 Device 1    clopidogrel (PLAVIX) 75 mg tablet Take 4 tablets x 1 followed by one tablet daily 90 tablet 4    clotrimazole-betamethasone 1-0.05% (LOTRISONE) cream APPLY EXTERNALLY TO THE AFFECTED AREA TWICE DAILY 15 g 0    docusate  sodium (COLACE) 100 MG capsule Take 100 mg by mouth 2 (two) times daily.      fluticasone (FLONASE) 50 mcg/actuation nasal spray 1 spray by Each Nare route as needed for Rhinitis.      glucagon, human recombinant, (GLUCAGON EMERGENCY KIT, HUMAN,) 1 mg SolR Inject 1 mg into the muscle as needed. 1 each 3    insulin aspart U-100 (NOVOLOG U-100 INSULIN ASPART) 100 unit/mL injection Uses insulin pump, max daily 100 units. 30 mL 11    insulin pump cartridge (OMNIPOD DASH INSULIN POD) Crtg Change every 3 days. 10 each 11    insulin pump controller (OMNIPOD DASH PDM KIT) Misc Use as directed 1 each 0    lisinopril-hydrochlorothiazide (PRINZIDE,ZESTORETIC) 20-12.5 mg per tablet Take 1 tablet by mouth 2 (two) times daily. 180 tablet 3    metFORMIN (GLUCOPHAGE) 500 MG tablet TAKE 1 TABLET TWICE DAILY 180 tablet 2    metoprolol succinate (TOPROL-XL) 100 MG 24 hr tablet Take 1 tablet (100 mg total) by mouth every evening. 90 tablet 3    metoprolol succinate (TOPROL-XL) 50 MG 24 hr tablet Take 1 tablet (50 mg total) by mouth once daily. 30 tablet 11    multivitamin capsule Take 1 capsule by mouth once daily.        nitroGLYCERIN (NITROSTAT) 0.4 MG SL tablet Place 1 tablet (0.4 mg total) under the tongue every 5 (five) minutes as needed for Chest pain. May dispense a two pack of 25 tablets. 100 tablet 3    vit A/vit C/vit E/zinc/copper (PRESERVISION AREDS ORAL) Take 1 tablet by mouth 2 (two) times daily.      [DISCONTINUED] [id number]-algal oil (METANX/FOLTANX RF) 3 mg-35 mg-2 mg -90.314 mg Cap Take 1 tablet by mouth once daily. 90 capsule 5     Current Facility-Administered Medications on File Prior to Visit   Medication Dose Route Frequency Provider Last Rate Last Dose    nitroGLYCERIN 0.4 MG/DOSE TL SPRY 400 mcg/spray spray 1 spray  1 spray Sublingual Q5 Min PRN Marcos Alexander MD   1 spray at 01/11/19 3875       Review of patient's allergies indicates:  No Known Allergies    Past Surgical History:    Procedure Laterality Date    ANGIOGRAM, CORONARY ARTERY N/A 1/29/2019    Performed [facility] at [facility] CATH LAB    APPENDECTOMY  1951    BLOCK-NERVE-MEDIAL BRANCH-LUMBAR Bilateral 3/10/2015    [facility]    BLOCK-NERVE-MEDIAL BRANCH-LUMBAR Bilateral 11/25/2014    [facility] at [facility]    CATARACT EXTRACTION  4/8/13    right eye (toric)    CATARACT EXTRACTION  4/29/13    left eye (toric)    COLONOSCOPY N/A 4/25/2017    Performed by CLARISSA Freeman [facility] at [facility] ENDO (4TH FLR)    COLONOSCOPY N/A 4/25/2014    Per[facility]SA Freeman [facility] at [facility] ENDO (4TH FLR)    CORONARY ARTERY BYPASS GRAFT  1994    x 3    EYE SURGERY      cataracts, both eyes    FINGER TENDON REPAIR  2011    left hand    INJECTION-FACET Bilateral 3/31/2015    [facility]    INJECTION-STEROID-EPIDURAL-LUMBAR N/A 10/28/2014    [facility]    INSERTION, IOL PROSTHESIS Left 4/29/2013    [facility] at [facility]    INSERTION, IOL PROSTHESIS Right 4/8/2013    [facility] at [facility]    INSERTION, STENT, CORONARY ARTERY N/A 1/29/2019    Performed by Saturnino Ny [facility] at [facility] CATH LAB    MINIMALLY INVASIVE FUSION-TRANSLUMINAL LUMBAR INTERBODY (TLIF) Left 5/18/2015    Performed by Tee Pinedo MD at [facility] OR 2ND FLR    PHACOEMULSIFICATION, CATARACT Left 4/29/2013    [facility] at [facility]    PHACOEMULSIFICATION, CATARACT Right 4/8/2013    [facility] at [facility]    RADIOFREQUENCY THERMOCOAGULATION (RFTC)-NERVE-MEDIAN BRANCH-LUMBAR Left 1/13/2015    Performed by Juan[facility]les [facility] at [facility]    RADIOFREQUENCY THERMOCOAGULATION (RFTC)-NERVE-MEDIAN BRANCH-LUMBAR Right 12/30/2014    [facility]    SKIN BIOPSY  2013    multiple    SPINE SURGERY   2015    TLIF    TONSILLECTOMY  1947       Family History   Problem Relation Age of Onset    Breast cancer Mother     Cancer Mother         ? lung cancer    Alzheimer's disease Father     Dementia Father     Stroke Father     Colon cancer Sister     Cancer Sister 60        colon    Acute myelogenous leukemia Sister     Diabetes Maternal Grandfather     Diabetes Paternal Aunt     Diabetes Paternal Uncle     Melanoma Neg Hx     Psoriasis Neg Hx     Lupus Neg Hx     Eczema Neg Hx     Acne Neg Hx        Social History     Socioeconomic History    Marital status:      Spouse name: Not on file    Number of children: Not on file    Years of education: Not on file    Highest education level: Not on file   Occupational History    Occupation: Sales     Employer: [employer]    Social Needs    Financial resource strain: Not on file    Food insecurity:     Worry: Not on file     Inability: Not on file    Transportation needs:     Medical: Not on file     Non-medical: Not on file   Tobacco Use    Smoking status: Never Smoker    Smokeless tobacco: Never Used   Substance and Sexual Activity    Alcohol use: Yes     Alcohol/week: 0.6 oz     Types: 1 Glasses of wine per week     Comment: social once monthly    Drug use: No    Sexual activity: Not Currently     Partners: Female   Lifestyle    Physical activity:     Days per week: Not on file     Minutes per session: Not on file    Stress: Not on file   Relationships    Social connections:     Talks on phone: Not on file     Gets together: Not on file     Attends [employer] service: Not on file     Active member of club or organization: Not on file     Attends meetings of clubs or organizations: Not on file     Relationship status: Not on file   Other Topics Concern    Not on file   Social History Narrative    Not on file               Objective:       Vitals:    05/22/19 0913   BP: 126/74   Pulse: 83   Resp: 18   Temp: 98.4 °F (36.9 °C)  "  TempSrc: Oral   Weight: 91.6 kg (202 lb)   Height: 5' 7" (1.702 m)   PainSc:   2   PainLoc: Foot        Physical Exam   Constitutional: He is oriented to person, place, and time. He appears well-developed and well-nourished.   Cardiovascular:   Dorsalis pedis and posterior tibial pulses are diminished Bilaterally. Toes are cool to touch. Feet are warm proximally.There is decreased digital hair. Skin is atrophic, slightly hyperpigmented, and mildly edematous       Musculoskeletal: Normal range of motion. He exhibits no edema or tenderness.   Adequate joint range of motion without pain, limitation, nor crepitation Bilateral feet and ankle joints. Muscle strength is 5/5 in all groups bilaterally.         Neurological: He is alert and oriented to person, place, and time.   Huntley-Eliud 5.07 monofilamant testing is diminished Rolly feet. Sharp/dull sensation diminished Bilaterally. Light touch absent Bilaterally.       Skin: Skin is warm and dry. Lesion noted. No ecchymosis noted. There is erythema.   Ulcer location: medial HIPJ R   Measurements : 1.7x1.3x0.1 cm   Signs of infection: yes- pain, surrounding redness  Pus:no  Erythema: yes bj wound, dorsal hallux   Undermining:no  Tunneling: no  Drainage: scant serous  Periwound skin:intact    Wound Base: necrotic      Psychiatric: He has a normal mood and affect. His behavior is normal.                 Assessment:       Encounter Diagnoses   Name Primary?    Skin ulcer of toe of left foot, limited to breakdown of skin Yes    Peripheral vascular disease     Diabetic polyneuropathy associated with type 1 diabetes mellitus          Plan:       Reid was seen today for diabetes mellitus, foot ulcer, foot problem and foot pain.    Diagnoses and all orders for this visit:    Skin ulcer of toe of left foot, limited to breakdown of skin  -     Ambulatory consult to Vascular Surgery  -     Aerobic culture  -     Culture, Anaerobic    Peripheral vascular " disease    Diabetic polyneuropathy associated with type 1 diabetes mellitus    Other orders  -     doxycycline (MONODOX) 100 MG capsule; Take 1 capsule (100 mg total) by mouth 2 (two) times daily. for 10 days  -     [id number]-algal oil (METANX/FOLTANX RF) 3 mg-35 mg-2 mg -90.314 mg Cap; Take 1 tablet by mouth once daily.      I counseled the patient on his conditions, their implications and medical management.    Aerobic/anaerobic cultures obtained from wound. Prescription written for broad spectrum abx, will monitor results and tailor abx as needed.   PO Doxy   Reviewed LEONIE w/ patient and spouse   Radiographics independently reviewed in detail with patient. Findings  discussed. All questions in regards to radiographs were answered   Due to continued necrosis, and increased pain I recommend a vas sx consult - orders placed   Debridement:area deeply cleansed with saline moistened gauze   Dressings:iodosorb   Offloading:laurie Kahn MA assisted w/ application of football dressing under my direct supervision. Darco shoe applied to offload the area. Advised patient that this should be worn on the affected foot at all times when ambulating       Follow-up:Patient is to return to the clinic in one week for follow-up but should call [facility] immediately if any signs of infection, such as fever, chills, sweats, increased redness or pain.    Short-term goals include maintaining good offloading and minimizing bioburden, promoting granulation and epithelialization to healing.  Long-term goals include keeping the wound healed by good offloading and medical management under the direction of internist.

## 2019-05-29 PROBLEM — I50.9 ACUTE HF (HEART FAILURE): Status: ACTIVE | Noted: 2019-01-01

## 2019-05-29 PROBLEM — L02.612 FOOT ABSCESS, LEFT: Status: ACTIVE | Noted: 2019-01-01

## 2019-05-29 PROBLEM — E87.1 HYPONATREMIA: Status: ACTIVE | Noted: 2019-01-01

## 2019-05-29 PROBLEM — R06.01 ORTHOPNEA: Status: ACTIVE | Noted: 2019-01-01

## 2019-05-29 PROBLEM — L03.119 CELLULITIS OF FOOT: Status: ACTIVE | Noted: 2019-01-01

## 2019-05-29 NOTE — PLAN OF CARE
(Physician in Lead of Transfers)   Direct Admit From Clinic (or Home) Acceptance Note    Referring Physician: Maye Wyatt DPM (podiatry)    Accepting Physician: Laura Osman MD    Date of Acceptance: 05/29/2019    Location of Patient: podiatry clinic    Reason for Admit: L plantar hallux abscess, enterococcus, failed outpt PO Abx    Report from Referring Physician: 79M with Dx as above. Enterococcus is sensitive to amp and Vanc.  Wound has worsened despite appropriate PO ABx (ampicillin), and he has 7 lbs of weight gain so may have some CHF exacerbation. No recent Echo on file.     Labs/imaging: see photos in podiatry note    To Do List: Admit to , OM workup, podiatry and ID consults, IV Vanc. May need diuresis and Echo.    Upon patient arrival to floor, please call extension [phone] (if no answer, this will flip to a beeper, so enter your call back number) for Hospital Medicine admit team assignment and for additional admit orders for the patient.  Do not page the attending physician associated with the patient on arrival (this physician may not be on duty at the time of arrival).  Rather, always call [phone] to reach the triage physician for orders and team assignment.    Laura Osman MD  Hospital Medicine Staff  Pager: [phone]

## 2019-05-29 NOTE — Clinical Note
80 ml injected throughout the case. 120 mL total wasted during the case. 200 mL total used in the case.  Principal Discharge DX:	Anemia Principal Discharge DX:	Troponin level elevated

## 2019-05-29 NOTE — PROGRESS NOTES
Subjective:      Patient ID: Reid Herman is a 79 y.o. male.    Chief Complaint: Follow-up (wound care ); Foot Ulcer (left foot great toe ); Foot Swelling; and Foot Pain    Reid is a 79 y.o. male who presents to the clinic for evaluation and treatment of high risk feet. Reid has a past medical history of Anemia, Back pain, Basal cell carcinoma (06/08/2015), CAD (coronary artery disease) (10/1/2012), Cataract, DDD (degenerative disc disease), lumbar (10/28/2014), Diabetes mellitus, Diabetes mellitus type I, Diabetic retinopathy of both eyes, Hyperlipidemia, Hypertension, Insulin pump in place, Obesity, Poorly controlled type 1 diabetes mellitus with peripheral neuropathy (10/1/2012), Preseptal cellulitis of right eye (8/17/15), S/P CABG x 2 (2/14/2014), Sleep apnea, Squamous cell carcinoma (03/18/2013), Squamous cell carcinoma (07/26/2017), Squamous cell carcinoma (08/02/2018), Trouble in sleeping, Type 1 diabetes, uncontrolled, with retinopathy (10/1/2012), Type II or unspecified type diabetes mellitus without mention of complication, not stated as uncontrolled, and Vitreous hemorrhage (8/17/2014). The patient's chief complaint is diabetic foot ulcer, R big toe. He has had X-rays, mylene, and been evaluated by Carmina Dacosta NP. He applies medi honey q day w/ bandage. Reports the pain has recently increased and the toe has become red. He manages pain w/ PO Tylenol . This patient has documented high risk feet requiring routine maintenance secondary to diabetes mellitis and those secondary complications of diabetes, as mentioned..      5/29/19: pt reports for 1 week follow up hallux ulcer. States legs have become more swollen recently. His weight is up 7 lbs.             PCP: Olivia Zavala MD    Date Last Seen by PCP:   Chief Complaint   Patient presents with    Follow-up     wound care     Foot Ulcer     left foot great toe     Foot Swelling    Foot Pain         Hemoglobin A1C   Date Value Ref Range  Status   05/02/2019 6.5 (H) 4.0 - 5.6 % Final     Comment:     ADA Screening Guidelines:  5.7-6.4%  Consistent with prediabetes  >or=6.5%  Consistent with diabetes  High levels of fetal hemoglobin interfere with the HbA1C  assay. Heterozygous hemoglobin variants (HbS, HgC, etc)do  not significantly interfere with this assay.   However, presence of multiple variants may affect accuracy.     01/21/2019 5.8 (H) 4.0 - 5.6 % Final     Comment:     ADA Screening Guidelines:  5.7-6.4%  Consistent with prediabetes  >or=6.5%  Consistent with diabetes  High levels of fetal hemoglobin interfere with the HbA1C  assay. Heterozygous hemoglobin variants (HbS, HgC, etc)do  not significantly interfere with this assay.   However, presence of multiple variants may affect accuracy.     10/15/2018 6.7 (H) 4.0 - 5.6 % Final     Comment:     ADA Screening Guidelines:  5.7-6.4%  Consistent with prediabetes  >or=6.5%  Consistent with diabetes  High levels of fetal hemoglobin interfere with the HbA1C  assay. Heterozygous hemoglobin variants (HbS, HgC, etc)do  not significantly interfere with this assay.   However, presence of multiple variants may affect accuracy.         Review of Systems   Constitution: Negative for chills, decreased appetite and fever.   Cardiovascular: Positive for leg swelling (increased recently ).   Skin: Positive for poor wound healing. Negative for dry skin.   Musculoskeletal: Negative for arthritis, joint pain, joint swelling and myalgias.   Gastrointestinal: Negative for nausea and vomiting.   Neurological: Positive for numbness and paresthesias. Negative for loss of balance.         Patient Active Problem List   Diagnosis    CLL (chronic lymphocytic leukemia)    Diabetic polyneuropathy associated with type 1 diabetes mellitus    Controlled type 1 diabetes mellitus with diabetic neuropathy, with long-term current use of insulin    Coronary artery disease involving native coronary artery of native heart with  angina pectoris    Posterior vitreous detachment - Both Eyes    Hyperlipidemia    HTN (hypertension)    Dermatophytosis of nail    Pseudophakia    BPH with urinary obstruction    Erectile dysfunction    S/P CABG (coronary artery bypass graft)    Corns and callosities    Lumbar facet arthropathy    DDD (degenerative disc disease), lumbar    Obstructive sleep apnea    Peripheral vascular disease    Lumbar spondylosis    Spondylolisthesis    S/P lumbar fusion    Atherosclerosis of aorta    CKD (chronic kidney disease), stage III    Sacroiliitis    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    Stable angina    Nonexudative age-related macular degeneration, bilateral, intermediate dry stage    Carotid disease, bilateral    AK (actinic keratosis)    Anemia in neoplastic disease    Hyperkalemia    Ulcer of great toe, left, limited to breakdown of skin    Insulin pump status       Current Outpatient Medications on File Prior to Visit   Medication Sig Dispense Refill    ACCU-CHEK FASTCLIX LANCET DRUM Misc TEST 6 TIMES DAILY 550 each 3    alpha lipoic acid 600 mg Cap Take 1 capsule by mouth once daily.        ampicillin (PRINCIPEN) 500 MG capsule Take 1 capsule (500 mg total) by mouth 4 (four) times daily. for 10 days 40 capsule 0    aspirin (ECOTRIN) 81 MG EC tablet Take 1 tablet (81 mg total) by mouth once daily.  0    atorvastatin (LIPITOR) 80 MG tablet TAKE 1/2 TABLET NIGHTLY. (Patient taking differently: 40 mg. ) 45 tablet 3    BD INSULIN SYRINGE ULT-FINE II 0.3 mL 31 gauge x 5/16 Syrg       blood sugar diagnostic (ACCU-CHEK SHARON PLUS TEST STRP) Strp Check sugar a 5x a day. 450 strip 3    blood-glucose meter,continuous (DEXCOM G6 ) Misc Use as directed, change every 3 years 1 each 0    blood-glucose sensor (DEXCOM G6 SENSOR) Doris Change every 10 days. 3 Device 6    blood-glucose transmitter (DEXCOM G6 TRANSMITTER) Doris Change  every 3 months. 1 Device 1    clopidogrel (PLAVIX) 75 mg tablet Take 4 tablets x 1 followed by one tablet daily 90 tablet 4    clotrimazole-betamethasone 1-0.05% (LOTRISONE) cream APPLY EXTERNALLY TO THE AFFECTED AREA TWICE DAILY 15 g 0    docusate sodium (COLACE) 100 MG capsule Take 100 mg by mouth 2 (two) times daily.      doxycycline (MONODOX) 100 MG capsule Take 1 capsule (100 mg total) by mouth 2 (two) times daily. for 10 days 20 capsule 0    fluticasone (FLONASE) 50 mcg/actuation nasal spray 1 spray by Each Nare route as needed for Rhinitis.      glucagon, human recombinant, (GLUCAGON EMERGENCY KIT, HUMAN,) 1 mg SolR Inject 1 mg into the muscle as needed. 1 each 3    insulin aspart U-100 (NOVOLOG U-100 INSULIN ASPART) 100 unit/mL injection Uses insulin pump, max daily 100 units. 30 mL 11    insulin pump cartridge (OMNIPOD DASH INSULIN POD) Crtg Change every 3 days. 10 each 11    insulin pump controller (OMNIPOD DASH PDM KIT) Misc Use as directed 1 each 0    [id number]-algal oil (METANX/FOLTANX RF) 3 mg-35 mg-2 mg -90.314 mg Cap Take 1 tablet by mouth once daily. 90 capsule 5    lisinopril-hydrochlorothiazide (PRINZIDE,ZESTORETIC) 20-12.5 mg per tablet Take 1 tablet by mouth 2 (two) times daily. 180 tablet 3    metFORMIN (GLUCOPHAGE) 500 MG tablet TAKE 1 TABLET TWICE DAILY 180 tablet 2    metoprolol succinate (TOPROL-XL) 100 MG 24 hr tablet Take 1 tablet (100 mg total) by mouth every evening. 90 tablet 3    metoprolol succinate (TOPROL-XL) 50 MG 24 hr tablet Take 1 tablet (50 mg total) by mouth once daily. 30 tablet 11    multivitamin capsule Take 1 capsule by mouth once daily.        nitroGLYCERIN (NITROSTAT) 0.4 MG SL tablet Place 1 tablet (0.4 mg total) under the tongue every 5 (five) minutes as needed for Chest pain. May dispense a two pack of 25 tablets. 100 tablet 3    vit A/vit C/vit E/zinc/copper (PRESERVISION AREDS ORAL) Take 1 tablet by mouth 2 (two) times daily.       Current  Facility-Administered Medications on File Prior to Visit   Medication Dose Route Frequency Provider Last Rate Last Dose    nitroGLYCERIN 0.4 MG/DOSE TL SPRY 400 mcg/spray spray 1 spray  1 spray Sublingual Q5 Min PRN Marcos Alexander MD   1 spray at 01/11/19 8795       Review of patient's allergies indicates:  No Known Allergies    Past Surgical History:   Procedure Laterality Date    ANGIOGRAM, CORONARY ARTERY N/A 1/29/2019    Performed by Saturnino Ny [facility] at [facility] CATH LAB    APPENDECTOMY  1951    BLOCK-NERVE-MEDIAL BRANCH-LUMBAR Bilateral 3/10/2015    Performed by Juanita [facility]els [facility] at [facility] PAIN MGT    BLOCK-NERVE-MEDIAL BRANCH-LUMBAR Bilateral 11/25/2014    [facility] at [facility] MGT    CATARACT EXTRACTION  4/8/13    right eye (toric)    CATARACT EXTRACTION  4/29/13    left eye (toric)    COLONOSCOPY N/A 4/25/2017    Performed by CLARISSA Freeman MD at [facility] ENDO (4TH FLR)    COLONOSCOPY N/A 4/25/2014    Performed by CLARISSA Freeman [facility] at [facility] ENDO (4TH FLR)    CORONARY ARTERY BYPASS GRAFT  1994    x 3    EYE SURGERY      cataracts, both eyes    FINGER TENDON REPAIR  2011    left hand    INJECTION-FACET Bilateral 3/31/2015    Performed by [facility]les [facility] at [facility] MGT    INJECTION-STEROID-EPIDURAL-LUMBAR N/A 10/28/2014    Performed by [facility] at [facility] MGT    INSERTION, IOL PROSTHESIS Left 4/29/2013    Performed by Soco Sandoval [facility] at [facility] OR    INSERTION, IOL PROSTHESIS Right 4/8/2013    Performed by Soco [facility]oval [facility] at [facility] OR    INSERTION, STENT, CORONARY ARTERY N/A 1/29/2019    Performed by Saturnino Ny [facility] at [facility] CATH LAB    MINIMALLY INVASIVE FUSION-TRANSLUMINAL LUMBAR INTERBODY (TLIF) Left 5/18/2015    Performed by Tee Pinedo MD at [facility] OR 2ND FLR    PHACOEMULSIFICATION, CATARACT Left 4/29/2013    Performed by Soco Sandoval [facility] at [facility] OR    PHACOEMULSIFICATION, CATARACT Right 4/8/2013    Performed by Soco MCADAMS  MD Lori at [facility] OR    RADIOFREQUENCY THERMOCOAGULATION (RFTC)-NERVE-MEDIAN BRANCH-LUMBAR Left 1/13/2015    Performed by Juanita Myles MD at [facility] PAIN T    RADIOFREQUENCY THERMOCOAGULATION (RFTC)-NERVE-MEDIAN BRANCH-LUMBAR Right 12/30/2014    [facility]    SKIN BIOPSY  2013    multiple    SPINE SURGERY  2015    TLIF    TONSILLECTOMY  1947       Family History   Problem Relation Age of Onset    Breast cancer Mother     Cancer Mother         ? lung cancer    Alzheimer's disease Father     Dementia Father     Stroke Father     Colon cancer Sister     Cancer Sister 60        colon    Acute myelogenous leukemia Sister     Diabetes Maternal Grandfather     Diabetes Paternal Aunt     Diabetes Paternal Uncle     Melanoma Neg Hx     Psoriasis Neg Hx     Lupus Neg Hx     Eczema Neg Hx     Acne Neg Hx        Social History     Socioeconomic History    Marital status:      Spouse name: Not on file    Number of children: Not on file    Years of education: Not on file    Highest education level: Not on file   Occupational History    Occupation: Sales     Employer: [employer]    Social Needs    Financial resource strain: Not on file    Food insecurity:     Worry: Not on file     Inability: Not on file    Transportation needs:     Medical: Not on file     Non-medical: Not on file   Tobacco Use    Smoking status: Never Smoker    Smokeless tobacco: Never Used   Substance and Sexual Activity    Alcohol use: Yes     Alcohol/week: 0.6 oz     Types: 1 Glasses of wine per week     Comment: social once monthly    Drug use: No    Sexual activity: Not Currently     Partners: Female   Lifestyle    Physical activity:     Days per week: Not on file     Minutes per session: Not on file    Stress: Not on file   Relationships    Social connections:     Talks on phone: Not on file     Gets together: Not on file     Attends Confucianist service: Not on file  "    Active member of club or organization: Not on file     Attends meetings of clubs or organizations: Not on file     Relationship status: Not on file   Other Topics Concern    Not on file   Social History Narrative    Not on file               Objective:       Vitals:    05/29/19 0848   BP: 133/72   Pulse: 63   Resp: 18   Temp: 98.3 °F (36.8 °C)   TempSrc: Oral   Weight: 91.6 kg (202 lb)   Height: 5' 7" (1.702 m)   PainSc:   7   PainLoc: Toe        Physical Exam   Constitutional: He is oriented to person, place, and time. He appears well-developed and well-nourished.   Cardiovascular:   Dorsalis pedis and posterior tibial pulses are diminished Bilaterally. Toes are cool to touch. Feet are warm proximally.There is decreased digital hair. Skin is atrophic, slightly hyperpigmented, and mildly edematous       Musculoskeletal: Normal range of motion. He exhibits no edema or tenderness.   Adequate joint range of motion without pain, limitation, nor crepitation Bilateral feet and ankle joints. Muscle strength is 5/5 in all groups bilaterally.         Neurological: He is alert and oriented to person, place, and time.   [place]ud 5.07 monofilamant testing is diminished Rolly feet. Sharp/dull sensation diminished Bilaterally. Light touch absent Bilaterally.       Skin: Skin is warm and dry. Lesion noted. No ecchymosis noted. There is erythema.   Ulcer location: medial HIPJ R   Measurements : 1.7x1.3x0.1 cm   Signs of infection: yes- pain, surrounding redness  Pus:no  Erythema: yes bj wound, dorsal hallux   Undermining:no  Tunneling: no  Drainage: scant serous  Periwound skin:intact    Wound Base: necrotic      Psychiatric: He has a normal mood and affect. His behavior is normal.                         Assessment:       Encounter Diagnoses   Name Primary?    Peripheral vascular disease Yes    Diabetic polyneuropathy associated with type 1 diabetes mellitus     Skin ulcer of toe of left foot, limited to breakdown " of skin     Cellulitis of left lower extremity     Leg swelling          Plan:       Reid was seen today for follow-up, foot ulcer, foot swelling and foot pain.    Diagnoses and all orders for this visit:    Peripheral vascular disease    Diabetic polyneuropathy associated with type 1 diabetes mellitus    Skin ulcer of toe of left foot, limited to breakdown of skin    Cellulitis of left lower extremity    Leg swelling      I counseled the patient on his conditions, their implications and medical management.    Patient has failed to improve w/ PO abx ( Ampicillin )   Has worsened leg swelling and pain   The forefoot is cool to touch , likely will need vasc sx / icards eval    Plan for direct admit   IV abx ( vanco as per cx results )   eval for etiology of edema. ? Volume overload vs infection . Pt reports weight is up by 7 lbs   Betadine w/ dsd applied  Will need MRI upin admit  Please consult podiatry

## 2019-05-29 NOTE — HPI
Reid Herman is a 79M direct admit from podiatry clinic for a left hallux abcess. Patient has a PMH HTN, Hyperlipidemia, DM-1(insulin pump), CAD s/p CABG x 2 , MINDA compliant with CPAP, CLL not currently on any therapy, PAD who has been dealing with a left foot abcess for the last month that has been getting worse. Cultures from wound have grown enterococcus that  sensitive to amp and Vanc.  Wound has worsened despite appropriate PO ABx (ampicillin). Additionally he has described that he has been having problems with SOB that he has been needing to increase the incline of his bed for. He also noticed worsening bilateral lower edema and he has gained 7 lbs in the last month. Patient states that currently he does not feel SOB ( since it only  occurs with being flat or exertion), no CP, no palpitations, no fever, chills. He states that he can feel his bilateral feet but has decreased. Usually he has been mobile but with this wound and his boot he has been less mobile. No history of DVT but he has a history of CLL.    Initial workup in the floor shows elevated BNP in the 2k, WBC > 50 ( he has a baseline around 53. CRP elevated, and hyponatremic to 127.

## 2019-05-29 NOTE — SUBJECTIVE & OBJECTIVE
Past Medical History:   Diagnosis Date    Anemia     Back pain     Basal cell carcinoma 06/08/2015    left nasal ala    CAD (coronary artery disease) 10/1/2012    Cataract     DDD (degenerative disc disease), lumbar 10/28/2014    Diabetes mellitus     Diabetes mellitus type I     Diabetic retinopathy of both eyes     Hyperlipidemia     Hypertension     Insulin pump in place     Obesity     Poorly controlled type 1 diabetes mellitus with peripheral neuropathy 10/1/2012    Preseptal cellulitis of right eye 8/17/15    S/P CABG x 2 2/14/2014    1994     Sleep apnea     Squamous cell carcinoma 03/18/2013    right posterior ear    Squamous cell carcinoma 07/26/2017    scalp    Squamous cell carcinoma 08/02/2018    Right Scalp/MOHS    Trouble in sleeping     Type 1 diabetes, uncontrolled, with retinopathy 10/1/2012    Type II or unspecified type diabetes mellitus without mention of complication, not stated as uncontrolled     Vitreous hemorrhage 8/17/2014       Past Surgical History:   Procedure Laterality Date    ANGIOGRAM, CORONARY ARTERY N/A 1/29/2019    Performed by Saturnino Ny [facility] at [facility] CATH LAB    APPENDECTOMY  1951    BLOCK-NERVE-MEDIAL BRANCH-LUMBAR Bilateral 3/10/2015    Performed by Juanita Myles MD at [facility] [place] [facility] MGT    BLOCK-NERVE-MEDIAL BRANCH-LUMBAR Bilateral 11/25/2014    [facility]    CATARACT EXTRACTION  4/8/13    right eye (toric)    CATARACT EXTRACTION  4/29/13    left eye (toric)    COLONOSCOPY N/A 4/25/2017    Performed by CLARISSA Freeman MD at [facility] ENDO (4TH FLR)    COLONOSCOPY N/A 4/25/2014    Performed by CLARISSA Freeman MD at [facility] ENDO (4TH FLR)    CORONARY ARTERY BYPASS GRAFT  1994    x 3    EYE SURGERY      cataracts, both eyes    FINGER TENDON REPAIR  2011    left hand    INJECTION-FACET Bilateral 3/31/2015    Performed by Juanita Myles MD at [facility] [place] [facility] MGT    INJECTION-STEROID-EPIDURAL-LUMBAR N/A  10/28/2014    Performed by Juanita [facility]les MD at [facility] PAIN MGT    INSERTION, IOL PROSTHESIS Left 4/29/2013    Performed by Soco Sandoval MD at [facility] OR    INSERTION, IOL PROSTHESIS Right 4/8/2013    Performed by Soco Sandoval MD at [facility] OR    INSERTION, STENT, CORONARY ARTERY N/A 1/29/2019    Performed by [facility]ino [facility] MD at [facility] CATH LAB    MINIMALLY INVASIVE FUSION-TRANSLUMINAL LUMBAR INTERBODY (TLIF) Left 5/18/2015    Performed by Tee Pinedo MD at [facility] OR 2ND FLR    PHACOEMULSIFICATION, CATARACT Left 4/29/2013    Performed by Soco Sandoval MD at [facility] OR    PHACOEMULSIFICATION, CATARACT Right 4/8/2013    Performed by Soco Sandoval MD at [facility] OR    RADIOFREQUENCY THERMOCOAGULATION (RFTC)-NERVE-MEDIAN BRANCH-LUMBAR Left 1/13/2015    [facility]    RADIOFREQUENCY THERMOCOAGULATION (RFTC)-NERVE-MEDIAN BRANCH-LUMBAR Right 12/30/2014    [facility]    SKIN BIOPSY  2013    multiple    SPINE SURGERY  2015    TLIF    TONSILLECTOMY  1945       Review of patient's allergies indicates:  No Known Allergies    No current facility-administered medications on file prior to encounter.      Current Outpatient Medications on File Prior to Encounter   Medication Sig    ACCU-CHEK FASTCLIX LANCET DRUM Misc TEST 6 TIMES DAILY    alpha lipoic acid 600 mg Cap Take 1 capsule by mouth once daily.      ampicillin (PRINCIPEN) 500 MG capsule Take 1 capsule (500 mg total) by mouth 4 (four) times daily. for 10 days    aspirin (ECOTRIN) 81 MG EC tablet Take 1 tablet (81 mg total) by mouth once daily.    atorvastatin (LIPITOR) 80 MG tablet TAKE 1/2 TABLET NIGHTLY. (Patient taking differently: 40 mg. )    BD INSULIN SYRINGE ULT-FINE II 0.3 mL 31 gauge x 5/16 Syrg     blood sugar diagnostic (ACCU-CHEK SHARON PLUS TEST STRP) Strp Check sugar a 5x a day.    blood-glucose meter,continuous (DEXCOM G6 ) Misc Use as directed, change  every 3 years    blood-glucose sensor (DEXCOM G6 SENSOR) Doris Change every 10 days.    blood-glucose transmitter (DEXCOM G6 TRANSMITTER) Doris Change every 3 months.    clopidogrel (PLAVIX) 75 mg tablet Take 4 tablets x 1 followed by one tablet daily    lisinopril-hydrochlorothiazide (PRINZIDE,ZESTORETIC) 20-12.5 mg per tablet Take 1 tablet by mouth 2 (two) times daily.    metFORMIN (GLUCOPHAGE) 500 MG tablet TAKE 1 TABLET TWICE DAILY    metoprolol succinate (TOPROL-XL) 100 MG 24 hr tablet Take 1 tablet (100 mg total) by mouth every evening.    metoprolol succinate (TOPROL-XL) 50 MG 24 hr tablet Take 1 tablet (50 mg total) by mouth once daily.    clotrimazole-betamethasone 1-0.05% (LOTRISONE) cream APPLY EXTERNALLY TO THE AFFECTED AREA TWICE DAILY    docusate sodium (COLACE) 100 MG capsule Take 100 mg by mouth 2 (two) times daily.    doxycycline (MONODOX) 100 MG capsule Take 1 capsule (100 mg total) by mouth 2 (two) times daily. for 10 days    fluticasone (FLONASE) 50 mcg/actuation nasal spray 1 spray by Each Nare route as needed for Rhinitis.    glucagon, human recombinant, (GLUCAGON EMERGENCY KIT, HUMAN,) 1 mg SolR Inject 1 mg into the muscle as needed.    insulin aspart U-100 (NOVOLOG U-100 INSULIN ASPART) 100 unit/mL injection Uses insulin pump, max daily 100 units.    insulin pump cartridge (OMNIPOD DASH INSULIN POD) Crtg Change every 3 days.    insulin pump controller (OMNIPOD DASH PDM KIT) Misc Use as directed    vhucxxavm-V4-xcQ29-algal oil (METANX/FOLTANX RF) 3 mg-35 mg-2 mg -90.314 mg Cap Take 1 tablet by mouth once daily.    multivitamin capsule Take 1 capsule by mouth once daily.      nitroGLYCERIN (NITROSTAT) 0.4 MG SL tablet Place 1 tablet (0.4 mg total) under the tongue every 5 (five) minutes as needed for Chest pain. May dispense a two pack of 25 tablets.    vit A/vit C/vit E/zinc/copper (PRESERVISION AREDS ORAL) Take 1 tablet by mouth 2 (two) times daily.     Family History      Problem Relation (Age of Onset)    Acute myelogenous leukemia Sister    Alzheimer's disease Father    Breast cancer Mother    Cancer Mother, Sister (60)    Colon cancer Sister    Dementia Father    Diabetes Maternal Grandfather, Paternal Aunt, Paternal Uncle    Stroke Father        Tobacco Use    Smoking status: Never Smoker    Smokeless tobacco: Never Used   Substance and Sexual Activity    Alcohol use: Yes     Alcohol/week: 0.6 oz     Types: 1 Glasses of wine per week     Comment: social once monthly    Drug use: No    Sexual activity: Not Currently     Partners: Female     Review of Systems   Constitutional: Positive for activity change and unexpected weight change (up 7 lbs ). Negative for appetite change, fatigue and fever.   HENT: Negative for congestion.    Eyes: Negative for discharge.   Respiratory: Positive for shortness of breath. Negative for wheezing.    Cardiovascular: Positive for leg swelling. Negative for chest pain.   Gastrointestinal: Negative for abdominal distention and nausea.   Endocrine: Negative for cold intolerance, heat intolerance, polydipsia, polyphagia and polyuria.   Genitourinary: Positive for decreased urine volume. Negative for difficulty urinating.   Musculoskeletal: Negative for arthralgias and back pain.   Skin: Positive for color change and rash.   Neurological: Negative for dizziness, tremors and numbness.   Psychiatric/Behavioral: Negative for agitation.     Objective:     Vital Signs (Most Recent):  Temp: 97.3 °F (36.3 °C) (05/29/19 1735)  Pulse: 71 (05/29/19 1735)  Resp: 18 (05/29/19 1735)  BP: (!) 146/71 (05/29/19 1735)  SpO2: 98 % (05/29/19 1735) Vital Signs (24h Range):  Temp:  [97.3 °F (36.3 °C)-98.3 °F (36.8 °C)] 97.3 °F (36.3 °C)  Pulse:  [63-71] 71  Resp:  [18] 18  SpO2:  [98 %] 98 %  BP: (133-146)/(71-72) 146/71     Weight: 97.8 kg (215 lb 9.8 oz)  Body mass index is 33.77 kg/m².    Physical Exam   Constitutional: He appears well-developed and well-nourished. No  distress.   HENT:   Head: Normocephalic and atraumatic.   Eyes: Pupils are equal, round, and reactive to light. EOM are normal.   Neck: Normal range of motion. Neck supple. JVD present.   Cardiovascular: Normal rate, regular rhythm and normal heart sounds.   Pulmonary/Chest: Effort normal and breath sounds normal. No respiratory distress.   Abdominal: Soft. Bowel sounds are normal. He exhibits distension. There is no tenderness.   Musculoskeletal: Normal range of motion. He exhibits edema, tenderness and deformity.   Erythema in the left foot as pictures below/ Ulcer present that is cleaned , pain around the ulcer noted.  Dorsalis pedis and posterior tibial pulses are diminished Bilaterally. Toes are cool to touch. Feet are warm proximally. + edema in the left foot and 2+ edema in the right foot        Skin: He is not diaphoretic.         CRANIAL NERVES     CN III, IV, VI   Pupils are equal, round, and reactive to light.  Extraocular motions are normal.        Significant Labs:   CBC:   Recent Labs   Lab 05/29/19  1756   WBC 56.50*   HGB 11.5*   HCT 35.3*        CMP:   Recent Labs   Lab 05/29/19  1756   *   K 5.5*   CL 94*   CO2 23   *   BUN 28*   CREATININE 1.3   CALCIUM 9.8   PROT 6.4   ALBUMIN 3.8   BILITOT 0.6   ALKPHOS 108   AST 26   ALT 38   ANIONGAP 10   EGFRNONAA 52.6*       Significant Imaging: I have reviewed all pertinent imaging results/findings within the past 24 hours.  I have reviewed and interpreted all pertinent imaging results/findings within the past 24 hours.

## 2019-05-29 NOTE — Clinical Note
Catheter is inserted into the right coronary artery. Angiography performed of the right coronary arteries in multiple views.

## 2019-05-30 PROBLEM — L97.529 DIABETIC ULCER OF LEFT GREAT TOE: Status: ACTIVE | Noted: 2019-01-01

## 2019-05-30 PROBLEM — S91.109A OPEN TOE WOUND: Status: ACTIVE | Noted: 2019-01-01

## 2019-05-30 PROBLEM — E11.621 DIABETIC ULCER OF LEFT GREAT TOE: Status: ACTIVE | Noted: 2019-01-01

## 2019-05-30 NOTE — CONSULTS
[facility]  Infectious Disease  Consult Note    Inpatient consult to Infectious Diseases  Consult performed by: TANIA Buck  Consult ordered by: Shanna To MD        ID consult received  Full consult note to follow

## 2019-05-30 NOTE — PT/OT/SLP EVAL
"Occupational Therapy   Evaluation and Discharge Note    Name: Reid Herman  MRN: [id number]  Admitting Diagnosis:  Cellulitis of foot      Recommendations:     Discharge Recommendations: home  Discharge Equipment Recommendations:  none  Barriers to discharge:  None    Assessment:     Reid Herman is a 79 y.o. male with a medical diagnosis of Cellulitis of foot. At this time, patient is functioning at their prior level of function and does not require further acute OT services.     Plan:     During this hospitalization, patient does not require further acute OT services.  Please re-consult if situation changes.    · Plan of Care Reviewed with: patient    Subjective     Chief Complaint: none  Patient/Family Comments/goals: to have foot get better    Occupational Profile:  Living Environment & PLOF: Pt resides with spouse in raised house with 12-13 steps with 2 rails to enter.  Pt was independent with all ADL's and ambulation.  Pt has both a walk-in shower & bathtub for bathing.  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  Equipment Used at home:  (HuriCane, rollator, shower chair)    Pain/Comfort:  · Pain Rating 1: 0/10  · Pain Rating Post-Intervention 1: 0/10      Objective:     Communicated with: RN prior to session.  Patient found supine with telemetry upon OT entry to room.    General Precautions: Standard, fall   Orthopedic Precautions:    Braces:       Occupational Performance:    Bed Mobility:    · Patient completed Supine to Sit with independence    Functional Mobility/Transfers:  · Patient completed Sit <> Stand Transfer with independence  with  no assistive device   · Patient completed Bed <> Chair Transfer using Stand Pivot technique with independence with no assistive device    Activities of Daily Living:  · Upper Body Dressing: modified independence standing  · Lower Body Dressing: modified independence " donning sock to RLE seated EOB    Cognitive/Visual Perceptual:  Cognitive/Psychosocial Skills:     -       Oriented to: Person, Place, Time and Situation   -       Follows Commands/attention:Follows multistep  commands  -       Communication: clear/fluent  -       Memory: No Deficits noted  -       Safety awareness/insight to disability: intact   -       Mood/Affect/Coping skills/emotional control: Appropriate to situation    Physical Exam:  Postural examination/scapula alignment:    -       Rounded shoulders  Sensation:    -       Intact  Dominant hand:    -       right  Upper Extremity Range of Motion:     -       Right Upper Extremity: WFL  -       Left Upper Extremity: WFL  Upper Extremity Strength:    -       Right Upper Extremity: WFL  -       Left Upper Extremity: WFL   Strength:    -       Right Upper Extremity: WFL  -       Left Upper Extremity: WFL    AMPAC 6 Click ADL:  AMPAC Total Score: 24    Treatment & Education:  Provided education regarding role of OT, POC, & discharge recommendations with pt verbalizing understanding.  Pt had no further questions & when asked whether there were any concerns pt reported none.      Education:    Patient left up in chair with all lines intact, call button in reach, RN notified and white board updated.    GOALS:   Multidisciplinary Problems     Occupational Therapy Goals     Not on file          Multidisciplinary Problems (Resolved)        Problem: Occupational Therapy Goal    Goal Priority Disciplines Outcome Interventions   Occupational Therapy Goal   (Resolved)     OT, PT/OT Outcome(s) achieved                    History:     Past Medical History:   Diagnosis Date    Anemia     Back pain     Basal cell carcinoma 06/08/2015    left nasal ala    CAD (coronary artery disease) 10/1/2012    Cataract     DDD (degenerative disc disease), lumbar 10/28/2014    Diabetes mellitus     Diabetes mellitus type I     Diabetic retinopathy of both eyes      Hyperlipidemia     Hypertension     Insulin pump in place     Obesity     Poorly controlled type 1 diabetes mellitus with peripheral neuropathy 10/1/2012    Preseptal cellulitis of right eye 8/17/15    S/P CABG x 2 2/14/2014    1994     Sleep apnea     Squamous cell carcinoma 03/18/2013    right posterior ear    Squamous cell carcinoma 07/26/2017    scalp    Squamous cell carcinoma 08/02/2018    Right Scalp/MOHS    Trouble in sleeping     Type 1 diabetes, uncontrolled, with retinopathy 10/1/2012    Type II or unspecified type diabetes mellitus without mention of complication, not stated as uncontrolled     Vitreous hemorrhage 8/17/2014       Past Surgical History:   Procedure Laterality Date    ANGIOGRAM, CORONARY ARTERY N/A 1/29/2019    Performed by Saturnino Ny MD at [facility] CATH LAB    APPENDECTOMY  1951    BLOCK-NERVE-MEDIAL BRANCH-LUMBAR Bilateral 3/10/2015    [facility] by [facility] at [facility] PAIN MGT    BLOCK-NERVE-MEDIAL BRANCH-LUMBAR Bilateral 11/25/2014    Performed by Juanita [facility]les MD at [facility] PAIN MGT    CATARACT EXTRACTION  4/8/13    right eye (toric)    CATARACT EXTRACTION  4/29/13    left eye (toric)    COLONOSCOPY N/A 4/25/2017    Performed by CLARISSA Freeman MD at [facility] ENDO (4TH FLR)    COLONOSCOPY N/A 4/25/2014    Performed by CLARISSA Freeman MD at [facility] ENDO (4TH FLR)    CORONARY ARTERY BYPASS GRAFT  1994    x 3    EYE SURGERY      cataracts, both eyes    FINGER TENDON REPAIR  2011    left hand    INJECTION-FACET Bilateral 3/31/2015    Performed by [facility] at [facility] PAIN MGT    INJECTION-STEROID-EPIDURAL-LUMBAR N/A 10/28/2014    Performed by [facility]les MD at [facility] PAIN MGT    INSERTION, IOL PROSTHESIS Left 4/29/2013    Performed by Soco Sandoval MD at [facility] OR    INSERTION, IOL PROSTHESIS Right 4/8/2013    Performed by Soco [facility]oval [facility] at [facility] OR    INSERTION, STENT, CORONARY ARTERY N/A 1/29/2019    Performed by Saturnino SARAH  MD [facility] at [facility] CATH LAB    MINIMALLY INVASIVE FUSION-TRANSLUMINAL LUMBAR INTERBODY (TLIF) Left 5/18/2015    Performed by Tee [facility]inedo [facility] at [facility] OR 2ND FLR    PHACOEMULSIFICATION, CATARACT Left 4/29/2013    [facility] by [facility] at [facility] OR    PHACOEMULSIFICATION, CATARACT Right 4/8/2013    Performed by [facility] at [facility] OR    RADIOFREQUENCY THERMOCOAGULATION (RFTC)-NERVE-MEDIAN BRANCH-LUMBAR Left 1/13/2015    [facility] at [facility]    RADIOFREQUENCY THERMOCOAGULATION (RFTC)-NERVE-MEDIAN BRANCH-LUMBAR Right 12/30/2014    [facility] at [facility]    SKIN BIOPSY  2013    multiple    SPINE SURGERY  2015    TLIF    TONSILLECTOMY  1945       Time Tracking:     OT Date of Treatment: 05/30/19  OT Start Time: 1032  OT Stop Time: 1045  OT Total Time (min): 13 min    Billable Minutes:Evaluation 13    COURTNEY Lucas  5/30/2019

## 2019-05-30 NOTE — ASSESSMENT & PLAN NOTE
Superifical wound that appears improved with elevation. When LE placed in dependent position, pigment worsens.  - Wound was painted with betadine today.   - Recommend local wound care: Daily santyl and adhesive bandage.  - MRI with reactive vs early OM: Debridement/amputation vs Biopsy and IV antibiotics options presented to patient today.  - Will await ICARDs evaluation.  - Antibiotics per ID recs. Appreciate recs.  - Pt can WBAT in surgical shoe.  - Podiatry will follow.

## 2019-05-30 NOTE — HOSPITAL COURSE
Admitted to medicine for cellulitis and CHF exacerbation. Ulcer seen by podiatry, vascular and ID.  Patient found to have elevated tropoinins and EKG changes consistent with recent MI.  Cardiology consulted, along with Interventional cardiology.  Recommendation to treat medically with heparin ggt for 48hrs, lisinopril 10mg, statin, plavix, . Angiogram performed by vascular surgery 6/3, underwent foot bone biopsy on 6/4 and cultures sent. Pt began experiencing acute chest pain later in the day. Repeat troponin acutely elevated at 14. Pt taken for left heart cath and had 1 stent placed 6/6. Following his procedure he remained in a-fib w/ RVR and taken for cardioversion.

## 2019-05-30 NOTE — ASSESSMENT & PLAN NOTE
BG goal 140-180    Increase Levemir to 18 units q HS - 20% reduction of home basal dose  Start Novolog ICR 1:10  Low dose correction scale  BG monitoring AC/HS    Patient uses insulin pump at home, expressing desire to remain on pump while in-patient.  Discussed with patient at bedside the impact of infection and IV antibiotics on BG readings.  Patient's wife to bring pump and supplies from home, will continue MDI for now and re-evaluate if patient should put pump back on tomorrow    ** Please call Endocrine for any BG related issues **    Discharge planning: TBD

## 2019-05-30 NOTE — ASSESSMENT & PLAN NOTE
- last LEONIE was march with mild PAD , will repeat here given that he is having complicated ulcer healing   - continue ASA, statin, and plavix  - consider vascular consult if patient with worsening PAD disease

## 2019-05-30 NOTE — ASSESSMENT & PLAN NOTE
Infection may elevate BG readings  ID following - on IV antibiotics  Optimize BG control for wound healing

## 2019-05-30 NOTE — PLAN OF CARE
Problem: Physical Therapy Goal  Goal: Physical Therapy Goal  Goals to be met by: 2019     Patient will increase functional independence with mobility by performin. Gait  x 150 feet with Supervision using Single-point Cane .   2. Ascend/descend 13 stair with bilateral Handrails Supervision    Outcome: Ongoing (interventions implemented as appropriate)  Eval completed and POC established    Rajendra Rivera PT,DPT  2019

## 2019-05-30 NOTE — CONSULTS
[facility]  Cardiology  Consult Note    Patient Name: Reid Herman  MRN: [id number]  Admission Date: 5/29/2019  Hospital Length of Stay: 1 days  Code Status: Full Code   Attending Provider: Elis Gutierrez MD   Consulting Provider: Cathy Uribe MD  Primary Care Physician: Olivia Zavala MD  Principal Problem:Cellulitis of foot    Patient information was obtained from patient, past medical records and ER records.     Inpatient consult to Cardiology  Consult performed by: Cathy Uribe MD  Consult ordered by: Shraddha Call MD  Reason for consult: Elevated troponin, new onset HF        Subjective:     Chief Complaint:  Shortness of breath     HPI:   80 yo M w/ PMH significant for CAD s/p CABG x2 (SVG-LAD, LIMA-D1, 1994), NSTEMI s/p DUC to distal, mid LCx (1/2019), PAD, CKD, CLL (not currently on therapy), HTN, HLD, and MINDA who presents w/ worsening L great toe wound that has failed outpatient therapy. Reports noticing worsening L foot pain, discomfort. Recently completed multiple course of antibiotics however w/ minimal improvement. Podiatry consulted w/ imaging concerning osteomyelitis. Pending bone biopsy. Patient denies recent anginal symptoms. Prior to recent NSTEMI, patient reported sharp, substernal chest pain and SOB/DIOR requiring regular doses of nitroglycerin. Current chest pain is described as a deep burning that he feels both substernal and into the epigastrium. Denies radiation of pain. Also reports recent weight gain (approx. 10 lbs), worsening peripheral edema, and orthopnea. During his hospitalization, he was noted to have an abnormal EKG w/ ST depressions. Troponin was elevated to 1.16, down trended to 0.89. BNP 2608. No prior ECHO. Cardiology consulted for EKG changes concerning ischemia and elevated troponin.      Past Medical History:   Diagnosis Date    Anemia     Back pain     Basal cell carcinoma 06/08/2015    left nasal ala    CAD (coronary artery  disease) 10/1/2012    Cataract     DDD (degenerative disc disease), lumbar 10/28/2014    Diabetes mellitus     Diabetes mellitus type I     Diabetic retinopathy of both eyes     Hyperlipidemia     Hypertension     Insulin pump in place     Obesity     Poorly controlled type 1 diabetes mellitus with peripheral neuropathy 10/1/2012    Preseptal cellulitis of right eye 8/17/15    S/P CABG x 2 2/14/2014    1994     Sleep apnea     Squamous cell carcinoma 03/18/2013    right posterior ear    Squamous cell carcinoma 07/26/2017    scalp    Squamous cell carcinoma 08/02/2018    Right Scalp/MOHS    Trouble in sleeping     Type 1 diabetes, uncontrolled, with retinopathy 10/1/2012    Type II or unspecified type diabetes mellitus without mention of complication, not stated as uncontrolled     Vitreous hemorrhage 8/17/2014       Past Surgical History:   Procedure Laterality Date    ANGIOGRAM, CORONARY ARTERY N/A 1/29/2019    Performed by Saturnino [facility] at [facility] CATH LAB    APPENDECTOMY  1953    BLOCK-NERVE-MEDIAL BRANCH-LUMBAR Bilateral 3/10/2015    [facility]    BLOCK-NERVE-MEDIAL BRANCH-LUMBAR Bilateral 11/25/2014    [facility]    CATARACT EXTRACTION  4/8/13    right eye (toric)    CATARACT EXTRACTION  4/29/13    left eye (toric)    COLONOSCOPY N/A 4/25/2017    Performed by CLARISSA Freeman [facility] at [facility] ENDO (4TH FLR)    COLONOSCOPY N/A 4/25/2014    Performed by CLARISSA Freeman [facility] at [facility] ENDO (4TH FLR)    CORONARY ARTERY BYPASS GRAFT  1994    x 3    EYE SURGERY      cataracts, both eyes    FINGER TENDON REPAIR  2011    left hand    INJECTION-FACET Bilateral 3/31/2015    [facility]formed [facility] MGT    INJECTION-STEROID-EPIDURAL-LUMBAR N/A 10/28/2014    [facility] by [facility] MGT    INSERTION, IOL PROSTHESIS Left 4/29/2013    Per[facility] Soco [facility]oval [facility]  at [facility] OR    INSERTION, IOL PROSTHESIS Right 4/8/2013    [facility] Soco [facility]oval [facility] at [facility] OR    INSERTION, STENT, CORONARY ARTERY N/A 1/29/2019    Performed by Saturn[facility] at [facility] CATH LAB    MINIMALLY INVASIVE FUSION-TRANSLUMINAL LUMBAR INTERBODY (TLIF) Left 5/18/2015    Performed by Tee Pinedo MD at [facility] OR 2ND FLR    PHACOEMULSIFICATION, CATARACT Left 4/29/2013    Performed by Soco [facility]oval [facility] at [facility] OR    PHACOEMULSIFICATION, CATARACT Right 4/8/2013    Performed by Soco [facility]oval [facility] at [facility]    RADIOFREQUENCY THERMOCOAGULATION (RFTC)-NERVE-MEDIAN BRANCH-LUMBAR Left 1/13/2015    Performed by Juanita Myles [facility] at [facility]    RADIOFREQUENCY THERMOCOAGULATION (RFTC)-NERVE-MEDIAN BRANCH-LUMBAR Right 12/30/2014    Performed by [facility] at [facility]    SKIN BIOPSY  2013    multiple    SPINE SURGERY  2015    TLIF    TONSILLECTOMY  1947       Review of patient's allergies indicates:  No Known Allergies    No current facility-administered medications on file prior to encounter.      Current Outpatient Medications on File Prior to Encounter   Medication Sig    ACCU-CHEK FASTCLIX LANCET DRUM Misc TEST 6 TIMES DAILY    alpha lipoic acid 600 mg Cap Take 1 capsule by mouth once daily.      ampicillin (PRINCIPEN) 500 MG capsule Take 1 capsule (500 mg total) by mouth 4 (four) times daily. for 10 days    aspirin (ECOTRIN) 81 MG EC tablet Take 1 tablet (81 mg total) by mouth once daily.    atorvastatin (LIPITOR) 80 MG tablet TAKE 1/2 TABLET NIGHTLY. (Patient taking differently: 40 mg. )    BD INSULIN SYRINGE ULT-FINE II 0.3 mL 31 gauge x 5/16 Syrg     blood sugar diagnostic (ACCU-CHEK SHARON PLUS TEST STRP) Strp Check sugar a 5x a day.    blood-glucose meter,continuous (DEXCOM G6 ) Misc Use as directed, change every 3 years    blood-glucose sensor (DEXCOM G6 SENSOR) Doris Change every 10 days.    blood-glucose transmitter (DEXCOM G6 TRANSMITTER) Doris  Change every 3 months.    clopidogrel (PLAVIX) 75 mg tablet Take 4 tablets x 1 followed by one tablet daily    lisinopril-hydrochlorothiazide (PRINZIDE,ZESTORETIC) 20-12.5 mg per tablet Take 1 tablet by mouth 2 (two) times daily.    metFORMIN (GLUCOPHAGE) 500 MG tablet TAKE 1 TABLET TWICE DAILY    metoprolol succinate (TOPROL-XL) 100 MG 24 hr tablet Take 1 tablet (100 mg total) by mouth every evening.    metoprolol succinate (TOPROL-XL) 50 MG 24 hr tablet Take 1 tablet (50 mg total) by mouth once daily.    clotrimazole-betamethasone 1-0.05% (LOTRISONE) cream APPLY EXTERNALLY TO THE AFFECTED AREA TWICE DAILY    docusate sodium (COLACE) 100 MG capsule Take 100 mg by mouth 2 (two) times daily.    doxycycline (MONODOX) 100 MG capsule Take 1 capsule (100 mg total) by mouth 2 (two) times daily. for 10 days    fluticasone (FLONASE) 50 mcg/actuation nasal spray 1 spray by Each Nare route as needed for Rhinitis.    glucagon, human recombinant, (GLUCAGON EMERGENCY KIT, HUMAN,) 1 mg SolR Inject 1 mg into the muscle as needed.    insulin aspart U-100 (NOVOLOG U-100 INSULIN ASPART) 100 unit/mL injection Uses insulin pump, max daily 100 units.    insulin pump cartridge (OMNIPOD DASH INSULIN POD) Crtg Change every 3 days.    insulin pump controller (OMNIPOD DASH PDM KIT) Misc Use as directed    [id number]-algal oil (METANX/FOLTANX RF) 3 mg-35 mg-2 mg -90.314 mg Cap Take 1 tablet by mouth once daily.    multivitamin capsule Take 1 capsule by mouth once daily.      nitroGLYCERIN (NITROSTAT) 0.4 MG SL tablet Place 1 tablet (0.4 mg total) under the tongue every 5 (five) minutes as needed for Chest pain. May dispense a two pack of 25 tablets.    vit A/vit C/vit E/zinc/copper (PRESERVISION AREDS ORAL) Take 1 tablet by mouth 2 (two) times daily.     Family History     Problem Relation (Age of Onset)    Acute myelogenous leukemia Sister    Alzheimer's disease Father    Breast cancer Mother    Cancer Mother, Sister  (60)    Colon cancer Sister    Dementia Father    Diabetes Maternal Grandfather, Paternal Aunt, Paternal Uncle    Stroke Father        Tobacco Use    Smoking status: Never Smoker    Smokeless tobacco: Never Used   Substance and Sexual Activity    Alcohol use: Yes     Alcohol/week: 0.6 oz     Types: 1 Glasses of wine per week     Comment: social once monthly    Drug use: No    Sexual activity: Not Currently     Partners: Female     Review of Systems   Constitution: Positive for malaise/fatigue and weight gain. Negative for chills and fever.   HENT: Negative for congestion.    Eyes: Negative for visual disturbance.   Cardiovascular: Positive for chest pain, dyspnea on exertion, leg swelling and orthopnea. Negative for palpitations.   Respiratory: Positive for shortness of breath. Negative for cough.    Skin: Positive for color change and poor wound healing.   Gastrointestinal: Negative for abdominal pain, nausea and vomiting.   Genitourinary: Negative for hematuria.   Neurological: Negative for headaches and light-headedness.     Objective:     Vital Signs (Most Recent):  Temp: 97.8 °F (36.6 °C) (05/30/19 0900)  Pulse: 68 (05/30/19 0900)  Resp: 14 (05/30/19 0900)  BP: (!) 102/50 (05/30/19 0900)  SpO2: 99 % (05/30/19 0900) Vital Signs (24h Range):  Temp:  [96.7 °F (35.9 °C)-97.8 °F (36.6 °C)] 97.8 °F (36.6 °C)  Pulse:  [60-71] 68  Resp:  [14-27] 14  SpO2:  [95 %-100 %] 99 %  BP: (102-146)/(50-71) 102/50     Weight: 97.8 kg (215 lb 9.8 oz)  Body mass index is 33.77 kg/m².    SpO2: 99 %  O2 Device (Oxygen Therapy): room air      Intake/Output Summary (Last 24 hours) at 5/30/2019 1454  Last data filed at 5/30/2019 0700  Gross per 24 hour   Intake 625 ml   Output 1750 ml   Net -1125 ml       Lines/Drains/Airways     Peripheral Intravenous Line                 Peripheral IV - Single Lumen 05/29/19 1835 20 G Anterior;Right Hand less than 1 day                Physical Exam   Constitutional: He is oriented to person,  place, and time. He appears well-developed and well-nourished. No distress.   HENT:   Head: Normocephalic and atraumatic.   Mouth/Throat: Oropharynx is clear and moist. No oropharyngeal exudate.   Eyes: Conjunctivae and EOM are normal. No scleral icterus.   Neck: Normal range of motion. Neck supple. JVD present.   Cardiovascular: Normal rate, regular rhythm and normal heart sounds.   No murmur heard.  Pulmonary/Chest: Effort normal. No respiratory distress. He has wheezes. He has rales.   Abdominal: Soft. Bowel sounds are normal. There is no tenderness.   Musculoskeletal: Normal range of motion. He exhibits edema (+4 BLE).   Neurological: He is alert and oriented to person, place, and time.   Skin: Skin is warm and dry.   Erythematous ulceration L great toe   Nursing note and vitals reviewed.      Significant Labs:   Blood Culture:   Recent Labs   Lab 05/29/19 1756   LABBLOO No Growth to date  No Growth to date   , BMP:   Recent Labs   Lab 05/29/19  1756 05/30/19  0026 05/30/19  0354 05/30/19  0806   * 149*  --  225*   * 127*  --  126*   K 5.5* 4.7  --  5.4*   CL 94* 92*  --  91*   CO2 23 23  --  23   BUN 28* 27*  --  28*   CREATININE 1.3 1.2  --  1.4   CALCIUM 9.8 10.0  --  9.8   MG 1.8  --  1.7  --    , CMP   Recent Labs   Lab 05/29/19  1756 05/30/19  0026 05/30/19  0806   * 127* 126*   K 5.5* 4.7 5.4*   CL 94* 92* 91*   CO2 23 23 23   * 149* 225*   BUN 28* 27* 28*   CREATININE 1.3 1.2 1.4   CALCIUM 9.8 10.0 9.8   PROT 6.4 6.4 6.3   ALBUMIN 3.8 4.0 3.9   BILITOT 0.6 0.9 0.8   ALKPHOS 108 108 105   AST 26 27 27   ALT 38 38 38   ANIONGAP 10 12 12   ESTGFRAFRICA >60.0 >60.0 55.6*   EGFRNONAA 52.6* 58.0* 48.1*   , CBC   Recent Labs   Lab 05/29/19  1756 05/30/19  0354 05/30/19  1149   WBC 56.50* 51.62* 50.43*   HGB 11.5* 12.1* 11.6*   HCT 35.3* 35.8* 36.1*    172 193   , INR   Recent Labs   Lab 05/30/19  1149   INR 1.1    and Troponin   Recent Labs   Lab 05/30/19  0806 05/30/19  1149    TROPONINI 1.169* 0.896*       Significant Imaging: All new imaging reviewed    Assessment and Plan:     Acute HF (heart failure)  78 yo M w/ PMH significant for CAD s/p CABG x2 (SVG-LAD, LIMA-D1, 1994), NSTEMI s/p DUC to distal, mid LCx (1/2019), PAD, CKD, CLL (not currently on therapy), HTN, HLD, and MINDA who presents w/ osteomyelitis of L great toe (wound cultures w/ E. Faecalis, on vancomycin). Hospitalization complicated by EKG w/ ST depressions, elevated troponin (peaked 1.16). Currently w/ dyspnea on exertion, orthopnea, significant peripheral edema. BNP 2608. ECHO w/ EF 20%, grade II DD. New onset HF w/ troponin leak vs NSTEMI in the setting of acute infection.     Recommendations:  - Continue treating ACS w/ heparin gtt for 48 hours   -  mg x1, continue ASA 81 mg, plavix 75 mg daily  - Follow up PRA pending  - Continue metoprolol  mg qhs  - IV Lasix 80 mg TID  - Start Lisinopril 10 mg daily (monitor for Cr increase >30% baseline)  - Strict I/Os, daily weights  - Discussed w/ Interventional cardiology; will plan for catheterization prior to discharge. Not urgently needed at this time.  - Will need close cardiology follow up outpatient    Cardiology will follow peripherally. Please call with questions or concerns.          VTE Risk Mitigation (From admission, onward)        Ordered     heparin 25,000 units in dextrose 5% 250 mL (100 units/mL) infusion LOW INTENSITY nomogram - [facility]  Continuous      05/30/19 1131     heparin 25,000 units in dextrose 5% (100 units/ml) IV bolus from bag - ADDITIONAL PRN BOLUS - 30 units/kg (max bolus 4000 units)  As needed (PRN)      05/30/19 1131     heparin 25,000 units in dextrose 5% (100 units/ml) IV bolus from bag - ADDITIONAL PRN BOLUS - 60 units/kg (max bolus 4000 units)  As needed (PRN)      05/30/19 1131     IP VTE LOW RISK PATIENT  Once      05/29/19 1516          Thank you for your consult. I will sign off. Please contact us if you have any additional  questions.    Cathy Uribe MD  Cardiology   [facility]

## 2019-05-30 NOTE — SUBJECTIVE & OBJECTIVE
Interval HPI:   Overnight events: Remains in [place].  BG mildly elevated this am on labs (225) after converting from home insulin pump to basal/bolus.  On IV antibiotics.  Eatin%  Nausea: No  Hypoglycemia and intervention: No  Fever: No  TPN and/or TF: No    PMH, PSH, FH, SH reviewed     Review of Systems    Constitutional: Positive for weight changes, 15 lb gain over last 2 weeks.  Eyes: Negative for visual disturbance.  Respiratory: Negative for cough.   Cardiovascular: Negative for chest pain.  Gastrointestinal: Negative for nausea.  Endocrine: Negative for polyuria, polydipsia.  Nocturia 2x per night.  Musculoskeletal: Negative for back pain.  Skin: Negative for rash.  Positive for cellulitis to L foot.  Neurological: Negative for syncope.  Psychiatric/Behavioral: Positive for mild depression.  Denies any suicidal or homicidal ideation.    Current Medications and/or Treatments Impacting Glycemic Control  Immunotherapy:    Immunosuppressants     None        Steroids:   Hormones (From admission, onward)    None        Pressors:    Autonomic Drugs (From admission, onward)    None        Hyperglycemia/Diabetes Medications:   Antihyperglycemics (From admission, onward)    Start     Stop Route Frequency Ordered    19 2100  insulin detemir U-100 pen 15 Units      -- SubQ Nightly 19 1831    19 1929  insulin aspart U-100 pen 1-10 Units      -- SubQ Before meals & nightly PRN 19 1831             PHYSICAL EXAMINATION:  Vitals:    19 0900   BP: (!) 102/50   Pulse: 68   Resp: 14   Temp: 97.8 °F (36.6 °C)     Body mass index is 33.77 kg/m².    Physical Exam     Constitutional: Well developed, well nourished, NAD.  ENT: External ears no masses with nose patent; normal hearing.  Neck: Supple; trachea midline.  Cardiovascular: Normal heart sounds, 2+ LE edema. DP +1 bilaterally.  Lungs: Normal effort; lungs anterior bilaterally clear to auscultation.  Abdomen: Soft, no masses, no  hernias.  MS: No clubbing or cyanosis of nails noted; unable to assess gait.  Skin: No rashes, lesions, or ulcers; no nodules. Injection sites are ok. No lipo hypertropthy or atrophy.  L great toe wound with redness noted.  Psychiatric: Good judgement and insight; normal mood and affect.  Neurological: Cranial nerves are grossly intact.  Foot: Nails in fair condition, no amputations noted.  L great toe wound with redness noted.

## 2019-05-30 NOTE — CONSULTS
"[facility]  Podiatry  Consult Note    Patient Name: Reid Herman  MRN: [id number]  Admission Date: 5/29/2019  Hospital Length of Stay: 1 days  Attending Physician: Elis Gutierrez MD  Primary Care Provider: Olivia Zavala MD     Inpatient consult to Podiatry  Consult performed by: Milind Norton MD  Consult ordered by: Shanna To MD        Subjective:     History of Present Illness:  Reid Herman is a 79 y.o. male who  has a past medical history of Anemia, Back pain, Basal cell carcinoma, CAD (coronary artery disease), Cataract, DDD (degenerative disc disease), lumbar, Diabetes mellitus, Diabetes mellitus type I, Diabetic retinopathy of both eyes, Hyperlipidemia, Hypertension, Insulin pump in place, Obesity, 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    Patient admitted for foot infection.  Consulted to Podiatry for the same.  Patient reports getting a blister to his left big toe 4/11 after attending an event and wearing different shoes. He reports the blister ruptured and the wound progressively got worse. He followed [facility] and had a course of antibiotics in addition to local wound care. The wound worsened and he was referred to Podiatry evaluation. He was given another course of antibiotics tailored to wound cultures (enterococcus) but has only received 3 days worth before being admitted.     He reports a "fullness" pain to the toe that is worse with elevation. He also reports 14 lb weight gain over the last month. Denies F/N/V/C.        Scheduled Meds:   aspirin  81 mg Oral Daily    atorvastatin  " 40 mg Oral QHS    clopidogrel  75 mg Oral Daily    furosemide  40 mg Intravenous BID    insulin detemir U-100  15 Units Subcutaneous QHS    metoprolol succinate  150 mg Oral QHS    vancomycin (VANCOCIN) IVPB  1,500 mg Intravenous Q24H     Continuous Infusions:  PRN Meds:acetaminophen, dextrose 50%, dextrose 50%, glucagon (human recombinant), glucose, glucose, insulin aspart U-100, sodium chloride 0.9%    Review of patient's allergies indicates:  No Known Allergies     Past Medical History:   Diagnosis Date    Anemia     Back pain     Basal cell carcinoma 06/08/2015    left nasal ala    CAD (coronary artery disease) 10/1/2012    Cataract     DDD (degenerative disc disease), lumbar 10/28/2014    Diabetes mellitus     Diabetes mellitus type I     Diabetic retinopathy of both eyes     Hyperlipidemia     Hypertension     Insulin pump in place     Obesity     Poorly controlled type 1 diabetes mellitus with peripheral neuropathy 10/1/2012    Preseptal cellulitis of right eye 8/17/15    S/P CABG x 2 2/14/2014    1994     Sleep apnea     Squamous cell carcinoma 03/18/2013    right posterior ear    Squamous cell carcinoma 07/26/2017    scalp    Squamous cell carcinoma 08/02/2018    Right Scalp/MOHS    Trouble in sleeping     Type 1 diabetes, uncontrolled, with retinopathy 10/1/2012    Type II or unspecified type diabetes mellitus without mention of complication, not stated as uncontrolled     Vitreous hemorrhage 8/17/2014     Past Surgical History:   Procedure Laterality Date    ANGIOGRAM, CORONARY ARTERY N/A 1/29/2019    Performed by [facility] CATH LAB    APPENDECTOMY  1953    BLOCK-NERVE-MEDIAL BRANCH-LUMBAR Bilateral 3/10/2015    Performed by [facility]    BLOCK-NERVE-MEDIAL BRANCH-LUMBAR Bilateral 11/25/2014    Per[facility] by [facility] at [facility]    CATARACT EXTRACTION  4/8/13    right eye (toric)    CATARACT EXTRACTION   4/29/13    left eye (toric)    COLONOSCOPY N/A 4/25/2017    Performed by CL[facility]ISSA Freeman [facility] at [facility] ENDO (4TH FLR)    COLONOSCOPY N/A 4/25/2014    Performed by CL[facility]ISSA Freeman [facility] at [facility] ENDO (4TH FLR)    CORONARY ARTERY BYPASS GRAFT  1994    x 3    EYE SURGERY      cataracts, both eyes    FINGER TENDON REPAIR  2011    left hand    INJECTION-FACET Bilateral 3/31/2015    Performed by [facility] at [facility] PAIN MGT    INJECTION-STEROID-EPIDURAL-LUMBAR N/A 10/28/2014    Performed by [facility] at [facility] PAIN MGT    INSERTION, IOL PROSTHESIS Left 4/29/2013    Performed by Soco Sandoval MD at [facility] OR    INSERTION, IOL PROSTHESIS Right 4/8/2013    Performed by Soco Sandoval [facility] at [facility] OR    INSERTION, STENT, CORONARY ARTERY N/A 1/29/2019    Performed by Saturn[facility] Ny MD at [facility] CATH LAB    MINIMALLY INVASIVE FUSION-TRANSLUMINAL LUMBAR INTERBODY (TLIF) Left 5/18/2015    Performed by Tee Pinedo MD at [facility] OR 2ND FLR    PHACOEMULSIFICATION, CATARACT Left 4/29/2013    Performed by [facility]o [facility]oval [facility] at [facility] OR    PHACOEMULSIFICATION, CATARACT Right 4/8/2013    Performed by Soco Sandoval MD at [facility] OR    RADIOFREQUENCY THERMOCOAGULATION (RFTC)-NERVE-MEDIAN BRANCH-LUMBAR Left 1/13/2015    Performed by Juanita Myles MD at [facility]    RADIOFREQUENCY THERMOCOAGULATION (RFTC)-NERVE-MEDIAN BRANCH-LUMBAR Right 12/30/2014    Performed by [facility] at [facility]    SKIN BIOPSY  2013    multiple    SPINE SURGERY  2015    TLIF    TONSILLECTOMY  1947       Family History     Problem Relation (Age of Onset)    Acute myelogenous leukemia Sister    Alzheimer's disease Father    Breast cancer Mother    Cancer Mother, Sister (60)    Colon cancer Sister    Dementia Father    Diabetes Maternal Grandfather, Paternal Aunt, Paternal Uncle    Stroke Father        Tobacco Use    Smoking status: Never Smoker    Smokeless tobacco: Never Used   Substance and Sexual Activity     Alcohol use: Yes     Alcohol/week: 0.6 oz     Types: 1 Glasses of wine per week     Comment: social once monthly    Drug use: No    Sexual activity: Not Currently     Partners: Female     Review of Systems   Constitutional: Negative for chills and fever.   Respiratory: Positive for shortness of breath.    Gastrointestinal: Negative for nausea and vomiting.   Skin: Positive for color change and wound.     Objective:     Vital Signs (Most Recent):  Temp: 97.8 °F (36.6 °C) (05/30/19 0900)  Pulse: 68 (05/30/19 0900)  Resp: 14 (05/30/19 0900)  BP: (!) 102/50 (05/30/19 0900)  SpO2: 99 % (05/30/19 0900) Vital Signs (24h Range):  Temp:  [96.7 °F (35.9 °C)-97.8 °F (36.6 °C)] 97.8 °F (36.6 °C)  Pulse:  [60-71] 68  Resp:  [14-27] 14  SpO2:  [95 %-100 %] 99 %  BP: (102-146)/(50-71) 102/50     Weight: 97.8 kg (215 lb 9.8 oz)  Body mass index is 33.77 kg/m².    Foot Exam    General  General Appearance: appears stated age and healthy   Orientation: alert and oriented to person, place, and time       Right Foot/Ankle     Inspection and Palpation  Ecchymosis: none  Tenderness: none   Swelling: (+3 edema)  Skin Exam: no abnormal color     Neurovascular  Dorsalis pedis: absent  Posterior tibial: absent  Saphenous nerve sensation: normal  Tibial nerve sensation: normal  Superficial peroneal nerve sensation: normal  Deep peroneal nerve sensation: normal  Sural nerve sensation: normal      Left Foot/Ankle      Inspection and Palpation  Ecchymosis: none  Tenderness: great toe interphalangeal joint   Swelling: (+3 edema)  Skin Exam: skin changes, abnormal color, ulcer and erythema;     Neurovascular  Dorsalis pedis: absent  Posterior tibial: absent  Saphenous nerve sensation: normal  Tibial nerve sensation: normal  Superficial peroneal nerve sensation: normal  Deep peroneal nerve sensation: normal  Sural nerve sensation: normal            Laboratory:  CBC:   Recent Labs   Lab 05/30/19  0354   WBC 51.62*   RBC 3.77*   HGB 12.1*   HCT  35.8*      MCV 95   MCH 32.1*   MCHC 33.8     CMP:   Recent Labs   Lab 05/30/19  0806   *   CALCIUM 9.8   ALBUMIN 3.9   PROT 6.3   *   K 5.4*   CO2 23   CL 91*   BUN 28*   CREATININE 1.4   ALKPHOS 105   ALT 38   AST 27   BILITOT 0.8     Wound Cultures:   Recent Labs   Lab 05/22/19  0942   LABAERO ENTEROCOCCUS FAECALIS  Many  No other significant isolate         Diagnostic Results:  MRI: I have reviewed all pertinent results/findings within the past 24 hours.  1. Slight marrow edema and subtle T1 hypointense signal involving the distal phalanx of the great toe possibly relating to reactive change or early osteomyelitis.    Clinical Findings:    B/l LE cold to the touch.    Superficial ulceration to the L hallux. Appears to be a thin, adherent eschar with periwound erythema. No drainage, fluctuance or crepitus on exam. Outline of erythema is much improved upon exam; however, purple hue to the forefoot appears when placed at a dependent position.     Dependent    Elevated          Assessment/Plan:     * Cellulitis of Left foot  Superifical wound that appears improved with elevation. When LE placed in dependent position, pigment worsens.  - Wound was painted with betadine today.   - Recommend local wound care: Daily santyl and adhesive bandage.  - MRI with reactive vs early OM: Debridement/amputation vs Biopsy and IV antibiotics options presented to patient today.  - Will await ICARDs evaluation.  - Antibiotics per ID recs. Appreciate recs.  - Pt can WBAT in surgical shoe.  - Podiatry will follow.    Peripheral vascular disease  ICARDs consult placed    Controlled type 1 diabetes mellitus with diabetic neuropathy, with long-term current use of insulin  Well controlled. Per primary        Thank you for your consult. I will follow-up with patient. Please contact us if you have any additional questions.    Milind Norton MD  Podiatry  [facility]

## 2019-05-30 NOTE — HPI
"Reid Herman is a 79 y.o. male who  has a past medical history of Anemia, Back pain, Basal cell carcinoma, CAD (coronary artery disease), Cataract, DDD (degenerative disc disease), lumbar, Diabetes mellitus, Diabetes mellitus type I, Diabetic retinopathy of both eyes, Hyperlipidemia, Hypertension, Insulin pump in place, Obesity, 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    Patient admitted for foot infection.  Consulted to Podiatry for the same.  Patient reports getting a blister to his left big toe 4/11 after attending an event and wearing different shoes. He reports the blister ruptured and the wound progressively got worse. He followed [facility] in W[facility] Care and had a course of antibiotics in addition to local wound care. The wound worsened and he was referred to Podiatry evaluation. He was given another course of antibiotics tailored to wound cultures (enterococcus) but has only received 3 days worth before being admitted.     He reports a "fullness" pain to the toe that is worse with elevation. He also reports 14 lb weight gain over the last month. Denies F/N/V/C.      "

## 2019-05-30 NOTE — PLAN OF CARE
Problem: Occupational Therapy Goal  Goal: Occupational Therapy Goal  Outcome: Outcome(s) achieved Date Met: 05/30/19  OT eval completed.  No further skilled OT services needed.

## 2019-05-30 NOTE — ASSESSMENT & PLAN NOTE
80 yo M w/ PMH significant for CAD s/p CABG x2 (SVG-LAD, LIMA-D1, 1994), NSTEMI s/p DUC to distal, mid LCx (1/2019), PAD, CKD, CLL (not currently on therapy), HTN, HLD, and MINDA who presents w/ osteomyelitis of L great toe (wound cultures w/ E. Faecalis, on vancomycin). Hospitalization complicated by EKG w/ ST depressions, elevated troponin (peaked 1.16). Currently w/ dyspnea on exertion, orthopnea, significant peripheral edema. BNP 2608. ECHO w/ EF 20%, grade II DD. New onset HF w/ troponin leak vs NSTEMI in the setting of acute infection.     Recommendations:  - Continue treating ACS w/ heparin gtt for 48 hours   -  mg x1, continue ASA 81 mg, plavix 75 mg daily  - Will follow up PRA pending  - Continue metoprolol  mg qhs  - IV Lasix 80 mg TID  - Start Lisinopril 10 mg daily (monitor for Cr increase >30% baseline)  - Strict I/Os, daily weights  - Discussed w/ Interventional cardiology; will plan for catheterization prior to discharge. Not urgently needed at this time.  - Will need close cardiology follow up outpatient    Cardiology will follow peripherally. Please call with questions or concerns.

## 2019-05-30 NOTE — CONSULTS
[facility]  Interventional Cardiology  Consult Note    Patient Name: Reid Herman  MRN: [id number]  Admission Date: 5/29/2019  Hospital Length of Stay: 1 days  Code Status: Full Code   Attending Provider: Elis Gutierrez MD  Consulting Provider: Joseline Mathis MD  Primary Care Physician: Olivia Zavala MD  Principal Problem:Cellulitis of foot    Patient information was obtained from patient and ER records.     Inpatient consult to Interventional Cardiology  Consult performed by: Joseline Juares MD  Consult ordered by: Elis Gutierrez MD        Subjective:     Chief Complaint:  Chest pain      HPI:  Mr. Herman is a 79yoM, IC for new EKG changes and stable angina CCS II. PMHx of CAD s/p CABG (SVG-LAD, LIMA-1st Diag) with recent 01/29/19 NSTEMI s/p PCI DUC Mid-distal LCx (Dr. Ny), Hypertension, HLD, MINDA on CPAP, CLL, PVD who was admitted for worsening Left greater toe ulceration by Podiatry. Patient states prior to his PCI 01/2019 had significant anginal symptoms with chest pain, SOB, DIOR having to take SL NTG on constant basis, completed PET stress which was possible for inducible ischemia at the [place] / inferoseptal, for which completed [facility] 01/29/19 with Dr. Ny and demonstrated ( 80-90% Mid-distal LCx,  p-LAD, LIMA-1st Diag 50% stenosis, SVG-LAD 60% stenosis, native %, he had DUC x2 mid-distal LCx. Following this noted improvement in his anginal symptoms only having to use SL NTG with heavy exertional activity. On 04/2019 found to have a Left greater toe blister for which he had seen wound care and started on doxycycline, eventually worsened and started PO PCN, however now have worsening cellulitis with concerns for OM. Patient does state last 2 weeks noted increasing chest discomfort with minimal activity which is a new change in functional status, having to use around 15 - 20 Sl NTG per week, along with New LE edema, orthopnea, PND, DIOR. Recent 12 lb weight gain.  Denies any dietary or fluid noncompliance. Compliant with medications, taking ASA / Plavix, has not skipped any doses.     Does note have TTE on file, EKG with new ST depression anterior laterally, Troponin 1.1, , WBC 52K (Chronic, at baseline), Bcx x2 NGTD, wound culure 05/27/19 E. Fecalis,     [facility] 01/29/19  · Successful PCI with DUC to distal and mid L. dominant LCX.  · Mid Cx lesion , 80% stenosed reduced to 0%..  · A STENT RESOLUTE INTGRTY [id number] stent was successfully placed at 12 YEISON for 5 sec.  · Dist Cx lesion , 99% stenosed reduced to 0%..  · A STENT RESOLUTE RAUL 2X15 DUC stent was successfully placed at 12 YEISON for 5 sec.  · Origin to Prox LAD Vein Graft lesion , 60% stenosed.  · Ost LAD to Prox LAD lesion , 100% stenosed (chronic).  · 1st Diag lesion , 50% stenosed.  · Ost RCA to Prox RCA lesion , 100% stenosed.  · Estimated blood loss: <50 mL  · Three vessel coronary artery disease.        Past Medical History:   Diagnosis Date    Anemia     Back pain     Basal cell carcinoma 06/08/2015    left nasal ala    CAD (coronary artery disease) 10/1/2012    Cataract     DDD (degenerative disc disease), lumbar 10/28/2014    Diabetes mellitus     Diabetes mellitus type I     Diabetic retinopathy of both eyes     Hyperlipidemia     Hypertension     Insulin pump in place     Obesity     Poorly controlled type 1 diabetes mellitus with peripheral neuropathy 10/1/2012    Preseptal cellulitis of right eye 8/17/15    S/P CABG x 2 2/14/2014    1994     Sleep apnea     Squamous cell carcinoma 03/18/2013    right posterior ear    Squamous cell carcinoma 07/26/2017    scalp    Squamous cell carcinoma 08/02/2018    Right Scalp/[facility]HS    Trouble in sleeping     Type 1 diabetes, uncontrolled, with retinopathy 10/1/2012    Type II or unspecified type diabetes mellitus without mention of complication, not stated as uncontrolled     Vitreous hemorrhage 8/17/2014       Past Surgical History:    Procedure Laterality Date    ANGIOGRAM, CORONARY ARTERY N/A 1/29/2019    Performed by Saturnino Ny MD at [facility] CATH LAB    APPENDECTOMY  1951    BLOCK-NERVE-MEDIAL BRANCH-LUMBAR Bilateral 3/10/2015    [facility] at [facility]    BLOCK-NERVE-MEDIAL BRANCH-LUMBAR Bilateral 11/25/2014    [facility] at [facility]    CATARACT EXTRACTION  4/8/13    right eye (toric)    CATARACT EXTRACTION  4/29/13    left eye (toric)    COLONOSCOPY N/A 4/25/2017    Performed by CLARISSA Freeman MD at [facility] ENDO (4TH FLR)    COLONOSCOPY N/A 4/25/2014    Performed by CLARISSA Freeman MD at [facility] ENDO (4TH FLR)    CORONARY ARTERY BYPASS GRAFT  1994    x 3    EYE SURGERY      cataracts, both eyes    FINGER TENDON REPAIR  2011    left hand    INJECTION-FACET Bilateral 3/31/2015    [facility] at [facility]    INJECTION-STEROID-EPIDURAL-LUMBAR N/A 10/28/2014    [facility] at [facility]    INSERTION, IOL PROSTHESIS Left 4/29/2013    Performed by Soco [facility]oval [facility] at [facility] OR    INSERTION, IOL PROSTHESIS Right 4/8/2013    Performed by Soco [facility]oval [facility] at [facility] OR    INSERTION, STENT, CORONARY ARTERY N/A 1/29/2019    Performed by Saturnino Ny MD at [facility] CATH LAB    MINIMALLY INVASIVE FUSION-TRANSLUMINAL LUMBAR INTERBODY (TLIF) Left 5/18/2015    Performed by Tee Pinedo MD at [facility] OR 2ND FLR    PHACOEMULSIFICATION, CATARACT Left 4/29/2013    Performed by Soco [facility]oval [facility] at [facility] OR    PHACOEMULSIFICATION, CATARACT Right 4/8/2013    Performed by [facility]oval [facility] at [facility] OR    RADIOFREQUENCY THERMOCOAGULATION (RFTC)-NERVE-MEDIAN BRANCH-LUMBAR Left 1/13/2015    Per[facility] at [facility]    RADIOFREQUENCY THERMOCOAGULATION (RFTC)-NERVE-MEDIAN BRANCH-LUMBAR Right 12/30/2014    [facility] at [facility]    SKIN BIOPSY  2013    multiple    SPINE SURGERY   2015    TLIF    TONSILLECTOMY  1947       Review of patient's allergies indicates:  No Known Allergies    Facility-Administered Medications Prior to Admission   Medication    nitroGLYCERIN 0.4 MG/DOSE TL SPRY 400 mcg/spray spray 1 spray     PTA Medications   Medication Sig    ACCU-CHEK FASTCLIX LANCET DRUM Misc TEST 6 TIMES DAILY    alpha lipoic acid 600 mg Cap Take 1 capsule by mouth once daily.      ampicillin (PRINCIPEN) 500 MG capsule Take 1 capsule (500 mg total) by mouth 4 (four) times daily. for 10 days    aspirin (ECOTRIN) 81 MG EC tablet Take 1 tablet (81 mg total) by mouth once daily.    atorvastatin (LIPITOR) 80 MG tablet TAKE 1/2 TABLET NIGHTLY. (Patient taking differently: 40 mg. )    BD INSULIN SYRINGE ULT-FINE II 0.3 mL 31 gauge x 5/16 Syrg     blood sugar diagnostic (ACCU-CHEK SHARON PLUS TEST STRP) Strp Check sugar a 5x a day.    blood-glucose meter,continuous (DEXCOM G6 ) Misc Use as directed, change every 3 years    blood-glucose sensor (DEXCOM G6 SENSOR) Doris Change every 10 days.    blood-glucose transmitter (DEXCOM G6 TRANSMITTER) Doris Change every 3 months.    clopidogrel (PLAVIX) 75 mg tablet Take 4 tablets x 1 followed by one tablet daily    lisinopril-hydrochlorothiazide (PRINZIDE,ZESTORETIC) 20-12.5 mg per tablet Take 1 tablet by mouth 2 (two) times daily.    metFORMIN (GLUCOPHAGE) 500 MG tablet TAKE 1 TABLET TWICE DAILY    metoprolol succinate (TOPROL-XL) 100 MG 24 hr tablet Take 1 tablet (100 mg total) by mouth every evening.    metoprolol succinate (TOPROL-XL) 50 MG 24 hr tablet Take 1 tablet (50 mg total) by mouth once daily.    clotrimazole-betamethasone 1-0.05% (LOTRISONE) cream APPLY EXTERNALLY TO THE AFFECTED AREA TWICE DAILY    docusate sodium (COLACE) 100 MG capsule Take 100 mg by mouth 2 (two) times daily.    doxycycline (MONODOX) 100 MG capsule Take 1 capsule (100 mg total) by mouth 2 (two) times daily. for 10 days    fluticasone (FLONASE) 50  mcg/actuation nasal spray 1 spray by Each Nare route as needed for Rhinitis.    glucagon, human recombinant, (GLUCAGON EMERGENCY KIT, HUMAN,) 1 mg SolR Inject 1 mg into the muscle as needed.    insulin aspart U-100 (NOVOLOG U-100 INSULIN ASPART) 100 unit/mL injection Uses insulin pump, max daily 100 units.    insulin pump cartridge (OMNIPOD DASH INSULIN POD) Crtg Change every 3 days.    insulin pump controller (OMNIPOD DASH PDM KIT) Misc Use as directed    [id number]-algal oil (METANX/FOLTANX RF) 3 mg-35 mg-2 mg -90.314 mg Cap Take 1 tablet by mouth once daily.    multivitamin capsule Take 1 capsule by mouth once daily.      nitroGLYCERIN (NITROSTAT) 0.4 MG SL tablet Place 1 tablet (0.4 mg total) under the tongue every 5 (five) minutes as needed for Chest pain. May dispense a two pack of 25 tablets.    vit A/vit C/vit E/zinc/copper (PRESERVISION AREDS ORAL) Take 1 tablet by mouth 2 (two) times daily.     Family History     Problem Relation (Age of Onset)    Acute myelogenous leukemia Sister    Alzheimer's disease Father    Breast cancer Mother    Cancer Mother, Sister (60)    Colon cancer Sister    Dementia Father    Diabetes Maternal Grandfather, Paternal Aunt, Paternal Uncle    Stroke Father        Tobacco Use    Smoking status: Never Smoker    Smokeless tobacco: Never Used   Substance and Sexual Activity    Alcohol use: Yes     Alcohol/week: 0.6 oz     Types: 1 Glasses of wine per week     Comment: social once monthly    Drug use: No    Sexual activity: Not Currently     Partners: Female     Review of Systems   Constitution: Positive for malaise/fatigue and weight gain. Negative for chills, decreased appetite, diaphoresis, fever and weight loss.   Eyes: Negative for blurred vision.   Cardiovascular: Positive for chest pain, dyspnea on exertion, leg swelling, orthopnea, palpitations and paroxysmal nocturnal dyspnea. Negative for irregular heartbeat and near-syncope.   Respiratory: Positive for  cough and shortness of breath. Negative for snoring and wheezing.    Skin: Positive for color change, nail changes and poor wound healing.   Gastrointestinal: Negative for abdominal pain, nausea and vomiting.   Genitourinary: Negative for bladder incontinence and urgency.     Objective:     Vital Signs (Most Recent):  Temp: 97.8 °F (36.6 °C) (05/30/19 0900)  Pulse: 68 (05/30/19 0900)  Resp: 14 (05/30/19 0900)  BP: (!) 102/50 (05/30/19 0900)  SpO2: 99 % (05/30/19 0900) Vital Signs (24h Range):  Temp:  [96.7 °F (35.9 °C)-97.8 °F (36.6 °C)] 97.8 °F (36.6 °C)  Pulse:  [60-71] 68  Resp:  [14-27] 14  SpO2:  [95 %-100 %] 99 %  BP: (102-146)/(50-71) 102/50     Weight: 97.8 kg (215 lb 9.8 oz)  Body mass index is 33.77 kg/m².    SpO2: 99 %  O2 Device (Oxygen Therapy): room air      Intake/Output Summary (Last 24 hours) at 5/30/2019 1350  Last data filed at 5/30/2019 0700  Gross per 24 hour   Intake 625 ml   Output 1750 ml   Net -1125 ml       Lines/Drains/Airways     Peripheral Intravenous Line                 Peripheral IV - Single Lumen 05/29/19 1835 20 G Anterior;Right Hand less than 1 day                Physical Exam   Constitutional: He is oriented to person, place, and time. He appears well-developed and well-nourished. No distress.   Neck: JVD present.   Cardiovascular: Normal rate, regular rhythm, normal heart sounds and intact distal pulses. Exam reveals no gallop and no friction rub.   No murmur heard.  Pulses:       Carotid pulses are 2+ on the right side, and 2+ on the left side.       Radial pulses are 2+ on the right side, and 2+ on the left side.        Femoral pulses are 2+ on the right side, and 2+ on the left side.       Popliteal pulses are 1+ on the right side, and 1+ on the left side.        Dorsalis pedis pulses are 1+ on the right side, and 1+ on the left side.        Posterior tibial pulses are 1+ on the right side, and 1+ on the left side.   Pulmonary/Chest: Effort normal. No respiratory distress. He  has no wheezes. He has rales in the right lower field, the left middle field and the left lower field. He exhibits no tenderness.   Abdominal: Soft. Bowel sounds are normal. He exhibits distension. He exhibits no mass. There is no tenderness. There is no rebound and no guarding.   Musculoskeletal: Normal range of motion. He exhibits edema.   Neurological: He is alert and oriented to person, place, and time.   Skin: Skin is warm and dry. Rash noted. He is not diaphoretic. There is erythema. There is pallor.        Nursing note and vitals reviewed.      Significant Labs:   CMP   Recent Labs   Lab 05/29/19  1756 05/30/19  0026 05/30/19  0806   * 127* 126*   K 5.5* 4.7 5.4*   CL 94* 92* 91*   CO2 23 23 23   * 149* 225*   BUN 28* 27* 28*   CREATININE 1.3 1.2 1.4   CALCIUM 9.8 10.0 9.8   PROT 6.4 6.4 6.3   ALBUMIN 3.8 4.0 3.9   BILITOT 0.6 0.9 0.8   ALKPHOS 108 108 105   AST 26 27 27   ALT 38 38 38   ANIONGAP 10 12 12   ESTGFRAFRICA >60.0 >60.0 55.6*   EGFRNONAA 52.6* 58.0* 48.1*   , CBC   Recent Labs   Lab 05/29/19  1756 05/30/19  0354 05/30/19  1149   WBC 56.50* 51.62* 50.43*   HGB 11.5* 12.1* 11.6*   HCT 35.3* 35.8* 36.1*    172 193   , INR   Recent Labs   Lab 05/30/19  1149   INR 1.1    and Lipid Panel No results for input(s): CHOL, HDL, LDLCALC, TRIG, CHOLHDL in the last 48 hours.    Significant Imaging: Echocardiogram: Transthoracic echo (TTE) complete (Cupid Only): No results found. However, due to the size of the patient record, not all encounters were searched. Please check Results Review for a complete set of results.    Assessment and Plan:     Acute HF (heart failure)  Mr. Herman is a 79yoM, IC for new EKG changes and crescendo angina CCS II. Who was admitted for worsening Left greater toe ulceration by Podiatry.     - PMHx of CAD s/p CABG (SVG-LAD, LIMA-1st Diag) with recent 01/29/19 [facility] s/p PCI DUC Mid-distal LCx x2 (Dr. Ny), Hypertension, HLD, MINDA on CPAP, CLL, PVD    - [facility]  01/29/19 with Dr. Ny and demonstrated ( 80-90% Mid-distal LCx,  p-LAD, LIMA-1st Diag 50% stenosis, SVG-LAD 60% stenosis, native %,   - Does note have TTE on file,    - EKG with new ST depression anterior laterally   - Troponin 1.1, , WBC 52K (Chronic, at baseline)   - Bcx x2 NGTD, wound culure 05/27/19 E. Fecalis,     - Plans below discussed with Dr. Farmer.    - Please call intervention team if patient develops new EKG changes or intractable angina to medical therapy   - Currently asymptomatic and free of chest pain, however recent history concerning with worsening exacerbations, increasing SL NTG and ADHF with new EKG changes and troponin 1.1 possible for silent infract vs. ISR (?plavix nonresponder). In the setting of active infection and ADHF, no urgent indication for intervention at this time.   - Cardiology consult team following, Medicine team aware, agree with treating for ACS for 48 hours  - Agree with aggressive diuresis   - Once patient optimized from HF and infectious standpoint will pursue C   - PRU pending (may need to switch pending levels)  - Titrate up anti-anginals as needed (increase Imdur)  - Obtain 2d TTE with contrast for WMA assessment and EF  - Trend troponin                   VTE Risk Mitigation (From admission, onward)        Ordered     heparin 25,000 units in dextrose 5% (100 units/ml) IV bolus from bag INITIAL BOLUS (max bolus 4000 units)  Once      05/30/19 1131     heparin 25,000 units in dextrose 5% 250 mL (100 units/mL) infusion LOW INTENSITY nomogram - [facility]  Continuous      05/30/19 1131     heparin 25,000 units in dextrose 5% (100 units/ml) IV bolus from bag - ADDITIONAL PRN BOLUS - 30 units/kg (max bolus 4000 units)  As needed (PRN)      05/30/19 1131     heparin 25,000 units in dextrose 5% (100 units/ml) IV bolus from bag - ADDITIONAL PRN BOLUS - 60 units/kg (max bolus 4000 units)  As needed (PRN)      05/30/19 1131     IP VTE LOW RISK PATIENT  Once       05/29/19 3364          Thank you for your consult. I will follow-up with patient. Please contact us if you have any additional questions.    Joseline Mathis MD  Interventional Cardiology   [facility]

## 2019-05-30 NOTE — ASSESSMENT & PLAN NOTE
Reid Herman is a 79 y.o. with DM1, PAD and a left hallux ulcer that has been growing enterococcus sensitive to ampicillin and vancomycin but with worsening erythema despite treatment. Admitted from podiatry for OM rule out and IV antibiotics    PLAN:  --WBC unable to be reliable as patient has CLL, crp is elevated esr is low but patient is immunocompromised   - Will rule out OM with MRI   - Blood cultures sent   - Will rule out DVT( patient high risk from decreased ambulation and CLL)  - Will recheck ABIs to see if PAD disease has progressed  - Started on vancomycin and pharmacy consulted, appreciate recs  - Podiatry consulted appreciate recs  - ID consulted appreciate recs ( likely need IV abx)

## 2019-05-30 NOTE — ASSESSMENT & PLAN NOTE
- likely secondary to CHF  - will get osm, urine osm and urine creatinine and na   - diurese for now   - daily CMP   - stop HCTZ as well

## 2019-05-30 NOTE — CONSULTS
[facility]  Infectious Disease  Consult Note    Patient Name: Reid Herman  MRN: [id number]  Admission Date: 5/29/2019  Hospital Length of Stay: 1 days  Attending Physician: Elis Gutierrez MD  Primary Care Provider: Olivia Zavala MD     Isolation Status: No active isolations    Patient information was obtained from patient and past medical records.      Consults  Assessment/Plan:     Diabetic ulcer of left great toe  79 year old male who presented as a direct admit from podiatry clinic for a left great toe ulcer. Patient has a PMH HTN, HLD, DM-1(insulin pump), CAD s/p CABG x 2 , MINDA compliant with CPAP, CLL not currently on any therapy, and PAD who presents with left foot ulcer. Prior wound culture with E. Faecalis. Pt started on IV vanc. MRI cannot rule out possible early osteomyelitis distal phalanx. ID consulted for further recs.     Stable leukocytosis (50,000) present since January- due to CLL- predominantly lymphocytic.  Pt afebrile.     Plan:  1. Recommend bone biopsy of left great toe sent for aerobic, anaerobic, fungal, and AFB cultures; please also send bone to pathology  2. Would hold antibiotics for now in anticipation of culture growth in order to tailor antibiotics  3. F/u on vascular, cardiology, and podiatry recs  4. Will follow with you    Thank you for your consult. I will follow-up with patient. Please contact us if you have any additional questions.  TANIA Saldana Pager: [phone]  Infectious Disease  [facility]    Subjective:     Principal Problem: Cellulitis of foot    HPI: This is a 79 year old male who presented as a direct admit from podiatry clinic for a left great toe ulcer. Patient has a PMH HTN, HLD, DM-1(insulin pump), CAD s/p CABG x 2 , MINDA compliant with CPAP, CLL not currently on any therapy, and PAD who presents with left foot ulcer and for the last month that has progressively been worsening.  Patient was on multiple courses of oral  antibiotics with no improvement.  He was seen in podiatry clinic yesterday and noted to be worsening so recommended admission for IV antibiotics.  Patient had MRI which could not rule out early osteomyelitis.  Podiatry consulted and discussed possible bone biopsy. Patient was started on IV vancomycin.    Interventional cards has been consulted.  There is a wound culture from 5/22 that grew enterococcus faecalis. Patient has a leukocytosis of 51,000 but has history of CLL and has had this WBC count since January.  Patient has mild pain but does have neuropathy. He denies fevers or chills.       Past Medical History:   Diagnosis Date    Anemia     Back pain     Basal cell carcinoma 06/08/2015    left nasal ala    CAD (coronary artery disease) 10/1/2012    Cataract     DDD (degenerative disc disease), lumbar 10/28/2014    Diabetes mellitus     Diabetes mellitus type I     Diabetic retinopathy of both eyes     Hyperlipidemia     Hypertension     Insulin pump in place     Obesity     Poorly controlled type 1 diabetes mellitus with peripheral neuropathy 10/1/2012    Preseptal cellulitis of right eye 8/17/15    S/P CABG x 2 2/14/2014    1994     Sleep apnea     Squamous cell carcinoma 03/18/2013    right posterior ear    Squamous cell carcinoma 07/26/2017    scalp    Squamous cell carcinoma 08/02/2018    Right Scalp/MOHS    Trouble in sleeping     Type 1 diabetes, uncontrolled, with retinopathy 10/1/2012    Type II or unspecified type diabetes mellitus without mention of complication, not stated as uncontrolled     Vitreous hemorrhage 8/17/2014       Past Surgical History:   Procedure Laterality Date    ANGIOGRAM, CORONARY ARTERY N/A 1/29/2019    Performed by Saturnino Ny MD at [facility] CATH LAB    APPENDECTOMY  1951    BLOCK-NERVE-MEDIAL BRANCH-LUMBAR Bilateral 3/10/2015    [facility] PAIN MGT    BLOCK-NERVE-MEDIAL BRANCH-LUMBAR Bilateral 11/25/2014     [facility]    CATARACT EXTRACTION  4/8/13    right eye (toric)    CATARACT EXTRACTION  4/29/13    left eye (toric)    COLONOSCOPY N/A 4/25/2017    [facility]SA [facility] ENDO ([facility]TH FLR)    COLONOSCOPY N/A 4/25/2014    [facility]O ([facility]TH FLR)    CORONARY ARTERY BYPASS GRAFT  1994    x 3    EYE SURGERY      cataracts, both eyes    FINGER TENDON REPAIR  2011    left hand    INJECTION-FACET Bilateral 3/31/2015    [facility]    INJECTION-STEROID-EPIDURAL-LUMBAR N/A 10/28/2014    [facility]    INSERTION, IOL PROSTHESIS Left 4/29/2013    Performed by [facility]o [facility]oval [facility] at [facility] OR    INSERTION, IOL PROSTHESIS Right 4/8/2013    Performed by Soco [facility]oval [facility] at [facility] OR    INSERTION, STENT, CORONARY ARTERY N/A 1/29/2019    [facility] CATH LAB    MINIMALLY INVASIVE FUSION-TRANSLUMINAL LUMBAR INTERBODY (TLIF) Left 5/18/2015    Performed by Tee Pinedo [facility] at [facility] OR 2ND FLR    PHACOEMULSIFICATION, CATARACT Left 4/29/2013    Performed by [facility]o [facility]oval [facility] at [facility] OR    PHACOEMULSIFICATION, CATARACT Right 4/8/2013    Performed by [facility]o [facility]oval [facility] at [facility] OR    RADIOFREQUENCY THERMOCOAGULATION (RFTC)-NERVE-MEDIAN BRANCH-LUMBAR Left 1/13/2015    Performed by [facility]les [facility] at [facility]    RADIOFREQUENCY THERMOCOAGULATION (RFTC)-NERVE-MEDIAN BRANCH-LUMBAR Right 12/30/2014    [facility] at [facility]    SKIN BIOPSY  2013    multiple    SPINE SURGERY  2015    TLIF    TONSILLECTOMY  1947       Review of patient's allergies indicates:  No Known Allergies    Medications:  Facility-Administered Medications Prior to Admission   Medication    nitroGLYCERIN 0.4 MG/DOSE TL SPRY 400 mcg/spray spray 1 spray     Medications Prior to Admission   Medication Sig    ACCU-CHEK FASTCLIX  LANCET DRUM Misc TEST 6 TIMES DAILY    alpha lipoic acid 600 mg Cap Take 1 capsule by mouth once daily.      ampicillin (PRINCIPEN) 500 MG capsule Take 1 capsule (500 mg total) by mouth 4 (four) times daily. for 10 days    aspirin (ECOTRIN) 81 MG EC tablet Take 1 tablet (81 mg total) by mouth once daily.    atorvastatin (LIPITOR) 80 MG tablet TAKE 1/2 TABLET NIGHTLY. (Patient taking differently: 40 mg. )    BD INSULIN SYRINGE ULT-FINE II 0.3 mL 31 gauge x 5/16 Syrg     blood sugar diagnostic (ACCU-CHEK SHARON PLUS TEST STRP) Strp Check sugar a 5x a day.    blood-glucose meter,continuous (DEXCOM G6 ) Misc Use as directed, change every 3 years    blood-glucose sensor (DEXCOM G6 SENSOR) Doris Change every 10 days.    blood-glucose transmitter (DEXCOM G6 TRANSMITTER) Doris Change every 3 months.    clopidogrel (PLAVIX) 75 mg tablet Take 4 tablets x 1 followed by one tablet daily    lisinopril-hydrochlorothiazide (PRINZIDE,ZESTORETIC) 20-12.5 mg per tablet Take 1 tablet by mouth 2 (two) times daily.    metFORMIN (GLUCOPHAGE) 500 MG tablet TAKE 1 TABLET TWICE DAILY    metoprolol succinate (TOPROL-XL) 100 MG 24 hr tablet Take 1 tablet (100 mg total) by mouth every evening.    metoprolol succinate (TOPROL-XL) 50 MG 24 hr tablet Take 1 tablet (50 mg total) by mouth once daily.    clotrimazole-betamethasone 1-0.05% (LOTRISONE) cream APPLY EXTERNALLY TO THE AFFECTED AREA TWICE DAILY    docusate sodium (COLACE) 100 MG capsule Take 100 mg by mouth 2 (two) times daily.    doxycycline (MONODOX) 100 MG capsule Take 1 capsule (100 mg total) by mouth 2 (two) times daily. for 10 days    fluticasone (FLONASE) 50 mcg/actuation nasal spray 1 spray by Each Nare route as needed for Rhinitis.    glucagon, human recombinant, (GLUCAGON EMERGENCY KIT, HUMAN,) 1 mg SolR Inject 1 mg into the muscle as needed.    insulin aspart U-100 (NOVOLOG U-100 INSULIN ASPART) 100 unit/mL injection Uses insulin pump, max daily 100  units.    insulin pump cartridge (OMNIPOD DASH INSULIN POD) Crtg Change every 3 days.    insulin pump controller (OMNIPOD DASH PDM KIT) Misc Use as directed    [id number]-algal oil (METANX/FOLTANX RF) 3 mg-35 mg-2 mg -90.314 mg Cap Take 1 tablet by mouth once daily.    multivitamin capsule Take 1 capsule by mouth once daily.      nitroGLYCERIN (NITROSTAT) 0.4 MG SL tablet Place 1 tablet (0.4 mg total) under the tongue every 5 (five) minutes as needed for Chest pain. May dispense a two pack of 25 tablets.    vit A/vit C/vit E/zinc/copper (PRESERVISION AREDS ORAL) Take 1 tablet by mouth 2 (two) times daily.     Antibiotics (From admission, onward)    Start     Stop Route Frequency Ordered    05/30/19 2000  vancomycin 1.5 g in 5 % dextrose 250 mL IVPB      -- IV Every 24 hours (non-standard times) 05/29/19 1931        Antifungals (From admission, onward)    None        Antivirals (From admission, onward)    None           Immunization History   Administered Date(s) Administered    Influenza - High Dose 10/01/2012, 09/29/2014, 09/28/2015, 10/06/2016, 09/25/2017, 09/11/2018    Influenza Split 10/07/2013    Pneumococcal Conjugate - 13 Valent 02/13/2015    Pneumococcal Polysaccharide - 23 Valent 08/15/2016    Tdap 02/17/2016    Zoster Recombinant 09/11/2018, 02/14/2019       Family History     Problem Relation (Age of Onset)    Acute myelogenous leukemia Sister    Alzheimer's disease Father    Breast cancer Mother    Cancer Mother, Sister (60)    Colon cancer Sister    Dementia Father    Diabetes Maternal Grandfather, Paternal Aunt, Paternal Uncle    Stroke Father        Social History     Socioeconomic History    Marital status:      Spouse name: Not on file    Number of children: Not on file    Years of education: Not on file    Highest education level: Not on file   Occupational History    Occupation: Sales     Employer: [employer]    Social Needs    Financial resource strain: Not on  file    Food insecurity:     Worry: Not on file     Inability: Not on file    Transportation needs:     Medical: Not on file     Non-medical: Not on file   Tobacco Use    Smoking status: Never Smoker    Smokeless tobacco: Never Used   Substance and Sexual Activity    Alcohol use: Yes     Alcohol/week: 0.6 oz     Types: 1 Glasses of wine per week     Comment: social once monthly    Drug use: No    Sexual activity: Not Currently     Partners: Female   Lifestyle    Physical activity:     Days per week: Not on file     Minutes per session: Not on file    Stress: Not on file   Relationships    Social connections:     Talks on phone: Not on file     Gets together: Not on file     Attends Sabianist service: Not on file     Active member of club or organization: Not on file     Attends meetings of clubs or organizations: Not on file     Relationship status: Not on file   Other Topics Concern    Not on file   Social History Narrative    Not on file     Review of Systems   Constitutional: Negative for chills and fever.   Respiratory: Negative for cough and shortness of breath.    Cardiovascular: Positive for leg swelling. Negative for chest pain.   Gastrointestinal: Negative for abdominal pain, constipation, diarrhea, nausea and vomiting.   Genitourinary: Negative for dysuria, frequency and hematuria.   Musculoskeletal: Negative for back pain and myalgias.   Skin: Positive for wound (left foot). Negative for rash.   Neurological: Negative for light-headedness, numbness and headaches.   Psychiatric/Behavioral: Negative for agitation and behavioral problems. The patient is not nervous/anxious.      Objective:     Vital Signs (Most Recent):  Temp: 97.8 °F (36.6 °C) (05/30/19 0900)  Pulse: 68 (05/30/19 0900)  Resp: 14 (05/30/19 0900)  BP: (!) 102/50 (05/30/19 0900)  SpO2: 99 % (05/30/19 0900) Vital Signs (24h Range):  Temp:  [96.7 °F (35.9 °C)-97.8 °F (36.6 °C)] 97.8 °F (36.6 °C)  Pulse:  [60-71] 68  Resp:  [14-27]  14  SpO2:  [95 %-100 %] 99 %  BP: (102-146)/(50-71) 102/50     Weight: 97.8 kg (215 lb 9.8 oz)  Body mass index is 33.77 kg/m².    Estimated Creatinine Clearance: 49.3 mL/min (based on SCr of 1.4 mg/dL).    Physical Exam   Constitutional: He is oriented to person, place, and time. He appears well-developed and well-nourished. No distress.   Cardiovascular: Normal rate and regular rhythm.   No murmur heard.  Pulmonary/Chest: Effort normal and breath sounds normal. No respiratory distress.   Abdominal: Soft. Bowel sounds are normal. He exhibits no distension.   Musculoskeletal: Normal range of motion. He exhibits edema.   Neurological: He is alert and oriented to person, place, and time.   Skin: Skin is warm and dry.   Left medial great toe with ulceration noted. There is erythema of the 1st digit. Mild tenderness. No active drainage.    Psychiatric: He has a normal mood and affect. His behavior is normal.             Significant Labs:   Blood Culture:   Recent Labs   Lab 05/29/19  1756   LABBLOO No Growth to date  No Growth to date     Urine Culture:   Recent Labs   Lab 04/03/19  1605   LABURIN No growth     Urine Studies:   Recent Labs   Lab 04/03/19  1605 05/30/19  0039   COLORU Yellow Yellow   APPEARANCEUA Hazy* Clear   PHUR 7.0  6 6.0   SPECGRAV 1.010  10.10 1.010   PROTEINUA Negative Negative   GLUCUA Negative Negative   KETONESU Negative  n Negative   BILIRUBINUA Negative Negative   OCCULTUA Negative Negative   NITRITE Negative  n Negative   UROBILINOGEN n  --    LEUKOCYTESUR Negative Negative   RBCUA 0  --    WBCUA 0  --    SQUAMEPITHEL 0  --      Wound Culture:   Recent Labs   Lab 05/22/19  0942   LABAERO ENTEROCOCCUS FAECALIS  Many  No other significant isolate         Significant Imaging: I have reviewed all pertinent imaging results/findings within the past 24 hours.

## 2019-05-30 NOTE — ASSESSMENT & PLAN NOTE
Mr. Herman is a 79yoM, IC for new EKG changes and crescendo angina CCS II. Who was admitted for worsening Left greater toe ulceration by Podiatry.     - PMHx of CAD s/p CABG (SVG-LAD, LIMA-1st Diag) with recent 01/29/19 [facility] s/p PCI DUC Mid-distal LCx x2 (Dr. Ny), Hypertension, HLD, MINDA on CPAP, CLL, PVD    - [facility] 01/29/19 with Dr. Ny and demonstrated ( 80-90% Mid-distal LCx,  p-LAD, LIMA-1st Diag 50% stenosis, SVG-LAD 60% stenosis, native %,   - Does note have TTE on file,    - EKG with new ST depression anterior laterally   - Troponin 1.1, , WBC 52K (Chronic, at baseline)   - Bcx x2 NGTD, wound culure 05/27/19 E. Fecalis,     - Plans below discussed with Dr. Farmer.    - Please call intervention team if patient develops new EKG changes or intractable angina to medical therapy   - Currently asymptomatic and free of chest pain, however recent history concerning with worsening exacerbations, increasing SL NTG and ADHF with new EKG changes and troponin 1.1 possible for silent infract vs. ISR (?plavix nonresponder). In the setting of active infection and ADHF, no urgent indication for intervention at this time.   - Cardiology consult team following, Medicine team aware, agree with treating for ACS for 48 hours  - Agree with aggressive diuresis   - Once patient optimized from HF and infectious standpoint will pursue [facility]   - PRU pending (may need to switch pending levels)  - Titrate up anti-anginals as needed (increase Imdur)  - Obtain 2d TTE with contrast for WMA assessment and EF  - Trend troponin

## 2019-05-30 NOTE — CONSULTS
[facility]  Vascular Surgery  Consult Note    Inpatient consult to Vascular Surgery  Consult performed by: Jessica Perez MD  Consult ordered by: Vinita Chen MD  Reason for consult: left hallux ulcer  Assessment/Recommendations: This patient is a 80 yo male with DM1, CAD and CKD3 here being worked up for possible NSTEMI who has a 1 month hx of left hallux toe ulcer.    - will d/w staff about if/when angiography w poss intervention        Subjective:     Chief Complaint/Reason for Admission: left great toe ulcer    History of Present Illness: Mr. Herman is a 80 yo man directly admitted from podiatry clinic for a left great toe wound. His medical history is significant for HTN, HLD, DM-1, CAD s/p CABG x2 (left SVG-LAD, LIMA-1st Diag), MINDA, CLL (chronic leukocytosis with baseline WBC 53k), and PAD. On admit, ID was consulted as well as Cardiology for abnormal EKG changes and elevated troponin to 1.1 and is being evaluated for possible NSTEMI. Vascular surgery is consulted for left great toe wound. The patient states he developed the ulcer on April 15 after standing all day long. MRI shows early osteomyelitis. VAS ABIs done in April show good waveforms bilaterally but non compressibility. Arterial duplex yesterday was not able to obtain LEONIE on left, and was 1.2 on right.     Of note, he had DUC x2 placed in January of this year for NSTEMI. Today he does report increased chest discomfort over the past two weeks as well as DIOR, new LE edema, orthopnea and 12 lb weight gain. On ASA, plavix, statin.  ECHO shows EF 20%.    Facility-Administered Medications Prior to Admission   Medication Dose Route Frequency Provider Last Rate Last Dose    nitroGLYCERIN 0.4 MG/DOSE TL SPRY 400 mcg/spray spray 1 spray  1 spray Sublingual Q5 Min PRN Marcos Alexander MD   1 spray at 01/11/19 1335     Medications Prior to Admission   Medication Sig Dispense Refill Last Dose    ACCU-CHEK FASTCLIX  LANCET DRUM Misc TEST 6 TIMES DAILY 550 each 3 5/29/2019 at Unknown time    alpha lipoic acid 600 mg Cap Take 1 capsule by mouth once daily.     5/29/2019 at Unknown time    ampicillin (PRINCIPEN) 500 MG capsule Take 1 capsule (500 mg total) by mouth 4 (four) times daily. for 10 days 40 capsule 0 5/29/2019 at Unknown time    aspirin (ECOTRIN) 81 MG EC tablet Take 1 tablet (81 mg total) by mouth once daily.  0 5/29/2019 at Unknown time    atorvastatin (LIPITOR) 80 MG tablet TAKE 1/2 TABLET NIGHTLY. (Patient taking differently: 40 mg. ) 45 tablet 3 5/29/2019 at Unknown time    BD INSULIN SYRINGE ULT-FINE II 0.3 mL 31 gauge x 5/16 Syrg    5/29/2019 at Unknown time    blood sugar diagnostic (ACCU-CHEK SHARON PLUS TEST STRP) Strp Check sugar a 5x a day. 450 strip 3 5/29/2019 at Unknown time    blood-glucose meter,continuous (DEXCOM G6 ) Misc Use as directed, change every 3 years 1 each 0 5/29/2019 at Unknown time    blood-glucose sensor (DEXCOM G6 SENSOR) Doris Change every 10 days. 3 Device 6 5/29/2019 at Unknown time    blood-glucose transmitter (DEXCOM G6 TRANSMITTER) Doris Change every 3 months. 1 Device 1 5/29/2019 at Unknown time    clopidogrel (PLAVIX) 75 mg tablet Take 4 tablets x 1 followed by one tablet daily 90 tablet 4 5/29/2019 at Unknown time    lisinopril-hydrochlorothiazide (PRINZIDE,ZESTORETIC) 20-12.5 mg per tablet Take 1 tablet by mouth 2 (two) times daily. 180 tablet 3 5/29/2019 at Unknown time    metFORMIN (GLUCOPHAGE) 500 MG tablet TAKE 1 TABLET TWICE DAILY 180 tablet 2 5/29/2019 at Unknown time    metoprolol succinate (TOPROL-XL) 100 MG 24 hr tablet Take 1 tablet (100 mg total) by mouth every evening. 90 tablet 3 5/29/2019 at Unknown time    metoprolol succinate (TOPROL-XL) 50 MG 24 hr tablet Take 1 tablet (50 mg total) by mouth once daily. 30 tablet 11 5/29/2019 at Unknown time    clotrimazole-betamethasone 1-0.05% (LOTRISONE) cream APPLY EXTERNALLY TO THE AFFECTED AREA TWICE  DAILY 15 g 0 Taking    docusate sodium (COLACE) 100 MG capsule Take 100 mg by mouth 2 (two) times daily.   Taking    doxycycline (MONODOX) 100 MG capsule Take 1 capsule (100 mg total) by mouth 2 (two) times daily. for 10 days 20 capsule 0 Taking    fluticasone (FLONASE) 50 mcg/actuation nasal spray 1 spray by Each Nare route as needed for Rhinitis.   Taking    glucagon, human recombinant, (GLUCAGON EMERGENCY KIT, HUMAN,) 1 mg SolR Inject 1 mg into the muscle as needed. 1 each 3 Taking    insulin aspart U-100 (NOVOLOG U-100 INSULIN ASPART) 100 unit/mL injection Uses insulin pump, max daily 100 units. 30 mL 11 Taking    insulin pump cartridge (OMNIPOD DASH INSULIN POD) Crtg Change every 3 days. 10 each 11 Taking    insulin pump controller (OMNIPOD DASH PDM KIT) Misc Use as directed 1 each 0 Taking    [id number]-algal oil (METANX/FOLTANX RF) 3 mg-35 mg-2 mg -90.314 mg Cap Take 1 tablet by mouth once daily. 90 capsule 5 Taking    multivitamin capsule Take 1 capsule by mouth once daily.     Taking    nitroGLYCERIN (NITROSTAT) 0.4 MG SL tablet Place 1 tablet (0.4 mg total) under the tongue every 5 (five) minutes as needed for Chest pain. May dispense a two pack of 25 tablets. 100 tablet 3 Taking    vit A/vit C/vit E/zinc/copper (PRESERVISION AREDS ORAL) Take 1 tablet by mouth 2 (two) times daily.   Taking       Review of patient's allergies indicates:  No Known Allergies    Past Medical History:   Diagnosis Date    Anemia     Back pain     Basal cell carcinoma 06/08/2015    left nasal ala    CAD (coronary artery disease) 10/1/2012    Cataract     DDD (degenerative disc disease), lumbar 10/28/2014    Diabetes mellitus     Diabetes mellitus type I     Diabetic retinopathy of both eyes     Hyperlipidemia     Hypertension     Insulin pump in place     Obesity     Poorly controlled type 1 diabetes mellitus with peripheral neuropathy 10/1/2012    Preseptal cellulitis of right eye 8/17/15     S/P CABG x 2 2/14/2014    1994     Sleep apnea     Squamous cell carcinoma 03/18/2013    right posterior ear    Squamous cell carcinoma 07/26/2017    scalp    Squamous cell carcinoma 08/02/2018    Right Scalp/MOHS    Trouble in sleeping     Type 1 diabetes, uncontrolled, with retinopathy 10/1/2012    Type II or unspecified type diabetes mellitus without mention of complication, not stated as uncontrolled     Vitreous hemorrhage 8/17/2014     Past Surgical History:   Procedure Laterality Date    ANGIOGRAM, CORONARY ARTERY N/A 1/29/2019    Performed [facility] at [facility] CATH LAB    APPENDECTOMY  1951    BLOCK-NERVE-MEDIAL BRANCH-LUMBAR Bilateral 3/10/2015    [facility]    BLOCK-NERVE-MEDIAL BRANCH-LUMBAR Bilateral 11/25/2014    [facility]    CATARACT EXTRACTION  4/8/13    right eye (toric)    CATARACT EXTRACTION  4/29/13    left eye (toric)    COLONOSCOPY N/A 4/25/2017    Performed by CLARISSA Freeman [facility] at [facility] ENDO (4TH FLR)    COLONOSCOPY N/A 4/25/2014    Performed by CL[facility]SA Freeman [facility] at [facility] ENDO (4TH FLR)    CORONARY ARTERY BYPASS GRAFT  1994    x 3    EYE SURGERY      cataracts, both eyes    FINGER TENDON REPAIR  2011    left hand    INJECTION-FACET Bilateral 3/31/2015    [facility]    INJECTION-STEROID-EPIDURAL-LUMBAR N/A 10/28/2014    [facility]    INSERTION, IOL PROSTHESIS Left 4/29/2013    [facility] at [facility] OR    INSERTION, IOL PROSTHESIS Right 4/8/2013    [facility] at [facility] OR    INSERTION, STENT, CORONARY ARTERY N/A 1/29/2019    Performed by [facility] at [facility] CATH LAB    MINIMALLY INVASIVE FUSION-TRANSLUMINAL LUMBAR INTERBODY (TLIF) Left 5/18/2015    Performed by Tee Pinedo MD at [facility] OR 2ND FLR    PHACOEMULSIFICATION, CATARACT Left 4/29/2013     [facility]    PHACOEMULSIFICATION, CATARACT Right 4/8/2013    [facility] at [facility] OR    RADIOFREQUENCY THERMOCOAGULATION (RFTC)-NERVE-MEDIAN BRANCH-LUMBAR Left 1/13/2015    [facility]formed by [facility]    RADIOFREQUENCY THERMOCOAGULATION (RFTC)-NERVE-MEDIAN BRANCH-LUMBAR Right 12/30/2014    [facility]    SKIN BIOPSY  2013    multiple    SPINE SURGERY  2015    TLIF    TONSILLECTOMY  1947     Family History     Problem Relation (Age of Onset)    Acute myelogenous leukemia Sister    Alzheimer's disease Father    Breast cancer Mother    Cancer Mother, Sister (60)    Colon cancer Sister    Dementia Father    Diabetes Maternal Grandfather, Paternal Aunt, Paternal Uncle    Stroke Father        Tobacco Use    Smoking status: Never Smoker    Smokeless tobacco: Never Used   Substance and Sexual Activity    Alcohol use: Yes     Alcohol/week: 0.6 oz     Types: 1 Glasses of wine per week     Comment: social once monthly    Drug use: No    Sexual activity: Not Currently     Partners: Female     Review of Systems   Constitutional: Positive for activity change, diaphoresis and unexpected weight change. Negative for chills, fatigue and fever.   HENT: Negative.    Respiratory: Positive for chest tightness and shortness of breath.    Cardiovascular: Positive for chest pain and leg swelling. Negative for palpitations.   Gastrointestinal: Negative.    Endocrine: Negative.    Genitourinary: Negative for decreased urine volume and difficulty urinating.   Musculoskeletal: Positive for arthralgias.   Skin: Positive for wound.   Allergic/Immunologic: Negative.    Neurological: Negative for dizziness, tremors, syncope, speech difficulty, weakness, light-headedness and numbness.   Psychiatric/Behavioral: Negative for confusion.     Objective:     Vital Signs (Most Recent):  Temp: 98.1 °F (36.7 °C) (05/30/19  1616)  Pulse: 66 (05/30/19 1616)  Resp: 14 (05/30/19 1616)  BP: 134/63 (05/30/19 1616)  SpO2: 99 % (05/30/19 1616) Vital Signs (24h Range):  Temp:  [96.7 °F (35.9 °C)-98.1 °F (36.7 °C)] 98.1 °F (36.7 °C)  Pulse:  [60-71] 66  Resp:  [14-27] 14  SpO2:  [95 %-100 %] 99 %  BP: (102-146)/(50-71) 134/63     Weight: 97.5 kg (215 lb)  Body mass index is 33.67 kg/m².    Date 05/30/19 0700 - 05/31/19 0659   Shift 9374-6356 4950-2433 5458-5399 24 Hour Total   INTAKE   Shift Total(mL/kg)       OUTPUT   Urine(mL/kg/hr) 300(0.4)   300   Shift Total(mL/kg) 300(3.1)   300(3.1)   Weight (kg) 97.5 97.5 97.5 97.5       Physical Exam   Constitutional: He is oriented to person, place, and time. He appears well-developed and well-nourished. No distress.   Cardiovascular: Normal rate, regular rhythm and normal heart sounds.   No murmur heard.  Pulses:       Radial pulses are 1+ on the right side, and 1+ on the left side.        Femoral pulses are 1+ on the right side, and 1+ on the left side.       Popliteal pulses are 2+ on the right side, and 2+ on the left side.        Dorsalis pedis pulses are 0 on the right side, and 0 on the left side.        Posterior tibial pulses are 0 on the right side, and 0 on the left side.   Bilateral brachial 2+ pulses  Monophasic DP signals bilaterally  Biphasic PT signals bilaterally, non palpable   Pulmonary/Chest: Effort normal and breath sounds normal. No respiratory distress. He has no wheezes. He has no rales.   Abdominal: Soft.   Musculoskeletal: Normal range of motion.   Feet:   Left Foot:   Skin Integrity: Positive for ulcer (hallux, tender to palpation with surrounding erythema no purulence).   Neurological: He is alert and oriented to person, place, and time.   Skin: Skin is dry. Capillary refill takes less than 2 seconds.   Bilateral legs cool to touch below knees   Psychiatric: He has a normal mood and affect. His behavior is normal.   Nursing note and vitals reviewed.      Significant  Labs:  CBC:   Recent Labs   Lab 05/30/19  1149   WBC 50.43*   RBC 3.61*   HGB 11.6*   HCT 36.1*      *   MCH 32.1*   MCHC 32.1     CMP:   Recent Labs   Lab 05/30/19  0806   *   CALCIUM 9.8   ALBUMIN 3.9   PROT 6.3   *   K 5.4*   CO2 23   CL 91*   BUN 28*   CREATININE 1.4   ALKPHOS 105   ALT 38   AST 27   BILITOT 0.8       Significant Diagnostics:  reviewed    Assessment/Plan:     Active Diagnoses:    Diagnosis Date Noted POA    PRINCIPAL PROBLEM:  Cellulitis of Left foot [L03.119] 05/29/2019 Yes    Open toe wound [S91.109A] 05/30/2019 Yes    Diabetic ulcer of left great toe [E11.621, L97.529] 05/30/2019 Yes    Foot abscess, left [L02.612] 05/29/2019 Yes    Orthopnea [R06.01] 05/29/2019 Yes    Acute HF (heart failure) [I50.9] 05/29/2019 Yes    Hyponatremia [E87.1] 05/29/2019 Yes    Hyperkalemia [E87.5] 01/22/2019 Yes    CKD (chronic kidney disease), stage III [N18.3] 06/24/2016 Yes    Peripheral vascular disease [I73.9] 10/31/2014 Yes    MINDA on CPAP [G47.33, Z99.89]  Not Applicable    HTN (hypertension) [I10] 04/10/2013 Yes    Hyperlipidemia [E78.5] 04/10/2013 Yes    Diabetic polyneuropathy associated with type 1 diabetes mellitus [E10.42] 10/01/2012 Yes    Controlled type 1 diabetes mellitus with diabetic neuropathy, with long-term current use of insulin [E10.40] 10/01/2012 Yes    CLL (chronic lymphocytic leukemia) [C91.90] 08/17/2012 Yes      Problems Resolved During this Admission:     Lab Results:  Lab Results   Component Value Date    K 4.8 05/31/2019    K 4.2 05/31/2019    K 4.9 05/30/2019    CREATININE 1.5 (H) 05/31/2019    CREATININE 1.3 05/31/2019    CREATININE 1.4 05/30/2019     Lab Results   Component Value Date    WBC 51.07 (HH) 05/31/2019    WBC 51.07 (HH) 05/31/2019    WBC 50.43 (HH) 05/30/2019    HCT 32.5 (L) 05/31/2019    HCT 32.5 (L) 05/31/2019    HCT 36.1 (L) 05/30/2019     05/31/2019     05/31/2019     05/30/2019     Lab Results    Component Value Date    HGBA1C 5.8 (H) 05/29/2019    HGBA1C 6.5 (H) 05/02/2019    HGBA1C 5.8 (H) 01/21/2019       Thank you for your consult. I will follow-up with patient. Please contact us if you have any additional questions.    Mariana Perez MD  Vascular Surgery  [facility]    Vascular Attending  Agree with above    78 yo man h/o HTN, HLD, DM I, CAD s/p CABG x2 (left SVG-LAD, LIMA-1st Diag), CHF (EF 20%), MINDA, and CLL (chronic leukocytosis with baseline WBC 53k) admitted with L 1st toe medial superficial ulceration and osteomyelitis.   2+ femorals and popliteals x2 but unable to palpate pedal pulses  Does have 2+ pitting edema x2    WBC 51 (CLL)  Cr 1.5    Please optimize fluid status (ie gentle diuresis) and renal function; will plan for L leg angio, R CFA approach and attempt to establish in-line flow to the L foot: 6/3/19 in AM  DAPT, statin rx    Luis Rod MD FACS

## 2019-05-30 NOTE — H&P (VIEW-ONLY)
[facility]  Interventional Cardiology  Consult Note    Patient Name: Reid Herman  MRN: [id number]  Admission Date: 5/29/2019  Hospital Length of Stay: 1 days  Code Status: Full Code   Attending Provider: Elis Gutierrez MD  Consulting Provider: Joseline Mathis MD  Primary Care Physician: Olivia Zavala MD  Principal Problem:Cellulitis of foot    Patient information was obtained from patient and ER records.     Inpatient consult to Interventional Cardiology  Consult performed by: Joseline Juares MD  Consult ordered by: Elis Gutierrez MD        Subjective:     Chief Complaint:  Chest pain      HPI:  Mr. Herman is a 79yoM, IC for new EKG changes and stable angina CCS II. PMHx of CAD s/p CABG (SVG-LAD, LIMA-1st Diag) with recent 01/29/19 NSTEMI s/p PCI DUC Mid-distal LCx (Dr. Ny), Hypertension, HLD, MINDA on CPAP, CLL, PVD who was admitted for worsening Left greater toe ulceration by Podiatry. Patient states prior to his PCI 01/2019 had significant anginal symptoms with chest pain, SOB, DIOR having to take SL NTG on constant basis, completed PET stress which was possible for inducible ischemia at the [place] / inferoseptal, for which completed [facility] 01/29/19 with Dr. Ny and demonstrated ( 80-90% Mid-distal LCx,  p-LAD, LIMA-1st Diag 50% stenosis, SVG-LAD 60% stenosis, native %, he had DUC x2 mid-distal LCx. Following this noted improvement in his anginal symptoms only having to use SL NTG with heavy exertional activity. On 04/2019 found to have a Left greater toe blister for which he had seen wound care and started on doxycycline, eventually worsened and started PO PCN, however now have worsening cellulitis with concerns for OM. Patient does state last 2 weeks noted increasing chest discomfort with minimal activity which is a new change in functional status, having to use around 15 - 20 Sl NTG per week, along with New LE edema, orthopnea, PND, DIOR. Recent 12 lb weight gain.  Denies any dietary or fluid noncompliance. Compliant with medications, taking ASA / Plavix, has not skipped any doses.     Does note have TTE on file, EKG with new ST depression anterior laterally, Troponin 1.1, , WBC 52K (Chronic, at baseline), Bcx x2 NGTD, wound culure 05/27/19 E. Fecalis,     [facility] 01/29/19  · Successful PCI with DUC to distal and mid L. dominant LCX.  · Mid Cx lesion , 80% stenosed reduced to 0%..  · A STENT RESOLUTE INTGRTY 2.50X30 stent was successfully placed at 12 YEISON for 5 sec.  · Dist Cx lesion , 99% stenosed reduced to 0%..  · A STENT RESOLUTE RAUL 2X15 DUC stent was successfully placed at 12 YEISON for 5 sec.  · Origin to Prox LAD Vein Graft lesion , 60% stenosed.  · Ost LAD to Prox LAD lesion , 100% stenosed (chronic).  · 1st Diag lesion , 50% stenosed.  · Ost RCA to Prox RCA lesion , 100% stenosed.  · Estimated blood loss: <50 mL  · Three vessel coronary artery disease.        Past Medical History:   Diagnosis Date    Anemia     Back pain     Basal cell carcinoma 06/08/2015    left nasal ala    CAD (coronary artery disease) 10/1/2012    Cataract     DDD (degenerative disc disease), lumbar 10/28/2014    Diabetes mellitus     Diabetes mellitus type I     Diabetic retinopathy of both eyes     Hyperlipidemia     Hypertension     Insulin pump in place     Obesity     Poorly controlled type 1 diabetes mellitus with peripheral neuropathy 10/1/2012    Preseptal cellulitis of right eye 8/17/15    S/P CABG x 2 2/14/2014    1994     Sleep apnea     Squamous cell carcinoma 03/18/2013    right posterior ear    Squamous cell carcinoma 07/26/2017    scalp    Squamous cell carcinoma 08/02/2018    Right Scalp/[facility]HS    Trouble in sleeping     Type 1 diabetes, uncontrolled, with retinopathy 10/1/2012    Type II or unspecified type diabetes mellitus without mention of complication, not stated as uncontrolled     Vitreous hemorrhage 8/17/2014       Past Surgical History:    Procedure Laterality Date    ANGIOGRAM, CORONARY ARTERY N/A 1/29/2019    Performed by Saturnino Ny MD at [facility] CATH LAB    APPENDECTOMY  1951    BLOCK-NERVE-MEDIAL BRANCH-LUMBAR Bilateral 3/10/2015    [facility] at [facility]    BLOCK-NERVE-MEDIAL BRANCH-LUMBAR Bilateral 11/25/2014    [facility]    CATARACT EXTRACTION  4/8/13    right eye (toric)    CATARACT EXTRACTION  4/29/13    left eye (toric)    COLONOSCOPY N/A 4/25/2017    Performed by CLARISSA Freeman MD at [facility] ENDO (4TH FLR)    COLONOSCOPY N/A 4/25/2014    Performed by CLARISSA Freeman MD at [facility] ENDO (4TH FLR)    CORONARY ARTERY BYPASS GRAFT  1994    x 3    EYE SURGERY      cataracts, both eyes    FINGER TENDON REPAIR  2011    left hand    INJECTION-FACET Bilateral 3/31/2015    [facility] at [facility]    INJECTION-STEROID-EPIDURAL-LUMBAR N/A 10/28/2014    [facility] by [facility] at [facility]    INSERTION, IOL PROSTHESIS Left 4/29/2013    Performed by Soco Sandoval MD at [facility] OR    INSERTION, IOL PROSTHESIS Right 4/8/2013    Performed by Soco Sandoval [facility] at [facility] OR    INSERTION, STENT, CORONARY ARTERY N/A 1/29/2019    Performed by Saturnino Ny MD at [facility] CATH LAB    MINIMALLY INVASIVE FUSION-TRANSLUMINAL LUMBAR INTERBODY (TLIF) Left 5/18/2015    Performed by Tee Pinedo MD at [facility] OR 2ND FLR    PHACOEMULSIFICATION, CATARACT Left 4/29/2013    Performed by Soco [facility]oval [facility] at [facility] OR    PHACOEMULSIFICATION, CATARACT Right 4/8/2013    Performed by Soco [facility]oval [facility] at [facility]    RADIOFREQUENCY THERMOCOAGULATION (RFTC)-NERVE-MEDIAN BRANCH-LUMBAR Left 1/13/2015    Performed by [facility] at [facility]    RADIOFREQUENCY THERMOCOAGULATION (RFTC)-NERVE-MEDIAN BRANCH-LUMBAR Right 12/30/2014    Performed by [facility] at [facility]    SKIN BIOPSY  2013    multiple    SPINE SURGERY   2015    TLIF    TONSILLECTOMY  1947       Review of patient's allergies indicates:  No Known Allergies    Facility-Administered Medications Prior to Admission   Medication    nitroGLYCERIN 0.4 MG/DOSE TL SPRY 400 mcg/spray spray 1 spray     PTA Medications   Medication Sig    ACCU-CHEK FASTCLIX LANCET DRUM Misc TEST 6 TIMES DAILY    alpha lipoic acid 600 mg Cap Take 1 capsule by mouth once daily.      ampicillin (PRINCIPEN) 500 MG capsule Take 1 capsule (500 mg total) by mouth 4 (four) times daily. for 10 days    aspirin (ECOTRIN) 81 MG EC tablet Take 1 tablet (81 mg total) by mouth once daily.    atorvastatin (LIPITOR) 80 MG tablet TAKE 1/2 TABLET NIGHTLY. (Patient taking differently: 40 mg. )    BD INSULIN SYRINGE ULT-FINE II 0.3 mL 31 gauge x 5/16 Syrg     blood sugar diagnostic (ACCU-CHEK SHARON PLUS TEST STRP) Strp Check sugar a 5x a day.    blood-glucose meter,continuous (DEXCOM G6 ) Misc Use as directed, change every 3 years    blood-glucose sensor (DEXCOM G6 SENSOR) Doris Change every 10 days.    blood-glucose transmitter (DEXCOM G6 TRANSMITTER) Doris Change every 3 months.    clopidogrel (PLAVIX) 75 mg tablet Take 4 tablets x 1 followed by one tablet daily    lisinopril-hydrochlorothiazide (PRINZIDE,ZESTORETIC) 20-12.5 mg per tablet Take 1 tablet by mouth 2 (two) times daily.    metFORMIN (GLUCOPHAGE) 500 MG tablet TAKE 1 TABLET TWICE DAILY    metoprolol succinate (TOPROL-XL) 100 MG 24 hr tablet Take 1 tablet (100 mg total) by mouth every evening.    metoprolol succinate (TOPROL-XL) 50 MG 24 hr tablet Take 1 tablet (50 mg total) by mouth once daily.    clotrimazole-betamethasone 1-0.05% (LOTRISONE) cream APPLY EXTERNALLY TO THE AFFECTED AREA TWICE DAILY    docusate sodium (COLACE) 100 MG capsule Take 100 mg by mouth 2 (two) times daily.    doxycycline (MONODOX) 100 MG capsule Take 1 capsule (100 mg total) by mouth 2 (two) times daily. for 10 days    fluticasone (FLONASE) 50  mcg/actuation nasal spray 1 spray by Each Nare route as needed for Rhinitis.    glucagon, human recombinant, (GLUCAGON EMERGENCY KIT, HUMAN,) 1 mg SolR Inject 1 mg into the muscle as needed.    insulin aspart U-100 (NOVOLOG U-100 INSULIN ASPART) 100 unit/mL injection Uses insulin pump, max daily 100 units.    insulin pump cartridge (OMNIPOD DASH INSULIN POD) Crtg Change every 3 days.    insulin pump controller (OMNIPOD DASH PDM KIT) Misc Use as directed    [id number]-algal oil (METANX/FOLTANX RF) 3 mg-35 mg-2 mg -90.314 mg Cap Take 1 tablet by mouth once daily.    multivitamin capsule Take 1 capsule by mouth once daily.      nitroGLYCERIN (NITROSTAT) 0.4 MG SL tablet Place 1 tablet (0.4 mg total) under the tongue every 5 (five) minutes as needed for Chest pain. May dispense a two pack of 25 tablets.    vit A/vit C/vit E/zinc/copper (PRESERVISION AREDS ORAL) Take 1 tablet by mouth 2 (two) times daily.     Family History     Problem Relation (Age of Onset)    Acute myelogenous leukemia Sister    Alzheimer's disease Father    Breast cancer Mother    Cancer Mother, Sister (60)    Colon cancer Sister    Dementia Father    Diabetes Maternal Grandfather, Paternal Aunt, Paternal Uncle    Stroke Father        Tobacco Use    Smoking status: Never Smoker    Smokeless tobacco: Never Used   Substance and Sexual Activity    Alcohol use: Yes     Alcohol/week: 0.6 oz     Types: 1 Glasses of wine per week     Comment: social once monthly    Drug use: No    Sexual activity: Not Currently     Partners: Female     Review of Systems   Constitution: Positive for malaise/fatigue and weight gain. Negative for chills, decreased appetite, diaphoresis, fever and weight loss.   Eyes: Negative for blurred vision.   Cardiovascular: Positive for chest pain, dyspnea on exertion, leg swelling, orthopnea, palpitations and paroxysmal nocturnal dyspnea. Negative for irregular heartbeat and near-syncope.   Respiratory: Positive for  cough and shortness of breath. Negative for snoring and wheezing.    Skin: Positive for color change, nail changes and poor wound healing.   Gastrointestinal: Negative for abdominal pain, nausea and vomiting.   Genitourinary: Negative for bladder incontinence and urgency.     Objective:     Vital Signs (Most Recent):  Temp: 97.8 °F (36.6 °C) (05/30/19 0900)  Pulse: 68 (05/30/19 0900)  Resp: 14 (05/30/19 0900)  BP: (!) 102/50 (05/30/19 0900)  SpO2: 99 % (05/30/19 0900) Vital Signs (24h Range):  Temp:  [96.7 °F (35.9 °C)-97.8 °F (36.6 °C)] 97.8 °F (36.6 °C)  Pulse:  [60-71] 68  Resp:  [14-27] 14  SpO2:  [95 %-100 %] 99 %  BP: (102-146)/(50-71) 102/50     Weight: 97.8 kg (215 lb 9.8 oz)  Body mass index is 33.77 kg/m².    SpO2: 99 %  O2 Device (Oxygen Therapy): room air      Intake/Output Summary (Last 24 hours) at 5/30/2019 1350  Last data filed at 5/30/2019 0700  Gross per 24 hour   Intake 625 ml   Output 1750 ml   Net -1125 ml       Lines/Drains/Airways     Peripheral Intravenous Line                 Peripheral IV - Single Lumen 05/29/19 1835 20 G Anterior;Right Hand less than 1 day                Physical Exam   Constitutional: He is oriented to person, place, and time. He appears well-developed and well-nourished. No distress.   Neck: JVD present.   Cardiovascular: Normal rate, regular rhythm, normal heart sounds and intact distal pulses. Exam reveals no gallop and no friction rub.   No murmur heard.  Pulses:       Carotid pulses are 2+ on the right side, and 2+ on the left side.       Radial pulses are 2+ on the right side, and 2+ on the left side.        Femoral pulses are 2+ on the right side, and 2+ on the left side.       Popliteal pulses are 1+ on the right side, and 1+ on the left side.        Dorsalis pedis pulses are 1+ on the right side, and 1+ on the left side.        Posterior tibial pulses are 1+ on the right side, and 1+ on the left side.   Pulmonary/Chest: Effort normal. No respiratory distress. He  has no wheezes. He has rales in the right lower field, the left middle field and the left lower field. He exhibits no tenderness.   Abdominal: Soft. Bowel sounds are normal. He exhibits distension. He exhibits no mass. There is no tenderness. There is no rebound and no guarding.   Musculoskeletal: Normal range of motion. He exhibits edema.   Neurological: He is alert and oriented to person, place, and time.   Skin: Skin is warm and dry. Rash noted. He is not diaphoretic. There is erythema. There is pallor.        Nursing note and vitals reviewed.      Significant Labs:   CMP   Recent Labs   Lab 05/29/19  1756 05/30/19  0026 05/30/19  0806   * 127* 126*   K 5.5* 4.7 5.4*   CL 94* 92* 91*   CO2 23 23 23   * 149* 225*   BUN 28* 27* 28*   CREATININE 1.3 1.2 1.4   CALCIUM 9.8 10.0 9.8   PROT 6.4 6.4 6.3   ALBUMIN 3.8 4.0 3.9   BILITOT 0.6 0.9 0.8   ALKPHOS 108 108 105   AST 26 27 27   ALT 38 38 38   ANIONGAP 10 12 12   ESTGFRAFRICA >60.0 >60.0 55.6*   EGFRNONAA 52.6* 58.0* 48.1*   , CBC   Recent Labs   Lab 05/29/19  1756 05/30/19  0354 05/30/19  1149   WBC 56.50* 51.62* 50.43*   HGB 11.5* 12.1* 11.6*   HCT 35.3* 35.8* 36.1*    172 193   , INR   Recent Labs   Lab 05/30/19  1149   INR 1.1    and Lipid Panel No results for input(s): CHOL, HDL, LDLCALC, TRIG, CHOLHDL in the last 48 hours.    Significant Imaging: Echocardiogram: Transthoracic echo (TTE) complete (Cupid Only): No results found. However, due to the size of the patient record, not all encounters were searched. Please check Results Review for a complete set of results.    Assessment and Plan:     Acute HF (heart failure)  Mr. Herman is a 79yoM, IC for new EKG changes and crescendo angina CCS II. Who was admitted for worsening Left greater toe ulceration by Podiatry.     - PMHx of CAD s/p CABG (SVG-LAD, LIMA-1st Diag) with recent 01/29/19 [facility] s/p PCI DUC Mid-distal LCx x2 (Dr. Ny), Hypertension, HLD, MINDA on CPAP, CLL, PVD    - [facility]  01/29/19 with Dr. Ny and demonstrated ( 80-90% Mid-distal LCx,  p-LAD, LIMA-1st Diag 50% stenosis, SVG-LAD 60% stenosis, native %,   - Does note have TTE on file,    - EKG with new ST depression anterior laterally   - Troponin 1.1, , WBC 52K (Chronic, at baseline)   - Bcx x2 NGTD, wound culure 05/27/19 E. Fecalis,     - Plans below discussed with Dr. Farmer.    - Please call intervention team if patient develops new EKG changes or intractable angina to medical therapy   - Currently asymptomatic and free of chest pain, however recent history concerning with worsening exacerbations, increasing SL NTG and ADHF with new EKG changes and troponin 1.1 possible for silent infract vs. ISR (?plavix nonresponder). In the setting of active infection and ADHF, no urgent indication for intervention at this time.   - Cardiology consult team following, Medicine team aware, agree with treating for ACS for 48 hours  - Agree with aggressive diuresis   - Once patient optimized from HF and infectious standpoint will pursue C   - PRU pending (may need to switch pending levels)  - Titrate up anti-anginals as needed (increase Imdur)  - Obtain 2d TTE with contrast for WMA assessment and EF  - Trend troponin                   VTE Risk Mitigation (From admission, onward)        Ordered     heparin 25,000 units in dextrose 5% (100 units/ml) IV bolus from bag INITIAL BOLUS (max bolus 4000 units)  Once      05/30/19 1131     heparin 25,000 units in dextrose 5% 250 mL (100 units/mL) infusion LOW INTENSITY nomogram - [facility]  Continuous      05/30/19 1131     heparin 25,000 units in dextrose 5% (100 units/ml) IV bolus from bag - ADDITIONAL PRN BOLUS - 30 units/kg (max bolus 4000 units)  As needed (PRN)      05/30/19 1131     heparin 25,000 units in dextrose 5% (100 units/ml) IV bolus from bag - ADDITIONAL PRN BOLUS - 60 units/kg (max bolus 4000 units)  As needed (PRN)      05/30/19 1131     IP VTE LOW RISK PATIENT  Once       05/29/19 6975          Thank you for your consult. I will follow-up with patient. Please contact us if you have any additional questions.    Joseline Mathis MD  Interventional Cardiology   [facility]

## 2019-05-30 NOTE — ASSESSMENT & PLAN NOTE
- Patient with symptoms of orthopnea and dyspnea on exertion   - Patient on HCTZ at home no loop diuretic   - BNP elevated at 2 k ,   - PE notable for  JVD and bilateral pitting edema, abdominal distension    - Xray on my read be bilateral edema   - precipitating factor is likely infected ulcer in a patient with PAD   -Echocardiogram: 2D echo with color flow doppler most recent not in file he had a pet stress in January showing EF of 40     PLAN   - IV diuresis with Lasix 40 IV BID and monitor response.  Goal diuresis 2-3L/day  - Strict I/O   - Daily weights  - Keep mag >2 and phos> 3  - Wean oxygen as tolerated   - Cardiac diet with decreased salt intake

## 2019-05-30 NOTE — NURSING
Patient identified by 2 identifiers. Denies previous reactions to blood transfusions, allergies reviewed & procedure explained.  20 g IV in place to Rt Hand, flushed w/ 10cc NS pre & post contrast administration.  3cc Optison administered, echo images obtained.  Pt tolerated procedure well.

## 2019-05-30 NOTE — SUBJECTIVE & OBJECTIVE
Scheduled Meds:   aspirin  81 mg Oral Daily    atorvastatin  40 mg Oral QHS    clopidogrel  75 mg Oral Daily    furosemide  40 mg Intravenous BID    insulin detemir U-100  15 Units Subcutaneous QHS    metoprolol succinate  150 mg Oral QHS    vancomycin (VANCOCIN) IVPB  1,500 mg Intravenous Q24H     Continuous Infusions:  PRN Meds:acetaminophen, dextrose 50%, dextrose 50%, glucagon (human recombinant), glucose, glucose, insulin aspart U-100, sodium chloride 0.9%    Review of patient's allergies indicates:  No Known Allergies     Past Medical History:   Diagnosis Date    Anemia     Back pain     Basal cell carcinoma 06/08/2015    left nasal ala    CAD (coronary artery disease) 10/1/2012    Cataract     DDD (degenerative disc disease), lumbar 10/28/2014    Diabetes mellitus     Diabetes mellitus type I     Diabetic retinopathy of both eyes     Hyperlipidemia     Hypertension     Insulin pump in place     Obesity     Poorly controlled type 1 diabetes mellitus with peripheral neuropathy 10/1/2012    Preseptal cellulitis of right eye 8/17/15    S/P CABG x 2 2/14/2014    1994     Sleep apnea     Squamous cell carcinoma 03/18/2013    right posterior ear    Squamous cell carcinoma 07/26/2017    scalp    Squamous cell carcinoma 08/02/2018    Right Scalp/MOHS    Trouble in sleeping     Type 1 diabetes, uncontrolled, with retinopathy 10/1/2012    Type II or unspecified type diabetes mellitus without mention of complication, not stated as uncontrolled     Vitreous hemorrhage 8/17/2014     Past Surgical History:   Procedure Laterality Date    ANGIOGRAM, CORONARY ARTERY N/A 1/29/2019    [facility]formed by [facility] at [facility] CATH LAB    APPENDECTOMY  1951    BLOCK-NERVE-MEDIAL BRANCH-LUMBAR Bilateral 3/10/2015    [facility] at [facility]    BLOCK-NERVE-MEDIAL BRANCH-LUMBAR Bilateral 11/25/2014    [facility] by [facility] at [facility]    CATARACT  EXTRACTION  4/8/13    right eye (toric)    CATARACT EXTRACTION  4/29/13    left eye (toric)    COLONOSCOPY N/A 4/25/2017    Performed by CLARISSA Freeman MD at [facility] ENDO (4TH FLR)    COLONOSCOPY N/A 4/25/2014    Performed by CL[facility]SA Freeman [facility] at [facility] ENDO (4TH FLR)    CORONARY ARTERY BYPASS GRAFT  1994    x 3    EYE SURGERY      cataracts, both eyes    FINGER TENDON REPAIR  2011    left hand    INJECTION-FACET Bilateral 3/31/2015    [facility] by [facility] at [facility] MGT    INJECTION-STEROID-EPIDURAL-LUMBAR N/A 10/28/2014    Performed by [facility] at [facility] MGT    INSERTION, IOL PROSTHESIS Left 4/29/2013    [facility] by [facility]oval [facility] at [facility] OR    INSERTION, IOL PROSTHESIS Right 4/8/2013    Performed by Soco [facility]oval [facility] at [facility] OR    INSERTION, STENT, CORONARY ARTERY N/A 1/29/2019    Performed by Saturnino [facility] MD at [facility] CATH LAB    MINIMALLY INVASIVE FUSION-TRANSLUMINAL LUMBAR INTERBODY (TLIF) Left 5/18/2015    Performed by Tee Pinedo MD at [facility] OR 2ND FLR    PHACOEMULSIFICATION, CATARACT Left 4/29/2013    Performed by [facility]oval [facility] at [facility] OR    PHACOEMULSIFICATION, CATARACT Right 4/8/2013    [facility]formed by [facility]o [facility]oval [facility] at [facility] OR    RADIOFREQUENCY THERMOCOAGULATION (RFTC)-NERVE-MEDIAN BRANCH-LUMBAR Left 1/13/2015    [facility]formed by [facility] at [facility]    RADIOFREQUENCY THERMOCOAGULATION (RFTC)-NERVE-MEDIAN BRANCH-LUMBAR Right 12/30/2014    [facility] by [facility] at [facility]    SKIN BIOPSY  2013    multiple    SPINE SURGERY  2015    TLIF    TONSILLECTOMY  1947       Family History     Problem Relation (Age of Onset)    Acute myelogenous leukemia Sister    Alzheimer's disease Father    Breast cancer Mother    Cancer Mother, Sister (60)    Colon cancer Sister    Dementia Father    Diabetes Maternal Grandfather, Paternal Aunt, Paternal Uncle    Stroke Father        Tobacco Use    Smoking status: Never Smoker     Smokeless tobacco: Never Used   Substance and Sexual Activity    Alcohol use: Yes     Alcohol/week: 0.6 oz     Types: 1 Glasses of wine per week     Comment: social once monthly    Drug use: No    Sexual activity: Not Currently     Partners: Female     Review of Systems   Constitutional: Negative for chills and fever.   Respiratory: Positive for shortness of breath.    Gastrointestinal: Negative for nausea and vomiting.   Skin: Positive for color change and wound.     Objective:     Vital Signs (Most Recent):  Temp: 97.8 °F (36.6 °C) (05/30/19 0900)  Pulse: 68 (05/30/19 0900)  Resp: 14 (05/30/19 0900)  BP: (!) 102/50 (05/30/19 0900)  SpO2: 99 % (05/30/19 0900) Vital Signs (24h Range):  Temp:  [96.7 °F (35.9 °C)-97.8 °F (36.6 °C)] 97.8 °F (36.6 °C)  Pulse:  [60-71] 68  Resp:  [14-27] 14  SpO2:  [95 %-100 %] 99 %  BP: (102-146)/(50-71) 102/50     Weight: 97.8 kg (215 lb 9.8 oz)  Body mass index is 33.77 kg/m².    Foot Exam    General  General Appearance: appears stated age and healthy   Orientation: alert and oriented to person, place, and time       Right Foot/Ankle     Inspection and Palpation  Ecchymosis: none  Tenderness: none   Swelling: (+3 edema)  Skin Exam: no abnormal color     Neurovascular  Dorsalis pedis: absent  Posterior tibial: absent  Saphenous nerve sensation: normal  Tibial nerve sensation: normal  Superficial peroneal nerve sensation: normal  Deep peroneal nerve sensation: normal  Sural nerve sensation: normal      Left Foot/Ankle      Inspection and Palpation  Ecchymosis: none  Tenderness: great toe interphalangeal joint   Swelling: (+3 edema)  Skin Exam: skin changes, abnormal color, ulcer and erythema;     Neurovascular  Dorsalis pedis: absent  Posterior tibial: absent  Saphenous nerve sensation: normal  Tibial nerve sensation: normal  Superficial peroneal nerve sensation: normal  Deep peroneal nerve sensation: normal  Sural nerve sensation: normal            Laboratory:  CBC:   Recent  Labs   Lab 05/30/19  0354   WBC 51.62*   RBC 3.77*   HGB 12.1*   HCT 35.8*      MCV 95   MCH 32.1*   MCHC 33.8     CMP:   Recent Labs   Lab 05/30/19  0806   *   CALCIUM 9.8   ALBUMIN 3.9   PROT 6.3   *   K 5.4*   CO2 23   CL 91*   BUN 28*   CREATININE 1.4   ALKPHOS 105   ALT 38   AST 27   BILITOT 0.8     Wound Cultures:   Recent Labs   Lab 05/22/19  0942   LABAERO ENTEROCOCCUS FAECALIS  Many  No other significant isolate         Diagnostic Results:  MRI: I have reviewed all pertinent results/findings within the past 24 hours.  1. Slight marrow edema and subtle T1 hypointense signal involving the distal phalanx of the great toe possibly relating to reactive change or early osteomyelitis.    Clinical Findings:    B/l LE cold to the touch.    Superficial ulceration to the L hallux. Appears to be a thin, adherent eschar with periwound erythema. No drainage, fluctuance or crepitus on exam. Outline of erythema is much improved upon exam; however, purple hue to the forefoot appears when placed at a dependent position.     Dependent    Elevated

## 2019-05-30 NOTE — HPI
This is a 79 year old male who presented as a direct admit from podiatry clinic for a left great toe ulcer. Patient has a PMH HTN, HLD, DM-1(insulin pump), CAD s/p CABG x 2 , MINDA compliant with CPAP, CLL not currently on any therapy, and PAD who presents with left foot ulcer and for the last month that has progressively been worsening.  Patient was on multiple courses of oral antibiotics with no improvement.  He was seen in podiatry clinic yesterday and noted to be worsening so recommended admission for IV antibiotics.  Patient had MRI which could not rule out early osteomyelitis.  Podiatry consulted and discussed possible bone biopsy. Patient was started on IV vancomycin.    Interventional cards has been consulted.  There is a wound culture from 5/22 that grew enterococcus faecalis. Patient has a leukocytosis of 51,000 but has history of CLL and has had this WBC count since January.  Patient has mild pain but does have neuropathy. He denies fevers or chills.

## 2019-05-30 NOTE — SUBJECTIVE & OBJECTIVE
Past Medical History:   Diagnosis Date    Anemia     Back pain     Basal cell carcinoma 06/08/2015    left nasal ala    CAD (coronary artery disease) 10/1/2012    Cataract     DDD (degenerative disc disease), lumbar 10/28/2014    Diabetes mellitus     Diabetes mellitus type I     Diabetic retinopathy of both eyes     Hyperlipidemia     Hypertension     Insulin pump in place     Obesity     Poorly controlled type 1 diabetes mellitus with peripheral neuropathy 10/1/2012    Preseptal cellulitis of right eye 8/17/15    S/P CABG x 2 2/14/2014    1994     Sleep apnea     Squamous cell carcinoma 03/18/2013    right posterior ear    Squamous cell carcinoma 07/26/2017    scalp    Squamous cell carcinoma 08/02/2018    Right Scalp/MOHS    Trouble in sleeping     Type 1 diabetes, uncontrolled, with retinopathy 10/1/2012    Type II or unspecified type diabetes mellitus without mention of complication, not stated as uncontrolled     Vitreous hemorrhage 8/17/2014       Past Surgical History:   Procedure Laterality Date    ANGIOGRAM, CORONARY ARTERY N/A 1/29/2019    Performed by Saturnino Ny MD at [facility] CATH LAB    APPENDECTOMY  1951    BLOCK-NERVE-MEDIAL BRANCH-LUMBAR Bilateral 3/10/2015    Performed by [facility] at [facility] MGT    BLOCK-NERVE-MEDIAL BRANCH-LUMBAR Bilateral 11/25/2014    [facility]    CATARACT EXTRACTION  4/8/13    right eye (toric)    CATARACT EXTRACTION  4/29/13    left eye (toric)    COLONOSCOPY N/A 4/25/2017    Performed by CLARISSA Freeman MD at [facility] ENDO (4TH FLR)    COLONOSCOPY N/A 4/25/2014    Performed by CLARISSA Freeman MD at [facility] ENDO (4TH FLR)    CORONARY ARTERY BYPASS GRAFT  1994    x 3    EYE SURGERY      cataracts, both eyes    FINGER TENDON REPAIR  2011    left hand    INJECTION-FACET Bilateral 3/31/2015    Performed by [facility] MD at [facility] [place] MGT    INJECTION-STEROID-EPIDURAL-LUMBAR N/A  10/28/2014    [facility] at [facility] MGT    INSERTION, IOL PROSTHESIS Left 4/29/2013    [facility]formed by Soco [facility]oval [facility] at [facility] OR    INSERTION, IOL PROSTHESIS Right 4/8/2013    [facility] by Soco [facility] at [facility] OR    INSERTION, STENT, CORONARY ARTERY N/A 1/29/2019    [facility] by [facility] at [facility] CATH LAB    MINIMALLY INVASIVE FUSION-TRANSLUMINAL LUMBAR INTERBODY (TLIF) Left 5/18/2015    Performed by Tee Pinedo MD at [facility] OR 2ND FLR    PHACOEMULSIFICATION, CATARACT Left 4/29/2013    Performed by Soco [facility]oval [facility] at [facility] OR    PHACOEMULSIFICATION, CATARACT Right 4/8/2013    Performed by Soc[facility] at [facility] OR    RADIOFREQUENCY THERMOCOAGULATION (RFTC)-NERVE-MEDIAN BRANCH-LUMBAR Left 1/13/2015    [facility]    RADIOFREQUENCY THERMOCOAGULATION (RFTC)-NERVE-MEDIAN BRANCH-LUMBAR Right 12/30/2014    [facility]    SKIN BIOPSY  2013    multiple    SPINE SURGERY  2015    TLIF    TONSILLECTOMY  1945       Review of patient's allergies indicates:  No Known Allergies    No current facility-administered medications on file prior to encounter.      Current Outpatient Medications on File Prior to Encounter   Medication Sig    ACCU-CHEK FASTCLIX LANCET DRUM Misc TEST 6 TIMES DAILY    alpha lipoic acid 600 mg Cap Take 1 capsule by mouth once daily.      ampicillin (PRINCIPEN) 500 MG capsule Take 1 capsule (500 mg total) by mouth 4 (four) times daily. for 10 days    aspirin (ECOTRIN) 81 MG EC tablet Take 1 tablet (81 mg total) by mouth once daily.    atorvastatin (LIPITOR) 80 MG tablet TAKE 1/2 TABLET NIGHTLY. (Patient taking differently: 40 mg. )    BD INSULIN SYRINGE ULT-FINE II 0.3 mL 31 gauge x 5/16 Syrg     blood sugar diagnostic (ACCU-CHEK SHARON PLUS TEST STRP) Strp Check sugar a 5x a day.    blood-glucose meter,continuous (DEXCOM G6 ) Misc Use as directed, change  every 3 years    blood-glucose sensor (DEXCOM G6 SENSOR) Doris Change every 10 days.    blood-glucose transmitter (DEXCOM G6 TRANSMITTER) Doris Change every 3 months.    clopidogrel (PLAVIX) 75 mg tablet Take 4 tablets x 1 followed by one tablet daily    lisinopril-hydrochlorothiazide (PRINZIDE,ZESTORETIC) 20-12.5 mg per tablet Take 1 tablet by mouth 2 (two) times daily.    metFORMIN (GLUCOPHAGE) 500 MG tablet TAKE 1 TABLET TWICE DAILY    metoprolol succinate (TOPROL-XL) 100 MG 24 hr tablet Take 1 tablet (100 mg total) by mouth every evening.    metoprolol succinate (TOPROL-XL) 50 MG 24 hr tablet Take 1 tablet (50 mg total) by mouth once daily.    clotrimazole-betamethasone 1-0.05% (LOTRISONE) cream APPLY EXTERNALLY TO THE AFFECTED AREA TWICE DAILY    docusate sodium (COLACE) 100 MG capsule Take 100 mg by mouth 2 (two) times daily.    doxycycline (MONODOX) 100 MG capsule Take 1 capsule (100 mg total) by mouth 2 (two) times daily. for 10 days    fluticasone (FLONASE) 50 mcg/actuation nasal spray 1 spray by Each Nare route as needed for Rhinitis.    glucagon, human recombinant, (GLUCAGON EMERGENCY KIT, HUMAN,) 1 mg SolR Inject 1 mg into the muscle as needed.    insulin aspart U-100 (NOVOLOG U-100 INSULIN ASPART) 100 unit/mL injection Uses insulin pump, max daily 100 units.    insulin pump cartridge (OMNIPOD DASH INSULIN POD) Crtg Change every 3 days.    insulin pump controller (OMNIPOD DASH PDM KIT) Misc Use as directed    [id number]-algal oil (METANX/FOLTANX RF) 3 mg-35 mg-2 mg -90.314 mg Cap Take 1 tablet by mouth once daily.    multivitamin capsule Take 1 capsule by mouth once daily.      nitroGLYCERIN (NITROSTAT) 0.4 MG SL tablet Place 1 tablet (0.4 mg total) under the tongue every 5 (five) minutes as needed for Chest pain. May dispense a two pack of 25 tablets.    vit A/vit C/vit E/zinc/copper (PRESERVISION AREDS ORAL) Take 1 tablet by mouth 2 (two) times daily.     Family History      Problem Relation (Age of Onset)    Acute myelogenous leukemia Sister    Alzheimer's disease Father    Breast cancer Mother    Cancer Mother, Sister (60)    Colon cancer Sister    Dementia Father    Diabetes Maternal Grandfather, Paternal Aunt, Paternal Uncle    Stroke Father        Tobacco Use    Smoking status: Never Smoker    Smokeless tobacco: Never Used   Substance and Sexual Activity    Alcohol use: Yes     Alcohol/week: 0.6 oz     Types: 1 Glasses of wine per week     Comment: social once monthly    Drug use: No    Sexual activity: Not Currently     Partners: Female     Review of Systems   Constitution: Positive for malaise/fatigue and weight gain. Negative for chills and fever.   HENT: Negative for congestion.    Eyes: Negative for visual disturbance.   Cardiovascular: Positive for chest pain, dyspnea on exertion, leg swelling and orthopnea. Negative for palpitations.   Respiratory: Positive for shortness of breath. Negative for cough.    Skin: Positive for color change and poor wound healing.   Gastrointestinal: Negative for abdominal pain, nausea and vomiting.   Genitourinary: Negative for hematuria.   Neurological: Negative for headaches and light-headedness.     Objective:     Vital Signs (Most Recent):  Temp: 97.8 °F (36.6 °C) (05/30/19 0900)  Pulse: 68 (05/30/19 0900)  Resp: 14 (05/30/19 0900)  BP: (!) 102/50 (05/30/19 0900)  SpO2: 99 % (05/30/19 0900) Vital Signs (24h Range):  Temp:  [96.7 °F (35.9 °C)-97.8 °F (36.6 °C)] 97.8 °F (36.6 °C)  Pulse:  [60-71] 68  Resp:  [14-27] 14  SpO2:  [95 %-100 %] 99 %  BP: (102-146)/(50-71) 102/50     Weight: 97.8 kg (215 lb 9.8 oz)  Body mass index is 33.77 kg/m².    SpO2: 99 %  O2 Device (Oxygen Therapy): room air      Intake/Output Summary (Last 24 hours) at 5/30/2019 1454  Last data filed at 5/30/2019 0700  Gross per 24 hour   Intake 625 ml   Output 1750 ml   Net -1125 ml       Lines/Drains/Airways     Peripheral Intravenous Line                 Peripheral IV  - Single Lumen 05/29/19 1835 20 G Anterior;Right Hand less than 1 day                Physical Exam   Constitutional: He is oriented to person, place, and time. He appears well-developed and well-nourished. No distress.   HENT:   Head: Normocephalic and atraumatic.   Mouth/Throat: Oropharynx is clear and moist. No oropharyngeal exudate.   Eyes: Conjunctivae and EOM are normal. No scleral icterus.   Neck: Normal range of motion. Neck supple. JVD present.   Cardiovascular: Normal rate, regular rhythm and normal heart sounds.   No murmur heard.  Pulmonary/Chest: Effort normal. No respiratory distress. He has wheezes. He has rales.   Abdominal: Soft. Bowel sounds are normal. There is no tenderness.   Musculoskeletal: Normal range of motion. He exhibits edema (+4 BLE).   Neurological: He is alert and oriented to person, place, and time.   Skin: Skin is warm and dry.   Erythematous ulceration L great toe   Nursing note and vitals reviewed.      Significant Labs:   Blood Culture:   Recent Labs   Lab 05/29/19 1756   LABBLOO No Growth to date  No Growth to date   , BMP:   Recent Labs   Lab 05/29/19 1756 05/30/19  0026 05/30/19  0354 05/30/19  0806   * 149*  --  225*   * 127*  --  126*   K 5.5* 4.7  --  5.4*   CL 94* 92*  --  91*   CO2 23 23  --  23   BUN 28* 27*  --  28*   CREATININE 1.3 1.2  --  1.4   CALCIUM 9.8 10.0  --  9.8   MG 1.8  --  1.7  --    , CMP   Recent Labs   Lab 05/29/19 1756 05/30/19  0026 05/30/19  0806   * 127* 126*   K 5.5* 4.7 5.4*   CL 94* 92* 91*   CO2 23 23 23   * 149* 225*   BUN 28* 27* 28*   CREATININE 1.3 1.2 1.4   CALCIUM 9.8 10.0 9.8   PROT 6.4 6.4 6.3   ALBUMIN 3.8 4.0 3.9   BILITOT 0.6 0.9 0.8   ALKPHOS 108 108 105   AST 26 27 27   ALT 38 38 38   ANIONGAP 10 12 12   ESTGFRAFRICA >60.0 >60.0 55.6*   EGFRNONAA 52.6* 58.0* 48.1*   , CBC   Recent Labs   Lab 05/29/19  1756 05/30/19  0354 05/30/19  1149   WBC 56.50* 51.62* 50.43*   HGB 11.5* 12.1* 11.6*   HCT 35.3* 35.8*  36.1*    172 193   , INR   Recent Labs   Lab 05/30/19  1149   INR 1.1    and Troponin   Recent Labs   Lab 05/30/19  0806 05/30/19  1149   TROPONINI 1.169* 0.896*       Significant Imaging: All new imaging reviewed

## 2019-05-30 NOTE — HPI
Mr. Herman is a 79yoM, IC for new EKG changes and stable angina CCS II. PMHx of CAD s/p CABG (SVG-LAD, LIMA-1st Diag) with recent 01/29/19 NSTEMI s/p PCI DUC Mid-distal LCx (Dr. Ny), Hypertension, HLD, MINDA on CPAP, CLL, PVD who was admitted for worsening Left greater toe ulceration by Podiatry. Patient states prior to his PCI 01/2019 had significant anginal symptoms with chest pain, SOB, DIOR having to take SL NTG on constant basis, completed PET stress which was possible for inducible ischemia at the [place] / inferoseptal, for which completed [facility] 01/29/19 with Dr. Ny and demonstrated ( 80-90% Mid-distal LCx,  p-LAD, LIMA-1st Diag 50% stenosis, SVG-LAD 60% stenosis, native %, he had DUC x2 mid-distal LCx. Following this noted improvement in his anginal symptoms only having to use SL NTG with heavy exertional activity. On 04/2019 found to have a Left greater toe blister for which he had seen wound care and started on doxycycline, eventually worsened and started PO PCN, however now have worsening cellulitis with concerns for OM. Patient does state last 2 weeks noted increasing chest discomfort with minimal activity which is a new change in functional status, having to use around 15 - 20 Sl NTG per week, along with New LE edema, orthopnea, PND, DIOR. Recent 12 lb weight gain. Denies any dietary or fluid noncompliance. Compliant with medications, taking ASA / Plavix, has not skipped any doses.     Does note have TTE on file, EKG with new ST depression anterior laterally, Troponin 1.1, , WBC 52K (Chronic, at baseline), Bcx x2 NGTD, wound culure 05/27/19 E. Fecalis,     [facility] 01/29/19  · Successful PCI with DUC to distal and mid L. dominant LCX.  · Mid Cx lesion , 80% stenosed reduced to 0%..  · A STENT RESOLUTE INTGRTY 2.50X30 stent was successfully placed at 12 YEISON for 5 sec.  · Dist Cx lesion , 99% stenosed reduced to 0%..  · A STENT RESOLUTE RAUL 2X15 DUC stent was successfully placed at 12 YEISON  for 5 sec.  · Origin to Prox LAD Vein Graft lesion , 60% stenosed.  · Ost LAD to Prox LAD lesion , 100% stenosed (chronic).  · 1st Diag lesion , 50% stenosed.  · Ost RCA to Prox RCA lesion , 100% stenosed.  · Estimated blood loss: <50 mL  · Three vessel coronary artery disease.

## 2019-05-30 NOTE — ASSESSMENT & PLAN NOTE
Caution with insulin stacking  Estimated Creatinine Clearance: 49.3 mL/min (based on SCr of 1.4 mg/dL).

## 2019-05-30 NOTE — NURSING
Dr. Smallwood explained to patient reason insulin pump will be not used while in hospital. Patient gave verbal understanding. Pt removed insulin pump and placed at bedside. No apparent distress or discomfort present.

## 2019-05-30 NOTE — NURSING
Patient . Rechecked and  I.M 3(Elina Ordaz) 5 units was given per sliding scale Team also notified about critical WBC

## 2019-05-30 NOTE — CONSULTS
[facility]  Endocrinology  Diabetes Consult Note    Consult Requested by: Elis Gutierrez MD   Reason for admit: Cellulitis of foot    HISTORY OF PRESENT ILLNESS:  Reason for Consult: Management of T1DM, Hyperglycemia     Surgical Procedure and Date: N/A    Diabetes diagnosis year:     Lab Results   Component Value Date    HGBA1C 5.8 (H) 2019       Home Diabetes Medications:  Accuchek spirit pump/sarai       Basal Rate  0000 - 0300     0.85 u/hr  0300 - 0700     0.9 u/hr  0700 - 2100     1.1 u/hr  2100 - 0000     0.85 u/hr        Carb Ratio  12A: 1:8     ISF  1:30     Target: 110-130  IAT: 3    How often checking glucose at home? Dexcom G5 CGM  BG readings on regimen: 110-140  Hypoglycemia on the regimen?  Yes, a few time per week  Missed doses on regimen?  No    Diabetes Complications include:     Hypoglycemia , Diabetic chronic kidney disease     , Diabetic retinopathy , Diabetic peripheral neuropathy , Diabetic peripheral angiopathy with/without gangrene and Foot ulcer      Complicating diabetes co morbidities:   CHF, MINDA and CKD      HPI:   Patient is a 79 y.o. male with a diagnosis of hyponatremia, left foot abscess, CKD3, DM1, CLL, MINDA, and acute heart failure.  Admitted to [facility] on 19 from podiatry clinic for a left hallux abcess.  Last seen in endocrinology clinic by ESTEFANÍA Rivers NP on 19.  Endocrine consulted for DM/BG management.            Interval HPI:   Overnight events: Remains in IMTA, NAEON.  BG mildly elevated this am on labs (225) after converting from home insulin pump to basal/bolus.  On IV antibiotics.  Eatin%  Nausea: No  Hypoglycemia and intervention: No  Fever: No  TPN and/or TF: No    PMH, PSH, FH, SH reviewed     Review of Systems    Constitutional: Positive for weight changes, 15 lb gain over last 2 weeks.  Eyes: Negative for visual disturbance.  Respiratory: Negative for cough.   Cardiovascular: Negative for chest pain.  Gastrointestinal: Negative  for nausea.  Endocrine: Negative for polyuria, polydipsia.  Nocturia 2x per night.  Musculoskeletal: Negative for back pain.  Skin: Negative for rash.  Positive for cellulitis to L foot.  Neurological: Negative for syncope.  Psychiatric/Behavioral: Positive for mild depression.  Denies any suicidal or homicidal ideation.    Current Medications and/or Treatments Impacting Glycemic Control  Immunotherapy:    Immunosuppressants     None        Steroids:   Hormones (From admission, onward)    None        Pressors:    Autonomic Drugs (From admission, onward)    None        Hyperglycemia/Diabetes Medications:   Antihyperglycemics (From admission, onward)    Start     Stop Route Frequency Ordered    05/29/19 2100  insulin detemir U-100 pen 15 Units      -- SubQ Nightly 05/29/19 1831 05/29/19 1929  insulin aspart U-100 pen 1-10 Units      -- SubQ Before meals & nightly PRN 05/29/19 1831             PHYSICAL EXAMINATION:  Vitals:    05/30/19 0900   BP: (!) 102/50   Pulse: 68   Resp: 14   Temp: 97.8 °F (36.6 °C)     Body mass index is 33.77 kg/m².    Physical Exam     Constitutional: Well developed, well nourished, NAD.  ENT: External ears no masses with nose patent; normal hearing.  Neck: Supple; trachea midline.  Cardiovascular: Normal heart sounds, 2+ LE edema. DP +1 bilaterally.  Lungs: Normal effort; lungs anterior bilaterally clear to auscultation.  Abdomen: Soft, no masses, no hernias.  MS: No clubbing or cyanosis of nails noted; unable to assess gait.  Skin: No rashes, lesions, or ulcers; no nodules. Injection sites are ok. No lipo hypertropthy or atrophy.  L great toe wound with redness noted.  Psychiatric: Good judgement and insight; normal mood and affect.  Neurological: Cranial nerves are grossly intact.  Foot: Nails in fair condition, no amputations noted.  L great toe wound with redness noted.      Labs Reviewed and Include   Recent Labs   Lab 05/30/19  0806   *   CALCIUM 9.8   ALBUMIN 3.9   PROT 6.3    *   K 5.4*   CO2 23   CL 91*   BUN 28*   CREATININE 1.4   ALKPHOS 105   ALT 38   AST 27   BILITOT 0.8     Lab Results   Component Value Date    WBC 51.62 (HH) 05/30/2019    HGB 12.1 (L) 05/30/2019    HCT 35.8 (L) 05/30/2019    MCV 95 05/30/2019     05/30/2019     No results for input(s): TSH, FREET4 in the last 168 hours.  Lab Results   Component Value Date    HGBA1C 5.8 (H) 05/29/2019       Nutritional status:   Body mass index is 33.77 kg/m².  Lab Results   Component Value Date    ALBUMIN 3.9 05/30/2019    ALBUMIN 4.0 05/30/2019    ALBUMIN 3.8 05/29/2019     Lab Results   Component Value Date    PREALBUMIN 22 05/17/2019       Estimated Creatinine Clearance: 49.3 mL/min (based on SCr of 1.4 mg/dL).    Accu-Checks  Recent Labs     05/29/19  1742 05/29/19  2346   POCTGLUCOSE 252* 148*        ASSESSMENT and PLAN    * Cellulitis of Left foot  Infection may elevate BG readings  ID following - on IV antibiotics  Optimize BG control for wound healing      Controlled type 1 diabetes mellitus with diabetic neuropathy, with long-term current use of insulin  BG goal 140-180    Increase Levemir to 18 units q HS - 20% reduction of home basal dose  Start Novolog ICR 1:10  Low dose correction scale  BG monitoring AC/HS    Patient uses insulin pump at home, expressing desire to remain on pump while in-patient.  Discussed with patient at bedside the impact of infection and IV antibiotics on BG readings.  Patient's wife to bring pump and supplies from home, will continue MDI for now and re-evaluate if patient should put pump back on tomorrow    ** Please call Endocrine for any BG related issues **    Discharge planning: TBD      CKD (chronic kidney disease), stage III  Caution with insulin stacking  Estimated Creatinine Clearance: 49.3 mL/min (based on SCr of 1.4 mg/dL).        MINDA on CPAP  May affect BG readings if uncontrolled            Plan discussed with patient at bedside.     Jeff Bustillo,  NP  Endocrinology  [facility]

## 2019-05-30 NOTE — ASSESSMENT & PLAN NOTE
- Ekg with no ST changes  - will likely decerease with lasix  - will stop his Ace for now  - Cold also be burden of CLL disease   - Daily k

## 2019-05-30 NOTE — PT/OT/SLP EVAL
"Physical Therapy Evaluation    Patient Name:  Reid Herman   MRN:  [id number]    Recommendations:     Discharge Recommendations:  home   Discharge Equipment Recommendations: none   Barriers to discharge: Inaccessible home    Assessment:     Reid Herman is a 79 y.o. male admitted with a medical diagnosis of Cellulitis of foot.  He presents with the following impairments/functional limitations:  impaired skin, gait instability, decreased lower extremity function, edema, impaired functional mobilty, impaired self care skills.  Pt tolerated session c no c/o difficulty.  Performed bed mobility and transfer independently. Pt able to take few steps to bedside chair without AD and S.  Further ambulation limited as pt did not have dressing over L foot wound to allow donning of socks and DARCO.  Pt able to WB through heel during transfer to chair and demo steady gait.  Pt would benefit from continued skilled acute PT 3x/wk to improve functional mobility.  Anticipating pt will be able to return home c no needs once medically appropriate.      Rehab Prognosis: Good; patient would benefit from acute skilled PT services to address these deficits and reach maximum level of function.    Recent Surgery: * No surgery found *      Plan:     During this hospitalization, patient to be seen 3 x/week to address the identified rehab impairments via gait training, therapeutic activities, therapeutic exercises, neuromuscular re-education and progress toward the following goals:    · Plan of Care Expires:  06/24/19    Subjective     Chief Complaint: R foot wound  Patient/Family Comments/goals: "I don't want to mess up the scab." - referring to R foot wound and donning socks  Pain/Comfort:  · Pain Rating 1: 0/10    Patients cultural, spiritual, Evangelical conflicts given the current situation: no    Living Environment:  Pt lives c wife in 2SH. Living quarters on 2nd floor c [address].  PTA driving, working c HVAC systems and no " falls.  Prior to admission, patients level of function was independent.  Equipment used at home: CPAP, cane, straight, rollator, shower chair.  DME owned (not currently used): none.  Upon discharge, patient will have assistance from wife.    Objective:     Communicated with RN and OT prior to session.  Patient found HOB elevated with peripheral IV, telemetry  upon PT entry to room.    General Precautions: Standard, fall   Orthopedic Precautions:N/A   Braces: (DARCO)     Exams:  · Cognitive Exam:  Patient is oriented to Person, Place, Time and Situation  · RLE ROM: WFL  · RLE Strength: WFL   · LLE ROM: WFL except ankle d/t edema  · LLE Strength: WFL except ankles not tested d/t wound    Functional Mobility:  · Bed Mobility:     · Rolling Left:  independence  · Scooting: independence  · Supine to Sit: independence  · Transfers:     · Sit to Stand:  independence with no AD  · Bed to Chair: supervision with  no AD  using  Step Transfer  · Gait: 4 steps to bedside chair c no AD (S)  · Weight placed through L heel throughout transfer  · Balance: standing (S)      Therapeutic Activities and Exercises:   Pt educated on: PT role/POC; safety c mobility; benefits of OOB activities; performing therex; d/c recs - v/u      AM-PAC 6 CLICK MOBILITY  Total Score:22     Patient left up in chair with all lines intact, call button in reach and RN notified.    GOALS:   Multidisciplinary Problems     Physical Therapy Goals        Problem: Physical Therapy Goal    Goal Priority Disciplines Outcome Goal Variances Interventions   Physical Therapy Goal     PT, PT/OT Ongoing (interventions implemented as appropriate)     Description:  Goals to be met by: 2019     Patient will increase functional independence with mobility by performin. Gait  x 150 feet with Supervision using Single-point Cane .   2. Ascend/descend 13 stair with bilateral Handrails Supervision                      History:     Past Medical History:   Diagnosis  Date    Anemia     Back pain     Basal cell carcinoma 06/08/2015    left nasal ala    CAD (coronary artery disease) 10/1/2012    Cataract     DDD (degenerative disc disease), lumbar 10/28/2014    Diabetes mellitus     Diabetes mellitus type I     Diabetic retinopathy of both eyes     Hyperlipidemia     Hypertension     Insulin pump in place     Obesity     Poorly controlled type 1 diabetes mellitus with peripheral neuropathy 10/1/2012    Preseptal cellulitis of right eye 8/17/15    S/P CABG x 2 2/14/2014    1994     Sleep apnea     Squamous cell carcinoma 03/18/2013    right posterior ear    Squamous cell carcinoma 07/26/2017    scalp    Squamous cell carcinoma 08/02/2018    Right Scalp/MOHS    Trouble in sleeping     Type 1 diabetes, uncontrolled, with retinopathy 10/1/2012    Type II or unspecified type diabetes mellitus without mention of complication, not stated as uncontrolled     Vitreous hemorrhage 8/17/2014       Past Surgical History:   Procedure Laterality Date    ANGIOGRAM, CORONARY ARTERY N/A 1/29/2019    Performed by Saturnino [facility] at [facility] CATH LAB    APPENDECTOMY  1953    BLOCK-NERVE-MEDIAL BRANCH-LUMBAR Bilateral 3/10/2015    [facility]    BLOCK-NERVE-MEDIAL BRANCH-LUMBAR Bilateral 11/25/2014    [facility]    CATARACT EXTRACTION  4/8/13    right eye (toric)    CATARACT EXTRACTION  4/29/13    left eye (toric)    COLONOSCOPY N/A 4/25/2017    Performed by CLARISSA Freeman MD at [facility] ENDO (4TH FLR)    COLONOSCOPY N/A 4/25/2014    Performed by CL[facility]SA Freeman [facility] at [facility] ENDO (4TH FLR)    CORONARY ARTERY BYPASS GRAFT  1994    x 3    EYE SURGERY      cataracts, both eyes    FINGER TENDON REPAIR  2011    left hand    INJECTION-FACET Bilateral 3/31/2015    [facility]    INJECTION-STEROID-EPIDURAL-LUMBAR N/A 10/28/2014    [facility]    INSERTION, IOL PROSTHESIS Left 4/29/2013    [facility]oval [facility] at [facility] OR    INSERTION, IOL PROSTHESIS Right 4/8/2013    Performed by Soco Sandoval [facility] at [facility] OR    INSERTION, STENT, CORONARY ARTERY N/A 1/29/2019    Per[facility] at [facility] CATH LAB    MINIMALLY INVASIVE FUSION-TRANSLUMINAL LUMBAR INTERBODY (TLIF) Left 5/18/2015    Performed by Tee [facility]inedo [facility] at [facility] OR 2ND FLR    PHACOEMULSIFICATION, CATARACT Left 4/29/2013    Performed by Soco Sandoval [facility] at [facility] OR    PHACOEMULSIFICATION, CATARACT Right 4/8/2013    Performed by Soco [facility]oval [facility] at [facility] OR    RADIOFREQUENCY THERMOCOAGULATION (RFTC)-NERVE-MEDIAN BRANCH-LUMBAR Left 1/13/2015    Performed by [facility]ita [facility]les [facility] at [facility]    RADIOFREQUENCY THERMOCOAGULATION (RFTC)-NERVE-MEDIAN BRANCH-LUMBAR Right 12/30/2014    [facility]    SKIN BIOPSY  2013    multiple    SPINE SURGERY  2015    TLIF    TONSILLECTOMY  1947       Time Tracking:     PT Received On: 05/30/19  PT Start Time: 1032     PT Stop Time: 1044  PT Total Time (min): 12 min     Billable Minutes: Evaluation 12 min      Rajendra Rivera, PT  05/30/2019

## 2019-05-30 NOTE — PROGRESS NOTES
Pharmacokinetic Initial Assessment: IV Vancomycin    Assessment/Plan:  · Initiate intravenous vancomycin with loading dose of 2000 mg once  · Followed by a maintenance dose of vancomycin 1500mg IV every 24 hours starting tomorrow  · Desired empiric serum trough concentration is 10 to 20 mcg/mL.  · Draw vancomycin trough level 30 min prior to third dose on 5/31 at approximately 1930  · Current order in place for MRI with and w/o contrast, watch closely.  · Pharmacy will continue to follow and monitor vancomycin.      Please contact pharmacy at extension [phone] with any questions regarding this assessment.     Thank you for the consult,   Hetal Reynolds           Patient brief summary:  Reid Herman is a 79 y.o. male initiated on antimicrobial therapy with IV Vancomycin for treatment of suspected skin & soft tissue    Drug Allergies:   Review of patient's allergies indicates:  No Known Allergies    Actual Body Weight:   97.8kg      Renal Function:   Estimated Creatinine Clearance: 53 mL/min (based on SCr of 1.3 mg/dL).,     Dialysis Method (if applicable):  none    CBC (last 72 hours):  Recent Labs   Lab Result Units 05/29/19  1756   WBC K/uL 56.50*   Hemoglobin g/dL 11.5*   Hemoglobin A1C % 5.8*   Hematocrit % 35.3*   Platelets K/uL 176   Gran% % 4.5*   Lymph% % 95.0*   Mono% % 0.5*   Eosinophil% % 0.0   Basophil% % 0.0   Differential Method  Manual       Metabolic Panel (last 72 hours):  Recent Labs   Lab Result Units 05/29/19  1756   Sodium mmol/L 127*   Potassium mmol/L 5.5*   Chloride mmol/L 94*   CO2 mmol/L 23   Glucose mg/dL 214*   BUN, Bld mg/dL 28*   Creatinine mg/dL 1.3   Albumin g/dL 3.8   Total Bilirubin mg/dL 0.6   Alkaline Phosphatase U/L 108   AST U/L 26   ALT U/L 38   Magnesium mg/dL 1.8   Phosphorus mg/dL 3.5       Drug levels (last 3 results):  No results for input(s): VANCOMYCINRA, VANCOMYCINPE, VANCOMYCINTR in the last 72 hours.    Microbiologic Results:  Microbiology Results (last 7  days)     Procedure Component Value Units Date/Time    Blood culture [[id number]] Collected:  05/29/19 1756    Order Status:  Sent Specimen:  Blood Updated:  05/29/19 1816    Blood culture [[id number]] Collected:  05/29/19 1756    Order Status:  Sent Specimen:  Blood Updated:  05/29/19 1816

## 2019-05-30 NOTE — ASSESSMENT & PLAN NOTE
- Patient sees Dr. Garcia in heme/onc  - Has not needed treatment  - has been hyperkalemic before attributed to CLL burden

## 2019-05-30 NOTE — ASSESSMENT & PLAN NOTE
- at home on lisinopril and HCTZ   - given his hyponatremia and hyperkalemia will discontinue both   - continue toprol 150 qhs

## 2019-05-30 NOTE — HPI
78 yo M w/ PMH significant for CAD s/p CABG x2 (SVG-LAD, LIMA-D1, 1994), NSTEMI s/p DUC to distal, mid LCx (1/2019), PAD, CKD, CLL (not currently on therapy), HTN, HLD, and MINDA who presents w/ worsening L great toe wound that has failed outpatient therapy. Reports noticing worsening L foot pain, discomfort. Recently completed multiple course of antibiotics however w/ minimal improvement. Podiatry consulted w/ imaging concerning osteomyelitis. Pending bone biopsy. Patient denies recent anginal symptoms. Prior to recent NSTEMI, patient reported sharp, substernal chest pain and SOB/DIOR requiring regular doses of nitroglycerin. Current chest pain is described as a deep burning that he feels both substernal and into the epigastrium. Denies radiation of pain. Also reports recent weight gain (approx. 10 lbs), worsening peripheral edema, and orthopnea. During his hospitalization, he was noted to have an abnormal EKG w/ ST depressions. Troponin was elevated to 1.16, down trended to 0.89. BNP 2608. No prior ECHO. Cardiology consulted for EKG changes concerning ischemia and elevated troponin.

## 2019-05-30 NOTE — ASSESSMENT & PLAN NOTE
- contributing to difficult ulcer healing   --HbA1c 5.8 very well controlled   - Home DM regimen:  Insulin pump, patient states his average basal is 23 units + SSI  - Start [id number] units + moderate SSI  - SSI with POCT accuchecks and Diabetic diet  - will consult endocrinology , for pump , currently pump turned off, discussed with him he has been having trouble with his meter it has not been working

## 2019-05-30 NOTE — SUBJECTIVE & OBJECTIVE
Past Medical History:   Diagnosis Date    Anemia     Back pain     Basal cell carcinoma 06/08/2015    left nasal ala    CAD (coronary artery disease) 10/1/2012    Cataract     DDD (degenerative disc disease), lumbar 10/28/2014    Diabetes mellitus     Diabetes mellitus type I     Diabetic retinopathy of both eyes     Hyperlipidemia     Hypertension     Insulin pump in place     Obesity     Poorly controlled type 1 diabetes mellitus with peripheral neuropathy 10/1/2012    Preseptal cellulitis of right eye 8/17/15    S/P CABG x 2 2/14/2014    1994     Sleep apnea     Squamous cell carcinoma 03/18/2013    right posterior ear    Squamous cell carcinoma 07/26/2017    scalp    Squamous cell carcinoma 08/02/2018    Right Scalp/MOHS    Trouble in sleeping     Type 1 diabetes, uncontrolled, with retinopathy 10/1/2012    Type II or unspecified type diabetes mellitus without mention of complication, not stated as uncontrolled     Vitreous hemorrhage 8/17/2014       Past Surgical History:   Procedure Laterality Date    ANGIOGRAM, CORONARY ARTERY N/A 1/29/2019    [facility] by Saturn[facility] at [facility] CATH LAB    APPENDECTOMY  1951    BLOCK-NERVE-MEDIAL BRANCH-LUMBAR Bilateral 3/10/2015    Per[facility] by [facility] at [facility] MGT    BLOCK-NERVE-MEDIAL BRANCH-LUMBAR Bilateral 11/25/2014    [facility]    CATARACT EXTRACTION  4/8/13    right eye (toric)    CATARACT EXTRACTION  4/29/13    left eye (toric)    COLONOSCOPY N/A 4/25/2017    Performed by CLARISSA Freeman MD at [facility] ENDO (4TH FLR)    COLONOSCOPY N/A 4/25/2014    Performed by CL[facility]SA Freeman [facility] at [facility] ENDO (4TH FLR)    CORONARY ARTERY BYPASS GRAFT  1994    x 3    EYE SURGERY      cataracts, both eyes    FINGER TENDON REPAIR  2011    left hand    INJECTION-FACET Bilateral 3/31/2015    Per[facility] by [facility] at [facility] MGT    INJECTION-STEROID-EPIDURAL-LUMBAR N/A  10/28/2014    Per[facility] by [facility] at [facility] PAIN MGT    INSERTION, IOL PROSTHESIS Left 4/29/2013    Performed by Soco Sandoval MD at [facility] OR    INSERTION, IOL PROSTHESIS Right 4/8/2013    Performed by Soco Sandoval MD at [facility] OR    INSERTION, STENT, CORONARY ARTERY N/A 1/29/2019    [facility] by [facility] at [facility] CATH LAB    MINIMALLY INVASIVE FUSION-TRANSLUMINAL LUMBAR INTERBODY (TLIF) Left 5/18/2015    Performed by Tee Pinedo MD at [facility] OR 2ND FLR    PHACOEMULSIFICATION, CATARACT Left 4/29/2013    [facility]formed by Soco [facility]oval [facility] at [facility] OR    PHACOEMULSIFICATION, CATARACT Right 4/8/2013    Performed by Soco [facility]oval MD at [facility] OR    RADIOFREQUENCY THERMOCOAGULATION (RFTC)-NERVE-MEDIAN BRANCH-LUMBAR Left 1/13/2015    [facility]    RADIOFREQUENCY THERMOCOAGULATION (RFTC)-NERVE-MEDIAN BRANCH-LUMBAR Right 12/30/2014    [facility]    SKIN BIOPSY  2013    multiple    SPINE SURGERY  2015    TLIF    TONSILLECTOMY  1945       Review of patient's allergies indicates:  No Known Allergies    Facility-Administered Medications Prior to Admission   Medication    nitroGLYCERIN 0.4 MG/DOSE TL SPRY 400 mcg/spray spray 1 spray     PTA Medications   Medication Sig    ACCU-CHEK FASTCLIX LANCET DRUM Misc TEST 6 TIMES DAILY    alpha lipoic acid 600 mg Cap Take 1 capsule by mouth once daily.      ampicillin (PRINCIPEN) 500 MG capsule Take 1 capsule (500 mg total) by mouth 4 (four) times daily. for 10 days    aspirin (ECOTRIN) 81 MG EC tablet Take 1 tablet (81 mg total) by mouth once daily.    atorvastatin (LIPITOR) 80 MG tablet TAKE 1/2 TABLET NIGHTLY. (Patient taking differently: 40 mg. )    BD INSULIN SYRINGE ULT-FINE II 0.3 mL 31 gauge x 5/16 Syrg     blood sugar diagnostic (ACCU-CHEK SHARON PLUS TEST STRP) Strp Check sugar a 5x a day.    blood-glucose meter,continuous (DEXCOM G6 ) Misc Use as  directed, change every 3 years    blood-glucose sensor (DEXCOM G6 SENSOR) Doris Change every 10 days.    blood-glucose transmitter (DEXCOM G6 TRANSMITTER) Doris Change every 3 months.    clopidogrel (PLAVIX) 75 mg tablet Take 4 tablets x 1 followed by one tablet daily    lisinopril-hydrochlorothiazide (PRINZIDE,ZESTORETIC) 20-12.5 mg per tablet Take 1 tablet by mouth 2 (two) times daily.    metFORMIN (GLUCOPHAGE) 500 MG tablet TAKE 1 TABLET TWICE DAILY    metoprolol succinate (TOPROL-XL) 100 MG 24 hr tablet Take 1 tablet (100 mg total) by mouth every evening.    metoprolol succinate (TOPROL-XL) 50 MG 24 hr tablet Take 1 tablet (50 mg total) by mouth once daily.    clotrimazole-betamethasone 1-0.05% (LOTRISONE) cream APPLY EXTERNALLY TO THE AFFECTED AREA TWICE DAILY    docusate sodium (COLACE) 100 MG capsule Take 100 mg by mouth 2 (two) times daily.    doxycycline (MONODOX) 100 MG capsule Take 1 capsule (100 mg total) by mouth 2 (two) times daily. for 10 days    fluticasone (FLONASE) 50 mcg/actuation nasal spray 1 spray by Each Nare route as needed for Rhinitis.    glucagon, human recombinant, (GLUCAGON EMERGENCY KIT, HUMAN,) 1 mg SolR Inject 1 mg into the muscle as needed.    insulin aspart U-100 (NOVOLOG U-100 INSULIN ASPART) 100 unit/mL injection Uses insulin pump, max daily 100 units.    insulin pump cartridge (OMNIPOD DASH INSULIN POD) Crtg Change every 3 days.    insulin pump controller (OMNIPOD DASH PDM KIT) Misc Use as directed    xvosepqer-R7-[id number]-algal oil (METANX/FOLTANX RF) 3 mg-35 mg-2 mg -90.314 mg Cap Take 1 tablet by mouth once daily.    multivitamin capsule Take 1 capsule by mouth once daily.      nitroGLYCERIN (NITROSTAT) 0.4 MG SL tablet Place 1 tablet (0.4 mg total) under the tongue every 5 (five) minutes as needed for Chest pain. May dispense a two pack of 25 tablets.    vit A/vit C/vit E/zinc/copper (PRESERVISION AREDS ORAL) Take 1 tablet by mouth 2 (two) times daily.      Family History     Problem Relation (Age of Onset)    Acute myelogenous leukemia Sister    Alzheimer's disease Father    Breast cancer Mother    Cancer Mother, Sister (60)    Colon cancer Sister    Dementia Father    Diabetes Maternal Grandfather, Paternal Aunt, Paternal Uncle    Stroke Father        Tobacco Use    Smoking status: Never Smoker    Smokeless tobacco: Never Used   Substance and Sexual Activity    Alcohol use: Yes     Alcohol/week: 0.6 oz     Types: 1 Glasses of wine per week     Comment: social once monthly    Drug use: No    Sexual activity: Not Currently     Partners: Female     Review of Systems   Constitution: Positive for malaise/fatigue and weight gain. Negative for chills, decreased appetite, diaphoresis, fever and weight loss.   Eyes: Negative for blurred vision.   Cardiovascular: Positive for chest pain, dyspnea on exertion, leg swelling, orthopnea, palpitations and paroxysmal nocturnal dyspnea. Negative for irregular heartbeat and near-syncope.   Respiratory: Positive for cough and shortness of breath. Negative for snoring and wheezing.    Skin: Positive for color change, nail changes and poor wound healing.   Gastrointestinal: Negative for abdominal pain, nausea and vomiting.   Genitourinary: Negative for bladder incontinence and urgency.     Objective:     Vital Signs (Most Recent):  Temp: 97.8 °F (36.6 °C) (05/30/19 0900)  Pulse: 68 (05/30/19 0900)  Resp: 14 (05/30/19 0900)  BP: (!) 102/50 (05/30/19 0900)  SpO2: 99 % (05/30/19 0900) Vital Signs (24h Range):  Temp:  [96.7 °F (35.9 °C)-97.8 °F (36.6 °C)] 97.8 °F (36.6 °C)  Pulse:  [60-71] 68  Resp:  [14-27] 14  SpO2:  [95 %-100 %] 99 %  BP: (102-146)/(50-71) 102/50     Weight: 97.8 kg (215 lb 9.8 oz)  Body mass index is 33.77 kg/m².    SpO2: 99 %  O2 Device (Oxygen Therapy): room air      Intake/Output Summary (Last 24 hours) at 5/30/2019 1350  Last data filed at 5/30/2019 0700  Gross per 24 hour   Intake 625 ml   Output 1750 ml    Net -1125 ml       Lines/Drains/Airways     Peripheral Intravenous Line                 Peripheral IV - Single Lumen 05/29/19 1835 20 G Anterior;Right Hand less than 1 day                Physical Exam   Constitutional: He is oriented to person, place, and time. He appears well-developed and well-nourished. No distress.   Neck: JVD present.   Cardiovascular: Normal rate, regular rhythm, normal heart sounds and intact distal pulses. Exam reveals no gallop and no friction rub.   No murmur heard.  Pulses:       Carotid pulses are 2+ on the right side, and 2+ on the left side.       Radial pulses are 2+ on the right side, and 2+ on the left side.        Femoral pulses are 2+ on the right side, and 2+ on the left side.       Popliteal pulses are 1+ on the right side, and 1+ on the left side.        Dorsalis pedis pulses are 1+ on the right side, and 1+ on the left side.        Posterior tibial pulses are 1+ on the right side, and 1+ on the left side.   Pulmonary/Chest: Effort normal. No respiratory distress. He has no wheezes. He has rales in the right lower field, the left middle field and the left lower field. He exhibits no tenderness.   Abdominal: Soft. Bowel sounds are normal. He exhibits distension. He exhibits no mass. There is no tenderness. There is no rebound and no guarding.   Musculoskeletal: Normal range of motion. He exhibits edema.   Neurological: He is alert and oriented to person, place, and time.   Skin: Skin is warm and dry. Rash noted. He is not diaphoretic. There is erythema. There is pallor.        Nursing note and vitals reviewed.      Significant Labs:   CMP   Recent Labs   Lab 05/29/19  1756 05/30/19  0026 05/30/19  0806   * 127* 126*   K 5.5* 4.7 5.4*   CL 94* 92* 91*   CO2 23 23 23   * 149* 225*   BUN 28* 27* 28*   CREATININE 1.3 1.2 1.4   CALCIUM 9.8 10.0 9.8   PROT 6.4 6.4 6.3   ALBUMIN 3.8 4.0 3.9   BILITOT 0.6 0.9 0.8   ALKPHOS 108 108 105   AST 26 27 27   ALT 38 38 38    ANIONGAP 10 12 12   ESTGFRAFRICA >60.0 >60.0 55.6*   EGFRNONAA 52.6* 58.0* 48.1*   , CBC   Recent Labs   Lab 05/29/19  1756 05/30/19  0354 05/30/19  1149   WBC 56.50* 51.62* 50.43*   HGB 11.5* 12.1* 11.6*   HCT 35.3* 35.8* 36.1*    172 193   , INR   Recent Labs   Lab 05/30/19  1149   INR 1.1    and Lipid Panel No results for input(s): CHOL, HDL, LDLCALC, TRIG, CHOLHDL in the last 48 hours.    Significant Imaging: Echocardiogram: Transthoracic echo (TTE) complete (Cupid Only): No results found. However, due to the size of the patient record, not all encounters were searched. Please check Results Review for a complete set of results.

## 2019-05-30 NOTE — SUBJECTIVE & OBJECTIVE
Past Medical History:   Diagnosis Date    Anemia     Back pain     Basal cell carcinoma 06/08/2015    left nasal ala    CAD (coronary artery disease) 10/1/2012    Cataract     DDD (degenerative disc disease), lumbar 10/28/2014    Diabetes mellitus     Diabetes mellitus type I     Diabetic retinopathy of both eyes     Hyperlipidemia     Hypertension     Insulin pump in place     Obesity     Poorly controlled type 1 diabetes mellitus with peripheral neuropathy 10/1/2012    Preseptal cellulitis of right eye 8/17/15    S/P CABG x 2 2/14/2014    1994     Sleep apnea     Squamous cell carcinoma 03/18/2013    right posterior ear    Squamous cell carcinoma 07/26/2017    scalp    Squamous cell carcinoma 08/02/2018    Right Scalp/MOHS    Trouble in sleeping     Type 1 diabetes, uncontrolled, with retinopathy 10/1/2012    Type II or unspecified type diabetes mellitus without mention of complication, not stated as uncontrolled     Vitreous hemorrhage 8/17/2014       Past Surgical History:   Procedure Laterality Date    ANGIOGRAM, CORONARY ARTERY N/A 1/29/2019    [facility]formed by [facility] at [facility] CATH LAB    APPENDECTOMY  1951    BLOCK-NERVE-MEDIAL BRANCH-LUMBAR Bilateral 3/10/2015    [facility]formed by Juan[facility]les [facility] at [facility] MGT    BLOCK-NERVE-MEDIAL BRANCH-LUMBAR Bilateral 11/25/2014    [facility]    CATARACT EXTRACTION  4/8/13    right eye (toric)    CATARACT EXTRACTION  4/29/13    left eye (toric)    COLONOSCOPY N/A 4/25/2017    Performed by CLARISSA Freeman MD at [facility] ENDO (4TH FLR)    COLONOSCOPY N/A 4/25/2014    Performed by CLARISSA Freeman [facility] at [facility] ENDO (4TH FLR)    CORONARY ARTERY BYPASS GRAFT  1994    x 3    EYE SURGERY      cataracts, both eyes    FINGER TENDON REPAIR  2011    left hand    INJECTION-FACET Bilateral 3/31/2015    Performed by Juan[facility] MD at [facility] MGT    INJECTION-STEROID-EPIDURAL-LUMBAR N/A  10/28/2014    Performed by [facility] at [facility] PAIN MGT    INSERTION, IOL PROSTHESIS Left 4/29/2013    [facility]formed by Soco [facility]oval MD at [facility] OR    INSERTION, IOL PROSTHESIS Right 4/8/2013    [facility]formed by Soco [facility]oval [facility] at [facility] OR    INSERTION, STENT, CORONARY ARTERY N/A 1/29/2019    Performed by a[facility]urnino [facility] at [facility] CATH LAB    MINIMALLY INVASIVE FUSION-TRANSLUMINAL LUMBAR INTERBODY (TLIF) Left 5/18/2015    Performed by Tee Pinedo MD at [facility] OR 2ND FLR    PHACOEMULSIFICATION, CATARACT Left 4/29/2013    Performed by Soco [facility]oval [facility] at [facility] OR    PHACOEMULSIFICATION, CATARACT Right 4/8/2013    Performed by Soco [facility]oval MD at [facility] OR    RADIOFREQUENCY THERMOCOAGULATION (RFTC)-NERVE-MEDIAN BRANCH-LUMBAR Left 1/13/2015    [facility]    RADIOFREQUENCY THERMOCOAGULATION (RFTC)-NERVE-MEDIAN BRANCH-LUMBAR Right 12/30/2014    [facility]    SKIN BIOPSY  2013    multiple    SPINE SURGERY  2015    TLIF    TONSILLECTOMY  1945       Review of patient's allergies indicates:  No Known Allergies    Medications:  Facility-Administered Medications Prior to Admission   Medication    nitroGLYCERIN 0.4 MG/DOSE TL SPRY 400 mcg/spray spray 1 spray     Medications Prior to Admission   Medication Sig    ACCU-CHEK FASTCLIX LANCET DRUM Misc TEST 6 TIMES DAILY    alpha lipoic acid 600 mg Cap Take 1 capsule by mouth once daily.      ampicillin (PRINCIPEN) 500 MG capsule Take 1 capsule (500 mg total) by mouth 4 (four) times daily. for 10 days    aspirin (ECOTRIN) 81 MG EC tablet Take 1 tablet (81 mg total) by mouth once daily.    atorvastatin (LIPITOR) 80 MG tablet TAKE 1/2 TABLET NIGHTLY. (Patient taking differently: 40 mg. )    BD INSULIN SYRINGE ULT-FINE II 0.3 mL 31 gauge x 5/16 Syrg     blood sugar diagnostic (ACCU-CHEK SHARON PLUS TEST STRP) Strp Check sugar a 5x a day.    blood-glucose meter,continuous  (DEXCOM G6 ) Misc Use as directed, change every 3 years    blood-glucose sensor (DEXCOM G6 SENSOR) Doris Change every 10 days.    blood-glucose transmitter (DEXCOM G6 TRANSMITTER) Doris Change every 3 months.    clopidogrel (PLAVIX) 75 mg tablet Take 4 tablets x 1 followed by one tablet daily    lisinopril-hydrochlorothiazide (PRINZIDE,ZESTORETIC) 20-12.5 mg per tablet Take 1 tablet by mouth 2 (two) times daily.    metFORMIN (GLUCOPHAGE) 500 MG tablet TAKE 1 TABLET TWICE DAILY    metoprolol succinate (TOPROL-XL) 100 MG 24 hr tablet Take 1 tablet (100 mg total) by mouth every evening.    metoprolol succinate (TOPROL-XL) 50 MG 24 hr tablet Take 1 tablet (50 mg total) by mouth once daily.    clotrimazole-betamethasone 1-0.05% (LOTRISONE) cream APPLY EXTERNALLY TO THE AFFECTED AREA TWICE DAILY    docusate sodium (COLACE) 100 MG capsule Take 100 mg by mouth 2 (two) times daily.    doxycycline (MONODOX) 100 MG capsule Take 1 capsule (100 mg total) by mouth 2 (two) times daily. for 10 days    fluticasone (FLONASE) 50 mcg/actuation nasal spray 1 spray by Each Nare route as needed for Rhinitis.    glucagon, human recombinant, (GLUCAGON EMERGENCY KIT, HUMAN,) 1 mg SolR Inject 1 mg into the muscle as needed.    insulin aspart U-100 (NOVOLOG U-100 INSULIN ASPART) 100 unit/mL injection Uses insulin pump, max daily 100 units.    insulin pump cartridge (OMNIPOD DASH INSULIN POD) Crtg Change every 3 days.    insulin pump controller (OMNIPOD DASH PDM KIT) Misc Use as directed    hyobaludx-D0-[id number]-algal oil (METANX/FOLTANX RF) 3 mg-35 mg-2 mg -90.314 mg Cap Take 1 tablet by mouth once daily.    multivitamin capsule Take 1 capsule by mouth once daily.      nitroGLYCERIN (NITROSTAT) 0.4 MG SL tablet Place 1 tablet (0.4 mg total) under the tongue every 5 (five) minutes as needed for Chest pain. May dispense a two pack of 25 tablets.    vit A/vit C/vit E/zinc/copper (PRESERVISION AREDS ORAL) Take 1 tablet by  mouth 2 (two) times daily.     Antibiotics (From admission, onward)    Start     Stop Route Frequency Ordered    05/30/19 2000  vancomycin 1.5 g in 5 % dextrose 250 mL IVPB      -- IV Every 24 hours (non-standard times) 05/29/19 1931        Antifungals (From admission, onward)    None        Antivirals (From admission, onward)    None           Immunization History   Administered Date(s) Administered    Influenza - High Dose 10/01/2012, 09/29/2014, 09/28/2015, 10/06/2016, 09/25/2017, 09/11/2018    Influenza Split 10/07/2013    Pneumococcal Conjugate - 13 Valent 02/13/2015    Pneumococcal Polysaccharide - 23 Valent 08/15/2016    Tdap 02/17/2016    Zoster Recombinant 09/11/2018, 02/14/2019       Family History     Problem Relation (Age of Onset)    Acute myelogenous leukemia Sister    Alzheimer's disease Father    Breast cancer Mother    Cancer Mother, Sister (60)    Colon cancer Sister    Dementia Father    Diabetes Maternal Grandfather, Paternal Aunt, Paternal Uncle    Stroke Father        Social History     Socioeconomic History    Marital status:      Spouse name: Not on file    Number of children: Not on file    Years of education: Not on file    Highest education level: Not on file   Occupational History    Occupation: Sales     Employer: [employer]    Social Needs    Financial resource strain: Not on file    Food insecurity:     Worry: Not on file     Inability: Not on file    Transportation needs:     Medical: Not on file     Non-medical: Not on file   Tobacco Use    Smoking status: Never Smoker    Smokeless tobacco: Never Used   Substance and Sexual Activity    Alcohol use: Yes     Alcohol/week: 0.6 oz     Types: 1 Glasses of wine per week     Comment: social once monthly    Drug use: No    Sexual activity: Not Currently     Partners: Female   Lifestyle    Physical activity:     Days per week: Not on file     Minutes per session: Not on file    Stress: Not on file    Relationships    Social connections:     Talks on phone: Not on file     Gets together: Not on file     Attends Muslim service: Not on file     Active member of club or organization: Not on file     Attends meetings of clubs or organizations: Not on file     Relationship status: Not on file   Other Topics Concern    Not on file   Social History Narrative    Not on file     Review of Systems   Constitutional: Negative for chills and fever.   Respiratory: Negative for cough and shortness of breath.    Cardiovascular: Positive for leg swelling. Negative for chest pain.   Gastrointestinal: Negative for abdominal pain, constipation, diarrhea, nausea and vomiting.   Genitourinary: Negative for dysuria, frequency and hematuria.   Musculoskeletal: Negative for back pain and myalgias.   Skin: Positive for wound (left foot). Negative for rash.   Neurological: Negative for light-headedness, numbness and headaches.   Psychiatric/Behavioral: Negative for agitation and behavioral problems. The patient is not nervous/anxious.      Objective:     Vital Signs (Most Recent):  Temp: 97.8 °F (36.6 °C) (05/30/19 0900)  Pulse: 68 (05/30/19 0900)  Resp: 14 (05/30/19 0900)  BP: (!) 102/50 (05/30/19 0900)  SpO2: 99 % (05/30/19 0900) Vital Signs (24h Range):  Temp:  [96.7 °F (35.9 °C)-97.8 °F (36.6 °C)] 97.8 °F (36.6 °C)  Pulse:  [60-71] 68  Resp:  [14-27] 14  SpO2:  [95 %-100 %] 99 %  BP: (102-146)/(50-71) 102/50     Weight: 97.8 kg (215 lb 9.8 oz)  Body mass index is 33.77 kg/m².    Estimated Creatinine Clearance: 49.3 mL/min (based on SCr of 1.4 mg/dL).    Physical Exam   Constitutional: He is oriented to person, place, and time. He appears well-developed and well-nourished. No distress.   Cardiovascular: Normal rate and regular rhythm.   No murmur heard.  Pulmonary/Chest: Effort normal and breath sounds normal. No respiratory distress.   Abdominal: Soft. Bowel sounds are normal. He exhibits no distension.   Musculoskeletal:  Normal range of motion. He exhibits edema.   Neurological: He is alert and oriented to person, place, and time.   Skin: Skin is warm and dry.   Left medial great toe with ulceration noted. There is erythema of the 1st digit. Mild tenderness. No active drainage.    Psychiatric: He has a normal mood and affect. His behavior is normal.             Significant Labs:   Blood Culture:   Recent Labs   Lab 05/29/19  1756   LABBLOO No Growth to date  No Growth to date     Urine Culture:   Recent Labs   Lab 04/03/19  1605   LABURIN No growth     Urine Studies:   Recent Labs   Lab 04/03/19  1605 05/30/19  0039   COLORU Yellow Yellow   APPEARANCEUA Hazy* Clear   PHUR 7.0  6 6.0   SPECGRAV 1.010  10.10 1.010   PROTEINUA Negative Negative   GLUCUA Negative Negative   KETONESU Negative  n Negative   BILIRUBINUA Negative Negative   OCCULTUA Negative Negative   NITRITE Negative  n Negative   UROBILINOGEN n  --    LEUKOCYTESUR Negative Negative   RBCUA 0  --    WBCUA 0  --    SQUAMEPITHEL 0  --      Wound Culture:   Recent Labs   Lab 05/22/19  0942   LABAERO ENTEROCOCCUS FAECALIS  Many  No other significant isolate         Significant Imaging: I have reviewed all pertinent imaging results/findings within the past 24 hours.

## 2019-05-30 NOTE — PROGRESS NOTES
VANCOMYCIN DOSING BY PHARMACY DISCONTINUATION NOTE    Reid Herman is a 79 y.o. male who had been consulted for vancomycin dosing.    The pharmacy consult for vancomycin dosing has been discontinued.     Vancomycin Dosing by Pharmacy Consult will sign-off. Please reconsult if necessary. Thank you for allowing us to participate in this patient's care.     Hetal Reynolds, PharmD  [id number]

## 2019-05-30 NOTE — H&P
[facility] Medicine  History & Physical    Patient Name: Reid Herman  MRN: [id number]  Admission Date: 5/29/2019  Attending Physician: Elis Gutierrez MD   Primary Care Provider: Olivia Zavala MD    [facility] Medicine Team: [facility] [place] [facility]OSP MED 3 Shanna To MD     Patient information was obtained from patient, past medical records and ER records.     Subjective:     Principal Problem:Cellulitis of foot    Chief Complaint: No chief complaint on file.       HPI: Reid Herman is a 79M direct admit from podiatry clinic for a left hallux abcess. Patient has a PMH HTN, Hyperlipidemia, DM-1(insulin pump), CAD s/p CABG x 2 , MINDA compliant with CPAP, CLL not currently on any therapy, PAD who has been dealing with a left foot abcess for the last month that has been getting worse. Cultures from wound have grown enterococcus that  sensitive to amp and Vanc.  Wound has worsened despite appropriate PO ABx (ampicillin). Additionally he has described that he has been having problems with SOB that he has been needing to increase the incline of his bed for. He also noticed worsening bilateral lower edema and he has gained 7 lbs in the last month. Patient states that currently he does not feel SOB ( since it only  occurs with being flat or exertion), no CP, no palpitations, no fever, chills. He states that he can feel his bilateral feet but has decreased. Usually he has been mobile but with this wound and his boot he has been less mobile. No history of DVT but he has a history of CLL.    Initial workup in the floor shows elevated BNP in the 2k, WBC > 50 ( he has a baseline around 53. CRP elevated, and hyponatremic to 127.     Past Medical History:   Diagnosis Date    Anemia     Back pain     Basal cell carcinoma 06/08/2015    left nasal ala    CAD (coronary artery disease) 10/1/2012    Cataract     DDD (degenerative disc disease), lumbar 10/28/2014    Diabetes mellitus     Diabetes  mellitus type I     Diabetic retinopathy of both eyes     Hyperlipidemia     Hypertension     Insulin pump in place     Obesity     Poorly controlled type 1 diabetes mellitus with peripheral neuropathy 10/1/2012    Preseptal cellulitis of right eye 8/17/15    S/P CABG x 2 2/14/2014    1994     Sleep apnea     Squamous cell carcinoma 03/18/2013    right posterior ear    Squamous cell carcinoma 07/26/2017    scalp    Squamous cell carcinoma 08/02/2018    Right Scalp/MOHS    Trouble in sleeping     Type 1 diabetes, uncontrolled, with retinopathy 10/1/2012    Type II or unspecified type diabetes mellitus without mention of complication, not stated as uncontrolled     Vitreous hemorrhage 8/17/2014       Past Surgical History:   Procedure Laterality Date    ANGIOGRAM, CORONARY ARTERY N/A 1/29/2019    [facility] Saturnino [facility]    APPENDECTOMY  1953    BLOCK-NERVE-MEDIAL BRANCH-LUMBAR Bilateral 3/10/2015    Performed by Juanita Myles MD at [facility] PAIN MGT    BLOCK-NERVE-MEDIAL BRANCH-LUMBAR Bilateral 11/25/2014    Performed by [facility]ita [facility]les MD at [facility] MGT    CATARACT EXTRACTION  4/8/13    right eye (toric)    CATARACT EXTRACTION  4/29/13    left eye (toric)    COLONOSCOPY N/A 4/25/2017    Performed [facility] CLARISSA Freeman [facility]    COLONOSCOPY N/A 4/25/2014    [facility] ([facility]    CORONARY ARTERY BYPASS GRAFT  1994    x 3    EYE SURGERY      cataracts, both eyes    FINGER TENDON REPAIR  2011    left hand    INJECTION-FACET Bilateral 3/31/2015    Performed by Juanita Myles MD at [facility] PAIN MGT    INJECTION-STEROID-EPIDURAL-LUMBAR N/A 10/28/2014    Performed by [facility]les [facility] at [facility] MGT    INSERTION, IOL PROSTHESIS Left 4/29/2013    Performed by Soco Sandoval MD at [facility] OR    INSERTION, IOL PROSTHESIS Right 4/8/2013    Performed by Soco Sandoval MD at [facility] OR    INSERTION, STENT,  CORONARY ARTERY N/A 1/29/2019    Performed by Saturnino Ny MD at [facility] CATH LAB    MINIMALLY INVASIVE FUSION-TRANSLUMINAL LUMBAR INTERBODY (TLIF) Left 5/18/2015    Performed by Tee Pinedo MD at [facility] OR 2ND FLR    PHACOEMULSIFICATION, CATARACT Left 4/29/2013    Performed by Soco [facility]oval [facility] at [facility] OR    PHACOEMULSIFICATION, CATARACT Right 4/8/2013    Performed by Soco [facility]oval MD at [facility] OR    RADIOFREQUENCY THERMOCOAGULATION (RFTC)-NERVE-MEDIAN BRANCH-LUMBAR Left 1/13/2015    Performed by Juanita Myles MD at [facility]    RADIOFREQUENCY THERMOCOAGULATION (RFTC)-NERVE-MEDIAN BRANCH-LUMBAR Right 12/30/2014    Performed by Juanita [facility]les MD at [facility]    SKIN BIOPSY  2013    multiple    SPINE SURGERY  2015    TLIF    TONSILLECTOMY  1947       Review of patient's allergies indicates:  No Known Allergies    No current facility-administered medications on file prior to encounter.      Current Outpatient Medications on File Prior to Encounter   Medication Sig    ACCU-CHEK FASTCLIX LANCET DRUM Misc TEST 6 TIMES DAILY    alpha lipoic acid 600 mg Cap Take 1 capsule by mouth once daily.      ampicillin (PRINCIPEN) 500 MG capsule Take 1 capsule (500 mg total) by mouth 4 (four) times daily. for 10 days    aspirin (ECOTRIN) 81 MG EC tablet Take 1 tablet (81 mg total) by mouth once daily.    atorvastatin (LIPITOR) 80 MG tablet TAKE 1/2 TABLET NIGHTLY. (Patient taking differently: 40 mg. )    BD INSULIN SYRINGE ULT-FINE II 0.3 mL 31 gauge x 5/16 Syrg     blood sugar diagnostic (ACCU-CHEK SHARON PLUS TEST STRP) Strp Check sugar a 5x a day.    blood-glucose meter,continuous (DEXCOM G6 ) Misc Use as directed, change every 3 years    blood-glucose sensor (DEXCOM G6 SENSOR) Doris Change every 10 days.    blood-glucose transmitter (DEXCOM G6 TRANSMITTER) Doris Change every 3 months.    clopidogrel (PLAVIX) 75 mg tablet Take 4 tablets x 1 followed by one tablet daily     lisinopril-hydrochlorothiazide (PRINZIDE,ZESTORETIC) 20-12.5 mg per tablet Take 1 tablet by mouth 2 (two) times daily.    metFORMIN (GLUCOPHAGE) 500 MG tablet TAKE 1 TABLET TWICE DAILY    metoprolol succinate (TOPROL-XL) 100 MG 24 hr tablet Take 1 tablet (100 mg total) by mouth every evening.    metoprolol succinate (TOPROL-XL) 50 MG 24 hr tablet Take 1 tablet (50 mg total) by mouth once daily.    clotrimazole-betamethasone 1-0.05% (LOTRISONE) cream APPLY EXTERNALLY TO THE AFFECTED AREA TWICE DAILY    docusate sodium (COLACE) 100 MG capsule Take 100 mg by mouth 2 (two) times daily.    doxycycline (MONODOX) 100 MG capsule Take 1 capsule (100 mg total) by mouth 2 (two) times daily. for 10 days    fluticasone (FLONASE) 50 mcg/actuation nasal spray 1 spray by Each Nare route as needed for Rhinitis.    glucagon, human recombinant, (GLUCAGON EMERGENCY KIT, HUMAN,) 1 mg SolR Inject 1 mg into the muscle as needed.    insulin aspart U-100 (NOVOLOG U-100 INSULIN ASPART) 100 unit/mL injection Uses insulin pump, max daily 100 units.    insulin pump cartridge (OMNIPOD DASH INSULIN POD) Crtg Change every 3 days.    insulin pump controller (OMNIPOD DASH PDM KIT) Misc Use as directed    [id number]-algal oil (METANX/FOLTANX RF) 3 mg-35 mg-2 mg -90.314 mg Cap Take 1 tablet by mouth once daily.    multivitamin capsule Take 1 capsule by mouth once daily.      nitroGLYCERIN (NITROSTAT) 0.4 MG SL tablet Place 1 tablet (0.4 mg total) under the tongue every 5 (five) minutes as needed for Chest pain. May dispense a two pack of 25 tablets.    vit A/vit C/vit E/zinc/copper (PRESERVISION AREDS ORAL) Take 1 tablet by mouth 2 (two) times daily.     Family History     Problem Relation (Age of Onset)    Acute myelogenous leukemia Sister    Alzheimer's disease Father    Breast cancer Mother    Cancer Mother, Sister (60)    Colon cancer Sister    Dementia Father    Diabetes Maternal Grandfather, Paternal Aunt, Paternal Uncle     Stroke Father        Tobacco Use    Smoking status: Never Smoker    Smokeless tobacco: Never Used   Substance and Sexual Activity    Alcohol use: Yes     Alcohol/week: 0.6 oz     Types: 1 Glasses of wine per week     Comment: social once monthly    Drug use: No    Sexual activity: Not Currently     Partners: Female     Review of Systems   Constitutional: Positive for activity change and unexpected weight change (up 7 lbs ). Negative for appetite change, fatigue and fever.   HENT: Negative for congestion.    Eyes: Negative for discharge.   Respiratory: Positive for shortness of breath. Negative for wheezing.    Cardiovascular: Positive for leg swelling. Negative for chest pain.   Gastrointestinal: Negative for abdominal distention and nausea.   Endocrine: Negative for cold intolerance, heat intolerance, polydipsia, polyphagia and polyuria.   Genitourinary: Positive for decreased urine volume. Negative for difficulty urinating.   Musculoskeletal: Negative for arthralgias and back pain.   Skin: Positive for color change and rash.   Neurological: Negative for dizziness, tremors and numbness.   Psychiatric/Behavioral: Negative for agitation.     Objective:     Vital Signs (Most Recent):  Temp: 97.3 °F (36.3 °C) (05/29/19 1735)  Pulse: 71 (05/29/19 1735)  Resp: 18 (05/29/19 1735)  BP: (!) 146/71 (05/29/19 1735)  SpO2: 98 % (05/29/19 1735) Vital Signs (24h Range):  Temp:  [97.3 °F (36.3 °C)-98.3 °F (36.8 °C)] 97.3 °F (36.3 °C)  Pulse:  [63-71] 71  Resp:  [18] 18  SpO2:  [98 %] 98 %  BP: (133-146)/(71-72) 146/71     Weight: 97.8 kg (215 lb 9.8 oz)  Body mass index is 33.77 kg/m².    Physical Exam   Constitutional: He appears well-developed and well-nourished. No distress.   HENT:   Head: Normocephalic and atraumatic.   Eyes: Pupils are equal, round, and reactive to light. EOM are normal.   Neck: Normal range of motion. Neck supple. JVD present.   Cardiovascular: Normal rate, regular rhythm and normal heart sounds.    Pulmonary/Chest: Effort normal and breath sounds normal. No respiratory distress.   Abdominal: Soft. Bowel sounds are normal. He exhibits distension. There is no tenderness.   Musculoskeletal: Normal range of motion. He exhibits edema, tenderness and deformity.   Erythema in the left foot as pictures below/ Ulcer present that is cleaned , pain around the ulcer noted.  Dorsalis pedis and posterior tibial pulses are diminished Bilaterally. Toes are cool to touch. Feet are warm proximally. + edema in the left foot and 2+ edema in the right foot        Skin: He is not diaphoretic.         CRANIAL NERVES     CN III, IV, VI   Pupils are equal, round, and reactive to light.  Extraocular motions are normal.        Significant Labs:   CBC:   Recent Labs   Lab 05/29/19  1756   WBC 56.50*   HGB 11.5*   HCT 35.3*        CMP:   Recent Labs   Lab 05/29/19  1756   *   K 5.5*   CL 94*   CO2 23   *   BUN 28*   CREATININE 1.3   CALCIUM 9.8   PROT 6.4   ALBUMIN 3.8   BILITOT 0.6   ALKPHOS 108   AST 26   ALT 38   ANIONGAP 10   EGFRNONAA 52.6*       Significant Imaging: I have reviewed all pertinent imaging results/findings within the past 24 hours.  I have reviewed and interpreted all pertinent imaging results/findings within the past 24 hours.                      Assessment/Plan:     * Cellulitis of Left foot  Reid Herman is a 79 y.o. with DM1, PAD and a left hallux ulcer that has been growing enterococcus sensitive to ampicillin and vancomycin but with worsening erythema despite treatment. Admitted from podiatry for OM rule out and IV antibiotics    PLAN:  --WBC unable to be reliable as patient has CLL, crp is elevated esr is low but patient is immunocompromised   - Will rule out OM with MRI   - Blood cultures sent   - Will rule out DVT( patient high risk from decreased ambulation and CLL)  - Will recheck ABIs to see if PAD disease has progressed  - Started on vancomycin and pharmacy consulted,  appreciate recs  - Podiatry consulted appreciate recs  - ID consulted appreciate recs ( likely need IV abx)             Acute HF (heart failure)  - Patient with symptoms of orthopnea and dyspnea on exertion   - Patient on HCTZ at home no loop diuretic   - BNP elevated at 2 k ,   - PE notable for  JVD and bilateral pitting edema, abdominal distension    - Xray on my read be bilateral edema   - precipitating factor is likely infected ulcer in a patient with PAD   -Echocardiogram: 2D echo with color flow doppler most recent not in file he had a pet stress in January showing EF of 40     PLAN   - IV diuresis with Lasix 40 IV BID and monitor response.  Goal diuresis 2-3L/day  - Strict I/O   - Daily weights  - Keep mag >2 and phos> 3  - Wean oxygen as tolerated   - Cardiac diet with decreased salt intake              Hyponatremia  - likely secondary to CHF  - will get osm, urine osm and urine creatinine and na   - diurese for now   - daily CMP   - stop HCTZ as well       Orthopnea  - as under CHF       Foot abscess, left  - as under cellulitis       Hyperkalemia  - Ekg with no ST changes  - will likely decerease with lasix  - will stop his Ace for now  - Cold also be burden of CLL disease   - Daily k       CKD (chronic kidney disease), stage III  - slightly worse today   - Daily CMP   - Will diurese likely cardiorenal due to CHF       Peripheral vascular disease  - last LEONIE was march with mild PAD , will repeat here given that he is having complicated ulcer healing   - continue ASA, statin, and plavix  - consider vascular consult if patient with worsening PAD disease       MINDA on CPAP  - cpap qhs       S/P CABG (coronary artery bypass graft)  CAD  History of angina     - no CP currently   - continue Asa, statin ,plavix  - takes nitro PRN will hold off on ordering and will evaluate pt if he starts having symptoms       HTN (hypertension)  - at home on lisinopril and HCTZ   - given his hyponatremia and hyperkalemia will  discontinue both   - continue toprol 150 qhs       Hyperlipidemia  - continue statin       Diabetic polyneuropathy associated with type 1 diabetes mellitus  - contributing to difficult ulcer healing   --HbA1c 5.8 very well controlled   - Home DM regimen:  Insulin pump, patient states his average basal is 23 units + SSI  - Start Ydaweol16 units + moderate SSI  - SSI with POCT accuchecks and Diabetic diet  - will consult endocrinology , for pump , currently pump turned off, discussed with him he has been having trouble with his meter it has not been working        CLL (chronic lymphocytic leukemia)  - Patient sees Dr. Garcia in heme/onc  - Has not needed treatment  - has been hyperkalemic before attributed to CLL burden         VTE Risk Mitigation (From admission, onward)        Ordered     IP VTE LOW RISK PATIENT  Once      05/29/19 5087             Shanna To MD  Department of Hospital Medicine   [facility]

## 2019-05-30 NOTE — ASSESSMENT & PLAN NOTE
CAD  History of angina     - no CP currently   - continue Asa, statin ,plavix  - takes nitro PRN will hold off on ordering and will evaluate pt if he starts having symptoms

## 2019-05-30 NOTE — ASSESSMENT & PLAN NOTE
79 year old male who presented as a direct admit from podiatry clinic for a left great toe ulcer. Patient has a PMH HTN, HLD, DM-1(insulin pump), CAD s/p CABG x 2 , MINDA compliant with CPAP, CLL not currently on any therapy, and PAD who presents with left foot ulcer. Prior wound culture with E. Faecalis. Pt started on IV vanc. MRI cannot rule out possible early osteomyelitis distal phalanx. ID consulted for further recs.     Stable leukocytosis (50,000) present since January- due to CLL- predominantly lymphocytic.  Pt afebrile.     Plan:  1. Recommend bone biopsy of left great toe sent for aerobic, anaerobic, fungal, and AFB cultures; please also send bone to pathology  2. Would hold antibiotics for now in anticipation of culture growth in order to tailor antibiotics  3. F/u on vascular, cardiology, and podiatry recs  4. Will follow with you

## 2019-05-30 NOTE — HPI
Reason for Consult: Management of T1DM, Hyperglycemia     Surgical Procedure and Date: N/A    Diabetes diagnosis year: 1972    Lab Results   Component Value Date    HGBA1C 5.8 (H) 05/29/2019       Home Diabetes Medications:  Accuchek spirit pump/sarai       Basal Rate  0000 - 0300     0.85 u/hr  0300 - 0700     0.9 u/hr  0700 - 2100     1.1 u/hr  2100 - 0000     0.85 u/hr        Carb Ratio  12A: 1:8     ISF  1:30     Target: 110-130  IAT: 3    How often checking glucose at home? Dexcom G5 CGM  BG readings on regimen: 110-140  Hypoglycemia on the regimen?  Yes, a few time per week  Missed doses on regimen?  No    Diabetes Complications include:     Hypoglycemia , Diabetic chronic kidney disease     , Diabetic retinopathy , Diabetic peripheral neuropathy , Diabetic peripheral angiopathy with/without gangrene and Foot ulcer      Complicating diabetes co morbidities:   CHF, MINDA and CKD      HPI:   Patient is a 79 y.o. male with a diagnosis of hyponatremia, left foot abscess, CKD3, DM1, CLL, MINDA, and acute heart failure.  Admitted to [facility] on 5/29/19 from podiatry clinic for a left hallux abcess.  Last seen in endocrinology clinic by ESTEFANÍA Rivers NP on 5/8/19.  Endocrine consulted for DM/BG management.

## 2019-05-31 PROBLEM — I21.4 NSTEMI (NON-ST ELEVATED MYOCARDIAL INFARCTION): Status: ACTIVE | Noted: 2019-01-01

## 2019-05-31 NOTE — ASSESSMENT & PLAN NOTE
- contributing to difficult ulcer healing   --HbA1c 5.8 very well controlled   - Home DM regimen:  Insulin pump, patient states his average basal is 23 units + SSI    Plan  Endocrine following, appreciate recs  Recommend Levemir to 18 units q HS - 20% reduction of home basal dose   Novolog ICR 1:10  Low dose correction scale  BG monitoring AC/HS  Endocrine to re-evaluate patient given that wife is bringing in pump  To start pump therapy

## 2019-05-31 NOTE — PLAN OF CARE
Problem: Physical Therapy Goal  Goal: Physical Therapy Goal  Goals to be met by: 2019     Patient will increase functional independence with mobility by performin. Gait  x 150 feet with Supervision using Single-point Cane .   2. Ascend/descend 13 stair with bilateral Handrails Supervision       Discharge Recommendations: Home    Pt safe to transfer OOB and amb with RN/PCT: Use pt's personal cane (hurry cane) with CGA.    Goals remain appropriate.     Kamille Salas, PTA.   [phone]   2019

## 2019-05-31 NOTE — SUBJECTIVE & OBJECTIVE
Interval History: NAEON. ID recommended holding vanc until bone biopsy.  Vascular to do angio 6/3. Podiatry recommendations pending. Continue with medical management of recent MI.  Endocrine to help manage insulin pump.    Review of Systems   Constitutional: Positive for activity change and unexpected weight change (up 7 lbs ). Negative for appetite change, fatigue and fever.   HENT: Negative for congestion.    Eyes: Negative for discharge.   Respiratory: Positive for shortness of breath. Negative for wheezing.    Cardiovascular: Positive for leg swelling. Negative for chest pain.   Gastrointestinal: Negative for abdominal distention and nausea.   Endocrine: Negative for cold intolerance, heat intolerance, polydipsia, polyphagia and polyuria.   Genitourinary: Positive for decreased urine volume. Negative for difficulty urinating.   Musculoskeletal: Negative for arthralgias and back pain.   Skin: Positive for color change and rash.   Neurological: Negative for dizziness, tremors and numbness.   Psychiatric/Behavioral: Negative for agitation.     Objective:     Vital Signs (Most Recent):  Temp: 98 °F (36.7 °C) (05/31/19 1319)  Pulse: 69 (05/31/19 1319)  Resp: 14 (05/31/19 1319)  BP: (!) 118/58 (05/31/19 1319)  SpO2: 100 % (05/31/19 1319) Vital Signs (24h Range):  Temp:  [98 °F (36.7 °C)-99.4 °F (37.4 °C)] 98 °F (36.7 °C)  Pulse:  [57-69] 69  Resp:  [14-20] 14  SpO2:  [99 %-100 %] 100 %  BP: (118-134)/(58-82) 118/58     Weight: 97.5 kg (215 lb)  Body mass index is 33.67 kg/m².    Intake/Output Summary (Last 24 hours) at 5/31/2019 1328  Last data filed at 5/31/2019 0448  Gross per 24 hour   Intake 240 ml   Output 2375 ml   Net -2135 ml      Physical Exam   Constitutional: He appears well-developed and well-nourished. No distress.   HENT:   Head: Normocephalic and atraumatic.   Eyes: Pupils are equal, round, and reactive to light. EOM are normal.   Neck: Normal range of motion. Neck supple. JVD present.   Cardiovascular:  Normal rate, regular rhythm and normal heart sounds.   Pulmonary/Chest: Effort normal and breath sounds normal. No respiratory distress.   Abdominal: Soft. Bowel sounds are normal. He exhibits distension. There is no tenderness.   Musculoskeletal: Normal range of motion. He exhibits edema, tenderness and deformity.   Erythema in the left foot as pictures below/ Ulcer present that is cleaned , pain around the ulcer noted.  Dorsalis pedis and posterior tibial pulses are diminished Bilaterally. Toes are cool to touch. Feet are warm proximally. + edema in the left foot and 2+ edema in the right foot        Skin: He is not diaphoretic.       Significant Labs:   CBC:   Recent Labs   Lab 05/30/19  0354 05/30/19  1149 05/31/19  0339   WBC 51.62* 50.43* 51.07*  51.07*   HGB 12.1* 11.6* 10.5*  10.5*   HCT 35.8* 36.1* 32.5*  32.5*    193 158  158     CMP:   Recent Labs   Lab 05/30/19  1655 05/31/19  0009 05/31/19  0835   * 130* 130*   K 4.9 4.2 4.8   CL 92* 94* 92*   CO2 24 29 28   * 168* 182*   BUN 31* 32* 30*   CREATININE 1.4 1.3 1.5*   CALCIUM 9.3 9.6 9.1   PROT 6.2 5.7* 6.1   ALBUMIN 3.7 3.5 3.6   BILITOT 0.6 0.6 0.6   ALKPHOS 113 97 88   AST 23 20 29   ALT 35 32 31   ANIONGAP 10 7* 10   EGFRNONAA 48.1* 52.6* 44.3*       Significant Imaging: I have reviewed all pertinent imaging results/findings within the past 24 hours.

## 2019-05-31 NOTE — ASSESSMENT & PLAN NOTE
-EKG w/ ST depressions, elevated troponin (peaked 1.16)  - dyspnea on exertion, orthopnea, significant peripheral edema.  - BNP 2608  - ECHO w/ EF 20%, grade II DD.   -New onset HF w/ troponin leak vs NSTEMI in the setting of acute infection.     Plan  - Continue treating ACS w/ heparin gtt for 48 hours   -continue ASA 81 mg  -Continue plavix 75 mg daily  - Continue metoprolol  mg qhs  - Continue IV Lasix 80 mg TID  - Continue Lisinopril 10 mg daily   - Strict I/Os, daily weights  - Interventional cards to catheterization prior to discharge.

## 2019-05-31 NOTE — PLAN OF CARE
CM at bedside to discuss discharge planning assessment.   Patient lives with wife.  He has an implanted insuling pump and uses CPAP prn.  Currently using a quad cane but states he has a rollatro walker at home. CM name and phone # written on board and explained CM services during patient's stay in hospital.   My Health packet discussed and left with patient.       05/30/19 1110   Discharge Assessment   Assessment Type Discharge Planning Assessment   Confirmed/corrected address and phone number on facesheet? Yes   Assessment information obtained from? Patient   Expected Length of Stay (days) 6   Communicated expected length of stay with patient/caregiver yes   Prior to hospitilization cognitive status: Alert/Oriented   Prior to hospitalization functional status: Assistive Equipment   Current cognitive status: Alert/Oriented   Current Functional Status: Assistive Equipment   Facility Arrived From: home   Is patient able to care for self after discharge? Yes   Who are your caregiver(s) and their phone number(s)? self   Patient's perception of discharge disposition home or selfcare   Readmission Within the Last 30 Days no previous admission in last 30 days   Patient currently being followed by outpatient case management? No   Patient currently receives any other outside agency services? No   Equipment Currently Used at Home cane, quad;rollator;walker, rolling;CPAP  (implanted insulin pump)   Do you have any problems affording any of your prescribed medications? No   Is the patient taking medications as prescribed? yes   Does the patient have transportation home? Yes   Transportation Anticipated family or friend will provide   Dialysis Name and Scheduled days n/a   Does the patient receive services at the [facility]? No   Discharge Plan A Home Health;Home with family   Discharge Plan B Home with family;Home Health   DME Needed Upon Discharge    (TBD)   Patient/Family in Agreement with Plan yes     Olivia Zavala,  MD  [address] / [facility] [address]      [facility] - [place], [place] - [address] AT [facility] [address]  [address]  [place] [address]  Phone: [phone] Fax: [phone]    [facility] - [place], [place] - [address] [facility]  [address] [facility] [address]  Phone: [phone] Fax: [phone]    [facility] - [place], [place] - [address]  [address]  [address]  [facility] [address]  Phone: [phone] Fax: [phone]    Extended Emergency Contact Information  Primary Emergency Contact: Altagracia Herman  Address: [address] [place] [address] [id number] [place] [address] of [place]  Home Phone: [phone]  Mobile Phone: [phone]  Relation: Spouse

## 2019-05-31 NOTE — PROGRESS NOTES
[facility] Hospital Medicine  Progress Note    Patient Name: Reid Herman  MRN: [id number]  Patient Class: IP- Inpatient   Admission Date: 5/29/2019  Length of Stay: 2 days  Attending Physician: Elis Gutierrez MD  Primary Care Provider: Olivia Zavala MD    [place] [facility] Medicine Team: [facility] HOSP MED 3 Shraddha Call MD    Subjective:     Principal Problem:Cellulitis of foot    HPI:  Reid Herman is a 79M direct admit from podiatry clinic for a left hallux abcess. Patient has a PMH HTN, Hyperlipidemia, DM-1(insulin pump), CAD s/p CABG x 2 , MINDA compliant with CPAP, CLL not currently on any therapy, PAD who has been dealing with a left foot abcess for the last month that has been getting worse. Cultures from wound have grown enterococcus that  sensitive to amp and Vanc.  Wound has worsened despite appropriate PO ABx (ampicillin). Additionally he has described that he has been having problems with SOB that he has been needing to increase the incline of his bed for. He also noticed worsening bilateral lower edema and he has gained 7 lbs in the last month. Patient states that currently he does not feel SOB ( since it only  occurs with being flat or exertion), no CP, no palpitations, no fever, chills. He states that he can feel his bilateral feet but has decreased. Usually he has been mobile but with this wound and his boot he has been less mobile. No history of DVT but he has a history of CLL.    Initial workup in the floor shows elevated BNP in the 2k, WBC > 50 ( he has a baseline around 53. CRP elevated, and hyponatremic to 127.     Hospital Course:  Admitted to medicine for cellulitis and CHF exacerbation. Ulcer seen by podiatry, vascular and ID.  Patient found to have elevated tropoinins and EKG changes consistent with recent MI.  Cardiology consulted, along with Interventional cardiology.  Recommendation to treat medically with heparin ggt for 48hrs, lisinopril 10mg, statin, plavix,  .        Interval History: NAEON. ID recommended holding vanc until bone biopsy.  Vascular to do angio 6/3. Podiatry recommendations pending. Continue with medical management of recent MI.  Endocrine to help manage insulin pump.    Review of Systems   Constitutional: Positive for activity change and unexpected weight change (up 7 lbs ). Negative for appetite change, fatigue and fever.   HENT: Negative for congestion.    Eyes: Negative for discharge.   Respiratory: Positive for shortness of breath. Negative for wheezing.    Cardiovascular: Positive for leg swelling. Negative for chest pain.   Gastrointestinal: Negative for abdominal distention and nausea.   Endocrine: Negative for cold intolerance, heat intolerance, polydipsia, polyphagia and polyuria.   Genitourinary: Positive for decreased urine volume. Negative for difficulty urinating.   Musculoskeletal: Negative for arthralgias and back pain.   Skin: Positive for color change and rash.   Neurological: Negative for dizziness, tremors and numbness.   Psychiatric/Behavioral: Negative for agitation.     Objective:     Vital Signs (Most Recent):  Temp: 98 °F (36.7 °C) (05/31/19 1319)  Pulse: 69 (05/31/19 1319)  Resp: 14 (05/31/19 1319)  BP: (!) 118/58 (05/31/19 1319)  SpO2: 100 % (05/31/19 1319) Vital Signs (24h Range):  Temp:  [98 °F (36.7 °C)-99.4 °F (37.4 °C)] 98 °F (36.7 °C)  Pulse:  [57-69] 69  Resp:  [14-20] 14  SpO2:  [99 %-100 %] 100 %  BP: (118-134)/(58-82) 118/58     Weight: 97.5 kg (215 lb)  Body mass index is 33.67 kg/m².    Intake/Output Summary (Last 24 hours) at 5/31/2019 1328  Last data filed at 5/31/2019 0448  Gross per 24 hour   Intake 240 ml   Output 2375 ml   Net -2135 ml      Physical Exam   Constitutional: He appears well-developed and well-nourished. No distress.   HENT:   Head: Normocephalic and atraumatic.   Eyes: Pupils are equal, round, and reactive to light. EOM are normal.   Neck: Normal range of motion. Neck supple. JVD present.    Cardiovascular: Normal rate, regular rhythm and normal heart sounds.   Pulmonary/Chest: Effort normal and breath sounds normal. No respiratory distress.   Abdominal: Soft. Bowel sounds are normal. He exhibits distension. There is no tenderness.   Musculoskeletal: Normal range of motion. He exhibits edema, tenderness and deformity.   Erythema in the left foot as pictures below/ Ulcer present that is cleaned , pain around the ulcer noted.  Dorsalis pedis and posterior tibial pulses are diminished Bilaterally. Toes are cool to touch. Feet are warm proximally. + edema in the left foot and 2+ edema in the right foot        Skin: He is not diaphoretic.       Significant Labs:   CBC:   Recent Labs   Lab 05/30/19  0354 05/30/19  1149 05/31/19  0339   WBC 51.62* 50.43* 51.07*  51.07*   HGB 12.1* 11.6* 10.5*  10.5*   HCT 35.8* 36.1* 32.5*  32.5*    193 158  158     CMP:   Recent Labs   Lab 05/30/19  1655 05/31/19  0009 05/31/19  0835   * 130* 130*   K 4.9 4.2 4.8   CL 92* 94* 92*   CO2 24 29 28   * 168* 182*   BUN 31* 32* 30*   CREATININE 1.4 1.3 1.5*   CALCIUM 9.3 9.6 9.1   PROT 6.2 5.7* 6.1   ALBUMIN 3.7 3.5 3.6   BILITOT 0.6 0.6 0.6   ALKPHOS 113 97 88   AST 23 20 29   ALT 35 32 31   ANIONGAP 10 7* 10   EGFRNONAA 48.1* 52.6* 44.3*       Significant Imaging: I have reviewed all pertinent imaging results/findings within the past 24 hours.    Assessment/Plan:      * Cellulitis of Left foot  Reid Herman is a 79 y.o. with DM1, PAD and a left hallux ulcer that has been growing enterococcus sensitive to ampicillin and vancomycin but with worsening erythema despite treatment. Admitted from podiatry for OM rule out and IV antibiotics    MRI shows early OM  BCx NGTD    Vascular recommending angiogram on 6/3  ID recommending holding Vanc and bone biopsy  Podiatry considering Debridement/amputation vs Biopsy and IV antibiotics options       PLAN:  --WBC unable to be reliable as patient has CLL, crp  is elevated esr is low but patient is immunocompromised   - Blood cultures, NGTD  - continue to hold off on vancomycin   - Podiatry following, appreciate recs  -ID following, appreciate recs  -Vascular following, appreciate recs            NSTEMI (non-ST elevated myocardial infarction)    -EKG w/ ST depressions, elevated troponin (peaked 1.16)  - dyspnea on exertion, orthopnea, significant peripheral edema.  - BNP 2608  - ECHO w/ EF 20%, grade II DD.   -New onset HF w/ troponin leak vs NSTEMI in the setting of acute infection.     Plan  - Continue treating ACS w/ heparin gtt for 48 hours   -continue ASA 81 mg  -Continue plavix 75 mg daily  - Continue metoprolol  mg qhs  - Continue IV Lasix 80 mg TID  - Continue Lisinopril 10 mg daily   - Strict I/Os, daily weights  - Interventional cards to catheterization prior to discharge.          Diabetic ulcer of left great toe        Hyponatremia  - likely secondary to CHF  - will get osm, urine osm and urine creatinine and na   - diurese for now   - daily CMP   - stop HCTZ as well       Acute HF (heart failure)  - Patient with symptoms of orthopnea and dyspnea on exertion   - Patient on HCTZ at home no loop diuretic   - BNP elevated at 2 k ,   - PE notable for  JVD and bilateral pitting edema, abdominal distension    - Xray on my read be bilateral edema   - precipitating factor is likely infected ulcer in a patient with PAD   -Echocardiogram: 2D echo with color flow doppler most recent not in file he had a pet stress in January showing EF of 40     PLAN   - IV lasix 80 TID  - Strict I/O   - Daily weights  - Keep mag >2 and phos> 3  - Wean oxygen as tolerated   - Cardiac diet with decreased salt intake              Orthopnea  - as under CHF       Foot abscess, left  - as under cellulitis       Hyperkalemia  -Lasix 80 TID  - Cold also be burden of CLL disease   - Daily k       CKD (chronic kidney disease), stage III  - slightly worse today   - Daily CMP   - Will diurese  likely cardiorenal due to CHF       Peripheral vascular disease  - last LEONIE was march with mild PAD , will repeat here given that he is having complicated ulcer healing   - continue ASA, statin, and plavix  - consider vascular consult if patient with worsening PAD disease       MINDA on CPAP  - cpap qhs       S/P CABG (coronary artery bypass graft)  CAD  History of angina     - no CP currently   - continue Asa, statin ,plavix  - takes nitro PRN will hold off on ordering and will evaluate pt if he starts having symptoms       HTN (hypertension)  - at home on lisinopril and HCTZ   - given his hyponatremia and hyperkalemia will discontinue both   - continue toprol 150 qhs       Hyperlipidemia  - continue statin       Diabetic polyneuropathy associated with type 1 diabetes mellitus  - contributing to difficult ulcer healing   --HbA1c 5.8 very well controlled   - Home DM regimen:  Insulin pump, patient states his average basal is 23 units + SSI    Plan  Endocrine following, appreciate recs  Recommend Levemir to 18 units q HS - 20% reduction of home basal dose   Novolog ICR 1:10  Low dose correction scale  BG monitoring AC/HS  Endocrine to re-evaluate patient given that wife is bringing in pump  To start pump therapy        CLL (chronic lymphocytic leukemia)  - Patient sees Dr. Garcia in heme/onc  - Has not needed treatment  - has been hyperkalemic before attributed to CLL burden           VTE Risk Mitigation (From admission, onward)        Ordered     heparin 25,000 units in dextrose 5% 250 mL (100 units/mL) infusion LOW INTENSITY nomogram - [facility]  Continuous      05/30/19 1131     heparin 25,000 units in dextrose 5% (100 units/ml) IV bolus from bag - ADDITIONAL PRN BOLUS - 30 units/kg (max bolus 4000 units)  As needed (PRN)      05/30/19 1131     heparin 25,000 units in dextrose 5% (100 units/ml) IV bolus from bag - ADDITIONAL PRN BOLUS - 60 units/kg (max bolus 4000 units)  As needed (PRN)      05/30/19 1131     IP VTE LOW  RISK PATIENT  Once      05/29/19 1731              Shraddha Call MD  Department of Hospital Medicine   [facility]

## 2019-05-31 NOTE — ASSESSMENT & PLAN NOTE
BG goal 140-180    Decrease Levemir to 16 units q HS  Increase Novolog ICR to 1:9  Low dose correction scale  BG monitoring AC/HS    Patient uses insulin pump at home, patient on with not being on pump while in-patient for now.  Discussed with patient at bedside the impact of infection and IV antibiotics on BG readings.  Patient's wife to bring pump and supplies from home, will continue MDI for now and re-evaluate if patient should put pump back.    ** Please call Endocrine for any BG related issues **    Discharge planning: TBJOSUE

## 2019-05-31 NOTE — SUBJECTIVE & OBJECTIVE
Interval History: No acute events overnight.  Afebrile.  No complaints.   Pending angiogram 6/3. Podiatry holding biopsy until after angiogram    Review of Systems   Constitutional: Negative for chills and fever.   Respiratory: Negative for cough and shortness of breath.    Cardiovascular: Positive for leg swelling. Negative for chest pain.   Gastrointestinal: Negative for abdominal pain, constipation, diarrhea, nausea and vomiting.   Genitourinary: Negative for dysuria, frequency and hematuria.   Musculoskeletal: Negative for back pain and myalgias.   Skin: Positive for wound (left foot). Negative for rash.   Neurological: Negative for light-headedness, numbness and headaches.   Psychiatric/Behavioral: Negative for agitation and behavioral problems. The patient is not nervous/anxious.      Objective:     Vital Signs (Most Recent):  Temp: 98.1 °F (36.7 °C) (05/31/19 0804)  Pulse: 60 (05/31/19 0804)  Resp: 14 (05/31/19 0804)  BP: 131/62 (05/31/19 0804)  SpO2: 100 % (05/31/19 0804) Vital Signs (24h Range):  Temp:  [98.1 °F (36.7 °C)] 98.1 °F (36.7 °C)  Pulse:  [57-68] 60  Resp:  [14-20] 14  SpO2:  [99 %-100 %] 100 %  BP: (121-134)/(62-82) 131/62     Weight: 97.5 kg (215 lb)  Body mass index is 33.67 kg/m².    Estimated Creatinine Clearance: 45.9 mL/min (A) (based on SCr of 1.5 mg/dL (H)).    Physical Exam   Constitutional: He is oriented to person, place, and time. He appears well-developed and well-nourished. No distress.   Cardiovascular: Normal rate and regular rhythm.   No murmur heard.  Pulmonary/Chest: Effort normal and breath sounds normal. No respiratory distress.   Abdominal: Soft. Bowel sounds are normal. He exhibits no distension.   Musculoskeletal: Normal range of motion. He exhibits edema.   Neurological: He is alert and oriented to person, place, and time.   Skin: Skin is warm and dry.   Left medial great toe with ulceration noted. There is erythema of the 1st digit. Mild tenderness. No active  drainage.    Psychiatric: He has a normal mood and affect. His behavior is normal.     5/31/19 5/29/19        Significant Labs:   Blood Culture:   Recent Labs   Lab 05/29/19  1756   LABBLOO No Growth to date  No Growth to date  No Growth to date  No Growth to date     CBC:   Recent Labs   Lab 05/30/19  0354 05/30/19  1149 05/31/19  0339   WBC 51.62* 50.43* 51.07*  51.07*   HGB 12.1* 11.6* 10.5*  10.5*   HCT 35.8* 36.1* 32.5*  32.5*    193 158  158     CMP:   Recent Labs   Lab 05/30/19  1655 05/31/19  0009 05/31/19  0835   * 130* 130*   K 4.9 4.2 4.8   CL 92* 94* 92*   CO2 24 29 28   * 168* 182*   BUN 31* 32* 30*   CREATININE 1.4 1.3 1.5*   CALCIUM 9.3 9.6 9.1   PROT 6.2 5.7* 6.1   ALBUMIN 3.7 3.5 3.6   BILITOT 0.6 0.6 0.6   ALKPHOS 113 97 88   AST 23 20 29   ALT 35 32 31   ANIONGAP 10 7* 10   EGFRNONAA 48.1* 52.6* 44.3*     Wound Culture:   Recent Labs   Lab 05/22/19  0942   LABAERO ENTEROCOCCUS FAECALIS  Many  No other significant isolate         Significant Imaging: I have reviewed all pertinent imaging results/findings within the past 24 hours.

## 2019-05-31 NOTE — PT/OT/SLP PROGRESS
"Physical Therapy Treatment    Patient Name:  Reid Herman   MRN:  [id number]  Admitting Diagnosis: Cellulitis of foot  Recent Surgery: Procedure(s) (LRB):  Angiogram Extremity Unilateral possible CO2 (Left)      Recommendations:     Discharge Recommendations:  home   Discharge Equipment Recommendations: none   Barriers to discharge: Inaccessible home    Plan:     During this hospitalization, patient to be seen 3 x/week to address the above listed problems via gait training, therapeutic activities, therapeutic exercises, neuromuscular re-education  · Plan of Care Expires:  06/24/19   Plan of Care Reviewed with: patient    This Plan of care has been discussed with the patient who was involved in its development and understands and is in agreement with the identified goals and treatment plan    Subjective     Communicated with RN (Awilda) prior to session.     Patient comments: "I'm not sure I should be walking on this foot"  Pt was educated on importance and benefits of performing exercises and functional mobility  Pain/Comfort:  · Pain Rating 1: 0/10  · Location - Side 1: Left  · Location - Orientation 1: generalized  · Location 1: foot(to touch)  · Pain Addressed 1: Reposition, Distraction, Cessation of Activity  · Pain Rating Post-Intervention 1: (NO rating provided)    Objective:     Patient found with: peripheral IV, telemetry, oxygen(2 LPM via NC)    Patient found sup in bed upon PT entry to room, agreeable to treatment.  NO family present in the room.    General Precautions: Standard, fall   Orthopedic Precautions:N/A   Braces: (DARCO shoe for L foot)       BED MOBILITY (vc's for hand placement sequencing of task):        Rolling to the R:  NT.       Rolling to the L:  NT.        Sup > sit at the EOB:  independent.       Sit > sup:  independent.                       SITTING AT THE EDGE OF THE BED    Assistance Level Required: sup for safety      TRANSFERS  (vc's for hand placement, sequencing of task and " safety)   Patient completed Sit <> Stand Transfer from EOB with independent  with no assistive device/hurry cane x2 trial(s)   Patient completed Stand <> Sit Transfer to EOB with independentA with no AD/hurry cane      GAIT: in hallway, IV pole in tow   Patient ambulated: 180ft   Patient required: CGA for balance and safety   Patient used:  Hurry cane   Gait Pattern observed: reciprocal gait   Gait Deviation(s): decreased joel, increased time in double stance, decreased velocity of limb motion, decreased step length, decreased stride length, decreased toe-to-floor clearance and decreased weight-shifting ability    Comments: vc's for safety      EDUCATION  Patient provided with daily orientation and goals of this PT session. They were educated to call for assistance and to transfer with hospital staff only.  Also, pt was educated on the effects of prolonged immobility and the importance of performing OOB activity and exercises to promote healing and reduce recovery time    Whiteboard updated with correct mobility information. RN/PCT notified.  Pt safe to transfer OOB and amb with RN/PCT: Use pt's personal cane (hurry cane) with CGA.    Patient left supine, with  all lines intact, call button in reach and RN notified    AM-PAC 6 CLICK MOBILITY  Turning over in bed (including adjusting bedclothes, sheets and blankets)?: 4  Sitting down on and standing up from a chair with arms (e.g., wheelchair, bedside commode, etc.): 4  Moving from lying on back to sitting on the side of the bed?: 4  Moving to and from a bed to a chair (including a wheelchair)?: 3  Need to walk in hospital room?: 3  Climbing 3-5 steps with a railing?: 2  Basic Mobility Total Score: 20     Assessment:     Reid Herman is a 79 y.o. male admitted with a medical diagnosis of Cellulitis of foot.  He presents with the following impairments/functional limitations:  weakness, gait instability, decreased lower extremity function, pain, edema.  requiring assistance and verbal cues for gait to prevent falls due to instability and pain.   Pt will cont to benefit from skilled PT intervention to address deficits and improve functional mobility.     Rehab Prognosis:  Good; patient would benefit from acute skilled PT services to address these deficits and reach maximum level of function.      GOALS:   Multidisciplinary Problems     Physical Therapy Goals        Problem: Physical Therapy Goal    Goal Priority Disciplines Outcome Goal Variances Interventions   Physical Therapy Goal     PT, PT/OT Ongoing (interventions implemented as appropriate)     Description:  Goals to be met by: 2019     Patient will increase functional independence with mobility by performin. Gait  x 150 feet with Supervision using Single-point Cane .   2. Ascend/descend 13 stair with bilateral Handrails Supervision                      Time Tracking:     PT Received On: 19  PT Start Time: 1449     PT Stop Time: 1523  PT Total Time (min): 34 min     Billable Minutes: Gait Training 18 and Therapeutic Activity 16    Treatment Type: Treatment  PT/PTA: PTA     PTA Visit Number: 1       Kamille Salas PTA.  Pager [phone]    2019    .

## 2019-05-31 NOTE — SUBJECTIVE & OBJECTIVE
"Interval HPI:   Overnight events: Remains in IMTA, mild hypoglycemic event this am.  BG trended down yesterday after ICR administered appropriately.  Noted WBC of 51.07, on IV antibiotics.  Eatin%  Nausea: No  Hypoglycemia and intervention: Yes, BG 50  Fever: No  TPN and/or TF: No    /77   Pulse (!) 57   Temp 98.1 °F (36.7 °C) (Oral)   Resp 20   Ht 5' 7" (1.702 m)   Wt 97.5 kg (215 lb)   SpO2 100%   BMI 33.67 kg/m²     Labs Reviewed and Include    Recent Labs   Lab 19  0009   *   CALCIUM 9.6   ALBUMIN 3.5   PROT 5.7*   *   K 4.2   CO2 29   CL 94*   BUN 32*   CREATININE 1.3   ALKPHOS 97   ALT 32   AST 20   BILITOT 0.6     Lab Results   Component Value Date    WBC 51.07 (HH) 2019    WBC 51.07 (HH) 2019    HGB 10.5 (L) 2019    HGB 10.5 (L) 2019    HCT 32.5 (L) 2019    HCT 32.5 (L) 2019    MCV 99 (H) 2019    MCV 99 (H) 2019     2019     2019     No results for input(s): TSH, FREET4 in the last 168 hours.  Lab Results   Component Value Date    HGBA1C 5.8 (H) 2019       Nutritional status:   Body mass index is 33.67 kg/m².  Lab Results   Component Value Date    ALBUMIN 3.5 2019    ALBUMIN 3.7 2019    ALBUMIN 3.9 2019     Lab Results   Component Value Date    PREALBUMIN 22 2019       Estimated Creatinine Clearance: 53 mL/min (based on SCr of 1.3 mg/dL).    Accu-Checks  Recent Labs     19  1742 19  2346 19  1220 19  1222 19  1337 19  1702 19  2041 19  0303   POCTGLUCOSE 252* 148* 421* 394* 394* 331* 241* 115*       Current Medications and/or Treatments Impacting Glycemic Control  Immunotherapy:    Immunosuppressants     None        Steroids:   Hormones (From admission, onward)    None        Pressors:    Autonomic Drugs (From admission, onward)    None        Hyperglycemia/Diabetes Medications:   Antihyperglycemics (From admission, " onward)    Start     Stop Route Frequency Ordered    05/30/19 2100  insulin detemir U-100 pen 18 Units      -- SubQ Nightly 05/30/19 1123    05/30/19 1230  insulin aspart U-100 pen 1-15 Units      -- SubQ 3 times daily with meals 05/30/19 1123    05/30/19 1223  insulin aspart U-100 pen 0-5 Units      -- SubQ Before meals & nightly PRN 05/30/19 1123

## 2019-05-31 NOTE — ANESTHESIA PREPROCEDURE EVALUATION
05/31/2019  Reid Herman is a 79 y.o., male.  Pre-operative evaluation for Procedure(s) (LRB):  Angiogram Extremity Unilateral possible CO2 (Left)    Reid Herman is a 79 y.o. male PMHx of CAD s/p CABG (SVG-LAD, LIMA-1st Diag) with recent 01/29/19 NSTEMI s/p PCI DUC Mid-distal LCx, Hypertension, HLD, MINDA on CPAP, CLL, PVD who was admitted for worsening left greater toe ulceration by Podiatry. Pt also with ADHF (EF 20%).       LHC 01/29/19 with Dr. Ny and demonstrated ( 80-90% Mid-distal LCx,  p-LAD, LIMA-1st Diag 50% stenosis, SVG-LAD 60% stenosis, native %;  EKG with new ST depression on anterolateral leads this admission. Interventional Cardiology and Cardiology following, once patient optimized from infectious and HF standpoint, they will pursue LHC. No emergent intervention needed at this time. Patient being treated medically for ACS, on aspirin, plavix, and heparin drip.    Patient scheduled for the above procedure    LDA:   20 G R Hand PIV    Prev airway: 5/18/2015  Easy oral  Rojas 2  Grade I     Drips:   Heparin 10U/kg/hr    Patient Active Problem List   Diagnosis    CLL (chronic lymphocytic leukemia)    Diabetic polyneuropathy associated with type 1 diabetes mellitus    Controlled type 1 diabetes mellitus with diabetic neuropathy, with long-term current use of insulin    Coronary artery disease involving native coronary artery of native heart with angina pectoris    Posterior vitreous detachment - Both Eyes    Hyperlipidemia    HTN (hypertension)    Dermatophytosis of nail    Pseudophakia    BPH with urinary obstruction    Erectile dysfunction    S/P CABG (coronary artery bypass graft)    Corns and callosities    Lumbar facet arthropathy    DDD (degenerative disc disease), lumbar    MINDA on CPAP    Peripheral vascular disease    Lumbar spondylosis     Spondylolisthesis    S/P lumbar fusion    Atherosclerosis of aorta    CKD (chronic kidney disease), stage III    Sacroiliitis    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    Stable angina    Nonexudative age-related macular degeneration, bilateral, intermediate dry stage    Carotid disease, bilateral    AK (actinic keratosis)    Anemia in neoplastic disease    Hyperkalemia    Ulcer of great toe, left, limited to breakdown of skin    Insulin pump status    Foot abscess, left    Cellulitis of Left foot    Orthopnea    Acute HF (heart failure)    Hyponatremia    Open toe wound    Diabetic ulcer of left great toe       Review of patient's allergies indicates:  No Known Allergies     No current facility-administered medications on file prior to encounter.      Current Outpatient Medications on File Prior to Encounter   Medication Sig Dispense Refill    ACCU-CHEK FASTCLIX LANCET DRUM Misc TEST 6 TIMES DAILY 550 each 3    alpha lipoic acid 600 mg Cap Take 1 capsule by mouth once daily.        ampicillin (PRINCIPEN) 500 MG capsule Take 1 capsule (500 mg total) by mouth 4 (four) times daily. for 10 days 40 capsule 0    aspirin (ECOTRIN) 81 MG EC tablet Take 1 tablet (81 mg total) by mouth once daily.  0    atorvastatin (LIPITOR) 80 MG tablet TAKE 1/2 TABLET NIGHTLY. (Patient taking differently: 40 mg. ) 45 tablet 3    BD INSULIN SYRINGE ULT-FINE II 0.3 mL 31 gauge x 5/16 Syrg       blood sugar diagnostic (ACCU-CHEK SHARON PLUS TEST STRP) Strp Check sugar a 5x a day. 450 strip 3    blood-glucose meter,continuous (DEXCOM G6 ) Misc Use as directed, change every 3 years 1 each 0    blood-glucose sensor (DEXCOM G6 SENSOR) Doris Change every 10 days. 3 Device 6    blood-glucose transmitter (DEXCOM G6 TRANSMITTER) Doris Change every 3 months. 1 Device 1    clopidogrel (PLAVIX) 75 mg tablet Take 4 tablets x 1 followed by one tablet daily 90 tablet 4     lisinopril-hydrochlorothiazide (PRINZIDE,ZESTORETIC) 20-12.5 mg per tablet Take 1 tablet by mouth 2 (two) times daily. 180 tablet 3    metFORMIN (GLUCOPHAGE) 500 MG tablet TAKE 1 TABLET TWICE DAILY 180 tablet 2    metoprolol succinate (TOPROL-XL) 100 MG 24 hr tablet Take 1 tablet (100 mg total) by mouth every evening. 90 tablet 3    metoprolol succinate (TOPROL-XL) 50 MG 24 hr tablet Take 1 tablet (50 mg total) by mouth once daily. 30 tablet 11    clotrimazole-betamethasone 1-0.05% (LOTRISONE) cream APPLY EXTERNALLY TO THE AFFECTED AREA TWICE DAILY 15 g 0    docusate sodium (COLACE) 100 MG capsule Take 100 mg by mouth 2 (two) times daily.      doxycycline (MONODOX) 100 MG capsule Take 1 capsule (100 mg total) by mouth 2 (two) times daily. for 10 days 20 capsule 0    fluticasone (FLONASE) 50 mcg/actuation nasal spray 1 spray by Each Nare route as needed for Rhinitis.      glucagon, human recombinant, (GLUCAGON EMERGENCY KIT, HUMAN,) 1 mg SolR Inject 1 mg into the muscle as needed. 1 each 3    insulin aspart U-100 (NOVOLOG U-100 INSULIN ASPART) 100 unit/mL injection Uses insulin pump, max daily 100 units. 30 mL 11    insulin pump cartridge (OMNIPOD DASH INSULIN POD) Crtg Change every 3 days. 10 each 11    insulin pump controller (OMNIPOD DASH PDM KIT) Misc Use as directed 1 each 0    xucfjmvac-U6-qdC10-algal oil (METANX/FOLTANX RF) 3 mg-35 mg-2 mg -90.314 mg Cap Take 1 tablet by mouth once daily. 90 capsule 5    multivitamin capsule Take 1 capsule by mouth once daily.        nitroGLYCERIN (NITROSTAT) 0.4 MG SL tablet Place 1 tablet (0.4 mg total) under the tongue every 5 (five) minutes as needed for Chest pain. May dispense a two pack of 25 tablets. 100 tablet 3    vit A/vit C/vit E/zinc/copper (PRESERVISION AREDS ORAL) Take 1 tablet by mouth 2 (two) times daily.         Past Surgical History:   Procedure Laterality Date    ANGIOGRAM, CORONARY ARTERY N/A 1/29/2019    Performed by Saturnino SARAH  MD [facility] CATH LAB    APPENDECTOMY  1953    BLOCK-NERVE-MEDIAL BRANCH-LUMBAR Bilateral 3/10/2015    [facility]    BLOCK-NERVE-MEDIAL BRANCH-LUMBAR Bilateral 11/25/2014    [facility] at [facility]    CATARACT EXTRACTION  4/8/13    right eye (toric)    CATARACT EXTRACTION  4/29/13    left eye (toric)    COLONOSCOPY N/A 4/25/2017    Performed by CLARISSA [facility] MD at [facility] ENDO (4TH FLR)    COLONOSCOPY N/A 4/25/2014    Performed by CLARISSA [facility] at [facility] ENDO (4TH FLR)    CORONARY ARTERY BYPASS GRAFT  1994    x 3    EYE SURGERY      cataracts, both eyes    FINGER TENDON REPAIR  2011    left hand    INJECTION-FACET Bilateral 3/31/2015    [facility]    INJECTION-STEROID-EPIDURAL-LUMBAR N/A 10/28/2014    [facility]    INSERTION, IOL PROSTHESIS Left 4/29/2013    [facility] OR    INSERTION, IOL PROSTHESIS Right 4/8/2013    [facility] by Soco [facility]oval [facility] at [facility] OR    INSERTION, STENT, CORONARY ARTERY N/A 1/29/2019    Performed by Saturnino Ny MD at [facility] CATH LAB    MINIMALLY INVASIVE FUSION-TRANSLUMINAL LUMBAR INTERBODY (TLIF) Left 5/18/2015    Performed by Tee Pinedo MD at [facility] OR 2ND FLR    PHACOEMULSIFICATION, CATARACT Left 4/29/2013    [facility] OR    PHACOEMULSIFICATION, CATARACT Right 4/8/2013    [facility]o [facility] at [facility] OR    RADIOFREQUENCY THERMOCOAGULATION (RFTC)-NERVE-MEDIAN BRANCH-LUMBAR Left 1/13/2015    Performed by [facility]    RADIOFREQUENCY THERMOCOAGULATION (RFTC)-NERVE-MEDIAN BRANCH-LUMBAR Right 12/30/2014    [facility]    SKIN BIOPSY  2013    multiple    SPINE SURGERY  2015    TLIF    TONSILLECTOMY  1947       Social History     Socioeconomic History    Marital status:       Spouse name: Not on file    Number of children: Not on file    Years of education: Not on file    Highest education level: Not on file   Occupational History    Occupation: Sales     Employer: [employer]    Social Needs    Financial resource strain: Not on file    Food insecurity:     Worry: Not on file     Inability: Not on file    Transportation needs:     Medical: Not on file     Non-medical: Not on file   Tobacco Use    Smoking status: Never Smoker    Smokeless tobacco: Never Used   Substance and Sexual Activity    Alcohol use: Yes     Alcohol/week: 0.6 oz     Types: 1 Glasses of wine per week     Comment: social once monthly    Drug use: No    Sexual activity: Not Currently     Partners: Female   Lifestyle    Physical activity:     Days per week: Not on file     Minutes per session: Not on file    Stress: Not on file   Relationships    Social connections:     Talks on phone: Not on file     Gets together: Not on file     Attends [employer] service: Not on file     Active member of club or organization: Not on file     Attends meetings of clubs or organizations: Not on file     Relationship status: Not on file   Other Topics Concern    Not on file   Social History Narrative    Not on file         Vital Signs Range (Last 24H):  Temp:  [36.7 °C (98.1 °F)]   Pulse:  [57-68]   Resp:  [14-20]   BP: (121-134)/(62-82)   SpO2:  [99 %-100 %]       CBC:   Recent Labs     05/30/19  1149 05/31/19  0339   WBC 50.43* 51.07*  51.07*   RBC 3.61* 3.27*  3.27*   HGB 11.6* 10.5*  10.5*   HCT 36.1* 32.5*  32.5*    158  158   * 99*  99*   MCH 32.1* 32.1*  32.1*   MCHC 32.1 32.3  32.3       CMP:   Recent Labs     05/30/19  0354  05/30/19  1655 05/31/19  0009 05/31/19  0339   NA  --    < > 126* 130*  --    K  --    < > 4.9 4.2  --    CL  --    < > 92* 94*  --    CO2  --    < > 24 29  --    BUN  --    < > 31* 32*  --    CREATININE  --    < > 1.4 1.3  --    GLU  --    < > 320* 168*   --    MG 1.7  --   --   --  1.8   PHOS 3.9  --   --   --  4.3   CALCIUM  --    < > 9.3 9.6  --    ALBUMIN  --    < > 3.7 3.5  --    PROT  --    < > 6.2 5.7*  --    ALKPHOS  --    < > 113 97  --    ALT  --    < > 35 32  --    AST  --    < > 23 20  --    BILITOT  --    < > 0.6 0.6  --     < > = values in this interval not displayed.       INR  Recent Labs     05/30/19  1149 05/30/19  2134 05/31/19  0339   INR 1.1  --   --    APTT 27.9 70.1* 58.5*           Diagnostic Studies:      EKG:  Vent. Rate : 070 BPM     Atrial Rate : 070 BPM     P-R Int : 166 ms          QRS Dur : 100 ms      QT Int : 440 ms       P-R-T Axes : 054 079 215 degrees     QTc Int : 475 ms    Normal sinus rhythm  Marked ST abnormality, possible lateral subendocardial injury  Possible  LVH with secondary ST-T wave changes  Abnormal ECG  When compared with ECG of 29-MAY-2019 19:17,  The axis Shifted left  ST more depressed in Anterior leads  Inverted T waves have replaced nonspecific T wave abnormality in Inferior  leads  Confirmed by MERCY FROST MD ([id number]) on 5/30/2019 2:26:05 PM    2D Echo: 5/30/2019    ·  Severely decreased left ventricular systolic function. The estimated ejection fraction is 20%  · Mildly to moderately reduced right ventricular systolic function.  · Grade II (moderate) left ventricular diastolic dysfunction consistent with pseudonormalization.  · Elevated left atrial pressure.  · Moderate left atrial enlargement.  · Mild-to-moderate mitral regurgitation.  · Mild tricuspid regurgitation.  · The estimated PA systolic pressure is 47 mm Hg  Intermediate central venous pressure (8 mm Hg).      Anesthesia Evaluation    I have reviewed the Patient Summary Reports.     I have reviewed the Medications.     Review of Systems  Anesthesia Hx:  No problems with previous Anesthesia  History of prior surgery of interest to airway management or planning: heart surgery. Previous anesthesia: General Denies Family Hx of Anesthesia complications.    Denies Personal Hx of Anesthesia complications.   Social:  Social Alcohol Use, Non-Smoker    Hematology/Oncology:         -- Anemia: Current/Recent Cancer. (CLL)   EENT/Dental:EENT/Dental Normal   Cardiovascular:   Hypertension Past MI CAD  CABG/stent  Angina CHF (EF20%) Orthopnea PVD hyperlipidemia EF 20%   Pulmonary:   Shortness of breath Sleep Apnea (Intermittently wears CPAP), CPAP    Renal/:   Chronic Renal Disease, CRI    Hepatic/GI:   GERD, well controlled    Musculoskeletal:   Arthritis   Spine Disorders: lumbar Disc disease and Degenerative disease    Neurological:   Peripheral Neuropathy    Endocrine:   Diabetes, type 1, using insulin Has insulin pump which is currently OFF       Physical Exam  General:  Well nourished    Airway/Jaw/Neck:  Airway Findings: Mouth Opening: Normal Tongue: Normal  General Airway Assessment: Adult  Mallampati: I  Improves to I with phonation.  TM Distance: Normal, at least 6 cm  Jaw/Neck Findings:  Neck ROM: Normal ROM  Neck Findings: Normal    Eyes/Ears/Nose:  EYES/EARS/NOSE FINDINGS: Normal   Dental:  Dental Findings: In tact   Chest/Lungs:  Chest/Lungs Findings: Clear to auscultation, Normal Respiratory Rate     Heart/Vascular:  Heart Findings: Rate: Normal  Rhythm: Regular Rhythm        Mental Status:  Mental Status Findings:  Cooperative, Alert and Oriented         Anesthesia Plan  Type of Anesthesia, risks & benefits discussed:  Anesthesia Type:  MAC  Patient's Preference:   Intra-op Monitoring Plan: standard ASA monitors  Intra-op Monitoring Plan Comments:   Post Op Pain Control Plan: multimodal analgesia, IV/PO Opioids PRN and per primary service following discharge from PACU  Post Op Pain Control Plan Comments:   Induction:   IV  Beta Blocker:  Patient is on a Beta-Blocker and has received one dose within the past 24 hours (No further documentation required).       Informed Consent: Patient understands risks and agrees with Anesthesia plan.  Questions answered.  Anesthesia consent signed with patient.  ASA Score: 3     Day of Surgery Review of History & Physical:    H&P update referred to the provider.     Anesthesia Plan Notes: Pt currently being followed by Cardiology and Interventional Cardiology for NSTEMI vs Troponin Leak and currently being medically treated for ACS. Recommend f/u on Cards and Interventional Cards recommendations prior to OR.         Ready For Surgery From Anesthesia Perspective.

## 2019-05-31 NOTE — PROGRESS NOTES
"[facility]  Endocrinology  Progress Note    Admit Date: 2019     Reason for Consult: Management of T1DM, Hyperglycemia     Surgical Procedure and Date: N/A    Diabetes diagnosis year:     Lab Results   Component Value Date    HGBA1C 5.8 (H) 2019       Home Diabetes Medications:  Accuchek spirit pump/sarai       Basal Rate  0000 - 0300     0.85 u/hr  0300 - 0700     0.9 u/hr  0700 - 2100     1.1 u/hr  2100 - 0000     0.85 u/hr        Carb Ratio  12A: 1:8     ISF  1:30     Target: 110-130  IAT: 3    How often checking glucose at home? Dexcom G5 CGM  BG readings on regimen: 110-140  Hypoglycemia on the regimen?  Yes, a few time per week  Missed doses on regimen?  No    Diabetes Complications include:     Hypoglycemia , Diabetic chronic kidney disease     , Diabetic retinopathy , Diabetic peripheral neuropathy , Diabetic peripheral angiopathy with/without gangrene and Foot ulcer      Complicating diabetes co morbidities:   CHF, MINDA and CKD      HPI:   Patient is a 79 y.o. male with a diagnosis of hyponatremia, left foot abscess, CKD3, DM1, CLL, MINDA, and acute heart failure.  Admitted to [facility] on 19 from podiatry clinic for a left hallux abcess.  Last seen in endocrinology clinic by ESTEFANÍA Rivers NP on 19.  Endocrine consulted for DM/BG management.            Interval HPI:   Overnight events: Remains in IMTA, mild hypoglycemic event this am.  BG  trended down yesterday after ICR administered appropriately.  Noted WBC of 51.07, on IV antibiotics.  Eatin%  Nausea: No  Hypoglycemia and intervention: Yes, BG 50  Fever: No  TPN and/or TF: No    /77   Pulse (!) 57   Temp 98.1 °F (36.7 °C) (Oral)   Resp 20   Ht 5' 7" (1.702 m)   Wt 97.5 kg (215 lb)   SpO2 100%   BMI 33.67 kg/m²      Labs Reviewed and Include    Recent Labs   Lab 19  0009   *   CALCIUM 9.6   ALBUMIN 3.5   PROT 5.7*   *   K 4.2   CO2 29   CL 94*   BUN 32*   CREATININE 1.3   ALKPHOS 97 "   ALT 32   AST 20   BILITOT 0.6     Lab Results   Component Value Date    WBC 51.07 (HH) 05/31/2019    WBC 51.07 (HH) 05/31/2019    HGB 10.5 (L) 05/31/2019    HGB 10.5 (L) 05/31/2019    HCT 32.5 (L) 05/31/2019    HCT 32.5 (L) 05/31/2019    MCV 99 (H) 05/31/2019    MCV 99 (H) 05/31/2019     05/31/2019     05/31/2019     No results for input(s): TSH, FREET4 in the last 168 hours.  Lab Results   Component Value Date    HGBA1C 5.8 (H) 05/29/2019       Nutritional status:   Body mass index is 33.67 kg/m².  Lab Results   Component Value Date    ALBUMIN 3.5 05/31/2019    ALBUMIN 3.7 05/30/2019    ALBUMIN 3.9 05/30/2019     Lab Results   Component Value Date    PREALBUMIN 22 05/17/2019       Estimated Creatinine Clearance: 53 mL/min (based on SCr of 1.3 mg/dL).    Accu-Checks  Recent Labs     05/29/19  1742 05/29/19  2346 05/30/19  1220 05/30/19  1222 05/30/19  1337 05/30/19  1702 05/30/19  2041 05/31/19  0303   POCTGLUCOSE 252* 148* 421* 394* 394* 331* 241* 115*       Current Medications and/or Treatments Impacting Glycemic Control  Immunotherapy:    Immunosuppressants     None        Steroids:   Hormones (From admission, onward)    None        Pressors:    Autonomic Drugs (From admission, onward)    None        Hyperglycemia/Diabetes Medications:   Antihyperglycemics (From admission, onward)    Start     Stop Route Frequency Ordered    05/30/19 2100  insulin detemir U-100 pen 18 Units      -- SubQ Nightly 05/30/19 1123    05/30/19 1230  insulin aspart U-100 pen 1-15 Units      -- SubQ 3 times daily with meals 05/30/19 1123    05/30/19 1223  insulin aspart U-100 pen 0-5 Units      -- SubQ Before meals & nightly PRN 05/30/19 1123          ASSESSMENT and PLAN    * Cellulitis of Left foot  Infection may elevate BG readings  Optimize BG control for wound healing      Controlled type 1 diabetes mellitus with diabetic neuropathy, with long-term current use of insulin  BG goal 140-180    Decrease Levemir to 16 units  q HS  Increase Novolog ICR to 1:9  Low dose correction scale  BG monitoring AC/HS    Patient uses insulin pump at home, patient on with not being on pump while in-patient for now.  Discussed with patient at bedside the impact of infection and IV antibiotics on BG readings.  Patient's wife to bring pump and supplies from home, will continue MDI for now and re-evaluate if patient should put pump back.    ** Please call Endocrine for any BG related issues **    Discharge planning: TBD      CKD (chronic kidney disease), stage III  Caution with insulin stacking  Estimated Creatinine Clearance: 45.9 mL/min (A) (based on SCr of 1.5 mg/dL (H)).        MINDA on CPAP  May affect BG readings if uncontrolled            Jeff Bustillo, NADINE  Endocrinology  [facility]

## 2019-05-31 NOTE — ASSESSMENT & PLAN NOTE
Endocrine following, appreciate recs  Recommend Levemir to 18 units q HS - 20% reduction of home basal dose   Novolog ICR 1:10  Low dose correction scale  BG monitoring AC/HS  Endocrine to re-evaluate patient given that wife is bringing in pump  To start pump therapy

## 2019-05-31 NOTE — PLAN OF CARE
Problem: Adult Inpatient Plan of Care  Goal: Plan of Care Review  Outcome: Ongoing (interventions implemented as appropriate)  Patient remained in stable condition through shift. Remained free of falls and other injuries. Able to reposition self independently. Denied any pain or discomfort. Per Heparin Nomogram, Heparin infusing at 10 units/kg/hr at 7.9 ml/hr. Blood sugars checked per orders, covered as needed. Bed in locked and lowest position, call light in reach, all questions answered, declines any further needs at this time. Will continue to monitor.

## 2019-05-31 NOTE — CARE UPDATE
Rapid Response Respiratory Therapy Proactive Rounding Note      Time of visit: 1347    Code Status: Full Code   : 1940  Age: 79 y.o.  Weight:   Wt Readings from Last 1 Encounters:   19 97.5 kg (215 lb)     Sex: male  Race: White   Bed: 1158/1158 B:   MRN: [id number]    SITUATION     Evaluated patient for: BiPap usage    BACKGROUND     Patient has a past medical history of Anemia, Back pain, Basal cell carcinoma, CAD (coronary artery disease), Cataract, DDD (degenerative disc disease), lumbar, Diabetes mellitus, Diabetes mellitus type I, Diabetic retinopathy of both eyes, Hyperlipidemia, Hypertension, Insulin pump in place, NSTEMI (non-ST elevated myocardial infarction), Obesity, 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  Pulmonary Hx: MINDA  Clinically Significant Surgical Hx: None    ASSESSMENT     Pulse: Pulse: 72 Respiratory rate: Resp: 17 Temperature: Temp: 98.9 °F (37.2 °C) BP: BP: 121/68 SpO2:SpO2: 96 %   Level of Consciousness: Level of Consciousness (AVPU): alert  Respiratory Effort: Respiratory Effort: Unlabored  Expansion/Accessory Muscle Usage: Expansion/Accessory Muscles/Retractions: expansion symmetric  All Lung Field Breath Sounds:    Cough Type:    Mobility at time of assessment: General Mobility: generalized weakness, mildly impaired  O2 Device/Concentration: O2 Device (Oxygen Therapy): nasal cannula   Flow (L/min): 2    Most recent blood gas: No results for input(s): PH, PCO2, PO2, HCO3, POCSATURATED, BE in the last 72 hours.  PF Ratio Calculator  P/F Ratio:    Current Respiratory Care Orders: CPAP @ night  NIPPV: Yes,  Type: CPAP Settings: 8  Surgical airway: No  ETCO2 monitored:    Ambu at bedside: Ambu bag with the patient?: Yes, Adult Ambu    INTERVENTIONS/RECOMMENDATIONS   ?  Pt states he feels okay at night with just nasal cannula on. Encouraged pt to try wearing CPAP tonight because that is treating his sleep apnea, not just supplemental O2.        FOLLOW-UP     Please call back the Rapid Response RT, Ted Stewart, RRT at x [phone] for any questions or concerns.

## 2019-05-31 NOTE — ASSESSMENT & PLAN NOTE
Reid Herman is a 79 y.o. with DM1, PAD and a left hallux ulcer that has been growing enterococcus sensitive to ampicillin and vancomycin but with worsening erythema despite treatment. Admitted from podiatry for OM rule out and IV antibiotics    MRI shows early OM  BCx NGTD    Vascular recommending angiogram on 6/3  ID recommending holding Vanc and bone biopsy  Podiatry considering Debridement/amputation vs Biopsy and IV antibiotics options       PLAN:  --WBC unable to be reliable as patient has CLL, crp is elevated esr is low but patient is immunocompromised   - Blood cultures, NGTD  - continue to hold off on vancomycin   - Podiatry following, appreciate recs  -ID following, appreciate recs  -Vascular following, appreciate recs

## 2019-05-31 NOTE — PROGRESS NOTES
[facility]  Infectious Disease  Progress Note    Patient Name: Reid Herman  MRN: [id number]  Admission Date: 5/29/2019  Length of Stay: 2 days  Attending Physician: Elis Gutierrez MD  Primary Care Provider: Olivia Zavala MD    Isolation Status: No active isolations  Assessment/Plan:      Diabetic ulcer of left great toe     79 year old male who presented as a direct admit from podiatry clinic for a left great toe ulcer. Patient has a PMH HTN, HLD, DM-1(insulin pump), CAD s/p CABG x 2 , MINDA compliant with CPAP, CLL not currently on any therapy, and PAD who presents with left foot ulcer. Prior wound culture with E. Faecalis. Pt started on IV vanc. MRI cannot rule out possible early osteomyelitis distal phalanx. ID consulted for further recs.     Stable leukocytosis (50,000 + ) present since January- due to CLL- predominantly lymphocytic.  Pt afebrile.      He is afebrile, without complaints.  Vascular Surgery has evaluated and planning angiogram on Monday.  Podiatry wishes to defer bone biopsy until after angiogram    Plan:  1. So long as patient remains stable, recommend continuing to hold antibiotics pending bone biopsy to improve culture yield.  If clinically worsens, may start IV vancomycin and ceftriaxone empirically.    2. When able, send bone biopsy  for aerobic, anaerobic, fungal, and AFB cultures; please also send bone to pathology  3. Will follow from [place] over the weekend.  If any questions or concerns, call me at [phone] over the weekend.     Data reviewed and plan discussed with ID staff  Discussed with Primary Team.     Thank you.   Please call for any questions or concerns.  MILDRED Malhotra, ANP-C  [phone]    Subjective:     Principal Problem:Cellulitis of foot    HPI: This is a 79 year old male who presented as a direct admit from podiatry clinic for a left great toe ulcer. Patient has a PMH HTN, HLD, DM-1(insulin pump), CAD s/p CABG x 2 , MINDA compliant with CPAP, CLL not  currently on any therapy, and PAD who presents with left foot ulcer and for the last month that has progressively been worsening.  Patient was on multiple courses of oral antibiotics with no improvement.  He was seen in podiatry clinic yesterday and noted to be worsening so recommended admission for IV antibiotics.  Patient had MRI which could not rule out early osteomyelitis.  Podiatry consulted and discussed possible bone biopsy. Patient was started on IV vancomycin.    Interventional cards has been consulted.  There is a wound culture from 5/22 that grew enterococcus faecalis. Patient has a leukocytosis of 51,000 but has history of CLL and has had this WBC count since January.  Patient has mild pain but does have neuropathy. He denies fevers or chills.       Interval History: No acute events overnight.  Afebrile.  No complaints.   Pending angiogram 6/3. Podiatry holding biopsy until after angiogram    Review of Systems   Constitutional: Negative for chills and fever.   Respiratory: Negative for cough and shortness of breath.    Cardiovascular: Positive for leg swelling. Negative for chest pain.   Gastrointestinal: Negative for abdominal pain, constipation, diarrhea, nausea and vomiting.   Genitourinary: Negative for dysuria, frequency and hematuria.   Musculoskeletal: Negative for back pain and myalgias.   Skin: Positive for wound (left foot). Negative for rash.   Neurological: Negative for light-headedness, numbness and headaches.   Psychiatric/Behavioral: Negative for agitation and behavioral problems. The patient is not nervous/anxious.      Objective:     Vital Signs (Most Recent):  Temp: 98.1 °F (36.7 °C) (05/31/19 0804)  Pulse: 60 (05/31/19 0804)  Resp: 14 (05/31/19 0804)  BP: 131/62 (05/31/19 0804)  SpO2: 100 % (05/31/19 0804) Vital Signs (24h Range):  Temp:  [98.1 °F (36.7 °C)] 98.1 °F (36.7 °C)  Pulse:  [57-68] 60  Resp:  [14-20] 14  SpO2:  [99 %-100 %] 100 %  BP: (121-134)/(62-82) 131/62     Weight:  97.5 kg (215 lb)  Body mass index is 33.67 kg/m².    Estimated Creatinine Clearance: 45.9 mL/min (A) (based on SCr of 1.5 mg/dL (H)).    Physical Exam   Constitutional: He is oriented to person, place, and time. He appears well-developed and well-nourished. No distress.   Cardiovascular: Normal rate and regular rhythm.   No murmur heard.  Pulmonary/Chest: Effort normal and breath sounds normal. No respiratory distress.   Abdominal: Soft. Bowel sounds are normal. He exhibits no distension.   Musculoskeletal: Normal range of motion. He exhibits edema.   Neurological: He is alert and oriented to person, place, and time.   Skin: Skin is warm and dry.   Left medial great toe with ulceration noted. There is erythema of the 1st digit. Mild tenderness. No active drainage.    Psychiatric: He has a normal mood and affect. His behavior is normal.     5/31/19 5/29/19        Significant Labs:   Blood Culture:   Recent Labs   Lab 05/29/19  1756   LABBLOO No Growth to date  No Growth to date  No Growth to date  No Growth to date     CBC:   Recent Labs   Lab 05/30/19  0354 05/30/19  1149 05/31/19  0339   WBC 51.62* 50.43* 51.07*  51.07*   HGB 12.1* 11.6* 10.5*  10.5*   HCT 35.8* 36.1* 32.5*  32.5*    193 158  158     CMP:   Recent Labs   Lab 05/30/19  1655 05/31/19  0009 05/31/19  0835   * 130* 130*   K 4.9 4.2 4.8   CL 92* 94* 92*   CO2 24 29 28   * 168* 182*   BUN 31* 32* 30*   CREATININE 1.4 1.3 1.5*   CALCIUM 9.3 9.6 9.1   PROT 6.2 5.7* 6.1   ALBUMIN 3.7 3.5 3.6   BILITOT 0.6 0.6 0.6   ALKPHOS 113 97 88   AST 23 20 29   ALT 35 32 31   ANIONGAP 10 7* 10   EGFRNONAA 48.1* 52.6* 44.3*     Wound Culture:   Recent Labs   Lab 05/22/19  0942   LABAERO ENTEROCOCCUS FAECALIS  Many  No other significant isolate         Significant Imaging: I have reviewed all pertinent imaging results/findings within the past 24 hours.

## 2019-05-31 NOTE — CARE UPDATE
Vascular care update:    Plan for angio on 6/3 for limb salvage, will consent today, NPO at midnight on Sunday, okay to continue with asa/plavix, hold hep gtt 6 am on Monday.      - please page vascular with further questions or concerns

## 2019-05-31 NOTE — ASSESSMENT & PLAN NOTE
79 year old male who presented as a direct admit from podiatry clinic for a left great toe ulcer. Patient has a PMH HTN, HLD, DM-1(insulin pump), CAD s/p CABG x 2 , MINDA compliant with CPAP, CLL not currently on any therapy, and PAD who presents with left foot ulcer. Prior wound culture with E. Faecalis. Pt started on IV vanc. MRI cannot rule out possible early osteomyelitis distal phalanx. ID consulted for further recs.     Stable leukocytosis (50,000 + ) present since January- due to CLL- predominantly lymphocytic.  Pt afebrile.      He is afebrile, without complaints.  Vascular Surgery has evaluated and planning angiogram on Monday.  Podiatry wishes to defer bone biopsy until after angiogram    Plan:  1. So long as patient remains stable, recommend continuing to hold antibiotics pending bone biopsy to improve culture yield.  If clinically worsens, may start IV vancomycin and ceftriaxone empirically.    2. When able, send bone biopsy  for aerobic, anaerobic, fungal, and AFB cultures; please also send bone to pathology  3. Will follow [place] over the weekend.  If any questions or concerns, call me at [phone] over the weekend.     Data reviewed and plan discussed with ID staff  Discussed with Primary Team.

## 2019-05-31 NOTE — ASSESSMENT & PLAN NOTE
- Patient with symptoms of orthopnea and dyspnea on exertion   - Patient on HCTZ at home no loop diuretic   - BNP elevated at 2 k ,   - PE notable for  JVD and bilateral pitting edema, abdominal distension    - Xray on my read be bilateral edema   - precipitating factor is likely infected ulcer in a patient with PAD   -Echocardiogram: 2D echo with color flow doppler most recent not in file he had a pet stress in January showing EF of 40     PLAN   - IV lasix 80 TID  - Strict I/O   - Daily weights  - Keep mag >2 and phos> 3  - Wean oxygen as tolerated   - Cardiac diet with decreased salt intake

## 2019-05-31 NOTE — ASSESSMENT & PLAN NOTE
Caution with insulin stacking  Estimated Creatinine Clearance: 45.9 mL/min (A) (based on SCr of 1.5 mg/dL (H)).

## 2019-06-01 NOTE — NURSING
Pt up to bedside chair.  Denies any c/o pain no s/s of distress. Per hep nomogram heparin infusing at 10 units /kg/hr at 7.mL/hr. Pt remains free from falls and injury will continue to monitor. Call light in reach  Instructed to call nurse when ready to get back in bed

## 2019-06-01 NOTE — ASSESSMENT & PLAN NOTE
Caution with insulin stacking  Estimated Creatinine Clearance: 53 mL/min (based on SCr of 1.3 mg/dL).

## 2019-06-01 NOTE — ASSESSMENT & PLAN NOTE
- last LOENIE was march with mild PAD , will repeat here given that he is having complicated ulcer healing   - continue ASA, statin, and plavix  - Plan for angiogram of LLE 6/3 by vascular surgery   NPO at midnight night before   Hold heparin gtt at 6am that morning

## 2019-06-01 NOTE — ASSESSMENT & PLAN NOTE
Reid Herman is a 79 y.o. with DM1, PAD and a left hallux ulcer that has been growing enterococcus sensitive to ampicillin and vancomycin but with worsening erythema despite treatment. Admitted from podiatry for OM rule out and IV antibiotics    MRI shows early OM  BCx NGTD    Vascular recommending angiogram on 6/3  ID recommending holding Vanc and bone biopsy (after angio)  Podiatry considering Debridement/amputation vs Biopsy and IV antibiotics options       PLAN:  --WBC unable to be reliable as patient has CLL, crp is elevated esr is low but patient is immunocompromised   - Blood cultures, NGTD  - continue to hold off on vancomycin   - Podiatry following, appreciate recs   Bone biopsy after angio 6/3  - ID following, appreciate recs   Holding vancomycin, await bone biopsy  - Vascular following, appreciate recs   Plan for angio 6/3 am - hold heparin gtt 6am morning of, NPO at midnight

## 2019-06-01 NOTE — ASSESSMENT & PLAN NOTE
- contributing to difficult ulcer healing   --HbA1c 5.8 very well controlled   - Home DM regimen:  Insulin pump, patient states his average basal is 23 units + SSI    Plan  Endocrine following, appreciate recs  Recommend Levemir to 15 units q HS   Novolog ICR 1:9  Supplement with SSI  BG monitoring AC/HS  Endocrine to re-evaluate patient given that wife is bringing in pump to start pump therapy

## 2019-06-01 NOTE — PROGRESS NOTES
[facility] Hospital Medicine  Progress Note    Patient Name: Reid Herman  MRN: [id number]  Patient Class: IP- Inpatient   Admission Date: 5/29/2019  Length of Stay: 3 days  Attending Physician: Elis Gutierrez MD  Primary Care Provider: Olivia Zavala MD    [facility] Team: [facility] MED 3 Vinita Chen MD    Subjective:     Principal Problem:Cellulitis of foot    HPI:  Reid Herman is a 79M direct admit from podiatry clinic for a left hallux abcess. Patient has a PMH HTN, Hyperlipidemia, DM-1(insulin pump), CAD s/p CABG x 2 , MINDA compliant with CPAP, CLL not currently on any therapy, PAD who has been dealing with a left foot abcess for the last month that has been getting worse. Cultures from wound have grown enterococcus that  sensitive to amp and Vanc.  Wound has worsened despite appropriate PO ABx (ampicillin). Additionally he has described that he has been having problems with SOB that he has been needing to increase the incline of his bed for. He also noticed worsening bilateral lower edema and he has gained 7 lbs in the last month. Patient states that currently he does not feel SOB ( since it only  occurs with being flat or exertion), no CP, no palpitations, no fever, chills. He states that he can feel his bilateral feet but has decreased. Usually he has been mobile but with this wound and his boot he has been less mobile. No history of DVT but he has a history of CLL.    Initial workup in the floor shows elevated BNP in the 2k, WBC > 50 ( he has a baseline around 53. CRP elevated, and hyponatremic to 127.     Hospital Course:  Admitted to medicine for cellulitis and CHF exacerbation. Ulcer seen by podiatry, vascular and ID.  Patient found to have elevated tropoinins and EKG changes consistent with recent MI.  Cardiology consulted, along with Interventional cardiology.  Recommendation to treat medically with heparin ggt for 48hrs, lisinopril 10mg, statin,  plavix, .  Plan for angiogram by vascular surgery 6/3 and bone culture obtained after by podiatry.    Interval History: Patient resting comfortably in bed, no complaints at this time. Plan for angiogram by vascular surgery 6/3. No reported chest pain or SOB.    Review of Systems   Constitutional: Positive for activity change and unexpected weight change (up 7 lbs ). Negative for appetite change, fatigue and fever.   HENT: Negative for congestion, sinus pressure and sinus pain.    Eyes: Negative for photophobia, discharge and visual disturbance.   Respiratory: Negative for apnea, shortness of breath and wheezing.    Cardiovascular: Positive for leg swelling. Negative for chest pain.   Gastrointestinal: Negative for abdominal distention and nausea.   Endocrine: Negative for cold intolerance, heat intolerance, polydipsia, polyphagia and polyuria.   Genitourinary: Positive for decreased urine volume. Negative for difficulty urinating.   Musculoskeletal: Negative for arthralgias and back pain.   Skin: Positive for color change and rash.   Neurological: Negative for dizziness, tremors and numbness.   Psychiatric/Behavioral: Negative for agitation, behavioral problems, confusion and decreased concentration.     Objective:     Vital Signs (Most Recent):  Temp: 98.1 °F (36.7 °C) (06/01/19 1000)  Pulse: 64 (06/01/19 1000)  Resp: 14 (06/01/19 1000)  BP: (!) 118/58 (06/01/19 1000)  SpO2: 99 % (06/01/19 1000) Vital Signs (24h Range):  Temp:  [98 °F (36.7 °C)-98.9 °F (37.2 °C)] 98.1 °F (36.7 °C)  Pulse:  [56-72] 64  Resp:  [14-33] 14  SpO2:  [96 %-100 %] 99 %  BP: (115-126)/(58-80) 118/58     Weight: 97.5 kg (215 lb)  Body mass index is 33.67 kg/m².    Intake/Output Summary (Last 24 hours) at 6/1/2019 1153  Last data filed at 6/1/2019 0504  Gross per 24 hour   Intake 390 ml   Output 2150 ml   Net -1760 ml      Physical Exam   Constitutional: He appears well-developed and well-nourished. No distress.   HENT:   Head:  Normocephalic and atraumatic.   Eyes: Pupils are equal, round, and reactive to light. EOM are normal.   Neck: Normal range of motion. Neck supple.   Cardiovascular: Normal rate, regular rhythm and normal heart sounds.   Pulmonary/Chest: Effort normal and breath sounds normal. No respiratory distress.   Abdominal: Soft. Bowel sounds are normal. He exhibits no mass. There is no tenderness. There is no guarding.   Musculoskeletal: Normal range of motion. He exhibits edema and tenderness. He exhibits no deformity.   Erythema in the left foot as pictures below/ Ulcer present that is cleaned , pain around the ulcer noted.  Dorsalis pedis and posterior tibial pulses are diminished Bilaterally. Toes are cool to touch. Feet are warm proximally. + edema in the left foot and 2+ edema in the right foot        Skin: He is not diaphoretic.   Nursing note and vitals reviewed.      Significant Labs:   CBC:   Recent Labs   Lab 05/31/19  0339 06/01/19  0304   WBC 51.07*  51.07* 51.72*   HGB 10.5*  10.5* 10.3*   HCT 32.5*  32.5* 32.0*     158 154     CMP:   Recent Labs   Lab 05/31/19  1525 06/01/19  0028 06/01/19  0752   * 132* 134*   K 4.8 4.7 4.1   CL 90* 91* 92*   CO2 30* 31* 30*   * 278* 199*   BUN 37* 36* 34*   CREATININE 1.6* 1.5* 1.3   CALCIUM 9.9 9.0 9.5   PROT 6.6 5.6* 5.9*   ALBUMIN 3.9 3.4* 3.6   BILITOT 0.5 0.5 0.6   ALKPHOS 113 92 92   AST 25 20 19   ALT 35 26 25   ANIONGAP 10 10 12   EGFRNONAA 40.9* 44.3* 52.6*       Significant Imaging: I have reviewed all pertinent imaging results/findings within the past 24 hours.     Transthoracic echo (TTE) 2D with Color Flow  · Severely decreased left ventricular systolic function. The estimated   ejection fraction is 20%  · Mildly to moderately reduced right ventricular systolic function.  · Grade II (moderate) left ventricular diastolic dysfunction consistent   with pseudonormalization.  · Elevated left atrial pressure.  · Moderate left atrial  enlargement.  · Mild-to-moderate mitral regurgitation.  · Mild tricuspid regurgitation.  · The estimated PA systolic pressure is 47 mm Hg  · Intermediate central venous pressure (8 mm Hg).     X-Ray Foot Complete Left  Narrative: EXAMINATION:  XR FOOT COMPLETE 3 VIEW LEFT    CLINICAL HISTORY:  hallux ulcer;.    TECHNIQUE:  AP, lateral and oblique views of the left foot were performed.    COMPARISON:  Non 05/20/2019 e    FINDINGS:  Mild DJD.  No fracture or bone destruction identified.  Hammertoes.  Vascular calcification noted.  Impression: See above    Electronically signed by: Shade Morel MD  Date:    05/30/2019  Time:    12:20  MRI Foot (Midfoot) Left W W/O Contrast  Narrative: EXAMINATION:  MRI FOOT (MIDFOOT) LEFT W W/O CONTRAST    CLINICAL HISTORY:  Osteomyelitis suspected, foot swelling, diabetic;    TECHNIQUE:  Multiplanar, multisequence MR images of the left foot were obtained before and after the administration of 10 cc gadolinium based IV contrast.    COMPARISON:  Radiograph 05/20/2019    FINDINGS:  There is there is slight marrow edema involving the tip of the distal phalanx of the great toe with slight T1 hypointense signal without significant associated postcontrast enhancement.  Findings could possibly relate to reactive change in this patient with reported left great toe ulcer with early osteomyelitis not excluded.  Remaining visualized osseous structures are intact without evidence of fracture or marrow replacement process.  No focal soft tissue fluid collections are identified.  There is diffuse STIR hyperintensity throughout the plantar foot musculature likely reflecting underlying neuropathic myositis in this patient with reported history of diabetes.  Impression: 1. Slight marrow edema and subtle T1 hypointense signal involving the distal phalanx of the great toe possibly relating to reactive change or early osteomyelitis.  2. Findings suggestive of neuropathic myositis.  This report was flagged  in Epic as abnormal.    Electronically signed by: Susan Blackwell MD  Date:    05/30/2019  Time:    03:49        Assessment/Plan:      * Cellulitis of Left foot  Reid Herman is a 79 y.o. with DM1, PAD and a left hallux ulcer that has been growing enterococcus sensitive to ampicillin and vancomycin but with worsening erythema despite treatment. Admitted from podiatry for OM rule out and IV antibiotics    MRI shows early OM  BCx NGTD    Vascular recommending angiogram on 6/3  ID recommending holding Vanc and bone biopsy (after angio)  Podiatry considering Debridement/amputation vs Biopsy and IV antibiotics options       PLAN:  --WBC unable to be reliable as patient has CLL, crp is elevated esr is low but patient is immunocompromised   - Blood cultures, NGTD  - continue to hold off on vancomycin   - Podiatry following, appreciate recs   Bone biopsy after angio 6/3  - ID following, appreciate recs   Holding vancomycin, await bone biopsy  - Vascular following, appreciate recs   Plan for angio 6/3 am - hold heparin gtt 6am morning of, NPO at midnight            NSTEMI (non-ST elevated myocardial infarction)    -EKG w/ ST depressions, elevated troponin (peaked 1.16)  - dyspnea on exertion, orthopnea, significant peripheral edema.  - BNP 2608  - ECHO w/ EF 20%, grade II DD.   -New onset HF w/ troponin leak vs NSTEMI in the setting of acute infection.     Plan  - Continue treating ACS w/ heparin gtt for 48 hours - hold 6am prior to angio 6/3   -continue ASA 81 mg  -Continue plavix 75 mg daily  - Continue metoprolol  mg qhs  - Continue IV Lasix 80 mg TID  - Continue Lisinopril 10 mg daily   - Strict I/Os, daily weights  - Interventional cards to perform LHC prior to discharge.        Diabetic ulcer of left great toe        Hyponatremia  - likely secondary to CHF  - diurese for now   - daily CMP   - stop HCTZ  - Improving      Acute HF (heart failure)  - Patient with symptoms of orthopnea and dyspnea on exertion    - Patient on HCTZ at home no loop diuretic   - BNP elevated at 2 k ,   - PE notable for  JVD and bilateral pitting edema, abdominal distension    - Xray on my read be bilateral edema   - precipitating factor is likely infected ulcer in a patient with PAD   -Echocardiogram: 2D echo with color flow doppler most recent not in file he had a pet stress in January showing EF of 40     PLAN   - IV lasix 80 TID  - Strict I/O   - Daily weights  - Keep mag >2 and phos> 3  - Wean oxygen as tolerated   - Cardiac diet with decreased salt intake              Orthopnea  - as under CHF       Foot abscess, left  - as under cellulitis       Hyperkalemia  - Lasix 80 TID  - Daily K  - Improved       CKD (chronic kidney disease), stage III  - slight improvement today  - Daily CMP   - Will diurese likely cardiorenal due to CHF     Peripheral vascular disease  - last LEONIE was march with mild PAD , will repeat here given that he is having complicated ulcer healing   - continue ASA, statin, and plavix  - Plan for angiogram of LLE 6/3 by vascular surgery   NPO at midnight night before   Hold heparin gtt at 6am that morning      MINDA on CPAP  - cpap qhs       S/P CABG (coronary artery bypass graft)  CAD  History of angina     - no CP currently   - continue Asa, statin ,plavix  - takes nitro PRN will hold off on ordering and will evaluate pt if he starts having symptoms       HTN (hypertension)  - at home on lisinopril and HCTZ   - given his hyponatremia and hyperkalemia will discontinue both   - continue toprol 150 qhs       Hyperlipidemia  - continue statin       Diabetic polyneuropathy associated with type 1 diabetes mellitus  - contributing to difficult ulcer healing   --HbA1c 5.8 very well controlled   - Home DM regimen:  Insulin pump, patient states his average basal is 23 units + SSI    Plan  Endocrine following, appreciate recs  Recommend Levemir to 15 units q HS   Novolog ICR 1:9  Supplement with SSI  BG monitoring AC/HS  Endocrine to  re-evaluate patient given that wife is bringing in pump to start pump therapy        CLL (chronic lymphocytic leukemia)  - Patient sees Dr. Garcia in heme/onc  - Has not needed treatment  - has been hyperkalemic before attributed to CLL burden           VTE Risk Mitigation (From admission, onward)        Ordered     heparin 25,000 units in dextrose 5% 250 mL (100 units/mL) infusion LOW INTENSITY nomogram - [facility]  Continuous      05/30/19 1131     heparin 25,000 units in dextrose 5% (100 units/ml) IV bolus from bag - ADDITIONAL PRN BOLUS - 30 units/kg (max bolus 4000 units)  As needed (PRN)      05/30/19 1131     heparin 25,000 units in dextrose 5% (100 units/ml) IV bolus from bag - ADDITIONAL PRN BOLUS - 60 units/kg (max bolus 4000 units)  As needed (PRN)      05/30/19 1131     IP VTE LOW RISK PATIENT  Once      05/29/19 8173              Vinita Chen MD  Department of Hospital Medicine   [facility]

## 2019-06-01 NOTE — ASSESSMENT & PLAN NOTE
-EKG w/ ST depressions, elevated troponin (peaked 1.16)  - dyspnea on exertion, orthopnea, significant peripheral edema.  - BNP 2608  - ECHO w/ EF 20%, grade II DD.   -New onset HF w/ troponin leak vs NSTEMI in the setting of acute infection.     Plan  - Continue treating ACS w/ heparin gtt for 48 hours - hold 6am prior to angio 6/3   -continue ASA 81 mg  -Continue plavix 75 mg daily  - Continue metoprolol  mg qhs  - Continue IV Lasix 80 mg TID  - Continue Lisinopril 10 mg daily   - Strict I/Os, daily weights  - Interventional cards to perform LHC prior to discharge.

## 2019-06-01 NOTE — PHYSICIAN QUERY
"PT Name: Reid Herman  MR #: [id number]    Physician Query Form - Heart  Condition Clarification     CDS/: Charlotte Christian RN               Contact information:[email or web address]  This form is a permanent document in the medical record.     Query Date: June 1, 2019    By submitting this query, we are merely seeking further clarification of documentation. Please utilize your independent clinical judgment when addressing the question(s) below.    The medical record contains the following   Indicators     Supporting Clinical Findings Location in Medical Record   x BNP BNP 2608 Labs 5/29   x EF · Severely decreased left ventricular systolic function. The estimated ejection fraction is 20%    pet stress in January showing EF of 40    TTE 5/30      [facility] Medicine PN 6/1   x Radiology findings Mild bibasilar lung disease.  No edema. CXR 5/29   x Echo Results   · Mildly to moderately reduced right ventricular systolic function.  · Grade II (moderate) left ventricular diastolic dysfunction consistent with pseudonormalization.  · Elevated left atrial pressure.  · Moderate left atrial enlargement.  · Mild-to-moderate mitral regurgitation.  · Mild tricuspid regurgitation.  · The estimated PA systolic pressure is 47 mm Hg  · Intermediate central venous pressure (8 mm Hg).   TTE 5/30    "Ascites" documented     x "SOB" or "DIOR" documented has been having problems with SOB that he has been needing to increase the incline of his bed for. Patient states that currently he does not feel SOB ( since it only  occurs with being flat or exertion) H&P    "Hypoxia" documented     x Heart Failure documented  acute heart failure     [facility] Medicine PN 6/1   x "Edema" documented  worsening bilateral lower edema and he has gained 7 lbs in the last month. H&P   x Diuretics/Meds Furosemide IV  Lisinopril oral  Metoprolol succinate oral MAR 5/29-30  MAR 5/31-present  MAR 5/29-present    Treatment:      Other:      Heart " failure (HF) can be acute, chronic or both. It is generally further specificed as systolic, diastolic, or combined. Lastly, it is important to identify an underlying etiology if known or suspected.     Common clues to acute exacerbation:  Rapidly progressive symptoms (w/in 2 weeks of presentation), using IV diuretics to treat, using supplemental O2, pulmonary edema on Xray, MI w/in 4 weeks, and/or BNP >500    Systolic Heart Failure: is defined as chart documentation of a left ventricular ejection fraction (LVEF) less than 40%     Diastolic Heart Failure: is defined as a left ventricular ejection fraction (LVEF) greater than 40%   +      Evidence of diastolic dysfunction on echocardiography OR    Right heart catheterization wedge pressure above 12 mm Hg OR    Left heart catheterization left ventricular end diastolic pressure 18 mm Hg or above.    References: *American Heart Association    The clinical guidelines noted below are only system guidelines, and do not replace the providers clinical judgment.       Provider, please specify the diagnosis associated with above clinical findings    [ X  ] Acute Systolic Heart Failure - New diagnosis.  EF < 40%  and acute HF symptoms documented  [   ] Other Type of Heart Failure (please specify type): _________________________  [   ] Heart Failure Ruled Out  [   ] Other (please specify): ___________________________________  [  ] Clinically Undetermined                          Please document in your progress notes daily for the duration of treatment until resolved and include in your discharge summary.

## 2019-06-01 NOTE — ASSESSMENT & PLAN NOTE
BG goal 140-180    Decrease Levemir to 15 units daily in AM  Novolog ICR 1:9  Low dose correction scale  BG monitoring AC/HS    Patient uses insulin pump at home, patient on with not being on pump while in-patient for now.  Discussed with patient at bedside the impact of infection and IV antibiotics on BG readings.  Patient's wife to bring pump and supplies from home, will continue MDI for now and re-evaluate if patient should put pump back.    ** Please call Endocrine for any BG related issues **    Discharge planning: JANIEC

## 2019-06-01 NOTE — PROGRESS NOTES
"[facility]  Endocrinology  Progress Note    Admit Date: 2019     Reason for Consult: Management of T1DM, Hyperglycemia     Surgical Procedure and Date: N/A    Diabetes diagnosis year:     Lab Results   Component Value Date    HGBA1C 5.8 (H) 2019       Home Diabetes Medications:  Accuchek spirit pump/sarai       Basal Rate  0000 - 0300     0.85 u/hr  0300 - 0700     0.9 u/hr  0700 - 2100     1.1 u/hr  2100 - 0000     0.85 u/hr        Carb Ratio  12A: 1:8     ISF  1:30     Target: 110-130  IAT: 3    How often checking glucose at home? Dexcom G5 CGM  BG readings on regimen: 110-140  Hypoglycemia on the regimen?  Yes, a few time per week  Missed doses on regimen?  No    Diabetes Complications include:     Hypoglycemia , Diabetic chronic kidney disease     , Diabetic retinopathy , Diabetic peripheral neuropathy , Diabetic peripheral angiopathy with/without gangrene and Foot ulcer      Complicating diabetes co morbidities:   CHF, MINDA and CKD      HPI:   Patient is a 79 y.o. male with a diagnosis of hyponatremia, left foot abscess, CKD3, DM1, CLL, MINDA, and acute heart failure.  Admitted to [facility] on 19 from podiatry clinic for a left hallux abcess.  Last seen in endocrinology clinic by ESTEFANÍA Rivers NP on 19.  Endocrine consulted for DM/BG management.            Interval HPI:   [facility]  BG elevated overnight after refusing basal and correction scale insulin.  Noted WBC of 51.72.  Eatin%  Nausea: No  Hypoglycemia and intervention: No  Fever: No  TPN and/or TF: No    /71   Pulse (!) 56   Temp 98.9 °F (37.2 °C) (Oral)   Resp 20   Ht 5' 7" (1.702 m)   Wt 97.5 kg (215 lb)   SpO2 100%   BMI 33.67 kg/m²      Labs Reviewed and Include    Recent Labs   Lab 19  0028   *   CALCIUM 9.0   ALBUMIN 3.4*   PROT 5.6*   *   K 4.7   CO2 31*   CL 91*   BUN 36*   CREATININE 1.5*   ALKPHOS 92   ALT 26   AST 20   BILITOT 0.5     Lab " Results   Component Value Date    WBC 51.72 (HH) 06/01/2019    HGB 10.3 (L) 06/01/2019    HCT 32.0 (L) 06/01/2019     (H) 06/01/2019     06/01/2019     No results for input(s): TSH, FREET4 in the last 168 hours.  Lab Results   Component Value Date    HGBA1C 5.8 (H) 05/29/2019       Nutritional status:   Body mass index is 33.67 kg/m².  Lab Results   Component Value Date    ALBUMIN 3.4 (L) 06/01/2019    ALBUMIN 3.9 05/31/2019    ALBUMIN 3.6 05/31/2019     Lab Results   Component Value Date    PREALBUMIN 22 05/17/2019       Estimated Creatinine Clearance: 45.9 mL/min (A) (based on SCr of 1.5 mg/dL (H)).    Accu-Checks  Recent Labs     05/30/19  1702 05/30/19  2041 05/31/19  0303 05/31/19  0747 05/31/19  0929 05/31/19  1155 05/31/19  1658 05/31/19  2055 06/01/19  0027 06/01/19  0751   POCTGLUCOSE 331* 241* 115* 50* 256* 286* 251* 162* 227* 209*       Current Medications and/or Treatments Impacting Glycemic Control  Immunotherapy:    Immunosuppressants     None        Steroids:   Hormones (From admission, onward)    None        Pressors:    Autonomic Drugs (From admission, onward)    None        Hyperglycemia/Diabetes Medications:   Antihyperglycemics (From admission, onward)    Start     Stop Route Frequency Ordered    06/01/19 0900  insulin detemir U-100 pen 15 Units      -- SubQ Daily 06/01/19 0804    05/30/19 1230  insulin aspart U-100 pen 1-15 Units      -- SubQ 3 times daily with meals 05/30/19 1123    05/30/19 1223  insulin aspart U-100 pen 0-5 Units      -- SubQ Before meals & nightly PRN 05/30/19 1123          ASSESSMENT and PLAN    * Cellulitis of Left foot  Infection may elevate BG readings  Optimize BG control for wound healing      Controlled type 1 diabetes mellitus with diabetic neuropathy, with long-term current use of insulin  BG goal 140-180    Decrease Levemir to 15 units daily in AM  Novolog ICR 1:9  Low dose correction scale  BG monitoring AC/HS    Patient uses insulin pump at home,  patient on with not being on pump while in-patient for now.  Discussed with patient at bedside the impact of infection and IV antibiotics on BG readings.  Patient's wife to bring pump and supplies from home, will continue MDI for now and re-evaluate if patient should put pump back.    ** Please call Endocrine for any BG related issues **    Discharge planning: TBD      CKD (chronic kidney disease), stage III  Caution with insulin stacking  Estimated Creatinine Clearance: 53 mL/min (based on SCr of 1.3 mg/dL).        MINDA on CPAP  May affect BG readings if uncontrolled            Jeff Bustillo NP  Endocrinology  [facility]

## 2019-06-01 NOTE — SUBJECTIVE & OBJECTIVE
Interval History: Patient resting comfortably in bed, no complaints at this time. Plan for angiogram by vascular surgery 6/3. No reported chest pain or SOB.    Review of Systems   Constitutional: Positive for activity change and unexpected weight change (up 7 lbs ). Negative for appetite change, fatigue and fever.   HENT: Negative for congestion, sinus pressure and sinus pain.    Eyes: Negative for photophobia, discharge and visual disturbance.   Respiratory: Negative for apnea, shortness of breath and wheezing.    Cardiovascular: Positive for leg swelling. Negative for chest pain.   Gastrointestinal: Negative for abdominal distention and nausea.   Endocrine: Negative for cold intolerance, heat intolerance, polydipsia, polyphagia and polyuria.   Genitourinary: Positive for decreased urine volume. Negative for difficulty urinating.   Musculoskeletal: Negative for arthralgias and back pain.   Skin: Positive for color change and rash.   Neurological: Negative for dizziness, tremors and numbness.   Psychiatric/Behavioral: Negative for agitation, behavioral problems, confusion and decreased concentration.     Objective:     Vital Signs (Most Recent):  Temp: 98.1 °F (36.7 °C) (06/01/19 1000)  Pulse: 64 (06/01/19 1000)  Resp: 14 (06/01/19 1000)  BP: (!) 118/58 (06/01/19 1000)  SpO2: 99 % (06/01/19 1000) Vital Signs (24h Range):  Temp:  [98 °F (36.7 °C)-98.9 °F (37.2 °C)] 98.1 °F (36.7 °C)  Pulse:  [56-72] 64  Resp:  [14-33] 14  SpO2:  [96 %-100 %] 99 %  BP: (115-126)/(58-80) 118/58     Weight: 97.5 kg (215 lb)  Body mass index is 33.67 kg/m².    Intake/Output Summary (Last 24 hours) at 6/1/2019 1153  Last data filed at 6/1/2019 0504  Gross per 24 hour   Intake 390 ml   Output 2150 ml   Net -1760 ml      Physical Exam   Constitutional: He appears well-developed and well-nourished. No distress.   HENT:   Head: Normocephalic and atraumatic.   Eyes: Pupils are equal, round, and reactive to light. EOM are normal.   Neck: Normal  range of motion. Neck supple.   Cardiovascular: Normal rate, regular rhythm and normal heart sounds.   Pulmonary/Chest: Effort normal and breath sounds normal. No respiratory distress.   Abdominal: Soft. Bowel sounds are normal. He exhibits no mass. There is no tenderness. There is no guarding.   Musculoskeletal: Normal range of motion. He exhibits edema and tenderness. He exhibits no deformity.   Erythema in the left foot as pictures below/ Ulcer present that is cleaned , pain around the ulcer noted.  Dorsalis pedis and posterior tibial pulses are diminished Bilaterally. Toes are cool to touch. Feet are warm proximally. + edema in the left foot and 2+ edema in the right foot        Skin: He is not diaphoretic.   Nursing note and vitals reviewed.      Significant Labs:   CBC:   Recent Labs   Lab 05/31/19  0339 06/01/19  0304   WBC 51.07*  51.07* 51.72*   HGB 10.5*  10.5* 10.3*   HCT 32.5*  32.5* 32.0*     158 154     CMP:   Recent Labs   Lab 05/31/19  1525 06/01/19  0028 06/01/19  0752   * 132* 134*   K 4.8 4.7 4.1   CL 90* 91* 92*   CO2 30* 31* 30*   * 278* 199*   BUN 37* 36* 34*   CREATININE 1.6* 1.5* 1.3   CALCIUM 9.9 9.0 9.5   PROT 6.6 5.6* 5.9*   ALBUMIN 3.9 3.4* 3.6   BILITOT 0.5 0.5 0.6   ALKPHOS 113 92 92   AST 25 20 19   ALT 35 26 25   ANIONGAP 10 10 12   EGFRNONAA 40.9* 44.3* 52.6*       Significant Imaging: I have reviewed all pertinent imaging results/findings within the past 24 hours.     Transthoracic echo (TTE) 2D with Color Flow  · Severely decreased left ventricular systolic function. The estimated   ejection fraction is 20%  · Mildly to moderately reduced right ventricular systolic function.  · Grade II (moderate) left ventricular diastolic dysfunction consistent   with pseudonormalization.  · Elevated left atrial pressure.  · Moderate left atrial enlargement.  · Mild-to-moderate mitral regurgitation.  · Mild tricuspid regurgitation.  · The estimated PA systolic pressure is  47 mm Hg  · Intermediate central venous pressure (8 mm Hg).     X-Ray Foot Complete Left  Narrative: EXAMINATION:  XR FOOT COMPLETE 3 VIEW LEFT    CLINICAL HISTORY:  hallux ulcer;.    TECHNIQUE:  AP, lateral and oblique views of the left foot were performed.    COMPARISON:  Non 05/20/2019 e    FINDINGS:  Mild DJD.  No fracture or bone destruction identified.  Hammertoes.  Vascular calcification noted.  Impression: See above    Electronically signed by: Shade Morel MD  Date:    05/30/2019  Time:    12:20  MRI Foot (Midfoot) Left W W/O Contrast  Narrative: EXAMINATION:  MRI FOOT (MIDFOOT) LEFT W W/O CONTRAST    CLINICAL HISTORY:  Osteomyelitis suspected, foot swelling, diabetic;    TECHNIQUE:  Multiplanar, multisequence MR images of the left foot were obtained before and after the administration of 10 cc gadolinium based IV contrast.    COMPARISON:  Radiograph 05/20/2019    FINDINGS:  There is there is slight marrow edema involving the tip of the distal phalanx of the great toe with slight T1 hypointense signal without significant associated postcontrast enhancement.  Findings could possibly relate to reactive change in this patient with reported left great toe ulcer with early osteomyelitis not excluded.  Remaining visualized osseous structures are intact without evidence of fracture or marrow replacement process.  No focal soft tissue fluid collections are identified.  There is diffuse STIR hyperintensity throughout the plantar foot musculature likely reflecting underlying neuropathic myositis in this patient with reported history of diabetes.  Impression: 1. Slight marrow edema and subtle T1 hypointense signal involving the distal phalanx of the great toe possibly relating to reactive change or early osteomyelitis.  2. Findings suggestive of neuropathic myositis.  This report was flagged in Epic as abnormal.    Electronically signed by: Susan Blackwell MD  Date:    05/30/2019  Time:    03:49

## 2019-06-01 NOTE — PLAN OF CARE
Problem: Adult Inpatient Plan of Care  Goal: Plan of Care Review  Outcome: Ongoing (interventions implemented as appropriate)  Patient remained in stable condition through shift. Remained free of falls and other injuries. Chao to reposition self independently. Complained of occasional cramping to left foot, however, refused PRN tylenol. Blood sugars checked per orders, patient refused scheduled basal insulin and sliding scale due to blood sugar dropped to 50 morning of 5/31. Per Heparin Nomogram, heparin infusing at 10 units/kg/hr at 7.9 ml/hr. Bed in locked and lowest position, call light in reach, all questions answered, denies any further needs a this time. Will continue to monitor.

## 2019-06-01 NOTE — SUBJECTIVE & OBJECTIVE
"Interval HPI:   Overnight events: Remains in [facility].  BG elevated overnight after refusing basal and correction scale insulin.  Noted WBC of 51.72.  Eatin%  Nausea: No  Hypoglycemia and intervention: No  Fever: No  TPN and/or TF: No    /71   Pulse (!) 56   Temp 98.9 °F (37.2 °C) (Oral)   Resp 20   Ht 5' 7" (1.702 m)   Wt 97.5 kg (215 lb)   SpO2 100%   BMI 33.67 kg/m²     Labs Reviewed and Include    Recent Labs   Lab 19  0028   *   CALCIUM 9.0   ALBUMIN 3.4*   PROT 5.6*   *   K 4.7   CO2 31*   CL 91*   BUN 36*   CREATININE 1.5*   ALKPHOS 92   ALT 26   AST 20   BILITOT 0.5     Lab Results   Component Value Date    WBC 51.72 (HH) 2019    HGB 10.3 (L) 2019    HCT 32.0 (L) 2019     (H) 2019     2019     No results for input(s): TSH, FREET4 in the last 168 hours.  Lab Results   Component Value Date    HGBA1C 5.8 (H) 2019       Nutritional status:   Body mass index is 33.67 kg/m².  Lab Results   Component Value Date    ALBUMIN 3.4 (L) 2019    ALBUMIN 3.9 2019    ALBUMIN 3.6 2019     Lab Results   Component Value Date    PREALBUMIN 22 2019       Estimated Creatinine Clearance: 45.9 mL/min (A) (based on SCr of 1.5 mg/dL (H)).    Accu-Checks  Recent Labs     19  1702 19  2041 19  0303 19  0747 19  0929 19  1155 19  1658 19  2055 19  0027 19  0751   POCTGLUCOSE 331* 241* 115* 50* 256* 286* 251* 162* 227* 209*       Current Medications and/or Treatments Impacting Glycemic Control  Immunotherapy:    Immunosuppressants     None        Steroids:   Hormones (From admission, onward)    None        Pressors:    Autonomic Drugs (From admission, onward)    None        Hyperglycemia/Diabetes Medications:   Antihyperglycemics (From admission, onward)    Start     Stop Route Frequency Ordered    19 0900  insulin detemir U-100 pen 15 Units      -- SubQ " Daily 06/01/19 0804    05/30/19 1230  insulin aspart U-100 pen 1-15 Units      -- SubQ 3 times daily with meals 05/30/19 1123    05/30/19 1223  insulin aspart U-100 pen 0-5 Units      -- SubQ Before meals & nightly PRN 05/30/19 1123

## 2019-06-02 NOTE — PROGRESS NOTES
[facility]  Vascular Surgery  Progress Note    Patient Name: Reid Herman  MRN: [id number]  Admission Date: 5/29/2019  Primary Care Provider: Olivia Zavala MD    Subjective:     Interval History: No acute events overnight.  AF, VSS. Some pain to left foot.     Post-Op Info:  Procedure(s) (LRB):  Angiogram Extremity Unilateral possible CO2 (Left)           Medications:  Continuous Infusions:   heparin (porcine) in D5W 10 Units/kg/hr (06/01/19 2102)     Scheduled Meds:   aspirin  81 mg Oral Daily    atorvastatin  40 mg Oral QHS    clopidogrel  75 mg Oral Daily    collagenase   Topical (Top) Daily    furosemide  80 mg Intravenous TID    insulin aspart U-100  1-15 Units Subcutaneous TIDWM    insulin detemir U-100  15 Units Subcutaneous Daily    lisinopril  10 mg Oral Daily    metoprolol succinate  150 mg Oral QHS     PRN Meds:acetaminophen, dextrose 50%, dextrose 50%, glucagon (human recombinant), glucose, glucose, heparin (PORCINE), heparin (PORCINE), insulin aspart U-100, sodium chloride 0.9%     Objective:     Vital Signs (Most Recent):  Temp: 97.7 °F (36.5 °C) (06/02/19 0412)  Pulse: (!) 54 (06/02/19 0412)  Resp: 15 (06/02/19 0412)  BP: 114/61 (06/02/19 0412)  SpO2: 99 % (06/02/19 0412) Vital Signs (24h Range):  Temp:  [97.4 °F (36.3 °C)-98.1 °F (36.7 °C)] 97.7 °F (36.5 °C)  Pulse:  [52-66] 54  Resp:  [14-20] 15  SpO2:  [95 %-100 %] 99 %  BP: ()/(50-72) 114/61         Physical Exam   Constitutional: He appears well-developed and well-nourished. No distress.   HENT:   Head: Normocephalic and atraumatic.   Eyes: Pupils are equal, round, and reactive to light. EOM are normal.   Neck: Normal range of motion. Neck supple.   Cardiovascular: Normal rate, regular rhythm and normal heart sounds.   Pulmonary/Chest: Effort normal and breath sounds normal. No respiratory distress.   Abdominal: Soft. Bowel sounds are normal. He exhibits no mass. There is no tenderness. There is no  guarding.   Musculoskeletal: Normal range of motion. He exhibits edema and tenderness. He exhibits no deformity.   Erythema and ulcer to left foot.  Dorsalis pedis and posterior tibial pulses are diminished Bilaterally. Toes are cool to touch. Feet are warm proximally. + edema in the left foot and 2+ edema in the right foot   Skin: He is not diaphoretic.   Nursing note and vitals reviewed.      Significant Labs:  CBC:   Recent Labs   Lab 06/02/19  0504   WBC 50.54*   RBC 3.20*   HGB 10.1*   HCT 31.9*   *   *   MCH 31.6*   MCHC 31.7*     CMP:   Recent Labs   Lab 06/01/19  2349   *   CALCIUM 9.5   ALBUMIN 3.6   PROT 5.9*   *   K 4.4   CO2 32*   CL 93*   BUN 36*   CREATININE 1.5*   ALKPHOS 89   ALT 25   AST 20   BILITOT 0.6     Coagulation:   Recent Labs   Lab 06/02/19  0504   APTT 58.3*       Significant Diagnostics:  I have reviewed all pertinent imaging results/findings within the past 24 hours.    Assessment/Plan:     * Cellulitis of Left foot  Pt is a 80yo male with ulcer and pain to left foot with surrounding cellulitis.    - For LLE angiogram tomorrow 6/3  - NPO at midnight  - Patient is consented        Beth Nassar MD  Vascular Surgery  [facility]

## 2019-06-02 NOTE — ASSESSMENT & PLAN NOTE
-EKG w/ ST depressions, elevated troponin (peaked 1.16)  - dyspnea on exertion, orthopnea, significant peripheral edema.  - BNP 2608  - ECHO w/ EF 20%, grade II DD.   -New onset HF w/ troponin leak vs NSTEMI in the setting of acute infection.     Plan  - Continue treating ACS w/ heparin gtt - stop 6am prior to angio 6/3   -continue ASA 81 mg  -Continue plavix 75 mg daily  - Continue metoprolol  mg qhs  - Continue IV Lasix 80 mg TID  - Continue Lisinopril 10 mg daily   - Strict I/Os, daily weights  - Interventional cards to perform LHC prior to discharge.

## 2019-06-02 NOTE — ASSESSMENT & PLAN NOTE
BG goal 140-180    Levemir 15 units daily in AM  Novolog ICR 1:9  Low dose correction scale  BG monitoring AC/HS    Patient uses insulin pump at home, patient ok with not being on pump while in-patient for now.  Discussed with patient at bedside the impact of infection and IV antibiotics on BG readings.  Patient's wife to bring pump and supplies from home, will continue MDI for now and re-evaluate if patient should put pump back.    ** Please call Endocrine for any BG related issues **    Discharge planning: TBD

## 2019-06-02 NOTE — PROGRESS NOTES
[facility] Hospital Medicine  Progress Note    Patient Name: Reid Herman  MRN: [id number]  Patient Class: IP- Inpatient   Admission Date: 5/29/2019  Length of Stay: 4 days  Attending Physician: Elis Gutierrez MD  Primary Care Provider: Olivia Zavala MD    [facility] Medicine Team: [facility] MED 3 Vinita Chen MD    Subjective:     Principal Problem:Cellulitis of foot    HPI:  Reid Herman is a 79M direct admit from podiatry clinic for a left hallux abcess. Patient has a PMH HTN, Hyperlipidemia, DM-1(insulin pump), CAD s/p CABG x 2 , MINDA compliant with CPAP, CLL not currently on any therapy, PAD who has been dealing with a left foot abcess for the last month that has been getting worse. Cultures from wound have grown enterococcus that  sensitive to amp and Vanc.  Wound has worsened despite appropriate PO ABx (ampicillin). Additionally he has described that he has been having problems with SOB that he has been needing to increase the incline of his bed for. He also noticed worsening bilateral lower edema and he has gained 7 lbs in the last month. Patient states that currently he does not feel SOB ( since it only  occurs with being flat or exertion), no CP, no palpitations, no fever, chills. He states that he can feel his bilateral feet but has decreased. Usually he has been mobile but with this wound and his boot he has been less mobile. No history of DVT but he has a history of CLL.    Initial workup in the floor shows elevated BNP in the 2k, WBC > 50 ( he has a baseline around 53. CRP elevated, and hyponatremic to 127.     Hospital Course:  Admitted to medicine for cellulitis and CHF exacerbation. Ulcer seen by podiatry, vascular and ID.  Patient found to have elevated tropoinins and EKG changes consistent with recent MI.  Cardiology consulted, along with Interventional cardiology.  Recommendation to treat medically with heparin ggt for 48hrs, lisinopril 10mg, statin,  plavix, .  Plan for angiogram by vascular surgery 6/3 and bone culture obtained after by podiatry.    Interval History: No acute events overnight. Vital signs stable. Sitting up eating breakfast.     Review of Systems   Constitutional: Positive for activity change and unexpected weight change (up 7 lbs ). Negative for appetite change, fatigue and fever.   HENT: Negative for congestion, sinus pressure and sinus pain.    Eyes: Negative for photophobia, discharge and visual disturbance.   Respiratory: Negative for apnea, shortness of breath and wheezing.    Cardiovascular: Positive for leg swelling. Negative for chest pain.   Gastrointestinal: Negative for abdominal distention and nausea.   Endocrine: Negative for cold intolerance, heat intolerance, polydipsia, polyphagia and polyuria.   Genitourinary: Positive for decreased urine volume. Negative for difficulty urinating.   Musculoskeletal: Negative for arthralgias and back pain.   Skin: Positive for color change and rash.   Neurological: Negative for dizziness, tremors and numbness.   Psychiatric/Behavioral: Negative for agitation, behavioral problems, confusion and decreased concentration.     Objective:     Vital Signs (Most Recent):  Temp: 98.1 °F (36.7 °C) (06/02/19 1157)  Pulse: (!) 58 (06/02/19 1100)  Resp: 14 (06/02/19 1100)  BP: (!) 98/52 (06/02/19 1100)  SpO2: 98 % (06/02/19 1249) Vital Signs (24h Range):  Temp:  [97.7 °F (36.5 °C)-98.4 °F (36.9 °C)] 98.1 °F (36.7 °C)  Pulse:  [52-66] 58  Resp:  [14-20] 14  SpO2:  [96 %-100 %] 98 %  BP: ()/(50-71) 98/52     Weight: 88.2 kg (194 lb 7.1 oz)  Body mass index is 30.45 kg/m².    Intake/Output Summary (Last 24 hours) at 6/2/2019 1449  Last data filed at 6/2/2019 1254  Gross per 24 hour   Intake 360 ml   Output 1495 ml   Net -1135 ml      Physical Exam   Constitutional: He appears well-developed and well-nourished. No distress.   HENT:   Head: Normocephalic and atraumatic.   Eyes: Pupils are equal,  round, and reactive to light. EOM are normal.   Neck: Normal range of motion. Neck supple.   Cardiovascular: Normal rate, regular rhythm and normal heart sounds.   Pulmonary/Chest: Effort normal and breath sounds normal. No respiratory distress.   Abdominal: Soft. Bowel sounds are normal. He exhibits no mass. There is no tenderness. There is no guarding.   Musculoskeletal: Normal range of motion. He exhibits edema and tenderness. He exhibits no deformity.   Erythema in the left foot as pictures below/ Ulcer present that is cleaned , pain around the ulcer noted.  Dorsalis pedis and posterior tibial pulses are diminished Bilaterally. Toes are cool to touch. Feet are warm proximally. + edema in the left foot and 2+ edema in the right foot        Skin: He is not diaphoretic.   Nursing note and vitals reviewed.      Significant Labs:   CBC:   Recent Labs   Lab 06/01/19  0304 06/02/19  0504   WBC 51.72* 50.54*   HGB 10.3* 10.1*   HCT 32.0* 31.9*    144*     CMP:   Recent Labs   Lab 06/01/19  0752 06/01/19  1547 06/01/19  2349   * 134* 134*   K 4.1 4.8 4.4   CL 92* 93* 93*   CO2 30* 29 32*   * 191* 120*   BUN 34* 39* 36*   CREATININE 1.3 1.7* 1.5*   CALCIUM 9.5 9.5 9.5   PROT 5.9* 6.4 5.9*   ALBUMIN 3.6 3.6 3.6   BILITOT 0.6 0.4 0.6   ALKPHOS 92 107 89   AST 19 26 20   ALT 25 27 25   ANIONGAP 12 12 9   EGFRNONAA 52.6* 38.1* 44.3*       Significant Imaging: I have reviewed all pertinent imaging results/findings within the past 24 hours.    Assessment/Plan:      * Cellulitis of Left foot  Reid Herman is a 79 y.o. with DM1, PAD and a left hallux ulcer that has been growing enterococcus sensitive to ampicillin and vancomycin but with worsening erythema despite treatment. Admitted from podiatry for OM rule out and IV antibiotics    MRI shows early OM  BCx NGTD    Vascular recommending angiogram on 6/3  ID recommending holding Vanc and bone biopsy (after angio)  Podiatry considering  Debridement/amputation vs Biopsy and IV antibiotics options       PLAN:  --WBC unable to be reliable as patient has CLL, crp is elevated esr is low but patient is immunocompromised   - Blood cultures, NGTD  - continue to hold off on vancomycin   - Podiatry following, appreciate recs   Bone biopsy after angio 6/3  - ID following, appreciate recs   Holding vancomycin, await bone biopsy  - Vascular following, appreciate recs   Plan for angio 6/3 am - stop heparin gtt 6am morning of, NPO at midnight            NSTEMI (non-ST elevated myocardial infarction)    -EKG w/ ST depressions, elevated troponin (peaked 1.16)  - dyspnea on exertion, orthopnea, significant peripheral edema.  - BNP 2608  - ECHO w/ EF 20%, grade II DD.   -New onset HF w/ troponin leak vs NSTEMI in the setting of acute infection.     Plan  - Continue treating ACS w/ heparin gtt - stop 6am prior to angio 6/3   -continue ASA 81 mg  -Continue plavix 75 mg daily  - Continue metoprolol  mg qhs  - Continue IV Lasix 80 mg TID  - Continue Lisinopril 10 mg daily   - Strict I/Os, daily weights  - Interventional cards to perform LHC prior to discharge.        Diabetic ulcer of left great toe        Hyponatremia  - likely secondary to CHF  - diurese for now   - daily CMP   - stop HCTZ  - Improving      Acute HF (heart failure)  - Patient with symptoms of orthopnea and dyspnea on exertion   - Patient on HCTZ at home no loop diuretic   - BNP elevated at 2 k ,   - PE notable for  JVD and bilateral pitting edema, abdominal distension    - Xray on my read be bilateral edema   - precipitating factor is likely infected ulcer in a patient with PAD   -Echocardiogram: 2D echo with color flow doppler most recent not in file he had a pet stress in January showing EF of 40     PLAN   - IV lasix 80 TID  - Strict I/O   - Daily weights  - Keep mag >2 and phos> 3  - Wean oxygen as tolerated   - Cardiac diet with decreased salt intake              Orthopnea  - as under CHF        Foot abscess, left  - as under cellulitis       Hyperkalemia  - Lasix 80 TID  - Daily K  - Improved       CKD (chronic kidney disease), stage III  - slight improvement today  - Daily CMP   - Will continue to diurese likely cardiorenal due to CHF     Peripheral vascular disease  - last LEONIE was march with mild PAD , will repeat here given that he is having complicated ulcer healing   - continue ASA, statin, and plavix  - Plan for angiogram of LLE 6/3 by vascular surgery   NPO at midnight    Stop heparin gtt at 6am    MINDA on CPAP  - cpap qhs       S/P CABG (coronary artery bypass graft)  CAD  History of angina     - no CP currently   - continue Asa, statin ,plavix  - takes nitro PRN will hold off on ordering and will evaluate pt if he starts having symptoms       HTN (hypertension)  - at home on lisinopril and HCTZ   - given his hyponatremia and hyperkalemia will discontinue both   - continue toprol 150 qhs       Hyperlipidemia  - continue statin       Diabetic polyneuropathy associated with type 1 diabetes mellitus  - contributing to difficult ulcer healing   --HbA1c 5.8 very well controlled   - Home DM regimen:  Insulin pump, patient states his average basal is 23 units + SSI    Plan  Endocrine following, appreciate recs  Recommend Levemir to 15 units q HS   Novolog ICR 1:9  Supplement with SSI  BG monitoring AC/HS  Endocrine to re-evaluate patient given that wife is bringing in pump to start pump therapy        CLL (chronic lymphocytic leukemia)  - Patient sees Dr. Garcia in heme/onc  - Has not needed treatment  - has been hyperkalemic before attributed to CLL burden           VTE Risk Mitigation (From admission, onward)        Ordered     heparin 25,000 units in dextrose 5% 250 mL (100 units/mL) infusion LOW INTENSITY nomogram - [facility]  Continuous      06/02/19 1508     heparin 25,000 units in dextrose 5% (100 units/ml) IV bolus from bag - ADDITIONAL PRN BOLUS - 60 units/kg (max bolus 4000 units)  As needed (PRN)       06/02/19 1505     heparin 25,000 units in dextrose 5% (100 units/ml) IV bolus from bag - ADDITIONAL PRN BOLUS - 30 units/kg (max bolus 4000 units)  As needed (PRN)      06/02/19 1505     IP VTE LOW RISK PATIENT  Once      05/29/19 1735              Vinita Chen MD  Department of Hospital Medicine   [facility]

## 2019-06-02 NOTE — ASSESSMENT & PLAN NOTE
Caution with insulin stacking  Estimated Creatinine Clearance: 43.7 mL/min (A) (based on SCr of 1.5 mg/dL (H)).

## 2019-06-02 NOTE — ASSESSMENT & PLAN NOTE
- last LEONIE was march with mild PAD , will repeat here given that he is having complicated ulcer healing   - continue ASA, statin, and plavix  - Plan for angiogram of LLE 6/3 by vascular surgery   NPO at midnight    Stop heparin gtt at 6am

## 2019-06-02 NOTE — PROGRESS NOTES
"[facility]  Endocrinology  Progress Note    Admit Date: 2019     Reason for Consult: Management of T1DM, Hyperglycemia     Surgical Procedure and Date: N/A    Diabetes diagnosis year:     Lab Results   Component Value Date    HGBA1C 5.8 (H) 2019       Home Diabetes Medications:  Accuchek spirit pump/sarai       Basal Rate  0000 - 0300     0.85 u/hr  0300 - 0700     0.9 u/hr  0700 - 2100     1.1 u/hr  2100 - 0000     0.85 u/hr        Carb Ratio  12A: 1:8     ISF  1:30     Target: 110-130  IAT: 3    How often checking glucose at home? Dexcom G5 CGM  BG readings on regimen: 110-140  Hypoglycemia on the regimen?  Yes, a few time per week  Missed doses on regimen?  No    Diabetes Complications include:     Hypoglycemia , Diabetic chronic kidney disease     , Diabetic retinopathy , Diabetic peripheral neuropathy , Diabetic peripheral angiopathy with/without gangrene and Foot ulcer      Complicating diabetes co morbidities:   CHF, MINDA and CKD      HPI:   Patient is a 79 y.o. male with a diagnosis of hyponatremia, left foot abscess, CKD3, DM1, CLL, MINDA, and acute heart failure.  Admitted to [facility] [place] 19 from podiatry clinic for a left hallux abcess.  Last seen in endocrinology clinic by ESTEFANÍA Rivers NP on 19.  Endocrine consulted for DM/BG management.            Interval HPI:   Overnight events: Remains in IMTA, NAEON.  BG below goal overnight but elevated this am after patient snacked on crackers and juice.  Noted WBC of 50.54.  NPO after MN for angiogram tomorrow.  Eatin%  Nausea: No  Hypoglycemia and intervention: No  Fever: No  TPN and/or TF: No    BP (!) 120/58   Pulse 60   Temp 98.4 °F (36.9 °C)   Resp 14   Ht 5' 7" (1.702 m)   Wt 88.2 kg (194 lb 7.1 oz)   SpO2 100%   BMI 30.45 kg/m²      Labs Reviewed and Include    Recent Labs   Lab 19  2349   *   CALCIUM 9.5   ALBUMIN 3.6   PROT 5.9*   *   K 4.4   CO2 32*   CL 93*   BUN 36*   CREATININE 1.5* "   ALKPHOS 89   ALT 25   AST 20   BILITOT 0.6     Lab Results   Component Value Date    WBC 50.54 (HH) 06/02/2019    HGB 10.1 (L) 06/02/2019    HCT 31.9 (L) 06/02/2019     (H) 06/02/2019     (L) 06/02/2019     No results for input(s): TSH, FREET4 in the last 168 hours.  Lab Results   Component Value Date    HGBA1C 5.8 (H) 05/29/2019       Nutritional status:   Body mass index is 30.45 kg/m².  Lab Results   Component Value Date    ALBUMIN 3.6 06/01/2019    ALBUMIN 3.6 06/01/2019    ALBUMIN 3.6 06/01/2019     Lab Results   Component Value Date    PREALBUMIN 22 05/17/2019       Estimated Creatinine Clearance: 43.7 mL/min (A) (based on SCr of 1.5 mg/dL (H)).    Accu-Checks  Recent Labs     05/31/19  1155 05/31/19  1658 05/31/19  2055 06/01/19  0027 06/01/19  0751 06/01/19  1143 06/01/19  1659 06/01/19  2100 06/02/19  0213 06/02/19  0805   POCTGLUCOSE 286* 251* 162* 227* 209* 303* 198* 113* 124* 252*       Current Medications and/or Treatments Impacting Glycemic Control  Immunotherapy:    Immunosuppressants     None        Steroids:   Hormones (From admission, onward)    None        Pressors:    Autonomic Drugs (From admission, onward)    None        Hyperglycemia/Diabetes Medications:   Antihyperglycemics (From admission, onward)    Start     Stop Route Frequency Ordered    06/01/19 0900  insulin detemir U-100 pen 15 Units      -- SubQ Daily 06/01/19 0804    05/30/19 1230  insulin aspart U-100 pen 1-15 Units      -- SubQ 3 times daily with meals 05/30/19 1123    05/30/19 1223  insulin aspart U-100 pen 0-5 Units      -- SubQ Before meals & nightly PRN 05/30/19 1123          ASSESSMENT and PLAN    * Cellulitis of Left foot  Infection may elevate BG readings  Optimize BG control for wound healing      Controlled type 1 diabetes mellitus with diabetic neuropathy, with long-term current use of insulin  BG goal 140-180    Levemir 15 units daily in AM  Novolog ICR 1:9  Low dose correction scale  BG monitoring  AC/HS    Patient uses insulin pump at home, patient ok with not being on pump while in-patient for now.  Discussed with patient at bedside the impact of infection and IV antibiotics on BG readings.  Patient's wife to bring pump and supplies from home, will continue MDI for now and re-evaluate if patient should put pump back.    ** Please call Endocrine for any BG related issues **    Discharge planning: TBD      CKD (chronic kidney disease), stage III  Caution with insulin stacking  Estimated Creatinine Clearance: 43.7 mL/min (A) (based on SCr of 1.5 mg/dL (H)).        MINDA on CPAP  May affect BG readings if uncontrolled            Jeff Bustillo, NADINE  Endocrinology  [facility]

## 2019-06-02 NOTE — PLAN OF CARE
Problem: Adult Inpatient Plan of Care  Goal: Plan of Care Review  Outcome: Ongoing (interventions implemented as appropriate)  No acute events throughout shift. VS and assessment performed per orders. Heparin drip continues. Blood glucose monitored as ordered.  Plan of care reviewed with pt, all concerns addressed. Will continue to monitor

## 2019-06-02 NOTE — SUBJECTIVE & OBJECTIVE
Medications:  Continuous Infusions:   heparin (porcine) in D5W 10 Units/kg/hr (06/01/19 2102)     Scheduled Meds:   aspirin  81 mg Oral Daily    atorvastatin  40 mg Oral QHS    clopidogrel  75 mg Oral Daily    collagenase   Topical (Top) Daily    furosemide  80 mg Intravenous TID    insulin aspart U-100  1-15 Units Subcutaneous TIDWM    insulin detemir U-100  15 Units Subcutaneous Daily    lisinopril  10 mg Oral Daily    metoprolol succinate  150 mg Oral QHS     PRN Meds:acetaminophen, dextrose 50%, dextrose 50%, glucagon (human recombinant), glucose, glucose, heparin (PORCINE), heparin (PORCINE), insulin aspart U-100, sodium chloride 0.9%     Objective:     Vital Signs (Most Recent):  Temp: 97.7 °F (36.5 °C) (06/02/19 0412)  Pulse: (!) 54 (06/02/19 0412)  Resp: 15 (06/02/19 0412)  BP: 114/61 (06/02/19 0412)  SpO2: 99 % (06/02/19 0412) Vital Signs (24h Range):  Temp:  [97.4 °F (36.3 °C)-98.1 °F (36.7 °C)] 97.7 °F (36.5 °C)  Pulse:  [52-66] 54  Resp:  [14-20] 15  SpO2:  [95 %-100 %] 99 %  BP: ()/(50-72) 114/61         Physical Exam   Constitutional: He appears well-developed and well-nourished. No distress.   HENT:   Head: Normocephalic and atraumatic.   Eyes: Pupils are equal, round, and reactive to light. EOM are normal.   Neck: Normal range of motion. Neck supple.   Cardiovascular: Normal rate, regular rhythm and normal heart sounds.   Pulmonary/Chest: Effort normal and breath sounds normal. No respiratory distress.   Abdominal: Soft. Bowel sounds are normal. He exhibits no mass. There is no tenderness. There is no guarding.   Musculoskeletal: Normal range of motion. He exhibits edema and tenderness. He exhibits no deformity.   Erythema and ulcer to left foot.  Dorsalis pedis and posterior tibial pulses are diminished Bilaterally. Toes are cool to touch. Feet are warm proximally. + edema in the left foot and 2+ edema in the right foot   Skin: He is not diaphoretic.   Nursing note and vitals  reviewed.      Significant Labs:  CBC:   Recent Labs   Lab 06/02/19  0504   WBC 50.54*   RBC 3.20*   HGB 10.1*   HCT 31.9*   *   *   MCH 31.6*   MCHC 31.7*     CMP:   Recent Labs   Lab 06/01/19  2349   *   CALCIUM 9.5   ALBUMIN 3.6   PROT 5.9*   *   K 4.4   CO2 32*   CL 93*   BUN 36*   CREATININE 1.5*   ALKPHOS 89   ALT 25   AST 20   BILITOT 0.6     Coagulation:   Recent Labs   Lab 06/02/19  0504   APTT 58.3*       Significant Diagnostics:  I have reviewed all pertinent imaging results/findings within the past 24 hours.

## 2019-06-02 NOTE — ASSESSMENT & PLAN NOTE
- slight improvement today  - Daily CMP   - Will continue to diurese likely cardiorenal due to CHF

## 2019-06-02 NOTE — ASSESSMENT & PLAN NOTE
Pt is a 80yo male with ulcer and pain to left foot with surrounding cellulitis.    - For LLE angiogram tomorrow 6/3  - NPO at midnight  - Patient is consented

## 2019-06-02 NOTE — ASSESSMENT & PLAN NOTE
- Patient sees Dr. Gracia in heme/onc  - Has not needed treatment  - has been hyperkalemic before attributed to CLL burden

## 2019-06-02 NOTE — SUBJECTIVE & OBJECTIVE
Interval History: No acute events overnight. Vital signs stable. Sitting up eating breakfast.     Review of Systems   Constitutional: Positive for activity change and unexpected weight change (up 7 lbs ). Negative for appetite change, fatigue and fever.   HENT: Negative for congestion, sinus pressure and sinus pain.    Eyes: Negative for photophobia, discharge and visual disturbance.   Respiratory: Negative for apnea, shortness of breath and wheezing.    Cardiovascular: Positive for leg swelling. Negative for chest pain.   Gastrointestinal: Negative for abdominal distention and nausea.   Endocrine: Negative for cold intolerance, heat intolerance, polydipsia, polyphagia and polyuria.   Genitourinary: Positive for decreased urine volume. Negative for difficulty urinating.   Musculoskeletal: Negative for arthralgias and back pain.   Skin: Positive for color change and rash.   Neurological: Negative for dizziness, tremors and numbness.   Psychiatric/Behavioral: Negative for agitation, behavioral problems, confusion and decreased concentration.     Objective:     Vital Signs (Most Recent):  Temp: 98.1 °F (36.7 °C) (06/02/19 1157)  Pulse: (!) 58 (06/02/19 1100)  Resp: 14 (06/02/19 1100)  BP: (!) 98/52 (06/02/19 1100)  SpO2: 98 % (06/02/19 1249) Vital Signs (24h Range):  Temp:  [97.7 °F (36.5 °C)-98.4 °F (36.9 °C)] 98.1 °F (36.7 °C)  Pulse:  [52-66] 58  Resp:  [14-20] 14  SpO2:  [96 %-100 %] 98 %  BP: ()/(50-71) 98/52     Weight: 88.2 kg (194 lb 7.1 oz)  Body mass index is 30.45 kg/m².    Intake/Output Summary (Last 24 hours) at 6/2/2019 1449  Last data filed at 6/2/2019 1254  Gross per 24 hour   Intake 360 ml   Output 1495 ml   Net -1135 ml      Physical Exam   Constitutional: He appears well-developed and well-nourished. No distress.   HENT:   Head: Normocephalic and atraumatic.   Eyes: Pupils are equal, round, and reactive to light. EOM are normal.   Neck: Normal range of motion. Neck supple.   Cardiovascular:  Normal rate, regular rhythm and normal heart sounds.   Pulmonary/Chest: Effort normal and breath sounds normal. No respiratory distress.   Abdominal: Soft. Bowel sounds are normal. He exhibits no mass. There is no tenderness. There is no guarding.   Musculoskeletal: Normal range of motion. He exhibits edema and tenderness. He exhibits no deformity.   Erythema in the left foot as pictures below/ Ulcer present that is cleaned , pain around the ulcer noted.  Dorsalis pedis and posterior tibial pulses are diminished Bilaterally. Toes are cool to touch. Feet are warm proximally. + edema in the left foot and 2+ edema in the right foot        Skin: He is not diaphoretic.   Nursing note and vitals reviewed.      Significant Labs:   CBC:   Recent Labs   Lab 06/01/19  0304 06/02/19  0504   WBC 51.72* 50.54*   HGB 10.3* 10.1*   HCT 32.0* 31.9*    144*     CMP:   Recent Labs   Lab 06/01/19  0752 06/01/19  1547 06/01/19  2349   * 134* 134*   K 4.1 4.8 4.4   CL 92* 93* 93*   CO2 30* 29 32*   * 191* 120*   BUN 34* 39* 36*   CREATININE 1.3 1.7* 1.5*   CALCIUM 9.5 9.5 9.5   PROT 5.9* 6.4 5.9*   ALBUMIN 3.6 3.6 3.6   BILITOT 0.6 0.4 0.6   ALKPHOS 92 107 89   AST 19 26 20   ALT 25 27 25   ANIONGAP 12 12 9   EGFRNONAA 52.6* 38.1* 44.3*       Significant Imaging: I have reviewed all pertinent imaging results/findings within the past 24 hours.

## 2019-06-02 NOTE — SUBJECTIVE & OBJECTIVE
"Interval HPI:   Overnight events: Remains in [place]ILL[place].  BG below goal overnight but elevated this am after patient snacked on crackers and juice.  Noted WBC of 50.54.  NPO after MN for angiogram tomorrow.  Eatin%  Nausea: No  Hypoglycemia and intervention: No  Fever: No  TPN and/or TF: No    BP (!) 120/58   Pulse 60   Temp 98.4 °F (36.9 °C)   Resp 14   Ht 5' 7" (1.702 m)   Wt 88.2 kg (194 lb 7.1 oz)   SpO2 100%   BMI 30.45 kg/m²     Labs Reviewed and Include    Recent Labs   Lab 19  2349   *   CALCIUM 9.5   ALBUMIN 3.6   PROT 5.9*   *   K 4.4   CO2 32*   CL 93*   BUN 36*   CREATININE 1.5*   ALKPHOS 89   ALT 25   AST 20   BILITOT 0.6     Lab Results   Component Value Date    WBC 50.54 (HH) 2019    HGB 10.1 (L) 2019    HCT 31.9 (L) 2019     (H) 2019     (L) 2019     No results for input(s): TSH, FREET4 in the last 168 hours.  Lab Results   Component Value Date    HGBA1C 5.8 (H) 2019       Nutritional status:   Body mass index is 30.45 kg/m².  Lab Results   Component Value Date    ALBUMIN 3.6 2019    ALBUMIN 3.6 2019    ALBUMIN 3.6 2019     Lab Results   Component Value Date    PREALBUMIN 22 2019       Estimated Creatinine Clearance: 43.7 mL/min (A) (based on SCr of 1.5 mg/dL (H)).    Accu-Checks  Recent Labs     19  1155 19  1658 19  2055 19  0027 19  0751 19  1143 19  1659 19  2100 19  0213 19  0805   POCTGLUCOSE 286* 251* 162* 227* 209* 303* 198* 113* 124* 252*       Current Medications and/or Treatments Impacting Glycemic Control  Immunotherapy:    Immunosuppressants     None        Steroids:   Hormones (From admission, onward)    None        Pressors:    Autonomic Drugs (From admission, onward)    None        Hyperglycemia/Diabetes Medications:   Antihyperglycemics (From admission, onward)    Start     Stop Route Frequency Ordered    19 0900 "  insulin detemir U-100 pen 15 Units      -- SubQ Daily 06/01/19 0804    05/30/19 1230  insulin aspart U-100 pen 1-15 Units      -- SubQ 3 times daily with meals 05/30/19 1123    05/30/19 1223  insulin aspart U-100 pen 0-5 Units      -- SubQ Before meals & nightly PRN 05/30/19 1123

## 2019-06-02 NOTE — ASSESSMENT & PLAN NOTE
Reid Herman is a 79 y.o. with DM1, PAD and a left hallux ulcer that has been growing enterococcus sensitive to ampicillin and vancomycin but with worsening erythema despite treatment. Admitted from podiatry for OM rule out and IV antibiotics    MRI shows early OM  BCx NGTD    Vascular recommending angiogram on 6/3  ID recommending holding Vanc and bone biopsy (after angio)  Podiatry considering Debridement/amputation vs Biopsy and IV antibiotics options       PLAN:  --WBC unable to be reliable as patient has CLL, crp is elevated esr is low but patient is immunocompromised   - Blood cultures, NGTD  - continue to hold off on vancomycin   - Podiatry following, appreciate recs   Bone biopsy after angio 6/3  - ID following, appreciate recs   Holding vancomycin, await bone biopsy  - Vascular following, appreciate recs   Plan for angio 6/3 am - stop heparin gtt 6am morning of, NPO at midnight

## 2019-06-03 NOTE — ASSESSMENT & PLAN NOTE
Pt is a 78yo male with a non-healing ulcer and pain to left foot with surrounding cellulitis pending LLE angio today for salvage.     - NPO at midnight  - Patient is consented  - please hold hep gtt at 6

## 2019-06-03 NOTE — ASSESSMENT & PLAN NOTE
Suspect symptoms likely secondary to CHF  - Diurese as tolerated, holding HCTZ   - trending daily labs

## 2019-06-03 NOTE — ANESTHESIA POSTPROCEDURE EVALUATION
Anesthesia Post Evaluation    Patient: Reid Herman    Procedure(s) Performed: Procedure(s) (LRB):  Angiogram Extremity Unilateral possible CO2 (Left)  PTA, PERIPHERAL BLOOD VESSEL (Left)    Final Anesthesia Type: general  Patient location during evaluation: [facility]  Patient participation: Yes- Able to Participate  Level of consciousness: awake and alert  Post-procedure vital signs: reviewed and stable  Pain management: adequate  Airway patency: patent  PONV status at discharge: No PONV  Anesthetic complications: no      Cardiovascular status: blood pressure returned to baseline  Respiratory status: unassisted  Hydration status: euvolemic  Follow-up not needed.          Vitals Value Taken Time   /63 6/3/2019 11:01 AM   Temp 36.7 °C (98.1 °F) 6/3/2019  8:08 AM   Pulse 61 6/3/2019 11:05 AM   Resp 48 6/3/2019 10:46 AM   SpO2 95 % 6/3/2019 11:05 AM   Vitals shown include unvalidated device data.      No case tracking events are documented in the log.      Pain/Britton Score: Britton Score: 9 (6/3/2019 10:45 AM)

## 2019-06-03 NOTE — PROGRESS NOTES
[facility]  Vascular Surgery  Progress Note    Patient Name: Reid Herman  MRN: [id number]  Admission Date: 5/29/2019  Primary Care Provider: Olivia Zavala MD    Subjective:     Interval History: No acute events, NPO since midnight, bg in the 70s asx, using santyl over the wound.     Post-Op Info:  Procedure(s) (LRB):  Angiogram Extremity Unilateral possible CO2 (Left)           Medications:  Continuous Infusions:   heparin (porcine) in D5W       Scheduled Meds:   aspirin  81 mg Oral Daily    atorvastatin  40 mg Oral QHS    clopidogrel  75 mg Oral Daily    collagenase   Topical (Top) Daily    furosemide  40 mg Intravenous BID    insulin aspart U-100  1-15 Units Subcutaneous TIDWM    insulin detemir U-100  14 Units Subcutaneous Daily    lisinopril  10 mg Oral Daily    metoprolol succinate  150 mg Oral QHS     PRN Meds:acetaminophen, ceFAZolin (ANCEF) IVPB, dextrose 50%, dextrose 50%, glucagon (human recombinant), glucose, glucose, heparin (PORCINE), heparin (PORCINE), insulin aspart U-100, sodium chloride 0.9%     Objective:     Vital Signs (Most Recent):  Temp: 97.9 °F (36.6 °C) (06/03/19 0430)  Pulse: (!) 57 (06/03/19 0430)  Resp: 16 (06/03/19 0430)  BP: 100/61 (06/03/19 0430)  SpO2: 100 % (06/03/19 0430) Vital Signs (24h Range):  Temp:  [97.9 °F (36.6 °C)-98.4 °F (36.9 °C)] 97.9 °F (36.6 °C)  Pulse:  [54-62] 57  Resp:  [14-22] 16  SpO2:  [96 %-100 %] 100 %  BP: ()/(52-68) 100/61         Physical Exam   Constitutional: He appears well-developed and well-nourished. No distress.   HENT:   Head: Normocephalic and atraumatic.   Eyes: Pupils are equal, round, and reactive to light. EOM are normal.   Neck: Normal range of motion. Neck supple.   Cardiovascular: Normal rate, regular rhythm and normal heart sounds.   Pulmonary/Chest: Effort normal and breath sounds normal. No respiratory distress.   Abdominal: Soft. Bowel sounds are normal. He exhibits no mass. There is no  tenderness. There is no guarding.   Musculoskeletal: Normal range of motion. He exhibits edema and tenderness. He exhibits no deformity.   Improved erythema and dry ulcer to left foot.  Dorsalis pedis and posterior tibial pulses are diminished Bilaterally. Toes are cool to touch. Feet are warm proximally. + edema in the left foot and 2+ edema in the right foot   Skin: He is not diaphoretic.   Nursing note and vitals reviewed.      Significant Labs:  CBC:   Recent Labs   Lab 06/03/19 0327   WBC 46.82*   RBC 3.21*   HGB 10.4*   HCT 32.2*   *   *   MCH 32.4*   MCHC 32.3     CMP:   Recent Labs   Lab 06/03/19 0327   *   CALCIUM 9.4   ALBUMIN 3.6   PROT 5.8*   *   K 3.8   CO2 33*   CL 93*   BUN 39*   CREATININE 1.5*   ALKPHOS 83   ALT 21   AST 18   BILITOT 0.7     Coagulation:   Recent Labs   Lab 06/03/19 0327   APTT 63.7*       Significant Diagnostics:  I have reviewed all pertinent imaging results/findings within the past 24 hours.    Assessment/Plan:     * Cellulitis of Left foot  Pt is a 80yo male with a non-healing ulcer and pain to left foot with surrounding cellulitis pending LLE angio today for salvage.     - NPO at midnight  - Patient is consented  - please hold hep gtt at 6        Lola Brito MD  Vascular Surgery  [facility]

## 2019-06-03 NOTE — ASSESSMENT & PLAN NOTE
79 year old male who presented as a direct admit from podiatry clinic for a left great toe ulcer. Patient has a PMH HTN, HLD, DM-1(insulin pump), CAD s/p CABG x 2 , MINDA compliant with CPAP, CLL not currently on any therapy, and PAD who presents with left foot ulcer. Prior wound culture with E. Faecalis. Pt started on IV vanc. MRI cannot rule out possible early osteomyelitis distal phalanx. ID consulted for further recs.     Stable leukocytosis (50,000 + ) present since January- due to CLL- predominantly lymphocytic.  Pt afebrile.      He is afebrile, without complaints.  Underwent angiogram and successful PTA X2 today to left leg.  Bone biopsy pending for tomorrow.     Plan:  1. So long as patient remains stable, continue  to hold antibiotics pending bone biopsy to improve culture yield.  If clinically worsens, may start IV vancomycin and ceftriaxone empirically.    2. Send bone biopsy  for aerobic, anaerobic, fungal, and AFB cultures; please also send bone to pathology  3. Will follow up tomorrow.     Data reviewed and plan discussed with ID staff

## 2019-06-03 NOTE — PLAN OF CARE
Report given to WINSOME Cabezas,  on unit patient will be returning to. Nurse made aware of patient most recent blood glucose level. Patient and nurse aware patient is to lie flat for 6 hours with time beginning at 1030am. Patient transferring via escort.

## 2019-06-03 NOTE — ASSESSMENT & PLAN NOTE
- EKG w/ ST depressions, elevated troponin (peaked 1.16)  - Dyspnea on exertion, orthopnea, significant peripheral edema.  - BNP 2608  - ECHO w/ EF 20%, grade II DD.   - New onset HF w/ troponin leak vs NSTEMI in the setting of acute infection.     Plan  - Continue ASA 81 mg and plavix 75 mg daily  - Continue metoprolol  mg qhs  - Continue IV Lasix 80 mg TID  - Continue Lisinopril 10 mg daily   - Interventional cards to perform LHC prior to discharge.

## 2019-06-03 NOTE — PROGRESS NOTES
Pt says he has received detemir from nurse in [facility]. I called the report nurse Annika and she did not give this to the pt. Annika stated that malinda Swain  gave the pt 11 units of insulin with is not in the pt. MAR. MD and OC notified.

## 2019-06-03 NOTE — SUBJECTIVE & OBJECTIVE
Interval History: No acute events over the weekend.  Angiogram today with successful LLE revascularization.  Pending bone biopsy by Podiatry.     Review of Systems   Constitutional: Negative for chills and fever.   Respiratory: Negative for cough and shortness of breath.    Cardiovascular: Positive for leg swelling. Negative for chest pain.   Gastrointestinal: Negative for abdominal pain, constipation, diarrhea, nausea and vomiting.   Genitourinary: Negative for dysuria, frequency and hematuria.   Musculoskeletal: Negative for back pain and myalgias.   Skin: Positive for wound (left foot). Negative for rash.   Neurological: Negative for light-headedness, numbness and headaches.   Psychiatric/Behavioral: Negative for agitation and behavioral problems. The patient is not nervous/anxious.      Objective:     Vital Signs (Most Recent):  Temp: 98.1 °F (36.7 °C) (06/03/19 0808)  Pulse: 68 (06/03/19 1030)  Resp: 18 (06/03/19 1030)  BP: (!) 106/58 (06/03/19 1030)  SpO2: 96 % (06/03/19 1030) Vital Signs (24h Range):  Temp:  [97.9 °F (36.6 °C)-98.4 °F (36.9 °C)] 98.1 °F (36.7 °C)  Pulse:  [54-68] 68  Resp:  [14-22] 18  SpO2:  [96 %-100 %] 96 %  BP: ()/(52-68) 106/58     Weight: 88.2 kg (194 lb 7.1 oz)  Body mass index is 30.45 kg/m².    Estimated Creatinine Clearance: 43.7 mL/min (A) (based on SCr of 1.5 mg/dL (H)).    Physical Exam   Constitutional: He is oriented to person, place, and time. He appears well-developed and well-nourished. No distress.   Cardiovascular: Normal rate and regular rhythm.   No murmur heard.  Pulmonary/Chest: Effort normal and breath sounds normal. No respiratory distress.   Abdominal: Soft. Bowel sounds are normal. He exhibits no distension.   Musculoskeletal: Normal range of motion. He exhibits edema.   Neurological: He is alert and oriented to person, place, and time.   Skin: Skin is warm and dry.   Left medial great toe with ulceration.   There is erythema of the 1st digit. Decreased  erythema to forefoot.   Mild tenderness. No active drainage.    Psychiatric: He has a normal mood and affect. His behavior is normal.       Significant Labs:   Blood Culture:   Recent Labs   Lab 05/29/19  1756   LABBLOO No Growth to date  No Growth to date  No Growth to date  No Growth to date  No Growth to date  No Growth to date  No Growth to date  No Growth to date  No Growth to date  No Growth to date     CBC:   Recent Labs   Lab 06/02/19  0504 06/03/19  0327   WBC 50.54* 46.82*   HGB 10.1* 10.4*   HCT 31.9* 32.2*   * 138*     CMP:   Recent Labs   Lab 06/01/19  1547 06/01/19  2349 06/03/19  0327   * 134* 135*   K 4.8 4.4 3.8   CL 93* 93* 93*   CO2 29 32* 33*   * 120* 123*   BUN 39* 36* 39*   CREATININE 1.7* 1.5* 1.5*   CALCIUM 9.5 9.5 9.4   PROT 6.4 5.9* 5.8*   ALBUMIN 3.6 3.6 3.6   BILITOT 0.4 0.6 0.7   ALKPHOS 107 89 83   AST 26 20 18   ALT 27 25 21   ANIONGAP 12 9 9   EGFRNONAA 38.1* 44.3* 44.3*     Wound Culture:   Recent Labs   Lab 05/22/19  0942   LABAERO ENTEROCOCCUS FAECALIS  Many  No other significant isolate         Significant Imaging: I have reviewed all pertinent imaging results/findings within the past 24 hours.

## 2019-06-03 NOTE — PROGRESS NOTES
[facility]  Infectious Disease  Progress Note    Patient Name: Reid Herman  MRN: [id number]  Admission Date: 5/29/2019  Length of Stay: 5 days  Attending Physician: Elis Gutierrez MD  Primary Care Provider: Olivia Zavala MD    Isolation Status: No active isolations  Assessment/Plan:      Diabetic ulcer of left great toe     79 year old male who presented as a direct admit from podiatry clinic for a left great toe ulcer. Patient has a PMH HTN, HLD, DM-1(insulin pump), CAD s/p CABG x 2 , MINDA compliant with CPAP, CLL not currently on any therapy, and PAD who presents with left foot ulcer. Prior wound culture with E. Faecalis. Pt started on IV vanc. MRI cannot rule out possible early osteomyelitis distal phalanx. ID consulted for further recs.     Stable leukocytosis (50,000 + ) present since January- due to CLL- predominantly lymphocytic.  Pt afebrile.      He is afebrile, without complaints.  Underwent angiogram and successful PTA X2 today to left leg.  Bone biopsy pending for tomorrow.     Plan:  1. So long as patient remains stable, continue  to hold antibiotics pending bone biopsy to improve culture yield.  If clinically worsens, may start IV vancomycin and ceftriaxone empirically.    2. Send bone biopsy  for aerobic, anaerobic, fungal, and AFB cultures; please also send bone to pathology  3. Will follow up tomorrow.     Data reviewed and plan discussed with ID staff      Thank you.   Please call for any questions or concerns.  MILDRED Malhotra, ANP-C  [phone]    Subjective:     Principal Problem:Cellulitis of foot    HPI: This is a 79 year old male who presented as a direct admit from podiatry clinic for a left great toe ulcer. Patient has a PMH HTN, HLD, DM-1(insulin pump), CAD s/p CABG x 2 , MINDA compliant with CPAP, CLL not currently on any therapy, and PAD who presents with left foot ulcer and for the last month that has progressively been worsening.  Patient was on multiple  courses of oral antibiotics with no improvement.  He was seen in podiatry clinic yesterday and noted to be worsening so recommended admission for IV antibiotics.  Patient had MRI which could not rule out early osteomyelitis.  Podiatry consulted and discussed possible bone biopsy. Patient was started on IV vancomycin.    Interventional cards has been consulted.  There is a wound culture from 5/22 that grew enterococcus faecalis. Patient has a leukocytosis of 51,000 but has history of CLL and has had this WBC count since January.  Patient has mild pain but does have neuropathy. He denies fevers or chills.       Interval History: No acute events over the weekend.  Angiogram today with successful LLE revascularization.  Pending bone biopsy by Podiatry.     Review of Systems   Constitutional: Negative for chills and fever.   Respiratory: Negative for cough and shortness of breath.    Cardiovascular: Positive for leg swelling. Negative for chest pain.   Gastrointestinal: Negative for abdominal pain, constipation, diarrhea, nausea and vomiting.   Genitourinary: Negative for dysuria, frequency and hematuria.   Musculoskeletal: Negative for back pain and myalgias.   Skin: Positive for wound (left foot). Negative for rash.   Neurological: Negative for light-headedness, numbness and headaches.   Psychiatric/Behavioral: Negative for agitation and behavioral problems. The patient is not nervous/anxious.      Objective:     Vital Signs (Most Recent):  Temp: 98.1 °F (36.7 °C) (06/03/19 0808)  Pulse: 68 (06/03/19 1030)  Resp: 18 (06/03/19 1030)  BP: (!) 106/58 (06/03/19 1030)  SpO2: 96 % (06/03/19 1030) Vital Signs (24h Range):  Temp:  [97.9 °F (36.6 °C)-98.4 °F (36.9 °C)] 98.1 °F (36.7 °C)  Pulse:  [54-68] 68  Resp:  [14-22] 18  SpO2:  [96 %-100 %] 96 %  BP: ()/(52-68) 106/58     Weight: 88.2 kg (194 lb 7.1 oz)  Body mass index is 30.45 kg/m².    Estimated Creatinine Clearance: 43.7 mL/min (A) (based on SCr of 1.5 mg/dL  (H)).    Physical Exam   Constitutional: He is oriented to person, place, and time. He appears well-developed and well-nourished. No distress.   Cardiovascular: Normal rate and regular rhythm.   No murmur heard.  Pulmonary/Chest: Effort normal and breath sounds normal. No respiratory distress.   Abdominal: Soft. Bowel sounds are normal. He exhibits no distension.   Musculoskeletal: Normal range of motion. He exhibits edema.   Neurological: He is alert and oriented to person, place, and time.   Skin: Skin is warm and dry.   Left medial great toe with ulceration.   There is erythema of the 1st digit. Decreased erythema to forefoot.   Mild tenderness. No active drainage.    Psychiatric: He has a normal mood and affect. His behavior is normal.       Significant Labs:   Blood Culture:   Recent Labs   Lab 05/29/19  1756   LABBLOO No Growth to date  No Growth to date  No Growth to date  No Growth to date  No Growth to date  No Growth to date  No Growth to date  No Growth to date  No Growth to date  No Growth to date     CBC:   Recent Labs   Lab 06/02/19  0504 06/03/19  0327   WBC 50.54* 46.82*   HGB 10.1* 10.4*   HCT 31.9* 32.2*   * 138*     CMP:   Recent Labs   Lab 06/01/19  1547 06/01/19  2349 06/03/19  0327   * 134* 135*   K 4.8 4.4 3.8   CL 93* 93* 93*   CO2 29 32* 33*   * 120* 123*   BUN 39* 36* 39*   CREATININE 1.7* 1.5* 1.5*   CALCIUM 9.5 9.5 9.4   PROT 6.4 5.9* 5.8*   ALBUMIN 3.6 3.6 3.6   BILITOT 0.4 0.6 0.7   ALKPHOS 107 89 83   AST 26 20 18   ALT 27 25 21   ANIONGAP 12 9 9   EGFRNONAA 38.1* 44.3* 44.3*     Wound Culture:   Recent Labs   Lab 05/22/19  0942   LABAERO ENTEROCOCCUS FAECALIS  Many  No other significant isolate         Significant Imaging: I have reviewed all pertinent imaging results/findings within the past 24 hours.

## 2019-06-03 NOTE — PROGRESS NOTES
[facility] Hospital Medicine  Progress Note    Patient Name: Reid Herman  MRN: [id number]  Patient Class: IP- Inpatient   Admission Date: 5/29/2019  Length of Stay: 5 days  Attending Physician: Elis Gutierrez MD  Primary Care Provider: Olivia Zavala MD    [facility] Medicine Team: [facility] 3 Milind Chaves MD    Subjective:     Principal Problem:Cellulitis of foot    HPI:  Reid Herman is a 79M direct admit from podiatry clinic for a left hallux abcess. Patient has a PMH HTN, Hyperlipidemia, DM-1(insulin pump), CAD s/p CABG x 2 , MINDA compliant with CPAP, CLL not currently on any therapy, PAD who has been dealing with a left foot abcess for the last month that has been getting worse. Cultures from wound have grown enterococcus that  sensitive to amp and Vanc.  Wound has worsened despite appropriate PO ABx (ampicillin). Additionally he has described that he has been having problems with SOB that he has been needing to increase the incline of his bed for. He also noticed worsening bilateral lower edema and he has gained 7 lbs in the last month. Patient states that currently he does not feel SOB ( since it only  occurs with being flat or exertion), no CP, no palpitations, no fever, chills. He states that he can feel his bilateral feet but has decreased. Usually he has been mobile but with this wound and his boot he has been less mobile. No history of DVT but he has a history of CLL.    Initial workup in the floor shows elevated BNP in the 2k, WBC > 50 ( he has a baseline around 53. CRP elevated, and hyponatremic to 127.     Hospital Course:  Admitted to medicine for cellulitis and CHF exacerbation. Ulcer seen by podiatry, vascular and ID.  Patient found to have elevated tropoinins and EKG changes consistent with recent MI.  Cardiology consulted, along with Interventional cardiology.  Recommendation to treat medically with heparin ggt for 48hrs, lisinopril 10mg, statin, plavix, ASA  325. Angiogram performed by vascular surgery 6/3.    Interval History: Angiogram performed today. Recovering from procedure well, continuing to monitor     Review of Systems   Constitutional: Positive for activity change and unexpected weight change (up 7 lbs ). Negative for appetite change, fatigue and fever.   HENT: Negative for congestion, sinus pressure and sinus pain.    Eyes: Negative for photophobia, discharge and visual disturbance.   Respiratory: Negative for apnea, shortness of breath and wheezing.    Cardiovascular: Positive for leg swelling. Negative for chest pain.   Gastrointestinal: Negative for abdominal distention and nausea.   Endocrine: Negative for cold intolerance, heat intolerance, polydipsia, polyphagia and polyuria.   Genitourinary: Positive for decreased urine volume. Negative for difficulty urinating.   Musculoskeletal: Negative for arthralgias and back pain.   Skin: Positive for color change and rash.   Neurological: Negative for dizziness, tremors and numbness.   Psychiatric/Behavioral: Negative for agitation, behavioral problems, confusion and decreased concentration.     Objective:     Vital Signs (Most Recent):  Temp: 97.1 °F (36.2 °C) (06/03/19 1300)  Pulse: 64 (06/03/19 1300)  Resp: 12 (06/03/19 1300)  BP: 122/72 (06/03/19 1300)  SpO2: 95 % (06/03/19 1300) Vital Signs (24h Range):  Temp:  [97.1 °F (36.2 °C)-98.4 °F (36.9 °C)] 97.1 °F (36.2 °C)  Pulse:  [54-69] 64  Resp:  [12-22] 12  SpO2:  [95 %-100 %] 95 %  BP: ()/(52-72) 122/72     Weight: 88.2 kg (194 lb 7.1 oz)  Body mass index is 30.45 kg/m².    Intake/Output Summary (Last 24 hours) at 6/3/2019 1309  Last data filed at 6/3/2019 1014  Gross per 24 hour   Intake 890 ml   Output 900 ml   Net -10 ml      Physical Exam   Constitutional: He appears well-developed and well-nourished. No distress.   HENT:   Head: Normocephalic and atraumatic.   Eyes: Pupils are equal, round, and reactive to light. EOM are normal.   Neck: Normal  range of motion. Neck supple.   Cardiovascular: Normal rate, regular rhythm and normal heart sounds.   Pulmonary/Chest: Effort normal and breath sounds normal. No respiratory distress.   Abdominal: Soft. Bowel sounds are normal. He exhibits no mass. There is no tenderness. There is no guarding.   Musculoskeletal: Normal range of motion. He exhibits edema and tenderness. He exhibits no deformity.   Erythema in the left foot as pictures below/ Ulcer present that is cleaned , pain around the ulcer noted.  Dorsalis pedis and posterior tibial pulses are diminished Bilaterally. Toes are cool to touch. Feet are warm proximally. + edema in the left foot and 2+ edema in the right foot    Neurological: He is alert.   Skin: He is not diaphoretic.   Nursing note and vitals reviewed.      Significant Labs: All pertinent labs within the past 24 hours have been reviewed.    Significant Imaging: I have reviewed and interpreted all pertinent imaging results/findings within the past 24 hours.    Assessment/Plan:      * Cellulitis of Left foot  79M w/ DM1, PAD and a left hallux ulcer that has been growing enterococcus sensitive to ampicillin and vancomycin but with worsening erythema despite treatment. Admitted from podiatry for OM rule out and IV antibiotics  - MRI shows early OM  - Angiogram performed by vascular surgery on 6/3    PLAN:  - Podiatry following - plan for bone biopsy after angio 6/3  - ID following - Holding vancomycin, pending bone biopsy  - Vascular following   - BCx - NGTD, continue to follow    NSTEMI (non-ST elevated myocardial infarction)  - EKG w/ ST depressions, elevated troponin (peaked 1.16)  - Dyspnea on exertion, orthopnea, significant peripheral edema.  - BNP 2608  - ECHO w/ EF 20%, grade II DD.   - New onset HF w/ troponin leak vs NSTEMI in the setting of acute infection.     Plan  - Continue ASA 81 mg and plavix 75 mg daily  - Continue metoprolol  mg qhs  - Continue IV Lasix 80 mg TID  - Continue  Lisinopril 10 mg daily   - Interventional cards to perform LHC prior to discharge.      Hyponatremia  Suspect symptoms likely secondary to CHF  - Diurese as tolerated, holding HCTZ   - trending daily labs      Acute HF (heart failure)  - Patient with symptoms of orthopnea and dyspnea on exertion   - Patient on HCTZ at home no loop diuretic   - BNP elevated at 2 k ,   - PE notable for  JVD and bilateral pitting edema, abdominal distension    - Xray on my read be bilateral edema   - precipitating factor is likely infected ulcer in a patient with PAD   -Echocardiogram: 2D echo with color flow doppler most recent not in file he had a pet stress in January showing EF of 40     PLAN   - IV lasix 40mg BID, adjust as needed  - Strict I/O and Daily weights  - Wean oxygen as tolerated     Orthopnea  - see CHF    Foot abscess, left  - see cellulitis     Hyperkalemia  - Lasix 40mg IV BID, adjust as needed  - Trending daily labs    CKD (chronic kidney disease), stage III  - Diuresing w/ lasix 40 IV BID  - Daily CMP, replace lytes as needed   - Continue to monitor    Peripheral vascular disease  Last LEONIE was march with mild PAD  - Angiogram performed 6/3  - continue ASA, statin, and plavix    MINDA on CPAP  - cpap qhs       HTN (hypertension)  - holding home lisinopril and HCTZ, resume as tolerated  - continue toprol 150 qhs       Hyperlipidemia  - continue statin       Diabetic polyneuropathy associated with type 1 diabetes mellitus  - contributing to difficult ulcer healing   --HbA1c 5.8 very well controlled   - Home DM regimen:  Insulin pump, patient states his average basal is 23 units + SSI    Plan  - Endocrine following, appreciate assistance  - Recommend Levemir to 15 units qHS, Novolog ICR 1:9  - Supplement with SSI    CLL (chronic lymphocytic leukemia)  - Patient sees Dr. Garcia in heme/onc  - Has not needed treatment  - has been hyperkalemic before attributed to CLL burden         VTE Risk Mitigation (From admission,  onward)        Ordered     heparin 25,000 units in dextrose 5% 250 mL (100 units/mL) infusion LOW INTENSITY nomogram - [facility]  Continuous      06/02/19 1508     heparin 25,000 units in dextrose 5% (100 units/ml) IV bolus from bag - ADDITIONAL PRN BOLUS - 60 units/kg (max bolus 4000 units)  As needed (PRN)      06/02/19 1505     heparin 25,000 units in dextrose 5% (100 units/ml) IV bolus from bag - ADDITIONAL PRN BOLUS - 30 units/kg (max bolus 4000 units)  As needed (PRN)      06/02/19 1505     IP VTE LOW RISK PATIENT  Once      05/29/19 7499            Milind Chaves MD  Department of Hospital Medicine   [facility]

## 2019-06-03 NOTE — PT/OT/SLP PROGRESS
Physical Therapy      Patient Name:  Reid Herman   MRN:  [id number]    Pt not seen on this date - pt off floor for angiogram in AM; Per note pt unable to sit up until 1630; Will hold therapy on this date. Chart reviewed and pt remain appropriate for PT services at this time.  Will see pt as schedule allows.      Rajendra Rivera, PT,DPT  6/3/2019

## 2019-06-03 NOTE — SUBJECTIVE & OBJECTIVE
Interval History: Angiogram performed today. Recovering from procedure well, continuing to monitor     Review of Systems   Constitutional: Positive for activity change and unexpected weight change (up 7 lbs ). Negative for appetite change, fatigue and fever.   HENT: Negative for congestion, sinus pressure and sinus pain.    Eyes: Negative for photophobia, discharge and visual disturbance.   Respiratory: Negative for apnea, shortness of breath and wheezing.    Cardiovascular: Positive for leg swelling. Negative for chest pain.   Gastrointestinal: Negative for abdominal distention and nausea.   Endocrine: Negative for cold intolerance, heat intolerance, polydipsia, polyphagia and polyuria.   Genitourinary: Positive for decreased urine volume. Negative for difficulty urinating.   Musculoskeletal: Negative for arthralgias and back pain.   Skin: Positive for color change and rash.   Neurological: Negative for dizziness, tremors and numbness.   Psychiatric/Behavioral: Negative for agitation, behavioral problems, confusion and decreased concentration.     Objective:     Vital Signs (Most Recent):  Temp: 97.1 °F (36.2 °C) (06/03/19 1300)  Pulse: 64 (06/03/19 1300)  Resp: 12 (06/03/19 1300)  BP: 122/72 (06/03/19 1300)  SpO2: 95 % (06/03/19 1300) Vital Signs (24h Range):  Temp:  [97.1 °F (36.2 °C)-98.4 °F (36.9 °C)] 97.1 °F (36.2 °C)  Pulse:  [54-69] 64  Resp:  [12-22] 12  SpO2:  [95 %-100 %] 95 %  BP: ()/(52-72) 122/72     Weight: 88.2 kg (194 lb 7.1 oz)  Body mass index is 30.45 kg/m².    Intake/Output Summary (Last 24 hours) at 6/3/2019 1309  Last data filed at 6/3/2019 1014  Gross per 24 hour   Intake 890 ml   Output 900 ml   Net -10 ml      Physical Exam   Constitutional: He appears well-developed and well-nourished. No distress.   HENT:   Head: Normocephalic and atraumatic.   Eyes: Pupils are equal, round, and reactive to light. EOM are normal.   Neck: Normal range of motion. Neck supple.   Cardiovascular: Normal  rate, regular rhythm and normal heart sounds.   Pulmonary/Chest: Effort normal and breath sounds normal. No respiratory distress.   Abdominal: Soft. Bowel sounds are normal. He exhibits no mass. There is no tenderness. There is no guarding.   Musculoskeletal: Normal range of motion. He exhibits edema and tenderness. He exhibits no deformity.   Erythema in the left foot as pictures below/ Ulcer present that is cleaned , pain around the ulcer noted.  Dorsalis pedis and posterior tibial pulses are diminished Bilaterally. Toes are cool to touch. Feet are warm proximally. + edema in the left foot and 2+ edema in the right foot    Neurological: He is alert.   Skin: He is not diaphoretic.   Nursing note and vitals reviewed.      Significant Labs: All pertinent labs within the past 24 hours have been reviewed.    Significant Imaging: I have reviewed and interpreted all pertinent imaging results/findings within the past 24 hours.

## 2019-06-03 NOTE — SUBJECTIVE & OBJECTIVE
"Interval HPI:   Overnight events: Remains in [place].  BG elevated yesterday during the day but below goal overnight.  Noted WBC of 56.8.  NPO after MN for angiogram tomorrow.  Eating:   NPO  Nausea: No  Hypoglycemia and intervention: No  Fever: No  TPN and/or TF: No    /61   Pulse (!) 57   Temp 97.9 °F (36.6 °C) (Oral)   Resp 16   Ht 5' 7" (1.702 m)   Wt 88.2 kg (194 lb 7.1 oz)   SpO2 100%   BMI 30.45 kg/m²     Labs Reviewed and Include    Recent Labs   Lab 06/03/19  0327   *   CALCIUM 9.4   ALBUMIN 3.6   PROT 5.8*   *   K 3.8   CO2 33*   CL 93*   BUN 39*   CREATININE 1.5*   ALKPHOS 83   ALT 21   AST 18   BILITOT 0.7     Lab Results   Component Value Date    WBC 46.82 (H) 06/03/2019    HGB 10.4 (L) 06/03/2019    HCT 32.2 (L) 06/03/2019     (H) 06/03/2019     (L) 06/03/2019     No results for input(s): TSH, FREET4 in the last 168 hours.  Lab Results   Component Value Date    HGBA1C 5.8 (H) 05/29/2019       Nutritional status:   Body mass index is 30.45 kg/m².  Lab Results   Component Value Date    ALBUMIN 3.6 06/03/2019    ALBUMIN 3.6 06/01/2019    ALBUMIN 3.6 06/01/2019     Lab Results   Component Value Date    PREALBUMIN 22 05/17/2019       Estimated Creatinine Clearance: 43.7 mL/min (A) (based on SCr of 1.5 mg/dL (H)).    Accu-Checks  Recent Labs     06/01/19  0751 06/01/19  1143 06/01/19  1659 06/01/19  2100 06/02/19  0213 06/02/19  0805 06/02/19  1155 06/02/19  1645 06/02/19  2244 06/03/19  0047   POCTGLUCOSE 209* 303* 198* 113* 124* 252* 397* 338* 83 73       Current Medications and/or Treatments Impacting Glycemic Control  Immunotherapy:    Immunosuppressants     None        Steroids:   Hormones (From admission, onward)    None        Pressors:    Autonomic Drugs (From admission, onward)    None        Hyperglycemia/Diabetes Medications:   Antihyperglycemics (From admission, onward)    Start     Stop Route Frequency Ordered    06/03/19 0900  insulin detemir U-100 " pen 14 Units      -- SubQ Daily 06/02/19 1256    06/03/19 0843  insulin aspart U-100 pen 0-5 Units      -- SubQ Before meals & nightly PRN 06/03/19 0746    05/30/19 1230  insulin aspart U-100 pen 1-15 Units      -- SubQ 3 times daily with meals 05/30/19 1123

## 2019-06-03 NOTE — ASSESSMENT & PLAN NOTE
- Patient with symptoms of orthopnea and dyspnea on exertion   - Patient on HCTZ at home no loop diuretic   - BNP elevated at 2 k ,   - PE notable for  JVD and bilateral pitting edema, abdominal distension    - Xray on my read be bilateral edema   - precipitating factor is likely infected ulcer in a patient with PAD   -Echocardiogram: 2D echo with color flow doppler most recent not in file he had a pet stress in January showing EF of 40     PLAN   - IV lasix 40mg BID, adjust as needed  - Strict I/O and Daily weights  - Wean oxygen as tolerated

## 2019-06-03 NOTE — ASSESSMENT & PLAN NOTE
BG goal 140-180    Levemir 14 units daily in AM (Levemir 11 units daily this AM - due to patient NPO for surgery)  Novolog ICR 1:9  Customize correction scale to reflect patient's ISF  BG monitoring AC/HS    ADDENDUM: Discontinue Levemir, DM educator to see patient in-patient and initiate pump start with new insulin pump in AM.  Settings TBD.    Patient uses insulin pump at home, patient ok with not being on pump while in-patient for now.  Discussed with patient at bedside the impact of infection and IV antibiotics on BG readings.  Patient's wife to bring pump and supplies from home, will continue MDI for now and re-evaluate if patient should put pump back.    ** Please call Endocrine for any BG related issues **    Discharge planning: TBD

## 2019-06-03 NOTE — NURSING
Notified m.d. On call for IM3 of pt's bp 94/52, HR 54. Pt. Is asymptomatic and states he feels fine. md states he will review pt. Chart and write orders as needed. Pt. In no distress, JOANNE

## 2019-06-03 NOTE — ASSESSMENT & PLAN NOTE
- contributing to difficult ulcer healing   --HbA1c 5.8 very well controlled   - Home DM regimen:  Insulin pump, patient states his average basal is 23 units + SSI    Plan  - Endocrine following, appreciate assistance  - Recommend Levemir to 15 units qHS, Novolog ICR 1:9  - Supplement with SSI

## 2019-06-03 NOTE — PROGRESS NOTES
Got report from Nurse Annika. Vitals in stable condition. Procedure started at 10:30. Instructed to keep pt. Flat until 1630.

## 2019-06-03 NOTE — NURSING TRANSFER
Nursing Transfer Note      6/3/2019     Transfer To: [id number]    Transfer via bed  Transported by escort      Chart send with patient: Yes    Notified: spouse

## 2019-06-03 NOTE — ASSESSMENT & PLAN NOTE
79M w/ DM1, PAD and a left hallux ulcer that has been growing enterococcus sensitive to ampicillin and vancomycin but with worsening erythema despite treatment. Admitted from podiatry for OM rule out and IV antibiotics  - MRI shows early OM  - Angiogram performed by vascular surgery on 6/3    PLAN:  - Podiatry following - plan for bone biopsy after angio 6/3  - ID following - Holding vancomycin, pending bone biopsy  - Vascular following   - BCx - NGTD, continue to follow

## 2019-06-03 NOTE — PROGRESS NOTES
"[facility]  Endocrinology  Progress Note    Admit Date: 5/29/2019     Reason for Consult: Management of T1DM, Hyperglycemia     Surgical Procedure and Date: N/A    Diabetes diagnosis year: 1972    Lab Results   Component Value Date    HGBA1C 5.8 (H) 05/29/2019       Home Diabetes Medications:  Accuchek spirit pump/sarai       Basal Rate  0000 - 0300     0.85 u/hr  0300 - 0700     0.9 u/hr  0700 - 2100     1.1 u/hr  2100 - 0000     0.85 u/hr        Carb Ratio  12A: 1:8     ISF  1:30     Target: 110-130  IAT: 3    How often checking glucose at home? Dexcom G5 CGM  BG readings on regimen: 110-140  Hypoglycemia on the regimen?  Yes, a few time per week  Missed doses on regimen?  No    Diabetes Complications include:     Hypoglycemia , Diabetic chronic kidney disease     , Diabetic retinopathy , Diabetic peripheral neuropathy , Diabetic peripheral angiopathy with/without gangrene and Foot ulcer      Complicating diabetes co morbidities:   CHF, MINDA and CKD      HPI:   Patient is a 79 y.o. male with a diagnosis of hyponatremia, left foot abscess, CKD3, DM1, CLL, MINDA, and acute heart failure.  Admitted to [facility] on 5/29/19 from podiatry clinic for a left hallux abcess.  Last seen in endocrinology clinic by ESTEFANÍA Rivers NP on 5/8/19.  Endocrine consulted for DM/BG management.            Interval HPI:   Overnight events: Remains in [place]MT[place], [place].  BG elevated yesterday during the day but below goal overnight.  Noted WBC of 56.8.  NPO after MN for angiogram tomorrow.  Eating:   NPO  Nausea: No  Hypoglycemia and intervention: No  Fever: No  TPN and/or TF: No    /61   Pulse (!) 57   Temp 97.9 °F (36.6 °C) (Oral)   Resp 16   Ht 5' 7" (1.702 m)   Wt 88.2 kg (194 lb 7.1 oz)   SpO2 100%   BMI 30.45 kg/m²      Labs Reviewed and Include    Recent Labs   Lab 06/03/19  0327   *   CALCIUM 9.4   ALBUMIN 3.6   PROT 5.8*   *   K 3.8   CO2 33*   CL 93*   BUN 39*   CREATININE 1.5*   ALKPHOS 83   ALT 21 "   AST 18   BILITOT 0.7     Lab Results   Component Value Date    WBC 46.82 (H) 06/03/2019    HGB 10.4 (L) 06/03/2019    HCT 32.2 (L) 06/03/2019     (H) 06/03/2019     (L) 06/03/2019     No results for input(s): TSH, FREET4 in the last 168 hours.  Lab Results   Component Value Date    HGBA1C 5.8 (H) 05/29/2019       Nutritional status:   Body mass index is 30.45 kg/m².  Lab Results   Component Value Date    ALBUMIN 3.6 06/03/2019    ALBUMIN 3.6 06/01/2019    ALBUMIN 3.6 06/01/2019     Lab Results   Component Value Date    PREALBUMIN 22 05/17/2019       Estimated Creatinine Clearance: 43.7 mL/min (A) (based on SCr of 1.5 mg/dL (H)).    Accu-Checks  Recent Labs     06/01/19  0751 06/01/19  1143 06/01/19  1659 06/01/19  2100 06/02/19  0213 06/02/19  0805 06/02/19  1155 06/02/19  1645 06/02/19  2244 06/03/19  0047   POCTGLUCOSE 209* 303* 198* 113* 124* 252* 397* 338* 83 73       Current Medications and/or Treatments Impacting Glycemic Control  Immunotherapy:    Immunosuppressants     None        Steroids:   Hormones (From admission, onward)    None        Pressors:    Autonomic Drugs (From admission, onward)    None        Hyperglycemia/Diabetes Medications:   Antihyperglycemics (From admission, onward)    Start     Stop Route Frequency Ordered    06/03/19 0900  insulin detemir U-100 pen 14 Units      -- SubQ Daily 06/02/19 1256    06/03/19 0843  insulin aspart U-100 pen 0-5 Units      -- SubQ Before meals & nightly PRN 06/03/19 0746    05/30/19 1230  insulin aspart U-100 pen 1-15 Units      -- SubQ 3 times daily with meals 05/30/19 1123          ASSESSMENT and PLAN    * Cellulitis of Left foot  Infection may elevate BG readings  Optimize BG control for wound healing      Controlled type 1 diabetes mellitus with diabetic neuropathy, with long-term current use of insulin  BG goal 140-180    Levemir 14 units daily in AM (Levemir 11 units daily this AM - due to patient NPO for surgery)  Novolog ICR  1:9  Customize correction scale to reflect patient's ISF  BG monitoring AC/HS    ADDENDUM: Discontinue Levemir, DM educator to see patient in-patient and initiate pump start with new insulin pump in AM.  Settings TBD.    Patient uses insulin pump at home, patient ok with not being on pump while in-patient for now.  Discussed with patient at bedside the impact of infection and IV antibiotics on BG readings.  Patient's wife to bring pump and supplies from home, will continue MDI for now and re-evaluate if patient should put pump back.    ** Please call Endocrine for any BG related issues **    Discharge planning: TBD      CKD (chronic kidney disease), stage III  Caution with insulin stacking  Estimated Creatinine Clearance: 43.7 mL/min (A) (based on SCr of 1.5 mg/dL (H)).        MINDA on CPAP  May affect BG readings if uncontrolled            Jeff Bustillo, NADINE  Endocrinology  [facility]

## 2019-06-03 NOTE — OP NOTE
Date: 06/03/2019    Surgeon: Luis Rod MD FACS    Assistant: JOSUE Hannah MD    Pre-op Diagnosis: Severe peripheral arterial disease; [place] chronic limb ischemia class V; Occlusion of the left tibial vessels; *Ulcer of L 1st digit - medially with no necrosis/fat exposed    Post-op Diagnosis: Same    Procedures:   1. Ultrasound-guided access to the R common femoral artery.   2. CO2 L lower extremity angiogram with selection of L PT / 3rd order vessel; *Of note, no pre-operative angiogram / CTA was available.  3. PTA L TP trunk with a 3.5 x 20 mm balloon  4.  PTA L PT with a 1.5 x 120mm and 2.0 x 150 mm balloons  5. 5-fr Mynx closure of R CFA     Anesthesia: Local MAC    EBL: Minimal    Anesthesia: Local    Findings:   Successful revascularization / resolution of stenoses and distal L PT occlusion; in-line flow flow to L PT- biphasic doppler signal  Severe calcification of the L tibial vessels.  L AT occludes proximally and L peroneal occludes in distal third.  L TP trunk has a stenosis >75% and L PT occludes at ankle but reconstitutes in L medial/lateral plantar arch  Very limited contrast used (4.75ml)    Cont DAPT; mat re-start anticoagulation within 12 hrs  Contrast used: 4.75ml  CO2: 350cc    Fluoro time: 12.8mins  Radiation: 477mGy    Complications:  None; patient tolerated the procedure well.    Disposition: Recoery- hemodynamically stable in good condition    Attending Attestation: I was present and scrubbed for the entire procedure.  After an informed consent was obtained the patient was brought to the interventional suite and placed in the supine position. Both the patient and procedure were confirmed and identified during timeout process. The patient received perioperative antibiotics. The patient's bilateral groins were prepped and draped in usual sterile fashion. Using ultrasound guidance, the R common femoral artery vessel patency was confirmed. Then direct ultrasound guidance the R CFA was  entered with a 21-G needle, Mandril wire and 4-Fr micropuncture needle. Then under fluoroscopic guidance, an 0.035-in wire was placed into the distal aorta. The micropuncture dilator was then exchanged over the wire for a 5-Liberian sheath. Sheathogram demonstrated access in the CFA. Next a VCF-catheter was placed over the wire and into the distal aorta under fluoroscopy and an aortogram L leg was performed which demonstrated:  Aorto-iliac vessels: patent  L Common femoral, profunda femoral arteries: patent  L Superficial femoral artery: patent  L Popliteal artery: patent  Tibials: Severe calcification of the L tibial vessels.  L AT occludes proximally and L peroneal occludes in distal third.  L TP trunk has a stenosis >75% and L PT occludes at ankle but reconstitutes in L medial/lateral plantar arch    Based on the images from this diagnostic angio, a decision was made to intervene. We then systemically heparinized the patient with 7000u of heparin.   Next, we placed a up-and-over sheath and used a 0.14-inch ChoICE PT ES wire to selected the L PT artery. Treatment of L TP trunk and PT arteries was done with PTA L TP trunk with a 3.5 x 20 mm balloon;  PTA L PT with a 1.5 x 120mm and 2.0 x 150 mm balloons mm balloon. A follow-up angiogram showed resolution of the lesions. Heparin was reversed with protamine. We then deployed a 5-Liberian Mynx closure device without issue. At the conclusion of the case the patient was noted to have no evidence of a groin hematoma. All instrument and sponge counts were correct at the end of the case. The patient tolerated the procedure well and was transferred to the pacu for further recovery.     Luis Rod MD FACS  Vascular/Endovascular Surgery

## 2019-06-03 NOTE — ASSESSMENT & PLAN NOTE
Last LEONIE was march with mild PAD  - Angiogram performed 6/3  - continue ASA, statin, and plavix

## 2019-06-03 NOTE — TRANSFER OF CARE
"Anesthesia Transfer of Care Note    Patient: Reid Herman    Procedure(s) Performed: Procedure(s) (LRB):  Angiogram Extremity Unilateral possible CO2 (Left)  PTA, PERIPHERAL BLOOD VESSEL (Left)    Patient location: PACU    Anesthesia Type: general    Transport from OR: Transported from OR on room air with adequate spontaneous ventilation    Post pain: adequate analgesia    Post assessment: no apparent anesthetic complications    Post vital signs: stable    Level of consciousness: awake and sedated    Nausea/Vomiting: no nausea/vomiting    Complications: none    Transfer of care protocol was followed      Last vitals:   Visit Vitals  /60 (BP Location: Left arm, Patient Position: Lying)   Pulse (!) 57   Temp 36.7 °C (98.1 °F) (Oral)   Resp 16   Ht 5' 7" (1.702 m)   Wt 88.2 kg (194 lb 7.1 oz)   SpO2 100%   BMI 30.45 kg/m²     "

## 2019-06-03 NOTE — SUBJECTIVE & OBJECTIVE
Medications:  Continuous Infusions:   heparin (porcine) in D5W       Scheduled Meds:   aspirin  81 mg Oral Daily    atorvastatin  40 mg Oral QHS    clopidogrel  75 mg Oral Daily    collagenase   Topical (Top) Daily    furosemide  40 mg Intravenous BID    insulin aspart U-100  1-15 Units Subcutaneous TIDWM    insulin detemir U-100  14 Units Subcutaneous Daily    lisinopril  10 mg Oral Daily    metoprolol succinate  150 mg Oral QHS     PRN Meds:acetaminophen, ceFAZolin (ANCEF) IVPB, dextrose 50%, dextrose 50%, glucagon (human recombinant), glucose, glucose, heparin (PORCINE), heparin (PORCINE), insulin aspart U-100, sodium chloride 0.9%     Objective:     Vital Signs (Most Recent):  Temp: 97.9 °F (36.6 °C) (06/03/19 0430)  Pulse: (!) 57 (06/03/19 0430)  Resp: 16 (06/03/19 0430)  BP: 100/61 (06/03/19 0430)  SpO2: 100 % (06/03/19 0430) Vital Signs (24h Range):  Temp:  [97.9 °F (36.6 °C)-98.4 °F (36.9 °C)] 97.9 °F (36.6 °C)  Pulse:  [54-62] 57  Resp:  [14-22] 16  SpO2:  [96 %-100 %] 100 %  BP: ()/(52-68) 100/61         Physical Exam   Constitutional: He appears well-developed and well-nourished. No distress.   HENT:   Head: Normocephalic and atraumatic.   Eyes: Pupils are equal, round, and reactive to light. EOM are normal.   Neck: Normal range of motion. Neck supple.   Cardiovascular: Normal rate, regular rhythm and normal heart sounds.   Pulmonary/Chest: Effort normal and breath sounds normal. No respiratory distress.   Abdominal: Soft. Bowel sounds are normal. He exhibits no mass. There is no tenderness. There is no guarding.   Musculoskeletal: Normal range of motion. He exhibits edema and tenderness. He exhibits no deformity.   Improved erythema and dry ulcer to left foot.  Dorsalis pedis and posterior tibial pulses are diminished Bilaterally. Toes are cool to touch. Feet are warm proximally. + edema in the left foot and 2+ edema in the right foot   Skin: He is not diaphoretic.   Nursing note and  vitals reviewed.      Significant Labs:  CBC:   Recent Labs   Lab 06/03/19 0327   WBC 46.82*   RBC 3.21*   HGB 10.4*   HCT 32.2*   *   *   MCH 32.4*   MCHC 32.3     CMP:   Recent Labs   Lab 06/03/19 0327   *   CALCIUM 9.4   ALBUMIN 3.6   PROT 5.8*   *   K 3.8   CO2 33*   CL 93*   BUN 39*   CREATININE 1.5*   ALKPHOS 83   ALT 21   AST 18   BILITOT 0.7     Coagulation:   Recent Labs   Lab 06/03/19 0327   APTT 63.7*       Significant Diagnostics:  I have reviewed all pertinent imaging results/findings within the past 24 hours.

## 2019-06-04 PROBLEM — E87.1 HYPONATREMIA: Status: RESOLVED | Noted: 2019-01-01 | Resolved: 2019-01-01

## 2019-06-04 PROBLEM — E87.5 HYPERKALEMIA: Status: RESOLVED | Noted: 2019-01-01 | Resolved: 2019-01-01

## 2019-06-04 NOTE — PROGRESS NOTES
[facility]  Vascular Surgery  Progress Note    Patient Name: Reid Herman  MRN: [id number]  Admission Date: 5/29/2019  Primary Care Provider: Olivia Zavala MD    Subjective:     Interval History: No acute events, tolerating his diet, foot feels similarly to him post angio, with continued biphasic PT, right groin site soft without a significant hematoma    Post-Op Info:  Procedure(s) (LRB):  Angiogram Extremity Unilateral possible CO2 (Left)  PTA, PERIPHERAL BLOOD VESSEL (Left)   1 Day Post-Op       Medications:  Continuous Infusions:    Scheduled Meds:   aspirin  81 mg Oral Daily    atorvastatin  40 mg Oral QHS    clopidogrel  75 mg Oral Daily    collagenase   Topical (Top) Daily    insulin aspart U-100  1-15 Units Subcutaneous TIDWM    lisinopril  10 mg Oral Daily    metoprolol succinate  150 mg Oral QHS     PRN Meds:acetaminophen, ceFAZolin (ANCEF) IVPB, dextrose 50%, dextrose 50%, glucagon (human recombinant), glucose, glucose, heparin (PORCINE), heparin (PORCINE), insulin aspart U-100, sodium chloride 0.9%     Objective:     Vital Signs (Most Recent):  Temp: 97.4 °F (36.3 °C) (06/04/19 0410)  Pulse: 63 (06/04/19 0410)  Resp: 19 (06/04/19 0410)  BP: 111/70 (06/04/19 0410)  SpO2: 98 % (06/04/19 0410) Vital Signs (24h Range):  Temp:  [97.1 °F (36.2 °C)-98.1 °F (36.7 °C)] 97.4 °F (36.3 °C)  Pulse:  [57-69] 63  Resp:  [12-20] 19  SpO2:  [95 %-100 %] 98 %  BP: (106-167)/(58-97) 111/70         Physical Exam   Constitutional: He appears well-developed and well-nourished. No distress.   HENT:   Head: Normocephalic and atraumatic.   Eyes: Pupils are equal, round, and reactive to light. EOM are normal.   Neck: Normal range of motion. Neck supple.   Cardiovascular: Normal rate, regular rhythm and normal heart sounds.   Pulmonary/Chest: Effort normal and breath sounds normal. No respiratory distress.   Abdominal: Soft. Bowel sounds are normal. He exhibits no mass. There is no tenderness.  There is no guarding.   Musculoskeletal: Normal range of motion. He exhibits tenderness. He exhibits no deformity.   Improved erythema and dry ulcer to left foot.  Biphasic PT and more monophasic DP on the left. Forefoot warmer to touch, improving edema without pitting on the left   Right groin site without significant ecchymosis, soft    Skin: He is not diaphoretic.   Nursing note and vitals reviewed.      Significant Labs:  CBC:   Recent Labs   Lab 06/04/19  0315   WBC 29.09*   RBC 3.16*   HGB 10.1*   HCT 31.6*   *   *   MCH 32.0*   MCHC 32.0     CMP:   Recent Labs   Lab 06/04/19  0315   *   CALCIUM 9.2   ALBUMIN 3.4*   PROT 5.6*      K 4.3   CO2 28   CL 97   BUN 34*   CREATININE 1.2   ALKPHOS 83   ALT 15   AST 19   BILITOT 0.7     Coagulation:   Recent Labs   Lab 06/04/19 0315   APTT 28.2       Significant Diagnostics:  I have reviewed all pertinent imaging results/findings within the past 24 hours.    Assessment/Plan:     * Cellulitis of Left foot  Pt is a 80yo male with a non-healing ulcer and pain to left foot with surrounding cellulitis s/p LLE angio with biphasic PT post procedure     - okay to resume hep gtt and continue with ASA/St/plavix from a vascular standpoint.   - f/u in 2 weeks with ABIs and arterial duplex U/S, will schedule  - remaining mgmt as per primary team   - please page vascular with further questions or concerns, signing off         Lola Brito MD  Vascular Surgery  [facility]    Vascular Attending  Agree with above    Luis Rod MD FACS

## 2019-06-04 NOTE — PROGRESS NOTES
[facility]  Interventional Cardiology  Progress Note    Patient Name: Reid Herman  MRN: [id number]  Admission Date: 5/29/2019  Hospital Length of Stay: 6 days  Code Status: Full Code   Attending Physician: Elis Gutierrez MD   Primary Care Physician: Olivia Zavala MD  Principal Problem:Cellulitis of foot    Subjective:     Interval History: The patient reports having no chest pain or dyspnea at this time. He continues to experience pain in his left foot and reports increased erythema. He denies having fever, chills or night sweats.    Objective:     Vital Signs (Most Recent):  Temp: 98.6 °F (37 °C) (06/04/19 1100)  Pulse: 69 (06/04/19 1100)  Resp: 20 (06/04/19 1100)  BP: 121/69 (06/04/19 1100)  SpO2: 98 % (06/04/19 1100) Vital Signs (24h Range):  Temp:  [97.4 °F (36.3 °C)-98.6 °F (37 °C)] 98.6 °F (37 °C)  Pulse:  [61-71] 69  Resp:  [15-20] 20  SpO2:  [98 %-99 %] 98 %  BP: (107-167)/(67-97) 121/69     Weight: 88.2 kg (194 lb 7.1 oz)  Body mass index is 30.45 kg/m².    SpO2: 98 %  O2 Device (Oxygen Therapy): nasal cannula      Intake/Output Summary (Last 24 hours) at 6/4/2019 1537  Last data filed at 6/4/2019 1000  Gross per 24 hour   Intake 480 ml   Output 700 ml   Net -220 ml       Lines/Drains/Airways     Peripheral Intravenous Line                 Peripheral IV - Single Lumen 06/03/19 1205 20 G;1 3/4 in Left Forearm 1 day                Physical Exam   Constitutional: He is oriented to person, place, and time. He appears well-developed and well-nourished. No distress.   HENT:   Head: Normocephalic and atraumatic.   Neck: JVD (HJR to the mandible) present.   Cardiovascular: Normal rate, regular rhythm, normal heart sounds and intact distal pulses. Exam reveals no gallop and no friction rub.   No murmur heard.  Pulses:       Radial pulses are 0 on the right side, and 1+ on the left side.        Femoral pulses are 2+ on the right side, and 2+ on the left side.       Dorsalis pedis pulses  are 0 on the right side, and 0 on the left side.        Posterior tibial pulses are 0 on the right side, and 0 on the left side.   Sternotomy, healed   Pulmonary/Chest: Effort normal. No respiratory distress. He has no wheezes. He has rales.   Bibasilar rales   Abdominal: Soft. Bowel sounds are normal. He exhibits no distension. There is no tenderness.   Musculoskeletal: He exhibits edema (trace bilateral lower extremity edema).   Black eschar left great toe, erythema extending to the dorsum of the foot   Neurological: He is alert and oriented to person, place, and time.   Skin: He is not diaphoretic.       Significant Labs:     Recent Results (from the past 24 hour(s))   POCT glucose    Collection Time: 06/03/19  5:53 PM   Result Value Ref Range    POCT Glucose 207 (H) 70 - 110 mg/dL   POCT glucose    Collection Time: 06/03/19 10:08 PM   Result Value Ref Range    POCT Glucose 222 (H) 70 - 110 mg/dL   POCT glucose    Collection Time: 06/04/19  1:21 AM   Result Value Ref Range    POCT Glucose 201 (H) 70 - 110 mg/dL   Magnesium    Collection Time: 06/04/19  3:15 AM   Result Value Ref Range    Magnesium 1.8 1.6 - 2.6 mg/dL   Phosphorus    Collection Time: 06/04/19  3:15 AM   Result Value Ref Range    Phosphorus 3.7 2.7 - 4.5 mg/dL   CBC with Automated Differential    Collection Time: 06/04/19  3:15 AM   Result Value Ref Range    WBC 29.09 (H) 3.90 - 12.70 K/uL    RBC 3.16 (L) 4.60 - 6.20 M/uL    Hemoglobin 10.1 (L) 14.0 - 18.0 g/dL    Hematocrit 31.6 (L) 40.0 - 54.0 %    Mean Corpuscular Volume 100 (H) 82 - 98 fL    Mean Corpuscular Hemoglobin 32.0 (H) 27.0 - 31.0 pg    Mean Corpuscular Hemoglobin Conc 32.0 32.0 - 36.0 g/dL    RDW 15.0 (H) 11.5 - 14.5 %    Platelets 125 (L) 150 - 350 K/uL    MPV 9.5 9.2 - 12.9 fL    Immature Granulocytes CANCELED 0.0 - 0.5 %    Immature Grans (Abs) CANCELED 0.00 - 0.04 K/uL    Lymph # CANCELED 1.0 - 4.8 K/uL    Mono # CANCELED 0.3 - 1.0 K/uL    Eos # CANCELED 0.0 - 0.5 K/uL    Baso #  CANCELED 0.00 - 0.20 K/uL    nRBC 0 0 /100 WBC    Gran% 12.0 (L) 38.0 - 73.0 %    Lymph% 88.0 (H) 18.0 - 48.0 %    Mono% 0.0 (L) 4.0 - 15.0 %    Eosinophil% 0.0 0.0 - 8.0 %    Basophil% 0.0 0.0 - 1.9 %    Aniso Slight     Poik Slight     Poly Occasional     Hypo Occasional     Ovalocytes Occasional     Differential Method Manual    Comprehensive Metabolic Panel (CMP)    Collection Time: 06/04/19  3:15 AM   Result Value Ref Range    Sodium 136 136 - 145 mmol/L    Potassium 4.3 3.5 - 5.1 mmol/L    Chloride 97 95 - 110 mmol/L    CO2 28 23 - 29 mmol/L    Glucose 176 (H) 70 - 110 mg/dL    BUN, Bld 34 (H) 8 - 23 mg/dL    Creatinine 1.2 0.5 - 1.4 mg/dL    Calcium 9.2 8.7 - 10.5 mg/dL    Total Protein 5.6 (L) 6.0 - 8.4 g/dL    Albumin 3.4 (L) 3.5 - 5.2 g/dL    Total Bilirubin 0.7 0.1 - 1.0 mg/dL    Alkaline Phosphatase 83 55 - 135 U/L    AST 19 10 - 40 U/L    ALT 15 10 - 44 U/L    Anion Gap 11 8 - 16 mmol/L    eGFR if African American >60.0 >60 mL/min/1.73 m^2    eGFR if non  58.0 (A) >60 mL/min/1.73 m^2   APTT    Collection Time: 06/04/19  3:15 AM   Result Value Ref Range    aPTT 28.2 21.0 - 32.0 sec   C-reactive protein    Collection Time: 06/04/19  3:15 AM   Result Value Ref Range    CRP 21.4 (H) 0.0 - 8.2 mg/L   POCT glucose    Collection Time: 06/04/19  8:06 AM   Result Value Ref Range    POCT Glucose 202 (H) 70 - 110 mg/dL   Troponin I    Collection Time: 06/04/19 10:42 AM   Result Value Ref Range    Troponin I 0.645 (H) 0.000 - 0.026 ng/mL   POCT glucose    Collection Time: 06/04/19 11:59 AM   Result Value Ref Range    POCT Glucose 308 (H) 70 - 110 mg/dL         Significant Imaging:     I have reviewed all pertinent imaging studies from the last 24 hours      Assessment and Plan:     Patient is a 79 y.o. male presenting with:    Acute HF (heart failure)  Known CAD with history of 2v CABG (1994) LIMA-D1 and SVG-LAD, recently had LHC 1/29/19 for worsening angina s/p DUC x 2 (mid and distal LCx). He was  also found to have 50% D1 stenosis (distal to the LIMA graft insertion) and 60% proximal SVG graft stenosis and RCA .    On this admission, patient found to be in decompensated HF with new EF 20%, global hypokinesis with akinesis of the septum and apex, with anterolateral ST depressions in a similar distribution to prior EKGs, more prominent on admission but have returned to baseline and questionable osteomyelitis of the left foot  He has since been diuresed 8L however is still volume overloaded, with HJR to the mandible, rales and peripheral edema   and patient has reported compliance with DAPT, troponin trended down since admit  He still has evidence of ongoing infection with CRP increasing, erythema and pain of the left foot  S/p bone biopsy 6/4/19, antibiotics resumed  The patient still has signs of volume overload and infection, recommend continued diuresis and antibiotic therapy prior to pursuing LHC  Continue medical therapy with DAPT, high intensity statin, BB (toprol 200 mg), increase anti-anginals (Imdur) as BP tolerates        VTE Risk Mitigation (From admission, onward)        Ordered     heparin 25,000 units in dextrose 5% 250 mL (100 units/mL) infusion LOW INTENSITY nomogram - [facility]  Continuous      06/02/19 1508     heparin 25,000 units in dextrose 5% (100 units/ml) IV bolus from bag - ADDITIONAL PRN BOLUS - 60 units/kg (max bolus 4000 units)  As needed (PRN)      06/02/19 1505     heparin 25,000 units in dextrose 5% (100 units/ml) IV bolus from bag - ADDITIONAL PRN BOLUS - 30 units/kg (max bolus 4000 units)  As needed (PRN)      06/02/19 1505     IP VTE LOW RISK PATIENT  Once      05/29/19 8243          Milind Reyes MD  Interventional Cardiology  [facility]

## 2019-06-04 NOTE — ASSESSMENT & PLAN NOTE
- contributing to difficult ulcer healing   --HbA1c 5.8 very well controlled   - Home DM regimen:  Insulin pump, patient states his average basal is 23 units + SSI    Plan  - Endocrine following, appreciate assistance  - Recommend Levemir to 14  units qHS, Novolog ICR 1:9  - Supplement with SSI

## 2019-06-04 NOTE — SUBJECTIVE & OBJECTIVE
"Interval HPI:   Overnight events:  BG is now above goal. Long acting insulin held this morning in preporation for home insulin pump start. However, patient would rather Endo team control BG during admission, and follow up with pump clinic post admission for additional pump education. Patient case discussed with KOKI Fong at [facility] [place] clinic.     Eatin%  Nausea: No  Hypoglycemia and intervention: No  Fever: No  TPN and/or TF: No  If yes, type of TF/TPN and rate: None    /69   Pulse 69   Temp 98.6 °F (37 °C) (Oral)   Resp 20   Ht 5' 7" (1.702 m)   Wt 88.2 kg (194 lb 7.1 oz)   SpO2 98%   BMI 30.45 kg/m²     Labs Reviewed and Include    Recent Labs   Lab 19  0315   *   CALCIUM 9.2   ALBUMIN 3.4*   PROT 5.6*      K 4.3   CO2 28   CL 97   BUN 34*   CREATININE 1.2   ALKPHOS 83   ALT 15   AST 19   BILITOT 0.7     Lab Results   Component Value Date    WBC 29.09 (H) 2019    HGB 10.1 (L) 2019    HCT 31.6 (L) 2019     (H) 2019     (L) 2019     No results for input(s): TSH, FREET4 in the last 168 hours.  Lab Results   Component Value Date    HGBA1C 5.8 (H) 2019       Nutritional status:   Body mass index is 30.45 kg/m².  Lab Results   Component Value Date    ALBUMIN 3.4 (L) 2019    ALBUMIN 3.6 2019    ALBUMIN 3.6 2019     Lab Results   Component Value Date    PREALBUMIN 22 2019       Estimated Creatinine Clearance: 54.6 mL/min (based on SCr of 1.2 mg/dL).    Accu-Checks  Recent Labs     19  2244 19  0047 19  0747 19  1141 19  1333 19  1753 19  2208 19  0121 19  0806 19  1159   POCTGLUCOSE 83 73 174* 212* 203* 207* 222* 201* 202* 308*       Current Medications and/or Treatments Impacting Glycemic Control  Immunotherapy:    Immunosuppressants     None        Steroids:   Hormones (From admission, onward)    None        Pressors:    Autonomic Drugs (From admission, " onward)    None        Hyperglycemia/Diabetes Medications:   Antihyperglycemics (From admission, onward)    Start     Stop Route Frequency Ordered    06/03/19 0843  insulin aspart U-100 pen 0-5 Units      -- SubQ Before meals & nightly PRN 06/03/19 0746    05/30/19 1230  insulin aspart U-100 pen 1-15 Units      -- SubQ 3 times daily with meals 05/30/19 1123

## 2019-06-04 NOTE — SUBJECTIVE & OBJECTIVE
Medications:  Continuous Infusions:    Scheduled Meds:   aspirin  81 mg Oral Daily    atorvastatin  40 mg Oral QHS    clopidogrel  75 mg Oral Daily    collagenase   Topical (Top) Daily    insulin aspart U-100  1-15 Units Subcutaneous TIDWM    lisinopril  10 mg Oral Daily    metoprolol succinate  150 mg Oral QHS     PRN Meds:acetaminophen, ceFAZolin (ANCEF) IVPB, dextrose 50%, dextrose 50%, glucagon (human recombinant), glucose, glucose, heparin (PORCINE), heparin (PORCINE), insulin aspart U-100, sodium chloride 0.9%     Objective:     Vital Signs (Most Recent):  Temp: 97.4 °F (36.3 °C) (06/04/19 0410)  Pulse: 63 (06/04/19 0410)  Resp: 19 (06/04/19 0410)  BP: 111/70 (06/04/19 0410)  SpO2: 98 % (06/04/19 0410) Vital Signs (24h Range):  Temp:  [97.1 °F (36.2 °C)-98.1 °F (36.7 °C)] 97.4 °F (36.3 °C)  Pulse:  [57-69] 63  Resp:  [12-20] 19  SpO2:  [95 %-100 %] 98 %  BP: (106-167)/(58-97) 111/70         Physical Exam   Constitutional: He appears well-developed and well-nourished. No distress.   HENT:   Head: Normocephalic and atraumatic.   Eyes: Pupils are equal, round, and reactive to light. EOM are normal.   Neck: Normal range of motion. Neck supple.   Cardiovascular: Normal rate, regular rhythm and normal heart sounds.   Pulmonary/Chest: Effort normal and breath sounds normal. No respiratory distress.   Abdominal: Soft. Bowel sounds are normal. He exhibits no mass. There is no tenderness. There is no guarding.   Musculoskeletal: Normal range of motion. He exhibits tenderness. He exhibits no deformity.   Improved erythema and dry ulcer to left foot.  Biphasic PT and more monophasic DP on the left. Forefoot warmer to touch, improving edema without pitting on the left   Right groin site without significant ecchymosis, soft    Skin: He is not diaphoretic.   Nursing note and vitals reviewed.      Significant Labs:  CBC:   Recent Labs   Lab 06/04/19  0315   WBC 29.09*   RBC 3.16*   HGB 10.1*   HCT 31.6*   *    *   MCH 32.0*   MCHC 32.0     CMP:   Recent Labs   Lab 06/04/19 0315   *   CALCIUM 9.2   ALBUMIN 3.4*   PROT 5.6*      K 4.3   CO2 28   CL 97   BUN 34*   CREATININE 1.2   ALKPHOS 83   ALT 15   AST 19   BILITOT 0.7     Coagulation:   Recent Labs   Lab 06/04/19 0315   APTT 28.2       Significant Diagnostics:  I have reviewed all pertinent imaging results/findings within the past 24 hours.

## 2019-06-04 NOTE — ASSESSMENT & PLAN NOTE
BG goal 140-180    BG is now above goal. Long acting insulin held this morning in preporation for home insulin pump start in Inpatient setting. However, patient would rather Endo team control BG during admission, and follow up with pump clinic post admission for additional pump education. Patient case discussed with KOKI Fong (DM educator) at [facility] [place]. Referral placed.     Restart Levemir 14 units. However next dose with be HS of 06/04/2019  Continue Novolog ICR 1:9  Continue customized Correction scale.     Discharge recommendations:   TBD.

## 2019-06-04 NOTE — PROCEDURES
06/04/2019  Procedure note:   Surgeon:  Dr. Delgado  Assisting Surgeon: Kip Dumont DPM, PGY2  Pre op diagnosis: osteomyelitis   Post op diagnosis: same    Anesthesia:   Hemostasis: direct pressure.   EBL: < 1ml      Procedure: bone biopsy    Findings:  After consent was signed and witnessed 7ml of 1% lidocaine was given locally.  Toe was dressed and prepped in a sterile manner.  A Jamshidi kit was used to biopsy the Left hallux distal phalanx.  Hard bone was noted.  Patient tolerated procedure well, and hemostasis was controlled with compression.  Foot was dressed with betadine, 4x4's, kerlix, and ace.

## 2019-06-04 NOTE — ASSESSMENT & PLAN NOTE
Pt is a 78yo male with a non-healing ulcer and pain to left foot with surrounding cellulitis s/p LLE angio with biphasic PT post procedure     - okay to resume hep gtt and continue with ASA/St/plavix from a vascular standpoint.   - f/u in 2 weeks with ABIs and arterial duplex U/S, will schedule  - remaining mgmt as per primary team   - please page vascular with further questions or concerns, signing off

## 2019-06-04 NOTE — PROGRESS NOTES
"[facility]  Endocrinology  Progress Note    Admit Date: 2019     Reason for Consult: Management of T1DM, Hyperglycemia     Surgical Procedure and Date: N/A    Diabetes diagnosis year:     Lab Results   Component Value Date    HGBA1C 5.8 (H) 2019       Home Diabetes Medications:  Accuchek spirit pump/sarai       Basal Rate  0000 - 0300     0.85 u/hr  0300 - 0700     0.9 u/hr  0700 - 2100     1.1 u/hr  2100 - 0000     0.85 u/hr        Carb Ratio  12A: 1:8     ISF  1:30     Target: 110-130  IAT: 3    How often checking glucose at home? Dexcom G5 CGM  BG readings on regimen: 110-140  Hypoglycemia on the regimen?  Yes, a few time per week  Missed doses on regimen?  No    Diabetes Complications include:     Hypoglycemia , Diabetic chronic kidney disease     , Diabetic retinopathy , Diabetic peripheral neuropathy , Diabetic peripheral angiopathy with/without gangrene and Foot ulcer      Complicating diabetes co morbidities:   CHF, MINDA and CKD      HPI:   Patient is a 79 y.o. male with a diagnosis of hyponatremia, left foot abscess, CKD3, DM1, CLL, MINDA, and acute heart failure.  Ad[place] [facility] [place] from podiatry clinic for a left hallux abcess.  Last seen in endocrinology clinic by ESTEFANÍA Rivers NP on 19.  Endocrine consulted for DM/BG management.            Interval HPI:   Overnight events:  BG is now above goal. Long acting insulin held this morning in preporation for home insulin pump start. However, patient would rather Endo team control BG during admission, and follow up with pump clinic post admission for additional pump education. Patient case discussed with KOKI Fong at [facility].     Eatin%  Nausea: No  Hypoglycemia and intervention: No  Fever: No  TPN and/or TF: No  If yes, type of TF/TPN and rate: None    /69   Pulse 69   Temp 98.6 °F (37 °C) (Oral)   Resp 20   Ht 5' 7" (1.702 m)   Wt 88.2 kg (194 lb 7.1 oz)   SpO2 98%   BMI 30.45 kg/m²      Labs " Reviewed and Include    Recent Labs   Lab 06/04/19  0315   *   CALCIUM 9.2   ALBUMIN 3.4*   PROT 5.6*      K 4.3   CO2 28   CL 97   BUN 34*   CREATININE 1.2   ALKPHOS 83   ALT 15   AST 19   BILITOT 0.7     Lab Results   Component Value Date    WBC 29.09 (H) 06/04/2019    HGB 10.1 (L) 06/04/2019    HCT 31.6 (L) 06/04/2019     (H) 06/04/2019     (L) 06/04/2019     No results for input(s): TSH, FREET4 in the last 168 hours.  Lab Results   Component Value Date    HGBA1C 5.8 (H) 05/29/2019       Nutritional status:   Body mass index is 30.45 kg/m².  Lab Results   Component Value Date    ALBUMIN 3.4 (L) 06/04/2019    ALBUMIN 3.6 06/03/2019    ALBUMIN 3.6 06/01/2019     Lab Results   Component Value Date    PREALBUMIN 22 05/17/2019       Estimated Creatinine Clearance: 54.6 mL/min (based on SCr of 1.2 mg/dL).    Accu-Checks  Recent Labs     06/02/19  2244 06/03/19  0047 06/03/19  0747 06/03/19  1141 06/03/19  1333 06/03/19  1753 06/03/19  2208 06/04/19  0121 06/04/19  0806 06/04/19  1159   POCTGLUCOSE 83 73 174* 212* 203* 207* 222* 201* 202* 308*       Current Medications and/or Treatments Impacting Glycemic Control  Immunotherapy:    Immunosuppressants     None        Steroids:   Hormones (From admission, onward)    None        Pressors:    Autonomic Drugs (From admission, onward)    None        Hyperglycemia/Diabetes Medications:   Antihyperglycemics (From admission, onward)    Start     Stop Route Frequency Ordered    06/03/19 0843  insulin aspart U-100 pen 0-5 Units      -- SubQ Before meals & nightly PRN 06/03/19 0746    05/30/19 1230  insulin aspart U-100 pen 1-15 Units      -- SubQ 3 times daily with meals 05/30/19 1123          ASSESSMENT and PLAN    * Cellulitis of Left foot  Infection may elevate BG readings  Optimize BG control for wound healing      Controlled type 1 diabetes mellitus with diabetic neuropathy, with long-term current use of insulin  BG goal 140-180    BG is now above  goal. Long acting insulin held this morning in preporation for home insulin pump start in Inpatient setting. However, patient would rather Endo team control BG during admission, and follow up with pump clinic post admission for additional pump education. Patient case discussed with KOKI Fong (DM educator) at [facility]. Referral placed.     Restart Levemir 14 units. However next dose with be HS of 06/04/2019  Continue Novolog ICR 1:9  Continue customized Correction scale.     Discharge recommendations:   TBD.     MINDA on CPAP  May affect BG readings if uncontrolled            Fransico Vigil NP  Endocrinology  [facility]

## 2019-06-04 NOTE — PLAN OF CARE
Problem: Adult Inpatient Plan of Care  Goal: Plan of Care Review  Outcome: Ongoing (interventions implemented as appropriate)  Pt. Remained free from injury and no acute distress noted. No c/o pain throughout shift. Wheels locked, siderails up x2, call light in reach. Bed in lowest position. WCM

## 2019-06-04 NOTE — PLAN OF CARE
Problem: Adult Inpatient Plan of Care  Goal: Plan of Care Review  Outcome: Ongoing (interventions implemented as appropriate)  Pt free of falls/trauma/injuries. Pt. AAOX4. Pt. Sit now sitting up in the bed with head elevated. Bed in low position, wheels locked, and call light within reach. Pt. Tolerated evening meal well. Medication applied to L toe.No distress noted/reported at this time. WCTM.

## 2019-06-04 NOTE — ASSESSMENT & PLAN NOTE
- EKG w/ ST depressions, elevated troponin (peaked 1.16)  - Dyspnea on exertion, orthopnea, significant peripheral edema.  - BNP 2608  - ECHO w/ EF 20%, grade II DD.   - New onset HF w/ troponin leak vs NSTEMI in the setting of acute infection.     had 1 episode of chest pain 6/4  Plan  - Continue ASA 81 mg and plavix 75 mg daily  - Continue metoprolol  mg qhs  - Continue IV Lasix 40 mg BIF  - Continue Lisinopril 10 mg daily   - Interventional cards to perform LHC prior to discharge.  - trend troponin

## 2019-06-04 NOTE — ASSESSMENT & PLAN NOTE
Known CAD with history of 2v CABG (1994) LIMA-D1 and SVG-LAD, recently had LHC 1/29/19 for worsening angina s/p DUC x 2 (mid and distal LCx). He was also found to have 50% D1 stenosis (distal to the LIMA graft insertion) and 60% proximal SVG graft stenosis and RCA .    On this admission, patient found to be in decompensated HF with new EF 20%, global hypokinesis with akinesis of the septum and apex, with anterolateral ST depressions in a similar distribution to prior EKGs, more prominent on admission but have returned to baseline and questionable osteomyelitis of the left foot  He has since been diuresed 8L however is still volume overloaded, with HJR to the mandible, rales and peripheral edema   and patient has reported compliance with DAPT, troponin trended down since admit  He still has evidence of ongoing infection with CRP increasing, erythema and pain of the left foot  S/p bone biopsy 6/4/19, antibiotics resumed  The patient still has signs of volume overload and infection, recommend continued diuresis and antibiotic therapy prior to pursuing LHC  Continue medical therapy with DAPT, high intensity statin, BB (toprol 200 mg), increase anti-anginals (Imdur) as BP tolerates

## 2019-06-04 NOTE — SUBJECTIVE & OBJECTIVE
Interval History: Has chest pain today resolved with nitro, no change in EKG, troponin trending   Review of Systems   Constitutional: Positive for activity change and unexpected weight change (up 7 lbs ). Negative for appetite change, fatigue and fever.   HENT: Negative for congestion, sinus pressure and sinus pain.    Eyes: Negative for photophobia, discharge and visual disturbance.   Respiratory: Negative for apnea, shortness of breath and wheezing.    Cardiovascular: Positive for leg swelling. Negative for chest pain.   Gastrointestinal: Negative for abdominal distention and nausea.   Endocrine: Negative for cold intolerance, heat intolerance, polydipsia, polyphagia and polyuria.   Genitourinary: Negative for difficulty urinating.   Musculoskeletal: Negative for arthralgias and back pain.   Skin: Positive for color change and rash.   Neurological: Negative for dizziness, tremors and numbness.   Psychiatric/Behavioral: Negative for agitation, behavioral problems, confusion and decreased concentration.     Objective:     Vital Signs (Most Recent):  Temp: 98.6 °F (37 °C) (06/04/19 1100)  Pulse: 69 (06/04/19 1100)  Resp: 20 (06/04/19 1100)  BP: 121/69 (06/04/19 1100)  SpO2: 98 % (06/04/19 1100) Vital Signs (24h Range):  Temp:  [97.4 °F (36.3 °C)-98.6 °F (37 °C)] 98.6 °F (37 °C)  Pulse:  [61-71] 69  Resp:  [15-20] 20  SpO2:  [98 %-99 %] 98 %  BP: (107-167)/(67-97) 121/69     Weight: 88.2 kg (194 lb 7.1 oz)  Body mass index is 30.45 kg/m².    Intake/Output Summary (Last 24 hours) at 6/4/2019 1637  Last data filed at 6/4/2019 1000  Gross per 24 hour   Intake 480 ml   Output 700 ml   Net -220 ml      Physical Exam   Constitutional: He appears well-developed and well-nourished. No distress.   HENT:   Head: Normocephalic and atraumatic.   Eyes: Pupils are equal, round, and reactive to light. EOM are normal.   Neck: Normal range of motion. Neck supple.   Cardiovascular: Normal rate, regular rhythm and normal heart sounds.    Pulmonary/Chest: Effort normal and breath sounds normal. No respiratory distress.   Abdominal: Soft. Bowel sounds are normal. He exhibits no mass. There is no tenderness. There is no guarding.   Musculoskeletal: Normal range of motion. He exhibits edema and tenderness. He exhibits no deformity.   Erythema in the left foot as pictures below/ Ulcer present that is cleaned , pain around the ulcer noted.  Feet are warm proximally. + edema in the left foot and 2+ edema in the right foot    Neurological: He is alert.   Skin: He is not diaphoretic.   Nursing note and vitals reviewed.      Significant Labs: All pertinent labs within the past 24 hours have been reviewed.    Significant Imaging: I have reviewed and interpreted all pertinent imaging results/findings within the past 24 hours.

## 2019-06-04 NOTE — SUBJECTIVE & OBJECTIVE
Interval History:    POD#1 angiogram with successful LLE revascularization.  Pending bone biopsy by Podiatry.   Patient complaining of chest discomfort    Review of Systems   Constitutional: Negative for chills and fever.   Respiratory: Negative for cough and shortness of breath.    Cardiovascular: Positive for leg swelling. Negative for chest pain.   Gastrointestinal: Negative for abdominal pain, constipation, diarrhea, nausea and vomiting.   Genitourinary: Negative for dysuria, frequency and hematuria.   Musculoskeletal: Negative for back pain and myalgias.   Skin: Positive for wound (left foot). Negative for rash.   Neurological: Negative for light-headedness, numbness and headaches.   Psychiatric/Behavioral: Negative for agitation and behavioral problems. The patient is not nervous/anxious.      Objective:     Vital Signs (Most Recent):  Temp: 97.4 °F (36.3 °C) (06/04/19 0410)  Pulse: 63 (06/04/19 0410)  Resp: 19 (06/04/19 0410)  BP: 111/70 (06/04/19 0410)  SpO2: 98 % (06/04/19 0410) Vital Signs (24h Range):  Temp:  [97.1 °F (36.2 °C)-97.6 °F (36.4 °C)] 97.4 °F (36.3 °C)  Pulse:  [61-69] 63  Resp:  [12-20] 19  SpO2:  [95 %-99 %] 98 %  BP: (106-167)/(58-97) 111/70     Weight: 88.2 kg (194 lb 7.1 oz)  Body mass index is 30.45 kg/m².    Estimated Creatinine Clearance: 54.6 mL/min (based on SCr of 1.2 mg/dL).    Physical Exam   Constitutional: He is oriented to person, place, and time. He appears well-developed and well-nourished. No distress.   Cardiovascular: Normal rate and regular rhythm.   No murmur heard.  Pulmonary/Chest: Effort normal and breath sounds normal. No respiratory distress.   Abdominal: Soft. Bowel sounds are normal. He exhibits no distension.   Musculoskeletal: Normal range of motion. He exhibits edema.   Neurological: He is alert and oriented to person, place, and time.   Skin: Skin is warm and dry.   Left medial great toe with dry ulceration. No drainage.  Mild TTP.  There is erythema of the  1st digit. Decreased erythema to forefoot.   Mild tenderness.  Foot is warmer today.     Psychiatric: He has a normal mood and affect. His behavior is normal.       Significant Labs:   Blood Culture:   Recent Labs   Lab 05/29/19  1756   LABBLOO No growth after 5 days.  No growth after 5 days.     CBC:   Recent Labs   Lab 06/03/19 0327 06/04/19 0315   WBC 46.82* 29.09*   HGB 10.4* 10.1*   HCT 32.2* 31.6*   * 125*     CMP:   Recent Labs   Lab 06/03/19 0327 06/04/19 0315   * 136   K 3.8 4.3   CL 93* 97   CO2 33* 28   * 176*   BUN 39* 34*   CREATININE 1.5* 1.2   CALCIUM 9.4 9.2   PROT 5.8* 5.6*   ALBUMIN 3.6 3.4*   BILITOT 0.7 0.7   ALKPHOS 83 83   AST 18 19   ALT 21 15   ANIONGAP 9 11   EGFRNONAA 44.3* 58.0*     Wound Culture:   Recent Labs   Lab 05/22/19  0942   LABAERO ENTEROCOCCUS FAECALIS  Many  No other significant isolate         Significant Imaging: I have reviewed all pertinent imaging results/findings within the past 24 hours.

## 2019-06-04 NOTE — PLAN OF CARE
CM at bedside to see patient who is off unit for angiogram.   CM team continuing to follow the patient throughout the hospital admission for discharge needs and assistance.  CM name, phone # and anticipated D/C date updated on whiteboard.       06/03/19 1020   Discharge Reassessment   Assessment Type Discharge Planning Reassessment   Discharge Plan A Home   Discharge Plan B Home   DME Needed Upon Discharge    (TBD)   Anticipated Discharge Disposition IV Therapy   Can the patient answer the patient profile reliably? Yes, cognitively intact   How does the patient rate their overall health at the present time? Fair   Describe the patient's ability to walk at the present time. No restrictions   Post-Acute Status   Post-Acute Authorization Home Health/Hospice   Discharge Delays (!) Procedure Scheduling (IR, OR, Labs, Echo, Cath, Echo, EEG)

## 2019-06-04 NOTE — SUBJECTIVE & OBJECTIVE
Interval History: The patient reports having no chest pain or dyspnea at this time. He continues to experience pain in his left foot and reports increased erythema. He denies having fever, chills or night sweats.    Objective:     Vital Signs (Most Recent):  Temp: 98.6 °F (37 °C) (06/04/19 1100)  Pulse: 69 (06/04/19 1100)  Resp: 20 (06/04/19 1100)  BP: 121/69 (06/04/19 1100)  SpO2: 98 % (06/04/19 1100) Vital Signs (24h Range):  Temp:  [97.4 °F (36.3 °C)-98.6 °F (37 °C)] 98.6 °F (37 °C)  Pulse:  [61-71] 69  Resp:  [15-20] 20  SpO2:  [98 %-99 %] 98 %  BP: (107-167)/(67-97) 121/69     Weight: 88.2 kg (194 lb 7.1 oz)  Body mass index is 30.45 kg/m².    SpO2: 98 %  O2 Device (Oxygen Therapy): nasal cannula      Intake/Output Summary (Last 24 hours) at 6/4/2019 1537  Last data filed at 6/4/2019 1000  Gross per 24 hour   Intake 480 ml   Output 700 ml   Net -220 ml       Lines/Drains/Airways     Peripheral Intravenous Line                 Peripheral IV - Single Lumen 06/03/19 1205 20 G;1 3/4 in Left Forearm 1 day                Physical Exam   Constitutional: He is oriented to person, place, and time. He appears well-developed and well-nourished. No distress.   HENT:   Head: Normocephalic and atraumatic.   Neck: JVD (HJR to the mandible) present.   Cardiovascular: Normal rate, regular rhythm, normal heart sounds and intact distal pulses. Exam reveals no gallop and no friction rub.   No murmur heard.  Pulses:       Radial pulses are 0 on the right side, and 1+ on the left side.        Femoral pulses are 2+ on the right side, and 2+ on the left side.       Dorsalis pedis pulses are 0 on the right side, and 0 on the left side.        Posterior tibial pulses are 0 on the right side, and 0 on the left side.   Sternotomy, healed   Pulmonary/Chest: Effort normal. No respiratory distress. He has no wheezes. He has rales.   Bibasilar rales   Abdominal: Soft. Bowel sounds are normal. He exhibits no distension. There is no tenderness.    Musculoskeletal: He exhibits edema (trace bilateral lower extremity edema).   Black eschar left great toe, erythema extending to the dorsum of the foot   Neurological: He is alert and oriented to person, place, and time.   Skin: He is not diaphoretic.       Significant Labs:     Recent Results (from the past 24 hour(s))   POCT glucose    Collection Time: 06/03/19  5:53 PM   Result Value Ref Range    POCT Glucose 207 (H) 70 - 110 mg/dL   POCT glucose    Collection Time: 06/03/19 10:08 PM   Result Value Ref Range    POCT Glucose 222 (H) 70 - 110 mg/dL   POCT glucose    Collection Time: 06/04/19  1:21 AM   Result Value Ref Range    POCT Glucose 201 (H) 70 - 110 mg/dL   Magnesium    Collection Time: 06/04/19  3:15 AM   Result Value Ref Range    Magnesium 1.8 1.6 - 2.6 mg/dL   Phosphorus    Collection Time: 06/04/19  3:15 AM   Result Value Ref Range    Phosphorus 3.7 2.7 - 4.5 mg/dL   CBC with Automated Differential    Collection Time: 06/04/19  3:15 AM   Result Value Ref Range    WBC 29.09 (H) 3.90 - 12.70 K/uL    RBC 3.16 (L) 4.60 - 6.20 M/uL    Hemoglobin 10.1 (L) 14.0 - 18.0 g/dL    Hematocrit 31.6 (L) 40.0 - 54.0 %    Mean Corpuscular Volume 100 (H) 82 - 98 fL    Mean Corpuscular Hemoglobin 32.0 (H) 27.0 - 31.0 pg    Mean Corpuscular Hemoglobin Conc 32.0 32.0 - 36.0 g/dL    RDW 15.0 (H) 11.5 - 14.5 %    Platelets 125 (L) 150 - 350 K/uL    MPV 9.5 9.2 - 12.9 fL    Immature Granulocytes CANCELED 0.0 - 0.5 %    Immature Grans (Abs) CANCELED 0.00 - 0.04 K/uL    Lymph # CANCELED 1.0 - 4.8 K/uL    Mono # CANCELED 0.3 - 1.0 K/uL    Eos # CANCELED 0.0 - 0.5 K/uL    Baso # CANCELED 0.00 - 0.20 K/uL    nRBC 0 0 /100 WBC    Gran% 12.0 (L) 38.0 - 73.0 %    Lymph% 88.0 (H) 18.0 - 48.0 %    Mono% 0.0 (L) 4.0 - 15.0 %    Eosinophil% 0.0 0.0 - 8.0 %    Basophil% 0.0 0.0 - 1.9 %    Aniso Slight     Poik Slight     Poly Occasional     Hypo Occasional     Ovalocytes Occasional     Differential Method Manual    Comprehensive  Metabolic Panel (CMP)    Collection Time: 06/04/19  3:15 AM   Result Value Ref Range    Sodium 136 136 - 145 mmol/L    Potassium 4.3 3.5 - 5.1 mmol/L    Chloride 97 95 - 110 mmol/L    CO2 28 23 - 29 mmol/L    Glucose 176 (H) 70 - 110 mg/dL    BUN, Bld 34 (H) 8 - 23 mg/dL    Creatinine 1.2 0.5 - 1.4 mg/dL    Calcium 9.2 8.7 - 10.5 mg/dL    Total Protein 5.6 (L) 6.0 - 8.4 g/dL    Albumin 3.4 (L) 3.5 - 5.2 g/dL    Total Bilirubin 0.7 0.1 - 1.0 mg/dL    Alkaline Phosphatase 83 55 - 135 U/L    AST 19 10 - 40 U/L    ALT 15 10 - 44 U/L    Anion Gap 11 8 - 16 mmol/L    eGFR if African American >60.0 >60 mL/min/1.73 m^2    eGFR if non  58.0 (A) >60 mL/min/1.73 m^2   APTT    Collection Time: 06/04/19  3:15 AM   Result Value Ref Range    aPTT 28.2 21.0 - 32.0 sec   C-reactive protein    Collection Time: 06/04/19  3:15 AM   Result Value Ref Range    CRP 21.4 (H) 0.0 - 8.2 mg/L   POCT glucose    Collection Time: 06/04/19  8:06 AM   Result Value Ref Range    POCT Glucose 202 (H) 70 - 110 mg/dL   Troponin I    Collection Time: 06/04/19 10:42 AM   Result Value Ref Range    Troponin I 0.645 (H) 0.000 - 0.026 ng/mL   POCT glucose    Collection Time: 06/04/19 11:59 AM   Result Value Ref Range    POCT Glucose 308 (H) 70 - 110 mg/dL         Significant Imaging:     I have reviewed all pertinent imaging studies from the last 24 hours

## 2019-06-04 NOTE — ASSESSMENT & PLAN NOTE
79 year old male who presented as a direct admit from podiatry clinic for a left great toe ulcer. Patient has a PMH HTN, HLD, DM-1(insulin pump), CAD s/p CABG x 2 , MINDA compliant with CPAP, CLL not currently on any therapy, and PAD who presents with left foot ulcer. Prior wound culture with E. Faecalis. Pt started on IV vanc. MRI cannot rule out possible early osteomyelitis distal phalanx. ID consulted for further recs.     Stable leukocytosis (50,000 + ) present since January- due to CLL- predominantly lymphocytic.  Pt afebrile.      He is afebrile, without complaints.  POD#1 angiogram and successful PTA X2  to left leg.  Bone biopsy pending for today. Minimal pain to toe, erythema of forefoot improved.  No drainage from wound.  Primary complaint today is chest discomfort.      Plan:  1.  Continue to hold antibiotics pending bone biopsy to improve culture yield.   If clinically worsens, may start IV vancomycin and ceftriaxone empirically.    2. Send bone biopsy  for aerobic, anaerobic, fungal, and AFB cultures; please also send bone to pathology  3. Will follow up tomorrow after bone biopsy    Data reviewed and plan discussed with ID staff

## 2019-06-04 NOTE — ASSESSMENT & PLAN NOTE
79M w/ DM1, PAD and a left hallux ulcer that has been growing enterococcus sensitive to ampicillin and vancomycin but with worsening erythema despite treatment. Admitted from podiatry for OM rule out and IV antibiotics  - MRI shows early OM  - Angiogram performed by vascular surgery on 6/3  - Bone biopsy was done and cultures sent 6/4    PLAN:  - ID following - on vancomycin and rocephin, will need 6 weeks of abx   - Podiatry following  - Vascular following   - Follow cultures

## 2019-06-04 NOTE — PROGRESS NOTES
Received word while patient admitted to hospital that he now has his [employer] pump and supplies and would like to switch over from his old [employer]u-check pump. Discussed with inpatient endo NP and agreed that patient should not be trained while admitted - needs to schedule with diabetes education or contact [employer] rep after d/c to schedule training.

## 2019-06-04 NOTE — PROGRESS NOTES
Pharmacokinetic Initial Assessment: IV Vancomycin    Assessment/Plan:    Initiate intravenous vancomycin with loading dose of 2250 mg once followed by a maintenance dose of vancomycin 1750mg IV every 24 hours  Desired empiric serum trough concentration is 10 to 20 mcg/mL.  Draw vancomycin trough level 30 min prior to third dose on 6/06 at approximately 1730  Pharmacy will continue to follow and monitor vancomycin.      Please contact pharmacy at extension [phone] with any questions regarding this assessment.     Thank you for the consult,   Bruno Rogers     Patient brief summary:  Reid Herman is a 79 y.o. male initiated on antimicrobial therapy with IV Vancomycin for treatment of suspected bone/joint    Drug Allergies:   Review of patient's allergies indicates:  No Known Allergies    Actual Body Weight:   88.2 kg    Renal Function:   Estimated Creatinine Clearance: 54.6 mL/min (based on SCr of 1.2 mg/dL).,       CBC (last 72 hours):  Recent Labs   Lab Result Units 06/02/19  0504 06/03/19 0327 06/04/19  0315   WBC K/uL 50.54* 46.82* 29.09*   Hemoglobin g/dL 10.1* 10.4* 10.1*   Hematocrit % 31.9* 32.2* 31.6*   Platelets K/uL 144* 138* 125*   Gran% % 4.0* 8.0* 12.0*   Lymph% % 96.0* 88.0* 88.0*   Mono% % 0.0* 3.0* 0.0*   Eosinophil% % 0.0 1.0 0.0   Basophil% % 0.0 0.0 0.0   Differential Method  Manual Manual Manual       Metabolic Panel (last 72 hours):  Recent Labs   Lab Result Units 06/01/19  2349 06/02/19  0504 06/03/19  0327 06/04/19  0315   Sodium mmol/L 134*  --  135* 136   Potassium mmol/L 4.4  --  3.8 4.3   Chloride mmol/L 93*  --  93* 97   CO2 mmol/L 32*  --  33* 28   Glucose mg/dL 120*  --  123* 176*   BUN, Bld mg/dL 36*  --  39* 34*   Creatinine mg/dL 1.5*  --  1.5* 1.2   Albumin g/dL 3.6  --  3.6 3.4*   Total Bilirubin mg/dL 0.6  --  0.7 0.7   Alkaline Phosphatase U/L 89  --  83 83   AST U/L 20  --  18 19   ALT U/L 25  --  21 15   Magnesium mg/dL  --  1.8 2.2 1.8   Phosphorus mg/dL  --  4.4 4.6*  3.7       Drug levels (last 3 results):  No results for input(s): VANCOMYCINRA, VANCOMYCINPE, VANCOMYCINTR in the last 72 hours.    Microbiologic Results:  Microbiology Results (last 7 days)     Procedure Component Value Units Date/Time    AFB Culture & Smear [[id number]] Collected:  06/04/19 1441    Order Status:  Sent Specimen:  Bone from Toe, Left Foot Updated:  06/04/19 1441    Culture, Anaerobe [[id number]] Collected:  06/04/19 1441    Order Status:  Sent Specimen:  Bone from Toe, Left Foot Updated:  06/04/19 1441    Gram stain [[id number]] Collected:  06/04/19 1441    Order Status:  Sent Specimen:  Bone from Toe, Left Foot Updated:  06/04/19 1441    Aerobic culture [[id number]] Collected:  06/04/19 1441    Order Status:  Sent Specimen:  Bone from Toe, Left Foot Updated:  06/04/19 1441    Fungus culture [[id number]] Collected:  06/04/19 1441    Order Status:  Sent Specimen:  Bone from Toe, Left Foot Updated:  06/04/19 1441    Blood culture [[id number]] Collected:  05/29/19 1756    Order Status:  Completed Specimen:  Blood Updated:  06/03/19 2012     Blood Culture, Routine No growth after 5 days.    Blood culture [[id number]] Collected:  05/29/19 1756    Order Status:  Completed Specimen:  Blood Updated:  06/03/19 2012     Blood Culture, Routine No growth after 5 days.

## 2019-06-04 NOTE — PROGRESS NOTES
[facility]  Infectious Disease  Progress Note    Patient Name: Ried Herman  MRN: [id number]  Admission Date: 5/29/2019  Length of Stay: 6 days  Attending Physician: Elis Gutierrez MD  Primary Care Provider: Olivia Zavala MD    Isolation Status: No active isolations  Assessment/Plan:      Diabetic ulcer of left great toe     79 year old male who presented as a direct admit from podiatry clinic for a left great toe ulcer. Patient has a PMH HTN, HLD, DM-1(insulin pump), CAD s/p CABG x 2 , MINDA compliant with CPAP, CLL not currently on any therapy, and PAD who presents with left foot ulcer. Prior wound culture with E. Faecalis. Pt started on IV vanc. MRI cannot rule out possible early osteomyelitis distal phalanx. ID consulted for further recs.     Stable leukocytosis (50,000 + ) present since January- due to CLL- predominantly lymphocytic.  Pt afebrile.      He is afebrile, without complaints.  POD#1 angiogram and successful PTA X2  to left leg.  Bone biopsy pending for today. Minimal pain to toe, erythema of forefoot improved.  No drainage from wound.  Primary complaint today is chest discomfort.      Plan:  1. Continue  to hold antibiotics pending bone biopsy to improve culture yield - so long as patient is stable.  If clinically worsens, may start IV vancomycin and ceftriaxone empirically.    2. Send bone biopsy  for aerobic, anaerobic, fungal, and AFB cultures; please also send bone to pathology  3. Will follow up tomorrow after bone biopsy    Data reviewed and plan discussed with ID staff      Thank you.   Please call for any questions or concerns.  Malini Ny, APRN, ANP-C  [phone]    Subjective:     Principal Problem:Cellulitis of foot    HPI: This is a 79 year old male who presented as a direct admit from podiatry clinic for a left great toe ulcer. Patient has a PMH HTN, HLD, DM-1(insulin pump), CAD s/p CABG x 2 , MINDA compliant with CPAP, CLL not currently on any therapy, and PAD who  presents with left foot ulcer and for the last month that has progressively been worsening.  Patient was on multiple courses of oral antibiotics with no improvement.  He was seen in podiatry clinic yesterday and noted to be worsening so recommended admission for IV antibiotics.  Patient had MRI which could not rule out early osteomyelitis.  Podiatry consulted and discussed possible bone biopsy. Patient was started on IV vancomycin.    Interventional cards has been consulted.  There is a wound culture from 5/22 that grew enterococcus faecalis. Patient has a leukocytosis of 51,000 but has history of CLL and has had this WBC count since January.  Patient has mild pain but does have neuropathy. He denies fevers or chills.       Interval History:    POD#1 angiogram with successful LLE revascularization.  Pending bone biopsy by Podiatry.   Patient complaining of chest discomfort    Review of Systems   Constitutional: Negative for chills and fever.   Respiratory: Negative for cough and shortness of breath.    Cardiovascular: Positive for leg swelling. Negative for chest pain.   Gastrointestinal: Negative for abdominal pain, constipation, diarrhea, nausea and vomiting.   Genitourinary: Negative for dysuria, frequency and hematuria.   Musculoskeletal: Negative for back pain and myalgias.   Skin: Positive for wound (left foot). Negative for rash.   Neurological: Negative for light-headedness, numbness and headaches.   Psychiatric/Behavioral: Negative for agitation and behavioral problems. The patient is not nervous/anxious.      Objective:     Vital Signs (Most Recent):  Temp: 97.4 °F (36.3 °C) (06/04/19 0410)  Pulse: 63 (06/04/19 0410)  Resp: 19 (06/04/19 0410)  BP: 111/70 (06/04/19 0410)  SpO2: 98 % (06/04/19 0410) Vital Signs (24h Range):  Temp:  [97.1 °F (36.2 °C)-97.6 °F (36.4 °C)] 97.4 °F (36.3 °C)  Pulse:  [61-69] 63  Resp:  [12-20] 19  SpO2:  [95 %-99 %] 98 %  BP: (106-167)/(58-97) 111/70     Weight: 88.2 kg (194 lb  7.1 oz)  Body mass index is 30.45 kg/m².    Estimated Creatinine Clearance: 54.6 mL/min (based on SCr of 1.2 mg/dL).    Physical Exam   Constitutional: He is oriented to person, place, and time. He appears well-developed and well-nourished. No distress.   Cardiovascular: Normal rate and regular rhythm.   No murmur heard.  Pulmonary/Chest: Effort normal and breath sounds normal. No respiratory distress.   Abdominal: Soft. Bowel sounds are normal. He exhibits no distension.   Musculoskeletal: Normal range of motion. He exhibits edema.   Neurological: He is alert and oriented to person, place, and time.   Skin: Skin is warm and dry.   Left medial great toe with dry ulceration. No drainage.  Mild TTP.  There is erythema of the 1st digit. Decreased erythema to forefoot.   Mild tenderness.  Foot is warmer today.     Psychiatric: He has a normal mood and affect. His behavior is normal.       Significant Labs:   Blood Culture:   Recent Labs   Lab 05/29/19  1756   LABBLOO No growth after 5 days.  No growth after 5 days.     CBC:   Recent Labs   Lab 06/03/19 0327 06/04/19 0315   WBC 46.82* 29.09*   HGB 10.4* 10.1*   HCT 32.2* 31.6*   * 125*     CMP:   Recent Labs   Lab 06/03/19 0327 06/04/19 0315   * 136   K 3.8 4.3   CL 93* 97   CO2 33* 28   * 176*   BUN 39* 34*   CREATININE 1.5* 1.2   CALCIUM 9.4 9.2   PROT 5.8* 5.6*   ALBUMIN 3.6 3.4*   BILITOT 0.7 0.7   ALKPHOS 83 83   AST 18 19   ALT 21 15   ANIONGAP 9 11   EGFRNONAA 44.3* 58.0*     Wound Culture:   Recent Labs   Lab 05/22/19  0942   LABAERO ENTEROCOCCUS FAECALIS  Many  No other significant isolate         Significant Imaging: I have reviewed all pertinent imaging results/findings within the past 24 hours.

## 2019-06-04 NOTE — PROGRESS NOTES
[facility] Hospital Medicine  Progress Note    Patient Name: Reid Herman  MRN: [id number]  Patient Class: IP- Inpatient   Admission Date: 5/29/2019  Length of Stay: 6 days  Attending Physician: Elis Gutierrez MD  Primary Care Provider: Olivia Zavala MD    [facility] Medicine Team: [facility] HOSP MED 3 CHERRI Pepe    Subjective:     Principal Problem:Cellulitis of foot    HPI:  Reid Herman is a 79M direct admit from podiatry clinic for a left hallux abcess. Patient has a PMH HTN, Hyperlipidemia, DM-1(insulin pump), CAD s/p CABG x 2 , MINDA compliant with CPAP, CLL not currently on any therapy, PAD who has been dealing with a left foot abcess for the last month that has been getting worse. Cultures from wound have grown enterococcus that  sensitive to amp and Vanc.  Wound has worsened despite appropriate PO ABx (ampicillin). Additionally he has described that he has been having problems with SOB that he has been needing to increase the incline of his bed for. He also noticed worsening bilateral lower edema and he has gained 7 lbs in the last month. Patient states that currently he does not feel SOB ( since it only  occurs with being flat or exertion), no CP, no palpitations, no fever, chills. He states that he can feel his bilateral feet but has decreased. Usually he has been mobile but with this wound and his boot he has been less mobile. No history of DVT but he has a history of CLL.    Initial workup in the floor shows elevated BNP in the 2k, WBC > 50 ( he has a baseline around 53. CRP elevated, and hyponatremic to 127.     Hospital Course:  Admitted to medicine for cellulitis and CHF exacerbation. Ulcer seen by podiatry, vascular and ID.  Patient found to have elevated tropoinins and EKG changes consistent with recent MI.  Cardiology consulted, along with Interventional cardiology.  Recommendation to treat medically with heparin ggt for 48hrs, lisinopril 10mg, statin, plavix, ASA  325. Angiogram performed by vascular surgery 6/3, underwent foot bone biopsy on 6/4 and cultures sent.     Interval History: Has chest pain today resolved with nitro, no change in EKG, troponin trending   Review of Systems   Constitutional: Positive for activity change and unexpected weight change (up 7 lbs ). Negative for appetite change, fatigue and fever.   HENT: Negative for congestion, sinus pressure and sinus pain.    Eyes: Negative for photophobia, discharge and visual disturbance.   Respiratory: Negative for apnea, shortness of breath and wheezing.    Cardiovascular: Positive for leg swelling. Negative for chest pain.   Gastrointestinal: Negative for abdominal distention and nausea.   Endocrine: Negative for cold intolerance, heat intolerance, polydipsia, polyphagia and polyuria.   Genitourinary: Negative for difficulty urinating.   Musculoskeletal: Negative for arthralgias and back pain.   Skin: Positive for color change and rash.   Neurological: Negative for dizziness, tremors and numbness.   Psychiatric/Behavioral: Negative for agitation, behavioral problems, confusion and decreased concentration.     Objective:     Vital Signs (Most Recent):  Temp: 98.6 °F (37 °C) (06/04/19 1100)  Pulse: 69 (06/04/19 1100)  Resp: 20 (06/04/19 1100)  BP: 121/69 (06/04/19 1100)  SpO2: 98 % (06/04/19 1100) Vital Signs (24h Range):  Temp:  [97.4 °F (36.3 °C)-98.6 °F (37 °C)] 98.6 °F (37 °C)  Pulse:  [61-71] 69  Resp:  [15-20] 20  SpO2:  [98 %-99 %] 98 %  BP: (107-167)/(67-97) 121/69     Weight: 88.2 kg (194 lb 7.1 oz)  Body mass index is 30.45 kg/m².    Intake/Output Summary (Last 24 hours) at 6/4/2019 1637  Last data filed at 6/4/2019 1000  Gross per 24 hour   Intake 480 ml   Output 700 ml   Net -220 ml      Physical Exam   Constitutional: He appears well-developed and well-nourished. No distress.   HENT:   Head: Normocephalic and atraumatic.   Eyes: Pupils are equal, round, and reactive to light. EOM are normal.   Neck:  Normal range of motion. Neck supple.   Cardiovascular: Normal rate, regular rhythm and normal heart sounds.   Pulmonary/Chest: Effort normal and breath sounds normal. No respiratory distress.   Abdominal: Soft. Bowel sounds are normal. He exhibits no mass. There is no tenderness. There is no guarding.   Musculoskeletal: Normal range of motion. He exhibits edema and tenderness. He exhibits no deformity.   Erythema in the left foot as pictures below/ Ulcer present that is cleaned , pain around the ulcer noted.  Feet are warm proximally. + edema in the left foot and 2+ edema in the right foot    Neurological: He is alert.   Skin: He is not diaphoretic.   Nursing note and vitals reviewed.      Significant Labs: All pertinent labs within the past 24 hours have been reviewed.    Significant Imaging: I have reviewed and interpreted all pertinent imaging results/findings within the past 24 hours.    Assessment/Plan:      * Cellulitis of Left foot  79M w/ DM1, PAD and a left hallux ulcer that has been growing enterococcus sensitive to ampicillin and vancomycin but with worsening erythema despite treatment. Admitted from podiatry for OM rule out and IV antibiotics  - MRI shows early OM  - Angiogram performed by vascular surgery on 6/3    PLAN:  - Podiatry following - bone biopsy was done and cultures sent 6/4  - ID following - restarted vanc and rociphin  - Vascular following   - BCx - NGTD, continue to follow    NSTEMI (non-ST elevated myocardial infarction)  - EKG w/ ST depressions, elevated troponin (peaked 1.16)  - Dyspnea on exertion, orthopnea, significant peripheral edema.  - BNP 2608  - ECHO w/ EF 20%, grade II DD.   - New onset HF w/ troponin leak vs NSTEMI in the setting of acute infection.   Had 1 episode of chest pain 6/4  Plan  - Continue ASA 81 mg and plavix 75 mg daily  - Continue metoprolol  mg qhs  - Continue IV Lasix 40 mg BIF  - Continue Lisinopril 10 mg daily   - Interventional cards to perform [facility]  prior to discharge.   - trend troponin        Diabetic ulcer of left great toe        Acute HF (heart failure)  - Patient with symptoms of orthopnea and dyspnea on exertion   - Patient on HCTZ at home no loop diuretic   - BNP elevated at 2 k ,   - PE notable for  JVD and bilateral pitting edema, abdominal distension    - Xray on my read be bilateral edema   - precipitating factor is likely infected ulcer in a patient with PAD   -Echocardiogram: 2D echo with color flow doppler most recent not in file he had a pet stress in January showing EF of 40     PLAN   - IV lasix 40mg BID, adjust as needed  - Strict I/O and Daily weights  - Wean oxygen as tolerated     Orthopnea  - see CHF    Foot abscess, left  - see cellulitis     CKD (chronic kidney disease), stage III  - Diuresing w/ lasix 40 IV BID  - Daily CMP, replace lytes as needed   - Continue to monitor    Peripheral vascular disease  Last LEONIE was march with mild PAD  - Angiogram performed 6/3  - continue ASA, statin, and plavix    MINDA on CPAP  - cpap qhs         HTN (hypertension)  - holding home lisinopril and HCTZ, resume as tolerated  - continue toprol 150 qhs       Hyperlipidemia  - continue statin       Diabetic polyneuropathy associated with type 1 diabetes mellitus  - contributing to difficult ulcer healing   --HbA1c 5.8 very well controlled   - Home DM regimen:  Insulin pump, patient states his average basal is 23 units + SSI    Plan  - Endocrine following, appreciate assistance  - Recommend Levemir to 15 units qHS, Novolog ICR 1:9  - Supplement with SSI    CLL (chronic lymphocytic leukemia)  - Patient sees Dr. Garcia in heme/onc  - Has not needed treatment  - has been hyperkalemic before attributed to CLL burden           VTE Risk Mitigation (From admission, onward)        Ordered     heparin 25,000 units in dextrose 5% 250 mL (100 units/mL) infusion LOW INTENSITY nomogram - [facility]  Continuous      06/02/19 1508     heparin 25,000 units in dextrose 5% (100  units/ml) IV bolus from bag - ADDITIONAL PRN BOLUS - 60 units/kg (max bolus 4000 units)  As needed (PRN)      06/02/19 1505     heparin 25,000 units in dextrose 5% (100 units/ml) IV bolus from bag - ADDITIONAL PRN BOLUS - 30 units/kg (max bolus 4000 units)  As needed (PRN)      06/02/19 1505     IP VTE LOW RISK PATIENT  Once      05/29/19 7188              CHERRI Pepe  Department of Hospital Medicine   [facility]

## 2019-06-05 NOTE — CARE UPDATE
Called about concern over patient becoming acutely hypotensive & symptomatic during 2 nights ago when he was administered Toprol 150mg. Toprol was held yesterday evening due to hypotension. Toprol was increased today to 200mg QHS. HR 60s. Patient is also concerned. Reducing tonight's dose to 50mg. May need up titration for optimal GDMT but readjustment for nightly dose.     Sophy Owens MD  Internal Medicine, PGY-I  [facility]

## 2019-06-05 NOTE — SUBJECTIVE & OBJECTIVE
Interval History: patient had an episode of chest discomfort last night while at rest, lasting approximately one hour, troponin 14 --> 7. No significant change on EKG this morning. Chest pain free currently.    Objective:     Vital Signs (Most Recent):  Temp: 98.6 °F (37 °C) (06/05/19 1227)  Pulse: 84 (06/05/19 1227)  Resp: 16 (06/05/19 1227)  BP: 114/68 (06/05/19 1227)  SpO2: 96 % (06/05/19 1227) Vital Signs (24h Range):  Temp:  [98.6 °F (37 °C)-98.7 °F (37.1 °C)] 98.6 °F (37 °C)  Pulse:  [67-87] 84  Resp:  [16-22] 16  SpO2:  [96 %-100 %] 96 %  BP: ()/(63-71) 114/68     Weight: 88.2 kg (194 lb 7.1 oz)  Body mass index is 30.45 kg/m².    SpO2: 96 %  O2 Device (Oxygen Therapy): nasal cannula      Intake/Output Summary (Last 24 hours) at 6/5/2019 1402  Last data filed at 6/5/2019 0800  Gross per 24 hour   Intake 720 ml   Output 1300 ml   Net -580 ml       Lines/Drains/Airways     Peripheral Intravenous Line                 Peripheral IV - Single Lumen 06/03/19 1205 20 G;1 3/4 in Left Forearm 2 days                Physical Exam   Constitutional: He is oriented to person, place, and time. He appears well-developed and well-nourished. No distress.   HENT:   Head: Normocephalic and atraumatic.   Neck: JVD (HJR to the mandible) present.   Cardiovascular: Normal rate, regular rhythm, normal heart sounds and intact distal pulses. Exam reveals no gallop and no friction rub.   No murmur heard.  Pulses:       Radial pulses are 0 on the right side, and 1+ on the left side.        Femoral pulses are 2+ on the right side, and 2+ on the left side.       Dorsalis pedis pulses are 0 on the right side, and 0 on the left side.        Posterior tibial pulses are 0 on the right side, and 0 on the left side.   Sternotomy, healed   Pulmonary/Chest: Effort normal. No respiratory distress. He has no wheezes. He has no rales.   Abdominal: Soft. Bowel sounds are normal. He exhibits no distension. There is no tenderness.    Musculoskeletal: He exhibits edema (1+ bilateral lower extremity edema).   Left foot dressing c/d/i   Neurological: He is alert and oriented to person, place, and time.   Skin: He is not diaphoretic.       Significant Labs:     Recent Results (from the past 24 hour(s))   Gram stain    Collection Time: 06/04/19  2:41 PM   Result Value Ref Range    Gram Stain Result No WBC's     Gram Stain Result No organisms seen    Culture, Anaerobe    Collection Time: 06/04/19  2:41 PM   Result Value Ref Range    Anaerobic Culture Culture in progress    Aerobic culture    Collection Time: 06/04/19  2:41 PM   Result Value Ref Range    Aerobic Bacterial Culture No growth    Troponin I    Collection Time: 06/04/19  2:47 PM   Result Value Ref Range    Troponin I 0.855 (H) 0.000 - 0.026 ng/mL   POCT glucose    Collection Time: 06/04/19  5:04 PM   Result Value Ref Range    POCT Glucose <20 (L) 70 - 110 mg/dL   POCT glucose    Collection Time: 06/04/19  5:05 PM   Result Value Ref Range    POCT Glucose 226 (H) 70 - 110 mg/dL   POCT glucose    Collection Time: 06/04/19  9:48 PM   Result Value Ref Range    POCT Glucose 239 (H) 70 - 110 mg/dL   Troponin I    Collection Time: 06/04/19 11:33 PM   Result Value Ref Range    Troponin I 14.424 (H) 0.000 - 0.026 ng/mL   Magnesium    Collection Time: 06/05/19  6:05 AM   Result Value Ref Range    Magnesium 2.0 1.6 - 2.6 mg/dL   Phosphorus    Collection Time: 06/05/19  6:05 AM   Result Value Ref Range    Phosphorus 3.0 2.7 - 4.5 mg/dL   CBC with Automated Differential    Collection Time: 06/05/19  6:05 AM   Result Value Ref Range    WBC 30.20 (H) 3.90 - 12.70 K/uL    RBC 3.09 (L) 4.60 - 6.20 M/uL    Hemoglobin 10.1 (L) 14.0 - 18.0 g/dL    Hematocrit 31.7 (L) 40.0 - 54.0 %    Mean Corpuscular Volume 103 (H) 82 - 98 fL    Mean Corpuscular Hemoglobin 32.7 (H) 27.0 - 31.0 pg    Mean Corpuscular Hemoglobin Conc 31.9 (L) 32.0 - 36.0 g/dL    RDW 15.2 (H) 11.5 - 14.5 %    Platelets 114 (L) 150 - 350 K/uL     MPV 9.9 9.2 - 12.9 fL    Immature Granulocytes CANCELED 0.0 - 0.5 %    Immature Grans (Abs) CANCELED 0.00 - 0.04 K/uL    nRBC 0 0 /100 WBC    Gran% 7.0 (L) 38.0 - 73.0 %    Lymph% 91.0 (H) 18.0 - 48.0 %    Mono% 1.0 (L) 4.0 - 15.0 %    Eosinophil% 1.0 0.0 - 8.0 %    Basophil% 0.0 0.0 - 1.9 %    Platelet Estimate Decreased (A)     Aniso Slight     Poly Occasional     Toxic Granulation Present     Smudge Cells Present     Large/Giant Platelets Present     Differential Method Manual    Comprehensive Metabolic Panel (CMP)    Collection Time: 06/05/19  6:05 AM   Result Value Ref Range    Sodium 135 (L) 136 - 145 mmol/L    Potassium 4.2 3.5 - 5.1 mmol/L    Chloride 97 95 - 110 mmol/L    CO2 27 23 - 29 mmol/L    Glucose 247 (H) 70 - 110 mg/dL    BUN, Bld 29 (H) 8 - 23 mg/dL    Creatinine 1.1 0.5 - 1.4 mg/dL    Calcium 9.0 8.7 - 10.5 mg/dL    Total Protein 5.7 (L) 6.0 - 8.4 g/dL    Albumin 3.3 (L) 3.5 - 5.2 g/dL    Total Bilirubin 0.9 0.1 - 1.0 mg/dL    Alkaline Phosphatase 82 55 - 135 U/L    AST 49 (H) 10 - 40 U/L    ALT 18 10 - 44 U/L    Anion Gap 11 8 - 16 mmol/L    eGFR if African American >60.0 >60 mL/min/1.73 m^2    eGFR if non African American >60.0 >60 mL/min/1.73 m^2   APTT    Collection Time: 06/05/19  6:05 AM   Result Value Ref Range    aPTT 26.8 21.0 - 32.0 sec   Sedimentation rate    Collection Time: 06/05/19  6:05 AM   Result Value Ref Range    Sed Rate 31 (H) 0 - 23 mm/Hr   POCT glucose    Collection Time: 06/05/19  8:05 AM   Result Value Ref Range    POCT Glucose 250 (H) 70 - 110 mg/dL   APTT    Collection Time: 06/05/19  9:08 AM   Result Value Ref Range    aPTT 27.4 21.0 - 32.0 sec   Protime-INR    Collection Time: 06/05/19  9:08 AM   Result Value Ref Range    Prothrombin Time 11.0 9.0 - 12.5 sec    INR 1.1 0.8 - 1.2   CBC auto differential    Collection Time: 06/05/19  9:08 AM   Result Value Ref Range    WBC 25.12 (H) 3.90 - 12.70 K/uL    RBC 3.28 (L) 4.60 - 6.20 M/uL    Hemoglobin 10.6 (L) 14.0 - 18.0  g/dL    Hematocrit 33.3 (L) 40.0 - 54.0 %    Mean Corpuscular Volume 102 (H) 82 - 98 fL    Mean Corpuscular Hemoglobin 32.3 (H) 27.0 - 31.0 pg    Mean Corpuscular Hemoglobin Conc 31.8 (L) 32.0 - 36.0 g/dL    RDW 15.2 (H) 11.5 - 14.5 %    Platelets 106 (L) 150 - 350 K/uL    MPV 10.2 9.2 - 12.9 fL    Immature Granulocytes CANCELED 0.0 - 0.5 %    Immature Grans (Abs) CANCELED 0.00 - 0.04 K/uL    Lymph # CANCELED 1.0 - 4.8 K/uL    Mono # CANCELED 0.3 - 1.0 K/uL    Eos # CANCELED 0.0 - 0.5 K/uL    Baso # CANCELED 0.00 - 0.20 K/uL    nRBC 0 0 /100 WBC    Gran% 11.0 (L) 38.0 - 73.0 %    Lymph% 89.0 (H) 18.0 - 48.0 %    Mono% 0.0 (L) 4.0 - 15.0 %    Eosinophil% 0.0 0.0 - 8.0 %    Basophil% 0.0 0.0 - 1.9 %    Platelet Estimate Decreased (A)     Aniso Slight     Poly Occasional     Differential Method Automated    Troponin I    Collection Time: 06/05/19  9:21 AM   Result Value Ref Range    Troponin I 7.182 (H) 0.000 - 0.026 ng/mL   POCT glucose    Collection Time: 06/05/19 11:53 AM   Result Value Ref Range    POCT Glucose 365 (H) 70 - 110 mg/dL         Significant Imaging:     I have reviewed all pertinent imaging studies from the last 24 hours

## 2019-06-05 NOTE — ASSESSMENT & PLAN NOTE
79 year old male with history of  DM-1, HTN, CAD s/p CABG x 2 , CLL not currently on any therapy and PAD  who presented as a direct admit from podiatry clinic for an infected left great toe ulcer. Prior wound culture with E. Faecalis. Did not respond to outpatient oral antibiotics. Pt started on IV vanc. MRI cannot rule out possible early osteomyelitis distal phalanx. ID consulted for further recs.     This admit he underwent successful angiogram/revascularization to LLE. Bone biopsy performed yesterday and cultures pending. He was started on empiric Vanc and Ceftriaxone after the biopsy. Afebrile. Chronic leukocytosis noted since January and has remained stable/actually down trending here. Troponin elevated overnight and may required LHC.      Plan:  1. Continue empiric Vancomycin and Ceftriaxone for now. Increase Ceftriaxone to 2 g daily.   2. Follow bone biopsy cultures to guide antibiotics  3. Anticipate 6 weeks of IV antibiotic therapy for osteomyelitis   4. Will follow up pathology as an outpatient if does not return while he remains in the hospital. Decision to tailor antibiotics can be made at ID follow up.  5. ID will follow.

## 2019-06-05 NOTE — SUBJECTIVE & OBJECTIVE
Interval History: Troponin acutely elevated. Cardiology following. Continues on rocephin and vanc. Weaning O2 as tolerated. Transferring to cardiology floor.     Review of Systems   Constitutional: Negative for chills, diaphoresis and fever.   Respiratory: Negative for cough and shortness of breath.    Cardiovascular: Positive for leg swelling. Negative for chest pain.   Gastrointestinal: Negative for abdominal pain, constipation, diarrhea and nausea.   Genitourinary: Negative for dysuria, frequency and hematuria.   Musculoskeletal: Positive for arthralgias. Negative for back pain and myalgias.   Skin: Positive for wound (left foot). Negative for rash.   Neurological: Negative for light-headedness, numbness and headaches.   Psychiatric/Behavioral: Negative for agitation and behavioral problems. The patient is not nervous/anxious.      Objective:     Vital Signs (Most Recent):  Temp: 98.6 °F (37 °C) (06/05/19 1227)  Pulse: 84 (06/05/19 1227)  Resp: 16 (06/05/19 1227)  BP: 114/68 (06/05/19 1227)  SpO2: 96 % (06/05/19 1227) Vital Signs (24h Range):  Temp:  [98.6 °F (37 °C)-98.7 °F (37.1 °C)] 98.6 °F (37 °C)  Pulse:  [67-87] 84  Resp:  [16-22] 16  SpO2:  [96 %-100 %] 96 %  BP: ()/(63-71) 114/68     Weight: 88.2 kg (194 lb 7.1 oz)  Body mass index is 30.45 kg/m².    Intake/Output Summary (Last 24 hours) at 6/5/2019 1342  Last data filed at 6/5/2019 0800  Gross per 24 hour   Intake 720 ml   Output 1300 ml   Net -580 ml      Physical Exam   Constitutional: He is oriented to person, place, and time. He appears well-developed and well-nourished. No distress.   HENT:   Head: Normocephalic and atraumatic.   Eyes: Pupils are equal, round, and reactive to light. EOM are normal.   Neck: Normal range of motion. Neck supple.   Cardiovascular: Normal rate, regular rhythm and normal heart sounds.   No murmur heard.  Pulmonary/Chest: Effort normal and breath sounds normal. No respiratory distress.   Abdominal: Soft. Bowel sounds  are normal. He exhibits no distension and no mass. There is no tenderness. There is no guarding.   Musculoskeletal: Normal range of motion. He exhibits edema and tenderness. He exhibits no deformity.   Erythema in the left foot as pictures below/ Ulcer present that is cleaned , pain around the ulcer noted.  Feet are warm proximally. + edema in the left foot and 2+ edema in the right foot    Neurological: He is alert and oriented to person, place, and time.   Skin: Skin is warm and dry. He is not diaphoretic.   Left medial great toe with dry ulceration. No drainage.  Mild TTP.  There is erythema of the 1st digit.    Psychiatric: He has a normal mood and affect. His behavior is normal.   Nursing note and vitals reviewed.      Significant Labs: All pertinent labs within the past 24 hours have been reviewed.    Significant Imaging: I have reviewed and interpreted all pertinent imaging results/findings within the past 24 hours.

## 2019-06-05 NOTE — PROGRESS NOTES
[facility]  Podiatry  Progress Note    Patient Name: Reid Herman  MRN: [id number]  Admission Date: 5/29/2019  Hospital Length of Stay: 7 days  Attending Physician: Elis Gutierrez MD  Primary Care Provider: Olivia Zavala MD   Interval Hx:  S/p bone biopsy.  Patient Seen at bedside for dressing change.  Patient denies F/C/N/V.  Dressings clean, dry, and intact.    Scheduled Meds:   aspirin  81 mg Oral Daily    atorvastatin  40 mg Oral QHS    cefTRIAXone (ROCEPHIN) IVPB  1 g Intravenous Q24H    clopidogrel  75 mg Oral Daily    collagenase   Topical (Top) Daily    furosemide  40 mg Intravenous BID    insulin aspart U-100  1-15 Units Subcutaneous TIDWM    insulin detemir U-100  15 Units Subcutaneous QHS    isosorbide mononitrate  30 mg Oral Daily    lisinopril  10 mg Oral Daily    metoprolol succinate  50 mg Oral QHS    vancomycin (VANCOCIN) IVPB (custom)  1,750 mg Intravenous Q24H     Continuous Infusions:   heparin (porcine) in D5W 12 Units/kg/hr (06/05/19 1014)     PRN Meds:acetaminophen, ceFAZolin (ANCEF) IVPB, dextrose 50%, dextrose 50%, glucagon (human recombinant), glucose, glucose, heparin (PORCINE), heparin (PORCINE), insulin aspart U-100, nitroGLYCERIN, sodium chloride 0.9%    Review of patient's allergies indicates:  No Known Allergies     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 10/1/2012    Preseptal cellulitis of right eye 8/17/15    S/P CABG x 2 2/14/2014    1994     Sleep  apnea     Squamous cell carcinoma 03/18/2013    right posterior ear    Squamous cell carcinoma 07/26/2017    scalp    Squamous cell carcinoma 08/02/2018    Right Scalp/MOHS    Trouble in sleeping     Type 1 diabetes, uncontrolled, with retinopathy 10/1/2012    Type II or unspecified type diabetes mellitus without mention of complication, not stated as uncontrolled     Vitreous hemorrhage 8/17/2014     Past Surgical History:   Procedure Laterality Date    Angiogram Extremity Unilateral possible CO2 Left 6/3/2019    Performed by Luis Rod [facility] at [facility] OR 2ND FLR    ANGIOGRAM, CORONARY ARTERY N/A 1/29/2019    Performed by Saturnino Ny MD at [facility] CATH LAB    APPENDECTOMY  1953    BLOCK-NERVE-MEDIAL BRANCH-LUMBAR Bilateral 3/10/2015    Performed by [facility]les [facility] at [facility] MGT    BLOCK-NERVE-MEDIAL BRANCH-LUMBAR Bilateral 11/25/2014    Performed by [facility]les [facility] at [facility] MGT    CATARACT EXTRACTION  4/8/13    right eye (toric)    CATARACT EXTRACTION  4/29/13    left eye (toric)    COLONOSCOPY N/A 4/25/2017    Performed by CLARISSA Freeman MD at [facility] ENDO (4TH FLR)    COLONOSCOPY N/A 4/25/2014    Performed by CLARISSA Freeman MD at [facility] ENDO (4TH FLR)    CORONARY ARTERY BYPASS GRAFT  1994    x 3    EYE SURGERY      cataracts, both eyes    FINGER TENDON REPAIR  2011    left hand    INJECTION-FACET Bilateral 3/31/2015    [facility] at [facility] PAIN MGT    INJECTION-STEROID-EPIDURAL-LUMBAR N/A 10/28/2014    Performed by [facility]les [facility] at [facility] MGT    INSERTION, IOL PROSTHESIS Left 4/29/2013    Performed by Soco Sandoval MD at [facility] OR    INSERTION, IOL PROSTHESIS Right 4/8/2013    Performed by Soco Sandoval MD at [facility] OR    INSERTION, STENT, CORONARY ARTERY N/A 1/29/2019    Performed by Saturnino Ny [facility] at [facility] CATH LAB    MINIMALLY INVASIVE FUSION-TRANSLUMINAL LUMBAR INTERBODY (TLIF) Left 5/18/2015    Performed by Tee CAVANAUGH  MD [facility] at [facility] OR 2ND FLR    PHACOEMULSIFICATION, CATARACT Left 4/29/2013    Performed by [facility] at [facility] OR    PHACOEMULSIFICATION, CATARACT Right 4/8/2013    Performed by Soco Sandoval MD at [facility] OR    PTA, PERIPHERAL BLOOD VESSEL Left 6/3/2019    Performed by Luis [facility] at [facility] OR 2ND FLR    RADIOFREQUENCY THERMOCOAGULATION (RFTC)-NERVE-MEDIAN BRANCH-LUMBAR Left 1/13/2015    Performed by [facility]ita [facility]les [facility] at [facility]    RADIOFREQUENCY THERMOCOAGULATION (RFTC)-NERVE-MEDIAN BRANCH-LUMBAR Right 12/30/2014    Performed by [facility]les [facility] at [facility]    SKIN BIOPSY  2013    multiple    SPINE SURGERY  2015    TLIF    TONSILLECTOMY  1947       Family History     Problem Relation (Age of Onset)    Acute myelogenous leukemia Sister    Alzheimer's disease Father    Breast cancer Mother    Cancer Mother, Sister (60)    Colon cancer Sister    Dementia Father    Diabetes Maternal Grandfather, Paternal Aunt, Paternal Uncle    Stroke Father        Tobacco Use    Smoking status: Never Smoker    Smokeless tobacco: Never Used   Substance and Sexual Activity    Alcohol use: Yes     Alcohol/week: 0.6 oz     Types: 1 Glasses of wine per week     Comment: social once monthly    Drug use: No    Sexual activity: Not Currently     Partners: Female     Review of Systems   Constitutional: Negative for chills and fever.   Respiratory: Positive for shortness of breath.    Gastrointestinal: Negative for nausea and vomiting.   Skin: Positive for color change and wound.     Objective:     Vital Signs (Most Recent):  Temp: 98.7 °F (37.1 °C) (06/05/19 0755)  Pulse: 84 (06/05/19 0755)  Resp: (!) 22 (06/05/19 0755)  BP: 117/71 (06/05/19 0944)  SpO2: 98 % (06/05/19 0755) Vital Signs (24h Range):  Temp:  [98.6 °F (37 °C)-98.7 °F (37.1 °C)] 98.7 °F (37.1 °C)  Pulse:  [67-84] 84  Resp:  [17-22] 22  SpO2:  [97 %-100 %] 98 %  BP: ()/(63-71) 117/71     Weight: 88.2 kg (194 lb 7.1 oz)  Body mass  index is 30.45 kg/m².    Foot Exam    General  General Appearance: appears stated age and healthy   Orientation: alert and oriented to person, place, and time       Right Foot/Ankle     Inspection and Palpation  Ecchymosis: none  Tenderness: none   Swelling: (+3 edema)  Skin Exam: no abnormal color     Neurovascular  Dorsalis pedis: absent  Posterior tibial: absent  Saphenous nerve sensation: normal  Tibial nerve sensation: normal  Superficial peroneal nerve sensation: normal  Deep peroneal nerve sensation: normal  Sural nerve sensation: normal      Left Foot/Ankle      Inspection and Palpation  Ecchymosis: none  Tenderness: great toe interphalangeal joint   Swelling: (+3 edema)  Skin Exam: skin changes, abnormal color, ulcer and erythema;     Neurovascular  Dorsalis pedis: absent  Posterior tibial: absent  Saphenous nerve sensation: normal  Tibial nerve sensation: normal  Superficial peroneal nerve sensation: normal  Deep peroneal nerve sensation: normal  Sural nerve sensation: normal            Laboratory:  CBC:   Recent Labs   Lab 06/05/19  0908   WBC 25.12*   RBC 3.28*   HGB 10.6*   HCT 33.3*   *   *   MCH 32.3*   MCHC 31.8*     CMP:   Recent Labs   Lab 06/05/19  0605   *   CALCIUM 9.0   ALBUMIN 3.3*   PROT 5.7*   *   K 4.2   CO2 27   CL 97   BUN 29*   CREATININE 1.1   ALKPHOS 82   ALT 18   AST 49*   BILITOT 0.9     Wound Cultures:   Recent Labs   Lab 05/22/19  0942 06/04/19  1441   LABAERO ENTEROCOCCUS FAECALIS  Many  No other significant isolate   No growth       Diagnostic Results:  MRI: I have reviewed all pertinent results/findings within the past 24 hours.  1. Slight marrow edema and subtle T1 hypointense signal involving the distal phalanx of the great toe possibly relating to reactive change or early osteomyelitis.    Clinical Findings:    B/l LE cold to the touch.    Superficial ulceration to the L hallux. Appears to be a thin, adherent eschar with periwound erythema. No  drainage, fluctuance or crepitus on exam. Outline of erythema is much improved upon exam; however, purple hue to the forefoot appears when placed at a dependent position.     6/5        Dependent    Elevated          Assessment/Plan:     * Cellulitis of Left foot  Reid Herman is a 79 y.o. male s/p right hallux bone biopsy  -Abx per ID, appreciate recs   - Recommend local wound care: Daily santyl and adhesive bandage.  - s/p revascularization 6/3  - Pt can WBAT in surgical shoe.  - Podiatry will follow.    SHANIKA (acute kidney injury)  Per primary      NSTEMI (non-ST elevated myocardial infarction)  Per primary      Peripheral vascular disease  S/p revascularization 6/3    Controlled type 1 diabetes mellitus with diabetic neuropathy, with long-term current use of insulin  Well controlled. Per primary        Kip Nicholas MD  Podiatry  [facility]

## 2019-06-05 NOTE — ASSESSMENT & PLAN NOTE
Known CAD with history of 2v CABG (1994) LIMA-D1 and SVG-LAD, recently had LHC 1/29/19 for worsening angina s/p DUC x 2 (mid and distal LCx). He was also found to have 50% D1 stenosis (distal to the LIMA graft insertion) and 60% proximal SVG graft stenosis and RCA .    On this admission, patient found to be in decompensated HF with new EF 20%, global hypokinesis with akinesis of the septum and apex, with anterolateral ST depressions in a similar distribution to prior EKGs, more prominent on admission but have returned to baseline and questionable osteomyelitis of the left foot  He has since been diuresed over 9L however is still volume overloaded, with HJR to the mandible and peripheral edema  S/p bone biopsy 6/4/19, antibiotics resumed; afebrile, all cultures negative, WBC trending down  Recommend increasing lasix to 80 mg IV BID in anticipation of cath tomorrow, NPO past midnight  Will try to schedule as early as possible to avoid prolonged fasting in this gentleman with type 1 diabetes  Continue medical therapy with DAPT, heparin, high intensity statin, BB, increase anti-anginals (Imdur) as BP tolerates

## 2019-06-05 NOTE — PROGRESS NOTES
[facility]  Interventional Cardiology  Progress Note    Patient Name: Reid Herman  MRN: [id number]  Admission Date: 5/29/2019  Hospital Length of Stay: 7 days  Code Status: Full Code   Attending Physician: Elis Gutierrez MD   Primary Care Physician: Olivia Zavala MD  Principal Problem:Cellulitis of foot    Subjective:     Interval History: patient had an episode of chest discomfort last night while at rest, lasting approximately one hour, troponin 14 --> 7. No significant change on EKG this morning. Chest pain free currently.    Objective:     Vital Signs (Most Recent):  Temp: 98.6 °F (37 °C) (06/05/19 1227)  Pulse: 84 (06/05/19 1227)  Resp: 16 (06/05/19 1227)  BP: 114/68 (06/05/19 1227)  SpO2: 96 % (06/05/19 1227) Vital Signs (24h Range):  Temp:  [98.6 °F (37 °C)-98.7 °F (37.1 °C)] 98.6 °F (37 °C)  Pulse:  [67-87] 84  Resp:  [16-22] 16  SpO2:  [96 %-100 %] 96 %  BP: ()/(63-71) 114/68     Weight: 88.2 kg (194 lb 7.1 oz)  Body mass index is 30.45 kg/m².    SpO2: 96 %  O2 Device (Oxygen Therapy): nasal cannula      Intake/Output Summary (Last 24 hours) at 6/5/2019 1402  Last data filed at 6/5/2019 0800  Gross per 24 hour   Intake 720 ml   Output 1300 ml   Net -580 ml       Lines/Drains/Airways     Peripheral Intravenous Line                 Peripheral IV - Single Lumen 06/03/19 1205 20 G;1 3/4 in Left Forearm 2 days                Physical Exam   Constitutional: He is oriented to person, place, and time. He appears well-developed and well-nourished. No distress.   HENT:   Head: Normocephalic and atraumatic.   Neck: JVD (HJR to the mandible) present.   Cardiovascular: Normal rate, regular rhythm, normal heart sounds and intact distal pulses. Exam reveals no gallop and no friction rub.   No murmur heard.  Pulses:       Radial pulses are 0 on the right side, and 1+ on the left side.        Femoral pulses are 2+ on the right side, and 2+ on the left side.       Dorsalis pedis pulses are 0  on the right side, and 0 on the left side.        Posterior tibial pulses are 0 on the right side, and 0 on the left side.   Sternotomy, healed   Pulmonary/Chest: Effort normal. No respiratory distress. He has no wheezes. He has no rales.   Abdominal: Soft. Bowel sounds are normal. He exhibits no distension. There is no tenderness.   Musculoskeletal: He exhibits edema (1+ bilateral lower extremity edema).   Left foot dressing c/d/i   Neurological: He is alert and oriented to person, place, and time.   Skin: He is not diaphoretic.       Significant Labs:     Recent Results (from the past 24 hour(s))   Gram stain    Collection Time: 06/04/19  2:41 PM   Result Value Ref Range    Gram Stain Result No WBC's     Gram Stain Result No organisms seen    Culture, Anaerobe    Collection Time: 06/04/19  2:41 PM   Result Value Ref Range    Anaerobic Culture Culture in progress    Aerobic culture    Collection Time: 06/04/19  2:41 PM   Result Value Ref Range    Aerobic Bacterial Culture No growth    Troponin I    Collection Time: 06/04/19  2:47 PM   Result Value Ref Range    Troponin I 0.855 (H) 0.000 - 0.026 ng/mL   POCT glucose    Collection Time: 06/04/19  5:04 PM   Result Value Ref Range    POCT Glucose <20 (L) 70 - 110 mg/dL   POCT glucose    Collection Time: 06/04/19  5:05 PM   Result Value Ref Range    POCT Glucose 226 (H) 70 - 110 mg/dL   POCT glucose    Collection Time: 06/04/19  9:48 PM   Result Value Ref Range    POCT Glucose 239 (H) 70 - 110 mg/dL   Troponin I    Collection Time: 06/04/19 11:33 PM   Result Value Ref Range    Troponin I 14.424 (H) 0.000 - 0.026 ng/mL   Magnesium    Collection Time: 06/05/19  6:05 AM   Result Value Ref Range    Magnesium 2.0 1.6 - 2.6 mg/dL   Phosphorus    Collection Time: 06/05/19  6:05 AM   Result Value Ref Range    Phosphorus 3.0 2.7 - 4.5 mg/dL   CBC with Automated Differential    Collection Time: 06/05/19  6:05 AM   Result Value Ref Range    WBC 30.20 (H) 3.90 - 12.70 K/uL    RBC  3.09 (L) 4.60 - 6.20 M/uL    Hemoglobin 10.1 (L) 14.0 - 18.0 g/dL    Hematocrit 31.7 (L) 40.0 - 54.0 %    Mean Corpuscular Volume 103 (H) 82 - 98 fL    Mean Corpuscular Hemoglobin 32.7 (H) 27.0 - 31.0 pg    Mean Corpuscular Hemoglobin Conc 31.9 (L) 32.0 - 36.0 g/dL    RDW 15.2 (H) 11.5 - 14.5 %    Platelets 114 (L) 150 - 350 K/uL    MPV 9.9 9.2 - 12.9 fL    Immature Granulocytes CANCELED 0.0 - 0.5 %    Immature Grans (Abs) CANCELED 0.00 - 0.04 K/uL    nRBC 0 0 /100 WBC    Gran% 7.0 (L) 38.0 - 73.0 %    Lymph% 91.0 (H) 18.0 - 48.0 %    Mono% 1.0 (L) 4.0 - 15.0 %    Eosinophil% 1.0 0.0 - 8.0 %    Basophil% 0.0 0.0 - 1.9 %    Platelet Estimate Decreased (A)     Aniso Slight     Poly Occasional     Toxic Granulation Present     Smudge Cells Present     Large/Giant Platelets Present     Differential Method Manual    Comprehensive Metabolic Panel (CMP)    Collection Time: 06/05/19  6:05 AM   Result Value Ref Range    Sodium 135 (L) 136 - 145 mmol/L    Potassium 4.2 3.5 - 5.1 mmol/L    Chloride 97 95 - 110 mmol/L    CO2 27 23 - 29 mmol/L    Glucose 247 (H) 70 - 110 mg/dL    BUN, Bld 29 (H) 8 - 23 mg/dL    Creatinine 1.1 0.5 - 1.4 mg/dL    Calcium 9.0 8.7 - 10.5 mg/dL    Total Protein 5.7 (L) 6.0 - 8.4 g/dL    Albumin 3.3 (L) 3.5 - 5.2 g/dL    Total Bilirubin 0.9 0.1 - 1.0 mg/dL    Alkaline Phosphatase 82 55 - 135 U/L    AST 49 (H) 10 - 40 U/L    ALT 18 10 - 44 U/L    Anion Gap 11 8 - 16 mmol/L    eGFR if African American >60.0 >60 mL/min/1.73 m^2    eGFR if non African American >60.0 >60 mL/min/1.73 m^2   APTT    Collection Time: 06/05/19  6:05 AM   Result Value Ref Range    aPTT 26.8 21.0 - 32.0 sec   Sedimentation rate    Collection Time: 06/05/19  6:05 AM   Result Value Ref Range    Sed Rate 31 (H) 0 - 23 mm/Hr   POCT glucose    Collection Time: 06/05/19  8:05 AM   Result Value Ref Range    POCT Glucose 250 (H) 70 - 110 mg/dL   APTT    Collection Time: 06/05/19  9:08 AM   Result Value Ref Range    aPTT 27.4 21.0 - 32.0  sec   Protime-INR    Collection Time: 06/05/19  9:08 AM   Result Value Ref Range    Prothrombin Time 11.0 9.0 - 12.5 sec    INR 1.1 0.8 - 1.2   CBC auto differential    Collection Time: 06/05/19  9:08 AM   Result Value Ref Range    WBC 25.12 (H) 3.90 - 12.70 K/uL    RBC 3.28 (L) 4.60 - 6.20 M/uL    Hemoglobin 10.6 (L) 14.0 - 18.0 g/dL    Hematocrit 33.3 (L) 40.0 - 54.0 %    Mean Corpuscular Volume 102 (H) 82 - 98 fL    Mean Corpuscular Hemoglobin 32.3 (H) 27.0 - 31.0 pg    Mean Corpuscular Hemoglobin Conc 31.8 (L) 32.0 - 36.0 g/dL    RDW 15.2 (H) 11.5 - 14.5 %    Platelets 106 (L) 150 - 350 K/uL    MPV 10.2 9.2 - 12.9 fL    Immature Granulocytes CANCELED 0.0 - 0.5 %    Immature Grans (Abs) CANCELED 0.00 - 0.04 K/uL    Lymph # CANCELED 1.0 - 4.8 K/uL    Mono # CANCELED 0.3 - 1.0 K/uL    Eos # CANCELED 0.0 - 0.5 K/uL    Baso # CANCELED 0.00 - 0.20 K/uL    nRBC 0 0 /100 WBC    Gran% 11.0 (L) 38.0 - 73.0 %    Lymph% 89.0 (H) 18.0 - 48.0 %    Mono% 0.0 (L) 4.0 - 15.0 %    Eosinophil% 0.0 0.0 - 8.0 %    Basophil% 0.0 0.0 - 1.9 %    Platelet Estimate Decreased (A)     Aniso Slight     Poly Occasional     Differential Method Automated    Troponin I    Collection Time: 06/05/19  9:21 AM   Result Value Ref Range    Troponin I 7.182 (H) 0.000 - 0.026 ng/mL   POCT glucose    Collection Time: 06/05/19 11:53 AM   Result Value Ref Range    POCT Glucose 365 (H) 70 - 110 mg/dL         Significant Imaging:     I have reviewed all pertinent imaging studies from the last 24 hours      Assessment and Plan:     Patient is a 79 y.o. male presenting with:    Acute HF (heart failure)  Known CAD with history of 2v CABG (1994) LIMA-D1 and SVG-LAD, recently had LHC 1/29/19 for worsening angina s/p DUC x 2 (mid and distal LCx). He was also found to have 50% D1 stenosis (distal to the LIMA graft insertion) and 60% proximal SVG graft stenosis and RCA .    On this admission, patient found to be in decompensated HF with new EF 20%, global  hypokinesis with akinesis of the septum and apex, with anterolateral ST depressions in a similar distribution to prior EKGs, more prominent on admission but have returned to baseline and questionable osteomyelitis of the left foot  He has since been diuresed over 9L however is still volume overloaded, with HJR to the mandible and peripheral edema  S/p bone biopsy 6/4/19, antibiotics resumed; afebrile, all cultures negative, WBC trending down  Recommend increasing lasix to 80 mg IV BID in anticipation of cath tomorrow, NPO past midnight  Will try to schedule as early as possible to avoid prolonged fasting in this gentleman with type 1 diabetes  Continue medical therapy with DAPT, heparin, high intensity statin, BB, increase anti-anginals (Imdur) as BP tolerates        VTE Risk Mitigation (From admission, onward)        Ordered     heparin 25,000 units in dextrose 5% 250 mL (100 units/mL) infusion LOW INTENSITY nomogram - [facility]  Continuous      06/05/19 0853     heparin 25,000 units in dextrose 5% (100 units/ml) IV bolus from bag - ADDITIONAL PRN BOLUS - 60 units/kg (max bolus 4000 units)  As needed (PRN)      06/05/19 0853     heparin 25,000 units in dextrose 5% (100 units/ml) IV bolus from bag - ADDITIONAL PRN BOLUS - 30 units/kg (max bolus 4000 units)  As needed (PRN)      06/05/19 0853     heparin 25,000 units in dextrose 5% 250 mL (100 units/mL) infusion LOW INTENSITY nomogram - [facility]  Continuous      06/02/19 1508     IP VTE LOW RISK PATIENT  Once      05/29/19 5462          Milind Reyes MD  Interventional Cardiology  [facility]

## 2019-06-05 NOTE — PT/OT/SLP PROGRESS
"Physical Therapy      Patient Name:  Reid Herman   MRN:  [id number]     Pt not seen on this date d/t refusal despite max encouragement.  Visited pt in AM for session but pt refused d/t L foot pain and dressing.  Pt states not wanting to "mess up" L foot.  Asked if pt willing to perform EOB activities but also refused. Pt and wife educated on importance of mobility and the effects of prolonged bed rest.  Cued to transfer to sitting EOB or UIC throughout day - v/u; Chart reviewed and pt remain appropriate for PT services at this time.  Will see pt as schedule allows.    Treatment Time: 1018 - 1028 (8 mins)  Treatment Charge: Therapeutic Activity (1 unit)    Rajendra Rivera, PT,DPT  6/5/2019      "

## 2019-06-05 NOTE — ASSESSMENT & PLAN NOTE
- EKG w/ ST depressions, elevated troponin (peaked 1.16)  - Dyspnea on exertion, orthopnea, significant peripheral edema.  - BNP 2608  - ECHO w/ EF 20%, grade II DD.   - New onset HF w/ troponin leak vs NSTEMI in the setting of acute infection.     Plan  - Continue ASA 81 mg and plavix 75 mg daily  - Continue metoprolol XL qHs  - Continue IV Lasix 40 mg BID  - Continue Lisinopril 10 mg daily   - Interventional cards to perform LHC prior to discharge

## 2019-06-05 NOTE — SUBJECTIVE & OBJECTIVE
Interval Hx:  S/p bone biopsy.  Patient Seen at bedside for dressing change.  Patient denies F/C/N/V.  Dressings clean, dry, and intact.    Scheduled Meds:   aspirin  81 mg Oral Daily    atorvastatin  40 mg Oral QHS    cefTRIAXone (ROCEPHIN) IVPB  1 g Intravenous Q24H    clopidogrel  75 mg Oral Daily    collagenase   Topical (Top) Daily    furosemide  40 mg Intravenous BID    insulin aspart U-100  1-15 Units Subcutaneous TIDWM    insulin detemir U-100  15 Units Subcutaneous QHS    isosorbide mononitrate  30 mg Oral Daily    lisinopril  10 mg Oral Daily    metoprolol succinate  50 mg Oral QHS    vancomycin (VANCOCIN) IVPB (custom)  1,750 mg Intravenous Q24H     Continuous Infusions:   heparin (porcine) in D5W 12 Units/kg/hr (06/05/19 1014)     PRN Meds:acetaminophen, ceFAZolin (ANCEF) IVPB, dextrose 50%, dextrose 50%, glucagon (human recombinant), glucose, glucose, heparin (PORCINE), heparin (PORCINE), insulin aspart U-100, nitroGLYCERIN, sodium chloride 0.9%    Review of patient's allergies indicates:  No Known Allergies     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 10/1/2012    Preseptal cellulitis of right eye 8/17/15    S/P CABG x 2 2/14/2014    1994     Sleep apnea     Squamous cell carcinoma 03/18/2013    right posterior ear    Squamous cell carcinoma 07/26/2017    scalp    Squamous cell carcinoma 08/02/2018    Right Scalp/[facility]    Trouble in sleeping     Type 1 diabetes, uncontrolled, with retinopathy  10/1/2012    Type II or unspecified type diabetes mellitus without mention of complication, not stated as uncontrolled     Vitreous hemorrhage 8/17/2014     Past Surgical History:   Procedure Laterality Date    Angiogram Extremity Unilateral possible CO2 Left 6/3/2019    Per[facility] at [facility] OR 2ND FLR    ANGIOGRAM, CORONARY ARTERY N/A 1/29/2019    Performed by Saturnino Ny MD at [facility] CATH LAB    APPENDECTOMY  1953    BLOCK-NERVE-MEDIAL BRANCH-LUMBAR Bilateral 3/10/2015    Performed by [facility]les [facility] at [facility] PAIN MGT    BLOCK-NERVE-MEDIAL BRANCH-LUMBAR Bilateral 11/25/2014    Performed by [facility] at [facility] PAIN MGT    CATARACT EXTRACTION  4/8/13    right eye (toric)    CATARACT EXTRACTION  4/29/13    left eye (toric)    COLONOSCOPY N/A 4/25/2017    Performed by CLARISSA Freeman MD at [facility] ENDO (4TH FLR)    COLONOSCOPY N/A 4/25/2014    Performed by CLARISSA Freeman MD at [facility] ENDO (4TH FLR)    CORONARY ARTERY BYPASS GRAFT  1994    x 3    EYE SURGERY      cataracts, both eyes    FINGER TENDON REPAIR  2011    left hand    INJECTION-FACET Bilateral 3/31/2015    [facility] at [facility] MGT    INJECTION-STEROID-EPIDURAL-LUMBAR N/A 10/28/2014    Performed by [facility] at [facility] MGT    INSERTION, IOL PROSTHESIS Left 4/29/2013    Performed by Soco Sandoval MD at [facility] OR    INSERTION, IOL PROSTHESIS Right 4/8/2013    Performed by Soco Sandoval [facility] at [facility] OR    INSERTION, STENT, CORONARY ARTERY N/A 1/29/2019    Performed by Saturn[facility] Ny MD at [facility] CATH LAB    MINIMALLY INVASIVE FUSION-TRANSLUMINAL LUMBAR INTERBODY (TLIF) Left 5/18/2015    Performed by Tee Pinedo MD at [facility] OR 2ND FLR    PHACOEMULSIFICATION, CATARACT Left 4/29/2013    Performed by Soco [facility]oval [facility] at [facility] OR    PHACOEMULSIFICATION, CATARACT Right 4/8/2013    Performed by Soco Sandoval MD at [facility] OR    PTA, PERIPHERAL BLOOD VESSEL Left  6/3/2019    Per[facility] FLR    RADIOFREQUENCY THERMOCOAGULATION (RFTC)-NERVE-MEDIAN BRANCH-LUMBAR Left 1/13/2015    Performed by [facility] at [facility]    RADIOFREQUENCY THERMOCOAGULATION (RFTC)-NERVE-MEDIAN BRANCH-LUMBAR Right 12/30/2014    Performed by [facility] at [facility]    SKIN BIOPSY  2013    multiple    SPINE SURGERY  2015    TLIF    TONSILLECTOMY  1947       Family History     Problem Relation (Age of Onset)    Acute myelogenous leukemia Sister    Alzheimer's disease Father    Breast cancer Mother    Cancer Mother, Sister (60)    Colon cancer Sister    Dementia Father    Diabetes Maternal Grandfather, Paternal Aunt, Paternal Uncle    Stroke Father        Tobacco Use    Smoking status: Never Smoker    Smokeless tobacco: Never Used   Substance and Sexual Activity    Alcohol use: Yes     Alcohol/week: 0.6 oz     Types: 1 Glasses of wine per week     Comment: social once monthly    Drug use: No    Sexual activity: Not Currently     Partners: Female     Review of Systems   Constitutional: Negative for chills and fever.   Respiratory: Positive for shortness of breath.    Gastrointestinal: Negative for nausea and vomiting.   Skin: Positive for color change and wound.     Objective:     Vital Signs (Most Recent):  Temp: 98.7 °F (37.1 °C) (06/05/19 0755)  Pulse: 84 (06/05/19 0755)  Resp: (!) 22 (06/05/19 0755)  BP: 117/71 (06/05/19 0944)  SpO2: 98 % (06/05/19 0755) Vital Signs (24h Range):  Temp:  [98.6 °F (37 °C)-98.7 °F (37.1 °C)] 98.7 °F (37.1 °C)  Pulse:  [67-84] 84  Resp:  [17-22] 22  SpO2:  [97 %-100 %] 98 %  BP: ()/(63-71) 117/71     Weight: 88.2 kg (194 lb 7.1 oz)  Body mass index is 30.45 kg/m².    Foot Exam    General  General Appearance: appears stated age and healthy   Orientation: alert and oriented to person, place, and time       Right Foot/Ankle     Inspection and Palpation  Ecchymosis: none  Tenderness: none    Swelling: (+3 edema)  Skin Exam: no abnormal color     Neurovascular  Dorsalis pedis: absent  Posterior tibial: absent  Saphenous nerve sensation: normal  Tibial nerve sensation: normal  Superficial peroneal nerve sensation: normal  Deep peroneal nerve sensation: normal  Sural nerve sensation: normal      Left Foot/Ankle      Inspection and Palpation  Ecchymosis: none  Tenderness: great toe interphalangeal joint   Swelling: (+3 edema)  Skin Exam: skin changes, abnormal color, ulcer and erythema;     Neurovascular  Dorsalis pedis: absent  Posterior tibial: absent  Saphenous nerve sensation: normal  Tibial nerve sensation: normal  Superficial peroneal nerve sensation: normal  Deep peroneal nerve sensation: normal  Sural nerve sensation: normal            Laboratory:  CBC:   Recent Labs   Lab 06/05/19  0908   WBC 25.12*   RBC 3.28*   HGB 10.6*   HCT 33.3*   *   *   MCH 32.3*   MCHC 31.8*     CMP:   Recent Labs   Lab 06/05/19  0605   *   CALCIUM 9.0   ALBUMIN 3.3*   PROT 5.7*   *   K 4.2   CO2 27   CL 97   BUN 29*   CREATININE 1.1   ALKPHOS 82   ALT 18   AST 49*   BILITOT 0.9     Wound Cultures:   Recent Labs   Lab 05/22/19  0942 06/04/19  1441   LABAERO ENTEROCOCCUS FAECALIS  Many  No other significant isolate   No growth       Diagnostic Results:  MRI: I have reviewed all pertinent results/findings within the past 24 hours.  1. Slight marrow edema and subtle T1 hypointense signal involving the distal phalanx of the great toe possibly relating to reactive change or early osteomyelitis.    Clinical Findings:    B/l LE cold to the touch.    Superficial ulceration to the L hallux. Appears to be a thin, adherent eschar with periwound erythema. No drainage, fluctuance or crepitus on exam. Outline of erythema is much improved upon exam; however, purple hue to the forefoot appears when placed at a dependent position.     6/5        Dependent    Elevated

## 2019-06-05 NOTE — PROGRESS NOTES
[facility]  Infectious Disease  Progress Note    Patient Name: Reid Herman  MRN: [id number]  Admission Date: 5/29/2019  Length of Stay: 7 days  Attending Physician: Elis Gutierrez MD  Primary Care Provider: Olivia Zavala MD    Isolation Status: No active isolations  Assessment/Plan:      Diabetic ulcer of left great toe     79 year old male with history of  DM-1, HTN, CAD s/p CABG x 2 , CLL not currently on any therapy and PAD  who presented as a direct admit from podiatry clinic for an infected left great toe ulcer. Prior wound culture with E. Faecalis. Did not respond to outpatient oral antibiotics. Pt started on IV vanc. MRI cannot rule out possible early osteomyelitis distal phalanx. ID consulted for further recs.     This admit he underwent successful angiogram/revascularization to LLE. Bone biopsy performed yesterday and cultures pending. He was started on empiric Vanc and Ceftriaxone after the biopsy. Afebrile. Chronic leukocytosis noted since January and has remained stable/actually down trending here. Troponin elevated overnight and may required LHC.      Plan:  1. Continue empiric Vancomycin and Ceftriaxone for now. Increase Ceftriaxone to 2 g daily.   2. Follow bone biopsy cultures to guide antibiotics  3. Anticipate 6 weeks of IV antibiotic therapy for osteomyelitis   4. Will follow up pathology as an outpatient if does not return while he remains in the hospital. Decision to tailor antibiotics can be made at ID follow up.  5. ID will follow.      Please call for any questions. Thank you.  Kim Dailey PA-C  Phone: [phone]  Pager: [phone]    Subjective:     Principal Problem:Cellulitis of foot    HPI: This is a 79 year old male who presented as a direct admit from podiatry clinic for a left great toe ulcer. Patient has a PMH HTN, HLD, DM-1(insulin pump), CAD s/p CABG x 2 , MINDA compliant with CPAP, CLL not currently on any therapy, and PAD who presents with left foot ulcer  and for the last month that has progressively been worsening.  Patient was on multiple courses of oral antibiotics with no improvement.  He was seen in podiatry clinic yesterday and noted to be worsening so recommended admission for IV antibiotics.  Patient had MRI which could not rule out early osteomyelitis.  Podiatry consulted and discussed possible bone biopsy. Patient was started on IV vancomycin.    Interventional cards has been consulted.  There is a wound culture from 5/22 that grew enterococcus faecalis. Patient has a leukocytosis of 51,000 but has history of CLL and has had this WBC count since January.  Patient has mild pain but does have neuropathy. He denies fevers or chills.       Interval History:  Troponin bumped overnight. Possible LHC today. Denies fevers, chills, sweats. Still having foot pain. Bone bx cultures are pending,.    Review of Systems   Constitutional: Negative for chills, diaphoresis and fever.   Respiratory: Negative for cough and shortness of breath.    Cardiovascular: Positive for leg swelling. Negative for chest pain.   Gastrointestinal: Negative for abdominal pain, constipation, diarrhea and nausea.   Genitourinary: Negative for dysuria, frequency and hematuria.   Musculoskeletal: Positive for arthralgias. Negative for back pain and myalgias.   Skin: Positive for wound (left foot). Negative for rash.   Neurological: Negative for light-headedness, numbness and headaches.   Psychiatric/Behavioral: Negative for agitation and behavioral problems. The patient is not nervous/anxious.      Objective:     Vital Signs (Most Recent):  Temp: 98.7 °F (37.1 °C) (06/05/19 0755)  Pulse: 84 (06/05/19 0900)  Resp: (!) 22 (06/05/19 0755)  BP: 117/71 (06/05/19 0944)  SpO2: 98 % (06/05/19 0900) Vital Signs (24h Range):  Temp:  [98.6 °F (37 °C)-98.7 °F (37.1 °C)] 98.7 °F (37.1 °C)  Pulse:  [67-84] 84  Resp:  [17-22] 22  SpO2:  [97 %-100 %] 98 %  BP: ()/(63-71) 117/71     Weight: 88.2 kg (194 lb  7.1 oz)  Body mass index is 30.45 kg/m².    Estimated Creatinine Clearance: 59.6 mL/min (based on SCr of 1.1 mg/dL).    Physical Exam   Constitutional: He is oriented to person, place, and time. He appears well-developed and well-nourished. No distress.   Cardiovascular: Normal rate and regular rhythm.   No murmur heard.  Pulmonary/Chest: Effort normal and breath sounds normal. No respiratory distress.   Abdominal: Soft. He exhibits no distension.   Musculoskeletal: Normal range of motion. He exhibits edema.   Neurological: He is alert and oriented to person, place, and time.   Skin: Skin is warm and dry.   Left medial great toe with dry ulceration. No drainage.  Mild TTP.  There is erythema of the 1st digit. Decreased erythema to forefoot.   Mild tenderness.  Foot is warmer today.     Psychiatric: He has a normal mood and affect. His behavior is normal.       Significant Labs:   Blood Culture:   Recent Labs   Lab 05/29/19  1756   LABBLOO No growth after 5 days.  No growth after 5 days.     CBC:   Recent Labs   Lab 06/04/19 0315 06/05/19  0605 06/05/19  0908   WBC 29.09* 30.20* 25.12*   HGB 10.1* 10.1* 10.6*   HCT 31.6* 31.7* 33.3*   * 114* 106*     CMP:   Recent Labs   Lab 06/04/19 0315 06/05/19  0605    135*   K 4.3 4.2   CL 97 97   CO2 28 27   * 247*   BUN 34* 29*   CREATININE 1.2 1.1   CALCIUM 9.2 9.0   PROT 5.6* 5.7*   ALBUMIN 3.4* 3.3*   BILITOT 0.7 0.9   ALKPHOS 83 82   AST 19 49*   ALT 15 18   ANIONGAP 11 11   EGFRNONAA 58.0* >60.0     Wound Culture:   Recent Labs   Lab 05/22/19  0942 06/04/19  1441   LABAERO ENTEROCOCCUS FAECALIS  Many  No other significant isolate   No growth       Significant Imaging: I have reviewed all pertinent imaging results/findings within the past 24 hours.

## 2019-06-05 NOTE — PHYSICIAN QUERY
PT Name: Reid Herman  MR #: [id number]    Physician Query Form - Non-Pressure Ulcer Clarification      CDS/: Charlotte Christian RN               Contact information: [email or web address]  This form is a permanent document in the medical record.     Query Date: June 5, 2019    By submitting this query, we are merely seeking further clarification of documentation. Please utilize your independent clinical judgment when addressing the question(s) below.    The Medical Record contains the following:   Indicator   Supporting Clinical Findings Location in Medical Record   x Non-pressure Ulcer Superficial ulceration to the L hallux. Appears to be a thin, adherent eschar with periwound erythema. No drainage,  H&P    Wound Care Consult     x Radiology Findings 1. Slight marrow edema and subtle T1 hypointense signal involving the distal phalanx of the great toe possibly relating to reactive change or early osteomyelitis.  2. Findings suggestive of neuropathic myositis. MRI L foot 5/29   x Acute/Chronic Illness DM1,  PVD, CLL, L foot abscess, hyponatremia, hyperkalemia, ckd3, MINDA , HTN, diabetic polyneuropathy,  left hallux ulcer that has been growing enterococcus sensitive to ampicillin and vancomycin but with worsening erythema despite treatment. Admitted from podiatry for OM rule out and IV antibiotics    Cellulitis L foot,New onset HF w/ troponin leak vs NSTEMI in the setting of acute infection.     Acute systolic heart failure H&P              Hospital medicine PN 6/5      Provider query response 6/1   x Treatment: Superifical wound that appears improved with elevation. When LE placed in dependent position, pigment worsens.  - Wound was painted with betadine today.   - Recommend local wound care: Daily santyl and adhesive bandage.    S/p bone biopsy 6/4/19, antibiotics resumed; afebrile, all cultures negative, WBC trending down Podiatry note 6/5            Cardiology PN 6/5    Other:        Provider,  Please specify the diagnosis or diagnoses associated with above clinical findings.  [   ] Skin breakdown only   [   ] Exposed fat layer   [   ] Muscle involvement without evidence of necrosis   [   ] Muscle necrosis   [ X  ] Bone involvement without evidence of necrosis   [   ] Bone necrosis   [   ] Other depth (please specify):   [   ] Other Integumentary Diagnosis (please specify):   [  ] Clinically Undetermined       Please document in your progress notes daily for the duration of treatment, until resolved, and include in your discharge summary.

## 2019-06-05 NOTE — PLAN OF CARE
Problem: Adult Inpatient Plan of Care  Goal: Plan of Care Review  Outcome: Ongoing (interventions implemented as appropriate)  Pt is free from falls trauma and injuries. Bed in low position with call light in reach. Skin is warm and dry. VS are stable. No noted fever or pain. Patient is transferring to cardiac floor after meal.

## 2019-06-05 NOTE — SUBJECTIVE & OBJECTIVE
Interval History:  Troponin bumped overnight. Possible LHC today. Denies fevers, chills, sweats. Still having foot pain. Bone bx cultures are pending,.    Review of Systems   Constitutional: Negative for chills, diaphoresis and fever.   Respiratory: Negative for cough and shortness of breath.    Cardiovascular: Positive for leg swelling. Negative for chest pain.   Gastrointestinal: Negative for abdominal pain, constipation, diarrhea and nausea.   Genitourinary: Negative for dysuria, frequency and hematuria.   Musculoskeletal: Positive for arthralgias. Negative for back pain and myalgias.   Skin: Positive for wound (left foot). Negative for rash.   Neurological: Negative for light-headedness, numbness and headaches.   Psychiatric/Behavioral: Negative for agitation and behavioral problems. The patient is not nervous/anxious.      Objective:     Vital Signs (Most Recent):  Temp: 98.7 °F (37.1 °C) (06/05/19 0755)  Pulse: 84 (06/05/19 0900)  Resp: (!) 22 (06/05/19 0755)  BP: 117/71 (06/05/19 0944)  SpO2: 98 % (06/05/19 0900) Vital Signs (24h Range):  Temp:  [98.6 °F (37 °C)-98.7 °F (37.1 °C)] 98.7 °F (37.1 °C)  Pulse:  [67-84] 84  Resp:  [17-22] 22  SpO2:  [97 %-100 %] 98 %  BP: ()/(63-71) 117/71     Weight: 88.2 kg (194 lb 7.1 oz)  Body mass index is 30.45 kg/m².    Estimated Creatinine Clearance: 59.6 mL/min (based on SCr of 1.1 mg/dL).    Physical Exam   Constitutional: He is oriented to person, place, and time. He appears well-developed and well-nourished. No distress.   Cardiovascular: Normal rate and regular rhythm.   No murmur heard.  Pulmonary/Chest: Effort normal and breath sounds normal. No respiratory distress.   Abdominal: Soft. He exhibits no distension.   Musculoskeletal: Normal range of motion. He exhibits edema.   Neurological: He is alert and oriented to person, place, and time.   Skin: Skin is warm and dry.   Left medial great toe with dry ulceration. No drainage.  Mild TTP.  There is erythema  of the 1st digit. Decreased erythema to forefoot.   Mild tenderness.  Foot is warmer today.     Psychiatric: He has a normal mood and affect. His behavior is normal.       Significant Labs:   Blood Culture:   Recent Labs   Lab 05/29/19  1756   LABBLOO No growth after 5 days.  No growth after 5 days.     CBC:   Recent Labs   Lab 06/04/19 0315 06/05/19  0605 06/05/19  0908   WBC 29.09* 30.20* 25.12*   HGB 10.1* 10.1* 10.6*   HCT 31.6* 31.7* 33.3*   * 114* 106*     CMP:   Recent Labs   Lab 06/04/19 0315 06/05/19  0605    135*   K 4.3 4.2   CL 97 97   CO2 28 27   * 247*   BUN 34* 29*   CREATININE 1.2 1.1   CALCIUM 9.2 9.0   PROT 5.6* 5.7*   ALBUMIN 3.4* 3.3*   BILITOT 0.7 0.9   ALKPHOS 83 82   AST 19 49*   ALT 15 18   ANIONGAP 11 11   EGFRNONAA 58.0* >60.0     Wound Culture:   Recent Labs   Lab 05/22/19  0942 06/04/19  1441   LABAERO ENTEROCOCCUS FAECALIS  Many  No other significant isolate   No growth       Significant Imaging: I have reviewed all pertinent imaging results/findings within the past 24 hours.

## 2019-06-05 NOTE — ASSESSMENT & PLAN NOTE
Reid HARDEN Jimmiematt is a 79 y.o. male s/p right hallux bone biopsy  -Abx per ID, appreciate recs   - Recommend local wound care: Daily santyl and adhesive bandage.  - s/p revascularization 6/3  - Pt can WBAT in surgical shoe.  - Podiatry will follow.

## 2019-06-05 NOTE — ASSESSMENT & PLAN NOTE
Patient sees Dr. Garcia in heme/onc  - Has not needed treatment  - Previously hyperkalemic attributed to CLL burden

## 2019-06-05 NOTE — ASSESSMENT & PLAN NOTE
BG goal 140-180    BG remains above goal. Patient was delayed in receiving long acting insulin yesterday secondary to pump start complications/protocols.   Increase Levemir 15 units.   Continue Novolog ICR 1:9 -->1:8  Continue customized Correction scale.     Discharge recommendations:   TBD.

## 2019-06-05 NOTE — PROGRESS NOTES
[facility] Hospital Medicine  Progress Note    Patient Name: Reid Herman  MRN: [id number]  Patient Class: IP- Inpatient   Admission Date: 5/29/2019  Length of Stay: 7 days  Attending Physician: Elis Gutierrez MD  Primary Care Provider: Olivia Zavala MD    [facility] Medicine Team: [facility] MED 3 Milind Chaves MD    Subjective:     Principal Problem:Cellulitis of foot    HPI:  Reid Herman is a 79M direct admit from podiatry clinic for a left hallux abcess. Patient has a PMH HTN, Hyperlipidemia, DM-1(insulin pump), CAD s/p CABG x 2 , MINDA compliant with CPAP, CLL not currently on any therapy, PAD who has been dealing with a left foot abcess for the last month that has been getting worse. Cultures from wound have grown enterococcus that  sensitive to amp and Vanc.  Wound has worsened despite appropriate PO ABx (ampicillin). Additionally he has described that he has been having problems with SOB that he has been needing to increase the incline of his bed for. He also noticed worsening bilateral lower edema and he has gained 7 lbs in the last month. Patient states that currently he does not feel SOB ( since it only  occurs with being flat or exertion), no CP, no palpitations, no fever, chills. He states that he can feel his bilateral feet but has decreased. Usually he has been mobile but with this wound and his boot he has been less mobile. No history of DVT but he has a history of CLL.    Initial workup in the floor shows elevated BNP in the 2k, WBC > 50 ( he has a baseline around 53. CRP elevated, and hyponatremic to 127.     Hospital Course:  Admitted to medicine for cellulitis and CHF exacerbation. Ulcer seen by podiatry, vascular and ID.  Patient found to have elevated tropoinins and EKG changes consistent with recent MI.  Cardiology consulted, along with Interventional cardiology.  Recommendation to treat medically with heparin ggt for 48hrs, lisinopril 10mg, statin, plavix, ASA  325. Angiogram performed by vascular surgery 6/3, underwent foot bone biopsy on 6/4 and cultures sent.     Interval History: Troponin acutely elevated. Cardiology following. Continues on rocephin and vanc. Weaning O2 as tolerated. Transferring to cardiology floor.     Review of Systems   Constitutional: Negative for chills, diaphoresis and fever.   Respiratory: Negative for cough and shortness of breath.    Cardiovascular: Positive for leg swelling. Negative for chest pain.   Gastrointestinal: Negative for abdominal pain, constipation, diarrhea and nausea.   Genitourinary: Negative for dysuria, frequency and hematuria.   Musculoskeletal: Positive for arthralgias. Negative for back pain and myalgias.   Skin: Positive for wound (left foot). Negative for rash.   Neurological: Negative for light-headedness, numbness and headaches.   Psychiatric/Behavioral: Negative for agitation and behavioral problems. The patient is not nervous/anxious.      Objective:     Vital Signs (Most Recent):  Temp: 98.6 °F (37 °C) (06/05/19 1227)  Pulse: 84 (06/05/19 1227)  Resp: 16 (06/05/19 1227)  BP: 114/68 (06/05/19 1227)  SpO2: 96 % (06/05/19 1227) Vital Signs (24h Range):  Temp:  [98.6 °F (37 °C)-98.7 °F (37.1 °C)] 98.6 °F (37 °C)  Pulse:  [67-87] 84  Resp:  [16-22] 16  SpO2:  [96 %-100 %] 96 %  BP: ()/(63-71) 114/68     Weight: 88.2 kg (194 lb 7.1 oz)  Body mass index is 30.45 kg/m².    Intake/Output Summary (Last 24 hours) at 6/5/2019 1342  Last data filed at 6/5/2019 0800  Gross per 24 hour   Intake 720 ml   Output 1300 ml   Net -580 ml      Physical Exam   Constitutional: He is oriented to person, place, and time. He appears well-developed and well-nourished. No distress.   HENT:   Head: Normocephalic and atraumatic.   Eyes: Pupils are equal, round, and reactive to light. EOM are normal.   Neck: Normal range of motion. Neck supple.   Cardiovascular: Normal rate, regular rhythm and normal heart sounds.   No murmur  heard.  Pulmonary/Chest: Effort normal and breath sounds normal. No respiratory distress.   Abdominal: Soft. Bowel sounds are normal. He exhibits no distension and no mass. There is no tenderness. There is no guarding.   Musculoskeletal: Normal range of motion. He exhibits edema and tenderness. He exhibits no deformity.   Erythema in the left foot as pictures below/ Ulcer present that is cleaned , pain around the ulcer noted.  Feet are warm proximally. + edema in the left foot and 2+ edema in the right foot    Neurological: He is alert and oriented to person, place, and time.   Skin: Skin is warm and dry. He is not diaphoretic.   Left medial great toe with dry ulceration. No drainage.  Mild TTP.  There is erythema of the 1st digit.    Psychiatric: He has a normal mood and affect. His behavior is normal.   Nursing note and vitals reviewed.      Significant Labs: All pertinent labs within the past 24 hours have been reviewed.    Significant Imaging: I have reviewed and interpreted all pertinent imaging results/findings within the past 24 hours.    Assessment/Plan:      * Cellulitis of Left foot  79M w/ DM1, PAD and a left hallux ulcer that has been growing enterococcus sensitive to ampicillin and vancomycin but with worsening erythema despite treatment. Admitted from podiatry for OM rule out and IV antibiotics  - MRI shows early OM  - Angiogram performed by vascular surgery on 6/3  - Bone biopsy was done and cultures sent 6/4    PLAN:  - ID following - on vancomycin and rocephin, will need 6 weeks of abx   - Podiatry following  - Vascular following   - Follow cultures     NSTEMI (non-ST elevated myocardial infarction)  - EKG w/ ST depressions, elevated troponin (peaked 1.16)  - Dyspnea on exertion, orthopnea, significant peripheral edema.  - BNP 2608  - ECHO w/ EF 20%, grade II DD.   - New onset HF w/ troponin leak vs NSTEMI in the setting of acute infection.     Plan  - Continue ASA 81 mg and plavix 75 mg daily  -  Continue metoprolol XL qHs  - Continue IV Lasix 40 mg BID  - Continue Lisinopril 10 mg daily   - Interventional cards to perform LHC prior to discharge    Acute HF (heart failure)  - Patient with symptoms of orthopnea and dyspnea on exertion   - Patient on HCTZ at home no loop diuretic   - BNP elevated at 2 k ,   - PE notable for  JVD and bilateral pitting edema, abdominal distension    - Xray on my read be bilateral edema   - precipitating factor is likely infected ulcer in a patient with PAD   -Echocardiogram: 2D echo with color flow doppler most recent not in file he had a pet stress in January showing EF of 40     PLAN   - IV lasix 40mg BID, adjust as needed  - Strict I/O and Daily weights  - Wean oxygen as tolerated     Orthopnea  - see CHF    Foot abscess, left  - see cellulitis       CKD (chronic kidney disease), stage III  - Diuresing w/ lasix 40 IV BID  - Daily CMP, replace lytes as needed   - Continue to monitor    Peripheral vascular disease  Last LEONIE was march with mild PAD  - Angiogram performed 6/3  - continue ASA, statin, and plavix    MINDA on CPAP  - cpap qhs       S/P CABG (coronary artery bypass graft)  CAD  History of angina     - no CP currently   - continue Asa, statin ,plavix  - takes nitro PRN will hold off on ordering and will evaluate pt if he starts having symptoms       HTN (hypertension)  - On home lisinopril  - Started Imdur  - Continue toprol qhs     Hyperlipidemia  - continue statin     Diabetic polyneuropathy associated with type 1 diabetes mellitus  - contributing to difficult ulcer healing   --HbA1c 5.8 very well controlled   - Home DM regimen:  Insulin pump, patient states his average basal is 23 units + SSI    Plan  - Endocrine following, appreciate assistance  - Recommend Levemir to 14  units qHS, Novolog ICR 1:9  - Supplement with SSI    CLL (chronic lymphocytic leukemia)  Patient sees Dr. Garcia in heme/onc  - Has not needed treatment  - Previously hyperkalemic attributed to CLL  burden       VTE Risk Mitigation (From admission, onward)        Ordered     heparin 25,000 units in dextrose 5% 250 mL (100 units/mL) infusion LOW INTENSITY nomogram - [facility]  Continuous      06/05/19 0853     heparin 25,000 units in dextrose 5% (100 units/ml) IV bolus from bag - ADDITIONAL PRN BOLUS - 60 units/kg (max bolus 4000 units)  As needed (PRN)      06/05/19 0853     heparin 25,000 units in dextrose 5% (100 units/ml) IV bolus from bag - ADDITIONAL PRN BOLUS - 30 units/kg (max bolus 4000 units)  As needed (PRN)      06/05/19 0853     heparin 25,000 units in dextrose 5% 250 mL (100 units/mL) infusion LOW INTENSITY nomogram - [facility]  Continuous      06/02/19 1508     IP VTE LOW RISK PATIENT  Once      05/29/19 6103          Milind Chaves MD  Department of Hospital Medicine   [facility]

## 2019-06-05 NOTE — PROGRESS NOTES
"[facility]  Endocrinology  Progress Note    Admit Date: 2019     Reason for Consult: Management of T1DM, Hyperglycemia     Surgical Procedure and Date: N/A    Diabetes diagnosis year:     Lab Results   Component Value Date    HGBA1C 5.8 (H) 2019       Home Diabetes Medications:  Accuchek spirit pump/sarai       Basal Rate  0000 - 0300     0.85 u/hr  0300 - 0700     0.9 u/hr  0700 - 2100     1.1 u/hr  2100 - 0000     0.85 u/hr        Carb Ratio  12A: 1:8     ISF  1:30     Target: 110-130  IAT: 3    How often checking glucose at home? Dexcom G5 CGM  BG readings on regimen: 110-140  Hypoglycemia on the regimen?  Yes, a few time per week  Missed doses on regimen?  No    Diabetes Complications include:     Hypoglycemia , Diabetic chronic kidney disease     , Diabetic retinopathy , Diabetic peripheral neuropathy , Diabetic peripheral angiopathy with/without gangrene and Foot ulcer      Complicating diabetes co morbidities:   CHF, MINDA and CKD      HPI:   Patient is a 79 y.o. male with a diagnosis of hyponatremia, left foot abscess, CKD3, DM1, CLL, MINDA, and acute heart failure.  Ad[facility] [place] [facility] [place] [facility] from podiatry clinic for a left hallux abcess.  Last seen in endocrinology clinic by ESTEFANÍA Rivers NP on 19.  Endocrine consulted for DM/BG management.            Interval HPI:   Overnight events:  BG remains above goal on current SQ MDI insulin regimen.  Hemodynamic complications noted overnight as per chart review.     Eatin%  Nausea: No  Hypoglycemia and intervention: No  Fever: No  TPN and/or TF: No  If yes, type of TF/TPN and rate: None    /63   Pulse 84   Temp 98.7 °F (37.1 °C) (Oral)   Resp (!) 22   Ht 5' 7" (1.702 m)   Wt 88.2 kg (194 lb 7.1 oz)   SpO2 98%   BMI 30.45 kg/m²      Labs Reviewed and Include    Recent Labs   Lab 19  0605   *   CALCIUM 9.0   ALBUMIN 3.3*   PROT 5.7*   *   K 4.2   CO2 27   CL 97   BUN 29*   CREATININE 1.1 "   ALKPHOS 82   ALT 18   AST 49*   BILITOT 0.9     Lab Results   Component Value Date    WBC 30.20 (H) 06/05/2019    HGB 10.1 (L) 06/05/2019    HCT 31.7 (L) 06/05/2019     (H) 06/05/2019     (L) 06/05/2019     No results for input(s): TSH, FREET4 in the last 168 hours.  Lab Results   Component Value Date    HGBA1C 5.8 (H) 05/29/2019       Nutritional status:   Body mass index is 30.45 kg/m².  Lab Results   Component Value Date    ALBUMIN 3.3 (L) 06/05/2019    ALBUMIN 3.4 (L) 06/04/2019    ALBUMIN 3.6 06/03/2019     Lab Results   Component Value Date    PREALBUMIN 22 05/17/2019       Estimated Creatinine Clearance: 59.6 mL/min (based on SCr of 1.1 mg/dL).    Accu-Checks  Recent Labs     06/03/19  1333 06/03/19  1753 06/03/19  2208 06/04/19  0121 06/04/19  0806 06/04/19  1159 06/04/19  1704 06/04/19  1705 06/04/19  2148 06/05/19  0805   POCTGLUCOSE 203* 207* 222* 201* 202* 308* <20* 226* 239* 250*       Current Medications and/or Treatments Impacting Glycemic Control  Immunotherapy:    Immunosuppressants     None        Steroids:   Hormones (From admission, onward)    None        Pressors:    Autonomic Drugs (From admission, onward)    None        Hyperglycemia/Diabetes Medications:   Antihyperglycemics (From admission, onward)    Start     Stop Route Frequency Ordered    06/04/19 2100  insulin detemir U-100 pen 14 Units      -- SubQ Nightly 06/04/19 1503    06/03/19 0843  insulin aspart U-100 pen 0-5 Units      -- SubQ Before meals & nightly PRN 06/03/19 0746    05/30/19 1230  insulin aspart U-100 pen 1-15 Units      -- SubQ 3 times daily with meals 05/30/19 1123          ASSESSMENT and PLAN    * Cellulitis of Left foot  Infection may elevate BG readings  Optimize BG control for wound healing      Controlled type 1 diabetes mellitus with diabetic neuropathy, with long-term current use of insulin  BG goal 140-180    BG remains above goal. Patient was delayed in receiving long acting insulin yesterday  secondary to pump start complications/protocols.   Increase Levemir 15 units.   Continue Novolog ICR 1:9 -->1:8  Continue customized Correction scale.     Discharge recommendations:   TBD.     MINDA on CPAP  May affect BG readings if uncontrolled            Fransico Vigil NP  Endocrinology  [facility]

## 2019-06-05 NOTE — SUBJECTIVE & OBJECTIVE
"Interval HPI:   Overnight events:  BG remains above goal on current SQ MDI insulin regimen.  Hemodynamic complications noted overnight as per chart review.     Eatin%  Nausea: No  Hypoglycemia and intervention: No  Fever: No  TPN and/or TF: No  If yes, type of TF/TPN and rate: None    /63   Pulse 84   Temp 98.7 °F (37.1 °C) (Oral)   Resp (!) 22   Ht 5' 7" (1.702 m)   Wt 88.2 kg (194 lb 7.1 oz)   SpO2 98%   BMI 30.45 kg/m²     Labs Reviewed and Include    Recent Labs   Lab 19  0605   *   CALCIUM 9.0   ALBUMIN 3.3*   PROT 5.7*   *   K 4.2   CO2 27   CL 97   BUN 29*   CREATININE 1.1   ALKPHOS 82   ALT 18   AST 49*   BILITOT 0.9     Lab Results   Component Value Date    WBC 30.20 (H) 2019    HGB 10.1 (L) 2019    HCT 31.7 (L) 2019     (H) 2019     (L) 2019     No results for input(s): TSH, FREET4 in the last 168 hours.  Lab Results   Component Value Date    HGBA1C 5.8 (H) 2019       Nutritional status:   Body mass index is 30.45 kg/m².  Lab Results   Component Value Date    ALBUMIN 3.3 (L) 2019    ALBUMIN 3.4 (L) 2019    ALBUMIN 3.6 2019     Lab Results   Component Value Date    PREALBUMIN 22 2019       Estimated Creatinine Clearance: 59.6 mL/min (based on SCr of 1.1 mg/dL).    Accu-Checks  Recent Labs     19  1333 19  1753 19  2208 19  0121 19  0806 19  1159 19  1704 19  1705 19  2148 19  0805   POCTGLUCOSE 203* 207* 222* 201* 202* 308* <20* 226* 239* 250*       Current Medications and/or Treatments Impacting Glycemic Control  Immunotherapy:    Immunosuppressants     None        Steroids:   Hormones (From admission, onward)    None        Pressors:    Autonomic Drugs (From admission, onward)    None        Hyperglycemia/Diabetes Medications:   Antihyperglycemics (From admission, onward)    Start     Stop Route Frequency Ordered    19  " insulin detemir U-100 pen 14 Units      -- SubQ Nightly 06/04/19 1503    06/03/19 0843  insulin aspart U-100 pen 0-5 Units      -- SubQ Before meals & nightly PRN 06/03/19 0746    05/30/19 1230  insulin aspart U-100 pen 1-15 Units      -- SubQ 3 times daily with meals 05/30/19 1123

## 2019-06-06 PROBLEM — E66.09 CLASS 1 OBESITY DUE TO EXCESS CALORIES WITH SERIOUS COMORBIDITY AND BODY MASS INDEX (BMI) OF 30.0 TO 30.9 IN ADULT: Status: ACTIVE | Noted: 2019-01-01

## 2019-06-06 PROBLEM — I48.91 ATRIAL FIBRILLATION: Status: ACTIVE | Noted: 2019-01-01

## 2019-06-06 NOTE — CONSULTS
[facility]  Cardiac Electrophysiology  Consult Note    Admission Date: 5/29/2019  Code Status: Full Code   Attending Provider: Laura Osman MD  Consulting Provider: Stefany Boyd MD  Principal Problem:Cellulitis of foot    Inpatient consult to Electrophysiology  Consult performed by: Stefany Boyd MD  Consult ordered by: Milind Chaves MD        Subjective:     Chief Complaint:  Atrial Fibrillation     HPI:     79M direct admit from podiatry clinic for a left hallux abcess. Patient has a PMH HTN, Hyperlipidemia, DM-1(insulin pump), CAD s/p CABG x 2 , MINDA compliant with CPAP, CLL not currently on any therapy, Admitted to medicine for cellulitis and CHF exacerbation. Ulcer seen by podiatry, vascular and ID.  Patient found to have elevated tropoinins and EKG changes consistent with recent MI.  Cardiology consulted, along with Interventional cardiology.  Recommendation to treat medically with heparin ggt for 48hrs, lisinopril 10mg, statin, plavix, . Angiogram performed by vascular surgery 6/3, underwent foot bone biopsy on 6/4 and cultures sent. Pt began experiencing acute chest pain later in the day. Repeat troponin acutely elevated at 14. Pt taken for left heart cath and had 1 stent placed 6/6 in SVG-LAD. Following his procedure he remained in A-fib w/ RVR and EP is consulted for cardioversion.    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 10/1/2012    Preseptal cellulitis of right  eye 8/17/15    S/P CABG x 2 2/14/2014    1994     Sleep apnea     Squamous cell carcinoma 03/18/2013    right posterior ear    Squamous cell carcinoma 07/26/2017    scalp    Squamous cell carcinoma 08/02/2018    Right Scalp/MOHS    Trouble in sleeping     Type 1 diabetes, uncontrolled, with retinopathy 10/1/2012    Type II or unspecified type diabetes mellitus without mention of complication, not stated as uncontrolled     Vitreous hemorrhage 8/17/2014       Past Surgical History:   Procedure Laterality Date    Angiogram Extremity Unilateral possible CO2 Left 6/3/2019    Performed [facility] at [facility] OR 2ND FLR    ANGIOGRAM, CORONARY ARTERY N/A 1/29/2019    Performed by Saturnino Ny [facility] at [facility] CATH LAB    APPENDECTOMY  1951    BLOCK-NERVE-MEDIAL BRANCH-LUMBAR Bilateral 3/10/2015    Performed by Juanita [facility]les [facility] at [facility] PAIN MGT    BLOCK-NERVE-MEDIAL BRANCH-LUMBAR Bilateral 11/25/2014    [facility] at [facility] MGT    CATARACT EXTRACTION  4/8/13    right eye (toric)    CATARACT EXTRACTION  4/29/13    left eye (toric)    COLONOSCOPY N/A 4/25/2017    Performed by CLARISSA Freeman MD at [facility] ENDO (4TH FLR)    COLONOSCOPY N/A 4/25/2014    Performed by CLARISSA Freeman MD at [facility] ENDO (4TH FLR)    CORONARY ARTERY BYPASS GRAFT  1994    x 3    EYE SURGERY      cataracts, both eyes    FINGER TENDON REPAIR  2011    left hand    INJECTION-FACET Bilateral 3/31/2015    Performed by [facility]les [facility] at [facility] PAIN MGT    INJECTION-STEROID-EPIDURAL-LUMBAR N/A 10/28/2014    Performed by [facility]les [facility] at [facility] PAIN MGT    INSERTION, IOL PROSTHESIS Left 4/29/2013    Performed by Soco Sandoval MD at [facility] OR    INSERTION, IOL PROSTHESIS Right 4/8/2013    Performed by Soco Sandoval MD at [facility] OR    INSERTION, STENT, CORONARY ARTERY N/A 1/29/2019    Performed by Saturnino Ny MD at [facility] CATH LAB    MINIMALLY INVASIVE FUSION-TRANSLUMINAL LUMBAR  INTERBODY (TLIF) Left 5/18/2015    Performed [facility]o [facility] at [facility] OR 2ND FLR    PHACOEMULSIFICATION, CATARACT Left 4/29/2013    [facility] at [facility] OR    PHACOEMULSIFICATION, CATARACT Right 4/8/2013    Performed by Soco [facility]oval [facility] at [facility] OR    PTA, PERIPHERAL BLOOD VESSEL Left 6/3/2019    [facility] at [facility] OR 2ND FLR    RADIOFREQUENCY THERMOCOAGULATION (RFTC)-NERVE-MEDIAN BRANCH-LUMBAR Left 1/13/2015    [facility]    RADIOFREQUENCY THERMOCOAGULATION (RFTC)-NERVE-MEDIAN BRANCH-LUMBAR Right 12/30/2014    [facility] by [facility] at [facility]    SKIN BIOPSY  2013    multiple    SPINE SURGERY  2015    TLIF    TONSILLECTOMY  1947       Review of patient's allergies indicates:  No Known Allergies    No current facility-administered medications on file prior to encounter.      Current Outpatient Medications on File Prior to Encounter   Medication Sig    alpha lipoic acid 600 mg Cap Take 1 capsule by mouth once daily.      aspirin (ECOTRIN) 81 MG EC tablet Take 1 tablet (81 mg total) by mouth once daily.    atorvastatin (LIPITOR) 80 MG tablet TAKE 1/2 TABLET NIGHTLY. (Patient taking differently: 40 mg. )    blood-glucose meter,continuous (DEXCOM G6 ) Misc Use as directed, change every 3 years    blood-glucose sensor (DEXCOM G6 SENSOR) Doris Change every 10 days.    blood-glucose transmitter (DEXCOM G6 TRANSMITTER) Doris Change every 3 months.    clopidogrel (PLAVIX) 75 mg tablet Take 4 tablets x 1 followed by one tablet daily (Patient taking differently: Take 75 mg by mouth once daily. )    clotrimazole-betamethasone 1-0.05% (LOTRISONE) cream APPLY EXTERNALLY TO THE AFFECTED AREA TWICE DAILY (Patient taking differently: APPLY EXTERNALLY TO THE AFFECTED AREA TWICE DAILY AS NEEDED)    docusate sodium (COLACE) 100 MG capsule Take 100 mg by mouth every other day.     fexofenadine (ALLEGRA) 180 MG  tablet Take 180 mg by mouth daily as needed (seasonal allergies).    fluticasone (FLONASE) 50 mcg/actuation nasal spray 1 spray by Each Nare route as needed for Rhinitis.    glucagon, human recombinant, (GLUCAGON EMERGENCY KIT, HUMAN,) 1 mg SolR Inject 1 mg into the muscle as needed.    insulin aspart U-100 (NOVOLOG U-100 INSULIN ASPART) 100 unit/mL injection Uses insulin pump, max daily 100 units.    insulin pump controller (OMNIPOD DASH PDM KIT) Misc Use as directed    [id number]-algal oil (METANX/FOLTANX RF) 3 mg-35 mg-2 mg -90.314 mg Cap Take 1 tablet by mouth once daily.    lisinopril-hydrochlorothiazide (PRINZIDE,ZESTORETIC) 20-12.5 mg per tablet Take 1 tablet by mouth 2 (two) times daily.    metFORMIN (GLUCOPHAGE) 500 MG tablet TAKE 1 TABLET TWICE DAILY    metoprolol succinate (TOPROL-XL) 100 MG 24 hr tablet Take 1 tablet (100 mg total) by mouth every evening.    metoprolol succinate (TOPROL-XL) 50 MG 24 hr tablet Take 1 tablet (50 mg total) by mouth once daily.    multivitamin capsule Take 1 capsule by mouth once daily.      nitroGLYCERIN (NITROSTAT) 0.4 MG SL tablet Place 1 tablet (0.4 mg total) under the tongue every 5 (five) minutes as needed for Chest pain. May dispense a two pack of 25 tablets.    propylene glycol (SYSTANE BALANCE) 0.6 % Drop Apply 1 drop to eye daily as needed (DRY EYE).    psyllium (METAMUCIL) powder Take 1 packet by mouth twice a week.    vit A/vit C/vit E/zinc/copper (PRESERVISION AREDS ORAL) Take 1 tablet by mouth 2 (two) times daily.    ACCU-CHEK FASTCLIX LANCET DRUM Misc TEST 6 TIMES DAILY    BD INSULIN SYRINGE ULT-FINE II 0.3 mL 31 gauge x 5/16 Syrg     blood sugar diagnostic (ACCU-CHEK SHARON PLUS TEST STRP) Strp Check sugar a 5x a day.    insulin pump cartridge (OMNIPOD DASH INSULIN POD) Crtg Change every 3 days.     Family History     Problem Relation (Age of Onset)    Acute myelogenous leukemia Sister    Alzheimer's disease Father    Breast cancer  Mother    Cancer Mother, Sister (60)    Colon cancer Sister    Dementia Father    Diabetes Maternal Grandfather, Paternal Aunt, Paternal Uncle    Stroke Father        Tobacco Use    Smoking status: Never Smoker    Smokeless tobacco: Never Used   Substance and Sexual Activity    Alcohol use: Yes     Alcohol/week: 0.6 oz     Types: 1 Glasses of wine per week     Comment: social once monthly    Drug use: No    Sexual activity: Not Currently     Partners: Female     Review of Systems   Constitution: Negative for chills, decreased appetite and diaphoresis.   HENT: Negative for congestion and ear discharge.    Eyes: Negative for blurred vision and discharge.   Cardiovascular: Negative for chest pain, dyspnea on exertion, irregular heartbeat, leg swelling and paroxysmal nocturnal dyspnea.   Respiratory: Negative for cough, hemoptysis and shortness of breath.    Gastrointestinal: Negative for abdominal pain.     Objective:     Vital Signs (Most Recent):  Temp: 97.9 °F (36.6 °C) (06/06/19 1250)  Pulse: 107 (06/06/19 1455)  Resp: 16 (06/06/19 1250)  BP: 136/65 (06/06/19 1455)  SpO2: (!) 94 % (06/06/19 1455) Vital Signs (24h Range):  Temp:  [97.6 °F (36.4 °C)-99.1 °F (37.3 °C)] 97.9 °F (36.6 °C)  Pulse:  [] 107  Resp:  [16-20] 16  SpO2:  [94 %-100 %] 94 %  BP: (104-136)/(57-74) 136/65       Weight: 88.2 kg (194 lb 7.1 oz)  Body mass index is 30.45 kg/m².    SpO2: (!) 94 %  O2 Device (Oxygen Therapy): room air    Physical Exam   Constitutional: He is oriented to person, place, and time. He appears well-developed and well-nourished. No distress.   Eyes: Pupils are equal, round, and reactive to light. Conjunctivae are normal.   Neck: No tracheal deviation present. No thyromegaly present.   Cardiovascular: Normal heart sounds and intact distal pulses. An irregularly irregular rhythm present. Tachycardia present. Exam reveals no gallop and no friction rub.   No murmur heard.  Dressing in L CFA   Pulmonary/Chest: Effort  normal and breath sounds normal. No respiratory distress. He has no wheezes. He has no rales.   Abdominal: Soft. Bowel sounds are normal. He exhibits no distension. There is no tenderness.   Musculoskeletal: He exhibits no edema or deformity.   Neurological: He is alert and oriented to person, place, and time. No cranial nerve deficit. Coordination normal.   Skin: Skin is warm and dry. He is not diaphoretic.   Psychiatric: He has a normal mood and affect. His behavior is normal.       Significant Labs:   EP:   Recent Labs   Lab 06/05/19  0605 06/05/19  0908 06/05/19  2111 06/06/19  0815   *  --  136 139  138   K 4.2  --  4.3 4.8  4.7   CL 97  --  99 100  99   CO2 27  --  27 31*  31*   *  --  294* 180*  173*   BUN 29*  --  29* 30*  29*   CREATININE 1.1  --  1.3 1.3  1.3   CALCIUM 9.0  --  9.3 9.6  9.6   PROT 5.7*  --   --  6.1  6.0   ALBUMIN 3.3*  --   --  3.5  3.4*   BILITOT 0.9  --   --  0.7  0.7   ALKPHOS 82  --   --  83  83   AST 49*  --   --  60*  61*   ALT 18  --   --  20  19   ANIONGAP 11  --  10 8  8   ESTGFRAFRICA >60.0  --  >60.0 >60.0  >60.0   EGFRNONAA >60.0  --  52.6* 52.6*  52.6*   WBC 30.20* 25.12*  --  30.99*   HGB 10.1* 10.6*  --  10.7*   HCT 31.7* 33.3*  --  34.3*   * 106*  --  112*   INR  --  1.1  --   --        Significant Imaging: Echocardiogram: 2D echo with color flow doppler: No results found. However, due to the size of the patient record, not all encounters were searched. Please check Results Review for a complete set of results.              Assessment and Plan:     Atrial fibrillation  - Newly diagnosed Afib never seen on EKG before. First occurrence per EKG and tele around 8 PM 6/5  - CHADSVASC 6  - Initially we recommended amiodarone gtt and Cardioversion however patient suffered VT arrest before the procedure and was taken back to cath lab after ROSC.  - Therefore Cardioversion is cancelled.  - When appropriate from primary team he is a  candidate  for anticoagulation         Thank you for your consult. I will follow-up with patient. Please contact us if you have any additional questions.    Stefany Boyd MD  Cardiac Electrophysiology  [facility]

## 2019-06-06 NOTE — ASSESSMENT & PLAN NOTE
- Newly diagnosed Afib never seen on EKG before. First occurrence per EKG and tele around 8 PM 6/5  - CHADSVASC 6  - Initially we recommended amiodarone gtt and Cardioversion however patient suffered VT arrest before the procedure and was taken back to cath lab after ROSC.  - Therefore Cardioversion is cancelled.  - When appropriate from primary team he is a  candidate for anticoagulation

## 2019-06-06 NOTE — PROGRESS NOTES
Pt escorted to cath lab via stretcher for [facility].   Pre-procedure orders implemented as ordered.  Pt showing no S/S of distress; AAOx3.  Pt transported with telemetry.  Awaiting return.    1245  Pt from cath lab via stretcher with escort.  Pt in no distress, resting comfortably in bed.  Post [facility] orders implemented.  Scant amount of sanguinous drainage noted to gauze/tegaderm dressing to L groin; area marked with sharpie.  Pt verbalizes understanding of plan of care.  Bed locked, in lowest position, siderails up x2.  Call bell in reach.  Pt instructed to call for assistance.  Will continue to monitor.

## 2019-06-06 NOTE — SIGNIFICANT EVENT
Pt remains in afib upon return from cath lab.  Dr. Lepe at the bedside for consents for cardioversion.  Amio bolus gtt initiated per MD orders.     1457  Pt converted to vtach.  Amio gtt stopped.  At this time, pt responsive with palpable pulses.     1458  Agonal breathing noted.  Code blue initiated.  No pulse noted.  Compressions initiated.      1459  Code team at bedside.

## 2019-06-06 NOTE — ASSESSMENT & PLAN NOTE
- EKG w/ ST depressions, elevated troponin (peaked 1.16)  - Dyspnea on exertion, orthopnea, significant peripheral edema.  - ECHO w/ EF 20%, grade II DD.   - New onset HF w/ troponin leak vs NSTEMI in the setting of acute infection.   - Underwent L-heart cath w/ stent placement 6/6  - Following LHC, pt taken for cardioversion    Plan  - Continue ASA 81 mg and plavix 75 mg daily  - Continue metoprolol XL qHs  - Continue IV Lasix 80 mg BID  - Continue Lisinopril 10 mg daily  - EP following, appreciate assistance

## 2019-06-06 NOTE — PROGRESS NOTES
[facility] Hospital Medicine  Progress Note    Patient Name: Reid Herman  MRN: [id number]  Patient Class: IP- Inpatient   Admission Date: 5/29/2019  Length of Stay: 8 days  Attending Physician: Laura Osman MD  Primary Care Provider: Olivia Zavala MD    [facility] Medicine Team: [facility]OSP MED 3 Milind Chaves MD    Subjective:     Principal Problem:Cellulitis of foot    HPI:  Reid Herman is a 79M direct admit from podiatry clinic for a left hallux abcess. Patient has a PMH HTN, Hyperlipidemia, DM-1(insulin pump), CAD s/p CABG x 2 , MINDA compliant with CPAP, CLL not currently on any therapy, PAD who has been dealing with a left foot abcess for the last month that has been getting worse. Cultures from wound have grown enterococcus that  sensitive to amp and Vanc.  Wound has worsened despite appropriate PO ABx (ampicillin). Additionally he has described that he has been having problems with SOB that he has been needing to increase the incline of his bed for. He also noticed worsening bilateral lower edema and he has gained 7 lbs in the last month. Patient states that currently he does not feel SOB ( since it only  occurs with being flat or exertion), no CP, no palpitations, no fever, chills. He states that he can feel his bilateral feet but has decreased. Usually he has been mobile but with this wound and his boot he has been less mobile. No history of DVT but he has a history of CLL.    Initial workup in the floor shows elevated BNP in the 2k, WBC > 50 ( he has a baseline around 53. CRP elevated, and hyponatremic to 127.     Hospital Course:  Admitted to medicine for cellulitis and CHF exacerbation. Ulcer seen by podiatry, vascular and ID.  Patient found to have elevated tropoinins and EKG changes consistent with recent MI.  Cardiology consulted, along with Interventional cardiology.  Recommendation to treat medically with heparin ggt for 48hrs, lisinopril 10mg, statin, plavix, ASA  325. Angiogram performed by vascular surgery 6/3, underwent foot bone biopsy on 6/4 and cultures sent. Pt began experiencing acute chest pain later in the day. Repeat troponin acutely elevated at 14. Pt taken for left heart cath and had 1 stent placed 6/6. Following his procedure he remained in a-fib w/ RVR and taken for cardioversion.     Interval History: Left heart cath today. Cardioversion following procedure. Remains on broad abx. Continuing to diurese.    Review of Systems   Constitutional: Negative for chills, diaphoresis and fever.   Respiratory: Negative for cough and shortness of breath.    Cardiovascular: Positive for leg swelling. Negative for chest pain.   Gastrointestinal: Negative for abdominal pain, constipation, diarrhea and nausea.   Genitourinary: Negative for dysuria, frequency and hematuria.   Musculoskeletal: Positive for arthralgias. Negative for back pain and myalgias.   Skin: Positive for wound (left foot). Negative for rash.   Neurological: Negative for light-headedness, numbness and headaches.   Psychiatric/Behavioral: Negative for agitation and behavioral problems. The patient is not nervous/anxious.      Objective:     Vital Signs (Most Recent):  Temp: 97.9 °F (36.6 °C) (06/06/19 1250)  Pulse: 106 (06/06/19 1430)  Resp: 16 (06/06/19 1250)  BP: 110/66 (06/06/19 1430)  SpO2: 98 % (06/06/19 1430) Vital Signs (24h Range):  Temp:  [97.6 °F (36.4 °C)-99.1 °F (37.3 °C)] 97.9 °F (36.6 °C)  Pulse:  [] 106  Resp:  [16-20] 16  SpO2:  [95 %-99 %] 98 %  BP: (104-125)/(57-74) 110/66     Weight: 88.2 kg (194 lb 7.1 oz)  Body mass index is 30.45 kg/m².    Intake/Output Summary (Last 24 hours) at 6/6/2019 1450  Last data filed at 6/6/2019 1030  Gross per 24 hour   Intake 988.4 ml   Output 1600 ml   Net -611.6 ml      Physical Exam   Constitutional: He is oriented to person, place, and time. He appears well-developed and well-nourished. No distress.   HENT:   Head: Normocephalic and atraumatic.    Eyes: Pupils are equal, round, and reactive to light. EOM are normal.   Neck: Normal range of motion. Neck supple.   Cardiovascular: Normal rate, regular rhythm and normal heart sounds.   No murmur heard.  Pulmonary/Chest: Effort normal and breath sounds normal. No respiratory distress.   Abdominal: Soft. Bowel sounds are normal. He exhibits no distension and no mass. There is no tenderness. There is no guarding.   Musculoskeletal: Normal range of motion. He exhibits edema and tenderness. He exhibits no deformity.   Erythema in the left foot as pictures below/ Ulcer present that is cleaned , pain around the ulcer noted.  Feet are warm proximally. + edema in the left foot and 2+ edema in the right foot    Neurological: He is alert and oriented to person, place, and time.   Skin: Skin is warm and dry. He is not diaphoretic.   Left medial great toe with dry ulceration. No drainage.  Mild TTP.  There is erythema of the 1st digit.    Psychiatric: He has a normal mood and affect. His behavior is normal.   Nursing note and vitals reviewed.      Significant Labs: All pertinent labs within the past 24 hours have been reviewed.    Significant Imaging: I have reviewed and interpreted all pertinent imaging results/findings within the past 24 hours.    Assessment/Plan:      * Cellulitis of Left foot  79M w/ DM1, PAD and a left hallux ulcer that has been growing enterococcus sensitive to ampicillin and vancomycin but with worsening erythema despite treatment. Admitted from podiatry for OM rule out and IV antibiotics  - MRI shows early OM  - Angiogram performed by vascular surgery on 6/3  - Bone biopsy was done and cultures sent 6/4    Plan  - ID following - on vancomycin and rocephin, will need 6 weeks of abx   - Podiatry following  - Vascular following   - Follow cultures     NSTEMI (non-ST elevated myocardial infarction)  - EKG w/ ST depressions, elevated troponin (peaked 1.16)  - Dyspnea on exertion, orthopnea, significant  peripheral edema.  - ECHO w/ EF 20%, grade II DD.   - New onset HF w/ troponin leak vs NSTEMI in the setting of acute infection.   - Underwent L-heart cath w/ stent placement 6/6  - Following LHC, pt taken for cardioversion    Plan  - Continue ASA 81 mg and plavix 75 mg daily  - Continue metoprolol XL qHs  - Continue IV Lasix 80 mg BID  - Continue Lisinopril 10 mg daily  - EP following, appreciate assistance      Acute HF (heart failure)  - Patient with symptoms of orthopnea and dyspnea on exertion   - Patient on HCTZ at home no loop diuretic   - BNP elevated at 2 k ,   - PE notable for  JVD and bilateral pitting edema, abdominal distension    - Xray on my read be bilateral edema   - precipitating factor is likely infected ulcer in a patient with PAD   -Echocardiogram: 2D echo with color flow doppler most recent not in file he had a pet stress in January showing EF of 40     Plan   - IV lasix 80mg BID, adjust as needed  - Strict I/O and Daily weights  - Wean oxygen as tolerated     Orthopnea  - see CHF    Foot abscess, left  - see cellulitis       CKD (chronic kidney disease), stage III  - Diuresing w/ lasix 80 IV BID  - Daily CMP, replace lytes as needed   - Continue to monitor    Peripheral vascular disease  Last LEONIE was march with mild PAD  - Angiogram performed 6/3  - continue ASA, statin, and plavix    MINDA on CPAP  - cpap qhs       S/P CABG (coronary artery bypass graft)  CAD  History of angina     - no CP currently   - continue Asa, statin ,plavix  - takes nitro PRN will hold off on ordering and will evaluate pt if he starts having symptoms       HTN (hypertension)  - On home lisinopril  - Started Imdur  - Continue toprol qhs   - Adjusting BP medications as tolerated      Hyperlipidemia  - continue statin     Diabetic polyneuropathy associated with type 1 diabetes mellitus  - contributing to difficult ulcer healing   --HbA1c 5.8 very well controlled   - Home DM regimen:  Insulin pump, patient states his  average basal is 23 units + SSI    Plan  - Endocrine following, appreciate assistance  - Recommend Levemir to 14  units qHS, Novolog ICR 1:9  - Supplement with SSI    CLL (chronic lymphocytic leukemia)  Patient sees Dr. Garcia in heme/onc  - Has not needed treatment  - Previously hyperkalemic attributed to CLL burden       VTE Risk Mitigation (From admission, onward)        Ordered     heparin infusion 1,000 units/500 ml in 0.9% NaCl (pressure line flush)  Intra-op continuous PRN      06/06/19 1052     heparin 25,000 units in dextrose 5% 250 mL (100 units/mL) infusion LOW INTENSITY nomogram - [facility]  Continuous      06/05/19 0853     heparin 25,000 units in dextrose 5% (100 units/ml) IV bolus from bag - ADDITIONAL PRN BOLUS - 60 units/kg (max bolus 4000 units)  As needed (PRN)      06/05/19 0853     heparin 25,000 units in dextrose 5% (100 units/ml) IV bolus from bag - ADDITIONAL PRN BOLUS - 30 units/kg (max bolus 4000 units)  As needed (PRN)      06/05/19 0853     heparin 25,000 units in dextrose 5% 250 mL (100 units/mL) infusion LOW INTENSITY nomogram - [facility]  Continuous      06/02/19 1508     IP VTE LOW RISK PATIENT  Once      05/29/19 4767          Milind Chaves MD  Department of Hospital Medicine   [facility]

## 2019-06-06 NOTE — BRIEF OP NOTE
"Post Cath Note  Referring Physician: Rajan Lo MD  Procedure: Left heart cath (Left), Insertion, Pacemaker, Temporary Transvenous, INSERTION, INTRA-AORTIC BALLOON PUMP, Bypass graft study       Access: Right CFA    SVG to LAD graft and stent widely patent with slow flow due to hypotension.     See full report for further details    Intervention:     IABP placed via R CFA  TVP placed via R CFV    Post Cath Exam:   /84 (BP Location: Right arm, Patient Position: Lying)   Pulse (!) 52   Temp 97.9 °F (36.6 °C) (Oral)   Resp 16   Ht 5' 7" (1.702 m)   Wt 88.2 kg (194 lb 7.1 oz)   SpO2 98%   BMI 30.45 kg/m²   No unusual pain, hematoma, thrill or bruit at vascular access site.  Distal pulse present without signs of ischemia.    Recommendations:   - Routine post-cath care  - IVF at 1 ml/kg/hr for 4 hrs from initial stent placement        "

## 2019-06-06 NOTE — PLAN OF CARE
Problem: Adult Inpatient Plan of Care  Goal: Plan of Care Review  Outcome: Ongoing (interventions implemented as appropriate)  Patient AAOx4. Plan of care and all safety precautions maintained and discussed. BP stable throughout the shift. HR 90's-100's on tele. Complaint of chest pain throughout the night. Nitro, oxygen 2L NC, morphine, & metoprolol (Afib RVR HR 110s'-140's) administered. NPO since midnight for L heart cath. Patient remains free from fall/injury. Call bell in reach. Encouraged to call for assistance. Verbalized understanding. Will continue to monitor.

## 2019-06-06 NOTE — ASSESSMENT & PLAN NOTE
- Patient with symptoms of orthopnea and dyspnea on exertion   - Patient on HCTZ at home no loop diuretic   - BNP elevated at 2 k ,   - PE notable for  JVD and bilateral pitting edema, abdominal distension    - Xray on my read be bilateral edema   - precipitating factor is likely infected ulcer in a patient with PAD   -Echocardiogram: 2D echo with color flow doppler most recent not in file he had a pet stress in January showing EF of 40     PLAN   - IV lasix 80mg BID, adjust as needed  - Strict I/O and Daily weights  - Wean oxygen as tolerated

## 2019-06-06 NOTE — CODE DOCUMENTATION
[facility] Rapid Response Nurse Note     Admit Date: 2019  LOS: 8  Code Status: Full Code   Date of Consult: 2019  : 1940  Age: 79 y.o.  Weight:   Wt Readings from Last 1 Encounters:   19 88.2 kg (194 lb 7.1 oz)     Sex: male  Race: White   Bed: [facility] Pool/NONE:   MRN: [id number]  Time Rapid Response Team page Received: 1459  Time Rapid Response Team at Bedside: 1403  Time Rapid Response Team left Bedside: 1545  Was the patient discharged from an ICU this admission?   no  Was the patient discharged from a PACU within last 24 hours?  no  Did the patient receive conscious sedation/general anesthesia within last 24 hours?  no  Was the patient in the ED within the past 24 hours?  no  Was the patient started on NIPPV within the past 24 hours?  no  Did this progress into an ARC or CPA: Cardio Pulmonary Arrest  Attending Physician: Laura Osman MD  Primary Service: [facility] MED 3  Consult Requested By: Laura Osman MD     SITUATION     Reason for Call:   Called to evaluate the patient for Dysrythmia    BACKGROUND     Why is the patient in the hospital?: Cellulitis of foot   has a past medical history of Anemia, Atrial fibrillation, Back pain, Basal cell carcinoma, CAD (coronary artery disease), Cataract, DDD (degenerative disc disease), lumbar, Diabetes mellitus, Diabetes mellitus type I, Diabetic retinopathy of both eyes, Hyperlipidemia, Hypertension, Insulin pump in place, NSTEMI (non-ST elevated myocardial infarction), Obesity, 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    ASSESSMENT     What did you find:     Code Blue  called. CCM team at bedside. Interventional Cardiology at bedside. Anesthesia at bedside. Pt connected to monitor, code cart. Pt going in and out of pulseless VTach. See Code Documentation flowsheet for details. Pt intubated per Anesthesia/CCM MD. Pt connected to monitor, ambu bag and transferred to Cath Lab Room 3. Pt transported per RRT.     Please see Code Blue Documentation for more details.    OUTCOME     ROSC obtained at 1525.     PHYSICIAN ESCALATION     Orders received and case discussed with Dr. PEMA Reyes.    Disposition: Cath Lab, ICU Bed 6063

## 2019-06-06 NOTE — NURSING
Notified Dr. Owens regarding patient complaint that chest pain is back to a 6/10 (pressure, non radiating). Awaiting orders

## 2019-06-06 NOTE — PLAN OF CARE
Patient accepted to the CCU  After VT arrest taken to the cath s/p TVP and IABP , now on lidocaine gtt . Hospital medicine updated and patient to be transferred to the ICU         Shanna To MD, MPH  Internal Medicine PGY2  [id number]

## 2019-06-06 NOTE — ASSESSMENT & PLAN NOTE
Caution with insulin stacking  Estimated Creatinine Clearance: 50.4 mL/min (based on SCr of 1.3 mg/dL).

## 2019-06-06 NOTE — CARE UPDATE
Rapid Response Respiratory Therapy Proactive Rounding Note      Time of visit: 1057    Code Status: Full Code   : 1940  Age: 79 y.o.  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  Pulmonary Hx: MINDA  Clinically Significant Surgical Hx: None    ASSESSMENT     Pulse: Pulse: 108 Respiratory rate: Resp: 16 Temperature: Temp: 97.9 °F (36.6 °C) BP: BP: 115/74 SpO2:SpO2: 97 %   Level of Consciousness: Level of Consciousness (AVPU): alert  Respiratory Effort: Respiratory Effort: Unlabored  Expansion/Accessory Muscle Usage: Expansion/Accessory Muscles/Retractions: expansion symmetric, no retractions, no use of accessory muscles  All Lung Field Breath Sounds: All Lung Fields Breath Sounds: Anterior:, Lateral:, diminished  Cough Type: Cough Type: good, nonproductive  Mobility at time of assessment: General Mobility: generalized weakness  O2 Device/Concentration: O2 Device (Oxygen Therapy): room air   Flow (L/min): 2    Most recent blood gas: No results for input(s): PH, PCO2, PO2,  HCO3, POCSATURATED, BE in the last 72 hours.   Current Respiratory Care Orders: pulse ox q4  NIPPV: Yes, Type: cpap Settings: 8  Surgical airway: No  ETCO2 monitored:    Ambu at bedside: Ambu bag with the patient?: Yes, Adult Ambu    INTERVENTIONS/RECOMMENDATIONS   ?  Spoke with nurse: she stated pt is refusing to wear cpap. She stated she will speak with the MD about the pt being non compliant. RT will continue to encourage pt to wear cpap at night    Discussed plan of care with Concepcion PAULSON and primary RT, PADDY Hooper.     FOLLOW-UP     Please call back the Rapid Response RT, Francisca Brandt, CRT at x [phone] for any questions or concerns.

## 2019-06-06 NOTE — PROGRESS NOTES
06/06/2019  Perfusionist:  Marcos Mukherjee  Surgeon(s) and Role:     * Mann Farmer MD - Primary     * Vitaliy Greene III, MD - Fellow     * Sean Tenorio MD - Fellow     * Milind Reyes MD - Fellow  No responsible provider has been recorded for the case.  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 10/1/2012    Preseptal cellulitis of right eye 8/17/15    S/P CABG x 2 2/14/2014    1994     Sleep apnea     Squamous cell carcinoma 03/18/2013    right posterior ear    Squamous cell carcinoma 07/26/2017    scalp    Squamous cell carcinoma 08/02/2018    Right Scalp/[facility]    Trouble in sleeping     Type 1 diabetes, uncontrolled, with retinopathy 10/1/2012    Type II or unspecified type diabetes mellitus without mention of complication, not stated as uncontrolled     Vitreous hemorrhage 8/17/2014       Device implanted: 6/6/19  Console SN:  9  If intra-aortic balloon pump - size: 7.5Fr 40 cc  Device SN [id number]    At the end of the procedure, the current device settings and alarms were verified to be accurate.  The patient was transported to ICU Room 6089, post operatively, back-up equipment was either delivered to the patients room or verified by the bedside nurse, and report given.  There were no issues.    4:41 PM   Principal Discharge DX:	Finger infection

## 2019-06-06 NOTE — SUBJECTIVE & OBJECTIVE
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 10/1/2012    Preseptal cellulitis of right eye 8/17/15    S/P CABG x 2 2/14/2014    1994     Sleep apnea     Squamous cell carcinoma 03/18/2013    right posterior ear    Squamous cell carcinoma 07/26/2017    scalp    Squamous cell carcinoma 08/02/2018    Right Scalp/MOHS    Trouble in sleeping     Type 1 diabetes, uncontrolled, with retinopathy 10/1/2012    Type II or unspecified type diabetes mellitus without mention of complication, not stated as uncontrolled     Vitreous hemorrhage 8/17/2014       Past Surgical History:   Procedure Laterality Date    Angiogram Extremity Unilateral possible CO2 Left 6/3/2019    [facility] OR 2ND FLR    ANGIOGRAM, CORONARY ARTERY N/A 1/29/2019    Performed by Saturnino Ny [facility] at [facility] CATH LAB    APPENDECTOMY  1951    BLOCK-NERVE-MEDIAL BRANCH-LUMBAR Bilateral 3/10/2015    [facility] by [facility]    BLOCK-NERVE-MEDIAL BRANCH-LUMBAR Bilateral 11/25/2014    [facility] at [facility]    CATARACT EXTRACTION  4/8/13    right eye (toric)    CATARACT EXTRACTION  4/29/13    left eye (toric)    COLONOSCOPY N/A 4/25/2017    Performed by CLARISSA Freeman MD at [facility] ENDO (4TH FLR)    COLONOSCOPY N/A 4/25/2014    Performed by CL[facility]SA Freeman [facility] at [facility] ENDO (4TH FLR)    CORONARY ARTERY BYPASS GRAFT  1994    x 3    EYE SURGERY      cataracts, both eyes     FINGER TENDON REPAIR  2011    left hand    INJECTION-FACET Bilateral 3/31/2015    [facility] at [facility]    INJECTION-STEROID-EPIDURAL-LUMBAR N/A 10/28/2014    [facility] at [facility] MGT    INSERTION, IOL PROSTHESIS Left 4/29/2013    [facility]formed by [facility]o [facility]oval [facility] at [facility] OR    INSERTION, IOL PROSTHESIS Right 4/8/2013    Performed by Soco Sandoval [facility] at [facility] OR    INSERTION, STENT, CORONARY ARTERY N/A 1/29/2019    [facility] at [facility] CATH LAB    MINIMALLY INVASIVE FUSION-TRANSLUMINAL LUMBAR INTERBODY (TLIF) Left 5/18/2015    Performed by Tee Pinedo MD at [facility] OR 2ND FLR    PHACOEMULSIFICATION, CATARACT Left 4/29/2013    Performed by Soco Sandoval [facility] at [facility] OR    PHACOEMULSIFICATION, CATARACT Right 4/8/2013    Performed by Soco Sandoval MD at [facility] OR    PTA, PERIPHERAL BLOOD VESSEL Left 6/3/2019    Performed by [facility] at [facility] OR 2ND FLR    RADIOFREQUENCY THERMOCOAGULATION (RFTC)-NERVE-MEDIAN BRANCH-LUMBAR Left 1/13/2015    Performed by [facility] at [facility]    RADIOFREQUENCY THERMOCOAGULATION (RFTC)-NERVE-MEDIAN BRANCH-LUMBAR Right 12/30/2014    Performed by [facility] at [facility]    SKIN BIOPSY  2013    multiple    SPINE SURGERY  2015    TLIF    TONSILLECTOMY  1945       Review of patient's allergies indicates:  No Known Allergies    No current facility-administered medications on file prior to encounter.      Current Outpatient Medications on File Prior to Encounter   Medication Sig    alpha lipoic acid 600 mg Cap Take 1 capsule by mouth once daily.      aspirin (ECOTRIN) 81 MG EC tablet Take 1 tablet (81 mg total) by mouth once daily.    atorvastatin (LIPITOR) 80 MG tablet TAKE 1/2 TABLET NIGHTLY. (Patient taking differently: 40 mg. )    blood-glucose meter,continuous (DEXCOM G6 ) Misc Use as directed, change every 3 years    blood-glucose sensor (DEXCOM  G6 SENSOR) Doris Change every 10 days.    blood-glucose transmitter (DEXCOM G6 TRANSMITTER) Doris Change every 3 months.    clopidogrel (PLAVIX) 75 mg tablet Take 4 tablets x 1 followed by one tablet daily (Patient taking differently: Take 75 mg by mouth once daily. )    clotrimazole-betamethasone 1-0.05% (LOTRISONE) cream APPLY EXTERNALLY TO THE AFFECTED AREA TWICE DAILY (Patient taking differently: APPLY EXTERNALLY TO THE AFFECTED AREA TWICE DAILY AS NEEDED)    docusate sodium (COLACE) 100 MG capsule Take 100 mg by mouth every other day.     fexofenadine (ALLEGRA) 180 MG tablet Take 180 mg by mouth daily as needed (seasonal allergies).    fluticasone (FLONASE) 50 mcg/actuation nasal spray 1 spray by Each Nare route as needed for Rhinitis.    glucagon, human recombinant, (GLUCAGON EMERGENCY KIT, HUMAN,) 1 mg SolR Inject 1 mg into the muscle as needed.    insulin aspart U-100 (NOVOLOG U-100 INSULIN ASPART) 100 unit/mL injection Uses insulin pump, max daily 100 units.    insulin pump controller (OMNIPOD DASH PDM KIT) Misc Use as directed    faigwfyip-[id number]-[id number]-algal oil (METANX/FOLTANX RF) 3 mg-35 mg-2 mg -90.314 mg Cap Take 1 tablet by mouth once daily.    lisinopril-hydrochlorothiazide (PRINZIDE,ZESTORETIC) 20-12.5 mg per tablet Take 1 tablet by mouth 2 (two) times daily.    metFORMIN (GLUCOPHAGE) 500 MG tablet TAKE 1 TABLET TWICE DAILY    metoprolol succinate (TOPROL-XL) 100 MG 24 hr tablet Take 1 tablet (100 mg total) by mouth every evening.    metoprolol succinate (TOPROL-XL) 50 MG 24 hr tablet Take 1 tablet (50 mg total) by mouth once daily.    multivitamin capsule Take 1 capsule by mouth once daily.      nitroGLYCERIN (NITROSTAT) 0.4 MG SL tablet Place 1 tablet (0.4 mg total) under the tongue every 5 (five) minutes as needed for Chest pain. May dispense a two pack of 25 tablets.    propylene glycol (SYSTANE BALANCE) 0.6 % Drop Apply 1 drop to eye daily as needed (DRY EYE).    psyllium  (METAMUCIL) powder Take 1 packet by mouth twice a week.    vit A/vit C/vit E/zinc/copper (PRESERVISION AREDS ORAL) Take 1 tablet by mouth 2 (two) times daily.    ACCU-CHEK FASTCLIX LANCET DRUM Misc TEST 6 TIMES DAILY    BD INSULIN SYRINGE ULT-FINE II 0.3 mL 31 gauge x 5/16 Syrg     blood sugar diagnostic (ACCU-CHEK SHARON PLUS TEST STRP) Strp Check sugar a 5x a day.    insulin pump cartridge (OMNIPOD DASH INSULIN POD) Crtg Change every 3 days.     Family History     Problem Relation (Age of Onset)    Acute myelogenous leukemia Sister    Alzheimer's disease Father    Breast cancer Mother    Cancer Mother, Sister (60)    Colon cancer Sister    Dementia Father    Diabetes Maternal Grandfather, Paternal Aunt, Paternal Uncle    Stroke Father        Tobacco Use    Smoking status: Never Smoker    Smokeless tobacco: Never Used   Substance and Sexual Activity    Alcohol use: Yes     Alcohol/week: 0.6 oz     Types: 1 Glasses of wine per week     Comment: social once monthly    Drug use: No    Sexual activity: Not Currently     Partners: Female     Review of Systems   Constitution: Negative for chills, decreased appetite and diaphoresis.   HENT: Negative for congestion and ear discharge.    Eyes: Negative for blurred vision and discharge.   Cardiovascular: Negative for chest pain, dyspnea on exertion, irregular heartbeat, leg swelling and paroxysmal nocturnal dyspnea.   Respiratory: Negative for cough, hemoptysis and shortness of breath.    Gastrointestinal: Negative for abdominal pain.     Objective:     Vital Signs (Most Recent):  Temp: 97.9 °F (36.6 °C) (06/06/19 1250)  Pulse: 107 (06/06/19 1455)  Resp: 16 (06/06/19 1250)  BP: 136/65 (06/06/19 1455)  SpO2: (!) 94 % (06/06/19 1455) Vital Signs (24h Range):  Temp:  [97.6 °F (36.4 °C)-99.1 °F (37.3 °C)] 97.9 °F (36.6 °C)  Pulse:  [] 107  Resp:  [16-20] 16  SpO2:  [94 %-100 %] 94 %  BP: (104-136)/(57-74) 136/65       Weight: 88.2 kg (194 lb 7.1 oz)  Body mass  index is 30.45 kg/m².    SpO2: (!) 94 %  O2 Device (Oxygen Therapy): room air    Physical Exam   Constitutional: He is oriented to person, place, and time. He appears well-developed and well-nourished. No distress.   Eyes: Pupils are equal, round, and reactive to light. Conjunctivae are normal.   Neck: No tracheal deviation present. No thyromegaly present.   Cardiovascular: Normal heart sounds and intact distal pulses. An irregularly irregular rhythm present. Tachycardia present. Exam reveals no gallop and no friction rub.   No murmur heard.  Dressing in L CFA   Pulmonary/Chest: Effort normal and breath sounds normal. No respiratory distress. He has no wheezes. He has no rales.   Abdominal: Soft. Bowel sounds are normal. He exhibits no distension. There is no tenderness.   Musculoskeletal: He exhibits no edema or deformity.   Neurological: He is alert and oriented to person, place, and time. No cranial nerve deficit. Coordination normal.   Skin: Skin is warm and dry. He is not diaphoretic.   Psychiatric: He has a normal mood and affect. His behavior is normal.       Significant Labs:   EP:   Recent Labs   Lab 06/05/19  0605 06/05/19  0908 06/05/19  2111 06/06/19  0815   *  --  136 139  138   K 4.2  --  4.3 4.8  4.7   CL 97  --  99 100  99   CO2 27  --  27 31*  31*   *  --  294* 180*  173*   BUN 29*  --  29* 30*  29*   CREATININE 1.1  --  1.3 1.3  1.3   CALCIUM 9.0  --  9.3 9.6  9.6   PROT 5.7*  --   --  6.1  6.0   ALBUMIN 3.3*  --   --  3.5  3.4*   BILITOT 0.9  --   --  0.7  0.7   ALKPHOS 82  --   --  83  83   AST 49*  --   --  60*  61*   ALT 18  --   --  20  19   ANIONGAP 11  --  10 8  8   ESTGFRAFRICA >60.0  --  >60.0 >60.0  >60.0   EGFRNONAA >60.0  --  52.6* 52.6*  52.6*   WBC 30.20* 25.12*  --  30.99*   HGB 10.1* 10.6*  --  10.7*   HCT 31.7* 33.3*  --  34.3*   * 106*  --  112*   INR  --  1.1  --   --        Significant Imaging: Echocardiogram: 2D echo with color flow doppler:  No results found. However, due to the size of the patient record, not all encounters were searched. Please check Results Review for a complete set of results.

## 2019-06-06 NOTE — ASSESSMENT & PLAN NOTE
- On home lisinopril  - Started Imdur  - Continue toprol qhs   - Adjusting BP medications as tolerated

## 2019-06-06 NOTE — SUBJECTIVE & OBJECTIVE
"Interval HPI:   Overnight events: BG slightly elevated overnight but within goal ranges this morning. NPO since midnight for L heart cath today.   Eating:   NPO  Nausea: No  Hypoglycemia and intervention: No  Fever: No  TPN and/or TF: No  If yes, type of TF/TPN and rate: None    /65 (BP Location: Right arm, Patient Position: Lying)   Pulse 105   Temp 97.6 °F (36.4 °C) (Oral)   Resp 16   Ht 5' 7" (1.702 m)   Wt 88.2 kg (194 lb 7.1 oz)   SpO2 99%   BMI 30.45 kg/m²     Labs Reviewed and Include    Recent Labs   Lab 06/05/19 2111   *   CALCIUM 9.3      K 4.3   CO2 27   CL 99   BUN 29*   CREATININE 1.3     Lab Results   Component Value Date    WBC 25.12 (H) 06/05/2019    HGB 10.6 (L) 06/05/2019    HCT 33.3 (L) 06/05/2019     (H) 06/05/2019     (L) 06/05/2019     No results for input(s): TSH, FREET4 in the last 168 hours.  Lab Results   Component Value Date    HGBA1C 5.8 (H) 05/29/2019       Nutritional status:   Body mass index is 30.45 kg/m².  Lab Results   Component Value Date    ALBUMIN 3.3 (L) 06/05/2019    ALBUMIN 3.4 (L) 06/04/2019    ALBUMIN 3.6 06/03/2019     Lab Results   Component Value Date    PREALBUMIN 22 05/17/2019       Estimated Creatinine Clearance: 50.4 mL/min (based on SCr of 1.3 mg/dL).    Accu-Checks  Recent Labs     06/04/19  1704 06/04/19  1705 06/04/19  2148 06/05/19  0805 06/05/19  1153 06/05/19  1655 06/05/19  2110 06/05/19  2306 06/06/19  0207 06/06/19  0727   POCTGLUCOSE <20* 226* 239* 250* 365* 242* 305* 255* 213* 179*       Current Medications and/or Treatments Impacting Glycemic Control  Immunotherapy:    Immunosuppressants     None        Steroids:   Hormones (From admission, onward)    None        Pressors:    Autonomic Drugs (From admission, onward)    None        Hyperglycemia/Diabetes Medications:   Antihyperglycemics (From admission, onward)    Start     Stop Route Frequency Ordered    06/05/19 2100  insulin detemir U-100 pen 15 Units      -- " SubQ Nightly 06/05/19 0923    06/05/19 1425  insulin aspart U-100 pen 1-10 Units      -- SubQ Before meals & nightly PRN 06/05/19 1325    05/30/19 1230  insulin aspart U-100 pen 1-15 Units      -- SubQ 3 times daily with meals 05/30/19 1123

## 2019-06-06 NOTE — HPI
79M direct admit from podiatry clinic for a left hallux abcess. Patient has a PMH HTN, Hyperlipidemia, DM-1(insulin pump), CAD s/p CABG x 2 , MINDA compliant with CPAP, CLL not currently on any therapy, Admitted to medicine for cellulitis and CHF exacerbation. Ulcer seen by podiatry, vascular and ID.  Patient found to have elevated tropoinins and EKG changes consistent with recent MI.  Cardiology consulted, along with Interventional cardiology.  Recommendation to treat medically with heparin ggt for 48hrs, lisinopril 10mg, statin, plavix, . Angiogram performed by vascular surgery 6/3, underwent foot bone biopsy on 6/4 and cultures sent. Pt began experiencing acute chest pain later in the day. Repeat troponin acutely elevated at 14. Pt taken for left heart cath and had 1 stent placed 6/6 in SVG-LAD. Following his procedure he remained in A-fib w/ RVR and EP is consulted for cardioversion.

## 2019-06-06 NOTE — SIGNIFICANT EVENT
Rapid Response Respiratory Therapy Code Blue Note      Code Status: Full Code   : 1940  Age: 79 y.o.  Weight:   Wt Readings from Last 1 Encounters:   19 88.2 kg (194 lb 7.1 oz)     Sex: male  Race: White   Bed: [facility] Pool/NONE:   MRN: [id number]  Time page Received: 1459   Time Rapid Response RT at Bedside: 1500  Time Rapid Response RT left Bedside: 1525  Report given to: MURIEL Smith RRT    SITUATION     Evaluated patient for: Code blue     BACKGROUND     Patient has a past medical history of Anemia, Atrial fibrillation, Back pain, Basal cell carcinoma, CAD (coronary artery disease), Cataract, DDD (degenerative disc disease), lumbar, Diabetes mellitus, Diabetes mellitus type I, Diabetic retinopathy of both eyes, Hyperlipidemia, Hypertension, Insulin pump in place, NSTEMI (non-ST elevated myocardial infarction), Obesity, 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  Pulmonary Hx: MINDA  Clinically Significant Surgical Hx: None    ASSESSMENT   Pulse: Pulse: 107 Respiratory rate: Resp: 16 Temperature: Temp: 97.9 °F (36.6 °C) BP: BP: 136/65 SpO2:SpO2: (!) 94 %   Level of Consciousness: Level of Consciousness (AVPU): alert  Respiratory Effort: Respiratory Effort: Unlabored  Expansion/Accessory Muscle Usage: Expansion/Accessory Muscles/Retractions: expansion symmetric, no retractions, no use of accessory muscles  All Lung Field Breath Sounds: All Lung Fields Breath Sounds: Anterior:, Lateral:, diminished  Cough Type: Cough Type: good, nonproductive  Mobility at time of assessment: General Mobility: generalized weakness  O2 Device/Concentration:  O2 Device (Oxygen Therapy): room air   Flow (L/min): 2    Most recent blood gas: No results for input(s): PH, PCO2, PO2, HCO3, POCSATURATED, BE in the last 72 hours.    Current Respiratory Care Orders: cont O2 pulse Ox q4, Cpap QHS  NIPPV: Yes, Type: cpap Settings: 8  Surgical airway: No  ETCO2 monitored:    Ambu at bedside: Ambu bag with the patient?: Yes, Adult Ambu    INTERVENTIONS/RECOMMENDATIONS     Pt resuscitated in room Intubated at bedside by team. Two resp therapist present. Pt transported to cath lab. MURIEL Smith RRT stayed in lab for assistance.    Please see Code Blue Documentation for more details.    OUTCOME     ROSC obtained     PHYSICIAN ESCALATION         Disposition: cath lab 4

## 2019-06-06 NOTE — BRIEF OP NOTE
"Post Cath Note  Referring Physician: Laura Osman MD  Procedure: Left heart cath (Left), Bypass graft study, Percutaneous coronary intervention (N/A)       Access: Left CFA    Proximal high-grade stenosis of SVG-LAD graft  LIMA-D1 patent, with diffuse D1 disease distal to graft anastomosis  Non-obstructive distal LM disease  Proximal native LAD   RCA   Prior LCx stents patent    See full report for further details    Intervention:     DUC x 1 to high-grade SVG-LAD stenosis    Closure device: Perclosure    Post Cath Exam:   /65 (BP Location: Right arm, Patient Position: Lying)   Pulse 94   Temp 97.6 °F (36.4 °C) (Oral)   Resp 16   Ht 5' 7" (1.702 m)   Wt 88.2 kg (194 lb 7.1 oz)   SpO2 99%   BMI 30.45 kg/m²   No unusual pain, hematoma, thrill or bruit at vascular access site.  Distal pulse present without signs of ischemia.    Recommendations:   - Routine post-cath care  - IVF at 1 cc/kg/hr for 4 hrs  - Cardiac rehab referral, Continue medical management, Risk factor reduction, Plavix for at least 1 year and ASA 81 mg indefinitely, Follow-up with outpatient cardiologist       "

## 2019-06-06 NOTE — CARE UPDATE
Patient complaining of chest discomfort at left chest which was mildly relieved by nitro x 3. EKG showed NEW AF with RVR, HR 120s. Trop downtrending from previous at 5. Mg, Ph, K WNL.  Discussed with cardiology to rate control with metoprolol pushes for HR <110.     Sophy Owens MD  Internal Medicine, PGY-I  [facility]

## 2019-06-06 NOTE — INTERVAL H&P NOTE
The patient has been examined and the H&P has been reviewed:    I concur with the findings and changes have been noted since the H&P was written: The patient experienced NSTEMI on 6/4/19 with troponin peak at 14. He is currently chest pain free and hemodynamically stable. Had another episode of chest discomfort last night while at rest, troponin trending down. Plan for [facility].    1. Cardiac catheterization with probable PCI.   2. Antiplatelets: ASA, plavix  3. Access: Left radial  4. Pt is a DUC candidate and understands the importance of taking plavix for at least one year, understands that in case of receiving a drug coated stent the failure to comply with dual anti-platelet therapy is likely to result in stent clothing, heart attack and death.   5. The risks, benefits, and alternatives of coronary vascular angiography and possible intervention were discussed with the patient. All questions were answered and informed consent was obtained. I had a detailed discussion with the patient regarding risk of stroke, MI, bleeding access site complications including limb loss, allergy, kidney failure including dialysis and death.  6. The patient understands the risks and benefits and wishes to go ahead with the procedure.  7. All patient's questions were answered    Anesthesia/Surgery risks, benefits and alternative options discussed and understood by patient/family.          Active Hospital Problems    Diagnosis  POA    *Cellulitis of Left foot [L03.119]  Yes    SHANIKA (acute kidney injury) [N17.9]  Yes    NSTEMI (non-ST elevated myocardial infarction) [I21.4]  Unknown    Open toe wound [S91.109A]  Yes    Diabetic ulcer of left great toe [E11.621, L97.529]  Yes    Foot abscess, left [L02.612]  Yes    Orthopnea [R06.01]  Yes    Acute HF (heart failure) [I50.9]  Yes    CKD (chronic kidney disease), stage III [N18.3]  Yes    Peripheral vascular disease [I73.9]  Yes    MINDA on CPAP [G47.33, Z99.89]  Not Applicable    HTN  (hypertension) [I10]  Yes    Hyperlipidemia [E78.5]  Yes    Diabetic polyneuropathy associated with type 1 diabetes mellitus [E10.42]  Yes    Controlled type 1 diabetes mellitus with diabetic neuropathy, with long-term current use of insulin [E10.40]  Yes    CLL (chronic lymphocytic leukemia) [C91.90]  Yes      Resolved Hospital Problems    Diagnosis Date Resolved POA    Hyponatremia [E87.1] 06/04/2019 Yes    Hyperkalemia [E87.5] 06/04/2019 Yes

## 2019-06-06 NOTE — SIGNIFICANT EVENT
L groin site continues to ooze sanguinous drainage; area marked.  Direct pressure held for 5 minutes. Dr. Lepe at the bedside.  Dr. Reyes notified.     1435  Dr. Reyes at the bedside to hold pressure.

## 2019-06-06 NOTE — ASSESSMENT & PLAN NOTE
BG goal 140-180    Continue Levemir 15 units q HS  Continue Novolog ICR 1:8  Continue customized correction scale.   Blood glucose monitoring AC/HS    Discharge recommendations:   TBD.

## 2019-06-06 NOTE — PLAN OF CARE
CM at bedside to see patient . Patient transferred to cardiology unit overnight secondary to chest pain.   PT/OT to re-evaluate.  Anticipated discharge next week.   Explained CM team continuing to follow the patient throughout the hospital admission for discharge needs and assistance.  CM name, phone # and anticipated D/C date updated on whiteboard.        06/06/19 0953   Discharge Reassessment   Assessment Type Discharge Planning Reassessment   Provided patient/caregiver education on the expected discharge date and the discharge plan Yes   Discharge Plan A Home with family   Discharge Plan B Home with family;Home Health   DME Needed Upon Discharge    (TBD)   Anticipated Discharge Disposition Home   Can the patient answer the patient profile reliably? Yes, cognitively intact   How does the patient rate their overall health at the present time? Fair   Describe the patient's ability to walk at the present time. Minor restrictions or changes   How often would a person be available to care for the patient? Whenever needed   Post-Acute Status   Post-Acute Authorization Other  (TBD; PT/OT to re-evaluate)   Discharge Delays (!) Change in Medical Condition

## 2019-06-06 NOTE — PROGRESS NOTES
"[facility]  Endocrinology  Progress Note    Admit Date: 5/29/2019     Reason for Consult: Management of T1DM, Hyperglycemia     Surgical Procedure and Date: N/A    Diabetes diagnosis year: 1970    Lab Results   Component Value Date    HGBA1C 5.8 (H) 05/29/2019       Home Diabetes Medications:  Accuchek spirit pump/sarai       Basal Rate  0000 - 0300     0.85 u/hr  0300 - 0700     0.9 u/hr  0700 - 2100     1.1 u/hr  2100 - 0000     0.85 u/hr        Carb Ratio  12A: 1:8     ISF  1:30     Target: 110-130  IAT: 3    How often checking glucose at home? Dexcom G5 CGM  BG readings on regimen: 110-140  Hypoglycemia on the regimen?  Yes, a few time per week  Missed doses on regimen?  No    Diabetes Complications include:     Hypoglycemia , Diabetic chronic kidney disease     , Diabetic retinopathy , Diabetic peripheral neuropathy , Diabetic peripheral angiopathy with/without gangrene and Foot ulcer      Complicating diabetes co morbidities:   CHF, MINDA and CKD      HPI:   Patient is a 79 y.o. male with a diagnosis of hyponatremia, left foot abscess, CKD3, DM1, CLL, MINDA, and acute heart failure.  Admitted to [facility] on 5/29/19 from podiatry clinic for a left hallux abcess.  Last seen in endocrinology clinic by ESTEFANÍA Rivers NP on 5/8/19.  Endocrine consulted for DM/BG management.            Interval HPI:   Overnight events: BG slightly elevated overnight but within goal ranges this morning. NPO since midnight for L heart cath today.   Eating:   NPO  Nausea: No  Hypoglycemia and intervention: No  Fever: No  TPN and/or TF: No  If yes, type of TF/TPN and rate: None    /65 (BP Location: Right arm, Patient Position: Lying)   Pulse 105   Temp 97.6 °F (36.4 °C) (Oral)   Resp 16   Ht 5' 7" (1.702 m)   Wt 88.2 kg (194 lb 7.1 oz)   SpO2 99%   BMI 30.45 kg/m²      Labs Reviewed and Include    Recent Labs   Lab 06/05/19 2111   *   CALCIUM 9.3      K 4.3   CO2 27   CL 99   BUN 29*   CREATININE 1.3 "     Lab Results   Component Value Date    WBC 25.12 (H) 06/05/2019    HGB 10.6 (L) 06/05/2019    HCT 33.3 (L) 06/05/2019     (H) 06/05/2019     (L) 06/05/2019     No results for input(s): TSH, FREET4 in the last 168 hours.  Lab Results   Component Value Date    HGBA1C 5.8 (H) 05/29/2019       Nutritional status:   Body mass index is 30.45 kg/m².  Lab Results   Component Value Date    ALBUMIN 3.3 (L) 06/05/2019    ALBUMIN 3.4 (L) 06/04/2019    ALBUMIN 3.6 06/03/2019     Lab Results   Component Value Date    PREALBUMIN 22 05/17/2019       Estimated Creatinine Clearance: 50.4 mL/min (based on SCr of 1.3 mg/dL).    Accu-Checks  Recent Labs     06/04/19  1704 06/04/19  1705 06/04/19  2148 06/05/19  0805 06/05/19  1153 06/05/19  1655 06/05/19  2110 06/05/19  2306 06/06/19  0207 06/06/19  0727   POCTGLUCOSE <20* 226* 239* 250* 365* 242* 305* 255* 213* 179*       Current Medications and/or Treatments Impacting Glycemic Control  Immunotherapy:    Immunosuppressants     None        Steroids:   Hormones (From admission, onward)    None        Pressors:    Autonomic Drugs (From admission, onward)    None        Hyperglycemia/Diabetes Medications:   Antihyperglycemics (From admission, onward)    Start     Stop Route Frequency Ordered    06/05/19 2100  insulin detemir U-100 pen 15 Units      -- SubQ Nightly 06/05/19 0923    06/05/19 1425  insulin aspart U-100 pen 1-10 Units      -- SubQ Before meals & nightly PRN 06/05/19 1325    05/30/19 1230  insulin aspart U-100 pen 1-15 Units      -- SubQ 3 times daily with meals 05/30/19 1123          ASSESSMENT and PLAN    * Cellulitis of Left foot  Infection may elevate BG readings  Optimize BG control for wound healing      Controlled type 1 diabetes mellitus with diabetic neuropathy, with long-term current use of insulin  BG goal 140-180    Continue Levemir 15 units q HS  Continue Novolog ICR 1:8  Continue customized correction scale.   Blood glucose monitoring  AC/HS    Discharge recommendations:   TBD.     MINDA on CPAP  May affect BG readings if uncontrolled        CKD (chronic kidney disease), stage III  Caution with insulin stacking  Estimated Creatinine Clearance: 50.4 mL/min (based on SCr of 1.3 mg/dL).        Class 1 obesity due to excess calories with serious comorbidity and body mass index (BMI) of 30.0 to 30.9 in adult  Body mass index is 30.45 kg/m².  May increase insulin resistance.             Dina Ba NP  Endocrinology  [facility]

## 2019-06-06 NOTE — SIGNIFICANT EVENT
brandie fall was called on pt. RT arrived at bedside two resp therapists were at bedside along with nursing and MD team members. Compressions performed, pt was intubated at bedside. Pt was transferred to cath lab and placed on ventilator 24 RR 450VT Peep 5 100% O2.

## 2019-06-06 NOTE — SUBJECTIVE & OBJECTIVE
Interval History: Left heart cath today. Cardioversion following procedure. Remains on broad abx. Continuing to diurese.    Review of Systems   Constitutional: Negative for chills, diaphoresis and fever.   Respiratory: Negative for cough and shortness of breath.    Cardiovascular: Positive for leg swelling. Negative for chest pain.   Gastrointestinal: Negative for abdominal pain, constipation, diarrhea and nausea.   Genitourinary: Negative for dysuria, frequency and hematuria.   Musculoskeletal: Positive for arthralgias. Negative for back pain and myalgias.   Skin: Positive for wound (left foot). Negative for rash.   Neurological: Negative for light-headedness, numbness and headaches.   Psychiatric/Behavioral: Negative for agitation and behavioral problems. The patient is not nervous/anxious.      Objective:     Vital Signs (Most Recent):  Temp: 97.9 °F (36.6 °C) (06/06/19 1250)  Pulse: 106 (06/06/19 1430)  Resp: 16 (06/06/19 1250)  BP: 110/66 (06/06/19 1430)  SpO2: 98 % (06/06/19 1430) Vital Signs (24h Range):  Temp:  [97.6 °F (36.4 °C)-99.1 °F (37.3 °C)] 97.9 °F (36.6 °C)  Pulse:  [] 106  Resp:  [16-20] 16  SpO2:  [95 %-99 %] 98 %  BP: (104-125)/(57-74) 110/66     Weight: 88.2 kg (194 lb 7.1 oz)  Body mass index is 30.45 kg/m².    Intake/Output Summary (Last 24 hours) at 6/6/2019 1450  Last data filed at 6/6/2019 1030  Gross per 24 hour   Intake 988.4 ml   Output 1600 ml   Net -611.6 ml      Physical Exam   Constitutional: He is oriented to person, place, and time. He appears well-developed and well-nourished. No distress.   HENT:   Head: Normocephalic and atraumatic.   Eyes: Pupils are equal, round, and reactive to light. EOM are normal.   Neck: Normal range of motion. Neck supple.   Cardiovascular: Normal rate, regular rhythm and normal heart sounds.   No murmur heard.  Pulmonary/Chest: Effort normal and breath sounds normal. No respiratory distress.   Abdominal: Soft. Bowel sounds are normal. He exhibits  no distension and no mass. There is no tenderness. There is no guarding.   Musculoskeletal: Normal range of motion. He exhibits edema and tenderness. He exhibits no deformity.   Erythema in the left foot as pictures below/ Ulcer present that is cleaned , pain around the ulcer noted.  Feet are warm proximally. + edema in the left foot and 2+ edema in the right foot    Neurological: He is alert and oriented to person, place, and time.   Skin: Skin is warm and dry. He is not diaphoretic.   Left medial great toe with dry ulceration. No drainage.  Mild TTP.  There is erythema of the 1st digit.    Psychiatric: He has a normal mood and affect. His behavior is normal.   Nursing note and vitals reviewed.      Significant Labs: All pertinent labs within the past 24 hours have been reviewed.    Significant Imaging: I have reviewed and interpreted all pertinent imaging results/findings within the past 24 hours.

## 2019-06-07 NOTE — PHYSICIAN QUERY
PT Name: Reid Herman  MR #: [id number]    Physician Query Form - Atrial Fibrillation Specificity     CDS/: Charlotte Christian RN               Contact information:[email or web address]     This form is a permanent document in the medical record.     Query Date: June 7, 2019    By submitting this query, we are merely seeking further clarification of documentation. Please utilize your independent clinical judgment when addressing the question(s) below.    The medical record contains the following:   Indicators     Supporting Clinical Findings Location in Medical Record   x Atrial Fibrillation  has a past medical history of  Atrial fibrillation    --Following his procedure he remained in A-fib w/ RVR and EP is consulted for cardioversion  --Newly diagnosed Afib never seen on EKG before Code documentation per Nursing 6/6    Arrhythmia consult 6/6         x EKG results NSR  Atrial fibrillation with rapid ventricular response with premature ventricular or aberrantly conducted complexes EKG 5/29  EKG 6/5   x Medication Amiodarone infusion  Metoprolol succinate oral  lisionpril oral  Lidocaine infusion  Isosorbide mononitrate oral MAR 6/6  MAR 6/4-6  MAR 5/31-6/6  MAR 6/6-7  MAR 6/4-6    Treatment      Other         Provider, please further specify the Atrial Fibrillation diagnosis.    [  ] Chronic   [ x ] Paroxysmal   [  ] Persistent   [  ] Other (please specify):   [  ] Clinically Undetermined       Please document in your progress notes daily for the duration of treatment until resolved, and include in your discharge summary.

## 2019-06-07 NOTE — SUBJECTIVE & OBJECTIVE
Interval History: Patient remains sedated and intubated, on levophed. Opens eyes to stimulation, follows some commands.    Objective:     Vital Signs (Most Recent):  Temp: 97.7 °F (36.5 °C) (06/07/19 0300)  Pulse: (!) 53 (06/07/19 0600)  Resp: (!) 22 (06/07/19 0600)  BP: 124/74 (06/07/19 0600)  SpO2: 100 % (06/07/19 0600) Vital Signs (24h Range):  Temp:  [97.4 °F (36.3 °C)-97.9 °F (36.6 °C)] 97.7 °F (36.5 °C)  Pulse:  [] 53  Resp:  [16-34] 22  SpO2:  [82 %-100 %] 100 %  BP: ()/(49-96) 124/74     Weight: 92.2 kg (203 lb 4.2 oz)  Body mass index is 31.84 kg/m².    SpO2: 100 %  O2 Device (Oxygen Therapy): ventilator      Intake/Output Summary (Last 24 hours) at 6/7/2019 0705  Last data filed at 6/7/2019 0600  Gross per 24 hour   Intake 2068.11 ml   Output 1675 ml   Net 393.11 ml       Lines/Drains/Airways     Central Venous Catheter Line                 Percutaneous Central Line Insertion/Assessment - triple lumen  06/06/19 1949 right internal jugular less than 1 day          Drain                 NG/OG Tube 06/06/19 1730 [facility] mouth less than 1 day         Sheath 06/06/19 1412 Right proximal less than 1 day         Sheath 06/06/19 1412 Right proximal less than 1 day         Urethral Catheter 06/06/19 1729 Non-latex less than 1 day          Airway                 Airway - Non-Surgical 06/06/19 1509 Endotracheal Tube less than 1 day          Line                 IABP 06/06/19 1728 less than 1 day         Pacer Wires 06/06/19 1728 less than 1 day          Peripheral Intravenous Line                 Peripheral IV - Single Lumen 06/03/19 1205 20 G;1 3/4 in Left Forearm 3 days         Peripheral IV - Single Lumen 06/05/19 2005 20 G Right Forearm 1 day         Peripheral IV - Single Lumen 06/06/19 2029 20 G Left;Lateral Forearm less than 1 day                Physical Exam   Constitutional: He is oriented to person, place, and time. He appears well-developed and well-nourished. No distress.   HENT:    Head: Normocephalic and atraumatic.   Neck: JVD present.   Cardiovascular: Normal rate, regular rhythm, normal heart sounds and intact distal pulses. Exam reveals no gallop and no friction rub.   No murmur heard.  Pulses:       Radial pulses are 0 on the right side, and 0 on the left side.        Femoral pulses are 2+ on the right side, and 2+ on the left side.       Dorsalis pedis pulses are 0 on the right side, and 0 on the left side.        Posterior tibial pulses are 0 on the right side, and 0 on the left side.   Sternotomy, healed   Pulmonary/Chest: Effort normal. No respiratory distress. He has no wheezes. He has no rales.   Abdominal: Soft. Bowel sounds are normal. He exhibits no distension. There is no tenderness.   Musculoskeletal: He exhibits edema (2+ bilateral lower extremity edema).   Left foot dressing c/d/i   Neurological: He is alert and oriented to person, place, and time.   Skin: He is not diaphoretic.       Significant Labs:     Recent Results (from the past 24 hour(s))   POCT glucose    Collection Time: 06/06/19  7:27 AM   Result Value Ref Range    POCT Glucose 179 (H) 70 - 110 mg/dL   Magnesium    Collection Time: 06/06/19  8:15 AM   Result Value Ref Range    Magnesium 2.3 1.6 - 2.6 mg/dL   Phosphorus    Collection Time: 06/06/19  8:15 AM   Result Value Ref Range    Phosphorus 3.7 2.7 - 4.5 mg/dL   Comprehensive Metabolic Panel (CMP)    Collection Time: 06/06/19  8:15 AM   Result Value Ref Range    Sodium 139 136 - 145 mmol/L    Potassium 4.8 3.5 - 5.1 mmol/L    Chloride 100 95 - 110 mmol/L    CO2 31 (H) 23 - 29 mmol/L    Glucose 180 (H) 70 - 110 mg/dL    BUN, Bld 30 (H) 8 - 23 mg/dL    Creatinine 1.3 0.5 - 1.4 mg/dL    Calcium 9.6 8.7 - 10.5 mg/dL    Total Protein 6.1 6.0 - 8.4 g/dL    Albumin 3.5 3.5 - 5.2 g/dL    Total Bilirubin 0.7 0.1 - 1.0 mg/dL    Alkaline Phosphatase 83 55 - 135 U/L    AST 60 (H) 10 - 40 U/L    ALT 20 10 - 44 U/L    Anion Gap 8 8 - 16 mmol/L    eGFR if African American  >60.0 >60 mL/min/1.73 m^2    eGFR if non  52.6 (A) >60 mL/min/1.73 m^2   APTT    Collection Time: 06/06/19  8:15 AM   Result Value Ref Range    aPTT 50.7 (H) 21.0 - 32.0 sec   APTT    Collection Time: 06/06/19  8:15 AM   Result Value Ref Range    aPTT 50.7 (H) 21.0 - 32.0 sec   Troponin I    Collection Time: 06/06/19  8:15 AM   Result Value Ref Range    Troponin I 13.871 (H) 0.000 - 0.026 ng/mL   CBC auto differential    Collection Time: 06/06/19  8:15 AM   Result Value Ref Range    WBC 30.99 (H) 3.90 - 12.70 K/uL    RBC 3.35 (L) 4.60 - 6.20 M/uL    Hemoglobin 10.7 (L) 14.0 - 18.0 g/dL    Hematocrit 34.3 (L) 40.0 - 54.0 %    Mean Corpuscular Volume 102 (H) 82 - 98 fL    Mean Corpuscular Hemoglobin 31.9 (H) 27.0 - 31.0 pg    Mean Corpuscular Hemoglobin Conc 31.2 (L) 32.0 - 36.0 g/dL    RDW 15.1 (H) 11.5 - 14.5 %    Platelets 112 (L) 150 - 350 K/uL    MPV 10.1 9.2 - 12.9 fL    Immature Granulocytes CANCELED 0.0 - 0.5 %    Immature Grans (Abs) CANCELED 0.00 - 0.04 K/uL    Lymph # CANCELED 1.0 - 4.8 K/uL    Mono # CANCELED 0.3 - 1.0 K/uL    Eos # CANCELED 0.0 - 0.5 K/uL    Baso # CANCELED 0.00 - 0.20 K/uL    nRBC 0 0 /100 WBC    Gran% 7.0 (L) 38.0 - 73.0 %    Lymph% 92.0 (H) 18.0 - 48.0 %    Mono% 0.5 (L) 4.0 - 15.0 %    Eosinophil% 0.5 0.0 - 8.0 %    Basophil% 0.0 0.0 - 1.9 %    Platelet Estimate Decreased (A)     Differential Method Manual    Comprehensive metabolic panel    Collection Time: 06/06/19  8:15 AM   Result Value Ref Range    Sodium 138 136 - 145 mmol/L    Potassium 4.7 3.5 - 5.1 mmol/L    Chloride 99 95 - 110 mmol/L    CO2 31 (H) 23 - 29 mmol/L    Glucose 173 (H) 70 - 110 mg/dL    BUN, Bld 29 (H) 8 - 23 mg/dL    Creatinine 1.3 0.5 - 1.4 mg/dL    Calcium 9.6 8.7 - 10.5 mg/dL    Total Protein 6.0 6.0 - 8.4 g/dL    Albumin 3.4 (L) 3.5 - 5.2 g/dL    Total Bilirubin 0.7 0.1 - 1.0 mg/dL    Alkaline Phosphatase 83 55 - 135 U/L    AST 61 (H) 10 - 40 U/L    ALT 19 10 - 44 U/L    Anion Gap 8 8 - 16  mmol/L    eGFR if African American >60.0 >60 mL/min/1.73 m^2    eGFR if non  52.6 (A) >60 mL/min/1.73 m^2   Type & Screen    Collection Time: 06/06/19 10:28 AM   Result Value Ref Range    Group & Rh O POS     Indirect Camilla NEG    POCT glucose    Collection Time: 06/06/19  1:04 PM   Result Value Ref Range    POCT Glucose 159 (H) 70 - 110 mg/dL   POCT glucose    Collection Time: 06/06/19  3:01 PM   Result Value Ref Range    POCT Glucose 157 (H) 70 - 110 mg/dL   Comprehensive metabolic panel    Collection Time: 06/06/19  3:46 PM   Result Value Ref Range    Sodium 136 136 - 145 mmol/L    Potassium 4.7 3.5 - 5.1 mmol/L    Chloride 102 95 - 110 mmol/L    CO2 17 (L) 23 - 29 mmol/L    Glucose 320 (H) 70 - 110 mg/dL    BUN, Bld 28 (H) 8 - 23 mg/dL    Creatinine 1.4 0.5 - 1.4 mg/dL    Calcium 8.6 (L) 8.7 - 10.5 mg/dL    Total Protein 5.5 (L) 6.0 - 8.4 g/dL    Albumin 3.2 (L) 3.5 - 5.2 g/dL    Total Bilirubin 0.5 0.1 - 1.0 mg/dL    Alkaline Phosphatase 75 55 - 135 U/L    AST 55 (H) 10 - 40 U/L    ALT 18 10 - 44 U/L    Anion Gap 17 (H) 8 - 16 mmol/L    eGFR if African American 55.6 (A) >60 mL/min/1.73 m^2    eGFR if non  48.1 (A) >60 mL/min/1.73 m^2   Magnesium    Collection Time: 06/06/19  3:46 PM   Result Value Ref Range    Magnesium 3.6 (H) 1.6 - 2.6 mg/dL   Phosphorus    Collection Time: 06/06/19  3:46 PM   Result Value Ref Range    Phosphorus 5.1 (H) 2.7 - 4.5 mg/dL   CBC auto differential    Collection Time: 06/06/19  3:46 PM   Result Value Ref Range    WBC 31.46 (H) 3.90 - 12.70 K/uL    RBC 4.24 (L) 4.60 - 6.20 M/uL    Hemoglobin 13.8 (L) 14.0 - 18.0 g/dL    Hematocrit 43.6 40.0 - 54.0 %    Mean Corpuscular Volume 103 (H) 82 - 98 fL    Mean Corpuscular Hemoglobin 32.5 (H) 27.0 - 31.0 pg    Mean Corpuscular Hemoglobin Conc 31.7 (L) 32.0 - 36.0 g/dL    RDW 15.3 (H) 11.5 - 14.5 %    Platelets 104 (L) 150 - 350 K/uL    MPV 10.1 9.2 - 12.9 fL    Immature Granulocytes 0.2 0.0 - 0.5 %    Gran #  (ANC) 2.5 1.8 - 7.7 K/uL    Immature Grans (Abs) 0.05 (H) 0.00 - 0.04 K/uL    Lymph # 28.5 (H) 1.0 - 4.8 K/uL    Mono # 0.2 (L) 0.3 - 1.0 K/uL    Eos # 0.1 0.0 - 0.5 K/uL    Baso # 0.08 0.00 - 0.20 K/uL    nRBC 0 0 /100 WBC    Gran% 8.0 (L) 38.0 - 73.0 %    Lymph% 90.7 (H) 18.0 - 48.0 %    Mono% 0.5 (L) 4.0 - 15.0 %    Eosinophil% 0.3 0.0 - 8.0 %    Basophil% 0.3 0.0 - 1.9 %    Platelet Estimate Decreased (A)     Aniso Slight     Differential Method Automated    Urinalysis, Reflex to Urine Culture Urine, Clean Catch    Collection Time: 06/06/19  5:00 PM   Result Value Ref Range    Specimen UA Urine, Catheterized     Color, UA Yellow Yellow, Straw, Lila    Appearance, UA Clear Clear    pH, UA 6.0 5.0 - 8.0    Specific Gravity, UA 1.020 1.005 - 1.030    Protein, UA 2+ (A) Negative    Glucose, UA Negative Negative    Ketones, UA Negative Negative    Bilirubin (UA) Negative Negative    Occult Blood UA 1+ (A) Negative    Nitrite, UA Negative Negative    Leukocytes, UA Negative Negative   Urinalysis Microscopic    Collection Time: 06/06/19  5:00 PM   Result Value Ref Range    RBC, UA 8 (H) 0 - 4 /hpf    WBC, UA 3 0 - 5 /hpf    Bacteria Rare None-Occ /hpf    Squam Epithel, UA 0 /hpf    Hyaline Casts, UA 8 (A) 0-1/lpf /lpf    Microscopic Comment SEE COMMENT    POCT glucose    Collection Time: 06/06/19  6:17 PM   Result Value Ref Range    POCT Glucose 259 (H) 70 - 110 mg/dL   APTT    Collection Time: 06/06/19  6:29 PM   Result Value Ref Range    aPTT 23.1 21.0 - 32.0 sec   Protime-INR    Collection Time: 06/06/19  6:29 PM   Result Value Ref Range    Prothrombin Time 11.2 9.0 - 12.5 sec    INR 1.1 0.8 - 1.2   Comprehensive metabolic panel    Collection Time: 06/06/19  6:29 PM   Result Value Ref Range    Sodium 133 (L) 136 - 145 mmol/L    Potassium 4.0 3.5 - 5.1 mmol/L    Chloride 94 (L) 95 - 110 mmol/L    CO2 25 23 - 29 mmol/L    Glucose 270 (H) 70 - 110 mg/dL    BUN, Bld 28 (H) 8 - 23 mg/dL    Creatinine 1.4 0.5 - 1.4 mg/dL     Calcium 9.0 8.7 - 10.5 mg/dL    Total Protein 6.2 6.0 - 8.4 g/dL    Albumin 3.3 (L) 3.5 - 5.2 g/dL    Total Bilirubin 0.7 0.1 - 1.0 mg/dL    Alkaline Phosphatase 85 55 - 135 U/L    AST 74 (H) 10 - 40 U/L    ALT 24 10 - 44 U/L    Anion Gap 14 8 - 16 mmol/L    eGFR if African American 55.6 (A) >60 mL/min/1.73 m^2    eGFR if non  48.1 (A) >60 mL/min/1.73 m^2   Magnesium    Collection Time: 06/06/19  6:29 PM   Result Value Ref Range    Magnesium 2.7 (H) 1.6 - 2.6 mg/dL   CBC auto differential    Collection Time: 06/06/19  6:29 PM   Result Value Ref Range    WBC 46.28 (H) 3.90 - 12.70 K/uL    RBC 3.26 (L) 4.60 - 6.20 M/uL    Hemoglobin 11.0 (L) 14.0 - 18.0 g/dL    Hematocrit 32.8 (L) 40.0 - 54.0 %    Mean Corpuscular Volume 101 (H) 82 - 98 fL    Mean Corpuscular Hemoglobin 33.7 (H) 27.0 - 31.0 pg    Mean Corpuscular Hemoglobin Conc 33.5 32.0 - 36.0 g/dL    RDW 15.2 (H) 11.5 - 14.5 %    Platelets 162 150 - 350 K/uL    MPV 10.2 9.2 - 12.9 fL    Immature Granulocytes 0.4 0.0 - 0.5 %    Gran # (ANC) 5.9 1.8 - 7.7 K/uL    Immature Grans (Abs) 0.17 (H) 0.00 - 0.04 K/uL    Lymph # 37.5 (H) 1.0 - 4.8 K/uL    Mono # 2.6 (H) 0.3 - 1.0 K/uL    Eos # 0.1 0.0 - 0.5 K/uL    Baso # 0.11 0.00 - 0.20 K/uL    nRBC 0 0 /100 WBC    Gran% 12.7 (L) 38.0 - 73.0 %    Lymph% 81.0 (H) 18.0 - 48.0 %    Mono% 5.6 4.0 - 15.0 %    Eosinophil% 0.1 0.0 - 8.0 %    Basophil% 0.2 0.0 - 1.9 %    Aniso Slight     Differential Method Automated    ISTAT PROCEDURE    Collection Time: 06/06/19  9:30 PM   Result Value Ref Range    POC PH 7.434 7.35 - 7.45    POC PCO2 38.8 35 - 45 mmHg    POC PO2 36 (LL) 40 - 60 mmHg    POC HCO3 26.0 24 - 28 mmol/L    POC BE 2 -2 to 2 mmol/L    POC SATURATED O2 71 (L) 95 - 100 %    POC TCO2 27 24 - 29 mmol/L    Sample VENOUS     Site Other     Allens Test N/A    VANCOMYCIN, TROUGH before 3rd dose    Collection Time: 06/06/19  9:49 PM   Result Value Ref Range    Vancomycin-Trough 11.4 10.0 - 22.0 ug/mL   CBC  auto differential    Collection Time: 06/06/19 11:25 PM   Result Value Ref Range    WBC 40.93 (H) 3.90 - 12.70 K/uL    RBC 3.28 (L) 4.60 - 6.20 M/uL    Hemoglobin 10.6 (L) 14.0 - 18.0 g/dL    Hematocrit 33.7 (L) 40.0 - 54.0 %    Mean Corpuscular Volume 103 (H) 82 - 98 fL    Mean Corpuscular Hemoglobin 32.3 (H) 27.0 - 31.0 pg    Mean Corpuscular Hemoglobin Conc 31.5 (L) 32.0 - 36.0 g/dL    RDW 15.0 (H) 11.5 - 14.5 %    Platelets 148 (L) 150 - 350 K/uL    MPV 9.7 9.2 - 12.9 fL    Immature Granulocytes CANCELED 0.0 - 0.5 %    Immature Grans (Abs) CANCELED 0.00 - 0.04 K/uL    Lymph # CANCELED 1.0 - 4.8 K/uL    Mono # CANCELED 0.3 - 1.0 K/uL    Eos # CANCELED 0.0 - 0.5 K/uL    Baso # CANCELED 0.00 - 0.20 K/uL    nRBC 0 0 /100 WBC    Gran% 9.5 (L) 38.0 - 73.0 %    Lymph% 88.0 (H) 18.0 - 48.0 %    Mono% 2.0 (L) 4.0 - 15.0 %    Eosinophil% 0.0 0.0 - 8.0 %    Basophil% 0.0 0.0 - 1.9 %    Bands 0.5 %    Platelet Estimate Decreased (A)     Aniso CANCELED     Poik Slight     Sonu Cells Occasional     Differential Method Manual    CBC auto differential    Collection Time: 06/06/19 11:25 PM   Result Value Ref Range    WBC 40.93 (H) 3.90 - 12.70 K/uL    RBC 3.28 (L) 4.60 - 6.20 M/uL    Hemoglobin 10.6 (L) 14.0 - 18.0 g/dL    Hematocrit 33.7 (L) 40.0 - 54.0 %    Mean Corpuscular Volume 103 (H) 82 - 98 fL    Mean Corpuscular Hemoglobin 32.3 (H) 27.0 - 31.0 pg    Mean Corpuscular Hemoglobin Conc 31.5 (L) 32.0 - 36.0 g/dL    RDW 15.0 (H) 11.5 - 14.5 %    Platelets 148 (L) 150 - 350 K/uL    MPV 9.7 9.2 - 12.9 fL    Immature Granulocytes CANCELED 0.0 - 0.5 %    Immature Grans (Abs) CANCELED 0.00 - 0.04 K/uL    Lymph # CANCELED 1.0 - 4.8 K/uL    Mono # CANCELED 0.3 - 1.0 K/uL    Eos # CANCELED 0.0 - 0.5 K/uL    Baso # CANCELED 0.00 - 0.20 K/uL    nRBC 0 0 /100 WBC    Gran% 9.5 (L) 38.0 - 73.0 %    Lymph% 88.0 (H) 18.0 - 48.0 %    Mono% 2.0 (L) 4.0 - 15.0 %    Eosinophil% 0.0 0.0 - 8.0 %    Basophil% 0.0 0.0 - 1.9 %    Bands 0.5 %     Platelet Estimate Decreased (A)     Aniso CANCELED     Poik Slight     Levant Cells Occasional     Differential Method Manual    POCT glucose    Collection Time: 06/06/19 11:35 PM   Result Value Ref Range    POCT Glucose 336 (H) 70 - 110 mg/dL   CBC with Automated Differential    Collection Time: 06/07/19  2:48 AM   Result Value Ref Range    WBC 40.50 (H) 3.90 - 12.70 K/uL    RBC 3.39 (L) 4.60 - 6.20 M/uL    Hemoglobin 11.0 (L) 14.0 - 18.0 g/dL    Hematocrit 33.1 (L) 40.0 - 54.0 %    Mean Corpuscular Volume 98 82 - 98 fL    Mean Corpuscular Hemoglobin 32.4 (H) 27.0 - 31.0 pg    Mean Corpuscular Hemoglobin Conc 33.2 32.0 - 36.0 g/dL    RDW 15.1 (H) 11.5 - 14.5 %    Platelets 146 (L) 150 - 350 K/uL    MPV 9.9 9.2 - 12.9 fL    Immature Granulocytes CANCELED 0.0 - 0.5 %    Immature Grans (Abs) CANCELED 0.00 - 0.04 K/uL    Lymph # CANCELED 1.0 - 4.8 K/uL    Mono # CANCELED 0.3 - 1.0 K/uL    Eos # CANCELED 0.0 - 0.5 K/uL    Baso # CANCELED 0.00 - 0.20 K/uL    nRBC 0 0 /100 WBC    Gran% 5.5 (L) 38.0 - 73.0 %    Lymph% 93.0 (H) 18.0 - 48.0 %    Mono% 1.5 (L) 4.0 - 15.0 %    Eosinophil% 0.0 0.0 - 8.0 %    Basophil% 0.0 0.0 - 1.9 %    Platelet Estimate CANCELED     Aniso Slight     Poik Slight     Hypo Occasional     Ovalocytes Occasional     Sonu Cells Occasional     Differential Method Manual    Comprehensive Metabolic Panel (CMP)    Collection Time: 06/07/19  2:48 AM   Result Value Ref Range    Sodium 134 (L) 136 - 145 mmol/L    Potassium 4.1 3.5 - 5.1 mmol/L    Chloride 97 95 - 110 mmol/L    CO2 26 23 - 29 mmol/L    Glucose 273 (H) 70 - 110 mg/dL    BUN, Bld 28 (H) 8 - 23 mg/dL    Creatinine 1.3 0.5 - 1.4 mg/dL    Calcium 8.5 (L) 8.7 - 10.5 mg/dL    Total Protein 5.6 (L) 6.0 - 8.4 g/dL    Albumin 3.2 (L) 3.5 - 5.2 g/dL    Total Bilirubin 0.5 0.1 - 1.0 mg/dL    Alkaline Phosphatase 86 55 - 135 U/L    AST 71 (H) 10 - 40 U/L    ALT 24 10 - 44 U/L    Anion Gap 11 8 - 16 mmol/L    eGFR if African American >60.0 >60 mL/min/1.73  m^2    eGFR if non  52.6 (A) >60 mL/min/1.73 m^2   APTT    Collection Time: 06/07/19  2:48 AM   Result Value Ref Range    aPTT 26.8 21.0 - 32.0 sec   Magnesium    Collection Time: 06/07/19  2:48 AM   Result Value Ref Range    Magnesium 2.2 1.6 - 2.6 mg/dL   Phosphorus    Collection Time: 06/07/19  2:48 AM   Result Value Ref Range    Phosphorus 3.6 2.7 - 4.5 mg/dL   POCT glucose    Collection Time: 06/07/19  2:56 AM   Result Value Ref Range    POCT Glucose 264 (H) 70 - 110 mg/dL   POCT glucose    Collection Time: 06/07/19  4:38 AM   Result Value Ref Range    POCT Glucose 291 (H) 70 - 110 mg/dL   POCT glucose    Collection Time: 06/07/19  6:31 AM   Result Value Ref Range    POCT Glucose 258 (H) 70 - 110 mg/dL         Significant Imaging:     I have reviewed all pertinent imaging studies from the last 24 hours

## 2019-06-07 NOTE — PHYSICIAN QUERY
PT Name: Reid Herman  MR #: [id number]    Physician Query Form - Relationship to Procedure Clarification     CDS/: Charlotte Christian RN               Contact information: [email or web address]    This form is a permanent document in the medical record.     Query Date: June 7, 2019      By submitting this query, we are merely seeking further clarification of documentation. Please utilize your independent clinical judgment when addressing the question(s) below.    The Medical record contains the following:  Supporting Clinical Findings Location in Medical Record   --Procedure: Left heart cath (Left), Bypass graft study,   --Access: Left CFA  --Proximal high-grade stenosis of SVG-LAD graft  LIMA-D1 patent, with diffuse D1 disease distal to graft anastomosis  Non-obstructive distal LM disease  Proximal native LAD   RCA   Prior LCx stents patent  --No unusual pain, hematoma, thrill or bruit at vascular access site.  Distal pulse present without signs of ischemia.    Compressions performed, pt was intubated at bedside. Pt was transferred to cath lab and placed on ventilator 24 RR 450VT Peep 5 100% O2.      L groin site continues to ooze sanguinous drainage; area marked.  Direct pressure held for 5 minutes.  Dr. Reyes at the bedside to hold pressure.      Procedure: Left heart cath (Left), Insertion, Pacemaker, Temporary Transvenous, INSERTION, INTRA-AORTIC BALLOON PUMP, Bypass graft study. Access: R CFA   Cardiology OP note 6/6@1224                      Nursing significant event note 6/6@ 1553    Nursing significant event note 6/6@1625    Cardiology OP note 6/6           Please clarify if _L groin site ooze of sanguinous drainage___ is      [x  ] Inherent/Integral to procedure   [  ] Present, but not a complication of the procedure   [  ] Incidental finding, not clinically significant   [  ] Complication of procedure   [  ] Other (please specify): __________________   [  ] Clinically Undetermined

## 2019-06-07 NOTE — PROGRESS NOTES
[facility]  Infectious Disease  Progress Note    Patient Name: Reid Herman  MRN: [id number]  Admission Date: 5/29/2019  Length of Stay: 9 days  Attending Physician: Paz Pradhan MD  Primary Care Provider: Olivia Zavala MD    Isolation Status: No active isolations  Assessment/Plan:      Diabetic ulcer of left great toe     79 year old male with history of  DM-1, HTN, CAD s/p CABG x 2 , CLL not currently on any therapy and PAD  who presented as a direct admit from podiatry clinic for an infected left great toe ulcer. Prior wound culture with E. Faecalis. Did not respond to outpatient oral antibiotics. Pt started on IV vanc. MRI cannot rule out possible early osteomyelitis distal phalanx. ID consulted for further recs.     This admit he underwent successful angiogram/revascularization to E. Bone biopsy performed 6/4 and cultures no significant isolate. He was started on empiric Vanc and Ceftriaxone after the biopsy. Afebrile. Chronic leukocytosis. Troponin elevated yesterday and underwent LHC. transferred to ICU yesterday as coded and was intubated. He is now extubated, stable      Plan:  1. Continue empiric Vancomycin and Ceftriaxone for now. vanc trough goal 15-20.   2. Bone cultures no significant isolate. Pathology pending.   3. Anticipate 6 weeks of IV antibiotic therapy for osteomyelitis (7/16/19)   4. Will need close f/u with ID once discharge. If pathology results are negative will treat with abx for 2 weeks. If pathology results are positive anticipate 6 weeks for osteomyelitis.   5. Will need weekly cbc cmp esr crp and vanc trough sent to ID clinic at            Thank you for your consult. I will sign off. Please contact us if you have any additional questions.    Keturah Zaman PA-C  Infectious Disease  [facility]  [phone]    Subjective:     Principal Problem:Cellulitis of foot    HPI: This is a 79 year old male who presented as a direct  admit from podiatry clinic for a left great toe ulcer. Patient has a PMH HTN, HLD, DM-1(insulin pump), CAD s/p CABG x 2 , MINDA compliant with CPAP, CLL not currently on any therapy, and PAD who presents with left foot ulcer and for the last month that has progressively been worsening.  Patient was on multiple courses of oral antibiotics with no improvement.  He was seen in podiatry clinic yesterday and noted to be worsening so recommended admission for IV antibiotics.  Patient had MRI which could not rule out early osteomyelitis.  Podiatry consulted and discussed possible bone biopsy. Patient was started on IV vancomycin.    Interventional cards has been consulted.  There is a wound culture from 5/22 that grew enterococcus faecalis. Patient has a leukocytosis of 51,000 but has history of CLL and has had this WBC count since January.  Patient has mild pain but does have neuropathy. He denies fevers or chills.     Interval History:   Extubated this AM  No complaints  Underwent LHC, developed code blue, intubated, sedated.     Review of Systems   Constitutional: Positive for fatigue. Negative for chills, diaphoresis and fever.   Respiratory: Positive for shortness of breath. Negative for cough.    Cardiovascular: Negative for chest pain.   Gastrointestinal: Negative for abdominal distention, abdominal pain, diarrhea, nausea and vomiting.   Genitourinary: Negative for dysuria.   Musculoskeletal: Negative for back pain.   Skin: Positive for wound. Negative for pallor and rash.   Neurological: Negative for dizziness and headaches.   All other systems reviewed and are negative.    Objective:     Vital Signs (Most Recent):  Temp: 97.7 °F (36.5 °C) (06/07/19 1100)  Pulse: 81 (06/07/19 1400)  Resp: (!) 25 (06/07/19 1400)  BP: (!) 111/58 (06/07/19 1400)  SpO2: 99 % (06/07/19 1400) Vital Signs (24h Range):  Temp:  [97.4 °F (36.3 °C)-97.8 °F (36.6 °C)] 97.7 °F (36.5 °C)  Pulse:  [] 81  Resp:  [14-36] 25  SpO2:  [84 %-100 %]  99 %  BP: ()/(49-96) 111/58     Weight: 92.2 kg (203 lb 4.2 oz)  Body mass index is 31.84 kg/m².    Estimated Creatinine Clearance: 51.5 mL/min (based on SCr of 1.3 mg/dL).    Physical Exam   Constitutional: He is oriented to person, place, and time. He appears well-developed and well-nourished. No distress.   Cardiovascular: Normal rate and regular rhythm.   No murmur heard.  Pulmonary/Chest: Effort normal and breath sounds normal. No respiratory distress.   Abdominal: Soft. He exhibits no distension.   Musculoskeletal: Normal range of motion. He exhibits edema.   Neurological: He is alert and oriented to person, place, and time.   Skin: Skin is warm and dry.   Left medial great toe with dry ulceration. No drainage.  Mild TTP.  There is erythema of the 1st digit. Decreased erythema to forefoot.   Mild tenderness.  Foot is warmer today.     Psychiatric: He has a normal mood and affect. His behavior is normal.       Significant Labs: All pertinent labs within the past 24 hours have been reviewed.    Significant Imaging: I have reviewed all pertinent imaging results/findings within the past 24 hours.

## 2019-06-07 NOTE — PROGRESS NOTES
[facility]  Interventional Cardiology  Progress Note    Patient Name: Reid Herman  MRN: [id number]  Admission Date: 5/29/2019  Hospital Length of Stay: 9 days  Code Status: Full Code   Attending Physician: Rajan Lo MD   Primary Care Physician: Olivia Zavala MD  Principal Problem:Cellulitis of foot    Subjective:     Interval History: Patient remains sedated and intubated, on levophed. Opens eyes to stimulation, follows some commands.    Objective:     Vital Signs (Most Recent):  Temp: 97.7 °F (36.5 °C) (06/07/19 0300)  Pulse: (!) 53 (06/07/19 0600)  Resp: (!) 22 (06/07/19 0600)  BP: 124/74 (06/07/19 0600)  SpO2: 100 % (06/07/19 0600) Vital Signs (24h Range):  Temp:  [97.4 °F (36.3 °C)-97.9 °F (36.6 °C)] 97.7 °F (36.5 °C)  Pulse:  [] 53  Resp:  [16-34] 22  SpO2:  [82 %-100 %] 100 %  BP: ()/(49-96) 124/74     Weight: 92.2 kg (203 lb 4.2 oz)  Body mass index is 31.84 kg/m².    SpO2: 100 %  O2 Device (Oxygen Therapy): ventilator      Intake/Output Summary (Last 24 hours) at 6/7/2019 0705  Last data filed at 6/7/2019 0600  Gross per 24 hour   Intake 2068.11 ml   Output 1675 ml   Net 393.11 ml       Lines/Drains/Airways     Central Venous Catheter Line                 Percutaneous Central Line Insertion/Assessment - triple lumen  06/06/19 1949 right internal jugular less than 1 day          Drain                 NG/OG Tube 06/06/19 1730 [facility] mouth less than 1 day         Sheath 06/06/19 1412 Right proximal less than 1 day         Sheath 06/06/19 1412 Right proximal less than 1 day         Urethral Catheter 06/06/19 1729 Non-latex less than 1 day          Airway                 Airway - Non-Surgical 06/06/19 1509 Endotracheal Tube less than 1 day          Line                 IABP 06/06/19 1728 less than 1 day         Pacer Wires 06/06/19 1728 less than 1 day          Peripheral Intravenous Line                 Peripheral IV - Single Lumen 06/03/19 1205 20 G;1  3/4 in Left Forearm 3 days         Peripheral IV - Single Lumen 06/05/19 2005 20 G Right Forearm 1 day         Peripheral IV - Single Lumen 06/06/19 2029 20 G Left;Lateral Forearm less than 1 day                Physical Exam   Constitutional: He is oriented to person, place, and time. He appears well-developed and well-nourished. No distress.   HENT:   Head: Normocephalic and atraumatic.   Neck: JVD present.   Cardiovascular: Normal rate, regular rhythm, normal heart sounds and intact distal pulses. Exam reveals no gallop and no friction rub.   No murmur heard.  Pulses:       Radial pulses are 0 on the right side, and 0 on the left side.        Femoral pulses are 2+ on the right side, and 2+ on the left side.       Dorsalis pedis pulses are 0 on the right side, and 0 on the left side.        Posterior tibial pulses are 0 on the right side, and 0 on the left side.   Sternotomy, healed   Pulmonary/Chest: Effort normal. No respiratory distress. He has no wheezes. He has no rales.   Abdominal: Soft. Bowel sounds are normal. He exhibits no distension. There is no tenderness.   Musculoskeletal: He exhibits edema (2+ bilateral lower extremity edema).   Left foot dressing c/d/i   Neurological: He is alert and oriented to person, place, and time.   Skin: He is not diaphoretic.       Significant Labs:     Recent Results (from the past 24 hour(s))   POCT glucose    Collection Time: 06/06/19  7:27 AM   Result Value Ref Range    POCT Glucose 179 (H) 70 - 110 mg/dL   Magnesium    Collection Time: 06/06/19  8:15 AM   Result Value Ref Range    Magnesium 2.3 1.6 - 2.6 mg/dL   Phosphorus    Collection Time: 06/06/19  8:15 AM   Result Value Ref Range    Phosphorus 3.7 2.7 - 4.5 mg/dL   Comprehensive Metabolic Panel (CMP)    Collection Time: 06/06/19  8:15 AM   Result Value Ref Range    Sodium 139 136 - 145 mmol/L    Potassium 4.8 3.5 - 5.1 mmol/L    Chloride 100 95 - 110 mmol/L    CO2 31 (H) 23 - 29 mmol/L    Glucose 180 (H) 70 - 110  mg/dL    BUN, Bld 30 (H) 8 - 23 mg/dL    Creatinine 1.3 0.5 - 1.4 mg/dL    Calcium 9.6 8.7 - 10.5 mg/dL    Total Protein 6.1 6.0 - 8.4 g/dL    Albumin 3.5 3.5 - 5.2 g/dL    Total Bilirubin 0.7 0.1 - 1.0 mg/dL    Alkaline Phosphatase 83 55 - 135 U/L    AST 60 (H) 10 - 40 U/L    ALT 20 10 - 44 U/L    Anion Gap 8 8 - 16 mmol/L    eGFR if African American >60.0 >60 mL/min/1.73 m^2    eGFR if non  52.6 (A) >60 mL/min/1.73 m^2   APTT    Collection Time: 06/06/19  8:15 AM   Result Value Ref Range    aPTT 50.7 (H) 21.0 - 32.0 sec   APTT    Collection Time: 06/06/19  8:15 AM   Result Value Ref Range    aPTT 50.7 (H) 21.0 - 32.0 sec   Troponin I    Collection Time: 06/06/19  8:15 AM   Result Value Ref Range    Troponin I 13.871 (H) 0.000 - 0.026 ng/mL   CBC auto differential    Collection Time: 06/06/19  8:15 AM   Result Value Ref Range    WBC 30.99 (H) 3.90 - 12.70 K/uL    RBC 3.35 (L) 4.60 - 6.20 M/uL    Hemoglobin 10.7 (L) 14.0 - 18.0 g/dL    Hematocrit 34.3 (L) 40.0 - 54.0 %    Mean Corpuscular Volume 102 (H) 82 - 98 fL    Mean Corpuscular Hemoglobin 31.9 (H) 27.0 - 31.0 pg    Mean Corpuscular Hemoglobin Conc 31.2 (L) 32.0 - 36.0 g/dL    RDW 15.1 (H) 11.5 - 14.5 %    Platelets 112 (L) 150 - 350 K/uL    MPV 10.1 9.2 - 12.9 fL    Immature Granulocytes CANCELED 0.0 - 0.5 %    Immature Grans (Abs) CANCELED 0.00 - 0.04 K/uL    Lymph # CANCELED 1.0 - 4.8 K/uL    Mono # CANCELED 0.3 - 1.0 K/uL    Eos # CANCELED 0.0 - 0.5 K/uL    Baso # CANCELED 0.00 - 0.20 K/uL    nRBC 0 0 /100 WBC    Gran% 7.0 (L) 38.0 - 73.0 %    Lymph% 92.0 (H) 18.0 - 48.0 %    Mono% 0.5 (L) 4.0 - 15.0 %    Eosinophil% 0.5 0.0 - 8.0 %    Basophil% 0.0 0.0 - 1.9 %    Platelet Estimate Decreased (A)     Differential Method Manual    Comprehensive metabolic panel    Collection Time: 06/06/19  8:15 AM   Result Value Ref Range    Sodium 138 136 - 145 mmol/L    Potassium 4.7 3.5 - 5.1 mmol/L    Chloride 99 95 - 110 mmol/L    CO2 31 (H) 23 - 29  mmol/L    Glucose 173 (H) 70 - 110 mg/dL    BUN, Bld 29 (H) 8 - 23 mg/dL    Creatinine 1.3 0.5 - 1.4 mg/dL    Calcium 9.6 8.7 - 10.5 mg/dL    Total Protein 6.0 6.0 - 8.4 g/dL    Albumin 3.4 (L) 3.5 - 5.2 g/dL    Total Bilirubin 0.7 0.1 - 1.0 mg/dL    Alkaline Phosphatase 83 55 - 135 U/L    AST 61 (H) 10 - 40 U/L    ALT 19 10 - 44 U/L    Anion Gap 8 8 - 16 mmol/L    eGFR if African American >60.0 >60 mL/min/1.73 m^2    eGFR if non  52.6 (A) >60 mL/min/1.73 m^2   Type & Screen    Collection Time: 06/06/19 10:28 AM   Result Value Ref Range    Group & Rh O POS     Indirect Camilla NEG    POCT glucose    Collection Time: 06/06/19  1:04 PM   Result Value Ref Range    POCT Glucose 159 (H) 70 - 110 mg/dL   POCT glucose    Collection Time: 06/06/19  3:01 PM   Result Value Ref Range    POCT Glucose 157 (H) 70 - 110 mg/dL   Comprehensive metabolic panel    Collection Time: 06/06/19  3:46 PM   Result Value Ref Range    Sodium 136 136 - 145 mmol/L    Potassium 4.7 3.5 - 5.1 mmol/L    Chloride 102 95 - 110 mmol/L    CO2 17 (L) 23 - 29 mmol/L    Glucose 320 (H) 70 - 110 mg/dL    BUN, Bld 28 (H) 8 - 23 mg/dL    Creatinine 1.4 0.5 - 1.4 mg/dL    Calcium 8.6 (L) 8.7 - 10.5 mg/dL    Total Protein 5.5 (L) 6.0 - 8.4 g/dL    Albumin 3.2 (L) 3.5 - 5.2 g/dL    Total Bilirubin 0.5 0.1 - 1.0 mg/dL    Alkaline Phosphatase 75 55 - 135 U/L    AST 55 (H) 10 - 40 U/L    ALT 18 10 - 44 U/L    Anion Gap 17 (H) 8 - 16 mmol/L    eGFR if African American 55.6 (A) >60 mL/min/1.73 m^2    eGFR if non  48.1 (A) >60 mL/min/1.73 m^2   Magnesium    Collection Time: 06/06/19  3:46 PM   Result Value Ref Range    Magnesium 3.6 (H) 1.6 - 2.6 mg/dL   Phosphorus    Collection Time: 06/06/19  3:46 PM   Result Value Ref Range    Phosphorus 5.1 (H) 2.7 - 4.5 mg/dL   CBC auto differential    Collection Time: 06/06/19  3:46 PM   Result Value Ref Range    WBC 31.46 (H) 3.90 - 12.70 K/uL    RBC 4.24 (L) 4.60 - 6.20 M/uL    Hemoglobin 13.8  (L) 14.0 - 18.0 g/dL    Hematocrit 43.6 40.0 - 54.0 %    Mean Corpuscular Volume 103 (H) 82 - 98 fL    Mean Corpuscular Hemoglobin 32.5 (H) 27.0 - 31.0 pg    Mean Corpuscular Hemoglobin Conc 31.7 (L) 32.0 - 36.0 g/dL    RDW 15.3 (H) 11.5 - 14.5 %    Platelets 104 (L) 150 - 350 K/uL    MPV 10.1 9.2 - 12.9 fL    Immature Granulocytes 0.2 0.0 - 0.5 %    Gran # (ANC) 2.5 1.8 - 7.7 K/uL    Immature Grans (Abs) 0.05 (H) 0.00 - 0.04 K/uL    Lymph # 28.5 (H) 1.0 - 4.8 K/uL    Mono # 0.2 (L) 0.3 - 1.0 K/uL    Eos # 0.1 0.0 - 0.5 K/uL    Baso # 0.08 0.00 - 0.20 K/uL    nRBC 0 0 /100 WBC    Gran% 8.0 (L) 38.0 - 73.0 %    Lymph% 90.7 (H) 18.0 - 48.0 %    Mono% 0.5 (L) 4.0 - 15.0 %    Eosinophil% 0.3 0.0 - 8.0 %    Basophil% 0.3 0.0 - 1.9 %    Platelet Estimate Decreased (A)     Aniso Slight     Differential Method Automated    Urinalysis, Reflex to Urine Culture Urine, Clean Catch    Collection Time: 06/06/19  5:00 PM   Result Value Ref Range    Specimen UA Urine, Catheterized     Color, UA Yellow Yellow, Straw, Lila    Appearance, UA Clear Clear    pH, UA 6.0 5.0 - 8.0    Specific Gravity, UA 1.020 1.005 - 1.030    Protein, UA 2+ (A) Negative    Glucose, UA Negative Negative    Ketones, UA Negative Negative    Bilirubin (UA) Negative Negative    Occult Blood UA 1+ (A) Negative    Nitrite, UA Negative Negative    Leukocytes, UA Negative Negative   Urinalysis Microscopic    Collection Time: 06/06/19  5:00 PM   Result Value Ref Range    RBC, UA 8 (H) 0 - 4 /hpf    WBC, UA 3 0 - 5 /hpf    Bacteria Rare None-Occ /hpf    Squam Epithel, UA 0 /hpf    Hyaline Casts, UA 8 (A) 0-1/lpf /lpf    Microscopic Comment SEE COMMENT    POCT glucose    Collection Time: 06/06/19  6:17 PM   Result Value Ref Range    POCT Glucose 259 (H) 70 - 110 mg/dL   APTT    Collection Time: 06/06/19  6:29 PM   Result Value Ref Range    aPTT 23.1 21.0 - 32.0 sec   Protime-INR    Collection Time: 06/06/19  6:29 PM   Result Value Ref Range    Prothrombin Time 11.2  9.0 - 12.5 sec    INR 1.1 0.8 - 1.2   Comprehensive metabolic panel    Collection Time: 06/06/19  6:29 PM   Result Value Ref Range    Sodium 133 (L) 136 - 145 mmol/L    Potassium 4.0 3.5 - 5.1 mmol/L    Chloride 94 (L) 95 - 110 mmol/L    CO2 25 23 - 29 mmol/L    Glucose 270 (H) 70 - 110 mg/dL    BUN, Bld 28 (H) 8 - 23 mg/dL    Creatinine 1.4 0.5 - 1.4 mg/dL    Calcium 9.0 8.7 - 10.5 mg/dL    Total Protein 6.2 6.0 - 8.4 g/dL    Albumin 3.3 (L) 3.5 - 5.2 g/dL    Total Bilirubin 0.7 0.1 - 1.0 mg/dL    Alkaline Phosphatase 85 55 - 135 U/L    AST 74 (H) 10 - 40 U/L    ALT 24 10 - 44 U/L    Anion Gap 14 8 - 16 mmol/L    eGFR if African American 55.6 (A) >60 mL/min/1.73 m^2    eGFR if non  48.1 (A) >60 mL/min/1.73 m^2   Magnesium    Collection Time: 06/06/19  6:29 PM   Result Value Ref Range    Magnesium 2.7 (H) 1.6 - 2.6 mg/dL   CBC auto differential    Collection Time: 06/06/19  6:29 PM   Result Value Ref Range    WBC 46.28 (H) 3.90 - 12.70 K/uL    RBC 3.26 (L) 4.60 - 6.20 M/uL    Hemoglobin 11.0 (L) 14.0 - 18.0 g/dL    Hematocrit 32.8 (L) 40.0 - 54.0 %    Mean Corpuscular Volume 101 (H) 82 - 98 fL    Mean Corpuscular Hemoglobin 33.7 (H) 27.0 - 31.0 pg    Mean Corpuscular Hemoglobin Conc 33.5 32.0 - 36.0 g/dL    RDW 15.2 (H) 11.5 - 14.5 %    Platelets 162 150 - 350 K/uL    MPV 10.2 9.2 - 12.9 fL    Immature Granulocytes 0.4 0.0 - 0.5 %    Gran # (ANC) 5.9 1.8 - 7.7 K/uL    Immature Grans (Abs) 0.17 (H) 0.00 - 0.04 K/uL    Lymph # 37.5 (H) 1.0 - 4.8 K/uL    Mono # 2.6 (H) 0.3 - 1.0 K/uL    Eos # 0.1 0.0 - 0.5 K/uL    Baso # 0.11 0.00 - 0.20 K/uL    nRBC 0 0 /100 WBC    Gran% 12.7 (L) 38.0 - 73.0 %    Lymph% 81.0 (H) 18.0 - 48.0 %    Mono% 5.6 4.0 - 15.0 %    Eosinophil% 0.1 0.0 - 8.0 %    Basophil% 0.2 0.0 - 1.9 %    Aniso Slight     Differential Method Automated    ISTAT PROCEDURE    Collection Time: 06/06/19  9:30 PM   Result Value Ref Range    POC PH 7.434 7.35 - 7.45    POC PCO2 38.8 35 - 45 mmHg     POC PO2 36 (LL) 40 - 60 mmHg    POC HCO3 26.0 24 - 28 mmol/L    POC BE 2 -2 to 2 mmol/L    POC SATURATED O2 71 (L) 95 - 100 %    POC TCO2 27 24 - 29 mmol/L    Sample VENOUS     Site Other     Allens Test N/A    VANCOMYCIN, TROUGH before 3rd dose    Collection Time: 06/06/19  9:49 PM   Result Value Ref Range    Vancomycin-Trough 11.4 10.0 - 22.0 ug/mL   CBC auto differential    Collection Time: 06/06/19 11:25 PM   Result Value Ref Range    WBC 40.93 (H) 3.90 - 12.70 K/uL    RBC 3.28 (L) 4.60 - 6.20 M/uL    Hemoglobin 10.6 (L) 14.0 - 18.0 g/dL    Hematocrit 33.7 (L) 40.0 - 54.0 %    Mean Corpuscular Volume 103 (H) 82 - 98 fL    Mean Corpuscular Hemoglobin 32.3 (H) 27.0 - 31.0 pg    Mean Corpuscular Hemoglobin Conc 31.5 (L) 32.0 - 36.0 g/dL    RDW 15.0 (H) 11.5 - 14.5 %    Platelets 148 (L) 150 - 350 K/uL    MPV 9.7 9.2 - 12.9 fL    Immature Granulocytes CANCELED 0.0 - 0.5 %    Immature Grans (Abs) CANCELED 0.00 - 0.04 K/uL    Lymph # CANCELED 1.0 - 4.8 K/uL    Mono # CANCELED 0.3 - 1.0 K/uL    Eos # CANCELED 0.0 - 0.5 K/uL    Baso # CANCELED 0.00 - 0.20 K/uL    nRBC 0 0 /100 WBC    Gran% 9.5 (L) 38.0 - 73.0 %    Lymph% 88.0 (H) 18.0 - 48.0 %    Mono% 2.0 (L) 4.0 - 15.0 %    Eosinophil% 0.0 0.0 - 8.0 %    Basophil% 0.0 0.0 - 1.9 %    Bands 0.5 %    Platelet Estimate Decreased (A)     Aniso CANCELED     Poik Slight     Sonu Cells Occasional     Differential Method Manual    CBC auto differential    Collection Time: 06/06/19 11:25 PM   Result Value Ref Range    WBC 40.93 (H) 3.90 - 12.70 K/uL    RBC 3.28 (L) 4.60 - 6.20 M/uL    Hemoglobin 10.6 (L) 14.0 - 18.0 g/dL    Hematocrit 33.7 (L) 40.0 - 54.0 %    Mean Corpuscular Volume 103 (H) 82 - 98 fL    Mean Corpuscular Hemoglobin 32.3 (H) 27.0 - 31.0 pg    Mean Corpuscular Hemoglobin Conc 31.5 (L) 32.0 - 36.0 g/dL    RDW 15.0 (H) 11.5 - 14.5 %    Platelets 148 (L) 150 - 350 K/uL    MPV 9.7 9.2 - 12.9 fL    Immature Granulocytes CANCELED 0.0 - 0.5 %    Immature Grans (Abs)  CANCELED 0.00 - 0.04 K/uL    Lymph # CANCELED 1.0 - 4.8 K/uL    Mono # CANCELED 0.3 - 1.0 K/uL    Eos # CANCELED 0.0 - 0.5 K/uL    Baso # CANCELED 0.00 - 0.20 K/uL    nRBC 0 0 /100 WBC    Gran% 9.5 (L) 38.0 - 73.0 %    Lymph% 88.0 (H) 18.0 - 48.0 %    Mono% 2.0 (L) 4.0 - 15.0 %    Eosinophil% 0.0 0.0 - 8.0 %    Basophil% 0.0 0.0 - 1.9 %    Bands 0.5 %    Platelet Estimate Decreased (A)     Aniso CANCELED     Poik Slight     Sonu Cells Occasional     Differential Method Manual    POCT glucose    Collection Time: 06/06/19 11:35 PM   Result Value Ref Range    POCT Glucose 336 (H) 70 - 110 mg/dL   CBC with Automated Differential    Collection Time: 06/07/19  2:48 AM   Result Value Ref Range    WBC 40.50 (H) 3.90 - 12.70 K/uL    RBC 3.39 (L) 4.60 - 6.20 M/uL    Hemoglobin 11.0 (L) 14.0 - 18.0 g/dL    Hematocrit 33.1 (L) 40.0 - 54.0 %    Mean Corpuscular Volume 98 82 - 98 fL    Mean Corpuscular Hemoglobin 32.4 (H) 27.0 - 31.0 pg    Mean Corpuscular Hemoglobin Conc 33.2 32.0 - 36.0 g/dL    RDW 15.1 (H) 11.5 - 14.5 %    Platelets 146 (L) 150 - 350 K/uL    MPV 9.9 9.2 - 12.9 fL    Immature Granulocytes CANCELED 0.0 - 0.5 %    Immature Grans (Abs) CANCELED 0.00 - 0.04 K/uL    Lymph # CANCELED 1.0 - 4.8 K/uL    Mono # CANCELED 0.3 - 1.0 K/uL    Eos # CANCELED 0.0 - 0.5 K/uL    Baso # CANCELED 0.00 - 0.20 K/uL    nRBC 0 0 /100 WBC    Gran% 5.5 (L) 38.0 - 73.0 %    Lymph% 93.0 (H) 18.0 - 48.0 %    Mono% 1.5 (L) 4.0 - 15.0 %    Eosinophil% 0.0 0.0 - 8.0 %    Basophil% 0.0 0.0 - 1.9 %    Platelet Estimate CANCELED     Aniso Slight     Poik Slight     Hypo Occasional     Ovalocytes Occasional     Sonu Cells Occasional     Differential Method Manual    Comprehensive Metabolic Panel (CMP)    Collection Time: 06/07/19  2:48 AM   Result Value Ref Range    Sodium 134 (L) 136 - 145 mmol/L    Potassium 4.1 3.5 - 5.1 mmol/L    Chloride 97 95 - 110 mmol/L    CO2 26 23 - 29 mmol/L    Glucose 273 (H) 70 - 110 mg/dL    BUN, Bld 28 (H) 8 - 23  mg/dL    Creatinine 1.3 0.5 - 1.4 mg/dL    Calcium 8.5 (L) 8.7 - 10.5 mg/dL    Total Protein 5.6 (L) 6.0 - 8.4 g/dL    Albumin 3.2 (L) 3.5 - 5.2 g/dL    Total Bilirubin 0.5 0.1 - 1.0 mg/dL    Alkaline Phosphatase 86 55 - 135 U/L    AST 71 (H) 10 - 40 U/L    ALT 24 10 - 44 U/L    Anion Gap 11 8 - 16 mmol/L    eGFR if African American >60.0 >60 mL/min/1.73 m^2    eGFR if non  52.6 (A) >60 mL/min/1.73 m^2   APTT    Collection Time: 06/07/19  2:48 AM   Result Value Ref Range    aPTT 26.8 21.0 - 32.0 sec   Magnesium    Collection Time: 06/07/19  2:48 AM   Result Value Ref Range    Magnesium 2.2 1.6 - 2.6 mg/dL   Phosphorus    Collection Time: 06/07/19  2:48 AM   Result Value Ref Range    Phosphorus 3.6 2.7 - 4.5 mg/dL   POCT glucose    Collection Time: 06/07/19  2:56 AM   Result Value Ref Range    POCT Glucose 264 (H) 70 - 110 mg/dL   POCT glucose    Collection Time: 06/07/19  4:38 AM   Result Value Ref Range    POCT Glucose 291 (H) 70 - 110 mg/dL   POCT glucose    Collection Time: 06/07/19  6:31 AM   Result Value Ref Range    POCT Glucose 258 (H) 70 - 110 mg/dL         Significant Imaging:     I have reviewed all pertinent imaging studies from the last 24 hours      Assessment and Plan:     Patient is a 79 y.o. male presenting with:    Acute HF (heart failure)  Known CAD with history of 2v CABG (1994) LIMA-D1 and SVG-LAD, recently had C 1/29/19 for worsening angina s/p DUC x 2 (mid and distal LCx). He was also found to have 50% D1 stenosis (distal to the LIMA graft insertion) and 60% proximal SVG graft stenosis and RCA .     S/p SVG DUC 6/6/19  Continue medical therapy with DAPT, high intensity statin  IABP placed via R CFA 6/6/19, still requiring pressor support although decreasing (levophed 0.22 - 0.08 this morning)  CXR daily for IABP/ET tube placement        VTE Risk Mitigation (From admission, onward)        Ordered     heparin infusion 1,000 units/500 ml in 0.9% NaCl (pressure line flush)   Intra-op continuous PRN      06/06/19 1052     heparin 25,000 units in dextrose 5% 250 mL (100 units/mL) infusion LOW INTENSITY nomogram - [facility]  Continuous      06/05/19 0853     heparin 25,000 units in dextrose 5% (100 units/ml) IV bolus from bag - ADDITIONAL PRN BOLUS - 60 units/kg (max bolus 4000 units)  As needed (PRN)      06/05/19 0853     heparin 25,000 units in dextrose 5% (100 units/ml) IV bolus from bag - ADDITIONAL PRN BOLUS - 30 units/kg (max bolus 4000 units)  As needed (PRN)      06/05/19 0853     heparin 25,000 units in dextrose 5% 250 mL (100 units/mL) infusion LOW INTENSITY nomogram - [facility]  Continuous      06/02/19 1508     IP VTE LOW RISK PATIENT  Once      05/29/19 3445          Milind Reyes MD  Interventional Cardiology  [facility]

## 2019-06-07 NOTE — ASSESSMENT & PLAN NOTE
Known CAD with history of 2v CABG (1994) LIMA-D1 and SVG-LAD, recently had C 1/29/19 for worsening angina s/p DUC x 2 (mid and distal LCx). He was also found to have 50% D1 stenosis (distal to the LIMA graft insertion) and 60% proximal SVG graft stenosis and RCA .     S/p SVG DUC 6/6/19  Continue medical therapy with DAPT, high intensity statin  IABP placed via R CFA 6/6/19, still requiring pressor support although decreasing (levophed 0.22 - 0.08 this morning)  CXR daily for IABP/ET tube placement

## 2019-06-07 NOTE — PT/OT/SLP PROGRESS
Physical Therapy Discharge      Patient Name:  Reid Herman   MRN:  [id number]    Pt transferred to ICU 6/6 after a code blue called. Pt intubated. Please re consult when pt is medically appropriate for acute therapy services     Deysi Joseph PT, DPT  6/7/2019  [phone]

## 2019-06-07 NOTE — PROGRESS NOTES
06/07/2019  Shannon Mcnally    Current provider:  Paz Pradhan MD      I, Shannon Mcnally, rounded on Reid HARDEN Virgil to ensure all mechanical assist device settings (IABP) were appropriate and all parameters were within limits.  I was able to ensure all back up equipment was present, the staff had no issues, and the Perfusion Department daily rounding was complete.    11:43 AM

## 2019-06-07 NOTE — ASSESSMENT & PLAN NOTE
79 year old male with history of  DM-1, HTN, CAD s/p CABG x 2 , CLL not currently on any therapy and PAD  who presented as a direct admit from podiatry clinic for an infected left great toe ulcer. Prior wound culture with E. Faecalis. Did not respond to outpatient oral antibiotics. Pt started on IV vanc. MRI cannot rule out possible early osteomyelitis distal phalanx. ID consulted for further recs.     This admit he underwent successful angiogram/revascularization to E. Bone biopsy performed 6/4 and cultures no significant isolate. He was started on empiric Vanc and Ceftriaxone after the biopsy. Afebrile. Chronic leukocytosis. Troponin elevated yesterday and underwent LHC. transferred to ICU yesterday as coded and was intubated. He is now extubated, stable      Plan:  1. Continue empiric Vancomycin and Ceftriaxone for now. vanc trough goal 15-20.   2. Bone cultures no significant isolate. Pathology pending.   3. Anticipate 6 weeks of IV antibiotic therapy for osteomyelitis (7/16/19)   4. Will need close f/u with ID once discharge. If pathology results are negative will treat with abx for 2 weeks. If pathology results are positive anticipate 6 weeks for osteomyelitis.   5. Will need weekly cbc cmp esr crp and vanc trough sent to ID clinic at

## 2019-06-07 NOTE — SUBJECTIVE & OBJECTIVE
Interval History:   Extubated this AM  No complaints  Underwent [facility], developed code blue, intubated, sedated.     Review of Systems   Constitutional: Positive for fatigue. Negative for chills, diaphoresis and fever.   Respiratory: Positive for shortness of breath. Negative for cough.    Cardiovascular: Negative for chest pain.   Gastrointestinal: Negative for abdominal distention, abdominal pain, diarrhea, nausea and vomiting.   Genitourinary: Negative for dysuria.   Musculoskeletal: Negative for back pain.   Skin: Positive for wound. Negative for pallor and rash.   Neurological: Negative for dizziness and headaches.   All other systems reviewed and are negative.    Objective:     Vital Signs (Most Recent):  Temp: 97.7 °F (36.5 °C) (06/07/19 1100)  Pulse: 81 (06/07/19 1400)  Resp: (!) 25 (06/07/19 1400)  BP: (!) 111/58 (06/07/19 1400)  SpO2: 99 % (06/07/19 1400) Vital Signs (24h Range):  Temp:  [97.4 °F (36.3 °C)-97.8 °F (36.6 °C)] 97.7 °F (36.5 °C)  Pulse:  [] 81  Resp:  [14-36] 25  SpO2:  [84 %-100 %] 99 %  BP: ()/(49-96) 111/58     Weight: 92.2 kg (203 lb 4.2 oz)  Body mass index is 31.84 kg/m².    Estimated Creatinine Clearance: 51.5 mL/min (based on SCr of 1.3 mg/dL).    Physical Exam   Constitutional: He is oriented to person, place, and time. He appears well-developed and well-nourished. No distress.   Cardiovascular: Normal rate and regular rhythm.   No murmur heard.  Pulmonary/Chest: Effort normal and breath sounds normal. No respiratory distress.   Abdominal: Soft. He exhibits no distension.   Musculoskeletal: Normal range of motion. He exhibits edema.   Neurological: He is alert and oriented to person, place, and time.   Skin: Skin is warm and dry.   Left medial great toe with dry ulceration. No drainage.  Mild TTP.  There is erythema of the 1st digit. Decreased erythema to forefoot.   Mild tenderness.  Foot is warmer today.     Psychiatric: He has a normal mood and affect. His behavior is  normal.       Significant Labs: All pertinent labs within the past 24 hours have been reviewed.    Significant Imaging: I have reviewed all pertinent imaging results/findings within the past 24 hours.

## 2019-06-07 NOTE — PLAN OF CARE
Problem: Adult Inpatient Plan of Care  Goal: Plan of Care Review  Outcome: Ongoing (interventions implemented as appropriate)  Recommendations     1. TF recommendations: Impact Peptide @ 45 mL/hr to provide 1620 calories, 101 grams of protein, 832 mL fluid.              - Hold for residuals >500 mL; additional fluid per MD.  2. If able to extubate & advance diet, recommend Cardiac diet (texture per SLP).   3. RD to monitor & follow-up.

## 2019-06-07 NOTE — CONSULTS
"  [facility]  Adult Nutrition  Consult Note    SUMMARY     Recommendations    1. TF recommendations: Impact Peptide @ 45 mL/hr to provide 1620 calories, 101 grams of protein, 832 mL fluid.   - Hold for residuals >500 mL; additional fluid per MD.  2. If able to extubate & advance diet, recommend Cardiac diet (texture per SLP).   3. RD to monitor & follow-up.    Goals: Meet % EEN, EPN  Nutrition Goal Status: new  Communication of RD Recs: reviewed with RN    Reason for Assessment    Reason For Assessment: consult, length of stay  Diagnosis: other (see comments)(Cellulitis)  Relevant Medical History: HTN, DM  Interdisciplinary Rounds: did not attend    General Information Comments: Pt intubated, sedated. S/p code blue, IABP placed. Family at bedside reports pt w/ good appetite & stable wt PTA. NFPE not indicated, pt appears nourished w/ no physical signs of malnutrition.  Nutrition Discharge Planning: Unable to determine    Nutrition/Diet History    Patient Reported Diet/Restrictions/Preferences: other (see comments)(YAAKOV)  Spiritual, Cultural Beliefs, Temple Practices, Values that Affect Care: no  Factors Affecting Nutritional Intake: NPO, on mechanical ventilation    Anthropometrics    Temp: 97.8 °F (36.6 °C)  Height Method: Estimated  Height: 5' 7" (170.2 cm)  Height (inches): 67 in  Weight Method: Bed Scale  Weight: 92.2 kg (203 lb 4.2 oz)  Weight (lb): 203.27 lb  Ideal Body Weight (IBW), Male: 148 lb  % Ideal Body Weight, Male (lb): 137.34 lb  BMI (Calculated): 31.9  BMI Grade: 30 - 34.9- obesity - grade I    Lab/Procedures/Meds    Pertinent Labs Reviewed: reviewed  Pertinent Labs Comments: Na 134, Gluc 273, A1C 5.8  Pertinent Medications Reviewed: reviewed  Pertinent Medications Comments: Levophed, Propofol, Heparin    Estimated/Assessed Needs    Weight Used For Calorie Calculations: 92.2 kg (203 lb 4.2 oz)     Energy Calorie Requirements (kcal): 1707 kcal/d   Energy Need Method: Patrick " State     Protein Requirements: 101-135 g/d (1.5-2 g/kg IBW)  Weight Used For Protein Calculations: 67.3 kg (148 lb 5.9 oz)     Estimated Fluid Requirement Method: other (see comments)(Per MD or 1 mL/kcal)     CHO Requirement: 50% total kcals    Nutrition Prescription Ordered    Current Diet Order: NPO    Evaluation of Received Nutrient/Fluid Intake    Other Calories (kcal): 211 (Propofol)    Comments: LBM: 6/5    Nutrition Risk    Level of Risk/Frequency of Follow-up: (2x/week)     Assessment and Plan    Nutrition Problem  Inadequate energy intake    Related to (etiology):   Inability to consume sufficient energy    Signs and Symptoms (as evidenced by):   NPO w/ no alternate means of nutrition     Interventions/Recommendations (treatment strategy):  Collaboration of care     Nutrition Diagnosis Status:   New     Monitor and Evaluation    Food and Nutrient Intake: energy intake, food and beverage intake, enteral nutrition intake  Food and Nutrient Adminstration: diet order, enteral and parenteral nutrition administration  Physical Activity and Function: nutrition-related ADLs and IADLs  Anthropometric Measurements: weight, weight change  Biochemical Data, Medical Tests and Procedures: lipid profile, inflammatory profile, gastrointestinal profile, glucose/endocrine profile, electrolyte and renal panel  Nutrition-Focused Physical Findings: overall appearance     Nutrition Follow-Up    RD Follow-up?: Yes

## 2019-06-07 NOTE — PLAN OF CARE
Problem: Adult Inpatient Plan of Care  Goal: Plan of Care Review  Outcome: Ongoing (interventions implemented as appropriate)  See vital signs and assessments in flowsheets. See below for updates on today's progress.     Pulmonary: ETT 23 @ lip. A/C.  FIO2 70%. . Peep 5. RR 18. O2 sat WNL.      Cardiovascular: Sinus dat. HR 40s-60s. TVP to right groin. Backup rate 40. IABP to right groin. IABP pressures 70s/80s over 20-30s. Map 60-70s. Augmentation. 110s. Levo gtt titirating for IABP map >60. Levo currently @ 0.08. Pulses 1+ UE/LE. Cap refill WNL. CVP 16, 11,& 9. No ectopy. No cardiac events.     Neurological: RASS -2. Pupils 3-4 sluggish. Withdraws from pain. Follows commands.  Propofol gtt weaned down for for RASS (-1) goal.     Gastrointestinal: No BM overnight.     Genitourinary: Tuttle intact and patent. Good U/O.     Endocrine: Blood glucose remains elevated in the upper 200s-lower 300s with coverage. Team aware.      Integumentary/Other: Right groin sheath TVP. Right groin  IABP. CDI.  Left groin site CDI.     Infusions: Propofol. Levo. 10ml to TVP. Lido.     Patient progressing towards goals as tolerated, plan of care communicated and reviewed with Redi Herman. All concerns addressed. Will continue to monitor.

## 2019-06-07 NOTE — SUBJECTIVE & OBJECTIVE
"Interval HPI:   Overnight events: Cardiac arrest yesterday afternoon and transferred to CMICU. Extubated today. Scheduled SQ insulin discontinued per primary. BG elevated above goal this am.  Eating:   NPO  Nausea: No  Hypoglycemia and intervention: No  Fever: No  TPN and/or TF: No  If yes, type of TF/TPN and rate: none    /74 (BP Location: Right arm, Patient Position: Lying)   Pulse (!) 58   Temp 97.7 °F (36.5 °C) (Axillary)   Resp (!) 24   Ht 5' 7" (1.702 m)   Wt 92.2 kg (203 lb 4.2 oz)   SpO2 100%   BMI 31.84 kg/m²     Labs Reviewed and Include    Recent Labs   Lab 06/07/19  0248   *   CALCIUM 8.5*   ALBUMIN 3.2*   PROT 5.6*   *   K 4.1   CO2 26   CL 97   BUN 28*   CREATININE 1.3   ALKPHOS 86   ALT 24   AST 71*   BILITOT 0.5     Lab Results   Component Value Date    WBC 40.50 (H) 06/07/2019    HGB 11.0 (L) 06/07/2019    HCT 33.1 (L) 06/07/2019    MCV 98 06/07/2019     (L) 06/07/2019     No results for input(s): TSH, FREET4 in the last 168 hours.  Lab Results   Component Value Date    HGBA1C 5.8 (H) 05/29/2019       Nutritional status:   Body mass index is 31.84 kg/m².  Lab Results   Component Value Date    ALBUMIN 3.2 (L) 06/07/2019    ALBUMIN 3.3 (L) 06/06/2019    ALBUMIN 3.2 (L) 06/06/2019     Lab Results   Component Value Date    PREALBUMIN 22 05/17/2019       Estimated Creatinine Clearance: 51.5 mL/min (based on SCr of 1.3 mg/dL).    Accu-Checks  Recent Labs     06/05/19  2306 06/06/19  0207 06/06/19  0727 06/06/19  1304 06/06/19  1501 06/06/19  1817 06/06/19  2335 06/07/19  0256 06/07/19  0438 06/07/19  0631   POCTGLUCOSE 255* 213* 179* 159* 157* 259* 336* 264* 291* 258*       Current Medications and/or Treatments Impacting Glycemic Control  Immunotherapy:    Immunosuppressants     None        Steroids:   Hormones (From admission, onward)    None        Pressors:    Autonomic Drugs (From admission, onward)    Start     Stop Route Frequency Ordered    06/06/19 1745  " norepinephrine 4 mg in dextrose 5% 250 mL infusion (premix) (titrating)     Question Answer Comment   Titrate by: (in mcg/kg/min) 0.02    Titrate interval: (in minutes) 5    Titrate to maintain: (MAP or SBP) MAP    Greater than: (in mmHg) 60    Maximum dose: (in mcg/kg/min) 3        -- IV Continuous 06/06/19 1633        Hyperglycemia/Diabetes Medications:   Antihyperglycemics (From admission, onward)    Start     Stop Route Frequency Ordered    06/07/19 0900  insulin detemir U-100 pen 12 Units      -- SubQ Daily 06/07/19 0748    06/07/19 0847  insulin aspart U-100 pen 0-5 Units      -- SubQ Every 6 hours PRN 06/07/19 0748

## 2019-06-07 NOTE — ASSESSMENT & PLAN NOTE
BG goal 140-180    Levemir 12 units daily -(weight based using 0.3 modifier)   Novolog ICR 1:8  -- Hold if NPO  Moderate dose correction scale  Blood glucose monitoring q 4 hrs while NPO    ADDENDUM:   Increase Levemir to 15 units daily    Discharge recommendations:   TBD.

## 2019-06-07 NOTE — PHYSICIAN QUERY
PT Name: Reid Herman  MR #: [id number]    Physician Query Form - Respiratory Condition Clarification      CDS/: Charlotte Christian RN               Contact information: [email or web address]    This form is a permanent document in the medical record.    Query Date: June 7, 2019    By submitting this query, we are merely seeking further clarification of documentation. Please utilize your independent clinical judgment when addressing the question(s) below.    The Medical record contains the following   Indicators   Supporting Clinical Findings Location in Medical Record   x   SOB, DIOR, Wheezing, Productive Cough, Use of Accessory Muscles, etc. Positive for shortness of breath. Negative for wheezing.  JVD present .positive for  orthopnea and dyspnea on exertion  Hospital medicine PN 5/31   x   Acute/Chronic Illness direct admit from podiatry clinic for a left hallux abcess.  PMH HTN, Hyperlipidemia, DM-1(insulin pump), CAD s/p CABG x 2 , MINDA compliant with CPAP, CLL not currently on any therapy, PAD, diabetic ulcer L great toe, hyponatremia, acute HF, hyperkalemia, CKD3    experienced NSTEMI on 6/4/19 with troponin peak at 14.       After VT arrest taken to the cath s/p TVP and IABP , [facility] Medicine PN 5/31            Cardiology H&P    Cardiology note 6/6   x   Radiology Findings Endotracheal tube,  nasogastric tube,There are sternotomy wires.There is a metallic marker probably for aortic balloon pump tip.. Right IJ catheter terminates at the cavoatrial junction.  transcutaneous pacing lead from IVC approach with tip projecting at the expected location of the right ventricle.There is mild streaky opacities at the left base probably platelike atelectasis slightly worse appearing. .    CXR 6/6   x   Respiratory Distress or Failure No respiratory distress H&P      Hypoxia or Hypercapnia     x   RR         ABGs         O2 sat Ph 7.432  PCO2  28.6  PO2 257  HCO3 19.1  Ph 7.434  PCO2 38.8   PO2  36   HCO3 26     O2 sat 71    PH 7.547  PCO2 32.9  PO2  253  HCO3  28.6    RR 18  O2 sat 98%  RR 14  O2 sat 99%               O2 sat 94%               O2 sat 82-85%  RR 30  On vent 70% FIO2  RR 25  On vent 70% FIO2  RR 36  On vent 70% FIO2 ABGs 6/6@1540  ABGs 6/6@2130  ABG's 6/7@0733    Vital signs 5/29@1735  Vital signs 6/1@1000  Vital signs 6/6@1459  Vital signs 6/6@1540  Vital signs 6/6@1747  Vital signs 6/6@2131  Vital signs 6/7   x   BiPAP/Intubation Agonal breathing noted.  Code blue initiated.  No pulse noted.  Compressions initiated.     pulseless polymorphic Vtach and Vfib, torsades, shocked 6 times total, intubated/sedated at bedside. cardiogenic shock and VT arrest Nursing note 6/6      Hospital medicine PN 6/6   x   Supplemental O2 MINDA on CPAP [facility] medicine PN 5/31      Home O2, Oxygen Dependence     x   Treatment Heparin gtt, ASA, plavix, metoprolol, lasix, lisionpril Hospital medicine PN 5/31      Other       Respiratory failure can be acute, chronic or both. It is generally further specified as hypoxic, hypercapnic or both. Lastly, it is important to identify an etiology, if known or suspected.   References::  https://www.acphospitalist.org/archives/2013/10/coding.htm http://[employer]/acute-respiratory-failure-know     The clinical guidelines noted below are only system guidelines, and do not replace the providers clinical judgment.    Provider, please specify diagnosis or diagnoses associated with above clinical findings.   [   ] Acute Respiratory Failure with Hypoxia - ABG pO2 < 60 mmHg or O2 sat of 88% on RA and respiratory symptoms documented   [   ] Other Acute Respiratory Failure     [   ] Acute and (on) Chronic Respiratory Failure with Hypoxia - pO2 >10 mmHg below baseline OR SpO2 < 91% on usual home O2 OR O2 > 2L/min over baseline home O2    [   ] Other Acute and (on) Chronic Respiratory Failure    [x   ] No Respiratory Failure, Maintained on Vent for Routine Care or Airway Protection -   purposely intubated for airway protection (e.g.: angioedema, stroke, trauma); without meeting the criteria for respiratory failure.    [   ] Other Respiratory Diagnosis (please specify): _________________________________   [   ]  Clinically Undetermined       Please document in your progress notes daily for the duration of treatment until resolved and include in your discharge summary.

## 2019-06-07 NOTE — PROCEDURES
"Reid Herman is a 79 y.o. male patient.    Temp: 97.4 °F (36.3 °C) (06/06/19 1901)  Pulse: (!) 49 (06/06/19 1901)  Resp: (!) 31 (06/06/19 1901)  BP: 126/70 (06/06/19 1901)  SpO2: 100 % (06/06/19 1901)  Weight: 92.1 kg (203 lb 0.7 oz) (06/06/19 1700)  Height: 5' 7" (170.2 cm) (06/06/19 1700)  Mallampati Scale: Class II  ASA Classification: Class 3    Central Line  Date/Time: 6/6/2019 8:12 PM  Location procedure was performed: [facility] CARDIAC MEDICAL ICU ([facility])  Performed by: Hetal Singh MD  Consent Done: Yes  Time out: Immediately prior to procedure a "time out" was called to verify the correct patient, procedure, equipment, support staff and site/side marked as required.  Indications: med administration, vascular access and hemodynamic monitoring  Anesthesia: general anesthesia  Preparation: skin prepped with ChloraPrep  Location details: right internal jugular  Catheter type: triple lumen  Catheter size: 7 Fr  Ultrasound guidance: yes  Vessel Caliber: large, compressibility normal  Manometry: Yes  Number of attempts: 1  Complications: none  Estimated blood loss (mL): 0  Specimens: No  Implants: No  Complications: No  Comments: Awaiting STAT chest x-ray          Hetal Singh  6/6/2019  "

## 2019-06-07 NOTE — PROGRESS NOTES
"[facility]  Endocrinology  Progress Note    Admit Date: 5/29/2019     Reason for Consult: Management of T1DM, Hyperglycemia     Surgical Procedure and Date: N/A    Diabetes diagnosis year: 1972    Lab Results   Component Value Date    HGBA1C 5.8 (H) 05/29/2019       Home Diabetes Medications:  Accuchek spirit pump/sarai       Basal Rate  0000 - 0300     0.85 u/hr  0300 - 0700     0.9 u/hr  0700 - 2100     1.1 u/hr  2100 - 0000     0.85 u/hr        Carb Ratio  12A: 1:8     ISF  1:30     Target: 110-130  IAT: 3    How often checking glucose at home? Dexcom G5 CGM  BG readings on regimen: 110-140  Hypoglycemia on the regimen?  Yes, a few time per week  Missed doses on regimen?  No    Diabetes Complications include:     Hypoglycemia , Diabetic chronic kidney disease     , Diabetic retinopathy , Diabetic peripheral neuropathy , Diabetic peripheral angiopathy with/without gangrene and Foot ulcer      Complicating diabetes co morbidities:   CHF, MINDA and CKD      HPI:   Patient is a 79 y.o. male with a diagnosis of hyponatremia, left foot abscess, CKD3, DM1, CLL, MINDA, and acute heart failure.  Admitted to [facility] on 5/29/19 from podiatry clinic for a left hallux abcess.  Last seen in endocrinology clinic by ESTEFANÍA Rivers NP on 5/8/19.  Endocrine consulted for DM/BG management.            Interval HPI:   Overnight events: Cardiac arrest yesterday afternoon and transferred to CMICU. Extubated today. Scheduled SQ insulin discontinued per primary. BG elevated above goal this am.  Eating:   NPO  Nausea: No  Hypoglycemia and intervention: No  Fever: No  TPN and/or TF: No  If yes, type of TF/TPN and rate: none    /74 (BP Location: Right arm, Patient Position: Lying)   Pulse (!) 58   Temp 97.7 °F (36.5 °C) (Axillary)   Resp (!) 24   Ht 5' 7" (1.702 m)   Wt 92.2 kg (203 lb 4.2 oz)   SpO2 100%   BMI 31.84 kg/m²      Labs Reviewed and Include    Recent Labs   Lab 06/07/19  0248   *   CALCIUM 8.5* "   ALBUMIN 3.2*   PROT 5.6*   *   K 4.1   CO2 26   CL 97   BUN 28*   CREATININE 1.3   ALKPHOS 86   ALT 24   AST 71*   BILITOT 0.5     Lab Results   Component Value Date    WBC 40.50 (H) 06/07/2019    HGB 11.0 (L) 06/07/2019    HCT 33.1 (L) 06/07/2019    MCV 98 06/07/2019     (L) 06/07/2019     No results for input(s): TSH, FREET4 in the last 168 hours.  Lab Results   Component Value Date    HGBA1C 5.8 (H) 05/29/2019       Nutritional status:   Body mass index is 31.84 kg/m².  Lab Results   Component Value Date    ALBUMIN 3.2 (L) 06/07/2019    ALBUMIN 3.3 (L) 06/06/2019    ALBUMIN 3.2 (L) 06/06/2019     Lab Results   Component Value Date    PREALBUMIN 22 05/17/2019       Estimated Creatinine Clearance: 51.5 mL/min (based on SCr of 1.3 mg/dL).    Accu-Checks  Recent Labs     06/05/19  2306 06/06/19  0207 06/06/19  0727 06/06/19  1304 06/06/19  1501 06/06/19  1817 06/06/19  2335 06/07/19  0256 06/07/19  0438 06/07/19  0631   POCTGLUCOSE 255* 213* 179* 159* 157* 259* 336* 264* 291* 258*       Current Medications and/or Treatments Impacting Glycemic Control  Immunotherapy:    Immunosuppressants     None        Steroids:   Hormones (From admission, onward)    None        Pressors:    Autonomic Drugs (From admission, onward)    Start     Stop Route Frequency Ordered    06/06/19 1745  norepinephrine 4 mg in dextrose 5% 250 mL infusion (premix) (titrating)     Question Answer Comment   Titrate by: (in mcg/kg/min) 0.02    Titrate interval: (in minutes) 5    Titrate to maintain: (MAP or SBP) MAP    Greater than: (in mmHg) 60    Maximum dose: (in mcg/kg/min) 3        -- IV Continuous 06/06/19 1633        Hyperglycemia/Diabetes Medications:   Antihyperglycemics (From admission, onward)    Start     Stop Route Frequency Ordered    06/07/19 0900  insulin detemir U-100 pen 12 Units      -- SubQ Daily 06/07/19 0748    06/07/19 0847  insulin aspart U-100 pen 0-5 Units      -- SubQ Every 6 hours PRN 06/07/19 0748           ASSESSMENT and PLAN    * Cellulitis of Left foot  Infection may elevate BG readings  Optimize BG control for wound healing      Controlled type 1 diabetes mellitus with diabetic neuropathy, with long-term current use of insulin  BG goal 140-180    Levemir 12 units daily -(weight based using 0.3 modifier)   Novolog ICR 1:8  -- Hold if NPO  Moderate dose correction scale  Blood glucose monitoring q 4 hrs while NPO    ADDENDUM:   Increase Levemir to 15 units daily    Discharge recommendations:   TBD.     MINDA on CPAP  May affect BG readings if uncontrolled        CKD (chronic kidney disease), stage III  Caution with insulin stacking  Estimated Creatinine Clearance: 51.5 mL/min (based on SCr of 1.3 mg/dL).        Class 1 obesity due to excess calories with serious comorbidity and body mass index (BMI) of 30.0 to 30.9 in adult  Body mass index is 31.84 kg/m².  May increase insulin resistance.             Dina Ba, NADINE  Endocrinology  [facility] [address]

## 2019-06-07 NOTE — ASSESSMENT & PLAN NOTE
Caution with insulin stacking  Estimated Creatinine Clearance: 51.5 mL/min (based on SCr of 1.3 mg/dL).

## 2019-06-07 NOTE — PROGRESS NOTES
[facility]  Cardiac Electrophysiology  Progress Note    Admission Date: 5/29/2019  Code Status: Full Code   Attending Physician: Rajan Lo MD   Expected Discharge Date: 6/11/2019  Principal Problem:Cellulitis of foot    Subjective:     Interval History: Did well overnight no VT/VF    Review of Systems   Unable to perform ROS: intubated     Objective:     Vital Signs (Most Recent):  Temp: 97.7 °F (36.5 °C) (06/07/19 0300)  Pulse: (!) 53 (06/07/19 0600)  Resp: (!) 22 (06/07/19 0600)  BP: 124/74 (06/07/19 0600)  SpO2: 100 % (06/07/19 0600) Vital Signs (24h Range):  Temp:  [97.4 °F (36.3 °C)-97.9 °F (36.6 °C)] 97.7 °F (36.5 °C)  Pulse:  [] 53  Resp:  [16-34] 22  SpO2:  [82 %-100 %] 100 %  BP: ()/(49-96) 124/74     Weight: 92.2 kg (203 lb 4.2 oz)  Body mass index is 31.84 kg/m².     SpO2: 100 %  O2 Device (Oxygen Therapy): ventilator    Physical Exam   Constitutional: He is oriented to person, place, and time. He appears well-developed and well-nourished. No distress. He is sedated and intubated.   Eyes: Pupils are equal, round, and reactive to light. Conjunctivae are normal.   Neck: No JVD present. No tracheal deviation present. No thyromegaly present.   Cardiovascular: Regular rhythm, normal heart sounds and intact distal pulses. Bradycardia present. Exam reveals no gallop and no friction rub.   No murmur heard.  Pulmonary/Chest: Effort normal and breath sounds normal. He is intubated. No respiratory distress. He has no wheezes. He has no rales.   Abdominal: Soft. Bowel sounds are normal. He exhibits no distension. There is no tenderness.   Musculoskeletal: He exhibits no edema or deformity.   Neurological: He is alert and oriented to person, place, and time. No cranial nerve deficit. Coordination normal.   Skin: Skin is warm and dry. He is not diaphoretic.   Psychiatric: He has a normal mood and affect. His behavior is normal.       Significant Labs:   EP:   Recent Labs   Lab  06/05/19  0908  06/06/19  1546 06/06/19  1829 06/06/19  2325 06/07/19  0248   NA  --    < > 136 133*  --  134*   K  --    < > 4.7 4.0  --  4.1   CL  --    < > 102 94*  --  97   CO2  --    < > 17* 25  --  26   GLU  --    < > 320* 270*  --  273*   BUN  --    < > 28* 28*  --  28*   CREATININE  --    < > 1.4 1.4  --  1.3   CALCIUM  --    < > 8.6* 9.0  --  8.5*   PROT  --    < > 5.5* 6.2  --  5.6*   ALBUMIN  --    < > 3.2* 3.3*  --  3.2*   BILITOT  --    < > 0.5 0.7  --  0.5   ALKPHOS  --    < > 75 85  --  86   AST  --    < > 55* 74*  --  71*   ALT  --    < > 18 24  --  24   ANIONGAP  --    < > 17* 14  --  11   ESTGFRAFRICA  --    < > 55.6* 55.6*  --  >60.0   EGFRNONAA  --    < > 48.1* 48.1*  --  52.6*   WBC 25.12*   < > 31.46* 46.28* 40.93*  40.93* 40.50*   HGB 10.6*   < > 13.8* 11.0* 10.6*  10.6* 11.0*   HCT 33.3*   < > 43.6 32.8* 33.7*  33.7* 33.1*   *   < > 104* 162 148*  148* 146*   INR 1.1  --   --  1.1  --   --     < > = values in this interval not displayed.       Significant Imaging: Echocardiogram:   Transthoracic echo (TTE) complete (Cupid Only):   Results for orders placed or performed during the hospital encounter of 05/29/19   Transthoracic echo (TTE) 2D with Color Flow   Result Value Ref Range    Ascending aorta 3.00 cm    STJ 2.68 cm    AV mean gradient 3.46 mmHg    Ao peak layo 1.23 m/s    Ao VTI 26.89 cm    IVRT 0.05 msec    IVS 0.85 0.6 - 1.1 cm    LA size 4.42 cm    Left Atrium Major Axis 5.77 cm    Left Atrium Minor Axis 5.68 cm    LVIDD 5.60 3.5 - 6.0 cm    LVIDS 4.56 (A) 2.1 - 4.0 cm    LVOT diameter 2.00 cm    LVOT peak VTI 12.97 cm    PW 1.04 0.6 - 1.1 cm    MV Peak A Layo 0.79 m/s    E wave decelartion time 156.41 msec    MV Peak E Layo 1.32 m/s    PV Peak D Layo 0.58 m/s    PV Peak S Layo 0.37 m/s    RA Major Axis 4.74 cm    RA Width 3.29 cm    RVDD 3.51 cm    Sinus 3.16 cm    TAPSE 1.54 cm    TR Max Layo 3.13 m/s    TDI LATERAL 0.04     TDI SEPTAL 0.04     LA WIDTH 4.47 cm    LV Diastolic  Volume 127.80 mL    LV Systolic Volume 95.41 mL    RV S' 7.65 m/s    LVOT peak sharon [id number] m/s    LV LATERAL E/E' RATIO 33.00     LV SEPTAL E/E' RATIO 33.00     FS 19 %    LA volume 96.14 cm3    LV mass 204.09 g    Left Ventricle Relative Wall Thickness 0.37 cm    AV valve area 1.51 cm2    AV Velocity Ratio 0.50     AV index (prosthetic) 0.48     E/A ratio 1.67     Mean e' 0.04     Pulm vein S/D ratio 0.64     LVOT area 3.14 cm2    LVOT stroke volume 40.73 cm3    AV peak gradient 6.05 mmHg    E/E' ratio 33.00     LV Systolic Volume Index 45.8 mL/m2    LV Diastolic Volume Index 61.29 mL/m2    LA Volume Index 46.1 mL/m2    LV Mass Index 97.9 g/m2    Triscuspid Valve Regurgitation Peak Gradient 39.19 mmHg    BSA 2.15 m2    Right Atrial Pressure (from IVC) 8 mmHg    TV rest pulmonary artery pressure 47 mmHg     Assessment and Plan:     Polymorphic ventricular tachycardia  - PMVT within 48 hrs of NSTEMI  - Repeat angiogram with open stent  - Can wean Lidocaine to 0.5 for 12 hrs then turn off  - No VT overnight  -QTc normal  - Will need Life vest on D/C    Atrial fibrillation  - Currently in Sinus bradycardia  - CHADSVASC 4  - AC when ok with primary team        Stefany Boyd MD  Cardiac Electrophysiology  [facility]

## 2019-06-07 NOTE — NURSING
LATE ENTRY    1700 - patient arrival from cardiac cath lab via bed with CCL RN x2 s/p code blue on CSU s/p cardiac cath + PCI 6/6 AM, and cardiac cath + IABP + TVP 6/6 PM. Per CCL nurseWagner RN, no PCI necessary during second cardiac cath s/p code blue. IABP placed to right groin, TVP placed to right groin via venous sheath. TVP ensured secure, locked, set for back up rate of 40 per cardiology MD. Cardiology resident contacted on patient arrival to notify of patient location, need for orders for medications currently infusing, which medications to continue, urinary catheter insertion, ogt insertion, ventilator orders, any additional needed orders. Peripheral pulses palpable (BUE) and doppler-able (BLE), equal bilaterally. IABP ensured secure, level, zeroed. 12 lead EKG completed prior to initiating lidocaine gtt as ordered. Cardiology MD contacted re: need for central venous access, as levophed infusing through TVP line currently. To pass on to PM MD as currently doing handoff. VS as documented. Assessments as documented. Additional PIV inserted until CVC access obtained.     1800 - Cardiology MD on call overnight contacted regarding need for CVC access. To be to bedside as soon as possible to insert line. Lidocaine gtt initiated as soon as obtained from pharmacy. MD made aware of HR average 50 bpm. TVP remains in place with set backup rate of 40.    Lise Lomas, KHURRAM

## 2019-06-08 NOTE — ASSESSMENT & PLAN NOTE
Known CAD with history of 2v CABG (1994) LIMA-D1 and SVG-LAD, recently had C 1/29/19 for worsening angina s/p DUC x 2 (mid and distal LCx). He was also found to have 50% D1 stenosis (distal to the LIMA graft insertion) and 60% proximal SVG graft stenosis and RCA .      S/p SVG DUC 6/6/19  Continue medical therapy with DAPT, high intensity statin  IABP placed via R CFA 6/6/19, still requiring pressor support although decreasing (levophed 0.22 - 0.08 this morning)  CXR daily for IABP/ET tube placement (will extubate today).  Continue on 80 mg IV lasix BID.

## 2019-06-08 NOTE — EICU
Called to room via e-lert. Pt coding. CPR underway. V-tach on monitor and pulseless. Pt in and out of consciousness throughout code so CPR paused several times during code as pt kept pushing staff away. Pt continued to be in and out of v-tach/v-fib with and without a pulse. A total of 17 shocks administered, 5 rounds of epi, 175 of lidocaine, 300 of amio and a lido drip started at 1. See code documentation for details. Pt had ROSC at 1302 and code ended.

## 2019-06-08 NOTE — ASSESSMENT & PLAN NOTE
BG goal 140-180    Levemir 12 units daily -(weight based using 0.3 modifier)   Novolog ICR 1:8  -- Hold if NPO  Low dose correction scale  Blood glucose monitoring AC/HS    Discharge recommendations:   TBD.

## 2019-06-08 NOTE — ASSESSMENT & PLAN NOTE
Initially had an episode of AF RVR then had PMVT. Now in sinus rhythm with frequent PACs had an episode of focal atrial tachycardia today.     Plan  Ideally with FAT would recommend verapamil however he has an EF of 20% so would recommend metoprolol instead. Additionally with his [id number] of 4 would recommend anticoagulation.

## 2019-06-08 NOTE — PROGRESS NOTES
"[facility]  Endocrinology  Progress Note    Admit Date: 2019     Reason for Consult: Management of T1DM, Hyperglycemia     Surgical Procedure and Date: N/A    Diabetes diagnosis year:     Lab Results   Component Value Date    HGBA1C 5.8 (H) 2019       Home Diabetes Medications:  Accuchek spirit pump/sarai       Basal Rate  0000 - 0300     0.85 u/hr  0300 - 0700     0.9 u/hr  0700 - 2100     1.1 u/hr  2100 - 0000     0.85 u/hr        Carb Ratio  12A: 1:8     ISF  1:30     Target: 110-130  IAT: 3    How often checking glucose at home? Dexcom G5 CGM  BG readings on regimen: 110-140  Hypoglycemia on the regimen?  Yes, a few time per week  Missed doses on regimen?  No    Diabetes Complications include:     Hypoglycemia , Diabetic chronic kidney disease     , Diabetic retinopathy , Diabetic peripheral neuropathy , Diabetic peripheral angiopathy with/without gangrene and Foot ulcer      Complicating diabetes co morbidities:   CHF, MINDA and CKD      HPI:   Patient is a 79 y.o. male with a diagnosis of hyponatremia, left foot abscess, CKD3, DM1, CLL, MINDA, and acute heart failure.  Admitted to [facility] on 19 from podiatry clinic for a left hallux abcess.  Last seen in endocrinology clinic by ESTEFANÍA Rivers NP on 19.  Endocrine consulted for DM/BG management.            Interval HPI:   Overnight events: NAEON. Remains in CMICU. BG at or slightly above goal ranges on current SQ insulin regimen. Diet advanced this morning.   Eatin%  Nausea: No  Hypoglycemia and intervention: No  Fever: No  TPN and/or TF: No  If yes, type of TF/TPN and rate: none    BP (!) 112/59 (BP Location: Right arm, Patient Position: Lying)   Pulse 77   Temp 98.8 °F (37.1 °C) (Oral)   Resp (!) 105   Ht 5' 7" (1.702 m)   Wt 92.2 kg (203 lb 4.2 oz)   SpO2 98%   BMI 31.84 kg/m²      Labs Reviewed and Include    Recent Labs   Lab 19  0435   *   CALCIUM 8.6*   ALBUMIN 2.9*   PROT 5.3*      K 3.5 "   CO2 28   CL 99   BUN 19   CREATININE 1.1   ALKPHOS 80   ALT 21   AST 55*   BILITOT 0.6     Lab Results   Component Value Date    WBC 29.29 (H) 06/08/2019    HGB 9.9 (L) 06/08/2019    HCT 31.3 (L) 06/08/2019     (H) 06/08/2019    PLT 89 (L) 06/08/2019     No results for input(s): TSH, FREET4 in the last 168 hours.  Lab Results   Component Value Date    HGBA1C 5.8 (H) 05/29/2019       Nutritional status:   Body mass index is 31.84 kg/m².  Lab Results   Component Value Date    ALBUMIN 2.9 (L) 06/08/2019    ALBUMIN 3.2 (L) 06/07/2019    ALBUMIN 3.3 (L) 06/06/2019     Lab Results   Component Value Date    PREALBUMIN 22 05/17/2019       Estimated Creatinine Clearance: 60.9 mL/min (based on SCr of 1.1 mg/dL).    Accu-Checks  Recent Labs     06/06/19  1817 06/06/19  2335 06/07/19  0256 06/07/19  0438 06/07/19  0631 06/07/19  0852 06/07/19  1559 06/07/19  1939 06/08/19  0005 06/08/19  0432   POCTGLUCOSE 259* 336* 264* 291* 258* 246* 251* 252* 208* 157*       Current Medications and/or Treatments Impacting Glycemic Control  Immunotherapy:    Immunosuppressants     None        Steroids:   Hormones (From admission, onward)    None        Pressors:    Autonomic Drugs (From admission, onward)    Start     Stop Route Frequency Ordered    06/06/19 1745  norepinephrine 4 mg in dextrose 5% 250 mL infusion (premix) (titrating)     Question Answer Comment   Titrate by: (in mcg/kg/min) 0.02    Titrate interval: (in minutes) 5    Titrate to maintain: (MAP or SBP) MAP    Greater than: (in mmHg) 60    Maximum dose: (in mcg/kg/min) 3        -- IV Continuous 06/06/19 1633        Hyperglycemia/Diabetes Medications:   Antihyperglycemics (From admission, onward)    Start     Stop Route Frequency Ordered    06/08/19 0900  insulin detemir U-100 pen 15 Units      -- SubQ Daily 06/07/19 1620    06/08/19 0856  insulin aspart U-100 pen 0-5 Units      -- SubQ Before meals & nightly PRN 06/08/19 0756    06/07/19 1730  insulin aspart U-100 pen  1-10 Units      -- SubQ 3 times daily with meals 06/07/19 1620          ASSESSMENT and PLAN    * Cellulitis of Left foot  Infection may elevate BG readings  Optimize BG control for wound healing      Controlled type 1 diabetes mellitus with diabetic neuropathy, with long-term current use of insulin  BG goal 140-180    Levemir 12 units daily -(weight based using 0.3 modifier)   Novolog ICR 1:8  -- Hold if NPO  Low dose correction scale  Blood glucose monitoring AC/HS    Discharge recommendations:   TBD.     MINDA on CPAP  May affect BG readings if uncontrolled        CKD (chronic kidney disease), stage III  Caution with insulin stacking  Estimated Creatinine Clearance: 60.9 mL/min (based on SCr of 1.1 mg/dL).        Class 1 obesity due to excess calories with serious comorbidity and body mass index (BMI) of 30.0 to 30.9 in adult  Body mass index is 31.84 kg/m².  May increase insulin resistance.             Dina Ba, NADINE  Endocrinology  [facility]

## 2019-06-08 NOTE — PLAN OF CARE
Called by CCU team to assess the patient who had an episode of NSVT, MM. Got a rhythm strip that showed coarse a fib. HR has been on the 120-130s; mostly.   Also, reviewed rhythm strip from code of previous days ago and evidenced pleomorphic VT    -Start IV amiodarone with loading dose    Discussed w Dr Nielsen

## 2019-06-08 NOTE — PLAN OF CARE
Patient developed PMVT before starting amiodarone earlier today  Patient had multiple episodes of PMVT intermittently during >25 min with ROSC in between    He was shocked 16 times, received epi x6, Mg x1, amio bolus 300 (drip was discontinued during the code by CCU attending, Dr Pradhan), lidocaine was started at 2.  Hypothermia protocol started  Patient intubated w midazolam 2 mg    BSA 2.09  O2 consumption 261.25  SVO2 70 (pH 7.256) -> 1 amp of bicarb given by Dr Pradhan, started on norepinephrine  CVP 12  MAP initially 55   CO 8.09  CI 3.87      -Norepinephrine was started  -CXR ordered  -F/U CVP, mixed venous blood results    EP recommended to continue lido and restart amio  CCU team (primary team) and interventional preferred to hold off on amio     Discussed w Dr Pradhan

## 2019-06-08 NOTE — PROGRESS NOTES
At approx 1250 today pt went into VTAC/Vfib arrest. ACLS protocol followed. Pt was shocked a total of 16 times. ROSC was achieved > 25 minutes with ROSC also in between. A total of 6 Epi given, Amio 300 mg, Lidocaine infusion re-started @ 2, Levo re-started. Anesthesia at bedside for intubation with 2 mg Versed given as well as 100 of [place]. EP recommends Amiodarone continuous infusion with Lidocaine but it was decided by Dr Pradhan to HOLD Amiodarone infusion at this time. Hypothermia protocol ordered via [place]. TTM initiated at 1430 with a goal temp of 33 degrees. Goal temp achieved at 1714. It was noted at 1750 that temp via [place] was 32.7-32.8. Called Dr Giraldo concerned that temp is too low. New Temp goal for 34 degrees ordered at 1755. Have not reached goal temp as of yet. See flowsheets for all temps recorded. In addition BG above 400 and Insulin infusion was started. Currently at 6 units/hr. Patient continues on IABP 1:1, ECG, AUTO. Patient continues have multiple PAC's, PVC's and is paced by the TVP intermittently. Patient is RASS of -5. No cough, no gag, no corneal. Dr Giraldo notified.Pupils 1 mm and extremely sluggish. Pt remains on 20 mcg/kg/min Propofol and 75 mcg/hr Fentanyl. Spouse at bedside all shift.  was able to visit with her. All questioned asked and concerns addressed. Monitoring very closely.

## 2019-06-08 NOTE — SUBJECTIVE & OBJECTIVE
"Interval HPI:   Overnight events: NAEON. Remains in CMICU. BG at or slightly above goal ranges on current SQ insulin regimen. Diet advanced this morning.   Eatin%  Nausea: No  Hypoglycemia and intervention: No  Fever: No  TPN and/or TF: No  If yes, type of TF/TPN and rate: none    BP (!) 112/59 (BP Location: Right arm, Patient Position: Lying)   Pulse 77   Temp 98.8 °F (37.1 °C) (Oral)   Resp (!) 105   Ht 5' 7" (1.702 m)   Wt 92.2 kg (203 lb 4.2 oz)   SpO2 98%   BMI 31.84 kg/m²     Labs Reviewed and Include    Recent Labs   Lab 19  0435   *   CALCIUM 8.6*   ALBUMIN 2.9*   PROT 5.3*      K 3.5   CO2 28   CL 99   BUN 19   CREATININE 1.1   ALKPHOS 80   ALT 21   AST 55*   BILITOT 0.6     Lab Results   Component Value Date    WBC 29.29 (H) 2019    HGB 9.9 (L) 2019    HCT 31.3 (L) 2019     (H) 2019    PLT 89 (L) 2019     No results for input(s): TSH, FREET4 in the last 168 hours.  Lab Results   Component Value Date    HGBA1C 5.8 (H) 2019       Nutritional status:   Body mass index is 31.84 kg/m².  Lab Results   Component Value Date    ALBUMIN 2.9 (L) 2019    ALBUMIN 3.2 (L) 2019    ALBUMIN 3.3 (L) 2019     Lab Results   Component Value Date    PREALBUMIN 22 2019       Estimated Creatinine Clearance: 60.9 mL/min (based on SCr of 1.1 mg/dL).    Accu-Checks  Recent Labs     19  1817 19  2335 19  0256 19  0438 19  0631 19  0852 19  1559 19  1939 19  0005 19  0432   POCTGLUCOSE 259* 336* 264* 291* 258* 246* 251* 252* 208* 157*       Current Medications and/or Treatments Impacting Glycemic Control  Immunotherapy:    Immunosuppressants     None        Steroids:   Hormones (From admission, onward)    None        Pressors:    Autonomic Drugs (From admission, onward)    Start     Stop Route Frequency Ordered    19 5479  norepinephrine 4 mg in dextrose 5% 250 mL " infusion (premix) (titrating)     Question Answer Comment   Titrate by: (in mcg/kg/min) 0.02    Titrate interval: (in minutes) 5    Titrate to maintain: (MAP or SBP) MAP    Greater than: (in mmHg) 60    Maximum dose: (in mcg/kg/min) 3        -- IV Continuous 06/06/19 1633        Hyperglycemia/Diabetes Medications:   Antihyperglycemics (From admission, onward)    Start     Stop Route Frequency Ordered    06/08/19 0900  insulin detemir U-100 pen 15 Units      -- SubQ Daily 06/07/19 1620    06/08/19 0856  insulin aspart U-100 pen 0-5 Units      -- SubQ Before meals & nightly PRN 06/08/19 0756    06/07/19 1730  insulin aspart U-100 pen 1-10 Units      -- SubQ 3 times daily with meals 06/07/19 1620

## 2019-06-08 NOTE — SUBJECTIVE & OBJECTIVE
Interval History: no acute events overnight. Will extubate today.    Review of Systems   Unable to perform ROS: intubated     Objective:     Vital Signs (Most Recent):  Temp: (!) 91.2 °F (32.9 °C) (06/08/19 1800)  Pulse: 66 (06/08/19 1800)  Resp: (!) 26 (06/08/19 1800)  BP: (!) 188/88 (06/08/19 1316)  SpO2: 100 % (06/08/19 1800) Vital Signs (24h Range):  Temp:  [91.2 °F (32.9 °C)-99.7 °F (37.6 °C)] 91.2 °F (32.9 °C)  Pulse:  [0-138] 66  Resp:  [] 26  SpO2:  [72 %-100 %] 100 %  BP: ()/(37-88) 188/88     Weight: 92.2 kg (203 lb 4.2 oz)  Body mass index is 31.84 kg/m².     SpO2: 100 %  O2 Device (Oxygen Therapy): ventilator      Intake/Output Summary (Last 24 hours) at 6/8/2019 1811  Last data filed at 6/8/2019 1800  Gross per 24 hour   Intake 1803.4 ml   Output 4045 ml   Net -2241.6 ml       Lines/Drains/Airways     Central Venous Catheter Line                 Percutaneous Central Line Insertion/Assessment - triple lumen  06/06/19 1949 right internal jugular 1 day          Drain                 Sheath 06/06/19 1412 Right proximal 2 days         NG/OG Tube 06/08/19 1445 [place] sump 14 Fr. Center mouth less than 1 day         Urethral Catheter 06/08/19 1525 14 Fr. less than 1 day          Airway                 Airway - Non-Surgical 06/08/19 1254 Endotracheal Tube less than 1 day          Line                 IABP 06/06/19 1728 2 days         Pacer Wires 06/06/19 1728 2 days          Peripheral Intravenous Line                 Peripheral IV - Single Lumen 06/05/19 2005 20 G Right Forearm 2 days         Peripheral IV - Single Lumen 06/06/19 1800 20 G Right Forearm 2 days         Peripheral IV - Single Lumen 06/06/19 2029 20 G Left;Lateral Forearm 1 day                Physical Exam   Constitutional: He appears well-developed and well-nourished. No distress.   HENT:   Head: Normocephalic and atraumatic.   Mouth/Throat: No oropharyngeal exudate.   Eyes: Conjunctivae are normal. Right eye exhibits no discharge.  Left eye exhibits no discharge. No scleral icterus.   Cardiovascular: Normal rate and normal heart sounds. Exam reveals no gallop and no friction rub.   Pulmonary/Chest: Effort normal and breath sounds normal. He has no wheezes. He has no rales.   intubated   Abdominal: Soft. He exhibits no distension.   Musculoskeletal: He exhibits edema.   Left foot dressing c/d/i    Neurological:   sedated   Skin: Skin is warm and dry. No rash noted. He is not diaphoretic. No erythema.       Significant Labs: All pertinent lab results from the last 24 hours have been reviewed.    Significant Imaging: Reviewed

## 2019-06-08 NOTE — HOSPITAL COURSE
Patient transffered to the ccu after he had a code after PMVT s/p several shocks. Repeat LHC with patent stents. Initially was on lidocaine which was weaned off however on 6/8 had several episodes of MMVT and PMVT which required CPR, intubation and shocks. Currently being rewarmed. Prognosis very poor and family updated , code status changed to DNR, propofol weaning started and he had some bilateral lower extremity twitching ,EEG negative for seixure and patient following commands off propofol. Patient remained to have short runs of VT during the nights. On 6/13 at around 1:30 called by nursing that he started having sustained VT wife at beside and patient pronounced at 1:41.  notified and treatment team aware     Vitals:    06/13/19 1339   BP:    Pulse: (!) 0   Resp: 18   Temp: 99.3 °F (37.4 °C)     Physical Exam   Constitutional: He appears well-developed and well-nourished. No distress.   HENT:   Head: Normocephalic and atraumatic.   Cardiovascular: Normal rate, regular rhythm, normal heart sounds and intact distal pulses. Exam reveals no gallop and no friction rub.   No murmur heard.  Sternotomy, healed. Surrounding ecchymoses. Extremities cool to touch. Right CFA IABP   Pulmonary/Chest:   Intubated. Mechanical breath sounds   Abdominal: Soft. Bowel sounds are normal. He exhibits no distension. There is no tenderness.   Musculoskeletal: He exhibits no edema (Trace bilateral lower extremity edema).   Left foot dressing c/d/i. Left foot mottled     Neurological:   Follows commands, moving all extremities   Skin: He is not diaphoretic.

## 2019-06-08 NOTE — PROGRESS NOTES
Dr Aguillon @ bedside. MD swithced IABP to 1:2 at this time. SVO2 ordered for 1200. Monitoring closely.     0910: Pt -125 and looks to be PAC's. Quickly obtained a EKG and switched IABP back to 1:1. Called Dr Shabazz with Cards to notify.     0930: Pt continues to throw multiple PAC's even in 1:1 in IABP. Pt is asymptomatic and is resting quietly. Dr Smallwood to bedside with Dr Nielsen. MD assessed pt and also looked at EKG. Determined it is in fact PAC's. MD not concerned at this time. Pt remains in 1:2 on IABP and asymptomatic. Monitoring closely.     1015: While Dr Pradhan at bedside for rounds Pt went into A-flutter with -134 bpm. EKG obtained per verbal order. Asked pt to bear down and he was able to convert back into NSR. Dr Pradhan is going to call Dr Nielsen/Dr Smallwood to see what next step is regarding medications to give. Dr Pradhan will get back to me ASAP. Pt has remained asymptomatic throughout. Monitoring closely.     1053: Pt back in Aflutter, -140's. Despite vagal and cough unable to convert out. Dr. Pradhan and Dr Shabazz notified. MD to order Digoxin.     1055: VTAC noted (26 beat run). Pt symptomatic. Called Dr Pradhan and MD to bedside immediately. Pt converted out and now in Afib RVR in 120's. Dr Pradhan back in touch with Dr Nielsen to discuss next steps.     1140: Dr Yoo also at bedside. RT currently obtaining rhythm strip form EKG machine to send to Dr Nielsen to determine POC moving forward. IABP remains 1:1 with no plans to attempt to wean anymore today. Pt is AAOX4, calm and cooperative.

## 2019-06-08 NOTE — ASSESSMENT & PLAN NOTE
Pt had LHC then developed AF was started on amiodarone and had PMVT s/p several shocks. Repeat LHC with patent stents. Initially was on lidocaine which was weaned off.     Plan  Would send home with a life vest and follow up in 3 months with Dr. Nielsen with echo prior to visit.

## 2019-06-08 NOTE — PROGRESS NOTES
Pt intubated with a 7.0 ETT secured at 22 at the  Lip on documented settings. Pt ambu bag and mask at bedside will continue to monitor.

## 2019-06-08 NOTE — ANESTHESIA PROCEDURE NOTES
Intubation    Diagnosis: V-tach/fib  Patient location during procedure: ICU  Staffing  Anesthesiologist: Laura Cr MD  Resident/CRNA: Tati Mcdaniel MD  Preanesthetic Checklist  Completed: patient identified, site marked, surgical consent, pre-op evaluation, timeout performed, IV checked, risks and benefits discussed, monitors and equipment checked and anesthesia consent given  Intubation  Indication: respiratory failure, airway protection  Pre-oxygenation. Induction: intravenous, mask ventilation: easy with oral airway.  Intubation: postinduction, laryngoscopy glidescope, Glidescope.  Endotracheal Tube: oral, 7.0 mm ID, cuffed (inflated to minimal occlusive pressure)  Attempts: 1, Grade II - cords partially seen  Complicating Factors: obesity (chest compressions)  Tube secured at 22 cm at the lips.  Findings post-intubation: bilateral breath sounds, positive ETCO2, atraumatic / condition of teeth unchanged  Position Confirmation: auscultation  Additional Notes  2 of midazolam and 100mg of rocuronium administered.

## 2019-06-08 NOTE — PROGRESS NOTES
[facility]  Cardiac Electrophysiology  Progress Note    Admission Date: 5/29/2019  Code Status: Full Code   Attending Physician: Paz Pradhan MD   Expected Discharge Date: 6/11/2019  Principal Problem:Cellulitis of foot    Subjective:     Interval History: Did well overnight no VT/VF        Objective:     Vital Signs (Most Recent):  Temp: 98.8 °F (37.1 °C) (06/08/19 0700)  Pulse: 85 (06/08/19 1000)  Resp: 20 (06/08/19 1000)  BP: (!) 121/55 (06/08/19 1000)  SpO2: 100 % (06/08/19 1000) Vital Signs (24h Range):  Temp:  [97.7 °F (36.5 °C)-99.1 °F (37.3 °C)] 98.8 °F (37.1 °C)  Pulse:  [] 85  Resp:  [] 20  SpO2:  [93 %-100 %] 100 %  BP: (102-124)/(51-78) 121/55     Weight: 92.2 kg (203 lb 4.2 oz)  Body mass index is 31.84 kg/m².     SpO2: 100 %  O2 Device (Oxygen Therapy): nasal cannula    Physical Exam   Constitutional: He is oriented to person, place, and time. He appears well-developed and well-nourished. No distress.   Eyes: Pupils are equal, round, and reactive to light. Conjunctivae are normal.   Neck: No JVD present. No tracheal deviation present. No thyromegaly present.   Cardiovascular: Regular rhythm, normal heart sounds and intact distal pulses. Bradycardia present. Exam reveals no gallop and no friction rub.   No murmur heard.  Pulmonary/Chest: Effort normal and breath sounds normal. No respiratory distress. He has no wheezes. He has no rales.   Abdominal: Soft. Bowel sounds are normal. He exhibits no distension. There is no tenderness.   Musculoskeletal: He exhibits no edema or deformity.   Neurological: He is alert and oriented to person, place, and time. No cranial nerve deficit. Coordination normal.   Skin: Skin is warm and dry. He is not diaphoretic.   Psychiatric: He has a normal mood and affect. His behavior is normal.       Significant Labs:   EP:   Recent Labs   Lab 06/06/19  1829 06/06/19  0785 06/07/19  0248 06/07/19  1531 06/08/19  0435   *  --  134*  138 139   K 4.0  --  4.1 3.8 3.5   CL 94*  --  97 98 99   CO2 25  --  26 27 28   *  --  273* 247* 149*   BUN 28*  --  28* 22 19   CREATININE 1.4  --  1.3 1.2 1.1   CALCIUM 9.0  --  8.5* 8.6* 8.6*   PROT 6.2  --  5.6*  --  5.3*   ALBUMIN 3.3*  --  3.2*  --  2.9*   BILITOT 0.7  --  0.5  --  0.6   ALKPHOS 85  --  86  --  80   AST 74*  --  71*  --  55*   ALT 24  --  24  --  21   ANIONGAP 14  --  11 13 12   ESTGFRAFRICA 55.6*  --  >60.0 >60.0 >60.0   EGFRNONAA 48.1*  --  52.6* 58.0* >60.0   WBC 46.28* 40.93*  40.93* 40.50*  --  29.29*   HGB 11.0* 10.6*  10.6* 11.0*  --  9.9*   HCT 32.8* 33.7*  33.7* 33.1*  --  31.3*    148*  148* 146*  --  89*   INR 1.1  --   --   --   --        Significant Imaging: Echocardiogram:   Transthoracic echo (TTE) complete (Cupid Only):   Results for orders placed or performed during the hospital encounter of 05/29/19   Transthoracic echo (TTE) 2D with Color Flow   Result Value Ref Range    Ascending aorta 3.00 cm    STJ 2.68 cm    AV mean gradient 3.46 mmHg    Ao peak layo 1.23 m/s    Ao VTI 26.89 cm    IVRT 0.05 msec    IVS 0.85 0.6 - 1.1 cm    LA size 4.42 cm    Left Atrium Major Axis 5.77 cm    Left Atrium Minor Axis 5.68 cm    LVIDD 5.60 3.5 - 6.0 cm    LVIDS 4.56 (A) 2.1 - 4.0 cm    LVOT diameter 2.00 cm    LVOT peak VTI 12.97 cm    PW 1.04 0.6 - 1.1 cm    MV Peak A Layo 0.79 m/s    E wave decelartion time 156.41 msec    MV Peak E Layo 1.32 m/s    PV Peak D Layo 0.58 m/s    PV Peak S Layo 0.37 m/s    RA Major Axis 4.74 cm    RA Width 3.29 cm    RVDD 3.51 cm    Sinus 3.16 cm    TAPSE 1.54 cm    TR Max Layo 3.13 m/s    TDI LATERAL 0.04     TDI SEPTAL 0.04     LA WIDTH 4.47 cm    LV Diastolic Volume 127.80 mL    LV Systolic Volume 95.41 mL    RV S' 7.65 m/s    LVOT peak laoy [id number] m/s    LV LATERAL E/E' RATIO 33.00     LV SEPTAL E/E' RATIO 33.00     FS 19 %    LA volume 96.14 cm3    LV mass 204.09 g    Left Ventricle Relative Wall Thickness 0.37 cm    AV valve area  1.51 cm2    AV Velocity Ratio 0.50     AV index (prosthetic) 0.48     E/A ratio 1.67     Mean e' 0.04     Pulm vein S/D ratio 0.64     LVOT area 3.14 cm2    LVOT stroke volume 40.73 cm3    AV peak gradient 6.05 mmHg    E/E' ratio 33.00     LV Systolic Volume Index 45.8 mL/m2    LV Diastolic Volume Index 61.29 mL/m2    LA Volume Index 46.1 mL/m2    LV Mass Index 97.9 g/m2    Triscuspid Valve Regurgitation Peak Gradient 39.19 mmHg    BSA 2.15 m2    Right Atrial Pressure (from IVC) 8 mmHg    TV rest pulmonary artery pressure 47 mmHg     Assessment and Plan:     Polymorphic ventricular tachycardia  Pt had LHC then developed AF was started on amiodarone and had PMVT s/p several shocks. Repeat LHC with patent stents. Initially was on lidocaine which was weaned off.     Plan  Would send home with a life vest and follow up in 3 months with Dr. Nielsen with echo prior to visit.     Atrial fibrillation and Focal atrial tachycardia  Initially had an episode of AF RVR then had PMVT. Now in sinus rhythm with frequent PACs had an episode of focal atrial tachycardia today.     Plan  Ideally with FAT would recommend verapamil however he has an EF of 20% so would recommend metoprolol instead. Additionally with his [id number] of 4 would recommend anticoagulation.         Dionicio Smallwood MD  Cardiac Electrophysiology  [facility]

## 2019-06-08 NOTE — SUBJECTIVE & OBJECTIVE
Interval History: Did well overnight no VT/VF    Review of Systems   Unable to perform ROS: intubated     Objective:     Vital Signs (Most Recent):  Temp: 98.8 °F (37.1 °C) (06/08/19 0700)  Pulse: 85 (06/08/19 1000)  Resp: 20 (06/08/19 1000)  BP: (!) 121/55 (06/08/19 1000)  SpO2: 100 % (06/08/19 1000) Vital Signs (24h Range):  Temp:  [97.7 °F (36.5 °C)-99.1 °F (37.3 °C)] 98.8 °F (37.1 °C)  Pulse:  [] 85  Resp:  [] 20  SpO2:  [93 %-100 %] 100 %  BP: (102-124)/(51-78) 121/55     Weight: 92.2 kg (203 lb 4.2 oz)  Body mass index is 31.84 kg/m².     SpO2: 100 %  O2 Device (Oxygen Therapy): nasal cannula    Physical Exam   Constitutional: He is oriented to person, place, and time. He appears well-developed and well-nourished. No distress. He is sedated and intubated.   Eyes: Pupils are equal, round, and reactive to light. Conjunctivae are normal.   Neck: No JVD present. No tracheal deviation present. No thyromegaly present.   Cardiovascular: Regular rhythm, normal heart sounds and intact distal pulses. Bradycardia present. Exam reveals no gallop and no friction rub.   No murmur heard.  Pulmonary/Chest: Effort normal and breath sounds normal. He is intubated. No respiratory distress. He has no wheezes. He has no rales.   Abdominal: Soft. Bowel sounds are normal. He exhibits no distension. There is no tenderness.   Musculoskeletal: He exhibits no edema or deformity.   Neurological: He is alert and oriented to person, place, and time. No cranial nerve deficit. Coordination normal.   Skin: Skin is warm and dry. He is not diaphoretic.   Psychiatric: He has a normal mood and affect. His behavior is normal.       Significant Labs:   EP:   Recent Labs   Lab 06/06/19  1829 06/06/19  2325 06/07/19  0248 06/07/19  1531 06/08/19  0435   *  --  134* 138 139   K 4.0  --  4.1 3.8 3.5   CL 94*  --  97 98 99   CO2 25  --  26 27 28   *  --  273* 247* 149*   BUN 28*  --  28* 22 19   CREATININE 1.4  --  1.3 1.2 1.1    CALCIUM 9.0  --  8.5* 8.6* 8.6*   PROT 6.2  --  5.6*  --  5.3*   ALBUMIN 3.3*  --  3.2*  --  2.9*   BILITOT 0.7  --  0.5  --  0.6   ALKPHOS 85  --  86  --  80   AST 74*  --  71*  --  55*   ALT 24  --  24  --  21   ANIONGAP 14  --  11 13 12   ESTGFRAFRICA 55.6*  --  >60.0 >60.0 >60.0   EGFRNONAA 48.1*  --  52.6* 58.0* >60.0   WBC 46.28* 40.93*  40.93* 40.50*  --  29.29*   HGB 11.0* 10.6*  10.6* 11.0*  --  9.9*   HCT 32.8* 33.7*  33.7* 33.1*  --  31.3*    148*  148* 146*  --  89*   INR 1.1  --   --   --   --        Significant Imaging: Echocardiogram:   Transthoracic echo (TTE) complete (Cupid Only):   Results for orders placed or performed during the hospital encounter of 05/29/19   Transthoracic echo (TTE) 2D with Color Flow   Result Value Ref Range    Ascending aorta 3.00 cm    STJ 2.68 cm    AV mean gradient 3.46 mmHg    Ao peak layo 1.23 m/s    Ao VTI 26.89 cm    IVRT 0.05 msec    IVS 0.85 0.6 - 1.1 cm    LA size 4.42 cm    Left Atrium Major Axis 5.77 cm    Left Atrium Minor Axis 5.68 cm    LVIDD 5.60 3.5 - 6.0 cm    LVIDS 4.56 (A) 2.1 - 4.0 cm    LVOT diameter 2.00 cm    LVOT peak VTI 12.97 cm    PW 1.04 0.6 - 1.1 cm    MV Peak A Layo 0.79 m/s    E wave decelartion time 156.41 msec    MV Peak E Layo 1.32 m/s    PV Peak D Layo 0.58 m/s    PV Peak S Layo 0.37 m/s    RA Major Axis 4.74 cm    RA Width 3.29 cm    RVDD 3.51 cm    Sinus 3.16 cm    TAPSE 1.54 cm    TR Max Layo 3.13 m/s    TDI LATERAL 0.04     TDI SEPTAL 0.04     LA WIDTH 4.47 cm    LV Diastolic Volume 127.80 mL    LV Systolic Volume 95.41 mL    RV S' 7.65 m/s    LVOT peak layo [id number] m/s    LV LATERAL E/E' RATIO 33.00     LV SEPTAL E/E' RATIO 33.00     FS 19 %    LA volume 96.14 cm3    LV mass 204.09 g    Left Ventricle Relative Wall Thickness 0.37 cm    AV valve area 1.51 cm2    AV Velocity Ratio 0.50     AV index (prosthetic) 0.48     E/A ratio 1.67     Mean e' 0.04     Pulm vein S/D ratio 0.64     LVOT area 3.14 cm2    LVOT stroke  volume 40.73 cm3    AV peak gradient 6.05 mmHg    E/E' ratio 33.00     LV Systolic Volume Index 45.8 mL/m2    LV Diastolic Volume Index 61.29 mL/m2    LA Volume Index 46.1 mL/m2    LV Mass Index 97.9 g/m2    Triscuspid Valve Regurgitation Peak Gradient 39.19 mmHg    BSA 2.15 m2    Right Atrial Pressure (from IVC) 8 mmHg    TV rest pulmonary artery pressure 47 mmHg

## 2019-06-08 NOTE — PLAN OF CARE
Problem: Adult Inpatient Plan of Care  Goal: Plan of Care Review  Outcome: Ongoing (interventions implemented as appropriate)  No acute events overnight. Pt AAO, afebrile, see flowsheet. VSS, no c/o CP or SOB. R groin IABP Auto ECG 1:1 Aug ~100's. Levo gtt titrated off, MAP maintained >60. Lido gtt weaned off at midnight. Pt briefly in Afib RVR this AM HR ~120's but self converted back into NSR HR 70-80's. CVP 5, 2, 3. On room air. Cardiac diet ordered, accuchecks q4, sliding scale PRN. Tuttle, UAYESHA good ~1.7 L overnight. CHG bath given last PM. Plan to wean IABP support as tolerated and reviewed with Reid Herman at the bedside. All questions and concerns addressed. SAMUEL.

## 2019-06-09 NOTE — PROGRESS NOTES
[facility]  Cardiology  Progress Note    Patient Name: Reid Herman  MRN: [id number]  Admission Date: 5/29/2019  Hospital Length of Stay: 10 days  Code Status: Full Code   Attending Physician: Paz Pradhan MD   Primary Care Physician: Olivia Zavala MD  Expected Discharge Date: 6/11/2019  Principal Problem:Acute HF (heart failure)    Subjective:     Hospital Course:   06/07- NAEON, plan to extubate today.     Interval History: NAEON, did well, CO/I 9.7/4.7, , -2000cc overnight    Review of Systems   Constitution: Negative.   HENT: Negative.    Eyes: Negative.    Cardiovascular: Negative.    Respiratory: Negative.    Endocrine: Negative.    Skin: Negative.    Musculoskeletal: Negative.    Gastrointestinal: Negative.    Genitourinary: Negative.    Neurological: Negative.      Objective:     Vital Signs (Most Recent):  Temp: (!) 93.4 °F (34.1 °C) (06/08/19 2100)  Pulse: (!) 59 (06/08/19 2100)  Resp: (!) 26 (06/08/19 2100)  BP: 124/67 (06/08/19 2000)  SpO2: 100 % (06/08/19 2100) Vital Signs (24h Range):  Temp:  [91.2 °F (32.9 °C)-99.7 °F (37.6 °C)] 93.4 °F (34.1 °C)  Pulse:  [0-138] 59  Resp:  [] 26  SpO2:  [72 %-100 %] 100 %  BP: ()/(37-88) 124/67     Weight: 92.2 kg (203 lb 4.2 oz)  Body mass index is 31.84 kg/m².     SpO2: 100 %  O2 Device (Oxygen Therapy): ventilator      Intake/Output Summary (Last 24 hours) at 6/8/2019 2207  Last data filed at 6/8/2019 2100  Gross per 24 hour   Intake 1826.67 ml   Output 2570 ml   Net -743.33 ml       Lines/Drains/Airways     Central Venous Catheter Line                 Percutaneous Central Line Insertion/Assessment - triple lumen  06/06/19 1949 right internal jugular 2 days          Drain                 Sheath 06/06/19 1412 Right proximal 2 days         NG/OG Tube 06/08/19 1445 Jose Miguel arroyo 14 Fr. Center mouth less than 1 day         Urethral Catheter 06/08/19 1525 14 Fr. less than 1 day          Airway                 Airway -  Non-Surgical 06/08/19 1254 Endotracheal Tube less than 1 day          Line                 IABP 06/06/19 1728 2 days         Pacer Wires 06/06/19 1728 2 days          Peripheral Intravenous Line                 Peripheral IV - Single Lumen 06/05/19 2005 20 G Right Forearm 3 days         Peripheral IV - Single Lumen 06/06/19 1800 20 G Right Forearm 2 days         Peripheral IV - Single Lumen 06/06/19 2029 20 G Left;Lateral Forearm 2 days                Physical Exam   Constitutional: He is oriented to person, place, and time. He appears well-developed and well-nourished.   HENT:   Head: Normocephalic and atraumatic.   Eyes: Pupils are equal, round, and reactive to light. Conjunctivae and EOM are normal.   Neck: Normal range of motion. Neck supple. No JVD present.   Cardiovascular: Normal rate and regular rhythm. Exam reveals no gallop and no friction rub.   Murmur heard.  iabp audible   Pulmonary/Chest: Effort normal and breath sounds normal. No respiratory distress. He has no wheezes. He has no rales. He exhibits no tenderness.   Abdominal: Soft. Bowel sounds are normal. He exhibits no distension. There is no tenderness.   Musculoskeletal: He exhibits edema. He exhibits no tenderness.   iabp RLE  L foot dressing in place   Neurological: He is alert and oriented to person, place, and time.   Skin: Skin is warm and dry. There is erythema. No pallor.       Significant Labs:   Recent Lab Results       06/08/19  2204   06/08/19  2108   06/08/19  2026   06/08/19  2019   06/08/19  1930        Albumin               Alkaline Phosphatase               Allens Test               ALT               Anion Gap     14         Aniso               Appearance, UA               aPTT               AST               Bacteria, UA               Basophil%               Bilirubin (UA)               BILIRUBIN TOTAL               Site               BUN, Bld     25         Calcium     8.1         Chloride     98         CO2     22         Color,  UA               CPK               Creatinine     1.4         DelSys               Differential Method               eGFR if      55.6         eGFR if non      48.1  Comment:  Calculation used to obtain the estimated glomerular filtration  rate (eGFR) is the CKD-EPI equation.            Eosinophil%               FiO2               Flow               Glucose     417         Glucose, UA               Gran%               Hematocrit               Hemoglobin               Immature Grans (Abs)               Immature Granulocytes               Ketones, UA               Leukocytes, UA               Lymph%               Magnesium     2.4         MCH               MCHC               MCV               Microscopic Comment               Min Vol               Mode               Mono%               MPV               NITRITE UA               nRBC               Occult Blood UA               PEEP               pH, UA               Phosphorus     1.8         PiP               Platelets               POC BE               POC HCO3               POC Lactate               POC PCO2               POC PH               POC PO2               POC SATURATED O2               POC TCO2               POCT Glucose 367 430   425 436     Poik               Potassium     4.0         PROTEIN TOTAL               Protein, UA               Rate               RBC               RBC, UA               RDW               Sample               Smudge Cells               Sodium     134         Sp02               Specific [place], UA               Specimen UA               Squam Epithel, UA               Troponin I     5.058  Comment:  The reference interval for Troponin I represents the 99th percentile   cutoff   for our facility and is consistent with 3rd generation assay   performance.           Vt               WBC, UA               WBC               Yeast, UA                                06/08/19  1809   06/08/19  1804    06/08/19  1618   06/08/19  1603   06/08/19  1508        Albumin               Alkaline Phosphatase               Allens Test               ALT               Anion Gap               Aniso               Appearance, UA               aPTT 70.5  Comment:  aPTT therapeutic range = 39-69 seconds             AST               Bacteria, UA               Basophil%               Bilirubin (UA)               BILIRUBIN TOTAL               Site               BUN, Bld               Calcium               Chloride               CO2               Color, UA               CPK               Creatinine               DelSys               Differential Method               eGFR if                eGFR if non                Eosinophil%               FiO2               Flow               Glucose         468  Comment:  *Critical value -   Results called to and read back by:panda miller rn     Glucose, UA               Gran%               Hematocrit               Hemoglobin               Immature Grans (Abs)               Immature Granulocytes               Ketones, UA               Leukocytes, UA               Lymph%               Magnesium               MCH               MCHC               MCV               Microscopic Comment               Min Vol               Mode               Mono%               MPV               NITRITE UA               nRBC               Occult Blood UA               PEEP               pH, UA               Phosphorus               PiP               Platelets               POC BE               POC HCO3               POC Lactate               POC PCO2               POC PH               POC PO2               POC SATURATED O2               POC TCO2               POCT Glucose   402 441 437       Poik               Potassium               PROTEIN TOTAL               Protein, UA               Rate               RBC               RBC, UA               RDW               Sample               Smudge  Cells               Sodium               Sp02               Specific [place], UA               Specimen UA               Squam Epithel, UA               Troponin I               Vt               WBC, UA               WBC               Yeast, UA                                06/08/19  1500   06/08/19  1430   06/08/19  1333   06/08/19  1319   06/08/19  1231        Albumin   3.0           Alkaline Phosphatase   103           Allens Test     N/A N/A N/A     ALT   39           Anion Gap   21              21           Aniso               Appearance, UA Clear             aPTT               AST   74           Bacteria, UA None             Basophil%               Bilirubin (UA) Negative             BILIRUBIN TOTAL   0.8  Comment:  For infants and newborns, interpretation of results should be based  on gestational age, weight and in agreement with clinical  observations.  Premature Infant recommended reference ranges:  Up to 24 hours.............<8.0 mg/dL  Up to 48 hours............<12.0 mg/dL  3-5 days..................<15.0 mg/dL  6-29 days.................<15.0 mg/dL             Site     Other RB Other     BUN, Bld   26              26           Calcium   8.3              8.3           Chloride   93              93           CO2   18              18           Color, UA Straw             CPK   667           Creatinine   1.4              1.4           DelSys     Adult Vent Adult Vent Nasal Can     Differential Method               eGFR if    55.6              55.6           eGFR if non    48.1  Comment:  Calculation used to obtain the estimated glomerular filtration  rate (eGFR) is the CKD-EPI equation.                 48.1  Comment:  Calculation used to obtain the estimated glomerular filtration  rate (eGFR) is the CKD-EPI equation.              Eosinophil%               FiO2     100 100       Flow         0.5     Glucose   473  Comment:  *Critical value -   Results called to and read back  by:panda miller rn              473  Comment:  *Critical value -   Results called to and read back by:panda miller rn              473  Comment:  *Critical value -   Results called to and read back by:panda miller rn              473  Comment:  *Critical value -   Results called to and read back by:panda miller rn              473  Comment:  *Critical value -   Results called to and read back by:panda miller rn           Glucose, UA 3+             Gran%               Hematocrit               Hemoglobin               Immature Grans (Abs)               Immature Granulocytes               Ketones, UA 1+             Leukocytes, UA Negative             Lymph%               Magnesium   7.7  Comment:  *Critical value -   Results called to and read back by:panda miller rn           MCH               MCHC               MCV               Microscopic Comment SEE COMMENT  Comment:  Other formed elements not mentioned in the report are not   present in the microscopic examination.                Min Vol     13.4 13.4       Mode     AC/PRVC AC/PRVC SPONT     Mono%               MPV               NITRITE UA Negative             nRBC               Occult Blood UA 3+             PEEP     5 5       pH, UA 6.0             Phosphorus   3.6           PiP     27 26       Platelets               POC BE     -8 -11 1     POC HCO3     19.5 16.3 25.2     POC Lactate       9.62       POC PCO2     43.8 38.9 39.7     POC PH     7.256 7.230 7.410     POC PO2     42 83 34     POC SATURATED O2     70 94 67     POC TCO2     21 17 26     POCT Glucose               Poik               Potassium   3.7              3.7           PROTEIN TOTAL   5.6           Protein, UA Negative  Comment:  Recommend a 24 hour urine protein or a urine   protein/creatinine ratio if globulin induced proteinuria is  clinically suspected.               Rate     26 26       RBC               RBC, UA 35             RDW               Sample     VENOUS ARTERIAL VENOUS     Smudge  Cells               Sodium   132              132           Sp02     100 100 100     Specific [place], UA 1.010             Specimen UA Urine, Catheterized             Squam Epithel, UA 0             Troponin I   6.103  Comment:  The reference interval for Troponin I represents the 99th percentile   cutoff   for our facility and is consistent with 3rd generation assay   performance.             Vt     500 500       WBC, UA 3             WBC               Yeast, UA None                              06/08/19  1226   06/08/19  1055   06/08/19  0933   06/08/19  0757   06/08/19  0435        Albumin         2.9     Alkaline Phosphatase         80     Allens Test               ALT         21     Anion Gap         12     Aniso         Slight     Appearance, UA               aPTT   40.3  Comment:  aPTT therapeutic range = 39-69 seconds     41.5  Comment:  aPTT therapeutic range = 39-69 seconds     AST         55     Bacteria, UA               Basophil%         0.0     Bilirubin (UA)               BILIRUBIN TOTAL         0.6  Comment:  For infants and newborns, interpretation of results should be based  on gestational age, weight and in agreement with clinical  observations.  Premature Infant recommended reference ranges:  Up to 24 hours.............<8.0 mg/dL  Up to 48 hours............<12.0 mg/dL  3-5 days..................<15.0 mg/dL  6-29 days.................<15.0 mg/dL       Site               BUN, Bld         19     Calcium         8.6     Chloride         99     CO2         28     Color, UA               CPK               Creatinine         1.1     DelSys               Differential Method         Manual     eGFR if          >60.0     eGFR if non          >60.0  Comment:  Calculation used to obtain the estimated glomerular filtration  rate (eGFR) is the CKD-EPI equation.        Eosinophil%         0.0     FiO2               Flow               Glucose         149     Glucose, UA                Gran%         10.0     Hematocrit         31.3     Hemoglobin         9.9     Immature Grans (Abs)         CANCELED  Comment:  Mild elevation in immature granulocytes is non specific and   can be seen in a variety of conditions including stress response,   acute inflammation, trauma and pregnancy. Correlation with other   laboratory and clinical findings is essential.    Result canceled by the ancillary.       Immature Granulocytes         CANCELED  Comment:  Result canceled by the ancillary.     Ketones, UA               Leukocytes, UA               Lymph%         88.0     Magnesium   2.3     1.9     MCH         31.9     MCHC         31.6     MCV         101     Microscopic Comment               Min Vol               Mode               Mono%         2.0     MPV         10.1     NITRITE UA               nRBC         0     Occult Blood UA               PEEP               pH, UA               Phosphorus         3.4     PiP               Platelets         89     POC BE               POC HCO3               POC Lactate               POC PCO2               POC PH               POC PO2               POC SATURATED O2               POC TCO2               POCT Glucose 314   256 195       Poik         Slight     Potassium   3.9     3.5     PROTEIN TOTAL         5.3     Protein, UA               Rate               RBC         3.10     RBC, UA               RDW         15.1     Sample               Smudge Cells         Present  Comment:  Smudge cells present;Substantial numbers may affect the   accuracy of the differential.       Sodium         139     Sp02               Specific [place], UA               Specimen UA               Squam Epithel, UA               Troponin I               Vt               WBC, UA               WBC         29.29     Yeast, UA                                06/08/19  0432   06/08/19  0430   06/08/19  0005        Albumin           Alkaline Phosphatase           Allens Test   N/A       ALT            Anion Gap           Aniso           Appearance, UA           aPTT           AST           Bacteria, UA           Basophil%           Bilirubin (UA)           BILIRUBIN TOTAL           Site   Other       BUN, Bld           Calcium           Chloride           CO2           Color, UA           CPK           Creatinine           DelSys   Nasal Can       Differential Method           eGFR if            eGFR if non            Eosinophil%           FiO2           Flow   1       Glucose           Glucose, UA           Gran%           Hematocrit           Hemoglobin           Immature Grans (Abs)           Immature Granulocytes           Ketones, UA           Leukocytes, UA           Lymph%           Magnesium           MCH           MCHC           MCV           Microscopic Comment           Min Vol           Mode   SPONT       Mono%           MPV           NITRITE UA           nRBC           Occult Blood UA           PEEP           pH, UA           Phosphorus           PiP           Platelets           POC BE   6       POC HCO3   29.8       POC Lactate           POC PCO2   42.9       POC PH   7.450       POC PO2   39       POC SATURATED O2   75       POC TCO2   31       POCT Glucose 157   208     Poik           Potassium           PROTEIN TOTAL           Protein, UA           Rate           RBC           RBC, UA           RDW           Sample   VENOUS       Smudge Cells           Sodium           Sp02           Specific [place], UA           Specimen UA           Squam Epithel, UA           Troponin I           Vt           WBC, UA           WBC           Yeast, UA                 Significant Imaging:   · Severely decreased left ventricular systolic function. The estimated ejection fraction is 20%  · Mildly to moderately reduced right ventricular systolic function.  · Grade II (moderate) left ventricular diastolic dysfunction consistent with pseudonormalization.  · Elevated left atrial  pressure.  · Moderate left atrial enlargement.  · Mild-to-moderate mitral regurgitation.  · Mild tricuspid regurgitation.  · The estimated PA systolic pressure is 47 mm Hg  · Intermediate central venous pressure (8 mm Hg).    Assessment and Plan:     Brief HPI: 80 yo M w/ PMH significant for CAD s/p CABG x2 (SVG-LAD, LIMA-D1, 1994), NSTEMI s/p DUC to distal, mid LCx (1/2019), PAD, CKD, CLL (not currently on therapy), HTN, HLD, and MINDA who presents w/ worsening L great toe wound that has failed outpatient therapy. Reports noticing worsening L foot pain, discomfort. Recently completed multiple course of antibiotics however w/ minimal improvement. Podiatry consulted w/ imaging concerning osteomyelitis. Pending bone biopsy. Patient denies recent anginal symptoms. Prior to recent NSTEMI, patient reported sharp, substernal chest pain and SOB/DIOR requiring regular doses of nitroglycerin. Current chest pain is described as a deep burning that he feels both substernal and into the epigastrium. Denies radiation of pain. Also reports recent weight gain (approx. 10 lbs), worsening peripheral edema, and orthopnea. During his hospitalization, he was noted to have an abnormal EKG w/ ST depressions. Troponin was elevated to 1.16, down trended to 0.89. BNP 2608. No prior ECHO. Cardiology consulted for EKG changes concerning ischemia and elevated troponin.    * Acute HF (heart failure)  Known CAD with history of 2v CABG (1994) LIMA-D1 and SVG-LAD, recently had C 1/29/19 for worsening angina s/p DUC x 2 (mid and distal LCx). He was also found to have 50% D1 stenosis (distal to the LIMA graft insertion) and 60% proximal SVG graft stenosis and RCA .      S/p SVG DUC 6/6/19  Continue medical therapy with DAPT, high intensity statin  IABP placed via R CFA 6/6/19,off pressor support this morning  CXR daily for IABP  Continue on 80 mg IV lasix BID.         VT (ventricular tachycardia)  Lidocaine gtt  Patient developed PMVT today and  was coded for 16 minutes, shocked multiple times  Noted in prior event notes    Atrial fibrillation and Focal atrial tachycardia  - Continue on heparin gtt.    Cellulitis of Left foot  - Vitals are WNLs  - Continue on Vanc and CTX.        VTE Risk Mitigation (From admission, onward)        Ordered     heparin infusion 1,000 units/500 ml in 0.9% NaCl (pressure line flush)  Intra-op continuous PRN      06/06/19 1052     heparin 25,000 units in dextrose 5% 250 mL (100 units/mL) infusion LOW INTENSITY nomogram - [facility]  Continuous      06/05/19 0853     heparin 25,000 units in dextrose 5% (100 units/ml) IV bolus from bag - ADDITIONAL PRN BOLUS - 60 units/kg (max bolus 4000 units)  As needed (PRN)      06/05/19 0853     heparin 25,000 units in dextrose 5% (100 units/ml) IV bolus from bag - ADDITIONAL PRN BOLUS - 30 units/kg (max bolus 4000 units)  As needed (PRN)      06/05/19 0853     heparin 25,000 units in dextrose 5% 250 mL (100 units/mL) infusion LOW INTENSITY nomogram - [facility]  Continuous      06/02/19 1508     IP VTE LOW RISK PATIENT  Once      05/29/19 1738          Deng Aguillon MD  Cardiology  [facility]

## 2019-06-09 NOTE — PLAN OF CARE
Problem: Adult Inpatient Plan of Care  Goal: Plan of Care Review  Outcome: Ongoing (interventions implemented as appropriate)  See my multiple progress notes and flow sheets for full details of today's events.

## 2019-06-09 NOTE — PROGRESS NOTES
"[facility]  Endocrinology  Progress Note    Admit Date: 5/29/2019     Reason for Consult: Management of T1DM, Hyperglycemia     Surgical Procedure and Date: N/A    Diabetes diagnosis year: 1972    Lab Results   Component Value Date    HGBA1C 5.8 (H) 05/29/2019       Home Diabetes Medications:  Accuchek spirit pump/sarai       Basal Rate  0000 - 0300     0.85 u/hr  0300 - 0700     0.9 u/hr  0700 - 2100     1.1 u/hr  2100 - 0000     0.85 u/hr        Carb Ratio  12A: 1:8     ISF  1:30     Target: 110-130  IAT: 3    How often checking glucose at home? Dexcom G5 CGM  BG readings on regimen: 110-140  Hypoglycemia on the regimen?  Yes, a few time per week  Missed doses on regimen?  No    Diabetes Complications include:     Hypoglycemia , Diabetic chronic kidney disease     , Diabetic retinopathy , Diabetic peripheral neuropathy , Diabetic peripheral angiopathy with/without gangrene and Foot ulcer      Complicating diabetes co morbidities:   CHF, MINDA and CKD      HPI:   Patient is a 79 y.o. male with a diagnosis of hyponatremia, left foot abscess, CKD3, DM1, CLL, MINDA, and acute heart failure.  Admitted to [facility] on 5/29/19 from podiatry clinic for a left hallux abcess.  Last seen in endocrinology clinic by ESTEFANÍA Rivers NP on 5/8/19.  Endocrine consulted for DM/BG management.            Interval HPI:   Overnight events: Remains in CMICU. Patient had VTAC/Vfib arrest yesterday afternoon followed by intubation. IV intensive insulin protocol initiated yesterday evening for BG >400. BG within goal ranges this morning.   Eating:   NPO  Nausea: No  Hypoglycemia and intervention: No  Fever: No  TPN and/or TF: No  If yes, type of TF/TPN and rate: none    /67   Pulse 60   Temp (!) 93.7 °F (34.3 °C) (Core Bladder)   Resp (!) 26   Ht 5' 7" (1.702 m)   Wt 92.2 kg (203 lb 4.2 oz)   SpO2 100%   BMI 31.84 kg/m²      Labs Reviewed and Include    Recent Labs   Lab 06/09/19  0426   *  181*   CALCIUM 8.1*  " 8.1*   ALBUMIN 2.7*   PROT 5.2*   *  135*   K 4.4  4.4   CO2 24  24     102   BUN 23  23   CREATININE 1.1  1.1   ALKPHOS 90   ALT 30   AST 52*   BILITOT 0.6     Lab Results   Component Value Date    WBC 48.30 (H) 06/09/2019    HGB 10.3 (L) 06/09/2019    HCT 31.6 (L) 06/09/2019    MCV 97 06/09/2019     (L) 06/09/2019     No results for input(s): TSH, FREET4 in the last 168 hours.  Lab Results   Component Value Date    HGBA1C 5.8 (H) 05/29/2019       Nutritional status:   Body mass index is 31.84 kg/m².  Lab Results   Component Value Date    ALBUMIN 2.7 (L) 06/09/2019    ALBUMIN 3.0 (L) 06/08/2019    ALBUMIN 2.9 (L) 06/08/2019     Lab Results   Component Value Date    PREALBUMIN 22 05/17/2019       Estimated Creatinine Clearance: 60.9 mL/min (based on SCr of 1.1 mg/dL).    Accu-Checks  Recent Labs     06/08/19  2108 06/08/19  2204 06/08/19  2307 06/09/19  0004 06/09/19  0119 06/09/19  0207 06/09/19  0321 06/09/19  0426 06/09/19  0511 06/09/19  0603   POCTGLUCOSE 430* 367* 303* 368* 293* 278* 228* 187* 165* 136*       Current Medications and/or Treatments Impacting Glycemic Control  Immunotherapy:    Immunosuppressants     None        Steroids:   Hormones (From admission, onward)    None        Pressors:    Autonomic Drugs (From admission, onward)    Start     Stop Route Frequency Ordered    06/08/19 1247  rocuronium (ZEMURON) 10 mg/mL injection     Note to Pharmacy:  Created by cabinet override    06/09 0059   06/08/19 1247    06/06/19 1745  norepinephrine 4 mg in dextrose 5% 250 mL infusion (premix) (titrating)     Question Answer Comment   Titrate by: (in mcg/kg/min) 0.02    Titrate interval: (in minutes) 5    Titrate to maintain: (MAP or SBP) MAP    Greater than: (in mmHg) 60    Maximum dose: (in mcg/kg/min) 3        -- IV Continuous 06/06/19 9903        Hyperglycemia/Diabetes Medications:   Antihyperglycemics (From admission, onward)    Start     Stop Route Frequency Ordered    06/08/19  1730  insulin regular (Humulin R) 100 Units in sodium chloride 0.9% 100 mL infusion     Question:  Insulin Rate Adjustment (DO NOT MODIFY ANSWER)  Answer:  [email or web address] [email or web address]    -- IV Continuous 06/08/19 1623    06/08/19 0856  insulin aspart U-100 pen 0-5 Units      -- SubQ Before meals & nightly PRN 06/08/19 0756          ASSESSMENT and PLAN    Controlled type 1 diabetes mellitus with diabetic neuropathy, with long-term current use of insulin  BG goal 140-180    Continue IV insulin infusion protocol  Requires intensive BG monitoring while on protocol     ** Please call Endocrine for any BG related issues **    Discharge recommendations:   TBD.     MINDA on CPAP  May affect BG readings if uncontrolled        Cellulitis of Left foot  Infection may elevate BG readings  Optimize BG control for wound healing      CKD (chronic kidney disease), stage III  Caution with insulin stacking  Estimated Creatinine Clearance: 60.9 mL/min (based on SCr of 1.1 mg/dL).        Class 1 obesity due to excess calories with serious comorbidity and body mass index (BMI) of 30.0 to 30.9 in adult  Body mass index is 31.84 kg/m².  May increase insulin resistance.             Dina Ba NP  Endocrinology  [facility]

## 2019-06-09 NOTE — PROGRESS NOTES
06/09/2019  Saturnino Lopez    Current provider:  Paz Pradhan MD      I, Saturnino Lopez, rounded on Reid HARDEN Malathimichael to ensure all mechanical assist device settings (IABP or VAD) were appropriate and all parameters were within limits.  I was able to ensure all back up equipment was present, the staff had no issues, and the Perfusion Department daily rounding was complete.    5:56 PM

## 2019-06-09 NOTE — PROGRESS NOTES
[facility]  Cardiology  Progress Note    Patient Name: Reid Herman  MRN: [id number]  Admission Date: 5/29/2019  Hospital Length of Stay: 11 days  Code Status: Full Code   Attending Physician: Paz Pradhan MD   Primary Care Physician: Olivia Zavala MD  Expected Discharge Date: 6/11/2019  Principal Problem:Acute HF (heart failure)    Subjective:     Hospital Course:   Patient transffered to the ccu after he had a code after PMVT s/p several shocks. Repeat C with patent stents. Initially was on lidocaine which was weaned off however on 6/8 had several episodes of MMVT and PMVT which required CPR, intubation and shocks. Currently being cooled. Prognosis very poor and family updated     Interval History: Patient being cooled , remains with poor prognosis family updated on plan to continue lidocaine for now and then reassess his neurological status after he is rewarmed     Review of Systems   Unable to perform ROS: intubated     Objective:     Vital Signs (Most Recent):  Temp: (!) 93.6 °F (34.2 °C) (06/09/19 1310)  Pulse: (!) 53 (06/09/19 1310)  Resp: (!) 26 (06/09/19 1310)  BP: 115/66 (06/09/19 1200)  SpO2: 100 % (06/09/19 1310) Vital Signs (24h Range):  Temp:  [91.2 °F (32.9 °C)-99.7 °F (37.6 °C)] 93.6 °F (34.2 °C)  Pulse:  [] 53  Resp:  [10-26] 26  SpO2:  [94 %-100 %] 100 %  BP: (102-138)/(55-77) 115/66     Weight: 92.1 kg (203 lb)  Body mass index is 31.79 kg/m².     SpO2: 100 %  O2 Device (Oxygen Therapy): ventilator      Intake/Output Summary (Last 24 hours) at 6/9/2019 1316  Last data filed at 6/9/2019 1200  Gross per 24 hour   Intake 2734.97 ml   Output 2140 ml   Net 594.97 ml       Lines/Drains/Airways     Central Venous Catheter Line                 Percutaneous Central Line Insertion/Assessment - triple lumen  06/06/19 1949 right internal jugular 2 days          Drain                 Sheath 06/06/19 1412 Right proximal 2 days         NG/OG Tube 06/08/19 1444 Jose Miguel arroyo  14 Fr. Center mouth less than 1 day         Urethral Catheter 06/08/19 1525 14 Fr. less than 1 day          Airway                 Airway - Non-Surgical 06/08/19 1254 Endotracheal Tube 1 day          Line                 IABP 06/06/19 1728 2 days         Pacer Wires 06/06/19 1728 2 days          Peripheral Intravenous Line                 Peripheral IV - Single Lumen 06/05/19 2005 20 G Right Forearm 3 days         Peripheral IV - Single Lumen 06/06/19 1800 20 G Right Forearm 2 days         Peripheral IV - Single Lumen 06/06/19 2029 20 G Left;Lateral Forearm 2 days                Physical Exam   Constitutional: He is oriented to person, place, and time. He appears well-developed and well-nourished. No distress. He is sedated and intubated.   Eyes: Pupils are equal, round, and reactive to light. Conjunctivae are normal.   Neck: No JVD present. No tracheal deviation present. No thyromegaly present.   Cardiovascular: Regular rhythm, normal heart sounds and intact distal pulses. Bradycardia present. Exam reveals no gallop and no friction rub.   No murmur heard.  Pulmonary/Chest: Effort normal and breath sounds normal. He is intubated. No respiratory distress. He has no wheezes. He has no rales.   Abdominal: Soft. Bowel sounds are normal. He exhibits no distension. There is no tenderness.   Musculoskeletal: He exhibits no edema or deformity.   Neurological: He is alert and oriented to person, place, and time. No cranial nerve deficit. Coordination normal.   Skin: Skin is warm and dry. He is not diaphoretic.   Psychiatric: He has a normal mood and affect. His behavior is normal.       Significant Labs:   CMP   Recent Labs   Lab 06/08/19  0435  06/08/19  1430  06/08/19  2331 06/09/19  0426 06/09/19  1016     --  132*  132*   < > 134* 135*  135* 135*   K 3.5   < > 3.7  3.7   < > 3.4* 4.4  4.4 3.1*   CL 99  --  93*  93*   < > 101 102  102 100   CO2 28  --  18*  18*   < > 22* 24  24 23   *  --  473*  473*   473*  473*  473*   < > 322* 181*  181* 135*   BUN 19  --  26*  26*   < > 24* 23  23 23   CREATININE 1.1  --  1.4  1.4   < > 1.3 1.1  1.1 1.1   CALCIUM 8.6*  --  8.3*  8.3*   < > 8.0* 8.1*  8.1* 8.0*   PROT 5.3*  --  5.6*  --   --  5.2*  --    ALBUMIN 2.9*  --  3.0*  --   --  2.7*  --    BILITOT 0.6  --  0.8  --   --  0.6  --    ALKPHOS 80  --  103  --   --  90  --    AST 55*  --  74*  --   --  52*  --    ALT 21  --  39  --   --  30  --    ANIONGAP 12  --  21*  21*   < > 11 9  9 12   ESTGFRAFRICA >60.0  --  55.6*  55.6*   < > >60.0 >60.0  >60.0 >60.0   EGFRNONAA >60.0  --  48.1*  48.1*   < > 52.6* >60.0  >60.0 >60.0    < > = values in this interval not displayed.   , CBC   Recent Labs   Lab 06/08/19  0435 06/09/19  0209 06/09/19  0426   WBC 29.29* 37.06* 48.30*   HGB 9.9* 10.0* 10.3*   HCT 31.3* 29.8* 31.6*   PLT 89* 107* 106*    and All pertinent lab results from the last 24 hours have been reviewed.    Significant Imaging: All imaging in the last 24 hour reviewed     Assessment and Plan:     Brief HPI: 78 yo M w/ PMH significant for CAD s/p CABG x2 (SVG-LAD, LIMA-D1, 1994), NSTEMI s/p DUC to distal, mid LCx (1/2019), PAD, CKD, CLL (not currently on therapy), HTN, HLD, and MINDA who presents w/ worsening L great toe wound that has failed outpatient therapy. Reports noticing worsening L foot pain, discomfort. Recently completed multiple course of antibiotics however w/ minimal improvement. Podiatry consulted w/ imaging concerning osteomyelitis. Complicated course of NSTEMI, afib that lead to VT arrest and transfer to the CCU. Extubated/reintubated when patient on 6/8 had multiple events of PMVT and shocked multiple times now undergoing TTM    * Acute HF (heart failure)  Known CAD with history of 2v CABG (1994) LIMA-D1 and SVG-LAD, recently had LHC 1/29/19 for worsening angina s/p DUC x 2 (mid and distal LCx). He was also found to have 50% D1 stenosis (distal to the LIMA graft insertion) and 60%  proximal SVG graft stenosis and RCA .      S/p SVG DUC 6/6/19  Continue medical therapy with DAPT, high intensity statin  IABP placed via R CFA 6/6/19, on levophed  CXR daily for IABP  Continue on 80 mg IV lasix BID.         Cellulitis of Left foot  - Vitals are WNLs  - Continue on Vanc and CTX.    VT (ventricular tachycardia)  Pt had LHC then developed AF was started on amiodarone and had PMVT s/p several shocks. Repeat LHC with patent stents. Initially was on lidocaine which was weaned off however on 6/8 had several episodes of MMVT and PMVT which required CPR, intubation and shocks. Currently being cooled.    Plan  - EP on board, recommend amiodarone to be restarted when possible   - Currently on lidocaine drip, will continue until rewarming and neuro-prognostication can be performed     Atrial fibrillation and Focal atrial tachycardia  - Continue on heparin gtt.        VTE Risk Mitigation (From admission, onward)        Ordered     heparin infusion 1,000 units/500 ml in 0.9% NaCl (pressure line flush)  Intra-op continuous PRN      06/06/19 1052     heparin 25,000 units in dextrose 5% 250 mL (100 units/mL) infusion LOW INTENSITY nomogram - [facility]  Continuous      06/05/19 0853     heparin 25,000 units in dextrose 5% (100 units/ml) IV bolus from bag - ADDITIONAL PRN BOLUS - 60 units/kg (max bolus 4000 units)  As needed (PRN)      06/05/19 0853     heparin 25,000 units in dextrose 5% (100 units/ml) IV bolus from bag - ADDITIONAL PRN BOLUS - 30 units/kg (max bolus 4000 units)  As needed (PRN)      06/05/19 0853     heparin 25,000 units in dextrose 5% 250 mL (100 units/mL) infusion LOW INTENSITY nomogram - [facility]  Continuous      06/02/19 1508     IP VTE LOW RISK PATIENT  Once      05/29/19 9305          Shanna To MD  Cardiology  [facility]

## 2019-06-09 NOTE — ASSESSMENT & PLAN NOTE
Known CAD with history of 2v CABG (1994) LIMA-D1 and SVG-LAD, recently had C 1/29/19 for worsening angina s/p DUC x 2 (mid and distal LCx). He was also found to have 50% D1 stenosis (distal to the LIMA graft insertion) and 60% proximal SVG graft stenosis and RCA .      S/p SVG DUC 6/6/19  Continue medical therapy with DAPT, high intensity statin  IABP placed via R CFA 6/6/19,off pressor support this morning  CXR daily for IABP  Continue on 80 mg IV lasix BID.

## 2019-06-09 NOTE — SUBJECTIVE & OBJECTIVE
"Interval HPI:   Overnight events: Remains in CMICU. Patient had VTAC/Vfib arrest yesterday afternoon followed by intubation. IV intensive insulin protocol initiated yesterday evening for BG >400. BG within goal ranges this morning.   Eating:   NPO  Nausea: No  Hypoglycemia and intervention: No  Fever: No  TPN and/or TF: No  If yes, type of TF/TPN and rate: none    /67   Pulse 60   Temp (!) 93.7 °F (34.3 °C) (Core Bladder)   Resp (!) 26   Ht 5' 7" (1.702 m)   Wt 92.2 kg (203 lb 4.2 oz)   SpO2 100%   BMI 31.84 kg/m²     Labs Reviewed and Include    Recent Labs   Lab 06/09/19 0426   *  181*   CALCIUM 8.1*  8.1*   ALBUMIN 2.7*   PROT 5.2*   *  135*   K 4.4  4.4   CO2 24  24     102   BUN 23  23   CREATININE 1.1  1.1   ALKPHOS 90   ALT 30   AST 52*   BILITOT 0.6     Lab Results   Component Value Date    WBC 48.30 (H) 06/09/2019    HGB 10.3 (L) 06/09/2019    HCT 31.6 (L) 06/09/2019    MCV 97 06/09/2019     (L) 06/09/2019     No results for input(s): TSH, FREET4 in the last 168 hours.  Lab Results   Component Value Date    HGBA1C 5.8 (H) 05/29/2019       Nutritional status:   Body mass index is 31.84 kg/m².  Lab Results   Component Value Date    ALBUMIN 2.7 (L) 06/09/2019    ALBUMIN 3.0 (L) 06/08/2019    ALBUMIN 2.9 (L) 06/08/2019     Lab Results   Component Value Date    PREALBUMIN 22 05/17/2019       Estimated Creatinine Clearance: 60.9 mL/min (based on SCr of 1.1 mg/dL).    Accu-Checks  Recent Labs     06/08/19  2108 06/08/19  2204 06/08/19  2307 06/09/19  0004 06/09/19  0119 06/09/19  0207 06/09/19  0321 06/09/19  0426 06/09/19  0511 06/09/19  0603   POCTGLUCOSE 430* 367* 303* 368* 293* 278* 228* 187* 165* 136*       Current Medications and/or Treatments Impacting Glycemic Control  Immunotherapy:    Immunosuppressants     None        Steroids:   Hormones (From admission, onward)    None        Pressors:    Autonomic Drugs (From admission, onward)    Start     Stop Route " Frequency Ordered    06/08/19 1247  rocuronium (ZEMURON) 10 mg/mL injection     Note to Pharmacy:  Created by cabinet override    06/09 0059   06/08/19 1247    06/06/19 1745  norepinephrine 4 mg in dextrose 5% 250 mL infusion (premix) (titrating)     Question Answer Comment   Titrate by: (in mcg/kg/min) 0.02    Titrate interval: (in minutes) 5    Titrate to maintain: (MAP or SBP) MAP    Greater than: (in mmHg) 60    Maximum dose: (in mcg/kg/min) 3        -- IV Continuous 06/06/19 1633        Hyperglycemia/Diabetes Medications:   Antihyperglycemics (From admission, onward)    Start     Stop Route Frequency Ordered    06/08/19 1730  insulin regular (Humulin R) 100 Units in sodium chloride 0.9% 100 mL infusion     Question:  Insulin Rate Adjustment (DO NOT MODIFY ANSWER)  Answer:  [email or web address] [email or web address]    -- IV Continuous 06/08/19 1623    06/08/19 0856  insulin aspart U-100 pen 0-5 Units      -- SubQ Before meals & nightly PRN 06/08/19 0753

## 2019-06-09 NOTE — ASSESSMENT & PLAN NOTE
Known CAD with history of 2v CABG (1994) LIMA-D1 and SVG-LAD, recently had LHC 1/29/19 for worsening angina s/p DUC x 2 (mid and distal LCx). He was also found to have 50% D1 stenosis (distal to the LIMA graft insertion) and 60% proximal SVG graft stenosis and RCA .      S/p SVG DUC 6/6/19  Continue medical therapy with DAPT, high intensity statin  IABP placed via R CFA 6/6/19, on levophed  CXR daily for IABP  Continue on 80 mg IV lasix BID.

## 2019-06-09 NOTE — ASSESSMENT & PLAN NOTE
Lidocaine gtt  Patient developed PMVT today and was coded for 16 minutes, shocked multiple times  Noted in prior event notes

## 2019-06-09 NOTE — ASSESSMENT & PLAN NOTE
BG goal 140-180    Continue IV insulin infusion protocol  Requires intensive BG monitoring while on protocol     ** Please call Endocrine for any BG related issues **    Discharge recommendations:   JANICE.

## 2019-06-09 NOTE — ASSESSMENT & PLAN NOTE
Caution with insulin stacking  Estimated Creatinine Clearance: 60.9 mL/min (based on SCr of 1.1 mg/dL).

## 2019-06-09 NOTE — ASSESSMENT & PLAN NOTE
Pt had LHC then developed AF was started on amiodarone and had PMVT s/p several shocks. Repeat LHC with patent stents. Initially was on lidocaine which was weaned off however on 6/8 had several episodes of MMVT and PMVT which required CPR, intubation and shocks. Currently being cooled.    Plan  - EP on board, recommend amiodarone to be restarted when possible   - Currently on lidocaine drip, will continue until rewarming and neuro-prognostication can be performed

## 2019-06-09 NOTE — SUBJECTIVE & OBJECTIVE
Interval History: NAEON, did well, CO/I 9.7/4.7, , -2000cc overnight    Review of Systems   Constitution: Negative.   HENT: Negative.    Eyes: Negative.    Cardiovascular: Negative.    Respiratory: Negative.    Endocrine: Negative.    Skin: Negative.    Musculoskeletal: Negative.    Gastrointestinal: Negative.    Genitourinary: Negative.    Neurological: Negative.      Objective:     Vital Signs (Most Recent):  Temp: (!) 93.4 °F (34.1 °C) (06/08/19 2100)  Pulse: (!) 59 (06/08/19 2100)  Resp: (!) 26 (06/08/19 2100)  BP: 124/67 (06/08/19 2000)  SpO2: 100 % (06/08/19 2100) Vital Signs (24h Range):  Temp:  [91.2 °F (32.9 °C)-99.7 °F (37.6 °C)] 93.4 °F (34.1 °C)  Pulse:  [0-138] 59  Resp:  [] 26  SpO2:  [72 %-100 %] 100 %  BP: ()/(37-88) 124/67     Weight: 92.2 kg (203 lb 4.2 oz)  Body mass index is 31.84 kg/m².     SpO2: 100 %  O2 Device (Oxygen Therapy): ventilator      Intake/Output Summary (Last 24 hours) at 6/8/2019 2207  Last data filed at 6/8/2019 2100  Gross per 24 hour   Intake 1826.67 ml   Output 2570 ml   Net -743.33 ml       Lines/Drains/Airways     Central Venous Catheter Line                 Percutaneous Central Line Insertion/Assessment - triple lumen  06/06/19 1949 right internal jugular 2 days          Drain                 Sheath 06/06/19 1412 Right proximal 2 days         NG/OG Tube 06/08/19 1445 [place] sump 14 Fr. Center mouth less than 1 day         Urethral Catheter 06/08/19 1525 14 Fr. less than 1 day          Airway                 Airway - Non-Surgical 06/08/19 1254 Endotracheal Tube less than 1 day          Line                 IABP 06/06/19 1728 2 days         Pacer Wires 06/06/19 1728 2 days          Peripheral Intravenous Line                 Peripheral IV - Single Lumen 06/05/19 2005 20 G Right Forearm 3 days         Peripheral IV - Single Lumen 06/06/19 1800 20 G Right Forearm 2 days         Peripheral IV - Single Lumen 06/06/19 2029 20 G Left;Lateral Forearm 2 days                 Physical Exam   Constitutional: He is oriented to person, place, and time. He appears well-developed and well-nourished.   HENT:   Head: Normocephalic and atraumatic.   Eyes: Pupils are equal, round, and reactive to light. Conjunctivae and EOM are normal.   Neck: Normal range of motion. Neck supple. No JVD present.   Cardiovascular: Normal rate and regular rhythm. Exam reveals no gallop and no friction rub.   Murmur heard.  iabp audible   Pulmonary/Chest: Effort normal and breath sounds normal. No respiratory distress. He has no wheezes. He has no rales. He exhibits no tenderness.   Abdominal: Soft. Bowel sounds are normal. He exhibits no distension. There is no tenderness.   Musculoskeletal: He exhibits edema. He exhibits no tenderness.   iabp RLE  L foot dressing in place   Neurological: He is alert and oriented to person, place, and time.   Skin: Skin is warm and dry. There is erythema. No pallor.       Significant Labs:   Recent Lab Results       06/08/19  2204   06/08/19  2108   06/08/19  2026   06/08/19  2019   06/08/19  1930        Albumin               Alkaline Phosphatase               Allens Test               ALT               Anion Gap     14         Aniso               Appearance, UA               aPTT               AST               Bacteria, UA               Basophil%               Bilirubin (UA)               BILIRUBIN TOTAL               Site               BUN, Bld     25         Calcium     8.1         Chloride     98         CO2     22         Color, UA               CPK               Creatinine     1.4         DelSys               Differential Method               eGFR if      55.6         eGFR if non      48.1  Comment:  Calculation used to obtain the estimated glomerular filtration  rate (eGFR) is the CKD-EPI equation.            Eosinophil%               FiO2               Flow               Glucose     417         Glucose, UA               Gran%                Hematocrit               Hemoglobin               Immature Grans (Abs)               Immature Granulocytes               Ketones, UA               Leukocytes, UA               Lymph%               Magnesium     2.4         MCH               MCHC               MCV               Microscopic Comment               Min Vol               Mode               Mono%               MPV               NITRITE UA               nRBC               Occult Blood UA               PEEP               pH, UA               Phosphorus     1.8         PiP               Platelets               POC BE               POC HCO3               POC Lactate               POC PCO2               POC PH               POC PO2               POC SATURATED O2               POC TCO2               POCT Glucose 367 430   425 436     Poik               Potassium     4.0         PROTEIN TOTAL               Protein, UA               Rate               RBC               RBC, UA               RDW               Sample               Smudge Cells               Sodium     134         Sp02               Specific [place], UA               Specimen UA               Squam Epithel, UA               Troponin I     5.058  Comment:  The reference interval for Troponin I represents the 99th percentile   cutoff   for our facility and is consistent with 3rd generation assay   performance.           Vt               WBC, UA               WBC               Yeast, UA                                06/08/19  1809   06/08/19  1804   06/08/19  1618   06/08/19  1603   06/08/19  1508        Albumin               Alkaline Phosphatase               Allens Test               ALT               Anion Gap               Aniso               Appearance, UA               aPTT 70.5  Comment:  aPTT therapeutic range = 39-69 seconds             AST               Bacteria, UA               Basophil%               Bilirubin (UA)               BILIRUBIN TOTAL               Site               BUN,  Bld               Calcium               Chloride               CO2               Color, UA               CPK               Creatinine               DelSys               Differential Method               eGFR if                eGFR if non                Eosinophil%               FiO2               Flow               Glucose         468  Comment:  *Critical value -   Results called to and read back by:panda miller rn     Glucose, UA               Gran%               Hematocrit               Hemoglobin               Immature Grans (Abs)               Immature Granulocytes               Ketones, UA               Leukocytes, UA               Lymph%               Magnesium               MCH               MCHC               MCV               Microscopic Comment               Min Vol               Mode               Mono%               MPV               NITRITE UA               nRBC               Occult Blood UA               PEEP               pH, UA               Phosphorus               PiP               Platelets               POC BE               POC HCO3               POC Lactate               POC PCO2               POC PH               POC PO2               POC SATURATED O2               POC TCO2               POCT Glucose   402 441 437       Poik               Potassium               PROTEIN TOTAL               Protein, UA               Rate               RBC               RBC, UA               RDW               Sample               Smudge Cells               Sodium               Sp02               Specific [place], UA               Specimen UA               Squam Epithel, UA               Troponin I               Vt               WBC, UA               WBC               Yeast, UA                                06/08/19  1500   06/08/19  1430   06/08/19  1333   06/08/19  1319   06/08/19  1231        Albumin   3.0           Alkaline Phosphatase   103           Allens Test     N/A N/A  N/A     ALT   39           Anion Gap   21              21           Aniso               Appearance, UA Clear             aPTT               AST   74           Bacteria, UA None             Basophil%               Bilirubin (UA) Negative             BILIRUBIN TOTAL   0.8  Comment:  For infants and newborns, interpretation of results should be based  on gestational age, weight and in agreement with clinical  observations.  Premature Infant recommended reference ranges:  Up to 24 hours.............<8.0 mg/dL  Up to 48 hours............<12.0 mg/dL  3-5 days..................<15.0 mg/dL  6-29 days.................<15.0 mg/dL             Site     Other RB Other     BUN, Bld   26              26           Calcium   8.3              8.3           Chloride   93              93           CO2   18              18           Color, UA Straw             CPK   667           Creatinine   1.4              1.4           DelSys     Adult Vent Adult Vent Nasal Can     Differential Method               eGFR if    55.6              55.6           eGFR if non    48.1  Comment:  Calculation used to obtain the estimated glomerular filtration  rate (eGFR) is the CKD-EPI equation.                 48.1  Comment:  Calculation used to obtain the estimated glomerular filtration  rate (eGFR) is the CKD-EPI equation.              Eosinophil%               FiO2     100 100       Flow         0.5     Glucose   473  Comment:  *Critical value -   Results called to and read back by:panda miller rn              473  Comment:  *Critical value -   Results called to and read back by:panda miller rn              473  Comment:  *Critical value -   Results called to and read back by:panda miller rn              473  Comment:  *Critical value -   Results called to and read back by:panda miller rn              473  Comment:  *Critical value -   Results called to and read back by:panda miller rn           Glucose, UA 3+              Gran%               Hematocrit               Hemoglobin               Immature Grans (Abs)               Immature Granulocytes               Ketones, UA 1+             Leukocytes, UA Negative             Lymph%               Magnesium   7.7  Comment:  *Critical value -   Results called to and read back by:panda miller, rn           MCH               MCHC               MCV               Microscopic Comment SEE COMMENT  Comment:  Other formed elements not mentioned in the report are not   present in the microscopic examination.                Min Vol     13.4 13.4       Mode     AC/PRVC AC/PRVC SPONT     Mono%               MPV               NITRITE UA Negative             nRBC               Occult Blood UA 3+             PEEP     5 5       pH, UA 6.0             Phosphorus   3.6           PiP     27 26       Platelets               POC BE     -8 -11 1     POC HCO3     19.5 16.3 25.2     POC Lactate       9.62       POC PCO2     43.8 38.9 39.7     POC PH     7.256 7.230 7.410     POC PO2     42 83 34     POC SATURATED O2     70 94 67     POC TCO2     21 17 26     POCT Glucose               Poik               Potassium   3.7              3.7           PROTEIN TOTAL   5.6           Protein, UA Negative  Comment:  Recommend a 24 hour urine protein or a urine   protein/creatinine ratio if globulin induced proteinuria is  clinically suspected.               Rate     26 26       RBC               RBC, UA 35             RDW               Sample     VENOUS ARTERIAL VENOUS     Smudge Cells               Sodium   132              132           Sp02     100 100 100     Specific [place], UA 1.010             Specimen UA Urine, Catheterized             Squam Epithel, UA 0             Troponin I   6.103  Comment:  The reference interval for Troponin I represents the 99th percentile   cutoff   for our facility and is consistent with 3rd generation assay   performance.             Vt     500 500       WBC, UA 3             WBC                Yeast, UA None                              06/08/19  1226   06/08/19  1055   06/08/19  0933   06/08/19  0757   06/08/19  0435        Albumin         2.9     Alkaline Phosphatase         80     Allens Test               ALT         21     Anion Gap         12     Aniso         Slight     Appearance, UA               aPTT   40.3  Comment:  aPTT therapeutic range = 39-69 seconds     41.5  Comment:  aPTT therapeutic range = 39-69 seconds     AST         55     Bacteria, UA               Basophil%         0.0     Bilirubin (UA)               BILIRUBIN TOTAL         0.6  Comment:  For infants and newborns, interpretation of results should be based  on gestational age, weight and in agreement with clinical  observations.  Premature Infant recommended reference ranges:  Up to 24 hours.............<8.0 mg/dL  Up to 48 hours............<12.0 mg/dL  3-5 days..................<15.0 mg/dL  6-29 days.................<15.0 mg/dL       Site               BUN, Bld         19     Calcium         8.6     Chloride         99     CO2         28     Color, UA               CPK               Creatinine         1.1     DelSys               Differential Method         Manual     eGFR if          >60.0     eGFR if non          >60.0  Comment:  Calculation used to obtain the estimated glomerular filtration  rate (eGFR) is the CKD-EPI equation.        Eosinophil%         0.0     FiO2               Flow               Glucose         149     Glucose, UA               Gran%         10.0     Hematocrit         31.3     Hemoglobin         9.9     Immature Grans (Abs)         CANCELED  Comment:  Mild elevation in immature granulocytes is non specific and   can be seen in a variety of conditions including stress response,   acute inflammation, trauma and pregnancy. Correlation with other   laboratory and clinical findings is essential.    Result canceled by the ancillary.       Immature Granulocytes          CANCELED  Comment:  Result canceled by the ancillary.     Ketones, UA               Leukocytes, UA               Lymph%         88.0     Magnesium   2.3     1.9     MCH         31.9     MCHC         31.6     MCV         101     Microscopic Comment               Min Vol               Mode               Mono%         2.0     MPV         10.1     NITRITE UA               nRBC         0     Occult Blood UA               PEEP               pH, UA               Phosphorus         3.4     PiP               Platelets         89     POC BE               POC HCO3               POC Lactate               POC PCO2               POC PH               POC PO2               POC SATURATED O2               POC TCO2               POCT Glucose 314   256 195       Poik         Slight     Potassium   3.9     3.5     PROTEIN TOTAL         5.3     Protein, UA               Rate               RBC         3.10     RBC, UA               RDW         15.1     Sample               Smudge Cells         Present  Comment:  Smudge cells present;Substantial numbers may affect the   accuracy of the differential.       Sodium         139     Sp02               Specific [place], UA               Specimen UA               Squam Epithel, UA               Troponin I               Vt               WBC, UA               WBC         29.29     Yeast, UA                                06/08/19  0432   06/08/19  0430   06/08/19  0005        Albumin           Alkaline Phosphatase           Allens Test   N/A       ALT           Anion Gap           Aniso           Appearance, UA           aPTT           AST           Bacteria, UA           Basophil%           Bilirubin (UA)           BILIRUBIN TOTAL           Site   Other       BUN, Bld           Calcium           Chloride           CO2           Color, UA           CPK           Creatinine           DelSys   Nasal Can       Differential Method           eGFR if            eGFR if non   American           Eosinophil%           FiO2           Flow   1       Glucose           Glucose, UA           Gran%           Hematocrit           Hemoglobin           Immature Grans (Abs)           Immature Granulocytes           Ketones, UA           Leukocytes, UA           Lymph%           Magnesium           MCH           MCHC           MCV           Microscopic Comment           Min Vol           Mode   SPONT       Mono%           MPV           NITRITE UA           nRBC           Occult Blood UA           PEEP           pH, UA           Phosphorus           PiP           Platelets           POC BE   6       POC HCO3   29.8       POC Lactate           POC PCO2   42.9       POC PH   7.450       POC PO2   39       POC SATURATED O2   75       POC TCO2   31       POCT Glucose 157   208     Poik           Potassium           PROTEIN TOTAL           Protein, UA           Rate           RBC           RBC, UA           RDW           Sample   VENOUS       Smudge Cells           Sodium           Sp02           Specific [place], UA           Specimen UA           Squam Epithel, UA           Troponin I           Vt           WBC, UA           WBC           Yeast, UA                 Significant Imaging:   · Severely decreased left ventricular systolic function. The estimated ejection fraction is 20%  · Mildly to moderately reduced right ventricular systolic function.  · Grade II (moderate) left ventricular diastolic dysfunction consistent with pseudonormalization.  · Elevated left atrial pressure.  · Moderate left atrial enlargement.  · Mild-to-moderate mitral regurgitation.  · Mild tricuspid regurgitation.  · The estimated PA systolic pressure is 47 mm Hg  · Intermediate central venous pressure (8 mm Hg).

## 2019-06-09 NOTE — SUBJECTIVE & OBJECTIVE
Interval History: Had several episodes of VT/VF yesterday, on lidocaine, cooling, intubated.     Review of Systems   Unable to perform ROS: intubated     Objective:     Vital Signs (Most Recent):  Temp: (!) 93.6 °F (34.2 °C) (06/09/19 0800)  Pulse: 60 (06/09/19 0800)  Resp: (!) 26 (06/09/19 0800)  BP: 102/60 (06/09/19 0800)  SpO2: 100 % (06/09/19 0800) Vital Signs (24h Range):  Temp:  [91.2 °F (32.9 °C)-99.7 °F (37.6 °C)] 93.6 °F (34.2 °C)  Pulse:  [0-138] 60  Resp:  [] 26  SpO2:  [72 %-100 %] 100 %  BP: ()/(37-88) 102/60     Weight: 92.2 kg (203 lb 4.2 oz)  Body mass index is 31.84 kg/m².     SpO2: 100 %  O2 Device (Oxygen Therapy): ventilator    Physical Exam   Constitutional: He is oriented to person, place, and time. He appears well-developed and well-nourished. No distress. He is sedated and intubated.   Eyes: Pupils are equal, round, and reactive to light. Conjunctivae are normal.   Neck: No JVD present. No tracheal deviation present. No thyromegaly present.   Cardiovascular: Regular rhythm, normal heart sounds and intact distal pulses. Bradycardia present. Exam reveals no gallop and no friction rub.   No murmur heard.  Pulmonary/Chest: Effort normal and breath sounds normal. He is intubated. No respiratory distress. He has no wheezes. He has no rales.   Abdominal: Soft. Bowel sounds are normal. He exhibits no distension. There is no tenderness.   Musculoskeletal: He exhibits no edema or deformity.   Neurological: He is alert and oriented to person, place, and time. No cranial nerve deficit. Coordination normal.   Skin: Skin is warm and dry. He is not diaphoretic.   Psychiatric: He has a normal mood and affect. His behavior is normal.       Significant Labs:   EP:   Recent Labs   Lab 06/08/19  0435  06/08/19  1430  06/08/19 2026 06/08/19  2331 06/09/19  0209 06/09/19  0426     --  132*  132*  --  134* 134*  --  135*  135*   K 3.5   < > 3.7  3.7  --  4.0 3.4*  --  4.4  4.4   CL 99  --   93*  93*  --  98 101  --  102  102   CO2 28  --  18*  18*  --  22* 22*  --  24  24   *  --  473*  473*  473*  473*  473*   < > 417* 322*  --  181*  181*   BUN 19  --  26*  26*  --  25* 24*  --  23  23   CREATININE 1.1  --  1.4  1.4  --  1.4 1.3  --  1.1  1.1   CALCIUM 8.6*  --  8.3*  8.3*  --  8.1* 8.0*  --  8.1*  8.1*   PROT 5.3*  --  5.6*  --   --   --   --  5.2*   ALBUMIN 2.9*  --  3.0*  --   --   --   --  2.7*   BILITOT 0.6  --  0.8  --   --   --   --  0.6   ALKPHOS 80  --  103  --   --   --   --  90   AST 55*  --  74*  --   --   --   --  52*   ALT 21  --  39  --   --   --   --  30   ANIONGAP 12  --  21*  21*  --  14 11  --  9  9   ESTGFRAFRICA >60.0  --  55.6*  55.6*  --  55.6* >60.0  --  >60.0  >60.0   EGFRNONAA >60.0  --  48.1*  48.1*  --  48.1* 52.6*  --  >60.0  >60.0   WBC 29.29*  --   --   --   --   --  37.06* 48.30*   HGB 9.9*  --   --   --   --   --  10.0* 10.3*   HCT 31.3*  --   --   --   --   --  29.8* 31.6*   PLT 89*  --   --   --   --   --  107* 106*   INR  --   --   --   --   --   --  1.1  --     < > = values in this interval not displayed.       Significant Imaging: Echocardiogram:   Transthoracic echo (TTE) complete (Cupid Only):   Results for orders placed or performed during the hospital encounter of 05/29/19   Transthoracic echo (TTE) 2D with Color Flow   Result Value Ref Range    Ascending aorta 3.00 cm    STJ 2.68 cm    AV mean gradient 3.46 mmHg    Ao peak layo 1.23 m/s    Ao VTI 26.89 cm    IVRT 0.05 msec    IVS 0.85 0.6 - 1.1 cm    LA size 4.42 cm    Left Atrium Major Axis 5.77 cm    Left Atrium Minor Axis 5.68 cm    LVIDD 5.60 3.5 - 6.0 cm    LVIDS 4.56 (A) 2.1 - 4.0 cm    LVOT diameter 2.00 cm    LVOT peak VTI 12.97 cm    PW 1.04 0.6 - 1.1 cm    MV Peak A Layo 0.79 m/s    E wave decelartion time 156.41 msec    MV Peak E Layo 1.32 m/s    PV Peak D Layo 0.58 m/s    PV Peak S Layo 0.37 m/s    RA Major Axis 4.74 cm    RA Width 3.29 cm    RVDD 3.51 cm    Sinus 3.16 cm     TAPSE 1.54 cm    TR Max Layo 3.13 m/s    TDI LATERAL 0.04     TDI SEPTAL 0.04     LA WIDTH 4.47 cm    LV Diastolic Volume 127.80 mL    LV Systolic Volume 95.41 mL    RV S' 7.65 m/s    LVOT peak layo [id number] m/s    LV LATERAL E/E' RATIO 33.00     LV SEPTAL E/E' RATIO 33.00     FS 19 %    LA volume 96.14 cm3    LV mass 204.09 g    Left Ventricle Relative Wall Thickness 0.37 cm    AV valve area 1.51 cm2    AV Velocity Ratio 0.50     AV index (prosthetic) 0.48     E/A ratio 1.67     Mean e' 0.04     Pulm vein S/D ratio 0.64     LVOT area 3.14 cm2    LVOT stroke volume 40.73 cm3    AV peak gradient 6.05 mmHg    E/E' ratio 33.00     LV Systolic Volume Index 45.8 mL/m2    LV Diastolic Volume Index 61.29 mL/m2    LA Volume Index 46.1 mL/m2    LV Mass Index 97.9 g/m2    Triscuspid Valve Regurgitation Peak Gradient 39.19 mmHg    BSA 2.15 m2    Right Atrial Pressure (from IVC) 8 mmHg    TV rest pulmonary artery pressure 47 mmHg

## 2019-06-09 NOTE — ASSESSMENT & PLAN NOTE
Initially had an episode of AF RVR then had PMVT. Now in sinus rhythm pacing at 62 had an episode of focal atrial tachycardia 6/8.     Plan  Continue AC for now.

## 2019-06-09 NOTE — PLAN OF CARE
Problem: Adult Inpatient Plan of Care  Goal: Plan of Care Review  Outcome: Ongoing (interventions implemented as appropriate)  No acute events overnight. Remained sedated and intubated. Fentanyl, propofol, levo, lido, heparin, and sinsulin gtts infusing per MD order. ETT 7.0 22 @ lip, AC R26, , FiO2 50%, P 5. Pt was in paced rhythm throughout night HR ~60, see flowsheet for details. IABP 1:1 Auto ECG, Aug ~100's. MAP maintained >60. [place] in place, target temp 34 degrees celsius reached @ 2013 6/8/19, no shivering noted. Accuchecks q1. No BM overnight. TuttleMELANIE marginal, lasix IVP given. CHG bath given. Wound care done to L toe. Plan to wean pressors and IABP as tolerated. Plan of care reviewed with Reid Herman at the bedside. SAMUEL.

## 2019-06-09 NOTE — ASSESSMENT & PLAN NOTE
Pt had LHC then developed AF was started on amiodarone and had PMVT s/p several shocks. Repeat LHC with patent stents. Initially was on lidocaine which was weaned off however on 6/8 had several episodes of MMVT and PMVT which required CPR, intubation and shocks. Currently being cooled.     Plan  Continue with lidocaine if needed amiodarone can be added.

## 2019-06-09 NOTE — PROGRESS NOTES
[facility]  Cardiac Electrophysiology  Progress Note    Admission Date: 5/29/2019  Code Status: Full Code   Attending Physician: Paz Pradhan MD   Expected Discharge Date: 6/11/2019  Principal Problem:Acute HF (heart failure)    Subjective:     Interval History: Had several episodes of VT/VF yesterday, on lidocaine, cooling, intubated.     Review of Systems   Unable to perform ROS: intubated     Objective:     Vital Signs (Most Recent):  Temp: (!) 93.6 °F (34.2 °C) (06/09/19 0800)  Pulse: 60 (06/09/19 0800)  Resp: (!) 26 (06/09/19 0800)  BP: 102/60 (06/09/19 0800)  SpO2: 100 % (06/09/19 0800) Vital Signs (24h Range):  Temp:  [91.2 °F (32.9 °C)-99.7 °F (37.6 °C)] 93.6 °F (34.2 °C)  Pulse:  [0-138] 60  Resp:  [] 26  SpO2:  [72 %-100 %] 100 %  BP: ()/(37-88) 102/60     Weight: 92.2 kg (203 lb 4.2 oz)  Body mass index is 31.84 kg/m².     SpO2: 100 %  O2 Device (Oxygen Therapy): ventilator    Physical Exam   Constitutional: He is oriented to person, place, and time. He appears well-developed and well-nourished. No distress. He is sedated and intubated.   Eyes: Pupils are equal, round, and reactive to light. Conjunctivae are normal.   Neck: No JVD present. No tracheal deviation present. No thyromegaly present.   Cardiovascular: Regular rhythm, normal heart sounds and intact distal pulses. Bradycardia present. Exam reveals no gallop and no friction rub.   No murmur heard.  Pulmonary/Chest: Effort normal and breath sounds normal. He is intubated. No respiratory distress. He has no wheezes. He has no rales.   Abdominal: Soft. Bowel sounds are normal. He exhibits no distension. There is no tenderness.   Musculoskeletal: He exhibits no edema or deformity.   Neurological: He is alert and oriented to person, place, and time. No cranial nerve deficit. Coordination normal.   Skin: Skin is warm and dry. He is not diaphoretic.   Psychiatric: He has a normal mood and affect. His behavior is  normal.       Significant Labs:   EP:   Recent Labs   Lab 06/08/19  0435  06/08/19  1430  06/08/19  2026 06/08/19  2331 06/09/19  0209 06/09/19  0426     --  132*  132*  --  134* 134*  --  135*  135*   K 3.5   < > 3.7  3.7  --  4.0 3.4*  --  4.4  4.4   CL 99  --  93*  93*  --  98 101  --  102  102   CO2 28  --  18*  18*  --  22* 22*  --  24  24   *  --  473*  473*  473*  473*  473*   < > 417* 322*  --  181*  181*   BUN 19  --  26*  26*  --  25* 24*  --  23  23   CREATININE 1.1  --  1.4  1.4  --  1.4 1.3  --  1.1  1.1   CALCIUM 8.6*  --  8.3*  8.3*  --  8.1* 8.0*  --  8.1*  8.1*   PROT 5.3*  --  5.6*  --   --   --   --  5.2*   ALBUMIN 2.9*  --  3.0*  --   --   --   --  2.7*   BILITOT 0.6  --  0.8  --   --   --   --  0.6   ALKPHOS 80  --  103  --   --   --   --  90   AST 55*  --  74*  --   --   --   --  52*   ALT 21  --  39  --   --   --   --  30   ANIONGAP 12  --  21*  21*  --  14 11  --  9  9   ESTGFRAFRICA >60.0  --  55.6*  55.6*  --  55.6* >60.0  --  >60.0  >60.0   EGFRNONAA >60.0  --  48.1*  48.1*  --  48.1* 52.6*  --  >60.0  >60.0   WBC 29.29*  --   --   --   --   --  37.06* 48.30*   HGB 9.9*  --   --   --   --   --  10.0* 10.3*   HCT 31.3*  --   --   --   --   --  29.8* 31.6*   PLT 89*  --   --   --   --   --  107* 106*   INR  --   --   --   --   --   --  1.1  --     < > = values in this interval not displayed.       Significant Imaging: Echocardiogram:   Transthoracic echo (TTE) complete (Cupid Only):   Results for orders placed or performed during the hospital encounter of 05/29/19   Transthoracic echo (TTE) 2D with Color Flow   Result Value Ref Range    Ascending aorta 3.00 cm    STJ 2.68 cm    AV mean gradient 3.46 mmHg    Ao peak sharon 1.23 m/s    Ao VTI 26.89 cm    IVRT 0.05 msec    IVS 0.85 0.6 - 1.1 cm    LA size 4.42 cm    Left Atrium Major Axis 5.77 cm    Left Atrium Minor Axis 5.68 cm    LVIDD 5.60 3.5 - 6.0 cm    LVIDS 4.56 (A) 2.1 - 4.0 cm    LVOT diameter 2.00  cm    LVOT peak VTI 12.97 cm    PW 1.04 0.6 - 1.1 cm    MV Peak A Layo 0.79 m/s    E wave decelartion time 156.41 msec    MV Peak E Layo 1.32 m/s    PV Peak D Layo 0.58 m/s    PV Peak S Layo 0.37 m/s    RA Major Axis 4.74 cm    RA Width 3.29 cm    RVDD 3.51 cm    Sinus 3.16 cm    TAPSE 1.54 cm    TR Max Layo 3.13 m/s    TDI LATERAL 0.04     TDI SEPTAL 0.04     LA WIDTH 4.47 cm    LV Diastolic Volume 127.80 mL    LV Systolic Volume 95.41 mL    RV S' 7.65 m/s    LVOT peak layo [id number] m/s    LV LATERAL E/E' RATIO 33.00     LV SEPTAL E/E' RATIO 33.00     FS 19 %    LA volume 96.14 cm3    LV mass 204.09 g    Left Ventricle Relative Wall Thickness 0.37 cm    AV valve area 1.51 cm2    AV Velocity Ratio 0.50     AV index (prosthetic) 0.48     E/A ratio 1.67     Mean e' 0.04     Pulm vein S/D ratio 0.64     LVOT area 3.14 cm2    LVOT stroke volume 40.73 cm3    AV peak gradient 6.05 mmHg    E/E' ratio 33.00     LV Systolic Volume Index 45.8 mL/m2    LV Diastolic Volume Index 61.29 mL/m2    LA Volume Index 46.1 mL/m2    LV Mass Index 97.9 g/m2    Triscuspid Valve Regurgitation Peak Gradient 39.19 mmHg    BSA 2.15 m2    Right Atrial Pressure (from IVC) 8 mmHg    TV rest pulmonary artery pressure 47 mmHg     Assessment and Plan:     VT (ventricular tachycardia)  Pt had LHC then developed AF was started on amiodarone and had PMVT s/p several shocks. Repeat LHC with patent stents. Initially was on lidocaine which was weaned off however on 6/8 had several episodes of MMVT and PMVT which required CPR, intubation and shocks. Currently being cooled.     Plan  Would recommend starting amiodarone as lidocaine is better as an adjunctive antiarrhythmic. Once amiodarone is started can plan to wean from lidocaine.     Atrial fibrillation and Focal atrial tachycardia  Initially had an episode of AF RVR then had PMVT. Now in sinus rhythm pacing at 62 had an episode of focal atrial tachycardia 6/8.     Plan  Continue AC for now.        Dionicio Smallwood MD  Cardiac Electrophysiology  [facility]

## 2019-06-09 NOTE — SUBJECTIVE & OBJECTIVE
Interval History: Patient being cooled , remains with poor prognosis family updated on plan to continue lidocaine for now and then reassess his neurological status after he is rewarmed     Review of Systems   Unable to perform ROS: intubated     Objective:     Vital Signs (Most Recent):  Temp: (!) 93.6 °F (34.2 °C) (06/09/19 1310)  Pulse: (!) 53 (06/09/19 1310)  Resp: (!) 26 (06/09/19 1310)  BP: 115/66 (06/09/19 1200)  SpO2: 100 % (06/09/19 1310) Vital Signs (24h Range):  Temp:  [91.2 °F (32.9 °C)-99.7 °F (37.6 °C)] 93.6 °F (34.2 °C)  Pulse:  [] 53  Resp:  [10-26] 26  SpO2:  [94 %-100 %] 100 %  BP: (102-138)/(55-77) 115/66     Weight: 92.1 kg (203 lb)  Body mass index is 31.79 kg/m².     SpO2: 100 %  O2 Device (Oxygen Therapy): ventilator      Intake/Output Summary (Last 24 hours) at 6/9/2019 1316  Last data filed at 6/9/2019 1200  Gross per 24 hour   Intake 2734.97 ml   Output 2140 ml   Net 594.97 ml       Lines/Drains/Airways     Central Venous Catheter Line                 Percutaneous Central Line Insertion/Assessment - triple lumen  06/06/19 1949 right internal jugular 2 days          Drain                 Sheath 06/06/19 1412 Right proximal 2 days         NG/OG Tube 06/08/19 1445 [place] sump 14 Fr. Center mouth less than 1 day         Urethral Catheter 06/08/19 1525 14 Fr. less than 1 day          Airway                 Airway - Non-Surgical 06/08/19 1254 Endotracheal Tube 1 day          Line                 IABP 06/06/19 1728 2 days         Pacer Wires 06/06/19 1728 2 days          Peripheral Intravenous Line                 Peripheral IV - Single Lumen 06/05/19 2005 20 G Right Forearm 3 days         Peripheral IV - Single Lumen 06/06/19 1800 20 G Right Forearm 2 days         Peripheral IV - Single Lumen 06/06/19 2029 20 G Left;Lateral Forearm 2 days                Physical Exam   Constitutional: He is oriented to person, place, and time. He appears well-developed and well-nourished. No distress. He is  sedated and intubated.   Eyes: Pupils are equal, round, and reactive to light. Conjunctivae are normal.   Neck: No JVD present. No tracheal deviation present. No thyromegaly present.   Cardiovascular: Regular rhythm, normal heart sounds and intact distal pulses. Bradycardia present. Exam reveals no gallop and no friction rub.   No murmur heard.  Pulmonary/Chest: Effort normal and breath sounds normal. He is intubated. No respiratory distress. He has no wheezes. He has no rales.   Abdominal: Soft. Bowel sounds are normal. He exhibits no distension. There is no tenderness.   Musculoskeletal: He exhibits no edema or deformity.   Neurological: He is alert and oriented to person, place, and time. No cranial nerve deficit. Coordination normal.   Skin: Skin is warm and dry. He is not diaphoretic.   Psychiatric: He has a normal mood and affect. His behavior is normal.       Significant Labs:   CMP   Recent Labs   Lab 06/08/19  0435  06/08/19  1430  06/08/19  2331 06/09/19  0426 06/09/19  1016     --  132*  132*   < > 134* 135*  135* 135*   K 3.5   < > 3.7  3.7   < > 3.4* 4.4  4.4 3.1*   CL 99  --  93*  93*   < > 101 102  102 100   CO2 28  --  18*  18*   < > 22* 24  24 23   *  --  473*  473*  473*  473*  473*   < > 322* 181*  181* 135*   BUN 19  --  26*  26*   < > 24* 23  23 23   CREATININE 1.1  --  1.4  1.4   < > 1.3 1.1  1.1 1.1   CALCIUM 8.6*  --  8.3*  8.3*   < > 8.0* 8.1*  8.1* 8.0*   PROT 5.3*  --  5.6*  --   --  5.2*  --    ALBUMIN 2.9*  --  3.0*  --   --  2.7*  --    BILITOT 0.6  --  0.8  --   --  0.6  --    ALKPHOS 80  --  103  --   --  90  --    AST 55*  --  74*  --   --  52*  --    ALT 21  --  39  --   --  30  --    ANIONGAP 12  --  21*  21*   < > 11 9  9 12   ESTGFRAFRICA >60.0  --  55.6*  55.6*   < > >60.0 >60.0  >60.0 >60.0   EGFRNONAA >60.0  --  48.1*  48.1*   < > 52.6* >60.0  >60.0 >60.0    < > = values in this interval not displayed.   , CBC   Recent Labs   Lab  06/08/19  0435 06/09/19  0209 06/09/19  0426   WBC 29.29* 37.06* 48.30*   HGB 9.9* 10.0* 10.3*   HCT 31.3* 29.8* 31.6*   PLT 89* 107* 106*    and All pertinent lab results from the last 24 hours have been reviewed.    Significant Imaging: All imaging in the last 24 hour reviewed

## 2019-06-09 NOTE — PROGRESS NOTES
06/08/2019  Saturnino Lopez    Current provider:  Paz Pradhan MD      I, Saturnino Lopez, rounded on Reid HARDEN Virgil to ensure all mechanical assist device settings (IABP or VAD) were appropriate and all parameters were within limits.  I was able to ensure all back up equipment was present, the staff had no issues, and the Perfusion Department daily rounding was complete.    9:41 PM

## 2019-06-10 PROBLEM — Z71.89 GOALS OF CARE, COUNSELING/DISCUSSION: Status: ACTIVE | Noted: 2019-01-01

## 2019-06-10 NOTE — SUBJECTIVE & OBJECTIVE
Interval History: Patient unfortunately coded again on 6/8/19 with multiple episodes of VF and PMVT requiring 16 shocks and reintubation. He is in the process of rewarming following the cooling protocol. Remains sedated and intubated, however per nurse was opening eyes and following some simple commands at start of shift.     Objective:     Vital Signs (Most Recent):  Temp: 97.3 °F (36.3 °C) (06/10/19 0630)  Pulse: 66 (06/10/19 0630)  Resp: 18 (06/10/19 0630)  BP: 108/65 (06/10/19 0600)  SpO2: 100 % (06/10/19 0630) Vital Signs (24h Range):  Temp:  [92.7 °F (33.7 °C)-97.3 °F (36.3 °C)] 97.3 °F (36.3 °C)  Pulse:  [53-67] 66  Resp:  [18-28] 18  SpO2:  [100 %] 100 %  BP: (101-134)/(58-83) 108/65     Weight: 92.1 kg (203 lb)  Body mass index is 31.79 kg/m².    SpO2: 100 %  O2 Device (Oxygen Therapy): ventilator      Intake/Output Summary (Last 24 hours) at 6/10/2019 0703  Last data filed at 6/10/2019 0605  Gross per 24 hour   Intake 2656.71 ml   Output 1055 ml   Net 1601.71 ml       Lines/Drains/Airways     Central Venous Catheter Line                 Percutaneous Central Line Insertion/Assessment - triple lumen  06/06/19 1949 right internal jugular 3 days          Drain                 Sheath 06/06/19 1412 Right proximal 3 days         NG/OG Tube 06/08/19 1445 [facility] sump 14 Fr. Center mouth 1 day         Urethral Catheter 06/08/19 1525 14 Fr. 1 day          Airway                 Airway - Non-Surgical 06/08/19 1254 Endotracheal Tube 1 day          Line                 IABP 06/06/19 1728 3 days         Pacer Wires 06/06/19 1728 3 days          Peripheral Intravenous Line                 Peripheral IV - Single Lumen 06/05/19 2005 20 G Right Forearm 4 days         Peripheral IV - Single Lumen 06/06/19 1800 20 G Right Forearm 3 days         Peripheral IV - Single Lumen 06/06/19 2029 20 G Left;Lateral Forearm 3 days                Physical Exam   Constitutional: He appears well-developed and well-nourished. No distress.    Sedated, intubated   HENT:   Head: Normocephalic and atraumatic.   Cardiovascular: Normal rate, regular rhythm, normal heart sounds and intact distal pulses. Exam reveals no gallop and no friction rub.   No murmur heard.  Pulses:       Radial pulses are 0 on the right side, and 0 on the left side.        Femoral pulses are 2+ on the left side.       Dorsalis pedis pulses are 0 on the right side, and 0 on the left side.        Posterior tibial pulses are 0 on the right side, and 0 on the left side.   Sternotomy, healed. Surrounding ecchymoses. Extremities cool to touch. Right CFA IABP, Right CFV TVP   Pulmonary/Chest:   Intubated. Mechanical breath sounds, bilateral crackles   Abdominal: Soft. Bowel sounds are normal. He exhibits no distension. There is no tenderness.   Musculoskeletal: He exhibits edema (1+ bilateral lower extremity edema).   Left foot dressing c/d/i. Left foot mottled   Skin: He is not diaphoretic.       Significant Labs:     Recent Results (from the past 24 hour(s))   Lactic acid, plasma    Collection Time: 06/09/19  7:16 AM   Result Value Ref Range    Lactate (Lactic Acid) 2.6 (H) 0.5 - 2.2 mmol/L   Procalcitonin    Collection Time: 06/09/19  7:16 AM   Result Value Ref Range    Procalcitonin 1.21 (H) <0.25 ng/mL   POCT glucose    Collection Time: 06/09/19  7:29 AM   Result Value Ref Range    POCT Glucose 141 (H) 70 - 110 mg/dL   APTT    Collection Time: 06/09/19  8:12 AM   Result Value Ref Range    aPTT 51.1 (H) 21.0 - 32.0 sec   POCT glucose    Collection Time: 06/09/19  8:23 AM   Result Value Ref Range    POCT Glucose 182 (H) 70 - 110 mg/dL   POCT glucose    Collection Time: 06/09/19  9:34 AM   Result Value Ref Range    POCT Glucose 162 (H) 70 - 110 mg/dL   Transthoracic echo (TTE) 2D with Color Flow    Collection Time: 06/09/19  9:40 AM   Result Value Ref Range    Ascending aorta 3.31 cm    STJ 3.12 cm    AV mean gradient 3.88 mmHg    Ao peak sharon 1.40 m/s    Ao VTI 15.79 cm    IVRT 0.09 msec     IVS 0.80 0.6 - 1.1 cm    LA size 4.30 cm    Left Atrium Major Axis 5.50 cm    Left Atrium Minor Axis 5.50 cm    LVIDD 5.10 3.5 - 6.0 cm    LVIDS 4.40 (A) 2.1 - 4.0 cm    LVOT diameter 1.99 cm    LVOT peak VTI 9.16 cm    PW 0.70 0.6 - 1.1 cm    MV Peak A Layo 0.23 m/s    E wave decelartion time 229.40 msec    MV Peak E Layo 0.94 m/s    RA Major Axis 5.23 cm    RA Width 3.94 cm    RVDD 4.28 cm    Sinus 3.59 cm    TAPSE 1.23 cm    TR Max Layo 1.75 m/s    TDI LATERAL 0.03     TDI SEPTAL 0.03     LA WIDTH 4.30 cm    LV Diastolic Volume 107.96 mL    LV Systolic Volume 73.56 mL    RV S' 4.10 m/s    LVOT peak layo [id number] m/s    LV LATERAL E/E' RATIO 31.33     LV SEPTAL E/E' RATIO 31.33     FS 14 %    LA volume 86.44 cm3    LV mass 129.43 g    Left Ventricle Relative Wall Thickness 0.27 cm    AV valve area 1.80 cm2    AV Velocity Ratio 0.61     AV index (prosthetic) 0.58     E/A ratio 4.09     Mean e' 0.03     LVOT area 3.11 cm2    LVOT stroke volume 28.48 cm3    AV peak gradient 7.84 mmHg    E/E' ratio 31.33     LV Systolic Volume Index 36.1 mL/m2    LV Diastolic Volume Index 53.05 mL/m2    LA Volume Index 42.5 mL/m2    LV Mass Index 63.6 g/m2    Triscuspid Valve Regurgitation Peak Gradient 12.25 mmHg    BSA 2.09 m2   Basic metabolic panel    Collection Time: 06/09/19 10:16 AM   Result Value Ref Range    Sodium 135 (L) 136 - 145 mmol/L    Potassium 3.1 (L) 3.5 - 5.1 mmol/L    Chloride 100 95 - 110 mmol/L    CO2 23 23 - 29 mmol/L    Glucose 135 (H) 70 - 110 mg/dL    BUN, Bld 23 8 - 23 mg/dL    Creatinine 1.1 0.5 - 1.4 mg/dL    Calcium 8.0 (L) 8.7 - 10.5 mg/dL    Anion Gap 12 8 - 16 mmol/L    eGFR if African American >60.0 >60 mL/min/1.73 m^2    eGFR if non African American >60.0 >60 mL/min/1.73 m^2   Magnesium    Collection Time: 06/09/19 10:16 AM   Result Value Ref Range    Magnesium 2.3 1.6 - 2.6 mg/dL   Phosphorus    Collection Time: 06/09/19 10:16 AM   Result Value Ref Range    Phosphorus 3.5 2.7 - 4.5 mg/dL    POCT glucose    Collection Time: 06/09/19 10:24 AM   Result Value Ref Range    POCT Glucose 143 (H) 70 - 110 mg/dL   POCT glucose    Collection Time: 06/09/19 11:24 AM   Result Value Ref Range    POCT Glucose 123 (H) 70 - 110 mg/dL   POCT glucose    Collection Time: 06/09/19 12:18 PM   Result Value Ref Range    POCT Glucose 116 (H) 70 - 110 mg/dL   Basic metabolic panel    Collection Time: 06/09/19 12:42 PM   Result Value Ref Range    Sodium 135 (L) 136 - 145 mmol/L    Potassium 3.6 3.5 - 5.1 mmol/L    Chloride 101 95 - 110 mmol/L    CO2 22 (L) 23 - 29 mmol/L    Glucose 128 (H) 70 - 110 mg/dL    BUN, Bld 22 8 - 23 mg/dL    Creatinine 1.2 0.5 - 1.4 mg/dL    Calcium 7.9 (L) 8.7 - 10.5 mg/dL    Anion Gap 12 8 - 16 mmol/L    eGFR if African American >60.0 >60 mL/min/1.73 m^2    eGFR if non  58.0 (A) >60 mL/min/1.73 m^2   Magnesium    Collection Time: 06/09/19 12:42 PM   Result Value Ref Range    Magnesium 2.3 1.6 - 2.6 mg/dL   Phosphorus    Collection Time: 06/09/19 12:42 PM   Result Value Ref Range    Phosphorus 2.9 2.7 - 4.5 mg/dL   Troponin I    Collection Time: 06/09/19  1:03 PM   Result Value Ref Range    Troponin I 4.308 (H) 0.000 - 0.026 ng/mL   POCT glucose    Collection Time: 06/09/19  1:19 PM   Result Value Ref Range    POCT Glucose 144 (H) 70 - 110 mg/dL   POCT glucose    Collection Time: 06/09/19  2:35 PM   Result Value Ref Range    POCT Glucose 240 (H) 70 - 110 mg/dL   POCT glucose    Collection Time: 06/09/19  3:43 PM   Result Value Ref Range    POCT Glucose 173 (H) 70 - 110 mg/dL   POCT glucose    Collection Time: 06/09/19  4:43 PM   Result Value Ref Range    POCT Glucose 171 (H) 70 - 110 mg/dL   Basic metabolic panel    Collection Time: 06/09/19  4:46 PM   Result Value Ref Range    Sodium 133 (L) 136 - 145 mmol/L    Potassium 4.7 3.5 - 5.1 mmol/L    Chloride 102 95 - 110 mmol/L    CO2 22 (L) 23 - 29 mmol/L    Glucose 175 (H) 70 - 110 mg/dL    BUN, Bld 23 8 - 23 mg/dL    Creatinine 1.2  0.5 - 1.4 mg/dL    Calcium 8.1 (L) 8.7 - 10.5 mg/dL    Anion Gap 9 8 - 16 mmol/L    eGFR if African American >60.0 >60 mL/min/1.73 m^2    eGFR if non  58.0 (A) >60 mL/min/1.73 m^2   Magnesium    Collection Time: 06/09/19  4:46 PM   Result Value Ref Range    Magnesium 2.2 1.6 - 2.6 mg/dL   Phosphorus    Collection Time: 06/09/19  4:46 PM   Result Value Ref Range    Phosphorus 3.3 2.7 - 4.5 mg/dL   APTT    Collection Time: 06/09/19  4:46 PM   Result Value Ref Range    aPTT 62.0 (H) 21.0 - 32.0 sec   POCT glucose    Collection Time: 06/09/19  5:21 PM   Result Value Ref Range    POCT Glucose 198 (H) 70 - 110 mg/dL   POCT glucose    Collection Time: 06/09/19  6:32 PM   Result Value Ref Range    POCT Glucose 206 (H) 70 - 110 mg/dL   POCT glucose    Collection Time: 06/09/19  7:31 PM   Result Value Ref Range    POCT Glucose 198 (H) 70 - 110 mg/dL   Basic metabolic panel    Collection Time: 06/09/19  7:55 PM   Result Value Ref Range    Sodium 135 (L) 136 - 145 mmol/L    Potassium 4.1 3.5 - 5.1 mmol/L    Chloride 102 95 - 110 mmol/L    CO2 22 (L) 23 - 29 mmol/L    Glucose 187 (H) 70 - 110 mg/dL    BUN, Bld 22 8 - 23 mg/dL    Creatinine 1.2 0.5 - 1.4 mg/dL    Calcium 7.9 (L) 8.7 - 10.5 mg/dL    Anion Gap 11 8 - 16 mmol/L    eGFR if African American >60.0 >60 mL/min/1.73 m^2    eGFR if non  58.0 (A) >60 mL/min/1.73 m^2   Magnesium    Collection Time: 06/09/19  7:55 PM   Result Value Ref Range    Magnesium 2.1 1.6 - 2.6 mg/dL   Phosphorus    Collection Time: 06/09/19  7:55 PM   Result Value Ref Range    Phosphorus 3.1 2.7 - 4.5 mg/dL   Troponin I    Collection Time: 06/09/19  7:55 PM   Result Value Ref Range    Troponin I 3.977 (H) 0.000 - 0.026 ng/mL   Glucose, random (Unless on insulin infusion)    Collection Time: 06/09/19  7:55 PM   Result Value Ref Range    Glucose 187 (H) 70 - 110 mg/dL   VANCOMYCIN, TROUGH before 3rd dose    Collection Time: 06/09/19  8:16 PM   Result Value Ref Range     Vancomycin-Trough 17.5 10.0 - 22.0 ug/mL   POCT glucose    Collection Time: 06/09/19  8:41 PM   Result Value Ref Range    POCT Glucose 192 (H) 70 - 110 mg/dL   ISTAT PROCEDURE    Collection Time: 06/09/19  8:45 PM   Result Value Ref Range    POC PH 7.381 7.35 - 7.45    POC PCO2 36.2 35 - 45 mmHg    POC PO2 30 (LL) 40 - 60 mmHg    POC HCO3 21.5 (L) 24 - 28 mmol/L    POC BE -4 -2 to 2 mmol/L    POC SATURATED O2 57 (L) 95 - 100 %    POC TCO2 23 (L) 24 - 29 mmol/L    Rate 26     Sample VENOUS     Site Other     Allens Test N/A     DelSys Adult Vent     Mode AC/PRVC     Vt 500     PEEP 5     PiP 26     FiO2 50     Min Vol 13.3     Sp02 100    POCT glucose    Collection Time: 06/09/19  9:49 PM   Result Value Ref Range    POCT Glucose 191 (H) 70 - 110 mg/dL   POCT glucose    Collection Time: 06/09/19 10:49 PM   Result Value Ref Range    POCT Glucose 181 (H) 70 - 110 mg/dL   Lactic acid, plasma    Collection Time: 06/09/19 10:51 PM   Result Value Ref Range    Lactate (Lactic Acid) 1.9 0.5 - 2.2 mmol/L   POCT glucose    Collection Time: 06/09/19 11:48 PM   Result Value Ref Range    POCT Glucose 173 (H) 70 - 110 mg/dL   Basic metabolic panel    Collection Time: 06/09/19 11:50 PM   Result Value Ref Range    Sodium 133 (L) 136 - 145 mmol/L    Potassium 4.1 3.5 - 5.1 mmol/L    Chloride 102 95 - 110 mmol/L    CO2 21 (L) 23 - 29 mmol/L    Glucose 155 (H) 70 - 110 mg/dL    BUN, Bld 21 8 - 23 mg/dL    Creatinine 1.2 0.5 - 1.4 mg/dL    Calcium 7.7 (L) 8.7 - 10.5 mg/dL    Anion Gap 10 8 - 16 mmol/L    eGFR if African American >60.0 >60 mL/min/1.73 m^2    eGFR if non  58.0 (A) >60 mL/min/1.73 m^2   Magnesium    Collection Time: 06/09/19 11:50 PM   Result Value Ref Range    Magnesium 2.0 1.6 - 2.6 mg/dL   Phosphorus    Collection Time: 06/09/19 11:50 PM   Result Value Ref Range    Phosphorus 2.9 2.7 - 4.5 mg/dL   Glucose, random (Unless on insulin infusion)    Collection Time: 06/09/19 11:50 PM   Result Value Ref Range     Glucose 155 (H) 70 - 110 mg/dL   POCT glucose    Collection Time: 06/10/19 12:49 AM   Result Value Ref Range    POCT Glucose 150 (H) 70 - 110 mg/dL   POCT glucose    Collection Time: 06/10/19  1:57 AM   Result Value Ref Range    POCT Glucose 155 (H) 70 - 110 mg/dL   POCT glucose    Collection Time: 06/10/19  3:01 AM   Result Value Ref Range    POCT Glucose 197 (H) 70 - 110 mg/dL   POCT glucose    Collection Time: 06/10/19  4:01 AM   Result Value Ref Range    POCT Glucose 193 (H) 70 - 110 mg/dL   CBC with Automated Differential    Collection Time: 06/10/19  4:06 AM   Result Value Ref Range    WBC 53.96 (HH) 3.90 - 12.70 K/uL    RBC 3.05 (L) 4.60 - 6.20 M/uL    Hemoglobin 9.6 (L) 14.0 - 18.0 g/dL    Hematocrit 29.9 (L) 40.0 - 54.0 %    Mean Corpuscular Volume 98 82 - 98 fL    Mean Corpuscular Hemoglobin 31.5 (H) 27.0 - 31.0 pg    Mean Corpuscular Hemoglobin Conc 32.1 32.0 - 36.0 g/dL    RDW 15.1 (H) 11.5 - 14.5 %    Platelets 101 (L) 150 - 350 K/uL    MPV 9.9 9.2 - 12.9 fL    Immature Granulocytes CANCELED 0.0 - 0.5 %    Immature Grans (Abs) CANCELED 0.00 - 0.04 K/uL    Lymph # Test Not Performed 1.0 - 4.8 K/uL    Mono # Test Not Performed 0.3 - 1.0 K/uL    Eos # Test Not Performed 0.0 - 0.5 K/uL    Baso # Test Not Performed 0.00 - 0.20 K/uL    nRBC 0 0 /100 WBC    Gran% 5.0 (L) 38.0 - 73.0 %    Lymph% 93.0 (H) 18.0 - 48.0 %    Mono% 0.0 (L) 4.0 - 15.0 %    Eosinophil% 1.0 0.0 - 8.0 %    Basophil% 0.0 0.0 - 1.9 %    Bands 1.0 %    Aniso Slight     Poik Slight     Poly Occasional     Hypo Occasional     Ovalocytes Occasional     Sonu Cells Occasional     Differential Method Manual    Comprehensive Metabolic Panel (CMP)    Collection Time: 06/10/19  4:06 AM   Result Value Ref Range    Sodium 134 (L) 136 - 145 mmol/L    Potassium 3.9 3.5 - 5.1 mmol/L    Chloride 102 95 - 110 mmol/L    CO2 21 (L) 23 - 29 mmol/L    Glucose 162 (H) 70 - 110 mg/dL    BUN, Bld 20 8 - 23 mg/dL    Creatinine 1.1 0.5 - 1.4 mg/dL    Calcium  7.8 (L) 8.7 - 10.5 mg/dL    Total Protein 4.9 (L) 6.0 - 8.4 g/dL    Albumin 2.5 (L) 3.5 - 5.2 g/dL    Total Bilirubin 0.6 0.1 - 1.0 mg/dL    Alkaline Phosphatase 88 55 - 135 U/L    AST 39 10 - 40 U/L    ALT 27 10 - 44 U/L    Anion Gap 11 8 - 16 mmol/L    eGFR if African American >60.0 >60 mL/min/1.73 m^2    eGFR if non African American >60.0 >60 mL/min/1.73 m^2   APTT    Collection Time: 06/10/19  4:06 AM   Result Value Ref Range    aPTT 62.9 (H) 21.0 - 32.0 sec   Magnesium    Collection Time: 06/10/19  4:06 AM   Result Value Ref Range    Magnesium 1.9 1.6 - 2.6 mg/dL   Basic metabolic panel    Collection Time: 06/10/19  4:06 AM   Result Value Ref Range    Sodium 134 (L) 136 - 145 mmol/L    Potassium 3.9 3.5 - 5.1 mmol/L    Chloride 102 95 - 110 mmol/L    CO2 21 (L) 23 - 29 mmol/L    Glucose 162 (H) 70 - 110 mg/dL    BUN, Bld 20 8 - 23 mg/dL    Creatinine 1.1 0.5 - 1.4 mg/dL    Calcium 7.8 (L) 8.7 - 10.5 mg/dL    Anion Gap 11 8 - 16 mmol/L    eGFR if African American >60.0 >60 mL/min/1.73 m^2    eGFR if non African American >60.0 >60 mL/min/1.73 m^2   Magnesium    Collection Time: 06/10/19  4:06 AM   Result Value Ref Range    Magnesium 1.9 1.6 - 2.6 mg/dL   Phosphorus    Collection Time: 06/10/19  4:06 AM   Result Value Ref Range    Phosphorus 3.1 2.7 - 4.5 mg/dL   Troponin I    Collection Time: 06/10/19  4:06 AM   Result Value Ref Range    Troponin I 3.521 (H) 0.000 - 0.026 ng/mL   CBC auto differential    Collection Time: 06/10/19  4:06 AM   Result Value Ref Range    WBC 53.96 (HH) 3.90 - 12.70 K/uL    RBC 3.05 (L) 4.60 - 6.20 M/uL    Hemoglobin 9.6 (L) 14.0 - 18.0 g/dL    Hematocrit 29.9 (L) 40.0 - 54.0 %    Mean Corpuscular Volume 98 82 - 98 fL    Mean Corpuscular Hemoglobin 31.5 (H) 27.0 - 31.0 pg    Mean Corpuscular Hemoglobin Conc 32.1 32.0 - 36.0 g/dL    RDW 15.1 (H) 11.5 - 14.5 %    Platelets 101 (L) 150 - 350 K/uL    MPV 9.9 9.2 - 12.9 fL    Immature Granulocytes CANCELED 0.0 - 0.5 %    Immature Grans  (Abs) CANCELED 0.00 - 0.04 K/uL    Lymph # Test Not Performed 1.0 - 4.8 K/uL    Mono # Test Not Performed 0.3 - 1.0 K/uL    Eos # Test Not Performed 0.0 - 0.5 K/uL    Baso # Test Not Performed 0.00 - 0.20 K/uL    nRBC 0 0 /100 WBC    Gran% 5.0 (L) 38.0 - 73.0 %    Lymph% 93.0 (H) 18.0 - 48.0 %    Mono% 0.0 (L) 4.0 - 15.0 %    Eosinophil% 1.0 0.0 - 8.0 %    Basophil% 0.0 0.0 - 1.9 %    Bands 1.0 %    Aniso Slight     Poik Slight     Poly Occasional     Hypo Occasional     Ovalocytes Occasional     Sonu Cells Occasional     Differential Method Manual    Protime-INR    Collection Time: 06/10/19  4:06 AM   Result Value Ref Range    Prothrombin Time 11.3 9.0 - 12.5 sec    INR 1.1 0.8 - 1.2   APTT    Collection Time: 06/10/19  4:06 AM   Result Value Ref Range    aPTT 62.9 (H) 21.0 - 32.0 sec   Glucose, random (Unless on insulin infusion)    Collection Time: 06/10/19  4:06 AM   Result Value Ref Range    Glucose 162 (H) 70 - 110 mg/dL   ISTAT PROCEDURE    Collection Time: 06/10/19  4:30 AM   Result Value Ref Range    POC PH 7.564 (HH) 7.35 - 7.45    POC PCO2 24.7 (LL) 35 - 45 mmHg    POC PO2 230 (H) 80 - 100 mmHg    POC HCO3 22.3 (L) 24 - 28 mmol/L    POC BE 0 -2 to 2 mmol/L    POC SATURATED O2 100 95 - 100 %    POC TCO2 23 23 - 27 mmol/L    Rate 26     Sample ARTERIAL     Site RR     Allens Test Pass     DelSys Adult Vent     Mode AC/PRVC     Vt 500     PEEP 5     PiP 26     FiO2 50     Min Vol 13.4     Sp02 100    POCT glucose    Collection Time: 06/10/19  5:11 AM   Result Value Ref Range    POCT Glucose 166 (H) 70 - 110 mg/dL   POCT glucose    Collection Time: 06/10/19  6:04 AM   Result Value Ref Range    POCT Glucose 145 (H) 70 - 110 mg/dL   POCT glucose    Collection Time: 06/10/19  6:52 AM   Result Value Ref Range    POCT Glucose 161 (H) 70 - 110 mg/dL         Significant Imaging:     I have reviewed all pertinent imaging studies from the last 24 hours

## 2019-06-10 NOTE — PLAN OF CARE
Problem: Adult Inpatient Plan of Care  Goal: Patient-Specific Goal (Individualization)  Outcome: Ongoing (interventions implemented as appropriate)  POC reviewed with patient, patient spouse, and family. Patient rewarming @ 37C @ 19, patient maintained temp @ 37C this shift. Pedal pulses to left foot not present, left popliteal pulse present, foot dusky; heat packs removed from foot when temp increased. Right PT pulse present. IABP 1:1 EKG trigger Augmentins. No pacing noted on telemetry monitoring. Levophed titrated MAP >= 60. Seizure activity noted this afternoon when turning patient; EEG currently being placed. CAUTI, CLABSI, HAPI bundle compliances implemented. All medications thoroughly explained. No further questions at this time. WCTM

## 2019-06-10 NOTE — PROGRESS NOTES
[facility]  Interventional Cardiology  Progress Note    Patient Name: Reid Herman  MRN: [id number]  Admission Date: 5/29/2019  Hospital Length of Stay: 12 days  Code Status: Full Code   Attending Physician: Paz Pradhan MD   Primary Care Physician: Olivia Zavala MD  Principal Problem:Acute HF (heart failure)    Subjective:     Interval History: Patient unfortunately coded again on 6/8/19 with multiple episodes of VF and PMVT requiring 16 shocks and reintubation. He is in the process of rewarming following the cooling protocol. Remains sedated and intubated, however per nurse was opening eyes and following some simple commands at start of shift.     Objective:     Vital Signs (Most Recent):  Temp: 97.3 °F (36.3 °C) (06/10/19 0630)  Pulse: 66 (06/10/19 0630)  Resp: 18 (06/10/19 0630)  BP: 108/65 (06/10/19 0600)  SpO2: 100 % (06/10/19 0630) Vital Signs (24h Range):  Temp:  [92.7 °F (33.7 °C)-97.3 °F (36.3 °C)] 97.3 °F (36.3 °C)  Pulse:  [53-67] 66  Resp:  [18-28] 18  SpO2:  [100 %] 100 %  BP: (101-134)/(58-83) 108/65     Weight: 92.1 kg (203 lb)  Body mass index is 31.79 kg/m².    SpO2: 100 %  O2 Device (Oxygen Therapy): ventilator      Intake/Output Summary (Last 24 hours) at 6/10/2019 0703  Last data filed at 6/10/2019 0605  Gross per 24 hour   Intake 2656.71 ml   Output 1055 ml   Net 1601.71 ml       Lines/Drains/Airways     Central Venous Catheter Line                 Percutaneous Central Line Insertion/Assessment - triple lumen  06/06/19 1949 right internal jugular 3 days          Drain                 Sheath 06/06/19 1412 Right proximal 3 days         NG/OG Tube 06/08/19 1445 [place] sump 14 Fr. Center mouth 1 day         Urethral Catheter 06/08/19 1525 14 Fr. 1 day          Airway                 Airway - Non-Surgical 06/08/19 1254 Endotracheal Tube 1 day          Line                 IABP 06/06/19 1728 3 days         Pacer Wires 06/06/19 1728 3 days          Peripheral  Intravenous Line                 Peripheral IV - Single Lumen 06/05/19 2005 20 G Right Forearm 4 days         Peripheral IV - Single Lumen 06/06/19 1800 20 G Right Forearm 3 days         Peripheral IV - Single Lumen 06/06/19 2029 20 G Left;Lateral Forearm 3 days                Physical Exam   Constitutional: He appears well-developed and well-nourished. No distress.   Sedated, intubated   HENT:   Head: Normocephalic and atraumatic.   Cardiovascular: Normal rate, regular rhythm, normal heart sounds and intact distal pulses. Exam reveals no gallop and no friction rub.   No murmur heard.  Pulses:       Radial pulses are 0 on the right side, and 0 on the left side.        Femoral pulses are 2+ on the left side.       Dorsalis pedis pulses are 0 on the right side, and 0 on the left side.        Posterior tibial pulses are 0 on the right side, and 0 on the left side.   Sternotomy, healed. Surrounding ecchymoses. Extremities cool to touch. Right CFA IABP, Right CFV TVP   Pulmonary/Chest:   Intubated. Mechanical breath sounds, bilateral crackles   Abdominal: Soft. Bowel sounds are normal. He exhibits no distension. There is no tenderness.   Musculoskeletal: He exhibits edema (1+ bilateral lower extremity edema).   Left foot dressing c/d/i. Left foot mottled   Skin: He is not diaphoretic.       Significant Labs:     Recent Results (from the past 24 hour(s))   Lactic acid, plasma    Collection Time: 06/09/19  7:16 AM   Result Value Ref Range    Lactate (Lactic Acid) 2.6 (H) 0.5 - 2.2 mmol/L   Procalcitonin    Collection Time: 06/09/19  7:16 AM   Result Value Ref Range    Procalcitonin 1.21 (H) <0.25 ng/mL   POCT glucose    Collection Time: 06/09/19  7:29 AM   Result Value Ref Range    POCT Glucose 141 (H) 70 - 110 mg/dL   APTT    Collection Time: 06/09/19  8:12 AM   Result Value Ref Range    aPTT 51.1 (H) 21.0 - 32.0 sec   POCT glucose    Collection Time: 06/09/19  8:23 AM   Result Value Ref Range    POCT Glucose 182 (H) 70 -  110 mg/dL   POCT glucose    Collection Time: 06/09/19  9:34 AM   Result Value Ref Range    POCT Glucose 162 (H) 70 - 110 mg/dL   Transthoracic echo (TTE) 2D with Color Flow    Collection Time: 06/09/19  9:40 AM   Result Value Ref Range    Ascending aorta 3.31 cm    STJ 3.12 cm    AV mean gradient 3.88 mmHg    Ao peak layo 1.40 m/s    Ao VTI 15.79 cm    IVRT 0.09 msec    IVS 0.80 0.6 - 1.1 cm    LA size 4.30 cm    Left Atrium Major Axis 5.50 cm    Left Atrium Minor Axis 5.50 cm    LVIDD 5.10 3.5 - 6.0 cm    LVIDS 4.40 (A) 2.1 - 4.0 cm    LVOT diameter 1.99 cm    LVOT peak VTI 9.16 cm    PW 0.70 0.6 - 1.1 cm    MV Peak A Alyo 0.23 m/s    E wave decelartion time 229.40 msec    MV Peak E Layo 0.94 m/s    RA Major Axis 5.23 cm    RA Width 3.94 cm    RVDD 4.28 cm    Sinus 3.59 cm    TAPSE 1.23 cm    TR Max Layo 1.75 m/s    TDI LATERAL 0.03     TDI SEPTAL 0.03     LA WIDTH 4.30 cm    LV Diastolic Volume 107.96 mL    LV Systolic Volume 73.56 mL    RV S' 4.10 m/s    LVOT peak layo [id number] m/s    LV LATERAL E/E' RATIO 31.33     LV SEPTAL E/E' RATIO 31.33     FS 14 %    LA volume 86.44 cm3    LV mass 129.43 g    Left Ventricle Relative Wall Thickness 0.27 cm    AV valve area 1.80 cm2    AV Velocity Ratio 0.61     AV index (prosthetic) 0.58     E/A ratio 4.09     Mean e' 0.03     LVOT area 3.11 cm2    LVOT stroke volume 28.48 cm3    AV peak gradient 7.84 mmHg    E/E' ratio 31.33     LV Systolic Volume Index 36.1 mL/m2    LV Diastolic Volume Index 53.05 mL/m2    LA Volume Index 42.5 mL/m2    LV Mass Index 63.6 g/m2    Triscuspid Valve Regurgitation Peak Gradient 12.25 mmHg    BSA 2.09 m2   Basic metabolic panel    Collection Time: 06/09/19 10:16 AM   Result Value Ref Range    Sodium 135 (L) 136 - 145 mmol/L    Potassium 3.1 (L) 3.5 - 5.1 mmol/L    Chloride 100 95 - 110 mmol/L    CO2 23 23 - 29 mmol/L    Glucose 135 (H) 70 - 110 mg/dL    BUN, Bld 23 8 - 23 mg/dL    Creatinine 1.1 0.5 - 1.4 mg/dL    Calcium 8.0 (L) 8.7 - 10.5  mg/dL    Anion Gap 12 8 - 16 mmol/L    eGFR if African American >60.0 >60 mL/min/1.73 m^2    eGFR if non African American >60.0 >60 mL/min/1.73 m^2   Magnesium    Collection Time: 06/09/19 10:16 AM   Result Value Ref Range    Magnesium 2.3 1.6 - 2.6 mg/dL   Phosphorus    Collection Time: 06/09/19 10:16 AM   Result Value Ref Range    Phosphorus 3.5 2.7 - 4.5 mg/dL   POCT glucose    Collection Time: 06/09/19 10:24 AM   Result Value Ref Range    POCT Glucose 143 (H) 70 - 110 mg/dL   POCT glucose    Collection Time: 06/09/19 11:24 AM   Result Value Ref Range    POCT Glucose 123 (H) 70 - 110 mg/dL   POCT glucose    Collection Time: 06/09/19 12:18 PM   Result Value Ref Range    POCT Glucose 116 (H) 70 - 110 mg/dL   Basic metabolic panel    Collection Time: 06/09/19 12:42 PM   Result Value Ref Range    Sodium 135 (L) 136 - 145 mmol/L    Potassium 3.6 3.5 - 5.1 mmol/L    Chloride 101 95 - 110 mmol/L    CO2 22 (L) 23 - 29 mmol/L    Glucose 128 (H) 70 - 110 mg/dL    BUN, Bld 22 8 - 23 mg/dL    Creatinine 1.2 0.5 - 1.4 mg/dL    Calcium 7.9 (L) 8.7 - 10.5 mg/dL    Anion Gap 12 8 - 16 mmol/L    eGFR if African American >60.0 >60 mL/min/1.73 m^2    eGFR if non  58.0 (A) >60 mL/min/1.73 m^2   Magnesium    Collection Time: 06/09/19 12:42 PM   Result Value Ref Range    Magnesium 2.3 1.6 - 2.6 mg/dL   Phosphorus    Collection Time: 06/09/19 12:42 PM   Result Value Ref Range    Phosphorus 2.9 2.7 - 4.5 mg/dL   Troponin I    Collection Time: 06/09/19  1:03 PM   Result Value Ref Range    Troponin I 4.308 (H) 0.000 - 0.026 ng/mL   POCT glucose    Collection Time: 06/09/19  1:19 PM   Result Value Ref Range    POCT Glucose 144 (H) 70 - 110 mg/dL   POCT glucose    Collection Time: 06/09/19  2:35 PM   Result Value Ref Range    POCT Glucose 240 (H) 70 - 110 mg/dL   POCT glucose    Collection Time: 06/09/19  3:43 PM   Result Value Ref Range    POCT Glucose 173 (H) 70 - 110 mg/dL   POCT glucose    Collection Time: 06/09/19  4:43  PM   Result Value Ref Range    POCT Glucose 171 (H) 70 - 110 mg/dL   Basic metabolic panel    Collection Time: 06/09/19  4:46 PM   Result Value Ref Range    Sodium 133 (L) 136 - 145 mmol/L    Potassium 4.7 3.5 - 5.1 mmol/L    Chloride 102 95 - 110 mmol/L    CO2 22 (L) 23 - 29 mmol/L    Glucose 175 (H) 70 - 110 mg/dL    BUN, Bld 23 8 - 23 mg/dL    Creatinine 1.2 0.5 - 1.4 mg/dL    Calcium 8.1 (L) 8.7 - 10.5 mg/dL    Anion Gap 9 8 - 16 mmol/L    eGFR if African American >60.0 >60 mL/min/1.73 m^2    eGFR if non  58.0 (A) >60 mL/min/1.73 m^2   Magnesium    Collection Time: 06/09/19  4:46 PM   Result Value Ref Range    Magnesium 2.2 1.6 - 2.6 mg/dL   Phosphorus    Collection Time: 06/09/19  4:46 PM   Result Value Ref Range    Phosphorus 3.3 2.7 - 4.5 mg/dL   APTT    Collection Time: 06/09/19  4:46 PM   Result Value Ref Range    aPTT 62.0 (H) 21.0 - 32.0 sec   POCT glucose    Collection Time: 06/09/19  5:21 PM   Result Value Ref Range    POCT Glucose 198 (H) 70 - 110 mg/dL   POCT glucose    Collection Time: 06/09/19  6:32 PM   Result Value Ref Range    POCT Glucose 206 (H) 70 - 110 mg/dL   POCT glucose    Collection Time: 06/09/19  7:31 PM   Result Value Ref Range    POCT Glucose 198 (H) 70 - 110 mg/dL   Basic metabolic panel    Collection Time: 06/09/19  7:55 PM   Result Value Ref Range    Sodium 135 (L) 136 - 145 mmol/L    Potassium 4.1 3.5 - 5.1 mmol/L    Chloride 102 95 - 110 mmol/L    CO2 22 (L) 23 - 29 mmol/L    Glucose 187 (H) 70 - 110 mg/dL    BUN, Bld 22 8 - 23 mg/dL    Creatinine 1.2 0.5 - 1.4 mg/dL    Calcium 7.9 (L) 8.7 - 10.5 mg/dL    Anion Gap 11 8 - 16 mmol/L    eGFR if African American >60.0 >60 mL/min/1.73 m^2    eGFR if non  58.0 (A) >60 mL/min/1.73 m^2   Magnesium    Collection Time: 06/09/19  7:55 PM   Result Value Ref Range    Magnesium 2.1 1.6 - 2.6 mg/dL   Phosphorus    Collection Time: 06/09/19  7:55 PM   Result Value Ref Range    Phosphorus 3.1 2.7 - 4.5 mg/dL    Troponin I    Collection Time: 06/09/19  7:55 PM   Result Value Ref Range    Troponin I 3.977 (H) 0.000 - 0.026 ng/mL   Glucose, random (Unless on insulin infusion)    Collection Time: 06/09/19  7:55 PM   Result Value Ref Range    Glucose 187 (H) 70 - 110 mg/dL   VANCOMYCIN, TROUGH before 3rd dose    Collection Time: 06/09/19  8:16 PM   Result Value Ref Range    Vancomycin-Trough 17.5 10.0 - 22.0 ug/mL   POCT glucose    Collection Time: 06/09/19  8:41 PM   Result Value Ref Range    POCT Glucose 192 (H) 70 - 110 mg/dL   ISTAT PROCEDURE    Collection Time: 06/09/19  8:45 PM   Result Value Ref Range    POC PH 7.381 7.35 - 7.45    POC PCO2 36.2 35 - 45 mmHg    POC PO2 30 (LL) 40 - 60 mmHg    POC HCO3 21.5 (L) 24 - 28 mmol/L    POC BE -4 -2 to 2 mmol/L    POC SATURATED O2 57 (L) 95 - 100 %    POC TCO2 23 (L) 24 - 29 mmol/L    Rate 26     Sample VENOUS     Site Other     Allens Test N/A     DelSys Adult Vent     Mode AC/PRVC     Vt 500     PEEP 5     PiP 26     FiO2 50     Min Vol 13.3     Sp02 100    POCT glucose    Collection Time: 06/09/19  9:49 PM   Result Value Ref Range    POCT Glucose 191 (H) 70 - 110 mg/dL   POCT glucose    Collection Time: 06/09/19 10:49 PM   Result Value Ref Range    POCT Glucose 181 (H) 70 - 110 mg/dL   Lactic acid, plasma    Collection Time: 06/09/19 10:51 PM   Result Value Ref Range    Lactate (Lactic Acid) 1.9 0.5 - 2.2 mmol/L   POCT glucose    Collection Time: 06/09/19 11:48 PM   Result Value Ref Range    POCT Glucose 173 (H) 70 - 110 mg/dL   Basic metabolic panel    Collection Time: 06/09/19 11:50 PM   Result Value Ref Range    Sodium 133 (L) 136 - 145 mmol/L    Potassium 4.1 3.5 - 5.1 mmol/L    Chloride 102 95 - 110 mmol/L    CO2 21 (L) 23 - 29 mmol/L    Glucose 155 (H) 70 - 110 mg/dL    BUN, Bld 21 8 - 23 mg/dL    Creatinine 1.2 0.5 - 1.4 mg/dL    Calcium 7.7 (L) 8.7 - 10.5 mg/dL    Anion Gap 10 8 - 16 mmol/L    eGFR if African American >60.0 >60 mL/min/1.73 m^2    eGFR if non African  American 58.0 (A) >60 mL/min/1.73 m^2   Magnesium    Collection Time: 06/09/19 11:50 PM   Result Value Ref Range    Magnesium 2.0 1.6 - 2.6 mg/dL   Phosphorus    Collection Time: 06/09/19 11:50 PM   Result Value Ref Range    Phosphorus 2.9 2.7 - 4.5 mg/dL   Glucose, random (Unless on insulin infusion)    Collection Time: 06/09/19 11:50 PM   Result Value Ref Range    Glucose 155 (H) 70 - 110 mg/dL   POCT glucose    Collection Time: 06/10/19 12:49 AM   Result Value Ref Range    POCT Glucose 150 (H) 70 - 110 mg/dL   POCT glucose    Collection Time: 06/10/19  1:57 AM   Result Value Ref Range    POCT Glucose 155 (H) 70 - 110 mg/dL   POCT glucose    Collection Time: 06/10/19  3:01 AM   Result Value Ref Range    POCT Glucose 197 (H) 70 - 110 mg/dL   POCT glucose    Collection Time: 06/10/19  4:01 AM   Result Value Ref Range    POCT Glucose 193 (H) 70 - 110 mg/dL   CBC with Automated Differential    Collection Time: 06/10/19  4:06 AM   Result Value Ref Range    WBC 53.96 (HH) 3.90 - 12.70 K/uL    RBC 3.05 (L) 4.60 - 6.20 M/uL    Hemoglobin 9.6 (L) 14.0 - 18.0 g/dL    Hematocrit 29.9 (L) 40.0 - 54.0 %    Mean Corpuscular Volume 98 82 - 98 fL    Mean Corpuscular Hemoglobin 31.5 (H) 27.0 - 31.0 pg    Mean Corpuscular Hemoglobin Conc 32.1 32.0 - 36.0 g/dL    RDW 15.1 (H) 11.5 - 14.5 %    Platelets 101 (L) 150 - 350 K/uL    MPV 9.9 9.2 - 12.9 fL    Immature Granulocytes CANCELED 0.0 - 0.5 %    Immature Grans (Abs) CANCELED 0.00 - 0.04 K/uL    Lymph # Test Not Performed 1.0 - 4.8 K/uL    Mono # Test Not Performed 0.3 - 1.0 K/uL    Eos # Test Not Performed 0.0 - 0.5 K/uL    Baso # Test Not Performed 0.00 - 0.20 K/uL    nRBC 0 0 /100 WBC    Gran% 5.0 (L) 38.0 - 73.0 %    Lymph% 93.0 (H) 18.0 - 48.0 %    Mono% 0.0 (L) 4.0 - 15.0 %    Eosinophil% 1.0 0.0 - 8.0 %    Basophil% 0.0 0.0 - 1.9 %    Bands 1.0 %    [place]iso Slight     Poik Slight     Poly Occasional     Hypo Occasional     Ovalocytes Occasional     Sonu Cells Occasional      Differential Method Manual    Comprehensive Metabolic Panel (CMP)    Collection Time: 06/10/19  4:06 AM   Result Value Ref Range    Sodium 134 (L) 136 - 145 mmol/L    Potassium 3.9 3.5 - 5.1 mmol/L    Chloride 102 95 - 110 mmol/L    CO2 21 (L) 23 - 29 mmol/L    Glucose 162 (H) 70 - 110 mg/dL    BUN, Bld 20 8 - 23 mg/dL    Creatinine 1.1 0.5 - 1.4 mg/dL    Calcium 7.8 (L) 8.7 - 10.5 mg/dL    Total Protein 4.9 (L) 6.0 - 8.4 g/dL    Albumin 2.5 (L) 3.5 - 5.2 g/dL    Total Bilirubin 0.6 0.1 - 1.0 mg/dL    Alkaline Phosphatase 88 55 - 135 U/L    AST 39 10 - 40 U/L    ALT 27 10 - 44 U/L    Anion Gap 11 8 - 16 mmol/L    eGFR if African American >60.0 >60 mL/min/1.73 m^2    eGFR if non African American >60.0 >60 mL/min/1.73 m^2   APTT    Collection Time: 06/10/19  4:06 AM   Result Value Ref Range    aPTT 62.9 (H) 21.0 - 32.0 sec   Magnesium    Collection Time: 06/10/19  4:06 AM   Result Value Ref Range    Magnesium 1.9 1.6 - 2.6 mg/dL   Basic metabolic panel    Collection Time: 06/10/19  4:06 AM   Result Value Ref Range    Sodium 134 (L) 136 - 145 mmol/L    Potassium 3.9 3.5 - 5.1 mmol/L    Chloride 102 95 - 110 mmol/L    CO2 21 (L) 23 - 29 mmol/L    Glucose 162 (H) 70 - 110 mg/dL    BUN, Bld 20 8 - 23 mg/dL    Creatinine 1.1 0.5 - 1.4 mg/dL    Calcium 7.8 (L) 8.7 - 10.5 mg/dL    Anion Gap 11 8 - 16 mmol/L    eGFR if African American >60.0 >60 mL/min/1.73 m^2    eGFR if non African American >60.0 >60 mL/min/1.73 m^2   Magnesium    Collection Time: 06/10/19  4:06 AM   Result Value Ref Range    Magnesium 1.9 1.6 - 2.6 mg/dL   Phosphorus    Collection Time: 06/10/19  4:06 AM   Result Value Ref Range    Phosphorus 3.1 2.7 - 4.5 mg/dL   Troponin I    Collection Time: 06/10/19  4:06 AM   Result Value Ref Range    Troponin I 3.521 (H) 0.000 - 0.026 ng/mL   CBC auto differential    Collection Time: 06/10/19  4:06 AM   Result Value Ref Range    WBC 53.96 (HH) 3.90 - 12.70 K/uL    RBC 3.05 (L) 4.60 - 6.20 M/uL    Hemoglobin 9.6 (L)  14.0 - 18.0 g/dL    Hematocrit 29.9 (L) 40.0 - 54.0 %    Mean Corpuscular Volume 98 82 - 98 fL    Mean Corpuscular Hemoglobin 31.5 (H) 27.0 - 31.0 pg    Mean Corpuscular Hemoglobin Conc 32.1 32.0 - 36.0 g/dL    RDW 15.1 (H) 11.5 - 14.5 %    Platelets 101 (L) 150 - 350 K/uL    MPV 9.9 9.2 - 12.9 fL    Immature Granulocytes CANCELED 0.0 - 0.5 %    Immature Grans (Abs) CANCELED 0.00 - 0.04 K/uL    Lymph # Test Not Performed 1.0 - 4.8 K/uL    Mono # Test Not Performed 0.3 - 1.0 K/uL    Eos # Test Not Performed 0.0 - 0.5 K/uL    Baso # Test Not Performed 0.00 - 0.20 K/uL    nRBC 0 0 /100 WBC    Gran% 5.0 (L) 38.0 - 73.0 %    Lymph% 93.0 (H) 18.0 - 48.0 %    Mono% 0.0 (L) 4.0 - 15.0 %    Eosinophil% 1.0 0.0 - 8.0 %    Basophil% 0.0 0.0 - 1.9 %    Bands 1.0 %    Aniso Slight     Poik Slight     Poly Occasional     Hypo Occasional     Ovalocytes Occasional     Sonu Cells Occasional     Differential Method Manual    Protime-INR    Collection Time: 06/10/19  4:06 AM   Result Value Ref Range    Prothrombin Time 11.3 9.0 - 12.5 sec    INR 1.1 0.8 - 1.2   APTT    Collection Time: 06/10/19  4:06 AM   Result Value Ref Range    aPTT 62.9 (H) 21.0 - 32.0 sec   Glucose, random (Unless on insulin infusion)    Collection Time: 06/10/19  4:06 AM   Result Value Ref Range    Glucose 162 (H) 70 - 110 mg/dL   ISTAT PROCEDURE    Collection Time: 06/10/19  4:30 AM   Result Value Ref Range    POC PH 7.564 (HH) 7.35 - 7.45    POC PCO2 24.7 (LL) 35 - 45 mmHg    POC PO2 230 (H) 80 - 100 mmHg    POC HCO3 22.3 (L) 24 - 28 mmol/L    POC BE 0 -2 to 2 mmol/L    POC SATURATED O2 100 95 - 100 %    POC TCO2 23 23 - 27 mmol/L    Rate 26     Sample ARTERIAL     Site RR     Allens Test Pass     DelSys Adult Vent     Mode AC/PRVC     Vt 500     PEEP 5     PiP 26     FiO2 50     Min Vol 13.4     Sp02 100    POCT glucose    Collection Time: 06/10/19  5:11 AM   Result Value Ref Range    POCT Glucose 166 (H) 70 - 110 mg/dL   POCT glucose    Collection Time:  06/10/19  6:04 AM   Result Value Ref Range    POCT Glucose 145 (H) 70 - 110 mg/dL   POCT glucose    Collection Time: 06/10/19  6:52 AM   Result Value Ref Range    POCT Glucose 161 (H) 70 - 110 mg/dL         Significant Imaging:     I have reviewed all pertinent imaging studies from the last 24 hours      Assessment and Plan:     Patient is a 79 y.o. male presenting with:    NSTEMI (non-ST elevated myocardial infarction)  Known CAD with history of 2v CABG (1994) LIMA-D1 and SVG-LAD, C 1/29/19 for worsening angina s/p DUC x 2 (mid and distal LCx). He was also found to have 50% D1 stenosis (distal to the LIMA graft insertion) and 60% proximal SVG graft stenosis and RCA .     S/p SVG DUC 6/6/19  AFib on 6/6/19, started on amiodarone, developed PMVT, taken back to cath lab with no new stenoses  EKG with TWI and nonspecific ST changes, troponin trended down  Continue medical therapy with DAPT, high intensity statin  IABP placed via R CFA 6/6/19, still requiring pressor support (levophed 0.18 mcg/kg/min)  CXR daily for IABP/ET tube placement        VTE Risk Mitigation (From admission, onward)        Ordered     heparin infusion 1,000 units/500 ml in 0.9% NaCl (pressure line flush)  Intra-op continuous PRN      06/06/19 1052     heparin 25,000 units in dextrose 5% 250 mL (100 units/mL) infusion LOW INTENSITY nomogram - [facility]  Continuous      06/05/19 0853     heparin 25,000 units in dextrose 5% (100 units/ml) IV bolus from bag - ADDITIONAL PRN BOLUS - 60 units/kg (max bolus 4000 units)  As needed (PRN)      06/05/19 0853     heparin 25,000 units in dextrose 5% (100 units/ml) IV bolus from bag - ADDITIONAL PRN BOLUS - 30 units/kg (max bolus 4000 units)  As needed (PRN)      06/05/19 0853     heparin 25,000 units in dextrose 5% 250 mL (100 units/mL) infusion LOW INTENSITY nomogram - [facility]  Continuous      06/02/19 1508     IP VTE LOW RISK PATIENT  Once      05/29/19 9646          Milind Reyes MD  Interventional  Cardiology  [facility]

## 2019-06-10 NOTE — PROGRESS NOTES
Pharmacokinetic Assessment Follow Up: IV Vancomycin    Vancomycin serum concentration assessment(s):    · The trough level was drawned correctly and can be used to guide therapy at this time.   · Trough = 17.5 @ 20:16 6/9/19    Vancomycin Regimen Plan:    · Continue current regimen: Vancomycin 1750 mg every 24 hours.  · Pharmacy will continue to follow and monitor vancomycin.    Please contact pharmacy at extension [phone] for questions regarding this assessment.    Thank you for the consult,   Maria L Cash     Patient brief summary:  Reid Herman is a 79 y.o. male initiated on antimicrobial therapy with IV Vancomycin for treatment of suspected bone/joint    The patient received a loading dose, followed by the current treatment regimen: vancomycin 1750 mg IV every 24 hours    Drug Allergies:   Review of patient's allergies indicates:  No Known Allergies    Actual Body Weight:   92.1 kg    Renal Function:   Estimated Creatinine Clearance: 55.8 mL/min (based on SCr of 1.2 mg/dL).,     CBC (last 72 hours):  Recent Labs   Lab Result Units 06/06/19  2325 06/07/19  0248 06/08/19  0435 06/09/19  0209 06/09/19  0426   WBC K/uL 40.93*  40.93* 40.50* 29.29* 37.06* 48.30*   Hemoglobin g/dL 10.6*  10.6* 11.0* 9.9* 10.0* 10.3*   Hematocrit % 33.7*  33.7* 33.1* 31.3* 29.8* 31.6*   Platelets K/uL 148*  148* 146* 89* 107* 106*   Gran% % 9.5*  9.5* 5.5* 10.0* 11.3* 9.2*   Lymph% % 88.0*  88.0* 93.0* 88.0* 86.5* 88.5*   Mono% % 2.0*  2.0* 1.5* 2.0* 1.6* 1.7*   Eosinophil% % 0.0  0.0 0.0 0.0 0.2 0.2   Basophil% % 0.0  0.0 0.0 0.0 0.2 0.2   Differential Method  Manual  Manual Manual Manual Automated Automated       Metabolic Panel (last 72 hours):  Recent Labs   Lab Result Units 06/07/19  0248 06/07/19  1531 06/08/19  0435 06/08/19  1055 06/08/19  1430 06/08/19  1500 06/08/19  1508 06/08/19  2026 06/08/19  2331 06/09/19  0426 06/09/19  1016 06/09/19  1242 06/09/19  1646 06/09/19  1955   Sodium mmol/L 134* 138 139  --  132*   132*  --   --  134* 134* 135*  135* 135* 135* 133* 135*   Potassium mmol/L 4.1 3.8 3.5 3.9 3.7  3.7  --   --  4.0 3.4* 4.4  4.4 3.1* 3.6 4.7 4.1   Chloride mmol/L 97 98 99  --  93*  93*  --   --  98 101 102  102 100 101 102 102   CO2 mmol/L 26 27 28  --  18*  18*  --   --  22* 22* 24  24 23 22* 22* 22*   Glucose mg/dL 273* 247* 149*  --  473*  473*  473*  473*  473*  --  468* 417* 322* 181*  181* 135* 128* 175* 187*  187*   Glucose, UA   --   --   --   --   --  3+*  --   --   --   --   --   --   --   --    BUN, Bld mg/dL 28* 22 19  --  26*  26*  --   --  25* 24* 23  23 23 22 23 22   Creatinine mg/dL 1.3 1.2 1.1  --  1.4  1.4  --   --  1.4 1.3 1.1  1.1 1.1 1.2 1.2 1.2   Albumin g/dL 3.2*  --  2.9*  --  3.0*  --   --   --   --  2.7*  --   --   --   --    Total Bilirubin mg/dL 0.5  --  0.6  --  0.8  --   --   --   --  0.6  --   --   --   --    Alkaline Phosphatase U/L 86  --  80  --  103  --   --   --   --  90  --   --   --   --    AST U/L 71*  --  55*  --  74*  --   --   --   --  52*  --   --   --   --    ALT U/L 24  --  21  --  39  --   --   --   --  30  --   --   --   --    Magnesium mg/dL 2.2 2.0 1.9 2.3 7.7*  --   --  2.4 2.5 2.3 2.3 2.3 2.2 2.1   Phosphorus mg/dL 3.6  --  3.4  --  3.6  --   --  1.8* 1.8* 1.6* 3.5 2.9 3.3 3.1       Vancomycin Administrations:  vancomycin given in the last 96 hours                   vancomycin (VANCOCIN) 1,750 mg in sodium chloride 0.9% 500 mL IVPB (mg) 1,750 mg New Bag 06/08/19 2036     1,750 mg New Bag 06/07/19 1959     1,750 mg New Bag  0012                Drug levels (last 3 results):  Recent Labs   Lab Result Units 06/06/19 2149 06/07/19  1930 06/09/19 2016   Vancomycin-Trough ug/mL 11.4 13.9 17.5       Microbiologic Results:  Microbiology Results (last 7 days)     Procedure Component Value Units Date/Time    Blood culture [[id number]] Collected:  06/08/19 2008    Order Status:  Completed Specimen:  Blood from Peripheral, Hand, Right Updated:  06/09/19 0515      Blood Culture, Routine No Growth to date    Blood culture [[id number]] Collected:  06/08/19 1955    Order Status:  Completed Specimen:  Blood from Peripheral, Wrist, Left Updated:  06/09/19 0515     Blood Culture, Routine No Growth to date    Culture, Respiratory with Gram Stain [[id number]] Collected:  06/08/19 1640    Order Status:  Completed Specimen:  Respiratory from Sputum Updated:  06/09/19 0448     Gram Stain (Respiratory) >10 epithelial cells per low power field     Gram Stain (Respiratory) Many WBC's     Gram Stain (Respiratory) Many Gram positive cocci     Gram Stain (Respiratory) Many Gram negative rods     Gram Stain (Respiratory) Rare yeast    Aerobic culture [[id number]] Collected:  06/04/19 1441    Order Status:  Completed Specimen:  Bone from Toe, Left Foot Updated:  06/06/19 1233     Aerobic Bacterial Culture No significant isolate    Culture, Anaerobe [[id number]] Collected:  06/04/19 1441    Order Status:  Completed Specimen:  Bone from Toe, Left Foot Updated:  06/06/19 1043     Anaerobic Culture Culture in progress    Gram stain [[id number]] Collected:  06/04/19 1441    Order Status:  Completed Specimen:  Bone from Toe, Left Foot Updated:  06/04/19 2158     Gram Stain Result No WBC's      No organisms seen    Fungus culture [[id number]] Collected:  06/04/19 1441    Order Status:  Sent Specimen:  Bone from Toe, Left Foot Updated:  06/04/19 1932    AFB Culture & Smear [[id number]] Collected:  06/04/19 1441    Order Status:  Sent Specimen:  Bone from Toe, Left Foot Updated:  06/04/19 1441    Blood culture [[id number]] Collected:  05/29/19 1756    Order Status:  Completed Specimen:  Blood Updated:  06/03/19 2012     Blood Culture, Routine No growth after 5 days.    Blood culture [[id number]] Collected:  05/29/19 1756    Order Status:  Completed Specimen:  Blood Updated:  06/03/19 2012     Blood Culture, Routine No growth after 5 days.

## 2019-06-10 NOTE — PROGRESS NOTES
[facility]  Cardiology  Progress Note    Patient Name: Reid Herman  MRN: [id number]  Admission Date: 5/29/2019  Hospital Length of Stay: 12 days  Code Status: DNR   Attending Physician: Paz Pradhan MD   Primary Care Physician: Olivia Zavala MD  Expected Discharge Date: 6/11/2019  Principal Problem:Acute HF (heart failure)    Subjective:     Hospital Course:   Patient transffered to the ccu after he had a code after PMVT s/p several shocks. Repeat C with patent stents. Initially was on lidocaine which was weaned off however on 6/8 had several episodes of MMVT and PMVT which required CPR, intubation and shocks. Currently being rewarmed. Prognosis very poor and family updated , code status changed to DNR, propofol weaning started and he had some bilateral lower extremity twitching , will get EEG placed     Interval History: Patient with no acute events overnight remains on levophed and lidocaine , now rewarming    Review of Systems   Unable to perform ROS: intubated     Objective:     Vital Signs (Most Recent):  Temp: 98.8 °F (37.1 °C) (06/10/19 1530)  Pulse: 72 (06/10/19 1530)  Resp: 18 (06/10/19 1530)  BP: (!) 101/59 (06/10/19 1530)  SpO2: 100 % (06/10/19 1530) Vital Signs (24h Range):  Temp:  [92.7 °F (33.7 °C)-99 °F (37.2 °C)] 98.8 °F (37.1 °C)  Pulse:  [54-73] 72  Resp:  [18-28] 18  SpO2:  [100 %] 100 %  BP: ()/(52-83) 101/59     Weight: 92.1 kg (203 lb)  Body mass index is 31.79 kg/m².     SpO2: 100 %  O2 Device (Oxygen Therapy): ventilator      Intake/Output Summary (Last 24 hours) at 6/10/2019 1541  Last data filed at 6/10/2019 1500  Gross per 24 hour   Intake 2466.39 ml   Output 1600 ml   Net 866.39 ml       Lines/Drains/Airways     Central Venous Catheter Line                 Percutaneous Central Line Insertion/Assessment - triple lumen  06/06/19 1949 right internal jugular 3 days          Drain                 Sheath 06/06/19 1412 Right proximal 4 days          NG/OG Tube 06/08/19 1445 [place] sump 14 Fr. Center mouth 2 days         Urethral Catheter 06/08/19 1525 14 Fr. 2 days          Airway                 Airway - Non-Surgical 06/08/19 1254 Endotracheal Tube 2 days          Line                 IABP 06/06/19 1728 3 days         Pacer Wires 06/06/19 1728 3 days          Peripheral Intravenous Line                 Peripheral IV - Single Lumen 06/05/19 2005 20 G Right Forearm 4 days         Peripheral IV - Single Lumen 06/06/19 1800 20 G Right Forearm 3 days         Peripheral IV - Single Lumen 06/06/19 2029 20 G Left;Lateral Forearm 3 days                Physical Exam   Constitutional: He appears well-developed and well-nourished. No distress.   Sedated, intubated   HENT:   Head: Normocephalic and atraumatic.   Cardiovascular: Normal rate, regular rhythm, normal heart sounds and intact distal pulses. Exam reveals no gallop and no friction rub.   No murmur heard.  Pulses:       Radial pulses are 0 on the right side, and 0 on the left side.        Femoral pulses are 2+ on the left side.       Dorsalis pedis pulses are 0 on the right side, and 0 on the left side.        Posterior tibial pulses are 0 on the right side, and 0 on the left side.   Sternotomy, healed. Surrounding ecchymoses. Extremities cool to touch. Right CFA IABP, Right CFV TVP   Pulmonary/Chest:   Intubated. Mechanical breath sounds, bilateral crackles   Abdominal: Soft. Bowel sounds are normal. He exhibits no distension. There is no tenderness.   Musculoskeletal: He exhibits edema (1+ bilateral lower extremity edema).   Left foot dressing c/d/i. Left foot mottled   Skin: He is not diaphoretic.       Significant Labs:   CMP   Recent Labs   Lab 06/09/19  0426  06/10/19  0406 06/10/19  0818 06/10/19  1128   *  135*   < > 134*  134* 132* 131*   K 4.4  4.4   < > 3.9  3.9 4.5 4.3     102   < > 102  102 102 101   CO2 24  24   < > 21*  21* 21* 21*   *  181*   < > 162*  162*  162* 156*  155*   BUN 23  23   < > 20  20 20 20   CREATININE 1.1  1.1   < > 1.1  1.1 1.3 1.3   CALCIUM 8.1*  8.1*   < > 7.8*  7.8* 7.7* 7.7*   PROT 5.2*  --  4.9*  --   --    ALBUMIN 2.7*  --  2.5*  --   --    BILITOT 0.6  --  0.6  --   --    ALKPHOS 90  --  88  --   --    AST 52*  --  39  --   --    ALT 30  --  27  --   --    ANIONGAP 9  9   < > 11  11 9 9   ESTGFRAFRICA >60.0  >60.0   < > >60.0  >60.0 >60.0 >60.0   EGFRNONAA >60.0  >60.0   < > >60.0  >60.0 52.6* 52.6*    < > = values in this interval not displayed.    and CBC   Recent Labs   Lab 06/09/19  0209 06/09/19  0426 06/10/19  0406   WBC 37.06* 48.30* 53.96*  53.96*   HGB 10.0* 10.3* 9.6*  9.6*   HCT 29.8* 31.6* 29.9*  29.9*   * 106* 101*  101*       Significant Imaging: X-Ray: CXR: X-Ray Chest 1 View (CXR):   Results for orders placed or performed during the hospital encounter of 05/29/19   X-Ray Chest 1 View    Narrative    EXAMINATION:  XR CHEST 1 VIEW    CLINICAL HISTORY:  IABT and ET Tube placement;    COMPARISON:  Comparison is made to 06/09/2019.    FINDINGS:  Endotracheal tube tip lies just inferior to the thoracic inlet, above the aortic arch and well above the greyson.  Enteric tube has its tip in the gastric fundus.  Right jugular origin vascular catheter tip lies near the junction of the superior vena cava and right atrium.  Postoperative changes again noted in the thorax.  Heart size and the appearance of the cardiomediastinal silhouette are unchanged since the examination referenced above.  Lung zones are also stable, with no significant new areas of airspace consolidation or volume loss evident.  No pleural fluid of any substantial volume is seen on either side.  No pneumothorax.      Impression    Allowing for a slightly poorer inspiratory depth level on the current examination, there has been no significant detrimental interval change in the appearance of the chest since 06/09/2019.      Electronically signed by: Shade Florez,  MD  Date:    06/10/2019  Time:    07:56     Assessment and Plan:     Brief HPI: 80 yo M w/ PMH significant for CAD s/p CABG x2 (SVG-LAD, LIMA-D1, 1994), NSTEMI s/p DUC to distal, mid LCx (1/2019), PAD, CKD, CLL (not currently on therapy), HTN, HLD, and MINDA who presents w/ worsening L great toe wound that has failed outpatient therapy. Reports noticing worsening L foot pain, discomfort. Recently completed multiple course of antibiotics however w/ minimal improvement. Podiatry consulted w/ imaging concerning osteomyelitis. Complicated course of NSTEMI, afib that lead to VT arrest and transfer to the CCU. Extubated/reintubated when patient on 6/8 had multiple events of PMVT and shocked multiple times now undergoing TTM. Changed to DNR today and will start weaning off sedation     * Acute HF (heart failure)  Known CAD with history of 2v CABG (1994) LIMA-D1 and SVG-LAD, recently had LHC 1/29/19 for worsening angina s/p DUC x 2 (mid and distal LCx). He was also found to have 50% D1 stenosis (distal to the LIMA graft insertion) and 60% proximal SVG graft stenosis and RCA .      S/p SVG DUC 6/6/19  Continue medical therapy with DAPT, high intensity statin  IABP placed via R CFA 6/6/19, on levophed  CXR daily for IABP  Continue on 80 mg IV lasix BID.         Cellulitis of Left foot  - Vitals are WNLs  - Continue on Vanc and CTX.    Goals of care, counseling/discussion  - discussed with family and they request DNR for now   - will start weaning off sedation and neuro prognosticate  - in the am while rounding he had some twitching   -will get eeg     VT (ventricular tachycardia)  Pt had LHC then developed AF was started on amiodarone and had PMVT s/p several shocks. Repeat LHC with patent stents. Initially was on lidocaine which was weaned off however on 6/8 had several episodes of MMVT and PMVT which required CPR, intubation and shocks. Currently being cooled.    Plan  - EP on board, recommend amiodarone to be restarted  when possible   - Currently on lidocaine drip, will continue until rewarming and neuro-prognostication can be performed     Atrial fibrillation and Focal atrial tachycardia  - Continue on heparin gtt.    NSTEMI (non-ST elevated myocardial infarction)  - continue asa, statin, plavix        VTE Risk Mitigation (From admission, onward)        Ordered     heparin infusion 1,000 units/500 ml in 0.9% NaCl (pressure line flush)  Intra-op continuous PRN      06/06/19 1052     heparin 25,000 units in dextrose 5% 250 mL (100 units/mL) infusion LOW INTENSITY nomogram - [facility]  Continuous      06/05/19 0853     heparin 25,000 units in dextrose 5% (100 units/ml) IV bolus from bag - ADDITIONAL PRN BOLUS - 60 units/kg (max bolus 4000 units)  As needed (PRN)      06/05/19 0853     heparin 25,000 units in dextrose 5% (100 units/ml) IV bolus from bag - ADDITIONAL PRN BOLUS - 30 units/kg (max bolus 4000 units)  As needed (PRN)      06/05/19 0853     heparin 25,000 units in dextrose 5% 250 mL (100 units/mL) infusion LOW INTENSITY nomogram - [facility]  Continuous      06/02/19 1508     IP VTE LOW RISK PATIENT  Once      05/29/19 1738          Shanna To MD  Cardiology  [facility]

## 2019-06-10 NOTE — ASSESSMENT & PLAN NOTE
Known CAD with history of 2v CABG (1994) LIMA-D1 and SVG-LAD, [facility] 1/29/19 for worsening angina s/p DUC x 2 (mid and distal LCx). He was also found to have 50% D1 stenosis (distal to the LIMA graft insertion) and 60% proximal SVG graft stenosis and RCA .     S/p SVG DUC 6/6/19  AFib on 6/6/19, started on amiodarone, developed PMVT, taken back to cath lab with no new stenoses  EKG with TWI and nonspecific ST changes, troponin trended down  Continue medical therapy with DAPT, high intensity statin  IABP placed via R CFA 6/6/19, still requiring pressor support (levophed 0.18 mcg/kg/min)  CXR daily for IABP/ET tube placement

## 2019-06-10 NOTE — ASSESSMENT & PLAN NOTE
Pt had LHC then developed AF was started on amiodarone and had PMVT s/p several shocks. Repeat LHC with patent stents. Initially was on lidocaine which was weaned off however on 6/8 had several episodes of MMVT and PMVT which required CPR, intubation and shocks. Currently being rewarmed no events overnight, no VT, VF    Plan  Lidocaine titration per primary team  If patient improves will need to be switched to oral AAD  Keep K >4 Mg >2

## 2019-06-10 NOTE — CODE DOCUMENTATION
Patient seen in rounds and rewarmed, patient family at bedside and understand his condition, requested to change his code to DNR, will attempt to wean sedation for neuro-prognostication.

## 2019-06-10 NOTE — SUBJECTIVE & OBJECTIVE
Interval History: Remains intubated and sedated, lidocaine at 2. No VT/VF overnight.    Review of Systems   Unable to perform ROS: intubated     Objective:     Vital Signs (Most Recent):  Temp: 98.6 °F (37 °C) (06/10/19 0921)  Pulse: 69 (06/10/19 0921)  Resp: 18 (06/10/19 0921)  BP: 117/75 (06/10/19 0921)  SpO2: 100 % (06/10/19 0921) Vital Signs (24h Range):  Temp:  [92.7 °F (33.7 °C)-98.6 °F (37 °C)] 98.6 °F (37 °C)  Pulse:  [53-70] 69  Resp:  [18-28] 18  SpO2:  [100 %] 100 %  BP: (101-134)/(58-83) 117/75     Weight: 92.1 kg (203 lb)  Body mass index is 31.79 kg/m².     SpO2: 100 %  O2 Device (Oxygen Therapy): ventilator    Physical Exam   Constitutional: He appears well-developed and well-nourished. No distress.   Sedated, intubated   HENT:   Head: Normocephalic and atraumatic.   Cardiovascular: Normal rate, regular rhythm, normal heart sounds and intact distal pulses. Exam reveals no gallop and no friction rub.   No murmur heard.  Pulses:       Radial pulses are 0 on the right side, and 0 on the left side.        Femoral pulses are 2+ on the left side.       Dorsalis pedis pulses are 0 on the right side, and 0 on the left side.        Posterior tibial pulses are 0 on the right side, and 0 on the left side.   Sternotomy, healed. Surrounding ecchymoses. Extremities cool to touch. Right CFA IABP, Right CFV TVP   Pulmonary/Chest:   Intubated. Mechanical breath sounds, bilateral crackles   Abdominal: Soft. Bowel sounds are normal. He exhibits no distension. There is no tenderness.   Musculoskeletal: He exhibits edema (1+ bilateral lower extremity edema).   Left foot dressing c/d/i. Left foot mottled   Skin: He is not diaphoretic.       Significant Labs:   EP:   Recent Labs   Lab 06/08/19  1430  06/09/19  0209 06/09/19  0426  06/09/19  2350 06/10/19  0406 06/10/19  0818   *  132*   < >  --  135*  135*   < > 133* 134*  134* 132*   K 3.7  3.7   < >  --  4.4  4.4   < > 4.1 3.9  3.9 4.5   CL 93*  93*   < >  --   102  102   < > 102 102  102 102   CO2 18*  18*   < >  --  24  24   < > 21* 21*  21* 21*   *  473*  473*  473*  473*   < >  --  181*  181*   < > 155*  155* 162*  162*  162* 156*   BUN 26*  26*   < >  --  23  23   < > 21 20  20 20   CREATININE 1.4  1.4   < >  --  1.1  1.1   < > 1.2 1.1  1.1 1.3   CALCIUM 8.3*  8.3*   < >  --  8.1*  8.1*   < > 7.7* 7.8*  7.8* 7.7*   PROT 5.6*  --   --  5.2*  --   --  4.9*  --    ALBUMIN 3.0*  --   --  2.7*  --   --  2.5*  --    BILITOT 0.8  --   --  0.6  --   --  0.6  --    ALKPHOS 103  --   --  90  --   --  88  --    AST 74*  --   --  52*  --   --  39  --    ALT 39  --   --  30  --   --  27  --    ANIONGAP 21*  21*   < >  --  9  9   < > 10 11  11 9   ESTGFRAFRICA 55.6*  55.6*   < >  --  >60.0  >60.0   < > >60.0 >60.0  >60.0 >60.0   EGFRNONAA 48.1*  48.1*   < >  --  >60.0  >60.0   < > 58.0* >60.0  >60.0 52.6*   WBC  --   --  37.06* 48.30*  --   --  53.96*  53.96*  --    HGB  --   --  10.0* 10.3*  --   --  9.6*  9.6*  --    HCT  --    < > 29.8* 31.6*  --   --  29.9*  29.9*  --    PLT  --   --  107* 106*  --   --  101*  101*  --    INR  --   --  1.1  --   --   --  1.1  --     < > = values in this interval not displayed.    and ABG:   Recent Labs   Lab 06/09/19  0436 06/09/19 2045 06/10/19  0430   PH 7.494* 7.381 7.564*   PCO2 30.4* 36.2 24.7*   HCO3 23.4* 21.5* 22.3*   POCSATURATED 65* 57* 100   BE 0 -4 0       Significant Imaging: Echocardiogram: 2D echo with color flow doppler: No results found. However, due to the size of the patient record, not all encounters were searched. Please check Results Review for a complete set of results.

## 2019-06-10 NOTE — CARE UPDATE
Called by Javier RN about involuntary bilateral extremity movements   Plan to restart propofol, then order EEG ( placed )  Javier to call when EEG is placed in order to get orders to stop propofol and ask for epilepsy to read EEG once placed     Shanna To   CCU resident

## 2019-06-10 NOTE — PROGRESS NOTES
2015: rewarming began    2100: not able to doppler DP and PT pulse in left foot. Foot is cold, pale/mottled. Warming blanket applied to foot.    2200: not able to doppler DP and PT pulse in left foot (attempted by bedside RN and charge RN) . Foot is now warm and is no longer pale. Notified Dr Juares. Verbal order received to apply warm packs to patient's left foot

## 2019-06-10 NOTE — SUBJECTIVE & OBJECTIVE
Interval History: Patient with no acute events overnight remains on levophed and lidocaine , now rewarming    Review of Systems   Unable to perform ROS: intubated     Objective:     Vital Signs (Most Recent):  Temp: 98.8 °F (37.1 °C) (06/10/19 1530)  Pulse: 72 (06/10/19 1530)  Resp: 18 (06/10/19 1530)  BP: (!) 101/59 (06/10/19 1530)  SpO2: 100 % (06/10/19 1530) Vital Signs (24h Range):  Temp:  [92.7 °F (33.7 °C)-99 °F (37.2 °C)] 98.8 °F (37.1 °C)  Pulse:  [54-73] 72  Resp:  [18-28] 18  SpO2:  [100 %] 100 %  BP: ()/(52-83) 101/59     Weight: 92.1 kg (203 lb)  Body mass index is 31.79 kg/m².     SpO2: 100 %  O2 Device (Oxygen Therapy): ventilator      Intake/Output Summary (Last 24 hours) at 6/10/2019 1541  Last data filed at 6/10/2019 1500  Gross per 24 hour   Intake 2466.39 ml   Output 1600 ml   Net 866.39 ml       Lines/Drains/Airways     Central Venous Catheter Line                 Percutaneous Central Line Insertion/Assessment - triple lumen  06/06/19 1949 right internal jugular 3 days          Drain                 Sheath 06/06/19 1412 Right proximal 4 days         NG/OG Tube 06/08/19 1445 [place] sump 14 Fr. Center mouth 2 days         Urethral Catheter 06/08/19 1525 14 Fr. 2 days          Airway                 Airway - Non-Surgical 06/08/19 1254 Endotracheal Tube 2 days          Line                 IABP 06/06/19 1728 3 days         Pacer Wires 06/06/19 1728 3 days          Peripheral Intravenous Line                 Peripheral IV - Single Lumen 06/05/19 2005 20 G Right Forearm 4 days         Peripheral IV - Single Lumen 06/06/19 1800 20 G Right Forearm 3 days         Peripheral IV - Single Lumen 06/06/19 2029 20 G Left;Lateral Forearm 3 days                Physical Exam   Constitutional: He appears well-developed and well-nourished. No distress.   Sedated, intubated   HENT:   Head: Normocephalic and atraumatic.   Cardiovascular: Normal rate, regular rhythm, normal heart sounds and intact distal pulses.  Exam reveals no gallop and no friction rub.   No murmur heard.  Pulses:       Radial pulses are 0 on the right side, and 0 on the left side.        Femoral pulses are 2+ on the left side.       Dorsalis pedis pulses are 0 on the right side, and 0 on the left side.        Posterior tibial pulses are 0 on the right side, and 0 on the left side.   Sternotomy, healed. Surrounding ecchymoses. Extremities cool to touch. Right CFA IABP, Right CFV TVP   Pulmonary/Chest:   Intubated. Mechanical breath sounds, bilateral crackles   Abdominal: Soft. Bowel sounds are normal. He exhibits no distension. There is no tenderness.   Musculoskeletal: He exhibits edema (1+ bilateral lower extremity edema).   Left foot dressing c/d/i. Left foot mottled   Skin: He is not diaphoretic.       Significant Labs:   CMP   Recent Labs   Lab 06/09/19  0426  06/10/19  0406 06/10/19  0818 06/10/19  1128   *  135*   < > 134*  134* 132* 131*   K 4.4  4.4   < > 3.9  3.9 4.5 4.3     102   < > 102  102 102 101   CO2 24  24   < > 21*  21* 21* 21*   *  181*   < > 162*  162*  162* 156* 155*   BUN 23  23   < > 20  20 20 20   CREATININE 1.1  1.1   < > 1.1  1.1 1.3 1.3   CALCIUM 8.1*  8.1*   < > 7.8*  7.8* 7.7* 7.7*   PROT 5.2*  --  4.9*  --   --    ALBUMIN 2.7*  --  2.5*  --   --    BILITOT 0.6  --  0.6  --   --    ALKPHOS 90  --  88  --   --    AST 52*  --  39  --   --    ALT 30  --  27  --   --    ANIONGAP 9  9   < > 11  11 9 9   ESTGFRAFRICA >60.0  >60.0   < > >60.0  >60.0 >60.0 >60.0   EGFRNONAA >60.0  >60.0   < > >60.0  >60.0 52.6* 52.6*    < > = values in this interval not displayed.    and CBC   Recent Labs   Lab 06/09/19  0209 06/09/19  0426 06/10/19  0406   WBC 37.06* 48.30* 53.96*  53.96*   HGB 10.0* 10.3* 9.6*  9.6*   HCT 29.8* 31.6* 29.9*  29.9*   * 106* 101*  101*       Significant Imaging: X-Ray: CXR: X-Ray Chest 1 View (CXR):   Results for orders placed or performed during the hospital  encounter of 05/29/19   X-Ray Chest 1 View    Narrative    EXAMINATION:  XR CHEST 1 VIEW    CLINICAL HISTORY:  IABT and ET Tube placement;    COMPARISON:  Comparison is made to 06/09/2019.    FINDINGS:  Endotracheal tube tip lies just inferior to the thoracic inlet, above the aortic arch and well above the greyson.  Enteric tube has its tip in the gastric fundus.  Right jugular origin vascular catheter tip lies near the junction of the superior vena cava and right atrium.  Postoperative changes again noted in the thorax.  Heart size and the appearance of the cardiomediastinal silhouette are unchanged since the examination referenced above.  Lung zones are also stable, with no significant new areas of airspace consolidation or volume loss evident.  No pleural fluid of any substantial volume is seen on either side.  No pneumothorax.      Impression    Allowing for a slightly poorer inspiratory depth level on the current examination, there has been no significant detrimental interval change in the appearance of the chest since 06/09/2019.      Electronically signed by: Shade Florez MD  Date:    06/10/2019  Time:    07:56

## 2019-06-10 NOTE — PROGRESS NOTES
"[facility]  Endocrinology  Progress Note    Admit Date: 5/29/2019     Reason for Consult: Management of T1DM, Hyperglycemia     Surgical Procedure and Date: N/A    Diabetes diagnosis year: 1970    Lab Results   Component Value Date    HGBA1C 5.8 (H) 05/29/2019       Home Diabetes Medications:  Accuchek spirit pump/sarai       Basal Rate  0000 - 0300     0.85 u/hr  0300 - 0700     0.9 u/hr  0700 - 2100     1.1 u/hr  2100 - 0000     0.85 u/hr        Carb Ratio  12A: 1:8     ISF  1:30     Target: 110-130  IAT: 3    How often checking glucose at home? Dexcom G5 CGM  BG readings on regimen: 110-140  Hypoglycemia on the regimen?  Yes, a few time per week  Missed doses on regimen?  No    Diabetes Complications include:     Hypoglycemia , Diabetic chronic kidney disease     , Diabetic retinopathy , Diabetic peripheral neuropathy , Diabetic peripheral angiopathy with/without gangrene and Foot ulcer      Complicating diabetes co morbidities:   CHF, MINDA and CKD      HPI:   Patient is a 79 y.o. male with a diagnosis of hyponatremia, left foot abscess, CKD3, DM1, CLL, MINDA, and acute heart failure.  Admitted to [facility] on 5/29/19 from podiatry clinic for a left hallux abcess.  Last seen in endocrinology clinic by ESTEFANÍA Rivers NP on 5/8/19.  Endocrine consulted for DM/BG management.            Interval HPI:   Overnight events: Remains intubated in CMICU. BG well controlled on IV intensive insulin protocol with rates ranging from 1.6-3.1 u/hr.   Eating:   NPO  Nausea: No  Hypoglycemia and intervention: No  Fever: No  TPN and/or TF: No  If yes, type of TF/TPN and rate: none    /62   Pulse 66   Temp 98.2 °F (36.8 °C)   Resp 18   Ht 5' 7" (1.702 m)   Wt 92.1 kg (203 lb)   SpO2 100%   BMI 31.79 kg/m²      Labs Reviewed and Include    Recent Labs   Lab 06/10/19  0406   *  162*  162*   CALCIUM 7.8*  7.8*   ALBUMIN 2.5*   PROT 4.9*   *  134*   K 3.9  3.9   CO2 21*  21*     102   BUN 20  " 20   CREATININE 1.1  1.1   ALKPHOS 88   ALT 27   AST 39   BILITOT 0.6     Lab Results   Component Value Date    WBC 53.96 (HH) 06/10/2019    WBC 53.96 (HH) 06/10/2019    HGB 9.6 (L) 06/10/2019    HGB 9.6 (L) 06/10/2019    HCT 29.9 (L) 06/10/2019    HCT 29.9 (L) 06/10/2019    MCV 98 06/10/2019    MCV 98 06/10/2019     (L) 06/10/2019     (L) 06/10/2019     No results for input(s): TSH, FREET4 in the last 168 hours.  Lab Results   Component Value Date    HGBA1C 5.8 (H) 05/29/2019       Nutritional status:   Body mass index is 31.79 kg/m².  Lab Results   Component Value Date    ALBUMIN 2.5 (L) 06/10/2019    ALBUMIN 2.7 (L) 06/09/2019    ALBUMIN 3.0 (L) 06/08/2019     Lab Results   Component Value Date    PREALBUMIN 22 05/17/2019       Estimated Creatinine Clearance: 60.9 mL/min (based on SCr of 1.1 mg/dL).    Accu-Checks  Recent Labs     06/09/19  2149 06/09/19  2249 06/09/19  2348 06/10/19  0049 06/10/19  0157 06/10/19  0301 06/10/19  0401 06/10/19  0511 06/10/19  0604 06/10/19  0652   POCTGLUCOSE 191* 181* 173* 150* 155* 197* 193* 166* 145* 161*       Current Medications and/or Treatments Impacting Glycemic Control  Immunotherapy:    Immunosuppressants     None        Steroids:   Hormones (From admission, onward)    None        Pressors:    Autonomic Drugs (From admission, onward)    Start     Stop Route Frequency Ordered    06/10/19 0345  norepinephrine 16 mg in dextrose 5 % 250 mL infusion     Question Answer Comment   Titrate by: (in mcg/kg/min) 0.02    Titrate interval: (in minutes) 5    Titrate to maintain: (MAP or SBP) MAP    Greater than: (in mmHg) 60    Maximum dose: (in mcg/kg/min) 3        -- IV Continuous 06/10/19 0331    06/08/19 1247  rocuronium (ZEMURON) 10 mg/mL injection     Note to Pharmacy:  Created by cabinet override    06/09 0059   06/08/19 7632        Hyperglycemia/Diabetes Medications:   Antihyperglycemics (From admission, onward)    Start     Stop Route Frequency Ordered     06/08/19 1730  insulin regular (Humulin R) 100 Units in sodium chloride 0.9% 100 mL infusion     Question:  Insulin Rate Adjustment (DO NOT MODIFY ANSWER)  Answer:  [email or web address] [email or web address]    -- IV Continuous 06/08/19 1623    06/08/19 0856  insulin aspart U-100 pen 0-5 Units      -- SubQ Before meals & nightly PRN 06/08/19 0756          ASSESSMENT and PLAN    Controlled type 1 diabetes mellitus with diabetic neuropathy, with long-term current use of insulin  BG goal 140-180    Continue IV insulin infusion protocol  Requires intensive BG monitoring while on protocol     ** Please call Endocrine for any BG related issues **    Discharge recommendations:   TBD.     MINDA on CPAP  May affect BG readings if uncontrolled        Cellulitis of Left foot  Infection may elevate BG readings  Optimize BG control for wound healing      CKD (chronic kidney disease), stage III  Caution with insulin stacking  Estimated Creatinine Clearance: 51.5 mL/min (based on SCr of 1.3 mg/dL).        Class 1 obesity due to excess calories with serious comorbidity and body mass index (BMI) of 30.0 to 30.9 in adult  Body mass index is 31.79 kg/m².  May increase insulin resistance.             Dina Ba NP  Endocrinology  [facility]

## 2019-06-10 NOTE — PROGRESS NOTES
06/10/2019  Shannon Mcnally    Current provider:  Paz Pradhan MD      I, Shannon Mcnally, rounded on Reid S Virgil to ensure all mechanical assist device settings (IABP or VAD) were appropriate and all parameters were within limits.  I was able to ensure all back up equipment was present, the staff had no issues, and the Perfusion Department daily rounding was complete.    10:18 AM

## 2019-06-10 NOTE — PLAN OF CARE
Problem: Diabetes Comorbidity  Goal: Blood Glucose Level Within Desired Range  Outcome: Ongoing (interventions implemented as appropriate)  Pt Accuchecks Q1h, insulin gtt in place.     Problem: Adult Inpatient Plan of Care  Goal: Plan of Care Review  Outcome: Ongoing (interventions implemented as appropriate)  Pt remains in ICU 6089. TTM protocol in place. Pt's temp 34 C throughout the day. Will place to re warm pt @ 2000. Pt opens eyes spontaneously, not following commands at this time. Moved hands and wiggled toes. Pupils equal and sluggish. Doppler upper extremity pulses, unable to assess lower. Left toes black, team aware at this time, marked. SB on monitor. IABP 1:1 auto ECG trigger, map goal > 60 on IABP. TVP to right groin, VVI set @ 50. CVP 15, 10 , 11, 12. Intubated, sats 100%. Lung sounds coarse. Bruising to skin. Wound care completed per order. Electrolytes replaced per order. Lidocaine, Heparin (therapuetic) insulin, levo, fentanyl, propofol in place. POC updated with pt and pt's family at bedside, all questions and concerns addressed.

## 2019-06-10 NOTE — PLAN OF CARE
Problem: Adult Inpatient Plan of Care  Goal: Plan of Care Review  Outcome: Ongoing (interventions implemented as appropriate)  TTM protocol in place. Re-warming started 6/9/19 at 2000. Unable to obtain DP and PT pulses in left foot, but was able to doppler popliteal pulse.  Dr. Juares came to bedside to assess patient. Keep warming blanket over feet per MD. IABP 1:1 auto EKG trigger, map goal > 60 on IABP. TVP to right groin, VVI set @ 50. CVP 11-13.  Wound care completed per order. Lidocaine, Heparin (therapuetic) insulin, levo, fentanyl, propofol infusing.

## 2019-06-10 NOTE — PROGRESS NOTES
06/10/19 1800   Vital Signs   Temp 97.2 °F (36.2 °C)   Temp src Esophageal   Temp #2 97.2 °F (36.2 °C)   Temp #2 src Bladder   blanket applied to lower extremities

## 2019-06-10 NOTE — SUBJECTIVE & OBJECTIVE
"Interval HPI:   Overnight events: Remains intubated in CMICU. BG well controlled on IV intensive insulin protocol with rates ranging from 1.6-3.1 u/hr.   Eating:   NPO  Nausea: No  Hypoglycemia and intervention: No  Fever: No  TPN and/or TF: No  If yes, type of TF/TPN and rate: none    /62   Pulse 66   Temp 98.2 °F (36.8 °C)   Resp 18   Ht 5' 7" (1.702 m)   Wt 92.1 kg (203 lb)   SpO2 100%   BMI 31.79 kg/m²     Labs Reviewed and Include    Recent Labs   Lab 06/10/19  0406   *  162*  162*   CALCIUM 7.8*  7.8*   ALBUMIN 2.5*   PROT 4.9*   *  134*   K 3.9  3.9   CO2 21*  21*     102   BUN 20  20   CREATININE 1.1  1.1   ALKPHOS 88   ALT 27   AST 39   BILITOT 0.6     Lab Results   Component Value Date    WBC 53.96 (HH) 06/10/2019    WBC 53.96 (HH) 06/10/2019    HGB 9.6 (L) 06/10/2019    HGB 9.6 (L) 06/10/2019    HCT 29.9 (L) 06/10/2019    HCT 29.9 (L) 06/10/2019    MCV 98 06/10/2019    MCV 98 06/10/2019     (L) 06/10/2019     (L) 06/10/2019     No results for input(s): TSH, FREET4 in the last 168 hours.  Lab Results   Component Value Date    HGBA1C 5.8 (H) 05/29/2019       Nutritional status:   Body mass index is 31.79 kg/m².  Lab Results   Component Value Date    ALBUMIN 2.5 (L) 06/10/2019    ALBUMIN 2.7 (L) 06/09/2019    ALBUMIN 3.0 (L) 06/08/2019     Lab Results   Component Value Date    PREALBUMIN 22 05/17/2019       Estimated Creatinine Clearance: 60.9 mL/min (based on SCr of 1.1 mg/dL).    Accu-Checks  Recent Labs     06/09/19  2149 06/09/19  2249 06/09/19  2348 06/10/19  0049 06/10/19  0157 06/10/19  0301 06/10/19  0401 06/10/19  0511 06/10/19  0604 06/10/19  0652   POCTGLUCOSE 191* 181* 173* 150* 155* 197* 193* 166* 145* 161*       Current Medications and/or Treatments Impacting Glycemic Control  Immunotherapy:    Immunosuppressants     None        Steroids:   Hormones (From admission, onward)    None        Pressors:    Autonomic Drugs (From admission, " onward)    Start     Stop Route Frequency Ordered    06/10/19 0345  norepinephrine 16 mg in dextrose 5 % 250 mL infusion     Question Answer Comment   Titrate by: (in mcg/kg/min) 0.02    Titrate interval: (in minutes) 5    Titrate to maintain: (MAP or SBP) MAP    Greater than: (in mmHg) 60    Maximum dose: (in mcg/kg/min) 3        -- IV Continuous 06/10/19 0331    06/08/19 1247  rocuronium (ZEMURON) 10 mg/mL injection     Note to Pharmacy:  Created by cabinet override    06/09 0059   06/08/19 1247        Hyperglycemia/Diabetes Medications:   Antihyperglycemics (From admission, onward)    Start     Stop Route Frequency Ordered    06/08/19 1730  insulin regular (Humulin R) 100 Units in sodium chloride 0.9% 100 mL infusion     Question:  Insulin Rate Adjustment (DO NOT MODIFY ANSWER)  Answer:  [email or web address] [id number]    -- IV Continuous 06/08/19 1623    06/08/19 0856  insulin aspart U-100 pen 0-5 Units      -- SubQ Before meals & nightly PRN 06/08/19 0755

## 2019-06-10 NOTE — PROGRESS NOTES
[facility]  Cardiac Electrophysiology  Progress Note    Admission Date: 5/29/2019  Code Status: Full Code   Attending Physician: Paz Pradhan MD   Expected Discharge Date: 6/11/2019  Principal Problem:Acute HF (heart failure)    Subjective:     Interval History: Remains intubated and sedated, lidocaine at 2. No VT/VF overnight.    Review of Systems   Unable to perform ROS: intubated     Objective:     Vital Signs (Most Recent):  Temp: 98.6 °F (37 °C) (06/10/19 0921)  Pulse: 69 (06/10/19 0921)  Resp: 18 (06/10/19 0921)  BP: 117/75 (06/10/19 0921)  SpO2: 100 % (06/10/19 0921) Vital Signs (24h Range):  Temp:  [92.7 °F (33.7 °C)-98.6 °F (37 °C)] 98.6 °F (37 °C)  Pulse:  [53-70] 69  Resp:  [18-28] 18  SpO2:  [100 %] 100 %  BP: (101-134)/(58-83) 117/75     Weight: 92.1 kg (203 lb)  Body mass index is 31.79 kg/m².     SpO2: 100 %  O2 Device (Oxygen Therapy): ventilator    Physical Exam   Constitutional: He appears well-developed and well-nourished. No distress.   Sedated, intubated   HENT:   Head: Normocephalic and atraumatic.   Cardiovascular: Normal rate, regular rhythm, normal heart sounds and intact distal pulses. Exam reveals no gallop and no friction rub.   No murmur heard.  Pulses:       Radial pulses are 0 on the right side, and 0 on the left side.        Femoral pulses are 2+ on the left side.       Dorsalis pedis pulses are 0 on the right side, and 0 on the left side.        Posterior tibial pulses are 0 on the right side, and 0 on the left side.   Sternotomy, healed. Surrounding ecchymoses. Extremities cool to touch. Right CFA IABP, Right CFV TVP   Pulmonary/Chest:   Intubated. Mechanical breath sounds, bilateral crackles   Abdominal: Soft. Bowel sounds are normal. He exhibits no distension. There is no tenderness.   Musculoskeletal: He exhibits edema (1+ bilateral lower extremity edema).   Left foot dressing c/d/i. Left foot mottled   Skin: He is not diaphoretic.       Significant  Labs:   EP:   Recent Labs   Lab 06/08/19  1430  06/09/19  0209 06/09/19  0426  06/09/19  2350 06/10/19  0406 06/10/19  0818   *  132*   < >  --  135*  135*   < > 133* 134*  134* 132*   K 3.7  3.7   < >  --  4.4  4.4   < > 4.1 3.9  3.9 4.5   CL 93*  93*   < >  --  102  102   < > 102 102  102 102   CO2 18*  18*   < >  --  24  24   < > 21* 21*  21* 21*   *  473*  473*  473*  473*   < >  --  181*  181*   < > 155*  155* 162*  162*  162* 156*   BUN 26*  26*   < >  --  23  23   < > 21 20  20 20   CREATININE 1.4  1.4   < >  --  1.1  1.1   < > 1.2 1.1  1.1 1.3   CALCIUM 8.3*  8.3*   < >  --  8.1*  8.1*   < > 7.7* 7.8*  7.8* 7.7*   PROT 5.6*  --   --  5.2*  --   --  4.9*  --    ALBUMIN 3.0*  --   --  2.7*  --   --  2.5*  --    BILITOT 0.8  --   --  0.6  --   --  0.6  --    ALKPHOS 103  --   --  90  --   --  88  --    AST 74*  --   --  52*  --   --  39  --    ALT 39  --   --  30  --   --  27  --    ANIONGAP 21*  21*   < >  --  9  9   < > 10 11  11 9   ESTGFRAFRICA 55.6*  55.6*   < >  --  >60.0  >60.0   < > >60.0 >60.0  >60.0 >60.0   EGFRNONAA 48.1*  48.1*   < >  --  >60.0  >60.0   < > 58.0* >60.0  >60.0 52.6*   WBC  --   --  37.06* 48.30*  --   --  53.96*  53.96*  --    HGB  --   --  10.0* 10.3*  --   --  9.6*  9.6*  --    HCT  --    < > 29.8* 31.6*  --   --  29.9*  29.9*  --    PLT  --   --  107* 106*  --   --  101*  101*  --    INR  --   --  1.1  --   --   --  1.1  --     < > = values in this interval not displayed.    and ABG:   Recent Labs   Lab 06/09/19  0436 06/09/19  2045 06/10/19  0430   PH 7.494* 7.381 7.564*   PCO2 30.4* 36.2 24.7*   HCO3 23.4* 21.5* 22.3*   POCSATURATED 65* 57* 100   BE 0 -4 0       Significant Imaging: Echocardiogram: 2D echo with color flow doppler: No results found. However, due to the size of the patient record, not all encounters were searched. Please check Results Review for a complete set of results.    Assessment and Plan:     VT  (ventricular tachycardia)  Pt had LHC then developed AF was started on amiodarone and had PMVT s/p several shocks. Repeat LHC with patent stents. Initially was on lidocaine which was weaned off however on 6/8 had several episodes of MMVT and PMVT which required CPR, intubation and shocks. Currently being rewarmed no events overnight, no VT, VF    Plan  Lidocaine titration per primary team  If patient improves neurologically/ hemodynamically, will need to be switched to oral AAD and weaned off lidocaine  Keep K >4 Mg >2    Atrial fibrillation and Focal atrial tachycardia  Initially had an episode of AF RVR then had PMVT. Now in sinus rhythm pacing at 60 had an episode of focal atrial tachycardia 6/8.     Plan  Continue AC for now.         Stefany Boyd MD  Cardiac Electrophysiology  [facility]

## 2019-06-10 NOTE — ASSESSMENT & PLAN NOTE
- discussed with family and they request DNR for now   - will start weaning off sedation and neuro prognosticate  - in the am while rounding he had some twitching   -will get eeg

## 2019-06-10 NOTE — PROGRESS NOTES
0900: per MD Carolynn start weaning sedation. readback x2  1330: per MD Yousuf discontinue troponin trending. readback x2  1459: involuntary movement of upper and lower extremities. Eyes opening spontaneously. Muscles rigid, not following commands. MD Yousuf updated- telephone order restart Propofol infusion & will order EEG monitoring. readback x2  1615: patient followed commands of RT during oral care per RT  1738: EEG tech paged for EEG placement follow up  1754: Spoke with Venessa EEG tech stated on way to place EEG  1800: patient opening eyes to voice, following RN commands- moving all extremities to command & spontaneously; squeezing RN hands & wiggling feet and toes with medium strength

## 2019-06-11 NOTE — SUBJECTIVE & OBJECTIVE
Interval History: Patient opens eyes spontaneously this morning, nods his head, follows some simple commands. No acute events overnight.    Objective:     Vital Signs (Most Recent):  Temp: 98.2 °F (36.8 °C) (06/11/19 0600)  Pulse: 74 (06/11/19 0600)  Resp: 18 (06/11/19 0600)  BP: (!) 109/54 (06/11/19 0600)  SpO2: 98 % (06/11/19 0600) Vital Signs (24h Range):  Temp:  [97.2 °F (36.2 °C)-99 °F (37.2 °C)] 98.2 °F (36.8 °C)  Pulse:  [66-80] 74  Resp:  [3-18] 18  SpO2:  [96 %-100 %] 98 %  BP: ()/(52-79) 109/54     Weight: 92.1 kg (203 lb)  Body mass index is 31.79 kg/m².    SpO2: 98 %  O2 Device (Oxygen Therapy): ventilator      Intake/Output Summary (Last 24 hours) at 6/11/2019 0656  Last data filed at 6/11/2019 0600  Gross per 24 hour   Intake 2267.6 ml   Output 3830 ml   Net -1562.4 ml       Lines/Drains/Airways     Central Venous Catheter Line                 Percutaneous Central Line Insertion/Assessment - triple lumen  06/06/19 1949 right internal jugular 4 days          Drain                 Sheath 06/06/19 1412 Right proximal 4 days         NG/OG Tube 06/08/19 1445 [place] sump 14 Fr. Center mouth 2 days         Urethral Catheter 06/08/19 1525 14 Fr. 2 days          Airway                 Airway - Non-Surgical 06/08/19 1254 Endotracheal Tube 2 days          Line                 IABP 06/06/19 1728 4 days         Pacer Wires 06/06/19 1728 4 days          Peripheral Intravenous Line                 Peripheral IV - Single Lumen 06/10/19 1604 20 G Right Forearm less than 1 day         Peripheral IV - Single Lumen 06/10/19 1611 20 G Left Antecubital less than 1 day                Physical Exam   Constitutional: He appears well-developed and well-nourished. No distress.   Sedated, intubated   HENT:   Head: Normocephalic and atraumatic.   Cardiovascular: Normal rate, regular rhythm, normal heart sounds and intact distal pulses. Exam reveals no gallop and no friction rub.   No murmur heard.  Pulses:       Radial pulses  are 0 on the right side, and 0 on the left side.        Femoral pulses are 2+ on the left side.       Dorsalis pedis pulses are 0 on the right side, and 0 on the left side.        Posterior tibial pulses are 0 on the right side, and 0 on the left side.   Sternotomy, healed. Surrounding ecchymoses. Extremities cool to touch. Right CFA IABP, Right CFV TVP   Pulmonary/Chest:   Intubated. Mechanical breath sounds, bilateral crackles   Abdominal: Soft. Bowel sounds are normal. He exhibits no distension. There is no tenderness.   Musculoskeletal: He exhibits edema (Trace bilateral lower extremity edema).   Left foot dressing c/d/i. Left foot mottled   Skin: He is not diaphoretic.       Significant Labs:     Recent Results (from the past 24 hour(s))   Basic metabolic panel    Collection Time: 06/10/19  8:18 AM   Result Value Ref Range    Sodium 132 (L) 136 - 145 mmol/L    Potassium 4.5 3.5 - 5.1 mmol/L    Chloride 102 95 - 110 mmol/L    CO2 21 (L) 23 - 29 mmol/L    Glucose 156 (H) 70 - 110 mg/dL    BUN, Bld 20 8 - 23 mg/dL    Creatinine 1.3 0.5 - 1.4 mg/dL    Calcium 7.7 (L) 8.7 - 10.5 mg/dL    Anion Gap 9 8 - 16 mmol/L    eGFR if African American >60.0 >60 mL/min/1.73 m^2    eGFR if non  52.6 (A) >60 mL/min/1.73 m^2   Magnesium    Collection Time: 06/10/19  8:18 AM   Result Value Ref Range    Magnesium 2.0 1.6 - 2.6 mg/dL   Phosphorus    Collection Time: 06/10/19  8:18 AM   Result Value Ref Range    Phosphorus 3.8 2.7 - 4.5 mg/dL   POCT glucose    Collection Time: 06/10/19  8:28 AM   Result Value Ref Range    POCT Glucose 179 (H) 70 - 110 mg/dL   APTT    Collection Time: 06/10/19  9:32 AM   Result Value Ref Range    aPTT 54.4 (H) 21.0 - 32.0 sec   POCT glucose    Collection Time: 06/10/19  9:33 AM   Result Value Ref Range    POCT Glucose 187 (H) 70 - 110 mg/dL   POCT glucose    Collection Time: 06/10/19 10:33 AM   Result Value Ref Range    POCT Glucose 184 (H) 70 - 110 mg/dL   Basic metabolic panel     Collection Time: 06/10/19 11:28 AM   Result Value Ref Range    Sodium 131 (L) 136 - 145 mmol/L    Potassium 4.3 3.5 - 5.1 mmol/L    Chloride 101 95 - 110 mmol/L    CO2 21 (L) 23 - 29 mmol/L    Glucose 155 (H) 70 - 110 mg/dL    BUN, Bld 20 8 - 23 mg/dL    Creatinine 1.3 0.5 - 1.4 mg/dL    Calcium 7.7 (L) 8.7 - 10.5 mg/dL    Anion Gap 9 8 - 16 mmol/L    eGFR if African American >60.0 >60 mL/min/1.73 m^2    eGFR if non  52.6 (A) >60 mL/min/1.73 m^2   Magnesium    Collection Time: 06/10/19 11:28 AM   Result Value Ref Range    Magnesium 2.0 1.6 - 2.6 mg/dL   Phosphorus    Collection Time: 06/10/19 11:28 AM   Result Value Ref Range    Phosphorus 3.8 2.7 - 4.5 mg/dL   POCT glucose    Collection Time: 06/10/19 11:30 AM   Result Value Ref Range    POCT Glucose 165 (H) 70 - 110 mg/dL   POCT glucose    Collection Time: 06/10/19 12:36 PM   Result Value Ref Range    POCT Glucose 183 (H) 70 - 110 mg/dL   POCT glucose    Collection Time: 06/10/19  1:45 PM   Result Value Ref Range    POCT Glucose 162 (H) 70 - 110 mg/dL   POCT glucose    Collection Time: 06/10/19  2:35 PM   Result Value Ref Range    POCT Glucose 154 (H) 70 - 110 mg/dL   POCT glucose    Collection Time: 06/10/19  3:42 PM   Result Value Ref Range    POCT Glucose 150 (H) 70 - 110 mg/dL   Basic metabolic panel    Collection Time: 06/10/19  4:18 PM   Result Value Ref Range    Sodium 134 (L) 136 - 145 mmol/L    Potassium 3.9 3.5 - 5.1 mmol/L    Chloride 102 95 - 110 mmol/L    CO2 21 (L) 23 - 29 mmol/L    Glucose 148 (H) 70 - 110 mg/dL    BUN, Bld 19 8 - 23 mg/dL    Creatinine 1.4 0.5 - 1.4 mg/dL    Calcium 7.6 (L) 8.7 - 10.5 mg/dL    Anion Gap 11 8 - 16 mmol/L    eGFR if African American 55.6 (A) >60 mL/min/1.73 m^2    eGFR if non  48.1 (A) >60 mL/min/1.73 m^2   Magnesium    Collection Time: 06/10/19  4:18 PM   Result Value Ref Range    Magnesium 2.0 1.6 - 2.6 mg/dL   Phosphorus    Collection Time: 06/10/19  4:18 PM   Result Value Ref Range     Phosphorus 3.9 2.7 - 4.5 mg/dL   POCT glucose    Collection Time: 06/10/19  4:36 PM   Result Value Ref Range    POCT Glucose 156 (H) 70 - 110 mg/dL   POCT glucose    Collection Time: 06/10/19  5:34 PM   Result Value Ref Range    POCT Glucose 175 (H) 70 - 110 mg/dL   POCT glucose    Collection Time: 06/10/19  6:17 PM   Result Value Ref Range    POCT Glucose 188 (H) 70 - 110 mg/dL   Basic metabolic panel    Collection Time: 06/10/19  7:31 PM   Result Value Ref Range    Sodium 135 (L) 136 - 145 mmol/L    Potassium 4.3 3.5 - 5.1 mmol/L    Chloride 105 95 - 110 mmol/L    CO2 21 (L) 23 - 29 mmol/L    Glucose 186 (H) 70 - 110 mg/dL    BUN, Bld 19 8 - 23 mg/dL    Creatinine 1.3 0.5 - 1.4 mg/dL    Calcium 7.8 (L) 8.7 - 10.5 mg/dL    Anion Gap 9 8 - 16 mmol/L    eGFR if African American >60.0 >60 mL/min/1.73 m^2    eGFR if non  52.6 (A) >60 mL/min/1.73 m^2   Magnesium    Collection Time: 06/10/19  7:31 PM   Result Value Ref Range    Magnesium 1.9 1.6 - 2.6 mg/dL   Phosphorus    Collection Time: 06/10/19  7:31 PM   Result Value Ref Range    Phosphorus 3.9 2.7 - 4.5 mg/dL   POCT glucose    Collection Time: 06/10/19  7:32 PM   Result Value Ref Range    POCT Glucose 210 (H) 70 - 110 mg/dL   POCT glucose    Collection Time: 06/10/19  8:34 PM   Result Value Ref Range    POCT Glucose 154 (H) 70 - 110 mg/dL   POCT glucose    Collection Time: 06/10/19  9:29 PM   Result Value Ref Range    POCT Glucose 148 (H) 70 - 110 mg/dL   POCT glucose    Collection Time: 06/10/19 10:23 PM   Result Value Ref Range    POCT Glucose 144 (H) 70 - 110 mg/dL   POCT glucose    Collection Time: 06/10/19 11:40 PM   Result Value Ref Range    POCT Glucose 180 (H) 70 - 110 mg/dL   POCT glucose    Collection Time: 06/11/19 12:30 AM   Result Value Ref Range    POCT Glucose 180 (H) 70 - 110 mg/dL   POCT glucose    Collection Time: 06/11/19  1:31 AM   Result Value Ref Range    POCT Glucose 150 (H) 70 - 110 mg/dL   POCT glucose    Collection Time:  06/11/19  2:24 AM   Result Value Ref Range    POCT Glucose 181 (H) 70 - 110 mg/dL   CBC with Automated Differential    Collection Time: 06/11/19  3:42 AM   Result Value Ref Range    WBC 48.61 (H) 3.90 - 12.70 K/uL    RBC 2.97 (L) 4.60 - 6.20 M/uL    Hemoglobin 9.4 (L) 14.0 - 18.0 g/dL    Hematocrit 29.2 (L) 40.0 - 54.0 %    Mean Corpuscular Volume 98 82 - 98 fL    Mean Corpuscular Hemoglobin 31.6 (H) 27.0 - 31.0 pg    Mean Corpuscular Hemoglobin Conc 32.2 32.0 - 36.0 g/dL    RDW 15.4 (H) 11.5 - 14.5 %    Platelets 83 (L) 150 - 350 K/uL    MPV 9.9 9.2 - 12.9 fL    Immature Granulocytes CANCELED 0.0 - 0.5 %    Immature Grans (Abs) CANCELED 0.00 - 0.04 K/uL    Lymph # CANCELED 1.0 - 4.8 K/uL    Mono # CANCELED 0.3 - 1.0 K/uL    Eos # CANCELED 0.0 - 0.5 K/uL    Baso # CANCELED 0.00 - 0.20 K/uL    nRBC 0 0 /100 WBC    Gran% 2.0 (L) 38.0 - 73.0 %    Lymph% 98.0 (H) 18.0 - 48.0 %    Mono% 0.0 (L) 4.0 - 15.0 %    Eosinophil% 0.0 0.0 - 8.0 %    Basophil% 0.0 0.0 - 1.9 %    Aniso Slight     Poik Slight     Poly Occasional     Hypo Occasional     Ovalocytes Occasional     Differential Method Manual    Comprehensive Metabolic Panel (CMP)    Collection Time: 06/11/19  3:42 AM   Result Value Ref Range    Sodium 135 (L) 136 - 145 mmol/L    Potassium 4.6 3.5 - 5.1 mmol/L    Chloride 105 95 - 110 mmol/L    CO2 21 (L) 23 - 29 mmol/L    Glucose 172 (H) 70 - 110 mg/dL    BUN, Bld 18 8 - 23 mg/dL    Creatinine 1.3 0.5 - 1.4 mg/dL    Calcium 7.8 (L) 8.7 - 10.5 mg/dL    Total Protein 4.9 (L) 6.0 - 8.4 g/dL    Albumin 2.4 (L) 3.5 - 5.2 g/dL    Total Bilirubin 0.5 0.1 - 1.0 mg/dL    Alkaline Phosphatase 103 55 - 135 U/L    AST 55 (H) 10 - 40 U/L    ALT 26 10 - 44 U/L    Anion Gap 9 8 - 16 mmol/L    eGFR if African American >60.0 >60 mL/min/1.73 m^2    eGFR if non  52.6 (A) >60 mL/min/1.73 m^2   APTT    Collection Time: 06/11/19  3:42 AM   Result Value Ref Range    aPTT 49.5 (H) 21.0 - 32.0 sec   Magnesium    Collection Time:  06/11/19  3:42 AM   Result Value Ref Range    Magnesium 2.0 1.6 - 2.6 mg/dL   Phosphorus    Collection Time: 06/11/19  3:42 AM   Result Value Ref Range    Phosphorus 4.1 2.7 - 4.5 mg/dL   Basic metabolic panel    Collection Time: 06/11/19  3:42 AM   Result Value Ref Range    Sodium 135 (L) 136 - 145 mmol/L    Potassium 4.6 3.5 - 5.1 mmol/L    Chloride 105 95 - 110 mmol/L    CO2 21 (L) 23 - 29 mmol/L    Glucose 172 (H) 70 - 110 mg/dL    BUN, Bld 18 8 - 23 mg/dL    Creatinine 1.3 0.5 - 1.4 mg/dL    Calcium 7.8 (L) 8.7 - 10.5 mg/dL    Anion Gap 9 8 - 16 mmol/L    eGFR if African American >60.0 >60 mL/min/1.73 m^2    eGFR if non  52.6 (A) >60 mL/min/1.73 m^2   Magnesium    Collection Time: 06/11/19  3:42 AM   Result Value Ref Range    Magnesium 2.0 1.6 - 2.6 mg/dL   Phosphorus    Collection Time: 06/11/19  3:42 AM   Result Value Ref Range    Phosphorus 4.1 2.7 - 4.5 mg/dL   CBC auto differential    Collection Time: 06/11/19  3:42 AM   Result Value Ref Range    WBC 48.61 (H) 3.90 - 12.70 K/uL    RBC 2.97 (L) 4.60 - 6.20 M/uL    Hemoglobin 9.4 (L) 14.0 - 18.0 g/dL    Hematocrit 29.2 (L) 40.0 - 54.0 %    Mean Corpuscular Volume 98 82 - 98 fL    Mean Corpuscular Hemoglobin 31.6 (H) 27.0 - 31.0 pg    Mean Corpuscular Hemoglobin Conc 32.2 32.0 - 36.0 g/dL    RDW 15.4 (H) 11.5 - 14.5 %    Platelets 83 (L) 150 - 350 K/uL    MPV 9.9 9.2 - 12.9 fL    Immature Granulocytes CANCELED 0.0 - 0.5 %    Immature Grans (Abs) CANCELED 0.00 - 0.04 K/uL    Lymph # CANCELED 1.0 - 4.8 K/uL    Mono # CANCELED 0.3 - 1.0 K/uL    Eos # CANCELED 0.0 - 0.5 K/uL    Baso # CANCELED 0.00 - 0.20 K/uL    nRBC 0 0 /100 WBC    Gran% 2.0 (L) 38.0 - 73.0 %    Lymph% 98.0 (H) 18.0 - 48.0 %    Mono% 0.0 (L) 4.0 - 15.0 %    Eosinophil% 0.0 0.0 - 8.0 %    Basophil% 0.0 0.0 - 1.9 %    Aniso Slight     Poik Slight     Poly Occasional     Hypo Occasional     Ovalocytes Occasional     Differential Method Manual    Protime-INR    Collection Time:  06/11/19  3:42 AM   Result Value Ref Range    Prothrombin Time 10.5 9.0 - 12.5 sec    INR 1.0 0.8 - 1.2   APTT    Collection Time: 06/11/19  3:42 AM   Result Value Ref Range    aPTT 49.5 (H) 21.0 - 32.0 sec   POCT glucose    Collection Time: 06/11/19  3:46 AM   Result Value Ref Range    POCT Glucose 176 (H) 70 - 110 mg/dL   POCT glucose    Collection Time: 06/11/19  4:22 AM   Result Value Ref Range    POCT Glucose 174 (H) 70 - 110 mg/dL   POCT glucose    Collection Time: 06/11/19  5:32 AM   Result Value Ref Range    POCT Glucose 171 (H) 70 - 110 mg/dL   POCT glucose    Collection Time: 06/11/19  6:25 AM   Result Value Ref Range    POCT Glucose 177 (H) 70 - 110 mg/dL         Significant Imaging:     I have reviewed all pertinent imaging studies from the last 24 hours

## 2019-06-11 NOTE — PROCEDURES
[facility] EEG/VIDEO MONITORING REPORT  Reid Herman  [id number]  1940    DATE OF SERVICE:  06/10/2019  DATE OF ADMISSION: 5/29/2019  5:31 PM    ADMITTING/REQUESTING PROVIDER: Laura Osman MD    REASON FOR CONSULT:  79-year-old man admitted after cardiac arrest found to have bilateral lower extremity twitching.  Evaluate for evidence of epileptiform activity.    METHODOLOGY   Electroencephalographic (EEG) recording is with electrodes placed according to the International 10-20 placement system.  Thirty two (32) channels of digital signal (sampling rate of 512/sec) including T1 and T2 was simultaneously recorded from the scalp and may include  EKG, EMG, and/or eye monitors.  Recording band pass was 0.1 to 512 hz.  Digital video recording of the patient is simultaneously recorded with the EEG.  The patient is instructed report clinical symptoms which may occur during the recording session.  EEG and video recording is stored and archived in digital format.  Activation procedures which include photic stimulation, hyperventilation and instructing patients to perform simple task are done in selected patients.   The EEG is displayed on a monitor screen and can be reviewed using different montages.  Computer assisted analysis is employed to detect spike and electrographic seizure activity.   The entire record is submitted for computer analysis.  The entire recording is visually reviewed and the times identified by computer analysis as being spikes or seizures are reviewed again.  Compresses spectral analysis (CSA) is also performed on the activity recorded from each individual channel.  This is displayed as a power display of frequencies from 0 to 30 Hz over time.   The CSA is reviewed looking for asymmetries in power between homologous areas of the scalp and then compared with the original EEG recording.     [employer] software is also utilized in the review of this study.  This software suite analyzes the EEG recording  in multiple domains.  Coherence and rhythmicity is computed to identify EEG sections which may contain organized seizures.  Each channel undergoes analysis to detect presence of spike and sharp waves which have special and morphological characteristic of epileptic activity.  The routine EEG recording is converted from spacial into frequency domain.  This is then displayed comparing homologous areas to identify areas of significant asymmetry.  Algorithm to identify non-cortically generated artifact is used to separate eye movement, EMG and other artifact from the EEG.      RECORDING TIMES  Start on 06/10/2019 at 18:27 p.m.  Stop on 06/11/2019 at 13:34 p.m.  A total of 18 hr and 57 min of EEG recording is obtained.    EEG FINDINGS  Background activity:   The background is predominantly symmetric 3-6 hz mixed frequency activity with occasional 1-2 seconds of generalized suppressions. There are diffuse overriding faster activities.    There are three pushbutton activations for reasons that are not explicitly stated. The first pushbutton activation is at 03:20 a.m..  The patient appears to be lying in bed with his eyes open with no other abnormal movements or behaviors.  There is no significant change in the electrographic background at this time.  There is a second pushbutton activation at 06:05 a.m. On camera, the patient is noted to have several irregular, non stereotyped, low-amplitude myoclonic jerks of the left arm and leg with no change in the electrographic background.  The third pushbutton activation is at 13:17 p.m. for unclear reasons.  The patient appears to be lying in bed with his eyes open with no abnormal vocalizations or movements.  There is no significant change in the electrographic background at this time.    Sleep:  There is no normal sleep architecture.    Activation procedures:   Hyperventilation is not performed  Photic stimulation is not performed    Cardiac Monitor:   Heart rate is regular with  rates ranging from 70-80 bpm.     Impression:   This is an abnormal continuous EEG monitoring study because a generally slow background consistent with diffuse cortical dysfunction and a moderate encephalopathy.  This finding is nonspecific with regards to etiology but can be seen in the setting of toxic/metabolic derangements, infection, anoxia, and as a medication effect.  Overriding faster activities are often seen as a medication effect.  There are no prominent focal findings, no epileptiform discharges, and no electrographic seizures.  There is a pushbutton activation for several myoclonic jerks of the arms and legs with no EEG correlate.    Tawnya Alamo MD PhD  Neurology-Epilepsy  [facility].  [facility]

## 2019-06-11 NOTE — ASSESSMENT & PLAN NOTE
Caution with insulin stacking  Estimated Creatinine Clearance: 55.8 mL/min (based on SCr of 1.2 mg/dL).

## 2019-06-11 NOTE — PLAN OF CARE
Problem: Adult Inpatient Plan of Care  Goal: Plan of Care Review  Outcome: Ongoing (interventions implemented as appropriate)    No acute events throughout day. See vital signs and assessments in flowsheets. See below for updates on today's progress.     Pulmonary: ET 22 @ the lip. Vent settings remain unchanged PEEP 5, Rate 18, FiO2 40%, .    Cardiovascular: MAP maintained >60 pm IABP. IABP settings: EKG, 1:1, Auto. SBP70's-90's/DBP 30's-40's. Augmentations 90's to 1-teens. No arrhythmias overnight. CVP: 10-11    Neurological: arouses to voice only. No withdrawal to pain. Corneal present and pupils 1mm and sluggish. Maintaining temp @ 98 degrees via [place]    Gastrointestinal: No BM. miralax given.    Genitourinary: 75-350ml/hr via peterson catheter    Endocrine: accuchecks q1 hour to titrate insulin gtt    Skin/Bath: no new skin issues overnight.    Infusions: propofol gtt off @ 1951. Heparin, lidocaine, fentanyl, insulin, and levophed gtts remain on.    Patient progressing towards goals as tolerated, plan of care communicated and reviewed with Reid Herman and family. All concerns addressed. Will continue to monitor.

## 2019-06-11 NOTE — PROGRESS NOTES
"  [facility]  Adult Nutrition  Consult Note    SUMMARY     Recommendations    1. TF recommendations: Impact Peptide @ 50 mL/hr to provide 1800 calories, 113 grams of protein, 924 mL fluid.              - Hold for residuals >500 mL; additional fluid per MD.  2. If able to extubate & advance diet, recommend Cardiac diet (texture per SLP).   3. RD to monitor & follow-up.    Goals: Meet % EEN, EPN  Nutrition Goal Status: goal not met  Communication of RD Recs: reviewed with RN    Reason for Assessment    Reason For Assessment: RD follow-up  Diagnosis: other (see comments)(Cellulitis)  Relevant Medical History: HTN, DM  Interdisciplinary Rounds: did not attend    General Information Comments: Pt remains intubated, sedated. Possibly starting enteral nutrition today, per RN. At initial RD visit, family at bedside reports pt w/ good appetite & stable wt PTA. NFPE not indicated, pt appears nourished w/ no physical signs of malnutrition.IABP present.  Nutrition Discharge Planning: Unable to determine    Nutrition/Diet History    Patient Reported Diet/Restrictions/Preferences: other (see comments)([id number])  Spiritual, Cultural Beliefs, Sikhism Practices, Values that Affect Care: no  Factors Affecting Nutritional Intake: NPO, on mechanical ventilation    Anthropometrics    Temp: 98.2 °F (36.8 °C)  Height Method: Estimated  Height: 5' 7" (170.2 cm)  Height (inches): 67 in  Weight Method: Bed Scale  Weight: 92.1 kg (203 lb 0.7 oz)  Weight (lb): 203.05 lb  Ideal Body Weight (IBW), Male: 148 lb  % Ideal Body Weight, Male (lb): 137.2 lb  BMI (Calculated): 31.9  BMI Grade: 30 - 34.9- obesity - grade I    Lab/Procedures/Meds    Pertinent Labs Reviewed: reviewed  Pertinent Labs Comments: Na 135, GFR 52.6, Gluc 172  Pertinent Medications Reviewed: reviewed  Pertinent Medications Comments: Fentanyl, Heparin, Insulin, Levophed    Estimated/Assessed Needs    Weight Used For Calorie Calculations: 92.1 kg (203 lb 0.7 " oz)     Energy Calorie Requirements (kcal): 1751 kcal/d  Energy Need Method: [place]     Protein Requirements: 101-135 g/d (1.5-2 g/kg IBW)  Weight Used For Protein Calculations: 67.3 kg (148 lb 5.9 oz)     Estimated Fluid Requirement Method: other (see comments)(Per MD or 1 mL/kcal)     CHO Requirement: 50% total kcals    Nutrition Prescription Ordered    Current Diet Order: NPO    Evaluation of Received Nutrient/Fluid Intake    Comments: LBM recorded: 6/5    Nutrition Risk    Level of Risk/Frequency of Follow-up: (2x/week)     Assessment and Plan    Nutrition Problem  Inadequate energy intake     Related to (etiology):   Inability to consume sufficient energy     Signs and Symptoms (as evidenced by):   NPO w/ no alternate means of nutrition      Interventions/Recommendations (treatment strategy):  Collaboration of care      Nutrition Diagnosis Status:   Continues      Monitor and Evaluation    Food and Nutrient Intake: energy intake, food and beverage intake, enteral nutrition intake  Food and Nutrient Adminstration: diet order, enteral and parenteral nutrition administration  Physical Activity and Function: nutrition-related ADLs and IADLs  Anthropometric Measurements: weight, weight change  Biochemical Data, Medical Tests and Procedures: lipid profile, inflammatory profile, gastrointestinal profile, glucose/endocrine profile, electrolyte and renal panel  Nutrition-Focused Physical Findings: overall appearance     Nutrition Follow-Up    RD Follow-up?: Yes

## 2019-06-11 NOTE — ASSESSMENT & PLAN NOTE
Known CAD with history of 2v CABG (1994) LIMA-D1 and SVG-LAD, recently had LHC 1/29/19 for worsening angina s/p DUC x 2 (mid and distal LCx). He was also found to have 50% D1 stenosis (distal to the LIMA graft insertion) and 60% proximal SVG graft stenosis and RCA .     S/p SVG DUC 6/6/19  AFib on 6/6/19, started on amiodarone, developed PMVT, taken back to cath lab with no new stenoses  Continue medical therapy with DAPT, high intensity statin  IABP placed via R CFA 6/6/19, still requiring pressor support (levo 0.1 mcg/kg/min)  CXR daily for IABP/ET tube placement

## 2019-06-11 NOTE — ASSESSMENT & PLAN NOTE
Pt had LHC then developed AF was started on amiodarone and had PMVT s/p several shocks. Repeat LHC with patent stents. Initially was on lidocaine which was weaned off however on 6/8 had several episodes of MMVT and PMVT which required CPR, intubation and shocks. Currently being cooled.    Plan  - EP on board   - will continue with lidocaine and likely switch to Mexiletine

## 2019-06-11 NOTE — PROGRESS NOTES
Propofol gtt currently off per cardiology day team to assess for seizure activity. Milan Lomeli MD notified and is aware. Will call if any seizure activity is noted. Will continue to monitor at this time.

## 2019-06-11 NOTE — ASSESSMENT & PLAN NOTE
Known CAD with history of 2v CABG (1994) LIMA-D1 and SVG-LAD, recently had C 1/29/19 for worsening angina s/p DUC x 2 (mid and distal LCx). He was also found to have 50% D1 stenosis (distal to the LIMA graft insertion) and 60% proximal SVG graft stenosis and RCA .     S/p SVG DUC 6/6/19  AFib on 6/6/19, started on amiodarone, developed PMVT, taken back to cath lab with no new stenoses  Continue medical therapy with DAPT, high intensity statin  IABP placed via R CFA 6/6/19, still requiring pressor support although decreasing (0.12 mcg/kg/min)  CXR daily for IABP/ET tube placement

## 2019-06-11 NOTE — PROGRESS NOTES
12 beat run VT on telemetry. Frequent bigemeny. IABP 80's/20-30's augmenting in 80's. Trevor Boyd MD notified. Orders to give 2g magnesium IV. WCTM.

## 2019-06-11 NOTE — PROGRESS NOTES
Patient started having some involuntary/nonpurposeful leg movement that lasted a few seconds. Notified Milan Lomeli MD that per previous notes this was the same activity that he had prior to the EEG being placed. Propofol off since 1951 to assess neuro. Advised to leave propofol off at this time per Milan Lomeli MD. WCTM.

## 2019-06-11 NOTE — SUBJECTIVE & OBJECTIVE
Interval History: No VT/VF overnight still on 2 of Lidocaine, opening eyes and follows some commands per RN.    Review of Systems   Unable to perform ROS: intubated     Objective:     Vital Signs (Most Recent):  Temp: 98.2 °F (36.8 °C) (06/11/19 0900)  Pulse: 73 (06/11/19 0900)  Resp: 18 (06/11/19 0900)  BP: (!) 114/54 (06/11/19 0900)  SpO2: 98 % (06/11/19 0900) Vital Signs (24h Range):  Temp:  [97.2 °F (36.2 °C)-106 °F (41.1 °C)] 98.2 °F (36.8 °C)  Pulse:  [67-80] 73  Resp:  [3-19] 18  SpO2:  [96 %-100 %] 98 %  BP: ()/(52-75) 114/54     Weight: 92.1 kg (203 lb)  Body mass index is 31.79 kg/m².     SpO2: 98 %  O2 Device (Oxygen Therapy): ventilator    Physical Exam   Constitutional: He appears well-developed and well-nourished. No distress.   Sedated, intubated   HENT:   Head: Normocephalic and atraumatic.   Cardiovascular: Normal rate, regular rhythm, normal heart sounds and intact distal pulses. Exam reveals no gallop and no friction rub.   No murmur heard.  Pulses:       Radial pulses are 0 on the right side, and 0 on the left side.        Femoral pulses are 2+ on the left side.       Dorsalis pedis pulses are 0 on the right side, and 0 on the left side.        Posterior tibial pulses are 0 on the right side, and 0 on the left side.   Sternotomy, healed. Surrounding ecchymoses. Extremities cool to touch. Right CFA IABP, Right CFV TVP   Pulmonary/Chest:   Intubated. Mechanical breath sounds, bilateral crackles   Abdominal: Soft. Bowel sounds are normal. He exhibits no distension. There is no tenderness.   Musculoskeletal: He exhibits edema (1+ bilateral lower extremity edema).   Left foot dressing c/d/i. Left foot mottled   Skin: He is not diaphoretic.       Significant Labs:   EP:   Recent Labs   Lab 06/10/19  0406  06/10/19  1931 06/11/19  0025 06/11/19  0342   *  134*   < > 135* 135* 135*  135*   K 3.9  3.9   < > 4.3 4.2 4.6  4.6     102   < > 105 105 105  105   CO2 21*  21*   < > 21*  20* 21*  21*   *  162*  162*   < > 186* 157* 172*  172*   BUN 20  20   < > 19 18 18  18   CREATININE 1.1  1.1   < > 1.3 1.2 1.3  1.3   CALCIUM 7.8*  7.8*   < > 7.8* 7.3* 7.8*  7.8*   PROT 4.9*  --   --   --  4.9*   ALBUMIN 2.5*  --   --   --  2.4*   BILITOT 0.6  --   --   --  0.5   ALKPHOS 88  --   --   --  103   AST 39  --   --   --  55*   ALT 27  --   --   --  26   ANIONGAP 11  11   < > 9 10 9  9   ESTGFRAFRICA >60.0  >60.0   < > >60.0 >60.0 >60.0  >60.0   EGFRNONAA >60.0  >60.0   < > 52.6* 58.0* 52.6*  52.6*   WBC 53.96*  53.96*  --   --   --  48.61*  48.61*   HGB 9.6*  9.6*  --   --   --  9.4*  9.4*   HCT 29.9*  29.9*  --   --   --  29.2*  29.2*   *  101*  --   --   --  83*  83*   INR 1.1  --   --   --  1.0    < > = values in this interval not displayed.       Significant Imaging: Echocardiogram:   Transthoracic echo (TTE) complete (Cupid Only):   Results for orders placed or performed during the hospital encounter of 05/29/19   Transthoracic echo (TTE) 2D with Color Flow   Result Value Ref Range    Ascending aorta 3.31 cm    STJ 3.12 cm    AV mean gradient 3.88 mmHg    Ao peak layo 1.40 m/s    Ao VTI 15.79 cm    IVRT 0.09 msec    IVS 0.80 0.6 - 1.1 cm    LA size 4.30 cm    Left Atrium Major Axis 5.50 cm    Left Atrium Minor Axis 5.50 cm    LVIDD 5.10 3.5 - 6.0 cm    LVIDS 4.40 (A) 2.1 - 4.0 cm    LVOT diameter 1.99 cm    LVOT peak VTI 9.16 cm    PW 0.70 0.6 - 1.1 cm    MV Peak A Layo 0.23 m/s    E wave decelartion time 229.40 msec    MV Peak E Layo 0.94 m/s    RA Major Axis 5.23 cm    RA Width 3.94 cm    RVDD 4.28 cm    Sinus 3.59 cm    TAPSE 1.23 cm    TR Max Layo 1.75 m/s    TDI LATERAL 0.03     TDI SEPTAL 0.03     LA WIDTH 4.30 cm    LV Diastolic Volume 107.96 mL    LV Systolic Volume 73.56 mL    RV S' 4.10 m/s    LVOT peak layo [id number] m/s    LV LATERAL E/E' RATIO 31.33     LV SEPTAL E/E' RATIO 31.33     FS 14 %    LA volume 86.44 cm3    LV mass 129.43 g    Left  Ventricle Relative Wall Thickness 0.27 cm    AV valve area 1.80 cm2    AV Velocity Ratio 0.61     AV index (prosthetic) 0.58     E/A ratio 4.09     Mean e' 0.03     LVOT area 3.11 cm2    LVOT stroke volume 28.48 cm3    AV peak gradient 7.84 mmHg    E/E' ratio 31.33     LV Systolic Volume Index 36.1 mL/m2    LV Diastolic Volume Index 53.05 mL/m2    LA Volume Index 42.5 mL/m2    LV Mass Index 63.6 g/m2    Triscuspid Valve Regurgitation Peak Gradient 12.25 mmHg    BSA 2.09 m2

## 2019-06-11 NOTE — ASSESSMENT & PLAN NOTE
Known CAD with history of 2v CABG (1994) LIMA-D1 and SVG-LAD, recently had C 1/29/19 for worsening angina s/p DUC x 2 (mid and distal LCx). He was also found to have 50% D1 stenosis (distal to the LIMA graft insertion) and 60% proximal SVG graft stenosis and RCA .     S/p SVG DUC 6/6/19  Continue medical therapy with DAPT, high intensity statin  IABP placed via R CFA 6/6/19, still requiring pressor support although decreasing (0.12 mcg/kg/min)  CXR daily for IABP/ET tube placement

## 2019-06-11 NOTE — PLAN OF CARE
Problem: Adult Inpatient Plan of Care  Goal: Plan of Care Review  Outcome: Ongoing (interventions implemented as appropriate)  Fentanyl, levophed, insulin, lidocaine, and heparin gtts. CVP 11, 10, 12. IABP 1:1, ECG, auto. Afebrile. Failed SBT this afternoon - will repeat tomorrow. No acute events throughout day, VS and assessment per flow sheet, patient progressing towards goals as tolerated, plan of care reviewed with Reid Herman and family, all concerns addressed, will continue to monitor.

## 2019-06-11 NOTE — PROGRESS NOTES
[facility]  Cardiology  Progress Note    Patient Name: Reid Herman  MRN: [id number]  Admission Date: 5/29/2019  Hospital Length of Stay: 13 days  Code Status: DNR   Attending Physician: Paz Pradhan MD   Primary Care Physician: Olivia Zavala MD  Expected Discharge Date: 6/17/2019  Principal Problem:VT (ventricular tachycardia)    Subjective:     Hospital Course:   Patient transffered to the ccu after he had a code after PMVT s/p several shocks. Repeat [facility] with patent stents. Initially was on lidocaine which was weaned off however on 6/8 had several episodes of MMVT and PMVT which required CPR, intubation and shocks. Currently being rewarmed. Prognosis very poor and family updated , code status changed to DNR, propofol weaning started and he had some bilateral lower extremity twitching ,EEG negative for seixure and patient following commands off propofol     Interval History: Patinet with no acute events overnight, off propofol remains on fentayl for pain, follows commands, SBT will be attempted    Review of Systems   Unable to perform ROS: intubated     Objective:     Vital Signs (Most Recent):  Temp: 98.2 °F (36.8 °C) (06/11/19 1500)  Pulse: 74 (06/11/19 1500)  Resp: 18 (06/11/19 1500)  BP: (!) 102/56 (06/11/19 1200)  SpO2: 97 % (06/11/19 1500) Vital Signs (24h Range):  Temp:  [97.2 °F (36.2 °C)-98.8 °F (37.1 °C)] 98.2 °F (36.8 °C)  Pulse:  [67-80] 74  Resp:  [3-19] 18  SpO2:  [96 %-100 %] 97 %  BP: ()/(54-73) 102/56     Weight: 92.1 kg (203 lb 0.7 oz)  Body mass index is 31.8 kg/m².     SpO2: 97 %  O2 Device (Oxygen Therapy): ventilator      Intake/Output Summary (Last 24 hours) at 6/11/2019 1528  Last data filed at 6/11/2019 1500  Gross per 24 hour   Intake 1939.7 ml   Output 4540 ml   Net -2600.3 ml       Lines/Drains/Airways     Central Venous Catheter Line                 Percutaneous Central Line Insertion/Assessment - triple lumen  06/06/19 1949 right internal jugular  4 days          Drain                 Sheath 06/06/19 1412 Right proximal 5 days         NG/OG Tube 06/08/19 1445 [place] sump 14 Fr. Center mouth 3 days         Urethral Catheter 06/08/19 1525 14 Fr. 3 days          Airway                 Airway - Non-Surgical 06/08/19 1254 Endotracheal Tube 3 days          Line                 IABP 06/06/19 1728 4 days         Pacer Wires 06/06/19 1728 4 days          Peripheral Intravenous Line                 Peripheral IV - Single Lumen 06/10/19 1604 20 G Right Forearm less than 1 day         Peripheral IV - Single Lumen 06/10/19 1611 20 G Left Antecubital less than 1 day                Physical Exam   Constitutional: He appears well-developed and well-nourished. No distress.   Sedated, intubated   HENT:   Head: Normocephalic and atraumatic.   Cardiovascular: Normal rate, regular rhythm, normal heart sounds and intact distal pulses. Exam reveals no gallop and no friction rub.   No murmur heard.  Pulses:       Radial pulses are 0 on the right side, and 0 on the left side.        Femoral pulses are 2+ on the left side.       Dorsalis pedis pulses are 0 on the right side, and 0 on the left side.        Posterior tibial pulses are 0 on the right side, and 0 on the left side.   Sternotomy, healed. Surrounding ecchymoses. Extremities cool to touch. Right CFA IABP, Right CFV TVP   Pulmonary/Chest:   Intubated. Mechanical breath sounds, bilateral crackles   Abdominal: Soft. Bowel sounds are normal. He exhibits no distension. There is no tenderness.   Musculoskeletal: He exhibits edema (1+ bilateral lower extremity edema).   Left foot dressing c/d/i. Left foot mottled   Neurological:   Follows commands    Skin: He is not diaphoretic.       Significant Labs:   CMP   Recent Labs   Lab 06/10/19  0406  06/11/19  0342 06/11/19  0833 06/11/19  1147   *  134*   < > 135*  135* 135* 136   K 3.9  3.9   < > 4.6  4.6 4.1 4.0     102   < > 105  105 106 106   CO2 21*  21*   < > 21*   21* 21* 21*   *  162*  162*   < > 172*  172* 151* 169*   BUN 20  20   < > 18  18 19 18   CREATININE 1.1  1.1   < > 1.3  1.3 1.2 1.2   CALCIUM 7.8*  7.8*   < > 7.8*  7.8* 7.9* 8.0*   PROT 4.9*  --  4.9*  --   --    ALBUMIN 2.5*  --  2.4*  --   --    BILITOT 0.6  --  0.5  --   --    ALKPHOS 88  --  103  --   --    AST 39  --  55*  --   --    ALT 27  --  26  --   --    ANIONGAP 11  11   < > 9  9 8 9   ESTGFRAFRICA >60.0  >60.0   < > >60.0  >60.0 >60.0 >60.0   EGFRNONAA >60.0  >60.0   < > 52.6*  52.6* 58.0* 58.0*    < > = values in this interval not displayed.   , CBC   Recent Labs   Lab 06/10/19  0406 06/11/19  0342   WBC 53.96*  53.96* 48.61*  48.61*   HGB 9.6*  9.6* 9.4*  9.4*   HCT 29.9*  29.9* 29.2*  29.2*   *  101* 83*  83*    and All pertinent lab results from the last 24 hours have been reviewed.    Significant Imaging: All imaging in the last 24 hours reviewed     Assessment and Plan:     Brief HPI:78 yo M w/ PMH significant for CAD s/p CABG x2 (SVG-LAD, LIMA-D1, 1994), NSTEMI s/p DUC to distal, mid LCx (1/2019), PAD, CKD, CLL (not currently on therapy), HTN, HLD, and MINDA who presents w/ worsening L great toe wound that has failed outpatient therapy. Reports noticing worsening L foot pain, discomfort. Recently completed multiple course of antibiotics however w/ minimal improvement. Podiatry consulted w/ imaging concerning osteomyelitis. Complicated course of NSTEMI, afib that lead to VT arrest and transfer to the CCU. Extubated/reintubated when patient on 6/8 had multiple events of PMVT and shocked multiple times now undergoing TTM. Today follows commands     * VT (ventricular tachycardia)  Pt had LHC then developed AF was started on amiodarone and had PMVT s/p several shocks. Repeat LHC with patent stents. Initially was on lidocaine which was weaned off however on 6/8 had several episodes of MMVT and PMVT which required CPR, intubation and shocks. Currently being  cooled.    Plan  - EP on board   - will continue with lidocaine and likely switch to Mexiletine     Cellulitis of Left foot  - Vitals are WNLs  - Continue on Vanc and CTX.    Acute HF (heart failure)  Known CAD with history of 2v CABG (1994) LIMA-D1 and SVG-LAD, recently had LHC 1/29/19 for worsening angina s/p DUC x 2 (mid and distal LCx). He was also found to have 50% D1 stenosis (distal to the LIMA graft insertion) and 60% proximal SVG graft stenosis and RCA .      S/p SVG DUC 6/6/19  Continue medical therapy with DAPT, high intensity statin  IABP placed via R CFA 6/6/19, on levophed  CXR daily for IABP  Continue on 80 mg IV lasix BID.         Goals of care, counseling/discussion  - discussed with family and they request DNR for now       Cardiac arrest  As under VT    Atrial fibrillation and Focal atrial tachycardia  - Continue on heparin gtt.    SHANIKA (acute kidney injury)  resolved    NSTEMI (non-ST elevated myocardial infarction)  - continue asa, statin, plavix    CKD (chronic kidney disease), stage III  - stable     MINDA on CPAP  Intubated right now   cpap nightly when extubated     Controlled type 1 diabetes mellitus with diabetic neuropathy, with long-term current use of insulin  - on insulin drip , management per endocrinology     CLL (chronic lymphocytic leukemia)  - stable         VTE Risk Mitigation (From admission, onward)        Ordered     heparin infusion 1,000 units/500 ml in 0.9% NaCl (pressure line flush)  Intra-op continuous PRN      06/06/19 1052     heparin 25,000 units in dextrose 5% 250 mL (100 units/mL) infusion LOW INTENSITY nomogram - [facility]  Continuous      06/05/19 0853     heparin 25,000 units in dextrose 5% (100 units/ml) IV bolus from bag - ADDITIONAL PRN BOLUS - 60 units/kg (max bolus 4000 units)  As needed (PRN)      06/05/19 0853     heparin 25,000 units in dextrose 5% (100 units/ml) IV bolus from bag - ADDITIONAL PRN BOLUS - 30 units/kg (max bolus 4000 units)  As needed (PRN)       06/05/19 0853     heparin 25,000 units in dextrose 5% 250 mL (100 units/mL) infusion LOW INTENSITY nomogram - [facility]  Continuous      06/02/19 1508     IP VTE LOW RISK PATIENT  Once      05/29/19 5863          Shanna To MD  Cardiology  [facility]

## 2019-06-11 NOTE — PHYSICIAN QUERY
PT Name: Reid Herman  MR #: [id number]     PHYSICIAN QUERY -  ELECTROLYTE CLARIFICATION      CDS/: Charlotte Christian RN               Contact information:[email or web address]  This form is a permanent document in the medical record.     Query Date: June 11, 2019    By submitting this query, we are merely seeking further clarification of documentation to reflect the severity of illness of your patient. Please utilize your independent clinical judgment when addressing the question(s) below.    The Medical record reflects the following:     Indicators   Supporting Clinical Findings Location in Medical Record   x Lab Value(s) Calcium 9.8  Potassium 5.5  Magnesium 1.8  Phosphorus 3.5  Potassium 3.8   Phosphorus 4.6  Calcium 9.0  Magnesium 2.7  Phosphorus 5.1  Calcium 8.5  Calcium 8.0  Potassium 3.4  Magnesium 1.9  Phosphorus 1.8  Calcium 7.9  Potassium 3.1    Phosphorus 1.6  Calcium 7.6  Potassium 4.2  Magnesium 1.9  Phosphorus 3.8 Labs 5/29  Labs 6/3  Labs 6/5  Labs 6/6  Labs 6/7  Labs 6/8  Labs 6/9  Labs 6/10   x Treatment                                 Medication Calcium gluconate IV prn  Potassium chloride IVPB  Potassium chloride SA CR   Magnesium sulfate IVPB   Sodium phosphate IVPB  MAR 5/29  MAR 6/8  MAR 6/8-9  MAR 6/8  MAR 6.8    Other       Provider, please specify the diagnosis or diagnoses that correspond(s) to the above indicators.       Seth all that apply:    [x   ] Hypocalcemia   [  x ] Hypokalemia   [  x ] Hypomagnesemia   [   ] Hypermagnesemia   [   ] Hypophosphatemia   [   ] Hyperphosphatemia   [   ] Other electrolyte disturbance (please specify): _______   [   ]  Clinically Undetermined       Please document in your progress notes daily for the duration of treatment until resolved, and include in your discharge summary.

## 2019-06-11 NOTE — PROGRESS NOTES
"[facility]  Endocrinology  Progress Note    Admit Date: 5/29/2019     Reason for Consult: Management of T1DM, Hyperglycemia     Surgical Procedure and Date: N/A    Diabetes diagnosis year: 1972    Lab Results   Component Value Date    HGBA1C 5.8 (H) 05/29/2019       Home Diabetes Medications:  Accuchek spirit pump/sarai       Basal Rate  0000 - 0300     0.85 u/hr  0300 - 0700     0.9 u/hr  0700 - 2100     1.1 u/hr  2100 - 0000     0.85 u/hr        Carb Ratio  12A: 1:8     ISF  1:30     Target: 110-130  IAT: 3    How often checking glucose at home? Dexcom G5 CGM  BG readings on regimen: 110-140  Hypoglycemia on the regimen?  Yes, a few time per week  Missed doses on regimen?  No    Diabetes Complications include:     Hypoglycemia , Diabetic chronic kidney disease     , Diabetic retinopathy , Diabetic peripheral neuropathy , Diabetic peripheral angiopathy with/without gangrene and Foot ulcer      Complicating diabetes co morbidities:   CHF, MINDA and CKD      HPI:   Patient is a 79 y.o. male with a diagnosis of hyponatremia, left foot abscess, CKD3, DM1, CLL, MINDA, and acute heart failure.  Admitted to [facility] on 5/29/19 from podiatry clinic for a left hallux abcess.  Last seen in endocrinology clinic by ESTEFANÍA Rivers NP on 5/8/19.  Endocrine consulted for DM/BG management.            Interval HPI:   Overnight events: Remains intubated in CMICU. BG well controlled on IV intensive insulin protocol with infusion rates ranging from 1.4-2 u/hr overnight.  Eating:   NPO  Nausea: No  Hypoglycemia and intervention: No  Fever: No  TPN and/or TF: No  If yes, type of TF/TPN and rate: none    BP (!) 119/59   Pulse 76   Temp (!) 106 °F (41.1 °C)   Resp 19   Ht 5' 7" (1.702 m)   Wt 92.1 kg (203 lb)   SpO2 100%   BMI 31.79 kg/m²      Labs Reviewed and Include    Recent Labs   Lab 06/11/19  0342   *  172*   CALCIUM 7.8*  7.8*   ALBUMIN 2.4*   PROT 4.9*   *  135*   K 4.6  4.6   CO2 21*  21*    "  105   BUN 18  18   CREATININE 1.3  1.3   ALKPHOS 103   ALT 26   AST 55*   BILITOT 0.5     Lab Results   Component Value Date    WBC 48.61 (H) 06/11/2019    WBC 48.61 (H) 06/11/2019    HGB 9.4 (L) 06/11/2019    HGB 9.4 (L) 06/11/2019    HCT 29.2 (L) 06/11/2019    HCT 29.2 (L) 06/11/2019    MCV 98 06/11/2019    MCV 98 06/11/2019    PLT 83 (L) 06/11/2019    PLT 83 (L) 06/11/2019     No results for input(s): TSH, FREET4 in the last 168 hours.  Lab Results   Component Value Date    HGBA1C 5.8 (H) 05/29/2019       Nutritional status:   Body mass index is 31.79 kg/m².  Lab Results   Component Value Date    ALBUMIN 2.4 (L) 06/11/2019    ALBUMIN 2.5 (L) 06/10/2019    ALBUMIN 2.7 (L) 06/09/2019     Lab Results   Component Value Date    PREALBUMIN 22 05/17/2019       Estimated Creatinine Clearance: 51.5 mL/min (based on SCr of 1.3 mg/dL).    Accu-Checks  Recent Labs     06/10/19  2223 06/10/19  2340 06/11/19  0030 06/11/19  0131 06/11/19  0224 06/11/19  0346 06/11/19  0422 06/11/19  0532 06/11/19  0625 06/11/19  0819   POCTGLUCOSE 144* 180* 180* 150* 181* 176* 174* 171* 177* 160*       Current Medications and/or Treatments Impacting Glycemic Control  Immunotherapy:    Immunosuppressants     None        Steroids:   Hormones (From admission, onward)    None        Pressors:    Autonomic Drugs (From admission, onward)    Start     Stop Route Frequency Ordered    06/10/19 0345  norepinephrine 16 mg in dextrose 5 % 250 mL infusion     Question Answer Comment   Titrate by: (in mcg/kg/min) 0.02    Titrate interval: (in minutes) 5    Titrate to maintain: (MAP or SBP) MAP    Greater than: (in mmHg) 60    Maximum dose: (in mcg/kg/min) 3        -- IV Continuous 06/10/19 0331    06/08/19 1247  rocuronium (ZEMURON) 10 mg/mL injection     Note to Pharmacy:  Created by cabinet override    06/09 0059   06/08/19 1247        Hyperglycemia/Diabetes Medications:   Antihyperglycemics (From admission, onward)    Start     Stop Route Frequency  Ordered    06/08/19 1730  insulin regular (Humulin R) 100 Units in sodium chloride 0.9% 100 mL infusion     Question:  Insulin Rate Adjustment (DO NOT MODIFY ANSWER)  Answer:  [email or web address]    -- IV Continuous 06/08/19 1623    06/08/19 0856  insulin aspart U-100 pen 0-5 Units      -- SubQ Before meals & nightly PRN 06/08/19 0756          ASSESSMENT and PLAN    Controlled type 1 diabetes mellitus with diabetic neuropathy, with long-term current use of insulin  BG goal 140-180    Continue IV insulin infusion protocol  Requires intensive BG monitoring while on protocol     ** Please call Endocrine for any BG related issues **    Discharge recommendations:   TBD.     MINDA on CPAP  May affect BG readings if uncontrolled        Cellulitis of Left foot  Infection may elevate BG readings  Optimize BG control for wound healing      CKD (chronic kidney disease), stage III  Caution with insulin stacking  Estimated Creatinine Clearance: 55.8 mL/min (based on SCr of 1.2 mg/dL).        Class 1 obesity due to excess calories with serious comorbidity and body mass index (BMI) of 30.0 to 30.9 in adult  Body mass index is 31.8 kg/m².  May increase insulin resistance.             Dina Ba NP  Endocrinology  [facility]

## 2019-06-11 NOTE — PROGRESS NOTES
[facility]  Interventional Cardiology  Progress Note    Patient Name: Reid Herman  MRN: [id number]  Admission Date: 5/29/2019  Hospital Length of Stay: 13 days  Code Status: DNR   Attending Physician: Paz Pradhan MD   Primary Care Physician: Olivia Zavala MD  Principal Problem:Acute HF (heart failure)    Subjective:     Interval History: Patient opens eyes spontaneously this morning, nods his head, follows some simple commands. No acute events overnight.    Objective:     Vital Signs (Most Recent):  Temp: 98.2 °F (36.8 °C) (06/11/19 0600)  Pulse: 74 (06/11/19 0600)  Resp: 18 (06/11/19 0600)  BP: (!) 109/54 (06/11/19 0600)  SpO2: 98 % (06/11/19 0600) Vital Signs (24h Range):  Temp:  [97.2 °F (36.2 °C)-99 °F (37.2 °C)] 98.2 °F (36.8 °C)  Pulse:  [66-80] 74  Resp:  [3-18] 18  SpO2:  [96 %-100 %] 98 %  BP: ()/(52-79) 109/54     Weight: 92.1 kg (203 lb)  Body mass index is 31.79 kg/m².    SpO2: 98 %  O2 Device (Oxygen Therapy): ventilator      Intake/Output Summary (Last 24 hours) at 6/11/2019 0656  Last data filed at 6/11/2019 0600  Gross per 24 hour   Intake 2267.6 ml   Output 3830 ml   Net -1562.4 ml       Lines/Drains/Airways     Central Venous Catheter Line                 Percutaneous Central Line Insertion/Assessment - triple lumen  06/06/19 1949 right internal jugular 4 days          Drain                 Sheath 06/06/19 1412 Right proximal 4 days         NG/OG Tube 06/08/19 1445 [place] sump 14 Fr. Center mouth 2 days         Urethral Catheter 06/08/19 1525 14 Fr. 2 days          Airway                 Airway - Non-Surgical 06/08/19 1254 Endotracheal Tube 2 days          Line                 IABP 06/06/19 1728 4 days         Pacer Wires 06/06/19 1728 4 days          Peripheral Intravenous Line                 Peripheral IV - Single Lumen 06/10/19 1604 20 G Right Forearm less than 1 day         Peripheral IV - Single Lumen 06/10/19 1611 20 G Left Antecubital less than 1 day                 Physical Exam   Constitutional: He appears well-developed and well-nourished. No distress.   Sedated, intubated   HENT:   Head: Normocephalic and atraumatic.   Cardiovascular: Normal rate, regular rhythm, normal heart sounds and intact distal pulses. Exam reveals no gallop and no friction rub.   No murmur heard.  Pulses:       Radial pulses are 0 on the right side, and 0 on the left side.        Femoral pulses are 2+ on the left side.       Dorsalis pedis pulses are 0 on the right side, and 0 on the left side.        Posterior tibial pulses are 0 on the right side, and 0 on the left side.   Sternotomy, healed. Surrounding ecchymoses. Extremities cool to touch. Right CFA IABP, Right CFV TVP   Pulmonary/Chest:   Intubated. Mechanical breath sounds, bilateral crackles   Abdominal: Soft. Bowel sounds are normal. He exhibits no distension. There is no tenderness.   Musculoskeletal: He exhibits edema (Trace bilateral lower extremity edema).   Left foot dressing c/d/i. Left foot mottled   Skin: He is not diaphoretic.       Significant Labs:     Recent Results (from the past 24 hour(s))   Basic metabolic panel    Collection Time: 06/10/19  8:18 AM   Result Value Ref Range    Sodium 132 (L) 136 - 145 mmol/L    Potassium 4.5 3.5 - 5.1 mmol/L    Chloride 102 95 - 110 mmol/L    CO2 21 (L) 23 - 29 mmol/L    Glucose 156 (H) 70 - 110 mg/dL    BUN, Bld 20 8 - 23 mg/dL    Creatinine 1.3 0.5 - 1.4 mg/dL    Calcium 7.7 (L) 8.7 - 10.5 mg/dL    Anion Gap 9 8 - 16 mmol/L    eGFR if African American >60.0 >60 mL/min/1.73 m^2    eGFR if non  52.6 (A) >60 mL/min/1.73 m^2   Magnesium    Collection Time: 06/10/19  8:18 AM   Result Value Ref Range    Magnesium 2.0 1.6 - 2.6 mg/dL   Phosphorus    Collection Time: 06/10/19  8:18 AM   Result Value Ref Range    Phosphorus 3.8 2.7 - 4.5 mg/dL   POCT glucose    Collection Time: 06/10/19  8:28 AM   Result Value Ref Range    POCT Glucose 179 (H) 70 - 110 mg/dL   APTT     Collection Time: 06/10/19  9:32 AM   Result Value Ref Range    aPTT 54.4 (H) 21.0 - 32.0 sec   POCT glucose    Collection Time: 06/10/19  9:33 AM   Result Value Ref Range    POCT Glucose 187 (H) 70 - 110 mg/dL   POCT glucose    Collection Time: 06/10/19 10:33 AM   Result Value Ref Range    POCT Glucose 184 (H) 70 - 110 mg/dL   Basic metabolic panel    Collection Time: 06/10/19 11:28 AM   Result Value Ref Range    Sodium 131 (L) 136 - 145 mmol/L    Potassium 4.3 3.5 - 5.1 mmol/L    Chloride 101 95 - 110 mmol/L    CO2 21 (L) 23 - 29 mmol/L    Glucose 155 (H) 70 - 110 mg/dL    BUN, Bld 20 8 - 23 mg/dL    Creatinine 1.3 0.5 - 1.4 mg/dL    Calcium 7.7 (L) 8.7 - 10.5 mg/dL    Anion Gap 9 8 - 16 mmol/L    eGFR if African American >60.0 >60 mL/min/1.73 m^2    eGFR if non  52.6 (A) >60 mL/min/1.73 m^2   Magnesium    Collection Time: 06/10/19 11:28 AM   Result Value Ref Range    Magnesium 2.0 1.6 - 2.6 mg/dL   Phosphorus    Collection Time: 06/10/19 11:28 AM   Result Value Ref Range    Phosphorus 3.8 2.7 - 4.5 mg/dL   POCT glucose    Collection Time: 06/10/19 11:30 AM   Result Value Ref Range    POCT Glucose 165 (H) 70 - 110 mg/dL   POCT glucose    Collection Time: 06/10/19 12:36 PM   Result Value Ref Range    POCT Glucose 183 (H) 70 - 110 mg/dL   POCT glucose    Collection Time: 06/10/19  1:45 PM   Result Value Ref Range    POCT Glucose 162 (H) 70 - 110 mg/dL   POCT glucose    Collection Time: 06/10/19  2:35 PM   Result Value Ref Range    POCT Glucose 154 (H) 70 - 110 mg/dL   POCT glucose    Collection Time: 06/10/19  3:42 PM   Result Value Ref Range    POCT Glucose 150 (H) 70 - 110 mg/dL   Basic metabolic panel    Collection Time: 06/10/19  4:18 PM   Result Value Ref Range    Sodium 134 (L) 136 - 145 mmol/L    Potassium 3.9 3.5 - 5.1 mmol/L    Chloride 102 95 - 110 mmol/L    CO2 21 (L) 23 - 29 mmol/L    Glucose 148 (H) 70 - 110 mg/dL    BUN, Bld 19 8 - 23 mg/dL    Creatinine 1.4 0.5 - 1.4 mg/dL    Calcium  7.6 (L) 8.7 - 10.5 mg/dL    Anion Gap 11 8 - 16 mmol/L    eGFR if African American 55.6 (A) >60 mL/min/1.73 m^2    eGFR if non  48.1 (A) >60 mL/min/1.73 m^2   Magnesium    Collection Time: 06/10/19  4:18 PM   Result Value Ref Range    Magnesium 2.0 1.6 - 2.6 mg/dL   Phosphorus    Collection Time: 06/10/19  4:18 PM   Result Value Ref Range    Phosphorus 3.9 2.7 - 4.5 mg/dL   POCT glucose    Collection Time: 06/10/19  4:36 PM   Result Value Ref Range    POCT Glucose 156 (H) 70 - 110 mg/dL   POCT glucose    Collection Time: 06/10/19  5:34 PM   Result Value Ref Range    POCT Glucose 175 (H) 70 - 110 mg/dL   POCT glucose    Collection Time: 06/10/19  6:17 PM   Result Value Ref Range    POCT Glucose 188 (H) 70 - 110 mg/dL   Basic metabolic panel    Collection Time: 06/10/19  7:31 PM   Result Value Ref Range    Sodium 135 (L) 136 - 145 mmol/L    Potassium 4.3 3.5 - 5.1 mmol/L    Chloride 105 95 - 110 mmol/L    CO2 21 (L) 23 - 29 mmol/L    Glucose 186 (H) 70 - 110 mg/dL    BUN, Bld 19 8 - 23 mg/dL    Creatinine 1.3 0.5 - 1.4 mg/dL    Calcium 7.8 (L) 8.7 - 10.5 mg/dL    Anion Gap 9 8 - 16 mmol/L    eGFR if African American >60.0 >60 mL/min/1.73 m^2    eGFR if non  52.6 (A) >60 mL/min/1.73 m^2   Magnesium    Collection Time: 06/10/19  7:31 PM   Result Value Ref Range    Magnesium 1.9 1.6 - 2.6 mg/dL   Phosphorus    Collection Time: 06/10/19  7:31 PM   Result Value Ref Range    Phosphorus 3.9 2.7 - 4.5 mg/dL   POCT glucose    Collection Time: 06/10/19  7:32 PM   Result Value Ref Range    POCT Glucose 210 (H) 70 - 110 mg/dL   POCT glucose    Collection Time: 06/10/19  8:34 PM   Result Value Ref Range    POCT Glucose 154 (H) 70 - 110 mg/dL   POCT glucose    Collection Time: 06/10/19  9:29 PM   Result Value Ref Range    POCT Glucose 148 (H) 70 - 110 mg/dL   POCT glucose    Collection Time: 06/10/19 10:23 PM   Result Value Ref Range    POCT Glucose 144 (H) 70 - 110 mg/dL   POCT glucose    Collection  Time: 06/10/19 11:40 PM   Result Value Ref Range    POCT Glucose 180 (H) 70 - 110 mg/dL   POCT glucose    Collection Time: 06/11/19 12:30 AM   Result Value Ref Range    POCT Glucose 180 (H) 70 - 110 mg/dL   POCT glucose    Collection Time: 06/11/19  1:31 AM   Result Value Ref Range    POCT Glucose 150 (H) 70 - 110 mg/dL   POCT glucose    Collection Time: 06/11/19  2:24 AM   Result Value Ref Range    POCT Glucose 181 (H) 70 - 110 mg/dL   CBC with Automated Differential    Collection Time: 06/11/19  3:42 AM   Result Value Ref Range    WBC 48.61 (H) 3.90 - 12.70 K/uL    RBC 2.97 (L) 4.60 - 6.20 M/uL    Hemoglobin 9.4 (L) 14.0 - 18.0 g/dL    Hematocrit 29.2 (L) 40.0 - 54.0 %    Mean Corpuscular Volume 98 82 - 98 fL    Mean Corpuscular Hemoglobin 31.6 (H) 27.0 - 31.0 pg    Mean Corpuscular Hemoglobin Conc 32.2 32.0 - 36.0 g/dL    RDW 15.4 (H) 11.5 - 14.5 %    Platelets 83 (L) 150 - 350 K/uL    MPV 9.9 9.2 - 12.9 fL    Immature Granulocytes CANCELED 0.0 - 0.5 %    Immature Grans (Abs) CANCELED 0.00 - 0.04 K/uL    Lymph # CANCELED 1.0 - 4.8 K/uL    Mono # CANCELED 0.3 - 1.0 K/uL    Eos # CANCELED 0.0 - 0.5 K/uL    Baso # CANCELED 0.00 - 0.20 K/uL    nRBC 0 0 /100 WBC    Gran% 2.0 (L) 38.0 - 73.0 %    Lymph% 98.0 (H) 18.0 - 48.0 %    Mono% 0.0 (L) 4.0 - 15.0 %    Eosinophil% 0.0 0.0 - 8.0 %    Basophil% 0.0 0.0 - 1.9 %    Aniso Slight     Poik Slight     Poly Occasional     Hypo Occasional     Ovalocytes Occasional     Differential Method Manual    Comprehensive Metabolic Panel (CMP)    Collection Time: 06/11/19  3:42 AM   Result Value Ref Range    Sodium 135 (L) 136 - 145 mmol/L    Potassium 4.6 3.5 - 5.1 mmol/L    Chloride 105 95 - 110 mmol/L    CO2 21 (L) 23 - 29 mmol/L    Glucose 172 (H) 70 - 110 mg/dL    BUN, Bld 18 8 - 23 mg/dL    Creatinine 1.3 0.5 - 1.4 mg/dL    Calcium 7.8 (L) 8.7 - 10.5 mg/dL    Total Protein 4.9 (L) 6.0 - 8.4 g/dL    Albumin 2.4 (L) 3.5 - 5.2 g/dL    Total Bilirubin 0.5 0.1 - 1.0 mg/dL     Alkaline Phosphatase 103 55 - 135 U/L    AST 55 (H) 10 - 40 U/L    ALT 26 10 - 44 U/L    Anion Gap 9 8 - 16 mmol/L    eGFR if African American >60.0 >60 mL/min/1.73 m^2    eGFR if non  52.6 (A) >60 mL/min/1.73 m^2   APTT    Collection Time: 06/11/19  3:42 AM   Result Value Ref Range    aPTT 49.5 (H) 21.0 - 32.0 sec   Magnesium    Collection Time: 06/11/19  3:42 AM   Result Value Ref Range    Magnesium 2.0 1.6 - 2.6 mg/dL   Phosphorus    Collection Time: 06/11/19  3:42 AM   Result Value Ref Range    Phosphorus 4.1 2.7 - 4.5 mg/dL   Basic metabolic panel    Collection Time: 06/11/19  3:42 AM   Result Value Ref Range    Sodium 135 (L) 136 - 145 mmol/L    Potassium 4.6 3.5 - 5.1 mmol/L    Chloride 105 95 - 110 mmol/L    CO2 21 (L) 23 - 29 mmol/L    Glucose 172 (H) 70 - 110 mg/dL    BUN, Bld 18 8 - 23 mg/dL    Creatinine 1.3 0.5 - 1.4 mg/dL    Calcium 7.8 (L) 8.7 - 10.5 mg/dL    Anion Gap 9 8 - 16 mmol/L    eGFR if African American >60.0 >60 mL/min/1.73 m^2    eGFR if non  52.6 (A) >60 mL/min/1.73 m^2   Magnesium    Collection Time: 06/11/19  3:42 AM   Result Value Ref Range    Magnesium 2.0 1.6 - 2.6 mg/dL   Phosphorus    Collection Time: 06/11/19  3:42 AM   Result Value Ref Range    Phosphorus 4.1 2.7 - 4.5 mg/dL   CBC auto differential    Collection Time: 06/11/19  3:42 AM   Result Value Ref Range    WBC 48.61 (H) 3.90 - 12.70 K/uL    RBC 2.97 (L) 4.60 - 6.20 M/uL    Hemoglobin 9.4 (L) 14.0 - 18.0 g/dL    Hematocrit 29.2 (L) 40.0 - 54.0 %    Mean Corpuscular Volume 98 82 - 98 fL    Mean Corpuscular Hemoglobin 31.6 (H) 27.0 - 31.0 pg    Mean Corpuscular Hemoglobin Conc 32.2 32.0 - 36.0 g/dL    RDW 15.4 (H) 11.5 - 14.5 %    Platelets 83 (L) 150 - 350 K/uL    MPV 9.9 9.2 - 12.9 fL    Immature Granulocytes CANCELED 0.0 - 0.5 %    Immature Grans (Abs) CANCELED 0.00 - 0.04 K/uL    Lymph # CANCELED 1.0 - 4.8 K/uL    Mono # CANCELED 0.3 - 1.0 K/uL    Eos # CANCELED 0.0 - 0.5 K/uL    Baso #  CANCELED 0.00 - 0.20 K/uL    nRBC 0 0 /100 WBC    Gran% 2.0 (L) 38.0 - 73.0 %    Lymph% 98.0 (H) 18.0 - 48.0 %    Mono% 0.0 (L) 4.0 - 15.0 %    Eosinophil% 0.0 0.0 - 8.0 %    Basophil% 0.0 0.0 - 1.9 %    Aniso Slight     Poik Slight     Poly Occasional     Hypo Occasional     Ovalocytes Occasional     Differential Method Manual    Protime-INR    Collection Time: 06/11/19  3:42 AM   Result Value Ref Range    Prothrombin Time 10.5 9.0 - 12.5 sec    INR 1.0 0.8 - 1.2   APTT    Collection Time: 06/11/19  3:42 AM   Result Value Ref Range    aPTT 49.5 (H) 21.0 - 32.0 sec   POCT glucose    Collection Time: 06/11/19  3:46 AM   Result Value Ref Range    POCT Glucose 176 (H) 70 - 110 mg/dL   POCT glucose    Collection Time: 06/11/19  4:22 AM   Result Value Ref Range    POCT Glucose 174 (H) 70 - 110 mg/dL   POCT glucose    Collection Time: 06/11/19  5:32 AM   Result Value Ref Range    POCT Glucose 171 (H) 70 - 110 mg/dL   POCT glucose    Collection Time: 06/11/19  6:25 AM   Result Value Ref Range    POCT Glucose 177 (H) 70 - 110 mg/dL         Significant Imaging:     I have reviewed all pertinent imaging studies from the last 24 hours      Assessment and Plan:     Patient is a 79 y.o. male presenting with:    NSTEMI  Known CAD with history of 2v CABG (1994) LIMA-D1 and SVG-LAD, recently had LHC 1/29/19 for worsening angina s/p DUC x 2 (mid and distal LCx). He was also found to have 50% D1 stenosis (distal to the LIMA graft insertion) and 60% proximal SVG graft stenosis and RCA .     S/p SVG DUC 6/6/19  AFib on 6/6/19, started on amiodarone, developed PMVT, taken back to cath lab with no new stenoses  Continue medical therapy with DAPT, high intensity statin  IABP placed via R CFA 6/6/19, still requiring pressor support although decreasing (0.12 mcg/kg/min)  CXR daily for IABP/ET tube placement        VTE Risk Mitigation (From admission, onward)        Ordered     heparin infusion 1,000 units/500 ml in 0.9% NaCl (pressure  line flush)  Intra-op continuous PRN      06/06/19 1052     heparin 25,000 units in dextrose 5% 250 mL (100 units/mL) infusion LOW INTENSITY nomogram - [facility]  Continuous      06/05/19 0853     heparin 25,000 units in dextrose 5% (100 units/ml) IV bolus from bag - ADDITIONAL PRN BOLUS - 60 units/kg (max bolus 4000 units)  As needed (PRN)      06/05/19 0853     heparin 25,000 units in dextrose 5% (100 units/ml) IV bolus from bag - ADDITIONAL PRN BOLUS - 30 units/kg (max bolus 4000 units)  As needed (PRN)      06/05/19 0853     heparin 25,000 units in dextrose 5% 250 mL (100 units/mL) infusion LOW INTENSITY nomogram - [facility]  Continuous      06/02/19 1508     IP VTE LOW RISK PATIENT  Once      05/29/19 7058          Milind Reyes MD  Interventional Cardiology  [facility]

## 2019-06-11 NOTE — SUBJECTIVE & OBJECTIVE
"Interval HPI:   Overnight events: Remains intubated in CMICU. BG well controlled on IV intensive insulin protocol with infusion rates ranging from 1.4-2 u/hr overnight.  Eating:   NPO  Nausea: No  Hypoglycemia and intervention: No  Fever: No  TPN and/or TF: No  If yes, type of TF/TPN and rate: none    BP (!) 119/59   Pulse 76   Temp (!) 106 °F (41.1 °C)   Resp 19   Ht 5' 7" (1.702 m)   Wt 92.1 kg (203 lb)   SpO2 100%   BMI 31.79 kg/m²     Labs Reviewed and Include    Recent Labs   Lab 06/11/19  0342   *  172*   CALCIUM 7.8*  7.8*   ALBUMIN 2.4*   PROT 4.9*   *  135*   K 4.6  4.6   CO2 21*  21*     105   BUN 18  18   CREATININE 1.3  1.3   ALKPHOS 103   ALT 26   AST 55*   BILITOT 0.5     Lab Results   Component Value Date    WBC 48.61 (H) 06/11/2019    WBC 48.61 (H) 06/11/2019    HGB 9.4 (L) 06/11/2019    HGB 9.4 (L) 06/11/2019    HCT 29.2 (L) 06/11/2019    HCT 29.2 (L) 06/11/2019    MCV 98 06/11/2019    MCV 98 06/11/2019    PLT 83 (L) 06/11/2019    PLT 83 (L) 06/11/2019     No results for input(s): TSH, FREET4 in the last 168 hours.  Lab Results   Component Value Date    HGBA1C 5.8 (H) 05/29/2019       Nutritional status:   Body mass index is 31.79 kg/m².  Lab Results   Component Value Date    ALBUMIN 2.4 (L) 06/11/2019    ALBUMIN 2.5 (L) 06/10/2019    ALBUMIN 2.7 (L) 06/09/2019     Lab Results   Component Value Date    PREALBUMIN 22 05/17/2019       Estimated Creatinine Clearance: 51.5 mL/min (based on SCr of 1.3 mg/dL).    Accu-Checks  Recent Labs     06/10/19  2223 06/10/19  2340 06/11/19  0030 06/11/19  0131 06/11/19  0224 06/11/19  0346 06/11/19  0422 06/11/19  0532 06/11/19  0625 06/11/19  0819   POCTGLUCOSE 144* 180* 180* 150* 181* 176* 174* 171* 177* 160*       Current Medications and/or Treatments Impacting Glycemic Control  Immunotherapy:    Immunosuppressants     None        Steroids:   Hormones (From admission, onward)    None        Pressors:    Autonomic Drugs (From " admission, onward)    Start     Stop Route Frequency Ordered    06/10/19 0345  norepinephrine 16 mg in dextrose 5 % 250 mL infusion     Question Answer Comment   Titrate by: (in mcg/kg/min) 0.02    Titrate interval: (in minutes) 5    Titrate to maintain: (MAP or SBP) MAP    Greater than: (in mmHg) 60    Maximum dose: (in mcg/kg/min) 3        -- IV Continuous 06/10/19 0331    06/08/19 1247  rocuronium (ZEMURON) 10 mg/mL injection     Note to Pharmacy:  Created by cabinet override    06/09 0059   06/08/19 1247        Hyperglycemia/Diabetes Medications:   Antihyperglycemics (From admission, onward)    Start     Stop Route Frequency Ordered    06/08/19 1730  insulin regular (Humulin R) 100 Units in sodium chloride 0.9% 100 mL infusion     Question:  Insulin Rate Adjustment (DO NOT MODIFY ANSWER)  Answer:  [email or web address]    -- IV Continuous 06/08/19 1623    06/08/19 0856  insulin aspart U-100 pen 0-5 Units      -- SubQ Before meals & nightly PRN 06/08/19 0752

## 2019-06-11 NOTE — PLAN OF CARE
06/11/19 1513   Discharge Reassessment   Assessment Type Discharge Planning Reassessment   Provided patient/caregiver education on the expected discharge date and the discharge plan Yes   Do you have any problems affording any of your prescribed medications? No   Discharge Plan A Home with family;Home Health   Discharge Plan B Skilled Nursing Facility   Can the patient answer the patient profile reliably? Yes, cognitively intact   Describe the patient's ability to walk at the present time. Major restrictions/daily assistance from another person   How often would a person be available to care for the patient? Whenever needed   Number of comorbid conditions (as recorded on the chart) Five or more   During the past month, has the patient often been bothered by feeling down, depressed or hopeless? No   During the past month, has the patient often been bothered by little interest or pleasure in doing things? No

## 2019-06-11 NOTE — ASSESSMENT & PLAN NOTE
Pt had LHC then developed AF was started on amiodarone and had PMVT s/p several shocks. Repeat LHC with patent stents. Initially was on lidocaine which was weaned off however on 6/8 had several episodes of MMVT and PMVT which required CPR, intubation and shocks. Has been rewarmed no events overnight, no VT, VF    Plan  Lidocaine titration per primary team  If patient improves will need to be switched to oral AAD  Keep K >4 Mg >2  Awaiting to see if patient wakes up  Continue Anticoagulation if no contraindications per primary

## 2019-06-11 NOTE — SUBJECTIVE & OBJECTIVE
Interval History: Pat[place] with no acute events overnight, off propofol remains on fentayl for pain, follows commands, SBT will be attempted    Review of Systems   Unable to perform ROS: intubated     Objective:     Vital Signs (Most Recent):  Temp: 98.2 °F (36.8 °C) (06/11/19 1500)  Pulse: 74 (06/11/19 1500)  Resp: 18 (06/11/19 1500)  BP: (!) 102/56 (06/11/19 1200)  SpO2: 97 % (06/11/19 1500) Vital Signs (24h Range):  Temp:  [97.2 °F (36.2 °C)-98.8 °F (37.1 °C)] 98.2 °F (36.8 °C)  Pulse:  [67-80] 74  Resp:  [3-19] 18  SpO2:  [96 %-100 %] 97 %  BP: ()/(54-73) 102/56     Weight: 92.1 kg (203 lb 0.7 oz)  Body mass index is 31.8 kg/m².     SpO2: 97 %  O2 Device (Oxygen Therapy): ventilator      Intake/Output Summary (Last 24 hours) at 6/11/2019 1528  Last data filed at 6/11/2019 1500  Gross per 24 hour   Intake 1939.7 ml   Output 4540 ml   Net -2600.3 ml       Lines/Drains/Airways     Central Venous Catheter Line                 Percutaneous Central Line Insertion/Assessment - triple lumen  06/06/19 1949 right internal jugular 4 days          Drain                 Sheath 06/06/19 1412 Right proximal 5 days         NG/OG Tube 06/08/19 1445 [place] sump 14 Fr. Center mouth 3 days         Urethral Catheter 06/08/19 1525 14 Fr. 3 days          Airway                 Airway - Non-Surgical 06/08/19 1254 Endotracheal Tube 3 days          Line                 IABP 06/06/19 1728 4 days         Pacer Wires 06/06/19 1728 4 days          Peripheral Intravenous Line                 Peripheral IV - Single Lumen 06/10/19 1604 20 G Right Forearm less than 1 day         Peripheral IV - Single Lumen 06/10/19 1611 20 G Left Antecubital less than 1 day                Physical Exam   Constitutional: He appears well-developed and well-nourished. No distress.   Sedated, intubated   HENT:   Head: Normocephalic and atraumatic.   Cardiovascular: Normal rate, regular rhythm, normal heart sounds and intact distal pulses. Exam reveals no gallop  and no friction rub.   No murmur heard.  Pulses:       Radial pulses are 0 on the right side, and 0 on the left side.        Femoral pulses are 2+ on the left side.       Dorsalis pedis pulses are 0 on the right side, and 0 on the left side.        Posterior tibial pulses are 0 on the right side, and 0 on the left side.   Sternotomy, healed. Surrounding ecchymoses. Extremities cool to touch. Right CFA IABP, Right CFV TVP   Pulmonary/Chest:   Intubated. Mechanical breath sounds, bilateral crackles   Abdominal: Soft. Bowel sounds are normal. He exhibits no distension. There is no tenderness.   Musculoskeletal: He exhibits edema (1+ bilateral lower extremity edema).   Left foot dressing c/d/i. Left foot mottled   Neurological:   Follows commands    Skin: He is not diaphoretic.       Significant Labs:   CMP   Recent Labs   Lab 06/10/19  0406  06/11/19  0342 06/11/19  0833 06/11/19  1147   *  134*   < > 135*  135* 135* 136   K 3.9  3.9   < > 4.6  4.6 4.1 4.0     102   < > 105  105 106 106   CO2 21*  21*   < > 21*  21* 21* 21*   *  162*  162*   < > 172*  172* 151* 169*   BUN 20  20   < > 18  18 19 18   CREATININE 1.1  1.1   < > 1.3  1.3 1.2 1.2   CALCIUM 7.8*  7.8*   < > 7.8*  7.8* 7.9* 8.0*   PROT 4.9*  --  4.9*  --   --    ALBUMIN 2.5*  --  2.4*  --   --    BILITOT 0.6  --  0.5  --   --    ALKPHOS 88  --  103  --   --    AST 39  --  55*  --   --    ALT 27  --  26  --   --    ANIONGAP 11  11   < > 9  9 8 9   ESTGFRAFRICA >60.0  >60.0   < > >60.0  >60.0 >60.0 >60.0   EGFRNONAA >60.0  >60.0   < > 52.6*  52.6* 58.0* 58.0*    < > = values in this interval not displayed.   , CBC   Recent Labs   Lab 06/10/19  0406 06/11/19  0342   WBC 53.96*  53.96* 48.61*  48.61*   HGB 9.6*  9.6* 9.4*  9.4*   HCT 29.9*  29.9* 29.2*  29.2*   *  101* 83*  83*    and All pertinent lab results from the last 24 hours have been reviewed.    Significant Imaging: All imaging in the last 24  hours reviewed

## 2019-06-11 NOTE — PROGRESS NOTES
06/11/2019  Shade Chandler    Current provider:  Paz Pradhan MD      I, Shade Chandler, rounded on Reid S Virgil to ensure all mechanical assist device settings (IABP or VAD) were appropriate and all parameters were within limits.  I was able to ensure all back up equipment was present, the staff had no issues, and the Perfusion Department daily rounding was complete.    9:32 AM

## 2019-06-12 NOTE — SUBJECTIVE & OBJECTIVE
Interval History: Non-sustained VT x 2 overnight, self limiting. Sedated so couldn't gauge commands. Lidocaine @ 2    Review of Systems   Unable to perform ROS: intubated     Objective:     Vital Signs (Most Recent):  Temp: 99.3 °F (37.4 °C) (06/12/19 0907)  Pulse: 84 (06/12/19 0907)  Resp: 18 (06/12/19 0907)  BP: (!) 100/55 (06/12/19 0701)  SpO2: 96 % (06/12/19 0907) Vital Signs (24h Range):  Temp:  [96.6 °F (35.9 °C)-99.3 °F (37.4 °C)] 99.3 °F (37.4 °C)  Pulse:  [74-86] 84  Resp:  [18-20] 18  SpO2:  [94 %-100 %] 96 %  BP: (100-125)/(54-71) 100/55     Weight: 96 kg (211 lb 10.3 oz)  Body mass index is 33.15 kg/m².     SpO2: 96 %  O2 Device (Oxygen Therapy): ventilator    Physical Exam   Constitutional: He appears well-developed and well-nourished. No distress.   Sedated, intubated   HENT:   Head: Normocephalic and atraumatic.   Cardiovascular: Normal rate, regular rhythm, normal heart sounds and intact distal pulses. Exam reveals no gallop and no friction rub.   No murmur heard.  Sternotomy, healed. Surrounding ecchymoses. Extremities cool to touch. Right CFA IABP   Pulmonary/Chest:   Intubated. Mechanical breath sounds   Abdominal: Soft. Bowel sounds are normal. He exhibits no distension. There is no tenderness.   Musculoskeletal: He exhibits edema (Trace bilateral lower extremity edema).   Left foot dressing c/d/i. Left foot mottled   Neurological:   Opens eyes to stimulation   Skin: He is not diaphoretic.       Significant Labs:   EP:   Recent Labs   Lab 06/11/19  0342  06/12/19  0124 06/12/19  0340 06/12/19  0619 06/12/19  0820   *  135*   < > 135*  --  135*  135* 134*   K 4.6  4.6   < > 4.0  --  4.8  4.8 4.7     105   < > 103  --  104  104 105   CO2 21*  21*   < > 22*  --  23  23 22*   *  172*   < > 141*  --  148*  148* 155*   BUN 18  18   < > 21  --  20  20 22   CREATININE 1.3  1.3   < > 1.1  --  1.2  1.2 1.2   CALCIUM 7.8*  7.8*   < > 8.0*  --  8.4*  8.4* 8.3*   PROT 4.9*   --   --   --  4.9*  --    ALBUMIN 2.4*  --   --   --  2.3*  --    BILITOT 0.5  --   --   --  0.5  --    ALKPHOS 103  --   --   --  96  --    AST 55*  --   --   --  77*  --    ALT 26  --   --   --  29  --    ANIONGAP 9  9   < > 10  --  8  8 7*   ESTGFRAFRICA >60.0  >60.0   < > >60.0  --  >60.0  >60.0 >60.0   EGFRNONAA 52.6*  52.6*   < > >60.0  --  58.0*  58.0* 58.0*   WBC 48.61*  48.61*  --   --  37.63*  --  31.91*   HGB 9.4*  9.4*  --   --  7.6*  --  8.5*   HCT 29.2*  29.2*  --   --  24.6*  --  27.2*   PLT 83*  83*  --   --  78*  --  67*   INR 1.0  --   --  1.0  --   --     < > = values in this interval not displayed.       Significant Imaging: Echocardiogram:   Transthoracic echo (TTE) complete (Cupid Only):   Results for orders placed or performed during the hospital encounter of 05/29/19   Transthoracic echo (TTE) 2D with Color Flow   Result Value Ref Range    Ascending aorta 3.31 cm    STJ 3.12 cm    AV mean gradient 3.88 mmHg    Ao peak layo 1.40 m/s    Ao VTI 15.79 cm    IVRT 0.09 msec    IVS 0.80 0.6 - 1.1 cm    LA size 4.30 cm    Left Atrium Major Axis 5.50 cm    Left Atrium Minor Axis 5.50 cm    LVIDD 5.10 3.5 - 6.0 cm    LVIDS 4.40 (A) 2.1 - 4.0 cm    LVOT diameter 1.99 cm    LVOT peak VTI 9.16 cm    PW 0.70 0.6 - 1.1 cm    MV Peak A Layo 0.23 m/s    E wave decelartion time 229.40 msec    MV Peak E Layo 0.94 m/s    RA Major Axis 5.23 cm    RA Width 3.94 cm    RVDD 4.28 cm    Sinus 3.59 cm    TAPSE 1.23 cm    TR Max Layo 1.75 m/s    TDI LATERAL 0.03     TDI SEPTAL 0.03     LA WIDTH 4.30 cm    LV Diastolic Volume 107.96 mL    LV Systolic Volume 73.56 mL    RV S' 4.10 m/s    LVOT peak layo [id number] m/s    LV LATERAL E/E' RATIO 31.33     LV SEPTAL E/E' RATIO 31.33     FS 14 %    LA volume 86.44 cm3    LV mass 129.43 g    Left Ventricle Relative Wall Thickness 0.27 cm    AV valve area 1.80 cm2    AV Velocity Ratio 0.61     AV index (prosthetic) 0.58     E/A ratio 4.09     Mean e' 0.03     LVOT  area 3.11 cm2    LVOT stroke volume 28.48 cm3    AV peak gradient 7.84 mmHg    E/E' ratio 31.33     LV Systolic Volume Index 36.1 mL/m2    LV Diastolic Volume Index 53.05 mL/m2    LA Volume Index 42.5 mL/m2    LV Mass Index 63.6 g/m2    Triscuspid Valve Regurgitation Peak Gradient 12.25 mmHg    BSA 2.09 m2

## 2019-06-12 NOTE — SUBJECTIVE & OBJECTIVE
Interval History: Some nonsustained VT , electrolytes all WNL, will attempt SBT and likely feed if fails today     Review of Systems   Unable to perform ROS: intubated     Objective:     Vital Signs (Most Recent):  Temp: 99.5 °F (37.5 °C) (06/12/19 1000)  Pulse: 82 (06/12/19 1000)  Resp: 18 (06/12/19 1000)  BP: (!) 100/55 (06/12/19 0701)  SpO2: 97 % (06/12/19 1000) Vital Signs (24h Range):  Temp:  [96.6 °F (35.9 °C)-99.5 °F (37.5 °C)] 99.5 °F (37.5 °C)  Pulse:  [74-86] 82  Resp:  [18-20] 18  SpO2:  [94 %-100 %] 97 %  BP: (100-125)/(54-71) 100/55     Weight: 96 kg (211 lb 10.3 oz)  Body mass index is 33.15 kg/m².     SpO2: 97 %  O2 Device (Oxygen Therapy): ventilator      Intake/Output Summary (Last 24 hours) at 6/12/2019 1055  Last data filed at 6/12/2019 1000  Gross per 24 hour   Intake 1580.73 ml   Output 2890 ml   Net -1309.27 ml       Lines/Drains/Airways     Central Venous Catheter Line                 Percutaneous Central Line Insertion/Assessment - triple lumen  06/06/19 1949 right internal jugular 5 days          Drain                 Sheath 06/06/19 1412 Right proximal 5 days         NG/OG Tube 06/08/19 1445 [place] sump 14 Fr. Center mouth 3 days         Urethral Catheter 06/08/19 1525 14 Fr. 3 days          Airway                 Airway - Non-Surgical 06/08/19 1254 Endotracheal Tube 3 days          Line                 IABP 06/06/19 1728 5 days         Pacer Wires 06/06/19 1728 5 days          Peripheral Intravenous Line                 Peripheral IV - Single Lumen 06/10/19 1604 20 G Right Forearm 1 day         Peripheral IV - Single Lumen 06/10/19 1611 20 G Left Antecubital 1 day                Physical Exam   Constitutional: He appears well-developed and well-nourished. No distress.   Sedated, intubated   HENT:   Head: Normocephalic and atraumatic.   Cardiovascular: Normal rate, regular rhythm, normal heart sounds and intact distal pulses. Exam reveals no gallop and no friction rub.   No murmur  heard.  Sternotomy, healed. Surrounding ecchymoses. Extremities cool to touch. Right CFA IABP   Pulmonary/Chest:   Intubated. Mechanical breath sounds   Abdominal: Soft. Bowel sounds are normal. He exhibits no distension. There is no tenderness.   Musculoskeletal: He exhibits edema (Trace bilateral lower extremity edema).   Left foot dressing c/d/i. Left foot mottled   Neurological:   Opens eyes to stimulation   Skin: He is not diaphoretic.       Significant Labs:   CMP   Recent Labs   Lab 06/11/19  0342 06/12/19  0124 06/12/19  0619 06/12/19  0820   *  135*   < > 135* 135*  135* 134*   K 4.6  4.6   < > 4.0 4.8  4.8 4.7     105   < > 103 104  104 105   CO2 21*  21*   < > 22* 23  23 22*   *  172*   < > 141* 148*  148* 155*   BUN 18  18   < > 21 20  20 22   CREATININE 1.3  1.3   < > 1.1 1.2  1.2 1.2   CALCIUM 7.8*  7.8*   < > 8.0* 8.4*  8.4* 8.3*   PROT 4.9*  --   --  4.9*  --    ALBUMIN 2.4*  --   --  2.3*  --    BILITOT 0.5  --   --  0.5  --    ALKPHOS 103  --   --  96  --    AST 55*  --   --  77*  --    ALT 26  --   --  29  --    ANIONGAP 9  9   < > 10 8  8 7*   ESTGFRAFRICA >60.0  >60.0   < > >60.0 >60.0  >60.0 >60.0   EGFRNONAA 52.6*  52.6*   < > >60.0 58.0*  58.0* 58.0*    < > = values in this interval not displayed.    and CBC   Recent Labs   Lab 06/11/19 0342 06/12/19  0340 06/12/19  0820   WBC 48.61*  48.61* 37.63* 31.91*   HGB 9.4*  9.4* 7.6* 8.5*   HCT 29.2*  29.2* 24.6* 27.2*   PLT 83*  83* 78* 67*       Significant Imaging: X-Ray: CXR: X-Ray Chest 1 View (CXR):   Results for orders placed or performed during the hospital encounter of 05/29/19   X-Ray Chest 1 View    Narrative    EXAMINATION:  XR CHEST 1 VIEW    CLINICAL HISTORY:  IABT and ET Tube placement;    TECHNIQUE:  Single frontal view of the chest was performed.    COMPARISON:  Radiograph 06/11/2019.    FINDINGS:  Sternal sutures are identified and appear appropriately aligned and intact.  ET tube and  enteric tube are again noted and appear unchanged in comparison to prior examination.  Right IJ central venous catheter noted with the distal tip projecting over the expected location of the right atrium.  Intra-aortic balloon pump is seen and appears unchanged in position and location.    Lungs are symmetrically expanded.  Mediastinal structures are midline.  Cardiac silhouette is unchanged.  Atherosclerotic calcifications noted at the level of the aortic arch.  No pneumothorax, pleural effusion, or consolidation is seen.      Impression    No significant interval change.      Electronically signed by: Senthil Hoyt  Date:    06/12/2019  Time:    06:52

## 2019-06-12 NOTE — PROGRESS NOTES
[facility]  Cardiology  Progress Note    Patient Name: Reid Herman  MRN: [id number]  Admission Date: 5/29/2019  Hospital Length of Stay: 14 days  Code Status: DNR   Attending Physician: Paz Pradhan MD   Primary Care Physician: Olivia Zavala MD  Expected Discharge Date: 6/17/2019  Principal Problem:VT (ventricular tachycardia)    Subjective:     Hospital Course:   Patient transffered to the ccu after he had a code after PMVT s/p several shocks. Repeat [facility] with patent stents. Initially was on lidocaine which was weaned off however on 6/8 had several episodes of MMVT and PMVT which required CPR, intubation and shocks. Currently being rewarmed. Prognosis very poor and family updated , code status changed to DNR, propofol weaning started and he had some bilateral lower extremity twitching ,EEG negative for seixure and patient following commands off propofol     Interval History: Some nonsustained VT , electrolytes all WNL, will attempt SBT and likely feed if fails today     Review of Systems   Unable to perform ROS: intubated     Objective:     Vital Signs (Most Recent):  Temp: 99.5 °F (37.5 °C) (06/12/19 1000)  Pulse: 82 (06/12/19 1000)  Resp: 18 (06/12/19 1000)  BP: (!) 100/55 (06/12/19 0701)  SpO2: 97 % (06/12/19 1000) Vital Signs (24h Range):  Temp:  [96.6 °F (35.9 °C)-99.5 °F (37.5 °C)] 99.5 °F (37.5 °C)  Pulse:  [74-86] 82  Resp:  [18-20] 18  SpO2:  [94 %-100 %] 97 %  BP: (100-125)/(54-71) 100/55     Weight: 96 kg (211 lb 10.3 oz)  Body mass index is 33.15 kg/m².     SpO2: 97 %  O2 Device (Oxygen Therapy): ventilator      Intake/Output Summary (Last 24 hours) at 6/12/2019 1055  Last data filed at 6/12/2019 1000  Gross per 24 hour   Intake 1580.73 ml   Output 2890 ml   Net -1309.27 ml       Lines/Drains/Airways     Central Venous Catheter Line                 Percutaneous Central Line Insertion/Assessment - triple lumen  06/06/19 1949 right internal jugular 5 days          Drain                  Sheath 06/06/19 1412 Right proximal 5 days         NG/OG Tube 06/08/19 1445 [place] sump 14 Fr. Center mouth 3 days         Urethral Catheter 06/08/19 1525 14 Fr. 3 days          Airway                 Airway - Non-Surgical 06/08/19 1254 Endotracheal Tube 3 days          Line                 IABP 06/06/19 1728 5 days         Pacer Wires 06/06/19 1728 5 days          Peripheral Intravenous Line                 Peripheral IV - Single Lumen 06/10/19 1604 20 G Right Forearm 1 day         Peripheral IV - Single Lumen 06/10/19 1611 20 G Left Antecubital 1 day                Physical Exam   Constitutional: He appears well-developed and well-nourished. No distress.   Sedated, intubated   HENT:   Head: Normocephalic and atraumatic.   Cardiovascular: Normal rate, regular rhythm, normal heart sounds and intact distal pulses. Exam reveals no gallop and no friction rub.   No murmur heard.  Sternotomy, healed. Surrounding ecchymoses. Extremities cool to touch. Right CFA IABP   Pulmonary/Chest:   Intubated. Mechanical breath sounds   Abdominal: Soft. Bowel sounds are normal. He exhibits no distension. There is no tenderness.   Musculoskeletal: He exhibits edema (Trace bilateral lower extremity edema).   Left foot dressing c/d/i. Left foot mottled   Neurological:   Opens eyes to stimulation   Skin: He is not diaphoretic.       Significant Labs:   CMP   Recent Labs   Lab 06/11/19  0342  06/12/19  0124 06/12/19  0619 06/12/19  0820   *  135*   < > 135* 135*  135* 134*   K 4.6  4.6   < > 4.0 4.8  4.8 4.7     105   < > 103 104  104 105   CO2 21*  21*   < > 22* 23  23 22*   *  172*   < > 141* 148*  148* 155*   BUN 18  18   < > 21 20  20 22   CREATININE 1.3  1.3   < > 1.1 1.2  1.2 1.2   CALCIUM 7.8*  7.8*   < > 8.0* 8.4*  8.4* 8.3*   PROT 4.9*  --   --  4.9*  --    ALBUMIN 2.4*  --   --  2.3*  --    BILITOT 0.5  --   --  0.5  --    ALKPHOS 103  --   --  96  --    AST 55*  --   --  77*  --     ALT 26  --   --  29  --    ANIONGAP 9  9   < > 10 8  8 7*   ESTGFRAFRICA >60.0  >60.0   < > >60.0 >60.0  >60.0 >60.0   EGFRNONAA 52.6*  52.6*   < > >60.0 58.0*  58.0* 58.0*    < > = values in this interval not displayed.    and CBC   Recent Labs   Lab 06/11/19  0342 06/12/19  0340 06/12/19  0820   WBC 48.61*  48.61* 37.63* 31.91*   HGB 9.4*  9.4* 7.6* 8.5*   HCT 29.2*  29.2* 24.6* 27.2*   PLT 83*  83* 78* 67*       Significant Imaging: X-Ray: CXR: X-Ray Chest 1 View (CXR):   Results for orders placed or performed during the hospital encounter of 05/29/19   X-Ray Chest 1 View    Narrative    EXAMINATION:  XR CHEST 1 VIEW    CLINICAL HISTORY:  IABT and ET Tube placement;    TECHNIQUE:  Single frontal view of the chest was performed.    COMPARISON:  Radiograph 06/11/2019.    FINDINGS:  Sternal sutures are identified and appear appropriately aligned and intact.  ET tube and enteric tube are again noted and appear unchanged in comparison to prior examination.  Right IJ central venous catheter noted with the distal tip projecting over the expected location of the right atrium.  Intra-aortic balloon pump is seen and appears unchanged in position and location.    Lungs are symmetrically expanded.  Mediastinal structures are midline.  Cardiac silhouette is unchanged.  Atherosclerotic calcifications noted at the level of the aortic arch.  No pneumothorax, pleural effusion, or consolidation is seen.      Impression    No significant interval change.      Electronically signed by: Senthil Hoyt  Date:    06/12/2019  Time:    06:52     Assessment and Plan:     Brief HPI: 80 yo M w/ PMH significant for CAD s/p CABG x2 (SVG-LAD, LIMA-D1, 1994), NSTEMI s/p DUC to distal, mid LCx (1/2019), PAD, CKD, CLL (not currently on therapy), HTN, HLD, and MINDA who presents w/ worsening L great toe wound that has failed outpatient therapy. Reports noticing worsening L foot pain, discomfort. Recently completed multiple course of  antibiotics however w/ minimal improvement. Podiatry consulted w/ imaging concerning osteomyelitis. Complicated course of NSTEMI, afib that lead to VT arrest and transfer to the CCU. Extubated/reintubated when patient on 6/8 had multiple events of PMVT and shocked multiple times now undergoing TTM. Today follows commands remains a little sleepy     * VT (ventricular tachycardia)  Pt had LHC then developed AF was started on amiodarone and had PMVT s/p several shocks. Repeat LHC with patent stents. Initially was on lidocaine which was weaned off however on 6/8 had several episodes of MMVT and PMVT which required CPR, intubation and shocks. Currently being cooled.    Plan  - EP on board   - will continue with lidocaine and likely switch to Mexiletine or another agent   - keep electrolytes repleted   - daily sbt       Cellulitis of Left foot  - Vitals are WNLs  - Continue on Vanc and CTX until 7/16   Acute HF (heart failure)  Known CAD with history of 2v CABG (1994) LIMA-D1 and SVG-LAD, recently had LHC 1/29/19 for worsening angina s/p DUC x 2 (mid and distal LCx). He was also found to have 50% D1 stenosis (distal to the LIMA graft insertion) and 60% proximal SVG graft stenosis and RCA .      S/p SVG DUC 6/6/19  Continue medical therapy with DAPT, high intensity statin  IABP placed via R CFA 6/6/19, on levophed  CXR daily for IABP  Continue on 80 mg IV lasix BID.         Goals of care, counseling/discussion  - discussed with family and they request DNR for now       Cardiac arrest  As under VT    Atrial fibrillation and Focal atrial tachycardia  - Continue on heparin gtt.    SHANIKA (acute kidney injury)  resolved    NSTEMI (non-ST elevated myocardial infarction)  - continue asa, statin, plavix    CKD (chronic kidney disease), stage III  - stable     MINDA on CPAP  Intubated right now   cpap nightly when extubated     Controlled type 1 diabetes mellitus with diabetic neuropathy, with long-term current use of insulin  - on  insulin drip , management per endocrinology     CLL (chronic lymphocytic leukemia)  - stable         VTE Risk Mitigation (From admission, onward)        Ordered     heparin infusion 1,000 units/500 ml in 0.9% NaCl (pressure line flush)  Intra-op continuous PRN      06/06/19 1052     heparin 25,000 units in dextrose 5% 250 mL (100 units/mL) infusion LOW INTENSITY nomogram - [facility]  Continuous      06/05/19 0853     heparin 25,000 units in dextrose 5% (100 units/ml) IV bolus from bag - ADDITIONAL PRN BOLUS - 60 units/kg (max bolus 4000 units)  As needed (PRN)      06/05/19 0853     heparin 25,000 units in dextrose 5% (100 units/ml) IV bolus from bag - ADDITIONAL PRN BOLUS - 30 units/kg (max bolus 4000 units)  As needed (PRN)      06/05/19 0853     heparin 25,000 units in dextrose 5% 250 mL (100 units/mL) infusion LOW INTENSITY nomogram - [facility]  Continuous      06/02/19 1508     IP VTE LOW RISK PATIENT  Once      05/29/19 0235          Shanna To MD  Cardiology  [facility]

## 2019-06-12 NOTE — PLAN OF CARE
Problem: Adult Inpatient Plan of Care  Goal: Plan of Care Review  Outcome: Ongoing (interventions implemented as appropriate)  Pt remains sedated and intubated. Follows commands and opens eyes to voice.  No other episodes of Vtach/vfib episode. CMP and Mg sent, awaiting result.   Remains with TVP on right groin.  Remains on IABP 1:1 EKG Auto.   No BM. Still with OGT.  Remains on IFC, UO adeqaute . CVP 10-11-11.  Afebrile.   Remains on Heparin, Lidocaine, Levophed and Fentanyl drip.  Plan for SBT today.  Plan of care reviewed with pt and spouse Michelle. All concerns and questions addressed. TM

## 2019-06-12 NOTE — NURSING
Informed Dr. Murray of Mg 2.4 . Asking if he wants more replacement because  previous order that was completed was to give Mg 2g if Mg level below 3 and pt has episodes of ectopy.  No new order. WCTM

## 2019-06-12 NOTE — SUBJECTIVE & OBJECTIVE
"Interval HPI:   Overnight events: Remains intubated in CMICU. BG well controlled on IV intensive insulin protocol with infusion rates ranging from 1.5-3 u/hr overnight.  Eating:   NPO  Nausea: No  Hypoglycemia and intervention: No  Fever: Yes (99.5)  TPN and/or TF: No  If yes, type of TF/TPN and rate: none    BP (!) 100/55 (BP Location: Right arm, Patient Position: Lying)   Pulse 86   Temp 99.5 °F (37.5 °C)   Resp 18   Ht 5' 7" (1.702 m)   Wt 96 kg (211 lb 10.3 oz)   SpO2 (!) 94%   BMI 33.15 kg/m²     Labs Reviewed and Include    Recent Labs   Lab 06/12/19  0619  06/12/19  1128   *  148*   < > 178*   CALCIUM 8.4*  8.4*   < > 8.2*   ALBUMIN 2.3*  --   --    PROT 4.9*  --   --    *  135*   < > 136   K 4.8  4.8   < > 4.9   CO2 23  23   < > 20*     104   < > 105   BUN 20  20   < > 22   CREATININE 1.2  1.2   < > 1.2   ALKPHOS 96  --   --    ALT 29  --   --    AST 77*  --   --    BILITOT 0.5  --   --     < > = values in this interval not displayed.     Lab Results   Component Value Date    WBC 31.91 (H) 06/12/2019    HGB 8.5 (L) 06/12/2019    HCT 27.2 (L) 06/12/2019     (H) 06/12/2019    PLT 67 (L) 06/12/2019     No results for input(s): TSH, FREET4 in the last 168 hours.  Lab Results   Component Value Date    HGBA1C 5.8 (H) 05/29/2019       Nutritional status:   Body mass index is 33.15 kg/m².  Lab Results   Component Value Date    ALBUMIN 2.3 (L) 06/12/2019    ALBUMIN 2.4 (L) 06/11/2019    ALBUMIN 2.5 (L) 06/10/2019     Lab Results   Component Value Date    PREALBUMIN 22 05/17/2019       Estimated Creatinine Clearance: 56.9 mL/min (based on SCr of 1.2 mg/dL).    Accu-Checks  Recent Labs     06/12/19  0219 06/12/19  0305 06/12/19  0406 06/12/19  0518 06/12/19  0615 06/12/19  0726 06/12/19  0826 06/12/19  1020 06/12/19  1120 06/12/19  1329   POCTGLUCOSE 156* 146* 183* 162* 159* 167* 165* 165* 196* 185*       Current Medications and/or Treatments Impacting Glycemic " Control  Immunotherapy:    Immunosuppressants     None        Steroids:   Hormones (From admission, onward)    None        Pressors:    Autonomic Drugs (From admission, onward)    Start     Stop Route Frequency Ordered    06/10/19 0345  norepinephrine 16 mg in dextrose 5 % 250 mL infusion     Question Answer Comment   Titrate by: (in mcg/kg/min) 0.02    Titrate interval: (in minutes) 5    Titrate to maintain: (MAP or SBP) MAP    Greater than: (in mmHg) 60    Maximum dose: (in mcg/kg/min) 3        -- IV Continuous 06/10/19 0331    06/08/19 1247  rocuronium (ZEMURON) 10 mg/mL injection     Note to Pharmacy:  Created by cabinet override    06/09 0059   06/08/19 1247        Hyperglycemia/Diabetes Medications:   Antihyperglycemics (From admission, onward)    Start     Stop Route Frequency Ordered    06/08/19 1730  insulin regular (Humulin R) 100 Units in sodium chloride 0.9% 100 mL infusion     Question:  Insulin Rate Adjustment (DO NOT MODIFY ANSWER)  Answer:  [email or web address]    -- IV Continuous 06/08/19 1623    06/08/19 0856  insulin aspart U-100 pen 0-5 Units      -- SubQ Before meals & nightly PRN 06/08/19 0757

## 2019-06-12 NOTE — PROGRESS NOTES
06/12/2019  Shannon Mcnally    Current provider:  Paz Pradhan MD      I, Shannon Mcnally, rounded on Reid S Virgil to ensure all mechanical assist device settings (IABP or VAD) were appropriate and all parameters were within limits.  I was able to ensure all back up equipment was present, the staff had no issues, and the Perfusion Department daily rounding was complete.    9:44 AM

## 2019-06-12 NOTE — PROGRESS NOTES
CPAP trial performed. Patient apnea multiple times. FVC and NIF performed per MD. NIF -10 and FVC 430Vt~.

## 2019-06-12 NOTE — PLAN OF CARE
Problem: Adult Inpatient Plan of Care  Goal: Plan of Care Review  Outcome: Ongoing (interventions implemented as appropriate)  Levophed, fentanyl, heparin, lidocaine, and insulin gtts. CVP 10-12 throughout shift. IABP 1:1, ECG, auto. Multiple runs of non-sustained VT throughout shift, electrolytes WNL and cardiology notified. Failed SBT again today, plan to try again tomorrow. No acute events throughout day, VS and assessment per flow sheet, patient progressing towards goals as tolerated, plan of care reviewed with Reid Herman and family, all concerns addressed, will continue to monitor.

## 2019-06-12 NOTE — PROGRESS NOTES
[facility]  Cardiac Electrophysiology  Progress Note    Admission Date: 5/29/2019  Code Status: DNR   Attending Physician: Paz Pradhan MD   Expected Discharge Date: 6/17/2019  Principal Problem:VT (ventricular tachycardia)    Subjective:     Interval History: Non-sustained VT x 2 overnight, self limiting. Sedated so couldn't gauge commands. Lidocaine @ 2    Review of Systems   Unable to perform ROS: intubated     Objective:     Vital Signs (Most Recent):  Temp: 99.3 °F (37.4 °C) (06/12/19 0907)  Pulse: 84 (06/12/19 0907)  Resp: 18 (06/12/19 0907)  BP: (!) 100/55 (06/12/19 0701)  SpO2: 96 % (06/12/19 0907) Vital Signs (24h Range):  Temp:  [96.6 °F (35.9 °C)-99.3 °F (37.4 °C)] 99.3 °F (37.4 °C)  Pulse:  [74-86] 84  Resp:  [18-20] 18  SpO2:  [94 %-100 %] 96 %  BP: (100-125)/(54-71) 100/55     Weight: 96 kg (211 lb 10.3 oz)  Body mass index is 33.15 kg/m².     SpO2: 96 %  O2 Device (Oxygen Therapy): ventilator    Physical Exam   Constitutional: He appears well-developed and well-nourished. No distress.   Sedated, intubated   HENT:   Head: Normocephalic and atraumatic.   Cardiovascular: Normal rate, regular rhythm, normal heart sounds and intact distal pulses. Exam reveals no gallop and no friction rub.   No murmur heard.  Sternotomy, healed. Surrounding ecchymoses. Extremities cool to touch. Right CFA IABP   Pulmonary/Chest:   Intubated. Mechanical breath sounds   Abdominal: Soft. Bowel sounds are normal. He exhibits no distension. There is no tenderness.   Musculoskeletal: He exhibits edema (Trace bilateral lower extremity edema).   Left foot dressing c/d/i. Left foot mottled   Neurological:   Opens eyes to stimulation   Skin: He is not diaphoretic.       Significant Labs:   EP:   Recent Labs   Lab 06/11/19  0342  06/12/19  0124 06/12/19  0340 06/12/19  0619 06/12/19  0820   *  135*   < > 135*  --  135*  135* 134*   K 4.6  4.6   < > 4.0  --  4.8  4.8 4.7     105   < > 103   --  104  104 105   CO2 21*  21*   < > 22*  --  23  23 22*   *  172*   < > 141*  --  148*  148* 155*   BUN 18  18   < > 21  --  20  20 22   CREATININE 1.3  1.3   < > 1.1  --  1.2  1.2 1.2   CALCIUM 7.8*  7.8*   < > 8.0*  --  8.4*  8.4* 8.3*   PROT 4.9*  --   --   --  4.9*  --    ALBUMIN 2.4*  --   --   --  2.3*  --    BILITOT 0.5  --   --   --  0.5  --    ALKPHOS 103  --   --   --  96  --    AST 55*  --   --   --  77*  --    ALT 26  --   --   --  29  --    ANIONGAP 9  9   < > 10  --  8  8 7*   ESTGFRAFRICA >60.0  >60.0   < > >60.0  --  >60.0  >60.0 >60.0   EGFRNONAA 52.6*  52.6*   < > >60.0  --  58.0*  58.0* 58.0*   WBC 48.61*  48.61*  --   --  37.63*  --  31.91*   HGB 9.4*  9.4*  --   --  7.6*  --  8.5*   HCT 29.2*  29.2*  --   --  24.6*  --  27.2*   PLT 83*  83*  --   --  78*  --  67*   INR 1.0  --   --  1.0  --   --     < > = values in this interval not displayed.       Significant Imaging: Echocardiogram:   Transthoracic echo (TTE) complete (Cupid Only):   Results for orders placed or performed during the hospital encounter of 05/29/19   Transthoracic echo (TTE) 2D with Color Flow   Result Value Ref Range    Ascending aorta 3.31 cm    STJ 3.12 cm    AV mean gradient 3.88 mmHg    Ao peak layo 1.40 m/s    Ao VTI 15.79 cm    IVRT 0.09 msec    IVS 0.80 0.6 - 1.1 cm    LA size 4.30 cm    Left Atrium Major Axis 5.50 cm    Left Atrium Minor Axis 5.50 cm    LVIDD 5.10 3.5 - 6.0 cm    LVIDS 4.40 (A) 2.1 - 4.0 cm    LVOT diameter 1.99 cm    LVOT peak VTI 9.16 cm    PW 0.70 0.6 - 1.1 cm    MV Peak A Layo 0.23 m/s    E wave decelartion time 229.40 msec    MV Peak E Layo 0.94 m/s    RA Major Axis 5.23 cm    RA Width 3.94 cm    RVDD 4.28 cm    Sinus 3.59 cm    TAPSE 1.23 cm    TR Max Layo 1.75 m/s    TDI LATERAL 0.03     TDI SEPTAL 0.03     LA WIDTH 4.30 cm    LV Diastolic Volume 107.96 mL    LV Systolic Volume 73.56 mL    RV S' 4.10 m/s    LVOT peak layo [id number] m/s    LV LATERAL E/E' RATIO  31.33     LV SEPTAL E/E' RATIO 31.33     FS 14 %    LA volume 86.44 cm3    LV mass 129.43 g    Left Ventricle Relative Wall Thickness 0.27 cm    AV valve area 1.80 cm2    AV Velocity Ratio 0.61     AV index (prosthetic) 0.58     E/A ratio 4.09     Mean e' 0.03     LVOT area 3.11 cm2    LVOT stroke volume 28.48 cm3    AV peak gradient 7.84 mmHg    E/E' ratio 31.33     LV Systolic Volume Index 36.1 mL/m2    LV Diastolic Volume Index 53.05 mL/m2    LA Volume Index 42.5 mL/m2    LV Mass Index 63.6 g/m2    Triscuspid Valve Regurgitation Peak Gradient 12.25 mmHg    BSA 2.09 m2     Assessment and Plan:     * VT (ventricular tachycardia)  Plan  Lidocaine titration per primary team  If patient improves will need to be switched to oral AAD  Keep K >4 Mg >2  Awaiting to see if patient wakes up  Continue Anticoagulation if no contraindications per primary    Atrial fibrillation and Focal atrial tachycardia  Initially had an episode of AF RVR then had PMVT. Now in sinus rhythm pacing at 62 had an episode of focal atrial tachycardia 6/8.     Plan  Continue AC for now.         Stefany Boyd MD  Cardiac Electrophysiology  [facility]

## 2019-06-12 NOTE — ASSESSMENT & PLAN NOTE
Pt had LHC then developed AF was started on amiodarone and had PMVT s/p several shocks. Repeat LHC with patent stents. Initially was on lidocaine which was weaned off however on 6/8 had several episodes of MMVT and PMVT which required CPR, intubation and shocks. Currently being cooled.    Plan  - EP on board   - will continue with lidocaine and likely switch to Mexiletine or another agent   - keep electrolytes repleted   - daily sbt

## 2019-06-12 NOTE — ASSESSMENT & PLAN NOTE
Plan  Lidocaine titration per primary team  If patient improves will need to be switched to oral AAD  Keep K >4 Mg >2  Awaiting to see if patient wakes up  Continue Anticoagulation if no contraindications per primary

## 2019-06-12 NOTE — NURSING
Informed Dr. Murray episode 21 beat vtach run. Latest K-4 ongoing replacement with prn 40MEQs IV and Mg 2.4.   MD said give 2 g IV now. Will closely monitor.

## 2019-06-12 NOTE — PROGRESS NOTES
"[facility]  Endocrinology  Progress Note    Admit Date: 5/29/2019     Reason for Consult: Management of T1DM, Hyperglycemia     Surgical Procedure and Date: N/A    Diabetes diagnosis year: 1970    Lab Results   Component Value Date    HGBA1C 5.8 (H) 05/29/2019       Home Diabetes Medications:  Accuchek spirit pump/sarai       Basal Rate  0000 - 0300     0.85 u/hr  0300 - 0700     0.9 u/hr  0700 - 2100     1.1 u/hr  2100 - 0000     0.85 u/hr        Carb Ratio  12A: 1:8     ISF  1:30     Target: 110-130  IAT: 3    How often checking glucose at home? Dexcom G5 CGM  BG readings on regimen: 110-140  Hypoglycemia on the regimen?  Yes, a few time per week  Missed doses on regimen?  No    Diabetes Complications include:     Hypoglycemia , Diabetic chronic kidney disease     , Diabetic retinopathy , Diabetic peripheral neuropathy , Diabetic peripheral angiopathy with/without gangrene and Foot ulcer      Complicating diabetes co morbidities:   CHF, MINDA and CKD      HPI:   Patient is a 79 y.o. male with a diagnosis of hyponatremia, left foot abscess, CKD3, DM1, CLL, MINDA, and acute heart failure.  [facility] [place]/19 from podiatry clinic for a left hallux abcess.  Last seen in endocrinology clinic by ESTEFANÍA Rivers NP on 5/8/19.  Endocrine consulted for DM/BG management.            Interval HPI:   Overnight events: Remains intubated in CMICU. BG well controlled on IV intensive insulin protocol with infusion rates ranging from 1.5-3 u/hr overnight.  Eating:   NPO  Nausea: No  Hypoglycemia and intervention: No  Fever: Yes (99.5)  TPN and/or TF: No  If yes, type of TF/TPN and rate: none    BP (!) 100/55 (BP Location: Right arm, Patient Position: Lying)   Pulse 86   Temp 99.5 °F (37.5 °C)   Resp 18   Ht 5' 7" (1.702 m)   Wt 96 kg (211 lb 10.3 oz)   SpO2 (!) 94%   BMI 33.15 kg/m²      Labs Reviewed and Include    Recent Labs   Lab 06/12/19  0619  06/12/19  1128   *  148*   < > 178*   CALCIUM 8.4* "  8.4*   < > 8.2*   ALBUMIN 2.3*  --   --    PROT 4.9*  --   --    *  135*   < > 136   K 4.8  4.8   < > 4.9   CO2 23  23   < > 20*     104   < > 105   BUN 20  20   < > 22   CREATININE 1.2  1.2   < > 1.2   ALKPHOS 96  --   --    ALT 29  --   --    AST 77*  --   --    BILITOT 0.5  --   --     < > = values in this interval not displayed.     Lab Results   Component Value Date    WBC 31.91 (H) 06/12/2019    HGB 8.5 (L) 06/12/2019    HCT 27.2 (L) 06/12/2019     (H) 06/12/2019    PLT 67 (L) 06/12/2019     No results for input(s): TSH, FREET4 in the last 168 hours.  Lab Results   Component Value Date    HGBA1C 5.8 (H) 05/29/2019       Nutritional status:   Body mass index is 33.15 kg/m².  Lab Results   Component Value Date    ALBUMIN 2.3 (L) 06/12/2019    ALBUMIN 2.4 (L) 06/11/2019    ALBUMIN 2.5 (L) 06/10/2019     Lab Results   Component Value Date    PREALBUMIN 22 05/17/2019       Estimated Creatinine Clearance: 56.9 mL/min (based on SCr of 1.2 mg/dL).    Accu-Checks  Recent Labs     06/12/19  0219 06/12/19  0305 06/12/19  0406 06/12/19  0518 06/12/19  0615 06/12/19  0726 06/12/19  0826 06/12/19  1020 06/12/19  1120 06/12/19  1329   POCTGLUCOSE 156* 146* 183* 162* 159* 167* 165* 165* 196* 185*       Current Medications and/or Treatments Impacting Glycemic Control  Immunotherapy:    Immunosuppressants     None        Steroids:   Hormones (From admission, onward)    None        Pressors:    Autonomic Drugs (From admission, onward)    Start     Stop Route Frequency Ordered    06/10/19 0345  norepinephrine 16 mg in dextrose 5 % 250 mL infusion     Question Answer Comment   Titrate by: (in mcg/kg/min) 0.02    Titrate interval: (in minutes) 5    Titrate to maintain: (MAP or SBP) MAP    Greater than: (in mmHg) 60    Maximum dose: (in mcg/kg/min) 3        -- IV Continuous 06/10/19 0331    06/08/19 1247  rocuronium (ZEMURON) 10 mg/mL injection     Note to Pharmacy:  Created by cabinet override    06/09  0059   06/08/19 1247        Hyperglycemia/Diabetes Medications:   Antihyperglycemics (From admission, onward)    Start     Stop Route Frequency Ordered    06/08/19 1730  insulin regular (Humulin R) 100 Units in sodium chloride 0.9% 100 mL infusion     Question:  Insulin Rate Adjustment (DO NOT MODIFY ANSWER)  Answer:  [email or web address]    -- IV Continuous 06/08/19 1623    06/08/19 0856  insulin aspart U-100 pen 0-5 Units      -- SubQ Before meals & nightly PRN 06/08/19 6316          ASSESSMENT and PLAN    Controlled type 1 diabetes mellitus with diabetic neuropathy, with long-term current use of insulin  BG goal 140-180    Continue IV insulin infusion protocol  Requires intensive BG monitoring while on protocol     ** Please call Endocrine for any BG related issues **    Discharge recommendations:   TBD.     MINDA on CPAP  May affect BG readings if uncontrolled        Cellulitis of Left foot  Infection may elevate BG readings  Optimize BG control for wound healing      CKD (chronic kidney disease), stage III  Caution with insulin stacking  Estimated Creatinine Clearance: 56.9 mL/min (based on SCr of 1.2 mg/dL).            Dina Ba NP  Endocrinology  [facility]

## 2019-06-12 NOTE — ASSESSMENT & PLAN NOTE
Caution with insulin stacking  Estimated Creatinine Clearance: 56.9 mL/min (based on SCr of 1.2 mg/dL).

## 2019-06-12 NOTE — SUBJECTIVE & OBJECTIVE
Interval History: Patient remains sedated and intubated. Patient had more episodes of PMVT overnight, fortunately non-sustained and self-terminating. Frequent ectopy with stimulation, movement. Patient follows some commands. No family at bedside this morning.    Objective:     Vital Signs (Most Recent):  Temp: 99.3 °F (37.4 °C) (06/12/19 0701)  Pulse: 75 (06/12/19 0701)  Resp: 18 (06/12/19 0701)  BP: (!) 100/55 (06/12/19 0701)  SpO2: 100 % (06/12/19 0701) Vital Signs (24h Range):  Temp:  [96.6 °F (35.9 °C)-99.3 °F (37.4 °C)] 99.3 °F (37.4 °C)  Pulse:  [73-86] 75  Resp:  [18-20] 18  SpO2:  [94 %-100 %] 100 %  BP: (100-125)/(54-71) 100/55     Weight: 96 kg (211 lb 10.3 oz)  Body mass index is 33.15 kg/m².    SpO2: 100 %  O2 Device (Oxygen Therapy): ventilator      Intake/Output Summary (Last 24 hours) at 6/12/2019 0721  Last data filed at 6/12/2019 0701  Gross per 24 hour   Intake 1576 ml   Output 3225 ml   Net -1649 ml       Lines/Drains/Airways     Central Venous Catheter Line                 Percutaneous Central Line Insertion/Assessment - triple lumen  06/06/19 1949 right internal jugular 5 days          Drain                 Sheath 06/06/19 1412 Right proximal 5 days         NG/OG Tube 06/08/19 1445 [place] sump 14 Fr. Center mouth 3 days         Urethral Catheter 06/08/19 1525 14 Fr. 3 days          Airway                 Airway - Non-Surgical 06/08/19 1254 Endotracheal Tube 3 days          Line                 IABP 06/06/19 1728 5 days         Pacer Wires 06/06/19 1728 5 days          Peripheral Intravenous Line                 Peripheral IV - Single Lumen 06/10/19 1604 20 G Right Forearm 1 day         Peripheral IV - Single Lumen 06/10/19 1611 20 G Left Antecubital 1 day                Physical Exam   Constitutional: He appears well-developed and well-nourished. No distress.   Sedated, intubated   HENT:   Head: Normocephalic and atraumatic.   Cardiovascular: Normal rate, regular rhythm, normal heart sounds and  intact distal pulses. Exam reveals no gallop and no friction rub.   No murmur heard.  Sternotomy, healed. Surrounding ecchymoses. Extremities cool to touch. Right CFA IABP   Pulmonary/Chest:   Intubated. Mechanical breath sounds   Abdominal: Soft. Bowel sounds are normal. He exhibits no distension. There is no tenderness.   Musculoskeletal: He exhibits edema (Trace bilateral lower extremity edema).   Left foot dressing c/d/i. Left foot mottled   Neurological:   Opens eyes to stimulation   Skin: He is not diaphoretic.       Significant Labs:     Recent Results (from the past 24 hour(s))   POCT glucose    Collection Time: 06/11/19  8:19 AM   Result Value Ref Range    POCT Glucose 160 (H) 70 - 110 mg/dL   Basic metabolic panel    Collection Time: 06/11/19  8:33 AM   Result Value Ref Range    Sodium 135 (L) 136 - 145 mmol/L    Potassium 4.1 3.5 - 5.1 mmol/L    Chloride 106 95 - 110 mmol/L    CO2 21 (L) 23 - 29 mmol/L    Glucose 151 (H) 70 - 110 mg/dL    BUN, Bld 19 8 - 23 mg/dL    Creatinine 1.2 0.5 - 1.4 mg/dL    Calcium 7.9 (L) 8.7 - 10.5 mg/dL    Anion Gap 8 8 - 16 mmol/L    eGFR if African American >60.0 >60 mL/min/1.73 m^2    eGFR if non  58.0 (A) >60 mL/min/1.73 m^2   Magnesium    Collection Time: 06/11/19  8:33 AM   Result Value Ref Range    Magnesium 2.0 1.6 - 2.6 mg/dL   Phosphorus    Collection Time: 06/11/19  8:33 AM   Result Value Ref Range    Phosphorus 3.5 2.7 - 4.5 mg/dL   ISTAT PROCEDURE    Collection Time: 06/11/19  8:51 AM   Result Value Ref Range    POC PH 7.376 7.35 - 7.45    POC PCO2 38.4 35 - 45 mmHg    POC PO2 35 (LL) 40 - 60 mmHg    POC HCO3 22.5 (L) 24 - 28 mmol/L    POC BE -3 -2 to 2 mmol/L    POC SATURATED O2 66 (L) 95 - 100 %    POC TCO2 24 24 - 29 mmol/L    Sample VENOUS     Site Other     Allens Test N/A     DelSys Adult Vent    ISTAT PROCEDURE    Collection Time: 06/11/19  9:00 AM   Result Value Ref Range    POC PH 7.538 (H) 7.35 - 7.45    POC PCO2 29.5 (L) 35 - 45 mmHg     POC PO2 178 (H) 80 - 100 mmHg    POC HCO3 25.1 24 - 28 mmol/L    POC BE 3 -2 to 2 mmol/L    POC SATURATED O2 100 95 - 100 %    POC TCO2 26 23 - 27 mmol/L    Rate 18     Sample ARTERIAL     Site RR     Allens Test Pass     DelSys Adult Vent     Mode AC/PRVC     Vt 500     PEEP 5     FiO2 40    POCT glucose    Collection Time: 06/11/19  9:23 AM   Result Value Ref Range    POCT Glucose 162 (H) 70 - 110 mg/dL   POCT glucose    Collection Time: 06/11/19 10:55 AM   Result Value Ref Range    POCT Glucose 176 (H) 70 - 110 mg/dL   Basic metabolic panel    Collection Time: 06/11/19 11:47 AM   Result Value Ref Range    Sodium 136 136 - 145 mmol/L    Potassium 4.0 3.5 - 5.1 mmol/L    Chloride 106 95 - 110 mmol/L    CO2 21 (L) 23 - 29 mmol/L    Glucose 169 (H) 70 - 110 mg/dL    BUN, Bld 18 8 - 23 mg/dL    Creatinine 1.2 0.5 - 1.4 mg/dL    Calcium 8.0 (L) 8.7 - 10.5 mg/dL    Anion Gap 9 8 - 16 mmol/L    eGFR if African American >60.0 >60 mL/min/1.73 m^2    eGFR if non  58.0 (A) >60 mL/min/1.73 m^2   Magnesium    Collection Time: 06/11/19 11:47 AM   Result Value Ref Range    Magnesium 2.0 1.6 - 2.6 mg/dL   Magnesium    Collection Time: 06/11/19 11:47 AM   Result Value Ref Range    Magnesium 2.0 1.6 - 2.6 mg/dL   Phosphorus    Collection Time: 06/11/19 11:47 AM   Result Value Ref Range    Phosphorus 3.7 2.7 - 4.5 mg/dL   Phosphorus    Collection Time: 06/11/19 11:47 AM   Result Value Ref Range    Phosphorus 3.7 2.7 - 4.5 mg/dL   POCT glucose    Collection Time: 06/11/19 12:32 PM   Result Value Ref Range    POCT Glucose 192 (H) 70 - 110 mg/dL   POCT glucose    Collection Time: 06/11/19  1:26 PM   Result Value Ref Range    POCT Glucose 184 (H) 70 - 110 mg/dL   POCT glucose    Collection Time: 06/11/19  3:17 PM   Result Value Ref Range    POCT Glucose 158 (H) 70 - 110 mg/dL   POCT glucose    Collection Time: 06/11/19  4:16 PM   Result Value Ref Range    POCT Glucose 117 (H) 70 - 110 mg/dL   POCT glucose    Collection  Time: 06/11/19  5:09 PM   Result Value Ref Range    POCT Glucose 150 (H) 70 - 110 mg/dL   Basic metabolic panel    Collection Time: 06/11/19  5:18 PM   Result Value Ref Range    Sodium 134 (L) 136 - 145 mmol/L    Potassium 4.4 3.5 - 5.1 mmol/L    Chloride 103 95 - 110 mmol/L    CO2 23 23 - 29 mmol/L    Glucose 152 (H) 70 - 110 mg/dL    BUN, Bld 19 8 - 23 mg/dL    Creatinine 1.2 0.5 - 1.4 mg/dL    Calcium 8.0 (L) 8.7 - 10.5 mg/dL    Anion Gap 8 8 - 16 mmol/L    eGFR if African American >60.0 >60 mL/min/1.73 m^2    eGFR if non  58.0 (A) >60 mL/min/1.73 m^2   POCT glucose    Collection Time: 06/11/19  6:18 PM   Result Value Ref Range    POCT Glucose 206 (H) 70 - 110 mg/dL   POCT glucose    Collection Time: 06/11/19  7:40 PM   Result Value Ref Range    POCT Glucose 194 (H) 70 - 110 mg/dL   POCT glucose    Collection Time: 06/11/19  8:35 PM   Result Value Ref Range    POCT Glucose 206 (H) 70 - 110 mg/dL   POCT glucose    Collection Time: 06/11/19  9:32 PM   Result Value Ref Range    POCT Glucose 219 (H) 70 - 110 mg/dL   Basic metabolic panel    Collection Time: 06/11/19  9:55 PM   Result Value Ref Range    Sodium 134 (L) 136 - 145 mmol/L    Potassium 4.1 3.5 - 5.1 mmol/L    Chloride 103 95 - 110 mmol/L    CO2 23 23 - 29 mmol/L    Glucose 203 (H) 70 - 110 mg/dL    BUN, Bld 19 8 - 23 mg/dL    Creatinine 1.2 0.5 - 1.4 mg/dL    Calcium 8.1 (L) 8.7 - 10.5 mg/dL    Anion Gap 8 8 - 16 mmol/L    eGFR if African American >60.0 >60 mL/min/1.73 m^2    eGFR if non  58.0 (A) >60 mL/min/1.73 m^2   Magnesium    Collection Time: 06/11/19  9:55 PM   Result Value Ref Range    Magnesium 2.4 1.6 - 2.6 mg/dL   Phosphorus    Collection Time: 06/11/19  9:55 PM   Result Value Ref Range    Phosphorus 3.6 2.7 - 4.5 mg/dL   POCT glucose    Collection Time: 06/11/19 10:54 PM   Result Value Ref Range    POCT Glucose 186 (H) 70 - 110 mg/dL   POCT glucose    Collection Time: 06/11/19 11:33 PM   Result Value Ref Range     POCT Glucose 230 (H) 70 - 110 mg/dL   POCT glucose    Collection Time: 06/12/19  1:12 AM   Result Value Ref Range    POCT Glucose 155 (H) 70 - 110 mg/dL   Basic metabolic panel    Collection Time: 06/12/19  1:24 AM   Result Value Ref Range    Sodium 135 (L) 136 - 145 mmol/L    Potassium 4.0 3.5 - 5.1 mmol/L    Chloride 103 95 - 110 mmol/L    CO2 22 (L) 23 - 29 mmol/L    Glucose 141 (H) 70 - 110 mg/dL    BUN, Bld 21 8 - 23 mg/dL    Creatinine 1.1 0.5 - 1.4 mg/dL    Calcium 8.0 (L) 8.7 - 10.5 mg/dL    Anion Gap 10 8 - 16 mmol/L    eGFR if African American >60.0 >60 mL/min/1.73 m^2    eGFR if non African American >60.0 >60 mL/min/1.73 m^2   Magnesium    Collection Time: 06/12/19  1:24 AM   Result Value Ref Range    Magnesium 2.4 1.6 - 2.6 mg/dL   Phosphorus    Collection Time: 06/12/19  1:24 AM   Result Value Ref Range    Phosphorus 3.3 2.7 - 4.5 mg/dL   POCT glucose    Collection Time: 06/12/19  2:19 AM   Result Value Ref Range    POCT Glucose 156 (H) 70 - 110 mg/dL   POCT glucose    Collection Time: 06/12/19  3:05 AM   Result Value Ref Range    POCT Glucose 146 (H) 70 - 110 mg/dL   Phosphorus    Collection Time: 06/12/19  3:40 AM   Result Value Ref Range    Phosphorus 3.5 2.7 - 4.5 mg/dL   CBC auto differential    Collection Time: 06/12/19  3:40 AM   Result Value Ref Range    WBC 37.63 (H) 3.90 - 12.70 K/uL    RBC 2.38 (L) 4.60 - 6.20 M/uL    Hemoglobin 7.6 (L) 14.0 - 18.0 g/dL    Hematocrit 24.6 (L) 40.0 - 54.0 %    Mean Corpuscular Volume 103 (H) 82 - 98 fL    Mean Corpuscular Hemoglobin 31.9 (H) 27.0 - 31.0 pg    Mean Corpuscular Hemoglobin Conc 30.9 (L) 32.0 - 36.0 g/dL    RDW 15.6 (H) 11.5 - 14.5 %    Platelets 78 (L) 150 - 350 K/uL    MPV 10.2 9.2 - 12.9 fL    Immature Granulocytes CANCELED 0.0 - 0.5 %    Immature Grans (Abs) CANCELED 0.00 - 0.04 K/uL    Lymph # CANCELED 1.0 - 4.8 K/uL    Mono # CANCELED 0.3 - 1.0 K/uL    Eos # CANCELED 0.0 - 0.5 K/uL    Baso # CANCELED 0.00 - 0.20 K/uL    nRBC 0 0 /100 WBC     Gran% 4.0 (L) 38.0 - 73.0 %    Lymph% 96.0 (H) 18.0 - 48.0 %    Mono% 0.0 (L) 4.0 - 15.0 %    Eosinophil% 0.0 0.0 - 8.0 %    Basophil% 0.0 0.0 - 1.9 %    Platelet Estimate Decreased (A)     Aniso Slight     Poik Slight     Poly Occasional     Hypo Occasional     Ovalocytes Occasional     Differential Method Manual    Protime-INR    Collection Time: 06/12/19  3:40 AM   Result Value Ref Range    Prothrombin Time 10.2 9.0 - 12.5 sec    INR 1.0 0.8 - 1.2   APTT    Collection Time: 06/12/19  3:40 AM   Result Value Ref Range    aPTT 48.8 (H) 21.0 - 32.0 sec   POCT glucose    Collection Time: 06/12/19  4:06 AM   Result Value Ref Range    POCT Glucose 183 (H) 70 - 110 mg/dL   POCT glucose    Collection Time: 06/12/19  5:18 AM   Result Value Ref Range    POCT Glucose 162 (H) 70 - 110 mg/dL   POCT glucose    Collection Time: 06/12/19  6:15 AM   Result Value Ref Range    POCT Glucose 159 (H) 70 - 110 mg/dL         Significant Imaging:     I have reviewed all pertinent imaging studies from the last 24 hours

## 2019-06-12 NOTE — PROGRESS NOTES
[facility]  Interventional Cardiology  Progress Note    Patient Name: Reid Herman  MRN: [id number]  Admission Date: 5/29/2019  Hospital Length of Stay: 14 days  Code Status: DNR   Attending Physician: Paz Pradhan MD   Primary Care Physician: Olivia Zavala MD  Principal Problem:VT (ventricular tachycardia)    Subjective:     Interval History: Patient remains sedated and intubated. Patient had more episodes of PMVT overnight, fortunately non-sustained and self-terminating. Frequent ectopy with stimulation, movement. Patient follows some commands. No family at bedside this morning.    Objective:     Vital Signs (Most Recent):  Temp: 99.3 °F (37.4 °C) (06/12/19 0701)  Pulse: 75 (06/12/19 0701)  Resp: 18 (06/12/19 0701)  BP: (!) 100/55 (06/12/19 0701)  SpO2: 100 % (06/12/19 0701) Vital Signs (24h Range):  Temp:  [96.6 °F (35.9 °C)-99.3 °F (37.4 °C)] 99.3 °F (37.4 °C)  Pulse:  [73-86] 75  Resp:  [18-20] 18  SpO2:  [94 %-100 %] 100 %  BP: (100-125)/(54-71) 100/55     Weight: 96 kg (211 lb 10.3 oz)  Body mass index is 33.15 kg/m².    SpO2: 100 %  O2 Device (Oxygen Therapy): ventilator      Intake/Output Summary (Last 24 hours) at 6/12/2019 0721  Last data filed at 6/12/2019 0701  Gross per 24 hour   Intake 1576 ml   Output 3225 ml   Net -1649 ml       Lines/Drains/Airways     Central Venous Catheter Line                 Percutaneous Central Line Insertion/Assessment - triple lumen  06/06/19 1949 right internal jugular 5 days          Drain                 Sheath 06/06/19 1412 Right proximal 5 days         NG/OG Tube 06/08/19 1445 [place] sump 14 Fr. Center mouth 3 days         Urethral Catheter 06/08/19 1525 14 Fr. 3 days          Airway                 Airway - Non-Surgical 06/08/19 1254 Endotracheal Tube 3 days          Line                 IABP 06/06/19 1728 5 days         Pacer Wires 06/06/19 1728 5 days          Peripheral Intravenous Line                 Peripheral IV - Single Lumen  06/10/19 1604 20 G Right Forearm 1 day         Peripheral IV - Single Lumen 06/10/19 1611 20 G Left Antecubital 1 day                Physical Exam   Constitutional: He appears well-developed and well-nourished. No distress.   Sedated, intubated   HENT:   Head: Normocephalic and atraumatic.   Cardiovascular: Normal rate, regular rhythm, normal heart sounds and intact distal pulses. Exam reveals no gallop and no friction rub.   No murmur heard.  Sternotomy, healed. Surrounding ecchymoses. Extremities cool to touch. Right CFA IABP   Pulmonary/Chest:   Intubated. Mechanical breath sounds   Abdominal: Soft. Bowel sounds are normal. He exhibits no distension. There is no tenderness.   Musculoskeletal: He exhibits edema (Trace bilateral lower extremity edema).   Left foot dressing c/d/i. Left foot mottled   Neurological:   Opens eyes to stimulation   Skin: He is not diaphoretic.       Significant Labs:     Recent Results (from the past 24 hour(s))   POCT glucose    Collection Time: 06/11/19  8:19 AM   Result Value Ref Range    POCT Glucose 160 (H) 70 - 110 mg/dL   Basic metabolic panel    Collection Time: 06/11/19  8:33 AM   Result Value Ref Range    Sodium 135 (L) 136 - 145 mmol/L    Potassium 4.1 3.5 - 5.1 mmol/L    Chloride 106 95 - 110 mmol/L    CO2 21 (L) 23 - 29 mmol/L    Glucose 151 (H) 70 - 110 mg/dL    BUN, Bld 19 8 - 23 mg/dL    Creatinine 1.2 0.5 - 1.4 mg/dL    Calcium 7.9 (L) 8.7 - 10.5 mg/dL    Anion Gap 8 8 - 16 mmol/L    eGFR if African American >60.0 >60 mL/min/1.73 m^2    eGFR if non  58.0 (A) >60 mL/min/1.73 m^2   Magnesium    Collection Time: 06/11/19  8:33 AM   Result Value Ref Range    Magnesium 2.0 1.6 - 2.6 mg/dL   Phosphorus    Collection Time: 06/11/19  8:33 AM   Result Value Ref Range    Phosphorus 3.5 2.7 - 4.5 mg/dL   ISTAT PROCEDURE    Collection Time: 06/11/19  8:51 AM   Result Value Ref Range    POC PH 7.376 7.35 - 7.45    POC PCO2 38.4 35 - 45 mmHg    POC PO2 35 (LL) 40 - 60  mmHg    POC HCO3 22.5 (L) 24 - 28 mmol/L    POC BE -3 -2 to 2 mmol/L    POC SATURATED O2 66 (L) 95 - 100 %    POC TCO2 24 24 - 29 mmol/L    Sample VENOUS     Site Other     Allens Test N/A     DelSys Adult Vent    ISTAT PROCEDURE    Collection Time: 06/11/19  9:00 AM   Result Value Ref Range    POC PH 7.538 (H) 7.35 - 7.45    POC PCO2 29.5 (L) 35 - 45 mmHg    POC PO2 178 (H) 80 - 100 mmHg    POC HCO3 25.1 24 - 28 mmol/L    POC BE 3 -2 to 2 mmol/L    POC SATURATED O2 100 95 - 100 %    POC TCO2 26 23 - 27 mmol/L    Rate 18     Sample ARTERIAL     Site RR     Allens Test Pass     DelSys Adult Vent     Mode AC/PRVC     Vt 500     PEEP 5     FiO2 40    POCT glucose    Collection Time: 06/11/19  9:23 AM   Result Value Ref Range    POCT Glucose 162 (H) 70 - 110 mg/dL   POCT glucose    Collection Time: 06/11/19 10:55 AM   Result Value Ref Range    POCT Glucose 176 (H) 70 - 110 mg/dL   Basic metabolic panel    Collection Time: 06/11/19 11:47 AM   Result Value Ref Range    Sodium 136 136 - 145 mmol/L    Potassium 4.0 3.5 - 5.1 mmol/L    Chloride 106 95 - 110 mmol/L    CO2 21 (L) 23 - 29 mmol/L    Glucose 169 (H) 70 - 110 mg/dL    BUN, Bld 18 8 - 23 mg/dL    Creatinine 1.2 0.5 - 1.4 mg/dL    Calcium 8.0 (L) 8.7 - 10.5 mg/dL    Anion Gap 9 8 - 16 mmol/L    eGFR if African American >60.0 >60 mL/min/1.73 m^2    eGFR if non  58.0 (A) >60 mL/min/1.73 m^2   Magnesium    Collection Time: 06/11/19 11:47 AM   Result Value Ref Range    Magnesium 2.0 1.6 - 2.6 mg/dL   Magnesium    Collection Time: 06/11/19 11:47 AM   Result Value Ref Range    Magnesium 2.0 1.6 - 2.6 mg/dL   Phosphorus    Collection Time: 06/11/19 11:47 AM   Result Value Ref Range    Phosphorus 3.7 2.7 - 4.5 mg/dL   Phosphorus    Collection Time: 06/11/19 11:47 AM   Result Value Ref Range    Phosphorus 3.7 2.7 - 4.5 mg/dL   POCT glucose    Collection Time: 06/11/19 12:32 PM   Result Value Ref Range    POCT Glucose 192 (H) 70 - 110 mg/dL   POCT glucose     Collection Time: 06/11/19  1:26 PM   Result Value Ref Range    POCT Glucose 184 (H) 70 - 110 mg/dL   POCT glucose    Collection Time: 06/11/19  3:17 PM   Result Value Ref Range    POCT Glucose 158 (H) 70 - 110 mg/dL   POCT glucose    Collection Time: 06/11/19  4:16 PM   Result Value Ref Range    POCT Glucose 117 (H) 70 - 110 mg/dL   POCT glucose    Collection Time: 06/11/19  5:09 PM   Result Value Ref Range    POCT Glucose 150 (H) 70 - 110 mg/dL   Basic metabolic panel    Collection Time: 06/11/19  5:18 PM   Result Value Ref Range    Sodium 134 (L) 136 - 145 mmol/L    Potassium 4.4 3.5 - 5.1 mmol/L    Chloride 103 95 - 110 mmol/L    CO2 23 23 - 29 mmol/L    Glucose 152 (H) 70 - 110 mg/dL    BUN, Bld 19 8 - 23 mg/dL    Creatinine 1.2 0.5 - 1.4 mg/dL    Calcium 8.0 (L) 8.7 - 10.5 mg/dL    Anion Gap 8 8 - 16 mmol/L    eGFR if African American >60.0 >60 mL/min/1.73 m^2    eGFR if non  58.0 (A) >60 mL/min/1.73 m^2   POCT glucose    Collection Time: 06/11/19  6:18 PM   Result Value Ref Range    POCT Glucose 206 (H) 70 - 110 mg/dL   POCT glucose    Collection Time: 06/11/19  7:40 PM   Result Value Ref Range    POCT Glucose 194 (H) 70 - 110 mg/dL   POCT glucose    Collection Time: 06/11/19  8:35 PM   Result Value Ref Range    POCT Glucose 206 (H) 70 - 110 mg/dL   POCT glucose    Collection Time: 06/11/19  9:32 PM   Result Value Ref Range    POCT Glucose 219 (H) 70 - 110 mg/dL   Basic metabolic panel    Collection Time: 06/11/19  9:55 PM   Result Value Ref Range    Sodium 134 (L) 136 - 145 mmol/L    Potassium 4.1 3.5 - 5.1 mmol/L    Chloride 103 95 - 110 mmol/L    CO2 23 23 - 29 mmol/L    Glucose 203 (H) 70 - 110 mg/dL    BUN, Bld 19 8 - 23 mg/dL    Creatinine 1.2 0.5 - 1.4 mg/dL    Calcium 8.1 (L) 8.7 - 10.5 mg/dL    Anion Gap 8 8 - 16 mmol/L    eGFR if African American >60.0 >60 mL/min/1.73 m^2    eGFR if non  58.0 (A) >60 mL/min/1.73 m^2   Magnesium    Collection Time: 06/11/19  9:55 PM    Result Value Ref Range    Magnesium 2.4 1.6 - 2.6 mg/dL   Phosphorus    Collection Time: 06/11/19  9:55 PM   Result Value Ref Range    Phosphorus 3.6 2.7 - 4.5 mg/dL   POCT glucose    Collection Time: 06/11/19 10:54 PM   Result Value Ref Range    POCT Glucose 186 (H) 70 - 110 mg/dL   POCT glucose    Collection Time: 06/11/19 11:33 PM   Result Value Ref Range    POCT Glucose 230 (H) 70 - 110 mg/dL   POCT glucose    Collection Time: 06/12/19  1:12 AM   Result Value Ref Range    POCT Glucose 155 (H) 70 - 110 mg/dL   Basic metabolic panel    Collection Time: 06/12/19  1:24 AM   Result Value Ref Range    Sodium 135 (L) 136 - 145 mmol/L    Potassium 4.0 3.5 - 5.1 mmol/L    Chloride 103 95 - 110 mmol/L    CO2 22 (L) 23 - 29 mmol/L    Glucose 141 (H) 70 - 110 mg/dL    BUN, Bld 21 8 - 23 mg/dL    Creatinine 1.1 0.5 - 1.4 mg/dL    Calcium 8.0 (L) 8.7 - 10.5 mg/dL    Anion Gap 10 8 - 16 mmol/L    eGFR if African American >60.0 >60 mL/min/1.73 m^2    eGFR if non African American >60.0 >60 mL/min/1.73 m^2   Magnesium    Collection Time: 06/12/19  1:24 AM   Result Value Ref Range    Magnesium 2.4 1.6 - 2.6 mg/dL   Phosphorus    Collection Time: 06/12/19  1:24 AM   Result Value Ref Range    Phosphorus 3.3 2.7 - 4.5 mg/dL   POCT glucose    Collection Time: 06/12/19  2:19 AM   Result Value Ref Range    POCT Glucose 156 (H) 70 - 110 mg/dL   POCT glucose    Collection Time: 06/12/19  3:05 AM   Result Value Ref Range    POCT Glucose 146 (H) 70 - 110 mg/dL   Phosphorus    Collection Time: 06/12/19  3:40 AM   Result Value Ref Range    Phosphorus 3.5 2.7 - 4.5 mg/dL   CBC auto differential    Collection Time: 06/12/19  3:40 AM   Result Value Ref Range    WBC 37.63 (H) 3.90 - 12.70 K/uL    RBC 2.38 (L) 4.60 - 6.20 M/uL    Hemoglobin 7.6 (L) 14.0 - 18.0 g/dL    Hematocrit 24.6 (L) 40.0 - 54.0 %    Mean Corpuscular Volume 103 (H) 82 - 98 fL    Mean Corpuscular Hemoglobin 31.9 (H) 27.0 - 31.0 pg    Mean Corpuscular Hemoglobin Conc 30.9 (L)  32.0 - 36.0 g/dL    RDW 15.6 (H) 11.5 - 14.5 %    Platelets 78 (L) 150 - 350 K/uL    MPV 10.2 9.2 - 12.9 fL    Immature Granulocytes CANCELED 0.0 - 0.5 %    Immature Grans (Abs) CANCELED 0.00 - 0.04 K/uL    Lymph # CANCELED 1.0 - 4.8 K/uL    Mono # CANCELED 0.3 - 1.0 K/uL    Eos # CANCELED 0.0 - 0.5 K/uL    Baso # CANCELED 0.00 - 0.20 K/uL    nRBC 0 0 /100 WBC    Gran% 4.0 (L) 38.0 - 73.0 %    Lymph% 96.0 (H) 18.0 - 48.0 %    Mono% 0.0 (L) 4.0 - 15.0 %    Eosinophil% 0.0 0.0 - 8.0 %    Basophil% 0.0 0.0 - 1.9 %    Platelet Estimate Decreased (A)     Aniso Slight     Poik Slight     Poly Occasional     Hypo Occasional     Ovalocytes Occasional     Differential Method Manual    Protime-INR    Collection Time: 06/12/19  3:40 AM   Result Value Ref Range    Prothrombin Time 10.2 9.0 - 12.5 sec    INR 1.0 0.8 - 1.2   APTT    Collection Time: 06/12/19  3:40 AM   Result Value Ref Range    aPTT 48.8 (H) 21.0 - 32.0 sec   POCT glucose    Collection Time: 06/12/19  4:06 AM   Result Value Ref Range    POCT Glucose 183 (H) 70 - 110 mg/dL   POCT glucose    Collection Time: 06/12/19  5:18 AM   Result Value Ref Range    POCT Glucose 162 (H) 70 - 110 mg/dL   POCT glucose    Collection Time: 06/12/19  6:15 AM   Result Value Ref Range    POCT Glucose 159 (H) 70 - 110 mg/dL         Significant Imaging:     I have reviewed all pertinent imaging studies from the last 24 hours      Assessment and Plan:     Patient is a 79 y.o. male presenting with:    NSTEMI (non-ST elevated myocardial infarction)  Known CAD with history of 2v CABG (1994) LIMA-D1 and SVG-LAD, recently had LHC 1/29/19 for worsening angina s/p DUC x 2 (mid and distal LCx). He was also found to have 50% D1 stenosis (distal to the LIMA graft insertion) and 60% proximal SVG graft stenosis and RCA .     S/p SVG DUC 6/6/19  AFib on 6/6/19, started on amiodarone, developed PMVT, taken back to cath lab with no new stenoses  Continue medical therapy with DAPT, high intensity  statin  IABP placed via R CFA 6/6/19, still requiring pressor support (levo 0.1 mcg/kg/min)  CXR daily for IABP/ET tube placement      VTE Risk Mitigation (From admission, onward)        Ordered     heparin infusion 1,000 units/500 ml in 0.9% NaCl (pressure line flush)  Intra-op continuous PRN      06/06/19 1052     heparin 25,000 units in dextrose 5% 250 mL (100 units/mL) infusion LOW INTENSITY nomogram - [facility]  Continuous      06/05/19 0853     heparin 25,000 units in dextrose 5% (100 units/ml) IV bolus from bag - ADDITIONAL PRN BOLUS - 60 units/kg (max bolus 4000 units)  As needed (PRN)      06/05/19 0853     heparin 25,000 units in dextrose 5% (100 units/ml) IV bolus from bag - ADDITIONAL PRN BOLUS - 30 units/kg (max bolus 4000 units)  As needed (PRN)      06/05/19 0853     heparin 25,000 units in dextrose 5% 250 mL (100 units/mL) infusion LOW INTENSITY nomogram - [facility]  Continuous      06/02/19 1508     IP VTE LOW RISK PATIENT  Once      05/29/19 8538          Milind Reyes MD  Interventional Cardiology  [facility]

## 2019-06-13 NOTE — SIGNIFICANT EVENT
Date : 6/13  Death Note     Called to see patient for patient in VT/VF . On exam the patient did not respond to verbal or physical stimuli. Absent heart and breath sounds . Absent peripheral pulses. Pupils are fixed and dilated. Patient pronounced  at 13:41 (24 hour format). Dr. Pradhan notified at bedside . Next of kin/wife at bedside.  notified, emotional support and condolences         Shanna To MD, MPH  [phone]   For leers: [email or web address]

## 2019-06-13 NOTE — PROGRESS NOTES
[facility]  Cardiac Electrophysiology  Progress Note    Admission Date: 5/29/2019  Code Status: DNR   Attending Physician: Paz Pradhan MD   Expected Discharge Date: 6/17/2019  Principal Problem:VT (ventricular tachycardia)    Subjective:     Interval History: Still intubated but follows some commands, 2 NSVT overnight just few beats. Lidocaine weaned by primary    Review of Systems   Unable to perform ROS: intubated     Objective:     Vital Signs (Most Recent):  Temp: 99.3 °F (37.4 °C) (06/13/19 1307)  Pulse: 98 (06/13/19 1307)  Resp: 18 (06/13/19 1307)  BP: (!) 100/55 (06/12/19 0701)  SpO2: 100 % (06/13/19 1307) Vital Signs (24h Range):  Temp:  [97.7 °F (36.5 °C)-99.7 °F (37.6 °C)] 99.3 °F (37.4 °C)  Pulse:  [] 98  Resp:  [9-25] 18  SpO2:  [94 %-100 %] 100 %     Weight: 94.4 kg (208 lb 1.8 oz)  Body mass index is 32.6 kg/m².     SpO2: 100 %  O2 Device (Oxygen Therapy): ventilator    Physical Exam   Constitutional: He appears well-developed and well-nourished. No distress.   HENT:   Head: Normocephalic and atraumatic.   Cardiovascular: Normal rate, regular rhythm, normal heart sounds and intact distal pulses. Exam reveals no gallop and no friction rub.   No murmur heard.  Sternotomy, healed. Surrounding ecchymoses. Extremities cool to touch. Right CFA IABP   Pulmonary/Chest:   Intubated. Mechanical breath sounds   Abdominal: Soft. Bowel sounds are normal. He exhibits no distension. There is no tenderness.   Musculoskeletal: He exhibits no edema (Trace bilateral lower extremity edema).   Left foot dressing c/d/i. Left foot mottled     Neurological:   Follows commands, moving all extremities   Skin: He is not diaphoretic.       Significant Labs:   EP:   Recent Labs   Lab 06/12/19  0340 06/12/19  0619 06/12/19  0820  06/12/19  2334 06/13/19  0342 06/13/19  0844   NA  --  135*  135* 134*   < > 137 136 136   K  --  4.8  4.8 4.7   < > 4.2 4.1 3.9   CL  --  104  104 105   < > 105 104  104   CO2  --  23  23 22*   < > 23 22* 24   GLU  --  148*  148* 155*   < > 133* 165* 135*   BUN  --  20  20 22   < > 25* 26* 28*   CREATININE  --  1.2  1.2 1.2   < > 1.2 1.2 1.2   CALCIUM  --  8.4*  8.4* 8.3*   < > 8.3* 8.2* 8.2*   PROT  --  4.9*  --   --   --  4.8*  --    ALBUMIN  --  2.3*  --   --   --  2.2*  --    BILITOT  --  0.5  --   --   --  0.5  --    ALKPHOS  --  96  --   --   --  90  --    AST  --  77*  --   --   --  69*  --    ALT  --  29  --   --   --  28  --    ANIONGAP  --  8  8 7*   < > 9 10 8   ESTGFRAFRICA  --  >60.0  >60.0 >60.0   < > >60.0 >60.0 >60.0   EGFRNONAA  --  58.0*  58.0* 58.0*   < > 58.0* 58.0* 58.0*   WBC 37.63*  --  31.91*  --   --  23.65*  23.65*  --    HGB 7.6*  --  8.5*  --   --  8.0*  8.0*  --    HCT 24.6*  --  27.2*  --   --  25.8*  25.8*  --    PLT 78*  --  67*  --   --  64*  64*  --    INR 1.0  --   --   --   --  1.0  --     < > = values in this interval not displayed.       Significant Imaging: Echocardiogram:   2D echo with color flow doppler: No results found. However, due to the size of the patient record, not all encounters were searched. Please check Results Review for a complete set of results. and Transthoracic echo (TTE) complete (Cupid Only):   Results for orders placed or performed during the hospital encounter of 05/29/19   Transthoracic echo (TTE) 2D with Color Flow   Result Value Ref Range    Ascending aorta 3.31 cm    STJ 3.12 cm    AV mean gradient 3.88 mmHg    Ao peak layo 1.40 m/s    Ao VTI 15.79 cm    IVRT 0.09 msec    IVS 0.80 0.6 - 1.1 cm    LA size 4.30 cm    Left Atrium Major Axis 5.50 cm    Left Atrium Minor Axis 5.50 cm    LVIDD 5.10 3.5 - 6.0 cm    LVIDS 4.40 (A) 2.1 - 4.0 cm    LVOT diameter 1.99 cm    LVOT peak VTI 9.16 cm    PW 0.70 0.6 - 1.1 cm    MV Peak A Layo 0.23 m/s    E wave decelartion time 229.40 msec    MV Peak E Layo 0.94 m/s    RA Major Axis 5.23 cm    RA Width 3.94 cm    RVDD 4.28 cm    Sinus 3.59 cm    TAPSE 1.23 cm    TR Max  Layo 1.75 m/s    TDI LATERAL 0.03     TDI SEPTAL 0.03     LA WIDTH 4.30 cm    LV Diastolic Volume 107.96 mL    LV Systolic Volume 73.56 mL    RV S' 4.10 m/s    LVOT peak layo [id number] m/s    LV LATERAL E/E' RATIO 31.33     LV SEPTAL E/E' RATIO 31.33     FS 14 %    LA volume 86.44 cm3    LV mass 129.43 g    Left Ventricle Relative Wall Thickness 0.27 cm    AV valve area 1.80 cm2    AV Velocity Ratio 0.61     AV index (prosthetic) 0.58     E/A ratio 4.09     Mean e' 0.03     LVOT area 3.11 cm2    LVOT stroke volume 28.48 cm3    AV peak gradient 7.84 mmHg    E/E' ratio 31.33     LV Systolic Volume Index 36.1 mL/m2    LV Diastolic Volume Index 53.05 mL/m2    LA Volume Index 42.5 mL/m2    LV Mass Index 63.6 g/m2    Triscuspid Valve Regurgitation Peak Gradient 12.25 mmHg    BSA 2.09 m2     Assessment and Plan:     * VT (ventricular tachycardia)  Plan  Lidocaine titration per primary team - now down to 1  If patient improves will need to be switched to oral AAD  Keep K >4 Mg >2  Patient with improvement and can give thumbs up but primary decided tommorow for extubation  Continue Anticoagulation if no contraindications per primary    Atrial fibrillation and Focal atrial tachycardia  Initially had an episode of AF RVR then had PMVT. Now in sinus rhythm pacing at 62 had an episode of focal atrial tachycardia 6/8.     Plan  Continue AC for now.         Stefany Boyd MD  Cardiac Electrophysiology  [facility]

## 2019-06-13 NOTE — DISCHARGE SUMMARY
[facility]  Cardiology  Discharge Summary      Patient Name: Reid Herman  MRN: [id number]  Admission Date: 5/29/2019  Hospital Length of Stay: 15 days  Discharge Date and Time:  06/13/2019 2:54 PM  Attending Physician: Paz Pradhan MD    Discharging Provider: Shanna To MD  Primary Care Physician: Olivia Zavala MD    HPI:   78 yo M w/ PMH significant for CAD s/p CABG x2 (SVG-LAD, LIMA-D1, 1994), NSTEMI s/p DUC to distal, mid LCx (1/2019), PAD, CKD, CLL (not currently on therapy), HTN, HLD, and MINDA who presents w/ worsening L great toe wound that has failed outpatient therapy. Reports noticing worsening L foot pain, discomfort. Recently completed multiple course of antibiotics however w/ minimal improvement. Podiatry consulted w/ imaging concerning osteomyelitis. Pending bone biopsy. Patient denies recent anginal symptoms. Prior to recent NSTEMI, patient reported sharp, substernal chest pain and SOB/DIOR requiring regular doses of nitroglycerin. Current chest pain is described as a deep burning that he feels both substernal and into the epigastrium. Denies radiation of pain. Also reports recent weight gain (approx. 10 lbs), worsening peripheral edema, and orthopnea. During his hospitalization, he was noted to have an abnormal EKG w/ ST depressions. Troponin was elevated to 1.16, down trended to 0.89. BNP 2608. No prior ECHO. Cardiology consulted for EKG changes concerning ischemia and elevated troponin.      Procedure(s) (LRB):  Left heart cath (Left)  Insertion, Pacemaker, Temporary Transvenous  INSERTION, INTRA-AORTIC BALLOON PUMP  Bypass graft study     Indwelling Lines/Drains at time of discharge:  Lines/Drains/Airways     Central Venous Catheter Line                 Percutaneous Central Line Insertion/Assessment - triple lumen  06/06/19 1949 right internal jugular 6 days          Drain                 Sheath 06/06/19 1412 Right proximal 7 days         NG/OG Tube 06/08/19  1445 Jose Miguel sump 14 Fr. Center mouth 5 days         Urethral Catheter 06/08/19 1525 14 Fr. 4 days          Line                 IABP 06/06/19 1728 6 days         Pacer Wires 06/06/19 1728 6 days                Hospital Course:  Patient transffered to the ccu after he had a code after PMVT s/p several shocks. Repeat C with patent stents. Initially was on lidocaine which was weaned off however on 6/8 had several episodes of MMVT and PMVT which required CPR, intubation and shocks. Currently being rewarmed. Prognosis very poor and family updated , code status changed to DNR, propofol weaning started and he had some bilateral lower extremity twitching ,EEG negative for seixure and patient following commands off propofol. Patient remained to have short runs of VT during the nights. On 6/13 at around 1:30 called by nursing that he started having sustained VT wife at beside and patient pronounced at 1:41.  notified and treatment team aware       Consults:   Consults (From admission, onward)        Status Ordering Provider     Inpatient consult to Cardiology  Once     Provider:  (Not yet assigned)    Completed KOMAL RAMÍREZ     Inpatient consult to Electrophysiology  Once     Provider:  (Not yet assigned)    Completed LUIS ENRIQUE LINCOLN     Inpatient consult to Endocrinology  Once     Provider:  (Not yet assigned)    Completed NIELS PITTMANINA O     Inpatient consult to Infectious Diseases  Once     Provider:  (Not yet assigned)    Completed TOYA NORBERT O     Inpatient consult to Interventional Cardiology  Once     Provider:  (Not yet assigned)    Completed BECCA TALAMANTES     Inpatient consult to Midline team  Once     Provider:  (Not yet assigned)    Completed BECCA TALAMANTES     Inpatient consult to Podiatry  Once     Provider:  (Not yet assigned)    Completed TOYA NORBERT O     Inpatient consult to Registered Dietitian/Nutritionist  Once     Provider:  (Not yet assigned)    Completed RACHEAL ALMONTE      Inpatient consult to Vascular Surgery  Once     Provider:  (Not yet assigned)    Completed ANIYA MOBLEY [facility]     Pharmacy to dose Vancomycin consult  Once     Provider:  (Not yet assigned)    Acknowledged JASON PETIT          Significant Diagnostic Studies: Labs:   CBC   Recent Labs   Lab 06/12/19  0340 06/12/19  0820 06/13/19  0342   WBC 37.63* 31.91* 23.65*  23.65*   HGB 7.6* 8.5* 8.0*  8.0*   HCT 24.6* 27.2* 25.8*  25.8*   PLT 78* 67* 64*  64*   , INR   Lab Results   Component Value Date    INR 1.0 06/13/2019    INR 1.0 06/12/2019    INR 1.0 06/11/2019   , Troponin   Recent Labs   Lab 06/10/19  0406   TROPONINI 3.521*    and All labs within the past 24 hours have been reviewed    Pending Diagnostic Studies:     Procedure Component Value Units Date/Time    VANCOMYCIN, TROUGH before 4th dose [[id number]] Collected:  06/06/19 2211    Order Status:  Sent Lab Status:  In process Updated:  06/06/19 2212    Specimen:  Blood           Final Active Diagnoses:    Diagnosis Date Noted POA    PRINCIPAL PROBLEM:  VT (ventricular tachycardia) [I47.2]  No    Cellulitis of Left foot [L03.119] 05/29/2019 Yes    Acute HF (heart failure) [I50.9] 05/29/2019 Yes    Goals of care, counseling/discussion [Z71.89] 06/10/2019 Not Applicable    VF (ventricular fibrillation) [I49.01]  No    Atrial fibrillation and Focal atrial tachycardia [I48.91] 06/06/2019 Unknown    SHANIKA (acute kidney injury) [N17.9]  Yes    NSTEMI (non-ST elevated myocardial infarction) [I21.4] 05/31/2019 Unknown    Open toe wound [S91.109A] 05/30/2019 Yes    Diabetic ulcer of left great toe [E11.621, L97.529] 05/30/2019 Yes    Foot abscess, left [L02.612] 05/29/2019 Yes    CKD (chronic kidney disease), stage III [N18.3] 06/24/2016 Yes    Peripheral vascular disease [I73.9] 10/31/2014 Yes    MINDA on CPAP [G47.33, Z99.89]  Not Applicable    HTN (hypertension) [I10] 04/10/2013 Yes    Hyperlipidemia [E78.5] 04/10/2013 Yes    Diabetic  polyneuropathy associated with type 1 diabetes mellitus [E10.42] 10/01/2012 Yes    Controlled type 1 diabetes mellitus with diabetic neuropathy, with long-term current use of insulin [E10.40] 10/01/2012 Yes    CLL (chronic lymphocytic leukemia) [C91.90] 2012 Yes      Problems Resolved During this Admission:    Diagnosis Date Noted Date Resolved POA    Hyponatremia [E87.1] 2019 Yes    Hyperkalemia [E87.5] 2019 Yes     Cellulitis of Left foot  - Vitals are WNLs  - Continue on Vanc and CTX total for 6 weeks until     VF (ventricular fibrillation)  As under VT        Discharged Condition:     Disposition:     Follow Up:  Follow-up Information     Luis Rod MD In 2 weeks.    Specialty:  Vascular Surgery  Why:  s/p angio, 2W f/u with ABIs and arterial duplex   Contact information:  [address] [place] [id number]  [phone]                 Patient Instructions:      VAS US Arterial Leg Left   Standing Status: Future Standing Exp. Date: 20   Order Comments: S/p angio of the LLE, biphasic PT at the end of case, 2 w f/u with Kelli     VAS US Ankle Brachial Indices Resting   Standing Status: Future Standing Exp. Date: 20   Order Comments: 2 w f/u study after LLE angio with kelli, biphasic PT at conclusion, non-healing ulcer     Medications:  None (patient  at medical facility)    Time spent on the discharge of patient: 60 minutes    Shanna To MD  Cardiology  [facility]

## 2019-06-13 NOTE — PROGRESS NOTES
[facility]  Interventional Cardiology  Progress Note    Patient Name: Reid Herman  MRN: [id number]  Admission Date: 5/29/2019  Hospital Length of Stay: 15 days  Code Status: DNR   Attending Physician: Paz Pradhan MD   Primary Care Physician: Olivia Zavala MD  Principal Problem:VT (ventricular tachycardia)    Subjective:     Interval History: Patient is awake, responds appropriately to my questions, follows commands. He indicates he is in no pain.    Objective:     Vital Signs (Most Recent):  Temp: 99.1 °F (37.3 °C) (06/13/19 0600)  Pulse: 89 (06/13/19 0600)  Resp: 18 (06/13/19 0600)  BP: (!) 100/55 (06/12/19 0701)  SpO2: 97 % (06/13/19 0600) Vital Signs (24h Range):  Temp:  [99.1 °F (37.3 °C)-99.7 °F (37.6 °C)] 99.1 °F (37.3 °C)  Pulse:  [75-89] 89  Resp:  [9-18] 18  SpO2:  [94 %-100 %] 97 %  BP: (100)/(55) 100/55     Weight: 94.4 kg (208 lb 1.8 oz)  Body mass index is 32.6 kg/m².    SpO2: 97 %  O2 Device (Oxygen Therapy): ventilator      Intake/Output Summary (Last 24 hours) at 6/13/2019 0647  Last data filed at 6/13/2019 0600  Gross per 24 hour   Intake 1442.7 ml   Output 2825 ml   Net -1382.3 ml       Lines/Drains/Airways     Central Venous Catheter Line                 Percutaneous Central Line Insertion/Assessment - triple lumen  06/06/19 1949 right internal jugular 6 days          Drain                 Sheath 06/06/19 1412 Right proximal 6 days         NG/OG Tube 06/08/19 1445 [place] sump 14 Fr. Center mouth 4 days         Urethral Catheter 06/08/19 1525 14 Fr. 4 days          Airway                 Airway - Non-Surgical 06/08/19 1254 Endotracheal Tube 4 days          Line                 IABP 06/06/19 1728 6 days         Pacer Wires 06/06/19 1728 6 days          Peripheral Intravenous Line                 Peripheral IV - Single Lumen 06/10/19 1604 20 G Right Forearm 2 days         Peripheral IV - Single Lumen 06/10/19 1611 20 G Left Antecubital 2 days                Physical  Exam   Constitutional: He appears well-developed and well-nourished. No distress.   HENT:   Head: Normocephalic and atraumatic.   Cardiovascular: Normal rate, regular rhythm, normal heart sounds and intact distal pulses. Exam reveals no gallop and no friction rub.   No murmur heard.  Sternotomy, healed. Surrounding ecchymoses. Extremities cool to touch. Right CFA IABP   Pulmonary/Chest:   Intubated. Mechanical breath sounds   Abdominal: Soft. Bowel sounds are normal. He exhibits no distension. There is no tenderness.   Musculoskeletal: He exhibits edema (Trace bilateral lower extremity edema).   Left foot dressing c/d/i. Left foot mottled   Neurological:   Follows commands, moving all extremities   Skin: He is not diaphoretic.       Significant Labs:     Recent Results (from the past 24 hour(s))   POCT glucose    Collection Time: 06/12/19  7:26 AM   Result Value Ref Range    POCT Glucose 167 (H) 70 - 110 mg/dL   CBC with Automated Differential    Collection Time: 06/12/19  8:20 AM   Result Value Ref Range    WBC 31.91 (H) 3.90 - 12.70 K/uL    RBC 2.66 (L) 4.60 - 6.20 M/uL    Hemoglobin 8.5 (L) 14.0 - 18.0 g/dL    Hematocrit 27.2 (L) 40.0 - 54.0 %    Mean Corpuscular Volume 102 (H) 82 - 98 fL    Mean Corpuscular Hemoglobin 32.0 (H) 27.0 - 31.0 pg    Mean Corpuscular Hemoglobin Conc 31.3 (L) 32.0 - 36.0 g/dL    RDW 15.5 (H) 11.5 - 14.5 %    Platelets 67 (L) 150 - 350 K/uL    MPV 10.3 9.2 - 12.9 fL    Immature Granulocytes 0.1 0.0 - 0.5 %    Gran # (ANC) 2.0 1.8 - 7.7 K/uL    Immature Grans (Abs) 0.03 0.00 - 0.04 K/uL    Lymph # 28.9 (H) 1.0 - 4.8 K/uL    Mono # 0.8 0.3 - 1.0 K/uL    Eos # 0.1 0.0 - 0.5 K/uL    Baso # 0.08 0.00 - 0.20 K/uL    nRBC 0 0 /100 WBC    Gran% 6.2 (L) 38.0 - 73.0 %    Lymph% 90.5 (H) 18.0 - 48.0 %    Mono% 2.6 (L) 4.0 - 15.0 %    Eosinophil% 0.3 0.0 - 8.0 %    Basophil% 0.3 0.0 - 1.9 %    Platelet Estimate Decreased (A)     Aniso Slight     Smudge Cells Present     Differential Method  Automated    Basic metabolic panel    Collection Time: 06/12/19  8:20 AM   Result Value Ref Range    Sodium 134 (L) 136 - 145 mmol/L    Potassium 4.7 3.5 - 5.1 mmol/L    Chloride 105 95 - 110 mmol/L    CO2 22 (L) 23 - 29 mmol/L    Glucose 155 (H) 70 - 110 mg/dL    BUN, Bld 22 8 - 23 mg/dL    Creatinine 1.2 0.5 - 1.4 mg/dL    Calcium 8.3 (L) 8.7 - 10.5 mg/dL    Anion Gap 7 (L) 8 - 16 mmol/L    eGFR if African American >60.0 >60 mL/min/1.73 m^2    eGFR if non  58.0 (A) >60 mL/min/1.73 m^2   Magnesium    Collection Time: 06/12/19  8:20 AM   Result Value Ref Range    Magnesium 2.9 (H) 1.6 - 2.6 mg/dL   Phosphorus    Collection Time: 06/12/19  8:20 AM   Result Value Ref Range    Phosphorus 3.3 2.7 - 4.5 mg/dL   POCT glucose    Collection Time: 06/12/19  8:26 AM   Result Value Ref Range    POCT Glucose 165 (H) 70 - 110 mg/dL   POCT glucose    Collection Time: 06/12/19 10:20 AM   Result Value Ref Range    POCT Glucose 165 (H) 70 - 110 mg/dL   POCT glucose    Collection Time: 06/12/19 11:20 AM   Result Value Ref Range    POCT Glucose 196 (H) 70 - 110 mg/dL   Basic metabolic panel    Collection Time: 06/12/19 11:28 AM   Result Value Ref Range    Sodium 136 136 - 145 mmol/L    Potassium 4.9 3.5 - 5.1 mmol/L    Chloride 105 95 - 110 mmol/L    CO2 20 (L) 23 - 29 mmol/L    Glucose 178 (H) 70 - 110 mg/dL    BUN, Bld 22 8 - 23 mg/dL    Creatinine 1.2 0.5 - 1.4 mg/dL    Calcium 8.2 (L) 8.7 - 10.5 mg/dL    Anion Gap 11 8 - 16 mmol/L    eGFR if African American >60.0 >60 mL/min/1.73 m^2    eGFR if non  58.0 (A) >60 mL/min/1.73 m^2   Magnesium    Collection Time: 06/12/19 11:28 AM   Result Value Ref Range    Magnesium 2.7 (H) 1.6 - 2.6 mg/dL   Phosphorus    Collection Time: 06/12/19 11:28 AM   Result Value Ref Range    Phosphorus 3.3 2.7 - 4.5 mg/dL   POCT glucose    Collection Time: 06/12/19  1:29 PM   Result Value Ref Range    POCT Glucose 185 (H) 70 - 110 mg/dL   POCT glucose    Collection Time:  06/12/19  2:31 PM   Result Value Ref Range    POCT Glucose 183 (H) 70 - 110 mg/dL   Basic metabolic panel    Collection Time: 06/12/19  3:38 PM   Result Value Ref Range    Sodium 136 136 - 145 mmol/L    Potassium 4.4 3.5 - 5.1 mmol/L    Chloride 105 95 - 110 mmol/L    CO2 23 23 - 29 mmol/L    Glucose 169 (H) 70 - 110 mg/dL    BUN, Bld 23 8 - 23 mg/dL    Creatinine 1.2 0.5 - 1.4 mg/dL    Calcium 8.1 (L) 8.7 - 10.5 mg/dL    Anion Gap 8 8 - 16 mmol/L    eGFR if African American >60.0 >60 mL/min/1.73 m^2    eGFR if non  58.0 (A) >60 mL/min/1.73 m^2   Magnesium    Collection Time: 06/12/19  3:38 PM   Result Value Ref Range    Magnesium 2.6 1.6 - 2.6 mg/dL   Phosphorus    Collection Time: 06/12/19  3:38 PM   Result Value Ref Range    Phosphorus 3.2 2.7 - 4.5 mg/dL   POCT glucose    Collection Time: 06/12/19  3:40 PM   Result Value Ref Range    POCT Glucose 181 (H) 70 - 110 mg/dL   POCT glucose    Collection Time: 06/12/19  4:33 PM   Result Value Ref Range    POCT Glucose 171 (H) 70 - 110 mg/dL   POCT glucose    Collection Time: 06/12/19  5:56 PM   Result Value Ref Range    POCT Glucose 156 (H) 70 - 110 mg/dL   POCT glucose    Collection Time: 06/12/19  7:10 PM   Result Value Ref Range    POCT Glucose 208 (H) 70 - 110 mg/dL   Basic metabolic panel    Collection Time: 06/12/19  8:21 PM   Result Value Ref Range    Sodium 137 136 - 145 mmol/L    Potassium 4.1 3.5 - 5.1 mmol/L    Chloride 104 95 - 110 mmol/L    CO2 24 23 - 29 mmol/L    Glucose 127 (H) 70 - 110 mg/dL    BUN, Bld 24 (H) 8 - 23 mg/dL    Creatinine 1.2 0.5 - 1.4 mg/dL    Calcium 8.3 (L) 8.7 - 10.5 mg/dL    Anion Gap 9 8 - 16 mmol/L    eGFR if African American >60.0 >60 mL/min/1.73 m^2    eGFR if non  58.0 (A) >60 mL/min/1.73 m^2   Magnesium    Collection Time: 06/12/19  8:21 PM   Result Value Ref Range    Magnesium 2.5 1.6 - 2.6 mg/dL   Phosphorus    Collection Time: 06/12/19  8:21 PM   Result Value Ref Range    Phosphorus 3.2 2.7 -  4.5 mg/dL   POCT glucose    Collection Time: 06/12/19  8:21 PM   Result Value Ref Range    POCT Glucose 138 (H) 70 - 110 mg/dL   POCT glucose    Collection Time: 06/12/19  9:35 PM   Result Value Ref Range    POCT Glucose 156 (H) 70 - 110 mg/dL   POCT glucose    Collection Time: 06/12/19 10:28 PM   Result Value Ref Range    POCT Glucose 129 (H) 70 - 110 mg/dL   Basic metabolic panel    Collection Time: 06/12/19 11:34 PM   Result Value Ref Range    Sodium 137 136 - 145 mmol/L    Potassium 4.2 3.5 - 5.1 mmol/L    Chloride 105 95 - 110 mmol/L    CO2 23 23 - 29 mmol/L    Glucose 133 (H) 70 - 110 mg/dL    BUN, Bld 25 (H) 8 - 23 mg/dL    Creatinine 1.2 0.5 - 1.4 mg/dL    Calcium 8.3 (L) 8.7 - 10.5 mg/dL    Anion Gap 9 8 - 16 mmol/L    eGFR if African American >60.0 >60 mL/min/1.73 m^2    eGFR if non  58.0 (A) >60 mL/min/1.73 m^2   Magnesium    Collection Time: 06/12/19 11:34 PM   Result Value Ref Range    Magnesium 2.5 1.6 - 2.6 mg/dL   Phosphorus    Collection Time: 06/12/19 11:34 PM   Result Value Ref Range    Phosphorus 3.5 2.7 - 4.5 mg/dL   POCT glucose    Collection Time: 06/12/19 11:38 PM   Result Value Ref Range    POCT Glucose 141 (H) 70 - 110 mg/dL   POCT glucose    Collection Time: 06/13/19 12:34 AM   Result Value Ref Range    POCT Glucose 165 (H) 70 - 110 mg/dL   POCT glucose    Collection Time: 06/13/19  1:31 AM   Result Value Ref Range    POCT Glucose 154 (H) 70 - 110 mg/dL   POCT glucose    Collection Time: 06/13/19  2:32 AM   Result Value Ref Range    POCT Glucose 194 (H) 70 - 110 mg/dL   POCT glucose    Collection Time: 06/13/19  3:38 AM   Result Value Ref Range    POCT Glucose 187 (H) 70 - 110 mg/dL   Magnesium    Collection Time: 06/13/19  3:42 AM   Result Value Ref Range    Magnesium 2.4 1.6 - 2.6 mg/dL   Phosphorus    Collection Time: 06/13/19  3:42 AM   Result Value Ref Range    Phosphorus 3.5 2.7 - 4.5 mg/dL   CBC with Automated Differential    Collection Time: 06/13/19  3:42 AM    Result Value Ref Range    WBC 23.65 (H) 3.90 - 12.70 K/uL    RBC 2.51 (L) 4.60 - 6.20 M/uL    Hemoglobin 8.0 (L) 14.0 - 18.0 g/dL    Hematocrit 25.8 (L) 40.0 - 54.0 %    Mean Corpuscular Volume 103 (H) 82 - 98 fL    Mean Corpuscular Hemoglobin 31.9 (H) 27.0 - 31.0 pg    Mean Corpuscular Hemoglobin Conc 31.0 (L) 32.0 - 36.0 g/dL    RDW 15.4 (H) 11.5 - 14.5 %    Platelets 64 (L) 150 - 350 K/uL    MPV 9.7 9.2 - 12.9 fL    Immature Granulocytes 0.1 0.0 - 0.5 %    Gran # (ANC) 1.4 (L) 1.8 - 7.7 K/uL    Immature Grans (Abs) 0.02 0.00 - 0.04 K/uL    Lymph # 22.1 (H) 1.0 - 4.8 K/uL    Mono # 0.2 (L) 0.3 - 1.0 K/uL    Eos # 0.0 0.0 - 0.5 K/uL    Baso # 0.03 0.00 - 0.20 K/uL    nRBC 0 0 /100 WBC    Gran% 5.7 (L) 38.0 - 73.0 %    Lymph% 93.3 (H) 18.0 - 48.0 %    Mono% 0.7 (L) 4.0 - 15.0 %    Eosinophil% 0.1 0.0 - 8.0 %    Basophil% 0.1 0.0 - 1.9 %    Platelet Estimate Decreased (A)     Aniso Slight     Hypo Occasional     Smudge Cells Present     Differential Method Automated    Comprehensive Metabolic Panel (CMP)    Collection Time: 06/13/19  3:42 AM   Result Value Ref Range    Sodium 136 136 - 145 mmol/L    Potassium 4.1 3.5 - 5.1 mmol/L    Chloride 104 95 - 110 mmol/L    CO2 22 (L) 23 - 29 mmol/L    Glucose 165 (H) 70 - 110 mg/dL    BUN, Bld 26 (H) 8 - 23 mg/dL    Creatinine 1.2 0.5 - 1.4 mg/dL    Calcium 8.2 (L) 8.7 - 10.5 mg/dL    Total Protein 4.8 (L) 6.0 - 8.4 g/dL    Albumin 2.2 (L) 3.5 - 5.2 g/dL    Total Bilirubin 0.5 0.1 - 1.0 mg/dL    Alkaline Phosphatase 90 55 - 135 U/L    AST 69 (H) 10 - 40 U/L    ALT 28 10 - 44 U/L    Anion Gap 10 8 - 16 mmol/L    eGFR if African American >60.0 >60 mL/min/1.73 m^2    eGFR if non  58.0 (A) >60 mL/min/1.73 m^2   CBC auto differential    Collection Time: 06/13/19  3:42 AM   Result Value Ref Range    WBC 23.65 (H) 3.90 - 12.70 K/uL    RBC 2.51 (L) 4.60 - 6.20 M/uL    Hemoglobin 8.0 (L) 14.0 - 18.0 g/dL    Hematocrit 25.8 (L) 40.0 - 54.0 %    Mean Corpuscular Volume  103 (H) 82 - 98 fL    Mean Corpuscular Hemoglobin 31.9 (H) 27.0 - 31.0 pg    Mean Corpuscular Hemoglobin Conc 31.0 (L) 32.0 - 36.0 g/dL    RDW 15.4 (H) 11.5 - 14.5 %    Platelets 64 (L) 150 - 350 K/uL    MPV 9.7 9.2 - 12.9 fL    Immature Granulocytes 0.1 0.0 - 0.5 %    Gran # (ANC) 1.4 (L) 1.8 - 7.7 K/uL    Immature Grans (Abs) 0.02 0.00 - 0.04 K/uL    Lymph # 22.1 (H) 1.0 - 4.8 K/uL    Mono # 0.2 (L) 0.3 - 1.0 K/uL    Eos # 0.0 0.0 - 0.5 K/uL    Baso # 0.03 0.00 - 0.20 K/uL    nRBC 0 0 /100 WBC    Gran% 5.7 (L) 38.0 - 73.0 %    Lymph% 93.3 (H) 18.0 - 48.0 %    Mono% 0.7 (L) 4.0 - 15.0 %    Eosinophil% 0.1 0.0 - 8.0 %    Basophil% 0.1 0.0 - 1.9 %    Platelet Estimate Decreased (A)     Aniso Slight     Hypo Occasional     Smudge Cells Present     Differential Method Automated    Protime-INR    Collection Time: 06/13/19  3:42 AM   Result Value Ref Range    Prothrombin Time 10.1 9.0 - 12.5 sec    INR 1.0 0.8 - 1.2   APTT    Collection Time: 06/13/19  3:42 AM   Result Value Ref Range    aPTT 40.9 (H) 21.0 - 32.0 sec   POCT glucose    Collection Time: 06/13/19  5:15 AM   Result Value Ref Range    POCT Glucose 194 (H) 70 - 110 mg/dL   POCT glucose    Collection Time: 06/13/19  5:52 AM   Result Value Ref Range    POCT Glucose 190 (H) 70 - 110 mg/dL         Significant Imaging:     I have reviewed all pertinent imaging studies from the last 24 hours      Assessment and Plan:     Patient is a 79 y.o. male presenting with:    NSTEMI (non-ST elevated myocardial infarction)  Known CAD with history of 2v CABG (1994) LIMA-D1 and SVG-LAD, recently had LHC 1/29/19 for worsening angina s/p DUC x 2 (mid and distal LCx). He was also found to have 50% D1 stenosis (distal to the LIMA graft insertion) and 60% proximal SVG graft stenosis and RCA .     S/p SVG DUC 6/6/19  AFib on 6/6/19, started on amiodarone, developed PMVT, taken back to cath lab with no new stenoses  Continue medical therapy with DAPT, high intensity statin  IABP  placed via R CFA 6/6/19, marked improvement in pressor requirements (levo 0.02 mcg/kg/min)  CXR daily for IABP/ET tube placement        VTE Risk Mitigation (From admission, onward)        Ordered     heparin infusion 1,000 units/500 ml in 0.9% NaCl (pressure line flush)  Intra-op continuous PRN      06/06/19 1052     heparin 25,000 units in dextrose 5% 250 mL (100 units/mL) infusion LOW INTENSITY nomogram - [facility]  Continuous      06/05/19 0853     heparin 25,000 units in dextrose 5% (100 units/ml) IV bolus from bag - ADDITIONAL PRN BOLUS - 60 units/kg (max bolus 4000 units)  As needed (PRN)      06/05/19 0853     heparin 25,000 units in dextrose 5% (100 units/ml) IV bolus from bag - ADDITIONAL PRN BOLUS - 30 units/kg (max bolus 4000 units)  As needed (PRN)      06/05/19 0853     heparin 25,000 units in dextrose 5% 250 mL (100 units/mL) infusion LOW INTENSITY nomogram - [facility]  Continuous      06/02/19 1508     IP VTE LOW RISK PATIENT  Once      05/29/19 4288          Milind Reyes MD  Interventional Cardiology  [facility]

## 2019-06-13 NOTE — PLAN OF CARE
Problem: Adult Inpatient Plan of Care  Goal: Plan of Care Review  Outcome: Ongoing (interventions implemented as appropriate)    No acute events within the shift.  Pt remains sedated and intubated. Follows commands and opens eyes to voice.  No episodes of Vtach. HR on 80's .Remains with TVP and IABP on right groin.IABP 1:1 EKG Auto.   No BM. Still with OGT, Diabetisource feeding started last night at 10cc/hr, no residuals.  Remains on IFC, UO total 850ml. CVP 10-9-10  Afebrile.   Remains on Heparin, Lidocaine, Levophed (to keep MAP above 60) and Fentanyl drip.  Plan for SBT trial today.  Plan of care reviewed with pt and spouse Michelle. All concerns and questions addressed. TM

## 2019-06-13 NOTE — ASSESSMENT & PLAN NOTE
Plan  Lidocaine titration per primary team - now down to 1  If patient improves will need to be switched to oral AAD  Keep K >4 Mg >2  Patient with improvement and can give thumbs up but primary decided tommorow for extubation  Continue Anticoagulation if no contraindications per primary

## 2019-06-13 NOTE — ASSESSMENT & PLAN NOTE
Known CAD with history of 2v CABG (1994) LIMA-D1 and SVG-LAD, recently had C 1/29/19 for worsening angina s/p DUC x 2 (mid and distal LCx). He was also found to have 50% D1 stenosis (distal to the LIMA graft insertion) and 60% proximal SVG graft stenosis and RCA .     S/p SVG DUC 6/6/19  AFib on 6/6/19, started on amiodarone, developed PMVT, taken back to cath lab with no new stenoses  Continue medical therapy with DAPT, high intensity statin  IABP placed via R CFA 6/6/19, marked improvement in pressor requirements (levo 0.02 mcg/kg/min)  CXR daily for IABP/ET tube placement

## 2019-06-13 NOTE — PROGRESS NOTES
[facility]  Cardiology  Progress Note    Patient Name: Reid Herman  MRN: [id number]  Admission Date: 5/29/2019  Hospital Length of Stay: 15 days  Code Status: DNR   Attending Physician: Paz Pradhan MD   Primary Care Physician: Olivia Zavala MD  Expected Discharge Date: 6/13/2019  Principal Problem:VT (ventricular tachycardia)    Subjective:     Hospital Course:   Patient transffered to the ccu after he had a code after PMVT s/p several shocks. Repeat [facility] with patent stents. Initially was on lidocaine which was weaned off however on 6/8 had several episodes of MMVT and PMVT which required CPR, intubation and shocks. Currently being rewarmed. Prognosis very poor and family updated , code status changed to DNR, propofol weaning started and he had some bilateral lower extremity twitching ,EEG negative for seixure and patient following commands off propofol. Patient remained to have short runs of VT during the nights. On 6/13 at around 1:30 called by nursing that he started having sustained VT wife at beside and patient pronounced at 1:41.  notified and treatment team aware     Vitals:    06/13/19 1339   BP:    Pulse: (!) 0   Resp: 18   Temp: 99.3 °F (37.4 °C)     Physical Exam   Constitutional: He appears well-developed and well-nourished. No distress.   HENT:   Head: Normocephalic and atraumatic.   Cardiovascular: Normal rate, regular rhythm, normal heart sounds and intact distal pulses. Exam reveals no gallop and no friction rub.   No murmur heard.  Sternotomy, healed. Surrounding ecchymoses. Extremities cool to touch. Right CFA IABP   Pulmonary/Chest:   Intubated. Mechanical breath sounds   Abdominal: Soft. Bowel sounds are normal. He exhibits no distension. There is no tenderness.   Musculoskeletal: He exhibits no edema (Trace bilateral lower extremity edema).   Left foot dressing c/d/i. Left foot mottled     Neurological:   Follows commands, moving all extremities   Skin:  He is not diaphoretic.         Interval History: no acute events overnight,VT last night nonsustained     Review of Systems   Unable to perform ROS: intubated     Objective:     Vital Signs (Most Recent):  Temp: 99.3 °F (37.4 °C) (06/13/19 1339)  Pulse: (!) 0 (06/13/19 1339)  Resp: 18 (06/13/19 1339)  BP: (!) 100/55 (06/12/19 0701)  SpO2: (!) 84 % (06/13/19 1339) Vital Signs (24h Range):  Temp:  [97.7 °F (36.5 °C)-99.7 °F (37.6 °C)] 99.3 °F (37.4 °C)  Pulse:  [0-101] 0  Resp:  [9-25] 18  SpO2:  [84 %-100 %] 84 %     Weight: 94.4 kg (208 lb 1.8 oz)  Body mass index is 32.6 kg/m².     SpO2: (!) 84 %  O2 Device (Oxygen Therapy): ventilator      Intake/Output Summary (Last 24 hours) at 6/13/2019 1501  Last data filed at 6/13/2019 1300  Gross per 24 hour   Intake 1404.08 ml   Output 2600 ml   Net -1195.92 ml       Lines/Drains/Airways     Central Venous Catheter Line                 Percutaneous Central Line Insertion/Assessment - triple lumen  06/06/19 1949 right internal jugular 6 days          Drain                 Sheath 06/06/19 1412 Right proximal 7 days         NG/OG Tube 06/08/19 1445 [place] sump 14 Fr. Center mouth 5 days         Urethral Catheter 06/08/19 1525 14 Fr. 4 days          Line                 IABP 06/06/19 1728 6 days         Pacer Wires 06/06/19 1728 6 days          Peripheral Intravenous Line                 Peripheral IV - Single Lumen 06/10/19 1604 20 G Right Forearm 2 days         Peripheral IV - Single Lumen 06/10/19 1611 20 G Left Antecubital 2 days                Physical Exam   Constitutional: He appears well-developed and well-nourished. No distress.   HENT:   Head: Normocephalic and atraumatic.   Cardiovascular: Normal rate, regular rhythm, normal heart sounds and intact distal pulses. Exam reveals no gallop and no friction rub.   No murmur heard.  Sternotomy, healed. Surrounding ecchymoses. Extremities cool to touch. Right CFA IABP   Pulmonary/Chest:   Intubated. Mechanical breath sounds    Abdominal: Soft. Bowel sounds are normal. He exhibits no distension. There is no tenderness.   Musculoskeletal: He exhibits no edema (Trace bilateral lower extremity edema).   Left foot dressing c/d/i. Left foot mottled     Neurological:   Follows commands, moving all extremities   Skin: He is not diaphoretic.       Significant Labs:   CMP   Recent Labs   Lab 06/12/19  0619  06/12/19  2334 06/13/19  0342 06/13/19  0844   *  135*   < > 137 136 136   K 4.8  4.8   < > 4.2 4.1 3.9     104   < > 105 104 104   CO2 23  23   < > 23 22* 24   *  148*   < > 133* 165* 135*   BUN 20  20   < > 25* 26* 28*   CREATININE 1.2  1.2   < > 1.2 1.2 1.2   CALCIUM 8.4*  8.4*   < > 8.3* 8.2* 8.2*   PROT 4.9*  --   --  4.8*  --    ALBUMIN 2.3*  --   --  2.2*  --    BILITOT 0.5  --   --  0.5  --    ALKPHOS 96  --   --  90  --    AST 77*  --   --  69*  --    ALT 29  --   --  28  --    ANIONGAP 8  8   < > 9 10 8   ESTGFRAFRICA >60.0  >60.0   < > >60.0 >60.0 >60.0   EGFRNONAA 58.0*  58.0*   < > 58.0* 58.0* 58.0*    < > = values in this interval not displayed.    and CBC   Recent Labs   Lab 06/12/19  0340 06/12/19  0820 06/13/19  0342   WBC 37.63* 31.91* 23.65*  23.65*   HGB 7.6* 8.5* 8.0*  8.0*   HCT 24.6* 27.2* 25.8*  25.8*   PLT 78* 67* 64*  64*       Significant Imaging: all imaging in the last 24 hours reviwed    Assessment and Plan:         * VT (ventricular tachycardia)  Pt had LHC then developed AF was started on amiodarone and had PMVT s/p several shocks. Repeat LHC with patent stents. Initially was on lidocaine which was weaned off however on 6/8 had several episodes of MMVT and PMVT which required CPR, intubation and shocks. Currently being cooled.    Plan  - EP on board   -  Lidocaine infusing at 2, will wean to 1, remains to have overnight a couple runs of VT  - will continue with lidocaine and likely switch to Mexiletine or another agent if can get extubated, trial today on rounds and patinet did  well but felt tired and mental status waxed and waned  - keep electrolytes repleted   - daily sbt       Cellulitis of Left foot  - Vitals are WNLs  - Continue on Vanc and CTX total for 6 weeks until 7/16    Acute HF (heart failure)  Known CAD with history of 2v CABG (1994) LIMA-D1 and SVG-LAD, recently had LHC 1/29/19 for worsening angina s/p DUC x 2 (mid and distal LCx). He was also found to have 50% D1 stenosis (distal to the LIMA graft insertion) and 60% proximal SVG graft stenosis and RCA .      S/p SVG DUC 6/6/19  Continue medical therapy with DAPT, high intensity statin  IABP placed via R CFA 6/6/19, on levophed  CXR daily for IABP  Continue on 80 mg IV lasix BID.         Goals of care, counseling/discussion  - discussed with family and they request DNR for now       VF (ventricular fibrillation)  As under VT    Atrial fibrillation and Focal atrial tachycardia  - Continue on heparin gtt.    SHANIKA (acute kidney injury)  resolved    NSTEMI (non-ST elevated myocardial infarction)  - continue asa, statin, plavix    CKD (chronic kidney disease), stage III  - stable     MINDA on CPAP  Intubated right now   cpap nightly when extubated     Controlled type 1 diabetes mellitus with diabetic neuropathy, with long-term current use of insulin  - on insulin drip , management per endocrinology     CLL (chronic lymphocytic leukemia)  - stable         VTE Risk Mitigation (From admission, onward)        Ordered     heparin infusion 1,000 units/500 ml in 0.9% NaCl (pressure line flush)  Intra-op continuous PRN      06/06/19 1052     heparin 25,000 units in dextrose 5% 250 mL (100 units/mL) infusion LOW INTENSITY nomogram - [facility]  Continuous      06/05/19 0853     heparin 25,000 units in dextrose 5% (100 units/ml) IV bolus from bag - ADDITIONAL PRN BOLUS - 60 units/kg (max bolus 4000 units)  As needed (PRN)      06/05/19 0853     heparin 25,000 units in dextrose 5% (100 units/ml) IV bolus from bag - ADDITIONAL PRN BOLUS - 30  units/kg (max bolus 4000 units)  As needed (PRN)      06/05/19 0853     heparin 25,000 units in dextrose 5% 250 mL (100 units/mL) infusion LOW INTENSITY nomogram - [facility]  Continuous      06/02/19 1508     IP VTE LOW RISK PATIENT  Once      05/29/19 4281          Shanna To MD  Cardiology  [facility]

## 2019-06-13 NOTE — SUBJECTIVE & OBJECTIVE
Interval History: no acute events overnight,VT last night nonsustained     Review of Systems   Unable to perform ROS: intubated     Objective:     Vital Signs (Most Recent):  Temp: 99.3 °F (37.4 °C) (06/13/19 1339)  Pulse: (!) 0 (06/13/19 1339)  Resp: 18 (06/13/19 1339)  BP: (!) 100/55 (06/12/19 0701)  SpO2: (!) 84 % (06/13/19 1339) Vital Signs (24h Range):  Temp:  [97.7 °F (36.5 °C)-99.7 °F (37.6 °C)] 99.3 °F (37.4 °C)  Pulse:  [0-101] 0  Resp:  [9-25] 18  SpO2:  [84 %-100 %] 84 %     Weight: 94.4 kg (208 lb 1.8 oz)  Body mass index is 32.6 kg/m².     SpO2: (!) 84 %  O2 Device (Oxygen Therapy): ventilator      Intake/Output Summary (Last 24 hours) at 6/13/2019 1501  Last data filed at 6/13/2019 1300  Gross per 24 hour   Intake 1404.08 ml   Output 2600 ml   Net -1195.92 ml       Lines/Drains/Airways     Central Venous Catheter Line                 Percutaneous Central Line Insertion/Assessment - triple lumen  06/06/19 1949 right internal jugular 6 days          Drain                 Sheath 06/06/19 1412 Right proximal 7 days         NG/OG Tube 06/08/19 1445 [place] sump 14 Fr. Center mouth 5 days         Urethral Catheter 06/08/19 1525 14 Fr. 4 days          Line                 IABP 06/06/19 1728 6 days         Pacer Wires 06/06/19 1728 6 days          Peripheral Intravenous Line                 Peripheral IV - Single Lumen 06/10/19 1604 20 G Right Forearm 2 days         Peripheral IV - Single Lumen 06/10/19 1611 20 G Left Antecubital 2 days                Physical Exam   Constitutional: He appears well-developed and well-nourished. No distress.   HENT:   Head: Normocephalic and atraumatic.   Cardiovascular: Normal rate, regular rhythm, normal heart sounds and intact distal pulses. Exam reveals no gallop and no friction rub.   No murmur heard.  Sternotomy, healed. Surrounding ecchymoses. Extremities cool to touch. Right CFA IABP   Pulmonary/Chest:   Intubated. Mechanical breath sounds   Abdominal: Soft. Bowel sounds  are normal. He exhibits no distension. There is no tenderness.   Musculoskeletal: He exhibits no edema (Trace bilateral lower extremity edema).   Left foot dressing c/d/i. Left foot mottled     Neurological:   Follows commands, moving all extremities   Skin: He is not diaphoretic.       Significant Labs:   CMP   Recent Labs   Lab 06/12/19  0619  06/12/19  2334 06/13/19  0342 06/13/19  0844   *  135*   < > 137 136 136   K 4.8  4.8   < > 4.2 4.1 3.9     104   < > 105 104 104   CO2 23  23   < > 23 22* 24   *  148*   < > 133* 165* 135*   BUN 20  20   < > 25* 26* 28*   CREATININE 1.2  1.2   < > 1.2 1.2 1.2   CALCIUM 8.4*  8.4*   < > 8.3* 8.2* 8.2*   PROT 4.9*  --   --  4.8*  --    ALBUMIN 2.3*  --   --  2.2*  --    BILITOT 0.5  --   --  0.5  --    ALKPHOS 96  --   --  90  --    AST 77*  --   --  69*  --    ALT 29  --   --  28  --    ANIONGAP 8  8   < > 9 10 8   ESTGFRAFRICA >60.0  >60.0   < > >60.0 >60.0 >60.0   EGFRNONAA 58.0*  58.0*   < > 58.0* 58.0* 58.0*    < > = values in this interval not displayed.    and CBC   Recent Labs   Lab 06/12/19  0340 06/12/19  0820 06/13/19  0342   WBC 37.63* 31.91* 23.65*  23.65*   HGB 7.6* 8.5* 8.0*  8.0*   HCT 24.6* 27.2* 25.8*  25.8*   PLT 78* 67* 64*  64*       Significant Imaging: all imaging in the last 24 hours reviwed

## 2019-06-13 NOTE — PROGRESS NOTES
06/13/2019  Shade Chandler    Current provider:  Paz Pradhan MD      I, Shade Chandler, rounded on Reid S Virgil to ensure all mechanical assist device settings (IABP or VAD) were appropriate and all parameters were within limits.  I was able to ensure all back up equipment was present, the staff had no issues, and the Perfusion Department daily rounding was complete.    7:15 AM

## 2019-06-13 NOTE — SUBJECTIVE & OBJECTIVE
"Interval HPI:   Overnight events:  BG is slightly above goal with frequent rate changes on intensive IV insulin Infusion. NAEON.    Eating:   NPO  Nausea: No  Hypoglycemia and intervention: No  Fever: No  TPN and/or TF: Yes (TF).  If yes, type of TF/TPN and rate: 10    BP (!) 100/55 (BP Location: Right arm, Patient Position: Lying)   Pulse 93   Temp 98.8 °F (37.1 °C)   Resp (!) 22   Ht 5' 7" (1.702 m)   Wt 94.4 kg (208 lb 1.8 oz)   SpO2 97%   BMI 32.60 kg/m²     Labs Reviewed and Include    Recent Labs   Lab 06/13/19  0342   *   CALCIUM 8.2*   ALBUMIN 2.2*   PROT 4.8*      K 4.1   CO2 22*      BUN 26*   CREATININE 1.2   ALKPHOS 90   ALT 28   AST 69*   BILITOT 0.5     Lab Results   Component Value Date    WBC 23.65 (H) 06/13/2019    WBC 23.65 (H) 06/13/2019    HGB 8.0 (L) 06/13/2019    HGB 8.0 (L) 06/13/2019    HCT 25.8 (L) 06/13/2019    HCT 25.8 (L) 06/13/2019     (H) 06/13/2019     (H) 06/13/2019    PLT 64 (L) 06/13/2019    PLT 64 (L) 06/13/2019     No results for input(s): TSH, FREET4 in the last 168 hours.  Lab Results   Component Value Date    HGBA1C 5.8 (H) 05/29/2019       Nutritional status:   Body mass index is 32.6 kg/m².  Lab Results   Component Value Date    ALBUMIN 2.2 (L) 06/13/2019    ALBUMIN 2.3 (L) 06/12/2019    ALBUMIN 2.4 (L) 06/11/2019     Lab Results   Component Value Date    PREALBUMIN 22 05/17/2019       Estimated Creatinine Clearance: 56.4 mL/min (based on SCr of 1.2 mg/dL).    Accu-Checks  Recent Labs     06/12/19  2228 06/12/19  2338 06/13/19  0034 06/13/19  0131 06/13/19  0232 06/13/19  0338 06/13/19  0515 06/13/19  0552 06/13/19  0731 06/13/19  0840   POCTGLUCOSE 129* 141* 165* 154* 194* 187* 194* 190* 203* 159*       Current Medications and/or Treatments Impacting Glycemic Control  Immunotherapy:    Immunosuppressants     None        Steroids:   Hormones (From admission, onward)    None        Pressors:    Autonomic Drugs (From admission, onward)    " Start     Stop Route Frequency Ordered    06/10/19 0345  norepinephrine 16 mg in dextrose 5 % 250 mL infusion     Question Answer Comment   Titrate by: (in mcg/kg/min) 0.02    Titrate interval: (in minutes) 5    Titrate to maintain: (MAP or SBP) MAP    Greater than: (in mmHg) 60    Maximum dose: (in mcg/kg/min) 3        -- IV Continuous 06/10/19 0331    06/08/19 1247  rocuronium (ZEMURON) 10 mg/mL injection     Note to Pharmacy:  Created by cabinet override    06/09 0059   06/08/19 1247        Hyperglycemia/Diabetes Medications:   Antihyperglycemics (From admission, onward)    Start     Stop Route Frequency Ordered    06/08/19 1730  insulin regular (Humulin R) 100 Units in sodium chloride 0.9% 100 mL infusion     Question:  Insulin Rate Adjustment (DO NOT MODIFY ANSWER)  Answer:  [email or web address]    -- IV Continuous 06/08/19 1620

## 2019-06-13 NOTE — SUBJECTIVE & OBJECTIVE
Interval History: Patient is awake, responds appropriately to my questions, follows commands. He indicates he is in no pain.    Objective:     Vital Signs (Most Recent):  Temp: 99.1 °F (37.3 °C) (06/13/19 0600)  Pulse: 89 (06/13/19 0600)  Resp: 18 (06/13/19 0600)  BP: (!) 100/55 (06/12/19 0701)  SpO2: 97 % (06/13/19 0600) Vital Signs (24h Range):  Temp:  [99.1 °F (37.3 °C)-99.7 °F (37.6 °C)] 99.1 °F (37.3 °C)  Pulse:  [75-89] 89  Resp:  [9-18] 18  SpO2:  [94 %-100 %] 97 %  BP: (100)/(55) 100/55     Weight: 94.4 kg (208 lb 1.8 oz)  Body mass index is 32.6 kg/m².    SpO2: 97 %  O2 Device (Oxygen Therapy): ventilator      Intake/Output Summary (Last 24 hours) at 6/13/2019 0647  Last data filed at 6/13/2019 0600  Gross per 24 hour   Intake 1442.7 ml   Output 2825 ml   Net -1382.3 ml       Lines/Drains/Airways     Central Venous Catheter Line                 Percutaneous Central Line Insertion/Assessment - triple lumen  06/06/19 1949 right internal jugular 6 days          Drain                 Sheath 06/06/19 1412 Right proximal 6 days         NG/OG Tube 06/08/19 1445 [place] sump 14 Fr. Center mouth 4 days         Urethral Catheter 06/08/19 1525 14 Fr. 4 days          Airway                 Airway - Non-Surgical 06/08/19 1254 Endotracheal Tube 4 days          Line                 IABP 06/06/19 1728 6 days         Pacer Wires 06/06/19 1728 6 days          Peripheral Intravenous Line                 Peripheral IV - Single Lumen 06/10/19 1604 20 G Right Forearm 2 days         Peripheral IV - Single Lumen 06/10/19 1611 20 G Left Antecubital 2 days                Physical Exam   Constitutional: He appears well-developed and well-nourished. No distress.   HENT:   Head: Normocephalic and atraumatic.   Cardiovascular: Normal rate, regular rhythm, normal heart sounds and intact distal pulses. Exam reveals no gallop and no friction rub.   No murmur heard.  Sternotomy, healed. Surrounding ecchymoses. Extremities cool to touch. Right  CFA IABP   Pulmonary/Chest:   Intubated. Mechanical breath sounds   Abdominal: Soft. Bowel sounds are normal. He exhibits no distension. There is no tenderness.   Musculoskeletal: He exhibits edema (Trace bilateral lower extremity edema).   Left foot dressing c/d/i. Left foot mottled   Neurological:   Follows commands, moving all extremities   Skin: He is not diaphoretic.       Significant Labs:     Recent Results (from the past 24 hour(s))   POCT glucose    Collection Time: 06/12/19  7:26 AM   Result Value Ref Range    POCT Glucose 167 (H) 70 - 110 mg/dL   CBC with Automated Differential    Collection Time: 06/12/19  8:20 AM   Result Value Ref Range    WBC 31.91 (H) 3.90 - 12.70 K/uL    RBC 2.66 (L) 4.60 - 6.20 M/uL    Hemoglobin 8.5 (L) 14.0 - 18.0 g/dL    Hematocrit 27.2 (L) 40.0 - 54.0 %    Mean Corpuscular Volume 102 (H) 82 - 98 fL    Mean Corpuscular Hemoglobin 32.0 (H) 27.0 - 31.0 pg    Mean Corpuscular Hemoglobin Conc 31.3 (L) 32.0 - 36.0 g/dL    RDW 15.5 (H) 11.5 - 14.5 %    Platelets 67 (L) 150 - 350 K/uL    MPV 10.3 9.2 - 12.9 fL    Immature Granulocytes 0.1 0.0 - 0.5 %    Gran # (ANC) 2.0 1.8 - 7.7 K/uL    Immature Grans (Abs) 0.03 0.00 - 0.04 K/uL    Lymph # 28.9 (H) 1.0 - 4.8 K/uL    Mono # 0.8 0.3 - 1.0 K/uL    Eos # 0.1 0.0 - 0.5 K/uL    Baso # 0.08 0.00 - 0.20 K/uL    nRBC 0 0 /100 WBC    Gran% 6.2 (L) 38.0 - 73.0 %    Lymph% 90.5 (H) 18.0 - 48.0 %    Mono% 2.6 (L) 4.0 - 15.0 %    Eosinophil% 0.3 0.0 - 8.0 %    Basophil% 0.3 0.0 - 1.9 %    Platelet Estimate Decreased (A)     Aniso Slight     Smudge Cells Present     Differential Method Automated    Basic metabolic panel    Collection Time: 06/12/19  8:20 AM   Result Value Ref Range    Sodium 134 (L) 136 - 145 mmol/L    Potassium 4.7 3.5 - 5.1 mmol/L    Chloride 105 95 - 110 mmol/L    CO2 22 (L) 23 - 29 mmol/L    Glucose 155 (H) 70 - 110 mg/dL    BUN, Bld 22 8 - 23 mg/dL    Creatinine 1.2 0.5 - 1.4 mg/dL    Calcium 8.3 (L) 8.7 - 10.5 mg/dL    Anion  Gap 7 (L) 8 - 16 mmol/L    eGFR if African American >60.0 >60 mL/min/1.73 m^2    eGFR if non  58.0 (A) >60 mL/min/1.73 m^2   Magnesium    Collection Time: 06/12/19  8:20 AM   Result Value Ref Range    Magnesium 2.9 (H) 1.6 - 2.6 mg/dL   Phosphorus    Collection Time: 06/12/19  8:20 AM   Result Value Ref Range    Phosphorus 3.3 2.7 - 4.5 mg/dL   POCT glucose    Collection Time: 06/12/19  8:26 AM   Result Value Ref Range    POCT Glucose 165 (H) 70 - 110 mg/dL   POCT glucose    Collection Time: 06/12/19 10:20 AM   Result Value Ref Range    POCT Glucose 165 (H) 70 - 110 mg/dL   POCT glucose    Collection Time: 06/12/19 11:20 AM   Result Value Ref Range    POCT Glucose 196 (H) 70 - 110 mg/dL   Basic metabolic panel    Collection Time: 06/12/19 11:28 AM   Result Value Ref Range    Sodium 136 136 - 145 mmol/L    Potassium 4.9 3.5 - 5.1 mmol/L    Chloride 105 95 - 110 mmol/L    CO2 20 (L) 23 - 29 mmol/L    Glucose 178 (H) 70 - 110 mg/dL    BUN, Bld 22 8 - 23 mg/dL    Creatinine 1.2 0.5 - 1.4 mg/dL    Calcium 8.2 (L) 8.7 - 10.5 mg/dL    Anion Gap 11 8 - 16 mmol/L    eGFR if African American >60.0 >60 mL/min/1.73 m^2    eGFR if non  58.0 (A) >60 mL/min/1.73 m^2   Magnesium    Collection Time: 06/12/19 11:28 AM   Result Value Ref Range    Magnesium 2.7 (H) 1.6 - 2.6 mg/dL   Phosphorus    Collection Time: 06/12/19 11:28 AM   Result Value Ref Range    Phosphorus 3.3 2.7 - 4.5 mg/dL   POCT glucose    Collection Time: 06/12/19  1:29 PM   Result Value Ref Range    POCT Glucose 185 (H) 70 - 110 mg/dL   POCT glucose    Collection Time: 06/12/19  2:31 PM   Result Value Ref Range    POCT Glucose 183 (H) 70 - 110 mg/dL   Basic metabolic panel    Collection Time: 06/12/19  3:38 PM   Result Value Ref Range    Sodium 136 136 - 145 mmol/L    Potassium 4.4 3.5 - 5.1 mmol/L    Chloride 105 95 - 110 mmol/L    CO2 23 23 - 29 mmol/L    Glucose 169 (H) 70 - 110 mg/dL    BUN, Bld 23 8 - 23 mg/dL    Creatinine 1.2  0.5 - 1.4 mg/dL    Calcium 8.1 (L) 8.7 - 10.5 mg/dL    Anion Gap 8 8 - 16 mmol/L    eGFR if African American >60.0 >60 mL/min/1.73 m^2    eGFR if non  58.0 (A) >60 mL/min/1.73 m^2   Magnesium    Collection Time: 06/12/19  3:38 PM   Result Value Ref Range    Magnesium 2.6 1.6 - 2.6 mg/dL   Phosphorus    Collection Time: 06/12/19  3:38 PM   Result Value Ref Range    Phosphorus 3.2 2.7 - 4.5 mg/dL   POCT glucose    Collection Time: 06/12/19  3:40 PM   Result Value Ref Range    POCT Glucose 181 (H) 70 - 110 mg/dL   POCT glucose    Collection Time: 06/12/19  4:33 PM   Result Value Ref Range    POCT Glucose 171 (H) 70 - 110 mg/dL   POCT glucose    Collection Time: 06/12/19  5:56 PM   Result Value Ref Range    POCT Glucose 156 (H) 70 - 110 mg/dL   POCT glucose    Collection Time: 06/12/19  7:10 PM   Result Value Ref Range    POCT Glucose 208 (H) 70 - 110 mg/dL   Basic metabolic panel    Collection Time: 06/12/19  8:21 PM   Result Value Ref Range    Sodium 137 136 - 145 mmol/L    Potassium 4.1 3.5 - 5.1 mmol/L    Chloride 104 95 - 110 mmol/L    CO2 24 23 - 29 mmol/L    Glucose 127 (H) 70 - 110 mg/dL    BUN, Bld 24 (H) 8 - 23 mg/dL    Creatinine 1.2 0.5 - 1.4 mg/dL    Calcium 8.3 (L) 8.7 - 10.5 mg/dL    Anion Gap 9 8 - 16 mmol/L    eGFR if African American >60.0 >60 mL/min/1.73 m^2    eGFR if non  58.0 (A) >60 mL/min/1.73 m^2   Magnesium    Collection Time: 06/12/19  8:21 PM   Result Value Ref Range    Magnesium 2.5 1.6 - 2.6 mg/dL   Phosphorus    Collection Time: 06/12/19  8:21 PM   Result Value Ref Range    Phosphorus 3.2 2.7 - 4.5 mg/dL   POCT glucose    Collection Time: 06/12/19  8:21 PM   Result Value Ref Range    POCT Glucose 138 (H) 70 - 110 mg/dL   POCT glucose    Collection Time: 06/12/19  9:35 PM   Result Value Ref Range    POCT Glucose 156 (H) 70 - 110 mg/dL   POCT glucose    Collection Time: 06/12/19 10:28 PM   Result Value Ref Range    POCT Glucose 129 (H) 70 - 110 mg/dL   Basic  metabolic panel    Collection Time: 06/12/19 11:34 PM   Result Value Ref Range    Sodium 137 136 - 145 mmol/L    Potassium 4.2 3.5 - 5.1 mmol/L    Chloride 105 95 - 110 mmol/L    CO2 23 23 - 29 mmol/L    Glucose 133 (H) 70 - 110 mg/dL    BUN, Bld 25 (H) 8 - 23 mg/dL    Creatinine 1.2 0.5 - 1.4 mg/dL    Calcium 8.3 (L) 8.7 - 10.5 mg/dL    Anion Gap 9 8 - 16 mmol/L    eGFR if African American >60.0 >60 mL/min/1.73 m^2    eGFR if non  58.0 (A) >60 mL/min/1.73 m^2   Magnesium    Collection Time: 06/12/19 11:34 PM   Result Value Ref Range    Magnesium 2.5 1.6 - 2.6 mg/dL   Phosphorus    Collection Time: 06/12/19 11:34 PM   Result Value Ref Range    Phosphorus 3.5 2.7 - 4.5 mg/dL   POCT glucose    Collection Time: 06/12/19 11:38 PM   Result Value Ref Range    POCT Glucose 141 (H) 70 - 110 mg/dL   POCT glucose    Collection Time: 06/13/19 12:34 AM   Result Value Ref Range    POCT Glucose 165 (H) 70 - 110 mg/dL   POCT glucose    Collection Time: 06/13/19  1:31 AM   Result Value Ref Range    POCT Glucose 154 (H) 70 - 110 mg/dL   POCT glucose    Collection Time: 06/13/19  2:32 AM   Result Value Ref Range    POCT Glucose 194 (H) 70 - 110 mg/dL   POCT glucose    Collection Time: 06/13/19  3:38 AM   Result Value Ref Range    POCT Glucose 187 (H) 70 - 110 mg/dL   Magnesium    Collection Time: 06/13/19  3:42 AM   Result Value Ref Range    Magnesium 2.4 1.6 - 2.6 mg/dL   Phosphorus    Collection Time: 06/13/19  3:42 AM   Result Value Ref Range    Phosphorus 3.5 2.7 - 4.5 mg/dL   CBC with Automated Differential    Collection Time: 06/13/19  3:42 AM   Result Value Ref Range    WBC 23.65 (H) 3.90 - 12.70 K/uL    RBC 2.51 (L) 4.60 - 6.20 M/uL    Hemoglobin 8.0 (L) 14.0 - 18.0 g/dL    Hematocrit 25.8 (L) 40.0 - 54.0 %    Mean Corpuscular Volume 103 (H) 82 - 98 fL    Mean Corpuscular Hemoglobin 31.9 (H) 27.0 - 31.0 pg    Mean Corpuscular Hemoglobin Conc 31.0 (L) 32.0 - 36.0 g/dL    RDW 15.4 (H) 11.5 - 14.5 %    Platelets  64 (L) 150 - 350 K/uL    MPV 9.7 9.2 - 12.9 fL    Immature Granulocytes 0.1 0.0 - 0.5 %    Gran # (ANC) 1.4 (L) 1.8 - 7.7 K/uL    Immature Grans (Abs) 0.02 0.00 - 0.04 K/uL    Lymph # 22.1 (H) 1.0 - 4.8 K/uL    Mono # 0.2 (L) 0.3 - 1.0 K/uL    Eos # 0.0 0.0 - 0.5 K/uL    Baso # 0.03 0.00 - 0.20 K/uL    nRBC 0 0 /100 WBC    Gran% 5.7 (L) 38.0 - 73.0 %    Lymph% 93.3 (H) 18.0 - 48.0 %    Mono% 0.7 (L) 4.0 - 15.0 %    Eosinophil% 0.1 0.0 - 8.0 %    Basophil% 0.1 0.0 - 1.9 %    Platelet Estimate Decreased (A)     Aniso Slight     Hypo Occasional     Smudge Cells Present     Differential Method Automated    Comprehensive Metabolic Panel (CMP)    Collection Time: 06/13/19  3:42 AM   Result Value Ref Range    Sodium 136 136 - 145 mmol/L    Potassium 4.1 3.5 - 5.1 mmol/L    Chloride 104 95 - 110 mmol/L    CO2 22 (L) 23 - 29 mmol/L    Glucose 165 (H) 70 - 110 mg/dL    BUN, Bld 26 (H) 8 - 23 mg/dL    Creatinine 1.2 0.5 - 1.4 mg/dL    Calcium 8.2 (L) 8.7 - 10.5 mg/dL    Total Protein 4.8 (L) 6.0 - 8.4 g/dL    Albumin 2.2 (L) 3.5 - 5.2 g/dL    Total Bilirubin 0.5 0.1 - 1.0 mg/dL    Alkaline Phosphatase 90 55 - 135 U/L    AST 69 (H) 10 - 40 U/L    ALT 28 10 - 44 U/L    Anion Gap 10 8 - 16 mmol/L    eGFR if African American >60.0 >60 mL/min/1.73 m^2    eGFR if non  58.0 (A) >60 mL/min/1.73 m^2   CBC auto differential    Collection Time: 06/13/19  3:42 AM   Result Value Ref Range    WBC 23.65 (H) 3.90 - 12.70 K/uL    RBC 2.51 (L) 4.60 - 6.20 M/uL    Hemoglobin 8.0 (L) 14.0 - 18.0 g/dL    Hematocrit 25.8 (L) 40.0 - 54.0 %    Mean Corpuscular Volume 103 (H) 82 - 98 fL    Mean Corpuscular Hemoglobin 31.9 (H) 27.0 - 31.0 pg    Mean Corpuscular Hemoglobin Conc 31.0 (L) 32.0 - 36.0 g/dL    RDW 15.4 (H) 11.5 - 14.5 %    Platelets 64 (L) 150 - 350 K/uL    MPV 9.7 9.2 - 12.9 fL    Immature Granulocytes 0.1 0.0 - 0.5 %    Gran # (ANC) 1.4 (L) 1.8 - 7.7 K/uL    Immature Grans (Abs) 0.02 0.00 - 0.04 K/uL    Lymph # 22.1 (H)  1.0 - 4.8 K/uL    Mono # 0.2 (L) 0.3 - 1.0 K/uL    Eos # 0.0 0.0 - 0.5 K/uL    Baso # 0.03 0.00 - 0.20 K/uL    nRBC 0 0 /100 WBC    Gran% 5.7 (L) 38.0 - 73.0 %    Lymph% 93.3 (H) 18.0 - 48.0 %    Mono% 0.7 (L) 4.0 - 15.0 %    Eosinophil% 0.1 0.0 - 8.0 %    Basophil% 0.1 0.0 - 1.9 %    Platelet Estimate Decreased (A)     Aniso Slight     Hypo Occasional     Smudge Cells Present     Differential Method Automated    Protime-INR    Collection Time: 06/13/19  3:42 AM   Result Value Ref Range    Prothrombin Time 10.1 9.0 - 12.5 sec    INR 1.0 0.8 - 1.2   APTT    Collection Time: 06/13/19  3:42 AM   Result Value Ref Range    aPTT 40.9 (H) 21.0 - 32.0 sec   POCT glucose    Collection Time: 06/13/19  5:15 AM   Result Value Ref Range    POCT Glucose 194 (H) 70 - 110 mg/dL   POCT glucose    Collection Time: 06/13/19  5:52 AM   Result Value Ref Range    POCT Glucose 190 (H) 70 - 110 mg/dL         Significant Imaging:     I have reviewed all pertinent imaging studies from the last 24 hours

## 2019-06-13 NOTE — SUBJECTIVE & OBJECTIVE
Interval History: Still intubated but follows some commands, 2 NSVT overnight just few beats. Lidocaine weaned by primary    Review of Systems   Unable to perform ROS: intubated     Objective:     Vital Signs (Most Recent):  Temp: 99.3 °F (37.4 °C) (06/13/19 1307)  Pulse: 98 (06/13/19 1307)  Resp: 18 (06/13/19 1307)  BP: (!) 100/55 (06/12/19 0701)  SpO2: 100 % (06/13/19 1307) Vital Signs (24h Range):  Temp:  [97.7 °F (36.5 °C)-99.7 °F (37.6 °C)] 99.3 °F (37.4 °C)  Pulse:  [] 98  Resp:  [9-25] 18  SpO2:  [94 %-100 %] 100 %     Weight: 94.4 kg (208 lb 1.8 oz)  Body mass index is 32.6 kg/m².     SpO2: 100 %  O2 Device (Oxygen Therapy): ventilator    Physical Exam   Constitutional: He appears well-developed and well-nourished. No distress.   HENT:   Head: Normocephalic and atraumatic.   Cardiovascular: Normal rate, regular rhythm, normal heart sounds and intact distal pulses. Exam reveals no gallop and no friction rub.   No murmur heard.  Sternotomy, healed. Surrounding ecchymoses. Extremities cool to touch. Right CFA IABP   Pulmonary/Chest:   Intubated. Mechanical breath sounds   Abdominal: Soft. Bowel sounds are normal. He exhibits no distension. There is no tenderness.   Musculoskeletal: He exhibits no edema (Trace bilateral lower extremity edema).   Left foot dressing c/d/i. Left foot mottled     Neurological:   Follows commands, moving all extremities   Skin: He is not diaphoretic.       Significant Labs:   EP:   Recent Labs   Lab 06/12/19  0340 06/12/19  0619 06/12/19  0820  06/12/19  2334 06/13/19  0342 06/13/19  0844   NA  --  135*  135* 134*   < > 137 136 136   K  --  4.8  4.8 4.7   < > 4.2 4.1 3.9   CL  --  104  104 105   < > 105 104 104   CO2  --  23  23 22*   < > 23 22* 24   GLU  --  148*  148* 155*   < > 133* 165* 135*   BUN  --  20  20 22   < > 25* 26* 28*   CREATININE  --  1.2  1.2 1.2   < > 1.2 1.2 1.2   CALCIUM  --  8.4*  8.4* 8.3*   < > 8.3* 8.2* 8.2*   PROT  --  4.9*  --   --   --  4.8*   --    ALBUMIN  --  2.3*  --   --   --  2.2*  --    BILITOT  --  0.5  --   --   --  0.5  --    ALKPHOS  --  96  --   --   --  90  --    AST  --  77*  --   --   --  69*  --    ALT  --  29  --   --   --  28  --    ANIONGAP  --  8  8 7*   < > 9 10 8   ESTGFRAFRICA  --  >60.0  >60.0 >60.0   < > >60.0 >60.0 >60.0   EGFRNONAA  --  58.0*  58.0* 58.0*   < > 58.0* 58.0* 58.0*   WBC 37.63*  --  31.91*  --   --  23.65*  23.65*  --    HGB 7.6*  --  8.5*  --   --  8.0*  8.0*  --    HCT 24.6*  --  27.2*  --   --  25.8*  25.8*  --    PLT 78*  --  67*  --   --  64*  64*  --    INR 1.0  --   --   --   --  1.0  --     < > = values in this interval not displayed.       Significant Imaging: Echocardiogram:   2D echo with color flow doppler: No results found. However, due to the size of the patient record, not all encounters were searched. Please check Results Review for a complete set of results. and Transthoracic echo (TTE) complete (Cupid Only):   Results for orders placed or performed during the hospital encounter of 05/29/19   Transthoracic echo (TTE) 2D with Color Flow   Result Value Ref Range    Ascending aorta 3.31 cm    STJ 3.12 cm    AV mean gradient 3.88 mmHg    Ao peak layo 1.40 m/s    Ao VTI 15.79 cm    IVRT 0.09 msec    IVS 0.80 0.6 - 1.1 cm    LA size 4.30 cm    Left Atrium Major Axis 5.50 cm    Left Atrium Minor Axis 5.50 cm    LVIDD 5.10 3.5 - 6.0 cm    LVIDS 4.40 (A) 2.1 - 4.0 cm    LVOT diameter 1.99 cm    LVOT peak VTI 9.16 cm    PW 0.70 0.6 - 1.1 cm    MV Peak A Layo 0.23 m/s    E wave decelartion time 229.40 msec    MV Peak E Layo 0.94 m/s    RA Major Axis 5.23 cm    RA Width 3.94 cm    RVDD 4.28 cm    Sinus 3.59 cm    TAPSE 1.23 cm    TR Max Layo 1.75 m/s    TDI LATERAL 0.03     TDI SEPTAL 0.03     LA WIDTH 4.30 cm    LV Diastolic Volume 107.96 mL    LV Systolic Volume 73.56 mL    RV S' 4.10 m/s    LVOT peak layo [id number] m/s    LV LATERAL E/E' RATIO 31.33     LV SEPTAL E/E' RATIO 31.33     FS 14 %    LA  volume 86.44 cm3    LV mass 129.43 g    Left Ventricle Relative Wall Thickness 0.27 cm    AV valve area 1.80 cm2    AV Velocity Ratio 0.61     AV index (prosthetic) 0.58     E/A ratio 4.09     Mean e' 0.03     LVOT area 3.11 cm2    LVOT stroke volume 28.48 cm3    AV peak gradient 7.84 mmHg    E/E' ratio 31.33     LV Systolic Volume Index 36.1 mL/m2    LV Diastolic Volume Index 53.05 mL/m2    LA Volume Index 42.5 mL/m2    LV Mass Index 63.6 g/m2    Triscuspid Valve Regurgitation Peak Gradient 12.25 mmHg    BSA 2.09 m2

## 2019-06-13 NOTE — ASSESSMENT & PLAN NOTE
Pt had LHC then developed AF was started on amiodarone and had PMVT s/p several shocks. Repeat LHC with patent stents. Initially was on lidocaine which was weaned off however on 6/8 had several episodes of MMVT and PMVT which required CPR, intubation and shocks. Currently being cooled.    Plan  - EP on board   -  Lidocaine infusing at 2, will wean to 1, remains to have overnight a couple runs of VT  - will continue with lidocaine and likely switch to Mexiletine or another agent if can get extubated, trial today on rounds and patinet did well but felt tired and mental status waxed and waned  - keep electrolytes repleted   - daily sbt

## 2019-06-13 NOTE — SUBJECTIVE & OBJECTIVE
Interval History: Patient following commands on Spontaneous pulling good tidal volumes     Review of Systems   Unable to perform ROS: intubated     Objective:     Vital Signs (Most Recent):  Temp: 99 °F (37.2 °C) (06/13/19 0800)  Pulse: 84 (06/13/19 0800)  Resp: 18 (06/13/19 0800)  BP: (!) 100/55 (06/12/19 0701)  SpO2: 95 % (06/13/19 0800) Vital Signs (24h Range):  Temp:  [99 °F (37.2 °C)-99.7 °F (37.6 °C)] 99 °F (37.2 °C)  Pulse:  [77-89] 84  Resp:  [9-18] 18  SpO2:  [94 %-99 %] 95 %     Weight: 94.4 kg (208 lb 1.8 oz)  Body mass index is 32.6 kg/m².     SpO2: 95 %  O2 Device (Oxygen Therapy): ventilator      Intake/Output Summary (Last 24 hours) at 6/13/2019 0849  Last data filed at 6/13/2019 0842  Gross per 24 hour   Intake 1352.44 ml   Output 2770 ml   Net -1417.56 ml       Lines/Drains/Airways     Central Venous Catheter Line                 Percutaneous Central Line Insertion/Assessment - triple lumen  06/06/19 1949 right internal jugular 6 days          Drain                 Sheath 06/06/19 1412 Right proximal 6 days         NG/OG Tube 06/08/19 1445 [place] sump 14 Fr. Center mouth 4 days         Urethral Catheter 06/08/19 1525 14 Fr. 4 days          Airway                 Airway - Non-Surgical 06/08/19 1254 Endotracheal Tube 4 days          Line                 IABP 06/06/19 1728 6 days         Pacer Wires 06/06/19 1728 6 days          Peripheral Intravenous Line                 Peripheral IV - Single Lumen 06/10/19 1604 20 G Right Forearm 2 days         Peripheral IV - Single Lumen 06/10/19 1611 20 G Left Antecubital 2 days                Physical Exam   Constitutional: He appears well-developed and well-nourished. No distress.   HENT:   Head: Normocephalic and atraumatic.   Cardiovascular: Normal rate, regular rhythm, normal heart sounds and intact distal pulses. Exam reveals no gallop and no friction rub.   No murmur heard.  Sternotomy, healed. Surrounding ecchymoses. Extremities cool to touch. Right CFA  IABP   Pulmonary/Chest:   Intubated. Mechanical breath sounds   Abdominal: Soft. Bowel sounds are normal. He exhibits no distension. There is no tenderness.   Musculoskeletal: He exhibits no edema (Trace bilateral lower extremity edema).   Left foot dressing c/d/i. Left foot mottled     Neurological:   Follows commands, moving all extremities   Skin: He is not diaphoretic.       Significant Labs:   CMP   Recent Labs   Lab 06/12/19  0619  06/12/19 2021 06/12/19 2334 06/13/19 0342   *  135*   < > 137 137 136   K 4.8  4.8   < > 4.1 4.2 4.1     104   < > 104 105 104   CO2 23  23   < > 24 23 22*   *  148*   < > 127* 133* 165*   BUN 20  20   < > 24* 25* 26*   CREATININE 1.2  1.2   < > 1.2 1.2 1.2   CALCIUM 8.4*  8.4*   < > 8.3* 8.3* 8.2*   PROT 4.9*  --   --   --  4.8*   ALBUMIN 2.3*  --   --   --  2.2*   BILITOT 0.5  --   --   --  0.5   ALKPHOS 96  --   --   --  90   AST 77*  --   --   --  69*   ALT 29  --   --   --  28   ANIONGAP 8  8   < > 9 9 10   ESTGFRAFRICA >60.0  >60.0   < > >60.0 >60.0 >60.0   EGFRNONAA 58.0*  58.0*   < > 58.0* 58.0* 58.0*    < > = values in this interval not displayed.   , CBC   Recent Labs   Lab 06/12/19  0340 06/12/19  0820 06/13/19 0342   WBC 37.63* 31.91* 23.65*  23.65*   HGB 7.6* 8.5* 8.0*  8.0*   HCT 24.6* 27.2* 25.8*  25.8*   PLT 78* 67* 64*  64*    and INR   Recent Labs   Lab 06/12/19 0340 06/13/19 0342   INR 1.0 1.0       Significant Imaging: All imaging in the last 24 hours reviewed

## 2019-06-13 NOTE — PROGRESS NOTES
Received in report from Masha PAULSON (night shift) that patient does not have pedal pulses bilaterally and team is aware. Following AM assessment pedal pulses absent. Posterior tibial pulses present with doppler. Notified MD To. Received order to place bear hugger on patient's feet and reassess once bilateral lower extremities are warm.

## 2019-06-28 NOTE — ANESTHESIA PROCEDURE NOTES
Intubation    Diagnosis: V-tach/fib  Patient location during procedure: ICU  Procedure start time: 6/8/2019 1:01 PM  Timeout: 6/8/2019 1:00 PM  Procedure end time: 6/8/2019 1:10 PMAuthorizing Provider: KIRK Smallserforming Provider: Tati Mcdaniel MD  Staffing  Anesthesiologist: Laura Cr MD  Resident/CRNA: Tati Mcdaniel MD  Performed: resident/CRNA   Anesthesiologist was present at the time of the procedure.  Preanesthetic Checklist  Completed: patient identified, site marked, surgical consent, pre-op evaluation, timeout performed, IV checked, risks and benefits discussed, monitors and equipment checked and anesthesia consent given  Intubation  Indication: respiratory failure  Pre-oxygenation. Induction: intravenous, mask ventilation: easy with oral airway.  Intubation: postinduction, laryngoscopy glidescope, Glidescope.  Endotracheal Tube: oral, 7.0 mm ID, cuffed (inflated to minimal occlusive pressure)  Attempts: 1, Grade II - cords partially seen  Complicating Factors: none  Tube secured at 22 cm at the teeth.  Findings post-intubation: bilateral breath sounds, positive ETCO2, atraumatic / condition of teeth unchanged  Position Confirmation: auscultation  Additional Notes  KADEN 2mg and Chito 100mg IV given for emergency intubation.     2nd p-note placed in chart because unable to edit original p-note to complete documentation.

## 2019-06-28 NOTE — ADDENDUM NOTE
Addendum  created 06/28/19 0650 by Laura Cr MD    Attestation recorded in Intraprocedure, Child order released for a procedure order, Intraprocedure Attestations filed, Intraprocedure Blocks edited, LDA created via procedure documentation, Sign clinical note

## 2019-07-03 LAB — FUNGUS SPEC CULT: NORMAL

## 2019-07-18 NOTE — PHYSICIAN QUERY
PT Name: Reid Herman  MR #: [id number]     Physician Query Form - Documentation Clarification      CDS/: Charlotte Christian RN               Contact information:[email or web address]    This form is a permanent document in the medical record.     Query Date: July 18, 2019    By submitting this query, we are merely seeking further clarification of documentation. Please utilize your independent clinical judgment when addressing the question(s) below.    The Medical record reflects the following:    Supporting Clinical Findings Location in Medical Record   FINAL PATHOLOGIC DIAGNOSIS  Cancellous bone and fibrous tissue (submitted as bone biopsy of left distal hallux phalanx):  -Viable bone exhibiting slight osteosclerosis without inflammatory infiltrates or other significant  histopathologic abnormalities  -Correlation with radiographic and clinical findings is necessary    L foot MRI with reactive vs early OM    Anticipate 6 weeks of IV antibiotic therapy for osteomyelitis (7/16/19)   4. Will need close f/u with ID once discharge. If pathology results are negative will treat with abx for 2 weeks. If pathology results are positive anticipate 6 weeks for osteomyelitis.     admitted via podiatry clinic for L hallux abscess after failing PO ABx, MRI revealed early osteomyelitis.  on Rocephin and Vanc per ID   Final pathology results from 6/4 collection              Podiatry consult 5/30    Infectious Disease PN 6/7            Hospital Medicine PN 6/6                                                                                Doctor, Please specify diagnosis or diagnoses associated with above clinical findings.    Provider Use Only      [ X  ] Acute Osteomyelitis of left distal hallux phalanx    [   ] Osteomyelitis of left distal hallux phalanx ruled out    [   ] Other _____________(please specify)                                                                                                                [  ] Clinically Undetermined

## 2019-09-18 NOTE — TRANSFER OF CARE
"Anesthesia Transfer of Care Note    Patient: Reid Herman    Procedure(s) Performed: Procedure(s) (LRB):  COLONOSCOPY (N/A)    Patient location: GI    Anesthesia Type: general    Transport from OR: Transported from OR on room air with adequate spontaneous ventilation    Post pain: adequate analgesia    Post assessment: no apparent anesthetic complications and tolerated procedure well    Post vital signs: stable    Level of consciousness: awake, alert and oriented    Nausea/Vomiting: no nausea/vomiting    Complications: none          Last vitals:   Visit Vitals    BP (!) 149/67 (BP Location: Left arm, Patient Position: Lying, BP Method: Automatic)    Pulse 86    Temp 36.6 °C (97.8 °F) (Axillary)    Resp 18    Ht 5' 7" (1.702 m)    Wt 89.8 kg (198 lb)    SpO2 95%    BMI 31.01 kg/m2     " The patient is a 57y Male complaining of see chief complaint quote.

## 2020-09-08 NOTE — SUBJECTIVE & OBJECTIVE
Interval History: Did well overnight no VT/VF    Review of Systems   Unable to perform ROS: intubated     Objective:     Vital Signs (Most Recent):  Temp: 97.7 °F (36.5 °C) (06/07/19 0300)  Pulse: (!) 53 (06/07/19 0600)  Resp: (!) 22 (06/07/19 0600)  BP: 124/74 (06/07/19 0600)  SpO2: 100 % (06/07/19 0600) Vital Signs (24h Range):  Temp:  [97.4 °F (36.3 °C)-97.9 °F (36.6 °C)] 97.7 °F (36.5 °C)  Pulse:  [] 53  Resp:  [16-34] 22  SpO2:  [82 %-100 %] 100 %  BP: ()/(49-96) 124/74     Weight: 92.2 kg (203 lb 4.2 oz)  Body mass index is 31.84 kg/m².     SpO2: 100 %  O2 Device (Oxygen Therapy): ventilator    Physical Exam   Constitutional: He is oriented to person, place, and time. He appears well-developed and well-nourished. No distress. He is sedated and intubated.   Eyes: Pupils are equal, round, and reactive to light. Conjunctivae are normal.   Neck: No JVD present. No tracheal deviation present. No thyromegaly present.   Cardiovascular: Regular rhythm, normal heart sounds and intact distal pulses. Bradycardia present. Exam reveals no gallop and no friction rub.   No murmur heard.  Pulmonary/Chest: Effort normal and breath sounds normal. He is intubated. No respiratory distress. He has no wheezes. He has no rales.   Abdominal: Soft. Bowel sounds are normal. He exhibits no distension. There is no tenderness.   Musculoskeletal: He exhibits no edema or deformity.   Neurological: He is alert and oriented to person, place, and time. No cranial nerve deficit. Coordination normal.   Skin: Skin is warm and dry. He is not diaphoretic.   Psychiatric: He has a normal mood and affect. His behavior is normal.       Significant Labs:   EP:   Recent Labs   Lab 06/05/19  0908  06/06/19  1546 06/06/19  1829 06/06/19  2325 06/07/19  0248   NA  --    < > 136 133*  --  134*   K  --    < > 4.7 4.0  --  4.1   CL  --    < > 102 94*  --  97   CO2  --    < > 17* 25  --  26   GLU  --    < > 320* 270*  --  273*   BUN  --    < > 28*  28*  --  28*   CREATININE  --    < > 1.4 1.4  --  1.3   CALCIUM  --    < > 8.6* 9.0  --  8.5*   PROT  --    < > 5.5* 6.2  --  5.6*   ALBUMIN  --    < > 3.2* 3.3*  --  3.2*   BILITOT  --    < > 0.5 0.7  --  0.5   ALKPHOS  --    < > 75 85  --  86   AST  --    < > 55* 74*  --  71*   ALT  --    < > 18 24  --  24   ANIONGAP  --    < > 17* 14  --  11   ESTGFRAFRICA  --    < > 55.6* 55.6*  --  >60.0   EGFRNONAA  --    < > 48.1* 48.1*  --  52.6*   WBC 25.12*   < > 31.46* 46.28* 40.93*  40.93* 40.50*   HGB 10.6*   < > 13.8* 11.0* 10.6*  10.6* 11.0*   HCT 33.3*   < > 43.6 32.8* 33.7*  33.7* 33.1*   *   < > 104* 162 148*  148* 146*   INR 1.1  --   --  1.1  --   --     < > = values in this interval not displayed.       Significant Imaging: Echocardiogram:   Transthoracic echo (TTE) complete (Cupid Only):   Results for orders placed or performed during the hospital encounter of 05/29/19   Transthoracic echo (TTE) 2D with Color Flow   Result Value Ref Range    Ascending aorta 3.00 cm    STJ 2.68 cm    AV mean gradient 3.46 mmHg    Ao peak layo 1.23 m/s    Ao VTI 26.89 cm    IVRT 0.05 msec    IVS 0.85 0.6 - 1.1 cm    LA size 4.42 cm    Left Atrium Major Axis 5.77 cm    Left Atrium Minor Axis 5.68 cm    LVIDD 5.60 3.5 - 6.0 cm    LVIDS 4.56 (A) 2.1 - 4.0 cm    LVOT diameter 2.00 cm    LVOT peak VTI 12.97 cm    PW 1.04 0.6 - 1.1 cm    MV Peak A Layo 0.79 m/s    E wave decelartion time 156.41 msec    MV Peak E Layo 1.32 m/s    PV Peak D Layo 0.58 m/s    PV Peak S Layo 0.37 m/s    RA Major Axis 4.74 cm    RA Width 3.29 cm    RVDD 3.51 cm    Sinus 3.16 cm    TAPSE 1.54 cm    TR Max Layo 3.13 m/s    TDI LATERAL 0.04     TDI SEPTAL 0.04     LA WIDTH 4.47 cm    LV Diastolic Volume 127.80 mL    LV Systolic Volume 95.41 mL    RV S' 7.65 m/s    LVOT peak layo [id number] m/s    LV LATERAL E/E' RATIO 33.00     LV SEPTAL E/E' RATIO 33.00     FS 19 %    LA volume 96.14 cm3    LV mass 204.09 g    Left Ventricle Relative Wall Thickness  0.37 cm    AV valve area 1.51 cm2    AV Velocity Ratio 0.50     AV index (prosthetic) 0.48     E/A ratio 1.67     Mean e' 0.04     Pulm vein S/D ratio 0.64     LVOT area 3.14 cm2    LVOT stroke volume 40.73 cm3    AV peak gradient 6.05 mmHg    E/E' ratio 33.00     LV Systolic Volume Index 45.8 mL/m2    LV Diastolic Volume Index 61.29 mL/m2    LA Volume Index 46.1 mL/m2    LV Mass Index 97.9 g/m2    Triscuspid Valve Regurgitation Peak Gradient 39.19 mmHg    BSA 2.15 m2    Right Atrial Pressure (from IVC) 8 mmHg    TV rest pulmonary artery pressure 47 mmHg      Wheelchair/Stroller

## 2021-05-18 NOTE — PROGRESS NOTES
Subjective:       Patient ID:  Reid Herman is a 77 y.o. male who presents for   Chief Complaint   Patient presents with    Skin Check     UBSE    Lesion     scalp     History of Present Illness: The patient presents for follow up of skin check.    The patient was last seen on: 3/10/17 by [facility] for bx x 2 of sk/mekanoacanthoma right chest and lentigo left eyebrow and for cryosurgery to actinic keratoses which have resolved.     Other skin complaints: lesion on scalp x 1 year + scaling treated with cryo in past. Did not resolve            Review of Systems   Skin: Positive for daily sunscreen use, activity-related sunscreen use and wears hat (always). Negative for itching, rash and recent sunburn.   Hematologic/Lymphatic: Bruises/bleeds easily.        Objective:    Physical Exam   Constitutional: He appears well-developed and well-nourished. No distress.   Neurological: He is alert and oriented to person, place, and time. He is not disoriented.   Psychiatric: He has a normal mood and affect.   Skin:   Areas Examined (abnormalities noted in diagram):   Scalp / Hair Palpated and Inspected  Head / Face Inspection Performed  Neck Inspection Performed  Chest / Axilla Inspection Performed  Back Inspection Performed  RUE Inspected  LUE Inspection Performed  Nails and Digits Inspection Performed                   Diagram Legend     Erythematous scaling macule/papule c/w actinic keratosis       Vascular papule c/w angioma      Pigmented verrucoid papule/plaque c/w seborrheic keratosis      Yellow umbilicated papule c/w sebaceous hyperplasia      Irregularly shaped tan macule c/w lentigo     1-2 mm smooth white papules consistent with Milia      Movable subcutaneous cyst with punctum c/w epidermal inclusion cyst      Subcutaneous movable cyst c/w pilar cyst      Firm pink to brown papule c/w dermatofibroma      Pedunculated fleshy papule(s) c/w skin tag(s)      Evenly pigmented macule c/w junctional nevus     Mildly  Pre Operative History and Physical Exam    Hemal Bhatia  [id number]  1959    5/18/2021  5:53 AM    CC:surveillance for colon polyps    The patient denies symptoms of abdominal pain, nausea, vomiting, constipation, diarrhea, black tarry stools, blood in the stools, change in the bowel habits, sour burps or heart burn and indigestion.      Past Medical History:   Diagnosis Date   • Blood clot associated with vein wall inflammation    • Essential (primary) hypertension      History reviewed. No pertinent surgical history.  Social History     Socioeconomic History   • Marital status: /Civil Union     Spouse name: Not on file   • Number of children: Not on file   • Years of education: Not on file   • Highest education level: Not on file   Occupational History   • Not on file   Tobacco Use   • Smoking status: Never Smoker   • Smokeless tobacco: Never Used   Substance and Sexual Activity   • Alcohol use: Yes     Alcohol/week: 1.0 standard drinks     Types: 1 Shots of liquor per week   • Drug use: No   • Sexual activity: Not on file   Other Topics Concern   • Not on file   Social History Narrative   • Not on file     Social Determinants of Health     Financial Resource Strain:    • Social Determinants: Financial Resource Strain:    Food Insecurity:    • Social Determinants: Food Insecurity:    Transportation Needs:    • Lack of Transportation (Medical):    • Lack of Transportation (Non-Medical):    Physical Activity:    • Days of Exercise per Week:    • Minutes of Exercise per Session:    Stress:    • Social Determinants: Stress:    Social Connections:    • Social Determinants: Social Connections:    Intimate Partner Violence:    • Social Determinants: Intimate Partner Violence Past Fear:    • Social Determinants: Intimate Partner Violence Current Fear:      History reviewed. No pertinent family history.        Physical Exam:    Well-developed well-nourished pleasant patient in no acute distress  HEENT:  Symmetric cranium. Extraocular motions are intact. There is no icterus or jaundice.  Nodes: No cervical clavicular or axillary nodes palpable.  Neck: Supple, no jugulovenous distention or thyromegaly.   Lungs: Clear to auscultation.  Heart: Regular rate and rhythm without any murmurs.  Abdomen: Normal bowel sounds flat. No masses are palpated.    In summary this patient comes in for a endoscopic procedure. There are currently no contraindications to the procedure. The procedure has been explained to the patient including 4% polypus rate and potential complications i.e. bleeding perforation possible need for blood transfusion and surgery.    NATALIE Ramon MD   variegated pigmented, slightly irregular-bordered macule c/w mildly atypical nevus      Flesh colored to evenly pigmented papule c/w intradermal nevus       Pink pearly papule/plaque c/w basal cell carcinoma      Erythematous hyperkeratotic cursted plaque c/w SCC      Surgical scar with no sign of skin cancer recurrence      Open and closed comedones      Inflammatory papules and pustules      Verrucoid papule consistent consistent with wart     Erythematous eczematous patches and plaques     Dystrophic onycholytic nail with subungual debris c/w onychomycosis     Umbilicated papule    Erythematous-base heme-crusted tan verrucoid plaque consistent with inflamed seborrheic keratosis     Erythematous Silvery Scaling Plaque c/w Psoriasis     See annotation          Assessment / Plan:      Pathology Orders:      Normal Orders This Visit    Tissue Specimen To Pathology, Dermatology     Questions:    Directional Terms:  Other(comment)    Clinical information:  r/o scc    Specific Site:  scalp        Neoplasm of uncertain behavior of skin  -     Tissue Specimen To Pathology, Dermatology  Shave biopsy procedure note:    Shave biopsy performed after verbal consent including risk of infection, scar, recurrence, need for additional treatment of site. Area prepped with alcohol, anesthetized with approximately 1.0cc of 1% lidocaine with epinephrine. Lesional tissue shaved with razor blade. Hemostasis achieved with application of aluminum chloride followed by hyfrecation. No complications. Dressing applied. Wound care explained.    If biopsy positive for malignancy, refer to Dr. Day for Mohs surgery consultation.      AK (actinic keratosis)  Cryosurgery Procedure Note    Verbal consent from the patient is obtained and the patient is aware of the precancerous quality and need for treatment of these lesions. Liquid nitrogen cryosurgery is applied to the 8 actinic keratoses, as detailed in the physical exam, to produce a freeze  injury. The patient is aware that blisters may form and is instructed on wound care with gentle cleansing and use of vaseline ointment to keep moist until healed. The patient is supplied a handout on cryosurgery and is instructed to call if lesions do not completely resolve.    Cont wear hat always    SK (seborrheic keratosis)   - stable and chronic    Angiomas   - stable and chronic      Personal history of other malignant neoplasm of skin  Area(s) of previous NMSC evaluated with no signs of recurrence.    Upper body skin examination performed today including at least 6 points as noted in physical examination. Suspicious lesions noted.             Return in about 6 months (around 1/14/2018).

## 2022-11-19 NOTE — NURSING
Notified Dr. Owens regarding recent transfer to the floor after shift change. Upon assessment patient complaint of non-radiating chest pain (pressure) to the L chestwall 6/10; x2 sublingual nitros administered with only relief to 3-4/10. Awaiting orders.   No

## 2023-02-09 NOTE — ASSESSMENT & PLAN NOTE
- Vitals are WNLs  - Continue on Vanc and CTX.   [Other: ____] : [unfilled] [FreeTextEntry1] : February 2023 - Patient returns today for follow-up in his usual state of health. He continues to report fatigue, dyspnea on exertion, and sleepiness. He sees Yi Phillips MD for pulmonary medicine, and he is maintained on supplemental oxygen around the clock. \par He does notice significant mood improvement since discontinuing prednisone last year.

## 2023-07-11 NOTE — ASSESSMENT & PLAN NOTE
- as under cellulitis      PDMP reviewed, Ativan refilled.      Patient scheduled to establish care.

## 2024-10-25 NOTE — PROGRESS NOTES
SPECIMEN  1) Ascending colon polyp.  FINAL PATHOLOGIC DIAGNOSIS  ASCENDING COLON, POLYPECTOMY:  Tubular adenoma  Diagnosed by: Samara Xie M.D.  (Electronically Signed: 2017-05-03 14:15:39)    Repeat colonoscopy in 5 years       If you have any questions or if I can clarify any of the above please contact me:    [facility] [phone]   Pager [phone]  email [email or web address]  Nurse Beth Walton [phone]   Ariadna David:  [phone]    Sincerely  H, Rajan Freeman MD, FACS, FASCRS  Staff Surgeon  Dept of Colon and Rectal Surgery   PROCEDURES:  Revision of breast flap 25-Oct-2024 06:44:06  Micheal Martinez

## (undated) DEVICE — SPIKE CONTRAST CONTROLLER

## (undated) DEVICE — INFLATOR ENCORE 26 BLLN INFL

## (undated) DEVICE — VALVE CONTROL COPILOT

## (undated) DEVICE — OMNIPAQUE 350 200ML

## (undated) DEVICE — HEMOSTAT VASC BAND REG 24CM

## (undated) DEVICE — SPONGE DERMACEA 4X4IN 12PLY

## (undated) DEVICE — CABLE PACER

## (undated) DEVICE — KIT PROBE COVER WITH GEL

## (undated) DEVICE — Device

## (undated) DEVICE — CUTTER LEAD

## (undated) DEVICE — WIRE CHOICE PT X SUPP 014X300

## (undated) DEVICE — CATH FL 3.5 5FR

## (undated) DEVICE — VISE RADIFOCUS MULTI TORQUE

## (undated) DEVICE — INTRODUCER VASC RADPQ 5FRX10CM

## (undated) DEVICE — SHEATH PINNACLE 8FR

## (undated) DEVICE — KIT MICROINTRO 4F .018X40X7CM

## (undated) DEVICE — CATH IMA INFINITI 4FRX100CM

## (undated) DEVICE — SOL 9P NACL IRR PIC IL

## (undated) DEVICE — KIT CO-PILOT

## (undated) DEVICE — CATH TEMP PACER 5.0FR

## (undated) DEVICE — CATH ANGIO SOFT VU 5FR 65CM

## (undated) DEVICE — KIT CUSTOM MANIFOLD

## (undated) DEVICE — GUIDEWIRE EMERALD 150CM PTFE

## (undated) DEVICE — CATH BLLN FG APEX MR 3.50X12MM

## (undated) DEVICE — CATH INFINITI 4F JL4 .042X100

## (undated) DEVICE — KIT CATH INSERT LINEAR 7.5F

## (undated) DEVICE — DEVICE PERCLOSE SUT CLSR 6FR

## (undated) DEVICE — WIRE BENTSON 035/180

## (undated) DEVICE — SEE MEDLINE ITEM 156894

## (undated) DEVICE — SHEATH INTRODUCER 6FR 11CM

## (undated) DEVICE — GUIDE LAUNCHER 6FR JR 4.0

## (undated) DEVICE — COVER INSTR ELASTIC BAND 40X20

## (undated) DEVICE — CATH SEEKER .014IN 135CM

## (undated) DEVICE — GUIDE WIRE BMW 014 X190

## (undated) DEVICE — GUIDE LAUNCHER 5FR EBU 3.5

## (undated) DEVICE — DEVICE CLOSURE MYNX GRIP 5FR

## (undated) DEVICE — WIRE GUIDE SAFE-T-J .035 260CM

## (undated) DEVICE — HEMOSTAT VASC BAND LONG 27CM

## (undated) DEVICE — SHEATH FLEXOR ANSEL 5FR 90M

## (undated) DEVICE — SOL NS 1000CC

## (undated) DEVICE — KIT CUSTOM PROCEDURE CONTRAST

## (undated) DEVICE — GUIDEWIRE EMERALD .035IN 260CM

## (undated) DEVICE — SHEATH INTRODUCER 5FR 10CM

## (undated) DEVICE — WIRE FILTER 30503-300

## (undated) DEVICE — CATH INFINITI JUDKINS JR4

## (undated) DEVICE — SHEATH INTRODUCER 4FR 11CM

## (undated) DEVICE — GUIDEWIRE ADVNTG 035X260CM ANG

## (undated) DEVICE — COVERS PROBE NR-48 STERILE

## (undated) DEVICE — STOPCOCK NDLS INJ MALE LL IV

## (undated) DEVICE — SET MICROPUNCTURE

## (undated) DEVICE — SYS LABEL CORRECT MED

## (undated) DEVICE — FLOWSWITCH HP 1-W W/O LL

## (undated) DEVICE — CATH DXTERITY JR40 100CM 5FR

## (undated) DEVICE — KIT CUSTOM WASTE BAG

## (undated) DEVICE — KIT GLIDESHEATH 5FRX10CM

## (undated) DEVICE — INFLATION DEVICE ENCORE 26

## (undated) DEVICE — CATH BLLN FG APEX MR 2.00X15MM

## (undated) DEVICE — GUIDE LAUNCHER 6FR AL1.5

## (undated) DEVICE — SEE MEDLINE ITEM 157187

## (undated) DEVICE — SYR MED RAD 150ML

## (undated) DEVICE — CATH DIAG ANG 4FX120 SLIP

## (undated) DEVICE — TUBING CNTRST INJ ADPT 72IN

## (undated) DEVICE — SET DECANTER MEDICHOICE

## (undated) DEVICE — FILTER HYDROPHOBIC BACTRLOGCL

## (undated) DEVICE — KIT INTRO MICRO NIT VSI 4FR

## (undated) DEVICE — STENT RSINT27508UX MICROTRAC 2.75X8RX
Type: IMPLANTABLE DEVICE | Site: HEART | Status: NON-FUNCTIONAL
Brand: RESOLUTE INTEGRITY™
Removed: 2019-01-29